# Patient Record
Sex: MALE | Race: BLACK OR AFRICAN AMERICAN | NOT HISPANIC OR LATINO | Employment: OTHER | URBAN - METROPOLITAN AREA
[De-identification: names, ages, dates, MRNs, and addresses within clinical notes are randomized per-mention and may not be internally consistent; named-entity substitution may affect disease eponyms.]

---

## 2018-01-22 ENCOUNTER — OFFICE VISIT CONVERTED (OUTPATIENT)
Dept: INTERNAL MEDICINE | Facility: CLINIC | Age: 73
End: 2018-01-22
Attending: PHYSICIAN ASSISTANT

## 2018-03-12 ENCOUNTER — CONVERSION ENCOUNTER (OUTPATIENT)
Dept: INTERNAL MEDICINE | Facility: CLINIC | Age: 73
End: 2018-03-12

## 2018-03-12 ENCOUNTER — OFFICE VISIT CONVERTED (OUTPATIENT)
Dept: INTERNAL MEDICINE | Facility: CLINIC | Age: 73
End: 2018-03-12
Attending: INTERNAL MEDICINE

## 2018-06-12 ENCOUNTER — OFFICE VISIT CONVERTED (OUTPATIENT)
Dept: INTERNAL MEDICINE | Facility: CLINIC | Age: 73
End: 2018-06-12
Attending: INTERNAL MEDICINE

## 2018-06-20 ENCOUNTER — OFFICE VISIT CONVERTED (OUTPATIENT)
Dept: INTERNAL MEDICINE | Facility: CLINIC | Age: 73
End: 2018-06-20
Attending: PHYSICIAN ASSISTANT

## 2018-08-01 ENCOUNTER — OFFICE VISIT CONVERTED (OUTPATIENT)
Dept: PULMONOLOGY | Facility: CLINIC | Age: 73
End: 2018-08-01
Attending: INTERNAL MEDICINE

## 2018-09-17 ENCOUNTER — OFFICE VISIT CONVERTED (OUTPATIENT)
Dept: INTERNAL MEDICINE | Facility: CLINIC | Age: 73
End: 2018-09-17
Attending: INTERNAL MEDICINE

## 2018-09-18 ENCOUNTER — OFFICE VISIT CONVERTED (OUTPATIENT)
Dept: PULMONOLOGY | Facility: CLINIC | Age: 73
End: 2018-09-18
Attending: INTERNAL MEDICINE

## 2019-01-08 ENCOUNTER — OFFICE VISIT CONVERTED (OUTPATIENT)
Dept: PULMONOLOGY | Facility: CLINIC | Age: 74
End: 2019-01-08
Attending: INTERNAL MEDICINE

## 2019-01-23 ENCOUNTER — HOSPITAL ENCOUNTER (OUTPATIENT)
Dept: CARDIOLOGY | Facility: HOSPITAL | Age: 74
Discharge: HOME | End: 2019-01-23
Attending: INTERNAL MEDICINE

## 2019-02-15 ENCOUNTER — HOSPITAL ENCOUNTER (OUTPATIENT)
Dept: OTHER | Facility: HOSPITAL | Age: 74
Discharge: HOME | End: 2019-02-15
Attending: SPECIALIST

## 2019-02-15 LAB
ALBUMIN SERPL-MCNC: 4.4 G/DL (ref 3.5–5)
ALBUMIN/GLOB SERPL: 1.3 {RATIO} (ref 1.4–2.6)
ALP SERPL-CCNC: 63 U/L (ref 56–155)
ALT SERPL-CCNC: 22 U/L (ref 10–40)
ANION GAP SERPL CALC-SCNC: 19 MMOL/L (ref 8–19)
AST SERPL-CCNC: 26 U/L (ref 15–50)
BILIRUB SERPL-MCNC: 0.37 MG/DL (ref 0.2–1.3)
BUN SERPL-MCNC: 16 MG/DL (ref 5–25)
BUN/CREAT SERPL: 16 {RATIO} (ref 6–20)
CALCIUM SERPL-MCNC: 9.5 MG/DL (ref 8.7–10.4)
CHLORIDE SERPL-SCNC: 103 MMOL/L (ref 99–111)
CHOLEST SERPL-MCNC: 154 MG/DL (ref 107–200)
CHOLEST/HDLC SERPL: 3.6 {RATIO} (ref 3–6)
CONV CO2: 27 MMOL/L (ref 22–32)
CONV TOTAL PROTEIN: 7.8 G/DL (ref 6.3–8.2)
CREAT UR-MCNC: 1.03 MG/DL (ref 0.7–1.2)
EST. AVERAGE GLUCOSE BLD GHB EST-MCNC: 237 MG/DL
GFR SERPLBLD BASED ON 1.73 SQ M-ARVRAT: >60 ML/MIN/{1.73_M2}
GLOBULIN UR ELPH-MCNC: 3.4 G/DL (ref 2–3.5)
GLUCOSE SERPL-MCNC: 114 MG/DL (ref 70–99)
HBA1C MFR BLD: 9.9 % (ref 3.5–5.7)
HDLC SERPL-MCNC: 43 MG/DL (ref 40–60)
LDLC SERPL CALC-MCNC: 89 MG/DL (ref 70–100)
OSMOLALITY SERPL CALC.SUM OF ELEC: 302 MOSM/KG (ref 273–304)
POTASSIUM SERPL-SCNC: 4.2 MMOL/L (ref 3.5–5.3)
PSA SERPL-MCNC: 0.92 NG/ML (ref 0–4)
SODIUM SERPL-SCNC: 145 MMOL/L (ref 135–147)
TRIGL SERPL-MCNC: 110 MG/DL (ref 40–150)
TSH SERPL-ACNC: 2.37 M[IU]/L (ref 0.27–4.2)
VLDLC SERPL-MCNC: 22 MG/DL (ref 5–37)

## 2019-07-02 ENCOUNTER — HOSPITAL ENCOUNTER (OUTPATIENT)
Dept: OTHER | Facility: HOSPITAL | Age: 74
Discharge: HOME | End: 2019-07-02
Attending: INTERNAL MEDICINE

## 2019-07-02 ENCOUNTER — CONVERSION ENCOUNTER (OUTPATIENT)
Dept: INTERNAL MEDICINE | Facility: CLINIC | Age: 74
End: 2019-07-02

## 2019-07-02 ENCOUNTER — OFFICE VISIT CONVERTED (OUTPATIENT)
Dept: INTERNAL MEDICINE | Facility: CLINIC | Age: 74
End: 2019-07-02
Attending: INTERNAL MEDICINE

## 2019-07-03 ENCOUNTER — HOSPITAL ENCOUNTER (OUTPATIENT)
Dept: CARDIOLOGY | Facility: HOSPITAL | Age: 74
Discharge: HOME | End: 2019-07-03
Attending: INTERNAL MEDICINE

## 2019-09-23 ENCOUNTER — OFFICE VISIT CONVERTED (OUTPATIENT)
Dept: SURGERY | Facility: CLINIC | Age: 74
End: 2019-09-23
Attending: SURGERY

## 2019-09-30 ENCOUNTER — OFFICE VISIT CONVERTED (OUTPATIENT)
Dept: INTERNAL MEDICINE | Facility: CLINIC | Age: 74
End: 2019-09-30
Attending: INTERNAL MEDICINE

## 2019-10-07 ENCOUNTER — HOSPITAL ENCOUNTER (OUTPATIENT)
Dept: GASTROENTEROLOGY | Facility: HOSPITAL | Age: 74
Setting detail: HOSPITAL OUTPATIENT SURGERY
Discharge: HOME | End: 2019-10-07
Attending: SURGERY

## 2019-10-07 LAB — GLUCOSE BLD-MCNC: 167 MG/DL (ref 70–99)

## 2019-10-28 ENCOUNTER — OFFICE VISIT CONVERTED (OUTPATIENT)
Dept: SURGERY | Facility: CLINIC | Age: 74
End: 2019-10-28
Attending: SURGERY

## 2020-02-04 ENCOUNTER — OFFICE VISIT CONVERTED (OUTPATIENT)
Dept: INTERNAL MEDICINE | Facility: CLINIC | Age: 75
End: 2020-02-04
Attending: INTERNAL MEDICINE

## 2020-02-04 ENCOUNTER — HOSPITAL ENCOUNTER (OUTPATIENT)
Dept: OTHER | Facility: HOSPITAL | Age: 75
Discharge: HOME | End: 2020-02-04
Attending: INTERNAL MEDICINE

## 2020-02-04 LAB
ALBUMIN SERPL-MCNC: 4.4 G/DL (ref 3.5–5)
ALBUMIN/GLOB SERPL: 1.3 {RATIO} (ref 1.4–2.6)
ALP SERPL-CCNC: 73 U/L (ref 56–155)
ALT SERPL-CCNC: 25 U/L (ref 10–40)
ANION GAP SERPL CALC-SCNC: 19 MMOL/L (ref 8–19)
AST SERPL-CCNC: 29 U/L (ref 15–50)
BASOPHILS # BLD AUTO: 0.04 10*3/UL (ref 0–0.2)
BASOPHILS NFR BLD AUTO: 0.5 % (ref 0–3)
BILIRUB SERPL-MCNC: 0.5 MG/DL (ref 0.2–1.3)
BUN SERPL-MCNC: 12 MG/DL (ref 5–25)
BUN/CREAT SERPL: 12 {RATIO} (ref 6–20)
CALCIUM SERPL-MCNC: 9.8 MG/DL (ref 8.7–10.4)
CHLORIDE SERPL-SCNC: 93 MMOL/L (ref 99–111)
CHOLEST SERPL-MCNC: 113 MG/DL (ref 107–200)
CHOLEST/HDLC SERPL: 2.6 {RATIO} (ref 3–6)
CONV ABS IMM GRAN: 0.02 10*3/UL (ref 0–0.2)
CONV CO2: 27 MMOL/L (ref 22–32)
CONV IMMATURE GRAN: 0.2 % (ref 0–1.8)
CONV TOTAL PROTEIN: 7.8 G/DL (ref 6.3–8.2)
CREAT UR-MCNC: 1.01 MG/DL (ref 0.7–1.2)
DEPRECATED RDW RBC AUTO: 45.6 FL (ref 35.1–43.9)
EOSINOPHIL # BLD AUTO: 0.11 10*3/UL (ref 0–0.7)
EOSINOPHIL # BLD AUTO: 1.3 % (ref 0–7)
ERYTHROCYTE [DISTWIDTH] IN BLOOD BY AUTOMATED COUNT: 13.3 % (ref 11.6–14.4)
EST. AVERAGE GLUCOSE BLD GHB EST-MCNC: 217 MG/DL
FOLATE SERPL-MCNC: >20 NG/ML (ref 4.8–20)
GFR SERPLBLD BASED ON 1.73 SQ M-ARVRAT: >60 ML/MIN/{1.73_M2}
GLOBULIN UR ELPH-MCNC: 3.4 G/DL (ref 2–3.5)
GLUCOSE SERPL-MCNC: 291 MG/DL (ref 70–99)
HBA1C MFR BLD: 9.2 % (ref 3.5–5.7)
HCT VFR BLD AUTO: 43.9 % (ref 42–52)
HDLC SERPL-MCNC: 43 MG/DL (ref 40–60)
HGB BLD-MCNC: 14.3 G/DL (ref 14–18)
IRON SATN MFR SERPL: 38 % (ref 20–55)
IRON SERPL-MCNC: 111 UG/DL (ref 70–180)
LDLC SERPL CALC-MCNC: 57 MG/DL (ref 70–100)
LYMPHOCYTES # BLD AUTO: 1.77 10*3/UL (ref 1–5)
LYMPHOCYTES NFR BLD AUTO: 20.8 % (ref 20–45)
MCH RBC QN AUTO: 30.4 PG (ref 27–31)
MCHC RBC AUTO-ENTMCNC: 32.6 G/DL (ref 33–37)
MCV RBC AUTO: 93.4 FL (ref 80–96)
MONOCYTES # BLD AUTO: 0.84 10*3/UL (ref 0.2–1.2)
MONOCYTES NFR BLD AUTO: 9.8 % (ref 3–10)
NEUTROPHILS # BLD AUTO: 5.75 10*3/UL (ref 2–8)
NEUTROPHILS NFR BLD AUTO: 67.4 % (ref 30–85)
NRBC CBCN: 0 % (ref 0–0.7)
OSMOLALITY SERPL CALC.SUM OF ELEC: 288 MOSM/KG (ref 273–304)
PLATELET # BLD AUTO: 231 10*3/UL (ref 130–400)
PMV BLD AUTO: 10.8 FL (ref 9.4–12.4)
POTASSIUM SERPL-SCNC: 5 MMOL/L (ref 3.5–5.3)
RBC # BLD AUTO: 4.7 10*6/UL (ref 4.7–6.1)
SODIUM SERPL-SCNC: 134 MMOL/L (ref 135–147)
T4 FREE SERPL-MCNC: 0.9 NG/DL (ref 0.9–1.8)
TIBC SERPL-MCNC: 293 UG/DL (ref 245–450)
TRANSFERRIN SERPL-MCNC: 205 MG/DL (ref 215–365)
TRIGL SERPL-MCNC: 66 MG/DL (ref 40–150)
TSH SERPL-ACNC: 6.37 M[IU]/L (ref 0.27–4.2)
VIT B12 SERPL-MCNC: 945 PG/ML (ref 211–911)
VLDLC SERPL-MCNC: 13 MG/DL (ref 5–37)
WBC # BLD AUTO: 8.53 10*3/UL (ref 4.8–10.8)

## 2020-02-14 ENCOUNTER — CONVERSION ENCOUNTER (OUTPATIENT)
Dept: ORTHOPEDIC SURGERY | Facility: CLINIC | Age: 75
End: 2020-02-14

## 2020-02-14 ENCOUNTER — OFFICE VISIT CONVERTED (OUTPATIENT)
Dept: ORTHOPEDIC SURGERY | Facility: CLINIC | Age: 75
End: 2020-02-14
Attending: ORTHOPAEDIC SURGERY

## 2020-03-10 ENCOUNTER — OFFICE VISIT CONVERTED (OUTPATIENT)
Dept: ORTHOPEDIC SURGERY | Facility: CLINIC | Age: 75
End: 2020-03-10
Attending: ORTHOPAEDIC SURGERY

## 2020-03-17 ENCOUNTER — HOSPITAL ENCOUNTER (OUTPATIENT)
Dept: PREADMISSION TESTING | Facility: HOSPITAL | Age: 75
Discharge: HOME | End: 2020-03-17
Attending: ORTHOPAEDIC SURGERY

## 2020-03-17 LAB
ALBUMIN SERPL-MCNC: 4.3 G/DL (ref 3.5–5)
ALBUMIN/GLOB SERPL: 1.3 {RATIO} (ref 1.4–2.6)
ALP SERPL-CCNC: 68 U/L (ref 56–155)
ALT SERPL-CCNC: 19 U/L (ref 10–40)
ANION GAP SERPL CALC-SCNC: 16 MMOL/L (ref 8–19)
APTT BLD: 22.7 S (ref 22.2–34.2)
AST SERPL-CCNC: 23 U/L (ref 15–50)
BASOPHILS # BLD AUTO: 0.04 10*3/UL (ref 0–0.2)
BASOPHILS NFR BLD AUTO: 0.6 % (ref 0–3)
BILIRUB SERPL-MCNC: 0.31 MG/DL (ref 0.2–1.3)
BUN SERPL-MCNC: 19 MG/DL (ref 5–25)
BUN/CREAT SERPL: 19 {RATIO} (ref 6–20)
CALCIUM SERPL-MCNC: 9.2 MG/DL (ref 8.7–10.4)
CHLORIDE SERPL-SCNC: 97 MMOL/L (ref 99–111)
CONV ABS IMM GRAN: 0.02 10*3/UL (ref 0–0.2)
CONV CO2: 27 MMOL/L (ref 22–32)
CONV IMMATURE GRAN: 0.3 % (ref 0–1.8)
CONV TOTAL PROTEIN: 7.6 G/DL (ref 6.3–8.2)
CREAT UR-MCNC: 0.99 MG/DL (ref 0.7–1.2)
DEPRECATED RDW RBC AUTO: 47.9 FL (ref 35.1–43.9)
EOSINOPHIL # BLD AUTO: 0.12 10*3/UL (ref 0–0.7)
EOSINOPHIL # BLD AUTO: 1.7 % (ref 0–7)
ERYTHROCYTE [DISTWIDTH] IN BLOOD BY AUTOMATED COUNT: 13.8 % (ref 11.6–14.4)
EST. AVERAGE GLUCOSE BLD GHB EST-MCNC: 203 MG/DL
GFR SERPLBLD BASED ON 1.73 SQ M-ARVRAT: >60 ML/MIN/{1.73_M2}
GLOBULIN UR ELPH-MCNC: 3.3 G/DL (ref 2–3.5)
GLUCOSE SERPL-MCNC: 190 MG/DL (ref 70–99)
HBA1C MFR BLD: 8.7 % (ref 3.5–5.7)
HCT VFR BLD AUTO: 40.7 % (ref 42–52)
HGB BLD-MCNC: 13.3 G/DL (ref 14–18)
INR PPP: 0.94 (ref 2–3)
LYMPHOCYTES # BLD AUTO: 1.58 10*3/UL (ref 1–5)
LYMPHOCYTES NFR BLD AUTO: 21.9 % (ref 20–45)
MCH RBC QN AUTO: 31.1 PG (ref 27–31)
MCHC RBC AUTO-ENTMCNC: 32.7 G/DL (ref 33–37)
MCV RBC AUTO: 95.1 FL (ref 80–96)
MONOCYTES # BLD AUTO: 0.92 10*3/UL (ref 0.2–1.2)
MONOCYTES NFR BLD AUTO: 12.8 % (ref 3–10)
NEUTROPHILS # BLD AUTO: 4.53 10*3/UL (ref 2–8)
NEUTROPHILS NFR BLD AUTO: 62.7 % (ref 30–85)
NRBC CBCN: 0 % (ref 0–0.7)
OSMOLALITY SERPL CALC.SUM OF ELEC: 289 MOSM/KG (ref 273–304)
PLATELET # BLD AUTO: 225 10*3/UL (ref 130–400)
PMV BLD AUTO: 9.7 FL (ref 9.4–12.4)
POTASSIUM SERPL-SCNC: 4 MMOL/L (ref 3.5–5.3)
PROTHROMBIN TIME: 10.3 S (ref 9.4–12)
RBC # BLD AUTO: 4.28 10*6/UL (ref 4.7–6.1)
SODIUM SERPL-SCNC: 136 MMOL/L (ref 135–147)
WBC # BLD AUTO: 7.21 10*3/UL (ref 4.8–10.8)

## 2020-05-22 ENCOUNTER — HOSPITAL ENCOUNTER (OUTPATIENT)
Dept: PREADMISSION TESTING | Facility: HOSPITAL | Age: 75
Discharge: HOME | End: 2020-05-22
Attending: ORTHOPAEDIC SURGERY

## 2020-05-22 LAB
ALBUMIN SERPL-MCNC: 4.3 G/DL (ref 3.5–5)
ALBUMIN/GLOB SERPL: 1.4 {RATIO} (ref 1.4–2.6)
ALP SERPL-CCNC: 66 U/L (ref 56–155)
ALT SERPL-CCNC: 22 U/L (ref 10–40)
ANION GAP SERPL CALC-SCNC: 15 MMOL/L (ref 8–19)
APTT BLD: 24.4 S (ref 22.2–34.2)
AST SERPL-CCNC: 26 U/L (ref 15–50)
BASOPHILS # BLD AUTO: 0.04 10*3/UL (ref 0–0.2)
BASOPHILS NFR BLD AUTO: 0.6 % (ref 0–3)
BILIRUB SERPL-MCNC: 0.36 MG/DL (ref 0.2–1.3)
BUN SERPL-MCNC: 16 MG/DL (ref 5–25)
BUN/CREAT SERPL: 17 {RATIO} (ref 6–20)
CALCIUM SERPL-MCNC: 9.2 MG/DL (ref 8.7–10.4)
CHLORIDE SERPL-SCNC: 99 MMOL/L (ref 99–111)
CONV ABS IMM GRAN: 0.01 10*3/UL (ref 0–0.2)
CONV CO2: 26 MMOL/L (ref 22–32)
CONV IMMATURE GRAN: 0.2 % (ref 0–1.8)
CONV TOTAL PROTEIN: 7.4 G/DL (ref 6.3–8.2)
CREAT UR-MCNC: 0.92 MG/DL (ref 0.7–1.2)
DEPRECATED RDW RBC AUTO: 42.4 FL (ref 35.1–43.9)
EOSINOPHIL # BLD AUTO: 0.17 10*3/UL (ref 0–0.7)
EOSINOPHIL # BLD AUTO: 2.7 % (ref 0–7)
ERYTHROCYTE [DISTWIDTH] IN BLOOD BY AUTOMATED COUNT: 12.4 % (ref 11.6–14.4)
EST. AVERAGE GLUCOSE BLD GHB EST-MCNC: 203 MG/DL
GFR SERPLBLD BASED ON 1.73 SQ M-ARVRAT: >60 ML/MIN/{1.73_M2}
GLOBULIN UR ELPH-MCNC: 3.1 G/DL (ref 2–3.5)
GLUCOSE SERPL-MCNC: 112 MG/DL (ref 70–99)
HBA1C MFR BLD: 8.7 % (ref 3.5–5.7)
HCT VFR BLD AUTO: 41.5 % (ref 42–52)
HGB BLD-MCNC: 13.6 G/DL (ref 14–18)
INR PPP: 1.02 (ref 2–3)
LYMPHOCYTES # BLD AUTO: 1.51 10*3/UL (ref 1–5)
LYMPHOCYTES NFR BLD AUTO: 23.9 % (ref 20–45)
MCH RBC QN AUTO: 30.4 PG (ref 27–31)
MCHC RBC AUTO-ENTMCNC: 32.8 G/DL (ref 33–37)
MCV RBC AUTO: 92.8 FL (ref 80–96)
MONOCYTES # BLD AUTO: 0.81 10*3/UL (ref 0.2–1.2)
MONOCYTES NFR BLD AUTO: 12.8 % (ref 3–10)
NEUTROPHILS # BLD AUTO: 3.79 10*3/UL (ref 2–8)
NEUTROPHILS NFR BLD AUTO: 59.8 % (ref 30–85)
NRBC CBCN: 0 % (ref 0–0.7)
OSMOLALITY SERPL CALC.SUM OF ELEC: 284 MOSM/KG (ref 273–304)
PLATELET # BLD AUTO: 194 10*3/UL (ref 130–400)
PMV BLD AUTO: 9.7 FL (ref 9.4–12.4)
POTASSIUM SERPL-SCNC: 4 MMOL/L (ref 3.5–5.3)
PROTHROMBIN TIME: 10.9 S (ref 9.4–12)
RBC # BLD AUTO: 4.47 10*6/UL (ref 4.7–6.1)
SODIUM SERPL-SCNC: 136 MMOL/L (ref 135–147)
WBC # BLD AUTO: 6.33 10*3/UL (ref 4.8–10.8)

## 2020-05-25 ENCOUNTER — HOSPITAL ENCOUNTER (OUTPATIENT)
Dept: PERIOP | Facility: HOSPITAL | Age: 75
Discharge: HOME | End: 2020-05-25
Attending: ORTHOPAEDIC SURGERY

## 2020-05-26 LAB — SARS-COV-2 RNA SPEC QL NAA+PROBE: NOT DETECTED

## 2020-06-01 ENCOUNTER — TELEPHONE CONVERTED (OUTPATIENT)
Dept: INTERNAL MEDICINE | Facility: CLINIC | Age: 75
End: 2020-06-01
Attending: INTERNAL MEDICINE

## 2020-06-01 ENCOUNTER — CONVERSION ENCOUNTER (OUTPATIENT)
Dept: INTERNAL MEDICINE | Facility: CLINIC | Age: 75
End: 2020-06-01

## 2020-06-04 ENCOUNTER — HOSPITAL ENCOUNTER (OUTPATIENT)
Dept: NUCLEAR MEDICINE | Facility: HOSPITAL | Age: 75
Discharge: HOME | End: 2020-06-04
Attending: SPECIALIST

## 2020-09-21 ENCOUNTER — HOSPITAL ENCOUNTER (OUTPATIENT)
Dept: PREADMISSION TESTING | Facility: HOSPITAL | Age: 75
Discharge: HOME | End: 2020-09-21
Attending: ORTHOPAEDIC SURGERY

## 2020-09-22 ENCOUNTER — HOSPITAL ENCOUNTER (OUTPATIENT)
Dept: PREADMISSION TESTING | Facility: HOSPITAL | Age: 75
Discharge: HOME | End: 2020-09-22
Attending: ORTHOPAEDIC SURGERY

## 2020-09-22 LAB
ALBUMIN SERPL-MCNC: 4.2 G/DL (ref 3.5–5)
ALBUMIN/GLOB SERPL: 1.4 {RATIO} (ref 1.4–2.6)
ALP SERPL-CCNC: 74 U/L (ref 56–155)
ALT SERPL-CCNC: 29 U/L (ref 10–40)
ANION GAP SERPL CALC-SCNC: 14 MMOL/L (ref 8–19)
APTT BLD: 23.8 S (ref 22.2–34.2)
AST SERPL-CCNC: 32 U/L (ref 15–50)
BASOPHILS # BLD AUTO: 0.03 10*3/UL (ref 0–0.2)
BASOPHILS NFR BLD AUTO: 0.5 % (ref 0–3)
BILIRUB SERPL-MCNC: 0.8 MG/DL (ref 0.2–1.3)
BUN SERPL-MCNC: 14 MG/DL (ref 5–25)
BUN/CREAT SERPL: 13 {RATIO} (ref 6–20)
CALCIUM SERPL-MCNC: 9 MG/DL (ref 8.7–10.4)
CHLORIDE SERPL-SCNC: 101 MMOL/L (ref 99–111)
CONV ABS IMM GRAN: 0.02 10*3/UL (ref 0–0.2)
CONV CO2: 28 MMOL/L (ref 22–32)
CONV IMMATURE GRAN: 0.3 % (ref 0–1.8)
CONV TOTAL PROTEIN: 7.3 G/DL (ref 6.3–8.2)
CREAT UR-MCNC: 1.05 MG/DL (ref 0.7–1.2)
DEPRECATED RDW RBC AUTO: 49.2 FL (ref 35.1–43.9)
EOSINOPHIL # BLD AUTO: 0.15 10*3/UL (ref 0–0.7)
EOSINOPHIL # BLD AUTO: 2.5 % (ref 0–7)
ERYTHROCYTE [DISTWIDTH] IN BLOOD BY AUTOMATED COUNT: 14 % (ref 11.6–14.4)
EST. AVERAGE GLUCOSE BLD GHB EST-MCNC: 200 MG/DL
GFR SERPLBLD BASED ON 1.73 SQ M-ARVRAT: >60 ML/MIN/{1.73_M2}
GLOBULIN UR ELPH-MCNC: 3.1 G/DL (ref 2–3.5)
GLUCOSE SERPL-MCNC: 188 MG/DL (ref 70–99)
HBA1C MFR BLD: 8.6 % (ref 3.5–5.7)
HCT VFR BLD AUTO: 42 % (ref 42–52)
HGB BLD-MCNC: 13.4 G/DL (ref 14–18)
INR PPP: 1.03 (ref 2–3)
LYMPHOCYTES # BLD AUTO: 1.72 10*3/UL (ref 1–5)
LYMPHOCYTES NFR BLD AUTO: 28.9 % (ref 20–45)
MCH RBC QN AUTO: 30.5 PG (ref 27–31)
MCHC RBC AUTO-ENTMCNC: 31.9 G/DL (ref 33–37)
MCV RBC AUTO: 95.5 FL (ref 80–96)
MONOCYTES # BLD AUTO: 0.75 10*3/UL (ref 0.2–1.2)
MONOCYTES NFR BLD AUTO: 12.6 % (ref 3–10)
NEUTROPHILS # BLD AUTO: 3.29 10*3/UL (ref 2–8)
NEUTROPHILS NFR BLD AUTO: 55.2 % (ref 30–85)
NRBC CBCN: 0 % (ref 0–0.7)
OSMOLALITY SERPL CALC.SUM OF ELEC: 293 MOSM/KG (ref 273–304)
PLATELET # BLD AUTO: 192 10*3/UL (ref 130–400)
PMV BLD AUTO: 9.9 FL (ref 9.4–12.4)
POTASSIUM SERPL-SCNC: 4.2 MMOL/L (ref 3.5–5.3)
PROTHROMBIN TIME: 11 S (ref 9.4–12)
RBC # BLD AUTO: 4.4 10*6/UL (ref 4.7–6.1)
SARS-COV-2 RNA SPEC QL NAA+PROBE: NOT DETECTED
SODIUM SERPL-SCNC: 139 MMOL/L (ref 135–147)
WBC # BLD AUTO: 5.96 10*3/UL (ref 4.8–10.8)

## 2020-10-09 ENCOUNTER — CONVERSION ENCOUNTER (OUTPATIENT)
Dept: ORTHOPEDIC SURGERY | Facility: CLINIC | Age: 75
End: 2020-10-09

## 2020-10-09 ENCOUNTER — OFFICE VISIT CONVERTED (OUTPATIENT)
Dept: ORTHOPEDIC SURGERY | Facility: CLINIC | Age: 75
End: 2020-10-09
Attending: PHYSICIAN ASSISTANT

## 2020-11-10 ENCOUNTER — OFFICE VISIT CONVERTED (OUTPATIENT)
Dept: ORTHOPEDIC SURGERY | Facility: CLINIC | Age: 75
End: 2020-11-10
Attending: PHYSICIAN ASSISTANT

## 2020-12-02 ENCOUNTER — OFFICE VISIT CONVERTED (OUTPATIENT)
Dept: INTERNAL MEDICINE | Facility: CLINIC | Age: 75
End: 2020-12-02
Attending: INTERNAL MEDICINE

## 2020-12-02 ENCOUNTER — HOSPITAL ENCOUNTER (OUTPATIENT)
Dept: OTHER | Facility: HOSPITAL | Age: 75
Discharge: HOME | End: 2020-12-02
Attending: INTERNAL MEDICINE

## 2020-12-02 LAB
ALBUMIN SERPL-MCNC: 4.6 G/DL (ref 3.5–5)
ALBUMIN/GLOB SERPL: 1.2 {RATIO} (ref 1.4–2.6)
ALP SERPL-CCNC: 97 U/L (ref 56–155)
ALT SERPL-CCNC: 22 U/L (ref 10–40)
ANION GAP SERPL CALC-SCNC: 22 MMOL/L (ref 8–19)
AST SERPL-CCNC: 26 U/L (ref 15–50)
BASOPHILS # BLD AUTO: 0.03 10*3/UL (ref 0–0.2)
BASOPHILS NFR BLD AUTO: 0.3 % (ref 0–3)
BILIRUB SERPL-MCNC: 1.02 MG/DL (ref 0.2–1.3)
BUN SERPL-MCNC: 20 MG/DL (ref 5–25)
BUN/CREAT SERPL: 18 {RATIO} (ref 6–20)
CALCIUM SERPL-MCNC: 9.4 MG/DL (ref 8.7–10.4)
CHLORIDE SERPL-SCNC: 95 MMOL/L (ref 99–111)
CHOLEST SERPL-MCNC: 181 MG/DL (ref 107–200)
CHOLEST/HDLC SERPL: 3.3 {RATIO} (ref 3–6)
CONV ABS IMM GRAN: 0.01 10*3/UL (ref 0–0.2)
CONV CO2: 23 MMOL/L (ref 22–32)
CONV IMMATURE GRAN: 0.1 % (ref 0–1.8)
CONV TOTAL PROTEIN: 8.3 G/DL (ref 6.3–8.2)
CREAT UR-MCNC: 1.14 MG/DL (ref 0.7–1.2)
DEPRECATED RDW RBC AUTO: 44.7 FL (ref 35.1–43.9)
EOSINOPHIL # BLD AUTO: 0.09 10*3/UL (ref 0–0.7)
EOSINOPHIL # BLD AUTO: 0.9 % (ref 0–7)
ERYTHROCYTE [DISTWIDTH] IN BLOOD BY AUTOMATED COUNT: 12.9 % (ref 11.6–14.4)
GFR SERPLBLD BASED ON 1.73 SQ M-ARVRAT: >60 ML/MIN/{1.73_M2}
GLOBULIN UR ELPH-MCNC: 3.7 G/DL (ref 2–3.5)
GLUCOSE SERPL-MCNC: 387 MG/DL (ref 70–99)
HCT VFR BLD AUTO: 45.4 % (ref 42–52)
HDLC SERPL-MCNC: 55 MG/DL (ref 40–60)
HGB BLD-MCNC: 14.4 G/DL (ref 14–18)
LDLC SERPL CALC-MCNC: 103 MG/DL (ref 70–100)
LYMPHOCYTES # BLD AUTO: 1.71 10*3/UL (ref 1–5)
LYMPHOCYTES NFR BLD AUTO: 17.9 % (ref 20–45)
MCH RBC QN AUTO: 29.9 PG (ref 27–31)
MCHC RBC AUTO-ENTMCNC: 31.7 G/DL (ref 33–37)
MCV RBC AUTO: 94.4 FL (ref 80–96)
MONOCYTES # BLD AUTO: 0.7 10*3/UL (ref 0.2–1.2)
MONOCYTES NFR BLD AUTO: 7.3 % (ref 3–10)
NEUTROPHILS # BLD AUTO: 7 10*3/UL (ref 2–8)
NEUTROPHILS NFR BLD AUTO: 73.5 % (ref 30–85)
NRBC CBCN: 0 % (ref 0–0.7)
OSMOLALITY SERPL CALC.SUM OF ELEC: 299 MOSM/KG (ref 273–304)
PLATELET # BLD AUTO: 200 10*3/UL (ref 130–400)
PMV BLD AUTO: 10.9 FL (ref 9.4–12.4)
POTASSIUM SERPL-SCNC: 4.7 MMOL/L (ref 3.5–5.3)
RBC # BLD AUTO: 4.81 10*6/UL (ref 4.7–6.1)
SODIUM SERPL-SCNC: 135 MMOL/L (ref 135–147)
T4 FREE SERPL-MCNC: 1.1 NG/DL (ref 0.9–1.8)
TRIGL SERPL-MCNC: 117 MG/DL (ref 40–150)
TSH SERPL-ACNC: 2.82 M[IU]/L (ref 0.27–4.2)
VLDLC SERPL-MCNC: 23 MG/DL (ref 5–37)
WBC # BLD AUTO: 9.54 10*3/UL (ref 4.8–10.8)

## 2020-12-03 LAB
EST. AVERAGE GLUCOSE BLD GHB EST-MCNC: 192 MG/DL
HBA1C MFR BLD: 8.3 % (ref 3.5–5.7)

## 2020-12-04 LAB
CONV HEPATITIS C AB WITH REFLEX TO CONFIRMATION: <0.1 S/CO RATIO (ref 0–0.9)
CONV HEPATITIS COMMENT: NORMAL

## 2020-12-15 ENCOUNTER — OFFICE VISIT CONVERTED (OUTPATIENT)
Dept: ORTHOPEDIC SURGERY | Facility: CLINIC | Age: 75
End: 2020-12-15
Attending: PHYSICIAN ASSISTANT

## 2020-12-16 ENCOUNTER — HOSPITAL ENCOUNTER (OUTPATIENT)
Dept: MRI IMAGING | Facility: HOSPITAL | Age: 75
Discharge: HOME | End: 2020-12-16
Attending: INTERNAL MEDICINE

## 2021-01-07 ENCOUNTER — OFFICE VISIT CONVERTED (OUTPATIENT)
Dept: ORTHOPEDIC SURGERY | Facility: CLINIC | Age: 76
End: 2021-01-07
Attending: ORTHOPAEDIC SURGERY

## 2021-02-09 ENCOUNTER — OFFICE VISIT CONVERTED (OUTPATIENT)
Dept: ORTHOPEDIC SURGERY | Facility: CLINIC | Age: 76
End: 2021-02-09
Attending: PHYSICIAN ASSISTANT

## 2021-03-25 ENCOUNTER — OFFICE VISIT CONVERTED (OUTPATIENT)
Dept: ORTHOPEDIC SURGERY | Facility: CLINIC | Age: 76
End: 2021-03-25

## 2021-03-26 ENCOUNTER — OFFICE VISIT CONVERTED (OUTPATIENT)
Dept: INTERNAL MEDICINE | Facility: CLINIC | Age: 76
End: 2021-03-26
Attending: INTERNAL MEDICINE

## 2021-03-26 ENCOUNTER — CONVERSION ENCOUNTER (OUTPATIENT)
Dept: INTERNAL MEDICINE | Facility: CLINIC | Age: 76
End: 2021-03-26

## 2021-04-12 ENCOUNTER — HOSPITAL ENCOUNTER (OUTPATIENT)
Dept: CARDIOLOGY | Facility: HOSPITAL | Age: 76
Discharge: HOME | End: 2021-04-12
Attending: PODIATRIST

## 2021-05-10 NOTE — H&P
History and Physical      Patient Name: Giles Donovan   Patient ID: [id number]   Sex: Male   YOB: 1945    Primary Care Provider: Morena Masters MD   Referring Provider: Kannan Bhatti MD    Visit Date: January 7, 2021    Provider: Ho Gilliam MD   Location: [facility]   Location Address: [address] [place] [address]  [id number]   Location Phone: [phone]          Chief Complaint  · Left Knee Pain      History Of Present Illness  Giles Donovan is a 75 year old /Black male who presents today to [facility].      Patient presents today for an evaluation of left knee pain. Patient had a fall a couple of months ago when he was voting resulting in left knee injury. Patient has been having a lot of posterior knee pain. Patient states he received a cortisone injection in his left knee by Dr. Masters that has given him relief of pain for a couple of days. Patient states he has generalized knee pain when weight bearing.       Past Medical History  Allergic rhinitis; Cataract; Cough; Depression; Diabetes Mellitus, Type II; GERD; Hyperlipidemia; Hypertension; Hypothyroidism; Psychiatric Care; Seasonal allergies; Shortness of Breath; Stenosis of right carotid artery; Syncope; Upper Respiratory Infection         Past Surgical History  Cataract surgery; Colonoscopy; Retinal tear repair NEC; Toe amputation, left, single toe         Medication List  aspirin 81 mg oral tablet,chewable; atorvastatin 20 mg oral tablet; carvedilol 12.5 mg oral tablet; felodipine 5 mg oral tablet extended release 24 hr; gabapentin 600 mg oral tablet; Lantus Solostar 100 unit/mL (3 mL) subcutaneous insulin pen; losartan 50 mg oral tablet; Obed Multivitamin For Men 200-175-250 mcg oral tablet; meloxicam 7.5 mg oral tablet; Novolog Flexpen 100 unit/mL subcutaneous insulin pen; omeprazole 40 mg oral capsule,delayed release(DR/EC); oxybutynin chloride 5 mg oral tablet; sildenafil oral; Synthroid  "88 mcg oral tablet; Tessalon Perles 100 mg oral capsule; Zyrtec 10 mg oral tablet         Allergy List  Latex       Allergies Reconciled  Family Medical History  Heart Disease; Lupus Erythematosus; Kidney Disease; Family history of Arthritis         Social History  Alcohol (Current some day); Alcohol Use (Current some day); Disabled; lives with other; Retired; Retired.; Single.; Tobacco (Never)         Immunizations  Name Date Admin   Influenza 12/02/2020   Influenza 10/01/2019   Influenza 09/17/2018         Review of Systems  · Constitutional  o Denies  o : fever, chills, weight loss  · Cardiovascular  o Denies  o : chest pain, shortness of breath  · Gastrointestinal  o Denies  o : liver disease, heartburn, nausea, blood in stools  · Genitourinary  o Denies  o : painful urination, blood in urine  · Integument  o Denies  o : rash, itching  · Neurologic  o Denies  o : headache, weakness, loss of consciousness  · Musculoskeletal  o Denies  o : painful, swollen joints  · Psychiatric  o Denies  o : drug/alcohol addiction, anxiety, depression      Vitals  Date Time BP Position Site L\R Cuff Size HR RR TEMP (F) WT  HT  BMI kg/m2 BSA m2 O2 Sat FR L/min FiO2 HC       01/07/2021 10:27 AM      82 - R   204lbs 16oz 5'  7\" 32.11 2.1 94 %            Physical Examination  · Constitutional  o Appearance  o : well developed, well-nourished, no obvious deformities present  · Head and Face  o Head  o :   § Inspection  § : normocephalic  o Face  o :   § Inspection  § : no facial lesions  · Eyes  o Conjunctivae  o : conjunctivae normal  o Sclerae  o : sclerae white  · Ears, Nose, Mouth and Throat  o Ears  o :   § External Ears  § : appearance within normal limits  § Hearing  § : intact  o Nose  o :   § External Nose  § : appearance normal  · Neck  o Inspection/Palpation  o : normal appearance  o Range of Motion  o : full range of motion  · Respiratory  o Respiratory Effort  o : breathing unlabored  o Inspection of Chest  o : normal " appearance  o Auscultation of Lungs  o : no audible wheezing or rales  · Cardiovascular  o Heart  o : regular rate  · Gastrointestinal  o Abdominal Examination  o : soft and non-tender  · Skin and Subcutaneous Tissue  o General Inspection  o : intact, no rashes  · Psychiatric  o General  o : Alert and oriented x3  o Judgement and Insight  o : judgment and insight intact  o Mood and Affect  o : mood normal, affect appropriate  · Left Knee  o Inspection  o : Sensation grossly intact. Tender to palpation on joint lines. No swelling, skin discoloration or atrophy. Full weight bearing. Cane for ambulation assistance. Full flexion and extension. Stable to valgus/varus stress. Good strength in quadriceps, hamstrings, dorsiflexors, and plantar flexors.  · Injection Note/Aspiration Note  o Site  o : left knee   o Procedure  o : Procedure: After educating the patient, patient gave consent for procedure. After using Chloraprep, the joint space was injected. The patient tolerated the procedure well.   o Medication  o : Synvisc One 48 mg   · Imaging  o Imaging  o : 12/16/20 MRI: 1. Mild to moderate tricompartmental osteoarthritis, most pronounced within the medial compartment. 2. Vertical tear of the body of the medial meniscus with small oblique tear of the apex of the posterior horn of the medial meniscus. The lateral meniscus appears intact.           Assessment  · Primary osteoarthritis of left knee     715.16/M17.12  · Hamstring Tendinitis     726.90/M77.9      Plan  · Orders  o Hyaluronan or derivative, Synvisc or Synvisc-One, for intra-venu () - 715.16/M17.12 - 01/07/2021   Lot [id number] Exp 02 2023 [employer] Administered by DANIEL Gilliam MD  o Knee Intra-articular Injection without US Guidance [facility] (54087) - 715.16/M17.12 - 01/07/2021  · Medications  o Medications have been Reconciled  o Transition of Care or Provider Policy  · Instructions  o Dr. Gilliam saw and examined the patient and agrees with plan.   o X-rays reviewed by  Dr. Gilliam.  o Reviewed the patient's Past Medical, Social, and Family history as well as the ROS at today's visit, no changes.  o Call or return if worsening symptoms.  o Follow up in 1 month.  o This note was transcribed by Beverley Chisholm. ernesto  o Discussed diagnosis and treatment plans with the patient. Patient states his hip replacement is doing well. Patient opted for physical therapy and a left knee injection. Patient tolerated the left knee injection well.             Electronically Signed by: Beverley Chisholm-, Other -Author on January 8, 2021 02:33:19 PM  Electronically Co-signed by: Ho Gilliam MD -Reviewer on January 9, 2021 10:14:46 AM

## 2021-05-13 NOTE — PROGRESS NOTES
Progress Note      Patient Name: Giles Donovan   Patient ID: [id number]   Sex: Male   YOB: 1945    Primary Care Provider: Morena Masters MD   Referring Provider: Kannan Bhatti MD    Visit Date: October 9, 2020    Provider: Tawny Glasgow PA-C   Location: [facility]   Location Address: [address] [place] [address]  [id number]   Location Phone: [phone]          History Of Present Illness  Giles Donovan is a 74 year old /Black male who presents today to [facility].      Patient is status post right total hip arthroplasty performed 9/25/20 by Dr. Gilliam. Patient is doing very well attending physical therapy at [facility]. Patient states minimal pain in right hip. Patient denies calf pain. Patient is using a cane.       Past Medical History  Allergic rhinitis; Cataract; Cough; Depression; Diabetes Mellitus, Type II; GERD; Hyperlipidemia; Hypertension; Hypothyroidism; Psychiatric Care; Seasonal allergies; Shortness of Breath; Stenosis of right carotid artery; Syncope; Upper respiratory infection         Past Surgical History  Cataract surgery; Colonoscopy; Retinal tear repair NEC; Toe amputation, left, single toe         Medication List  aspirin 81 mg oral tablet,chewable; atorvastatin 20 mg oral tablet; carvedilol 12.5 mg oral tablet; felodipine 5 mg oral tablet extended release 24 hr; gabapentin 600 mg oral tablet; Lantus Solostar 100 unit/mL (3 mL) subcutaneous insulin pen; losartan 50 mg oral tablet; Obed Multivitamin For Men 200-175-250 mcg oral tablet; Novolog Flexpen 100 unit/mL subcutaneous insulin pen; omeprazole 40 mg oral capsule,delayed release(DR/EC); sildenafil oral; Synthroid 88 mcg oral tablet; Tessalon Perles 100 mg oral capsule; Zyrtec 10 mg oral tablet         Allergy List  Latex         Family Medical History  Heart Disease; Lupus Erythematosus; Kidney Disease; Family history of Arthritis         Social History  Alcohol (Current some day);  "Alcohol Use (Current some day); Disabled; lives with other; Retired; Retired.; Single.; Tobacco (Never)         Review of Systems  · Constitutional  o Denies  o : fever, chills, weight loss  · Cardiovascular  o Denies  o : chest pain, shortness of breath  · Gastrointestinal  o Denies  o : liver disease, heartburn, nausea, blood in stools  · Genitourinary  o Denies  o : painful urination, blood in urine  · Integument  o Denies  o : rash, itching  · Neurologic  o Denies  o : headache, weakness, loss of consciousness  · Musculoskeletal  o Denies  o : painful, swollen joints  · Psychiatric  o Denies  o : drug/alcohol addiction, anxiety, depression      Vitals  Date Time BP Position Site L\R Cuff Size HR RR TEMP (F) WT  HT  BMI kg/m2 BSA m2 O2 Sat FR L/min FiO2 HC       10/09/2020 10:10 AM      86 - R   212lbs 6oz 5'  7\" 33.26 2.13 92 %            Physical Examination  · Constitutional  o Appearance  o : well developed, well-nourished, no obvious deformities present  · Head and Face  o Head  o :   § Inspection  § : normocephalic  o Face  o :   § Inspection  § : no facial lesions  · Eyes  o Conjunctivae  o : conjunctivae normal  o Sclerae  o : sclerae white  · Ears, Nose, Mouth and Throat  o Ears  o :   § External Ears  § : appearance within normal limits  § Hearing  § : intact  o Nose  o :   § External Nose  § : appearance normal  · Neck  o Inspection/Palpation  o : normal appearance  o Range of Motion  o : full range of motion  · Respiratory  o Respiratory Effort  o : breathing unlabored  o Inspection of Chest  o : normal appearance  o Auscultation of Lungs  o : no audible wheezing or rales  · Cardiovascular  o Heart  o : regular rate  · Gastrointestinal  o Abdominal Examination  o : soft and non-tender  · Skin and Subcutaneous Tissue  o General Inspection  o : intact, no rashes  · Psychiatric  o General  o : Alert and oriented x3  o Judgement and Insight  o : judgment and insight intact  o Mood and Affect  o : mood " normal, affect appropriate  · In Office Procedures  o View  o : AP/LATERAL  o Site  o : right, hip  o Indication  o : Right hip pain  o Study  o : X-rays ordered, taken in the office, and reviewed today.  o Xray  o : stable implant  o Comparative Data  o : Comparative Data found and reviewed today  · Right Hip-Street  o Inspection  o : well-healed scar   o Palpation  o : no tenderness at hip and pelvic muscles  o ROM  o : normal extension (30), normal abduction (45-50), normal adduction, normal internal rotation, normal external rotation  o Special Tests  o : no pain with flexion, no pain with extension, no pain with rotation  o Neurologic Testing  o : Neurovascular and sensation intact          Assessment  · Aftercare;following joint replacement     V54.81/Z47.1  · Right Pain: Hip     719.45/M25.559      Plan  · Orders  o Hip (Right) 2 or more views (includes AP Pelvis) [facility] Preferred View. (19885) - 719.45/M25.559 - 10/09/2020  · Medications  o Medications have been Reconciled  o Transition of Care or Provider Policy  · Instructions  o Staples removed in clinic today.  o Reviewed the patient's Past Medical, Social, and Family history as well as the ROS at today's visit, no changes.  o Call or return if worsening symptoms.  o X-ray ordered, taken and reviewed at this visit.  o Follow Up in 4 weeks.   o Electronically Identified Patient Education Materials Provided Electronically     continue physical therapy             Electronically Signed by: Tawny Glasgow PA-C -Author on October 9, 2020 10:38:03 AM  Electronically Co-signed by: Ho Gilliam MD -Reviewer on October 9, 2020 08:21:21 PM

## 2021-05-13 NOTE — PROGRESS NOTES
Progress Note      Patient Name: Giles Donovan   Patient ID: [id number]   Sex: Male   YOB: 1945    Primary Care Provider: Morena Masters MD   Referring Provider: Kannan Bhatti MD    Visit Date: November 10, 2020    Provider: Tawny Glasgow PA-C   Location: [facility]   Location Address: [address] [place] [address]  [id number]   Location Phone: [phone]          Chief Complaint  · Follow up right hip pain      History Of Present Illness  Giles Donovan is a 75 year old /Black male who presents today to [facility].      Patient is status post right total hip arthroplasty performed 9/25/20 by Dr. Gilliam. Patient is doing very well attending physical therapy at [facility]. Patient states minimal pain in right hip.             Past Medical History  Allergic rhinitis; Cataract; Cough; Depression; Diabetes Mellitus, Type II; GERD; Hyperlipidemia; Hypertension; Hypothyroidism; Psychiatric Care; Seasonal allergies; Shortness of Breath; Stenosis of right carotid artery; Syncope; Upper respiratory infection         Past Surgical History  Cataract surgery; Colonoscopy; Retinal tear repair NEC; Toe amputation, left, single toe         Medication List  aspirin 81 mg oral tablet,chewable; atorvastatin 20 mg oral tablet; carvedilol 12.5 mg oral tablet; felodipine 5 mg oral tablet extended release 24 hr; gabapentin 600 mg oral tablet; Lantus Solostar 100 unit/mL (3 mL) subcutaneous insulin pen; losartan 50 mg oral tablet; Obed Multivitamin For Men 200-175-250 mcg oral tablet; Novolog Flexpen 100 unit/mL subcutaneous insulin pen; omeprazole 40 mg oral capsule,delayed release(DR/EC); sildenafil oral; Synthroid 88 mcg oral tablet; Tessalon Perles 100 mg oral capsule; Zyrtec 10 mg oral tablet         Allergy List  Latex         Family Medical History  Heart Disease; Lupus Erythematosus; Kidney Disease; Family history of Arthritis         Social History  Alcohol (Current some  "day); Alcohol Use (Current some day); Disabled; lives with other; Retired; Retired.; Single.; Tobacco (Never)         Review of Systems  · Constitutional  o Denies  o : fever, chills, weight loss  · Cardiovascular  o Denies  o : chest pain, shortness of breath  · Gastrointestinal  o Denies  o : liver disease, heartburn, nausea, blood in stools  · Genitourinary  o Denies  o : painful urination, blood in urine  · Integument  o Denies  o : rash, itching  · Neurologic  o Denies  o : headache, weakness, loss of consciousness  · Musculoskeletal  o Denies  o : painful, swollen joints  · Psychiatric  o Denies  o : drug/alcohol addiction, anxiety, depression      Vitals  Date Time BP Position Site L\R Cuff Size HR RR TEMP (F) WT  HT  BMI kg/m2 BSA m2 O2 Sat FR L/min FiO2        11/10/2020 10:56 AM      83 - R   212lbs 16oz 5'  7\" 33.36 2.14 96 %            Physical Examination  · Constitutional  o Appearance  o : well developed, well-nourished, no obvious deformities present  · Head and Face  o Head  o :   § Inspection  § : normocephalic  o Face  o :   § Inspection  § : no facial lesions  · Eyes  o Conjunctivae  o : conjunctivae normal  o Sclerae  o : sclerae white  · Ears, Nose, Mouth and Throat  o Ears  o :   § External Ears  § : appearance within normal limits  § Hearing  § : intact  o Nose  o :   § External Nose  § : appearance normal  · Neck  o Inspection/Palpation  o : normal appearance  o Range of Motion  o : full range of motion  · Respiratory  o Respiratory Effort  o : breathing unlabored  o Inspection of Chest  o : normal appearance  o Auscultation of Lungs  o : no audible wheezing or rales  · Cardiovascular  o Heart  o : regular rate  · Gastrointestinal  o Abdominal Examination  o : soft and non-tender  · Skin and Subcutaneous Tissue  o General Inspection  o : intact, no rashes  · Psychiatric  o General  o : Alert and oriented x3  o Judgement and Insight  o : judgment and insight intact  o Mood and Affect  o : " mood normal, affect appropriate  · Right Hip-Street  o Inspection  o : well-healed scar   o Palpation  o : no tenderness at hip and pelvic muscles  o ROM  o : normal extension (30), normal abduction (45-50), normal adduction, normal internal rotation, normal external rotation  o Special Tests  o : normal heel/toe walk, no pain with flexion, no pain with extension, no pain with rotation  o Neurologic Testing  o : Neurovascular and sensation intact          Assessment  · Aftercare;following joint replacement     V54.81/Z47.1  · Right Pain: Hip     719.45/M25.559      Plan  · Medications  o Medications have been Reconciled  o Transition of Care or Provider Policy  · Instructions  o Reviewed the patient's Past Medical, Social, and Family history as well as the ROS at today's visit, no changes.  o Call or return if worsening symptoms.  o Follow up in 6 weeks.  o Electronically Identified Patient Education Materials Provided Electronically     Follow up 6 weeks, x ray             Electronically Signed by: NIRMAL RuizC -Author on November 10, 2020 11:37:03 AM  Electronically Co-signed by: Ho Gilliam MD -Reviewer on November 10, 2020 01:10:20 PM

## 2021-05-13 NOTE — PROGRESS NOTES
"   Quick Note      Patient Name: Giles Donovan   Patient ID: [id number]   Sex: Male   YOB: 1945    Primary Care Provider: Morena Masters MD   Referring Provider: Kannan Bhatti MD    Visit Date: June 1, 2020    Provider: Morena Masters MD   Location: [facility]   Location Address: [address] [place] [address], [address]  [place], [place]  [id number]   Location Phone: [phone]          History Of Present Illness  TELEHEALTH TELEPHONE VISIT  Chief Complaint: right hip pain, follow up.   Giles Donovan is a 74 year old /Black male who is presenting for evaluation via telehealth telephone visit. Verbal consent obtained before beginning visit.   Provider spent 15 minutes with the patient during telehealth visit.   The following staff were present during this visit:  and TENISHA Carrera   Past Medical History/Overview of Patient Symptoms     follow up via telehealth    patient reports pain 8/10 in the right hip that runs down into the thigh, when he walks it causes pain. Denies injury. Xrays have been performed by ortho, will have hip replacement with Dr. Gilliam.    no chest pain  no trouble breathing  no leg swelling    he is having a cough  he likes the Tessalon perles and feels like they help him with his chronic cough    still follows with  [facility] for his basic blood work, etc       Vitals  Date Time BP Position Site L\R Cuff Size HR RR TEMP (F) WT  HT  BMI kg/m2 BSA m2 O2 Sat HC       06/01/2020 12:13 PM         207lbs 0oz 5'  7\" 32.42 2.11           Physical Examination     alert and oriented, conversation held.           Assessment  · Cough     786.2/R05  refill Tessalon perles  · Diabetes Mellitus, Type II     250.00/E11.9  · Hypertension     401.9/I10  · Right hip pain     719.45/M25.551       chronic issues stable    cont current meds  will get labs from [facility] for review    will try to help him get back in with ortho for his hip     Problems " Reconciled  Plan  · Orders  o Physician Telephone Evaluation, 11-20 minutes (37908) - - 06/01/2020  · Medications  o Tessalon Perles 100 mg oral capsule   SIG: take 1 capsule by oral route every 4 hours as needed   DISP: (30) capsules with 1 refills  Prescribed on 06/01/2020     o Medications have been Reconciled  o Transition of Care or Provider Policy  · Instructions  o Plan Of Care:             Electronically Signed by: Morena Masters MD -Author on June 6, 2020 08:06:50 PM

## 2021-05-13 NOTE — PROGRESS NOTES
"   Progress Note      Patient Name: Giles Donovan   Patient ID: [id number]   Sex: Male   YOB: 1945    Primary Care Provider: Morena Masters MD   Referring Provider: Morena Masters MD    Visit Date: December 2, 2020    Provider: Morena Masters MD   Location: [facility]   Location Address: [address] [place] [address]  [id number]   Location Phone: [phone]          Chief Complaint  · left posterior knee pain   · \"I had my right hip replaced x 1 month ago and my left knee has hurt since then, plus I fell 10/26/2020 its been hurt since that too\"  · \"I'm here for a normal f/u\"      History Of Present Illness  Giles Donovan is a 75 year old /Black male who presents for evaluation and treatment of:      [facility]  [place] [facility]  pneumonia vaccine: unsure records at [facility]   hep c screening: never  urine leakage: has bought depends  states that if he doesn't run to the restroom he will have an accident    left leg under his knee  he has a sharp pain under it  it goes into his calf  it hurts more with sitting than standing  when he is walking it bothers him  it started hurting right after the surgery  he tripped over the concrete and fell on the grass    reviewed x-ray    hip is doing great post op           Past Medical History  Disease Name Date Onset Notes   Allergic rhinitis 12/27/2014 will try zyrtec he didn't want to use a nasal spray   Cataract --  Lt Eye   Cough 03/30/2016 PFT and CXR ordered.    Depression --  PTSD   Diabetes Mellitus, Type II --  --    GERD --  --    Hyperlipidemia 03/30/2016 Lipids ordered. Continue on current medication   Hypertension --  --    Hypothyroidism --  --    Psychiatric Care 1999 PTSD   Seasonal allergies --  --    Shortness of Breath --  --    Stenosis of right carotid artery 02/19/2017 will refer to vascular surgeon   Syncope 02/19/2017 --    Upper Respiratory Infection " 10/18/2015 Will treat cough with Hydromet otherwise over-the-counter meds for treatment         Past Surgical History  Procedure Name Date Notes   Cataract surgery --  Rt Eye x 2   Colonoscopy --  --    Retinal tear repair NEC --  --    Toe amputation, left, single toe 11/2017 Second phalaeng         Medication List  Name Date Started Instructions   aspirin 81 mg oral tablet,chewable  chew 1 tablet (81 mg) by oral route once daily   atorvastatin 20 mg oral tablet 12/31/2018 take 1 tablet (20 mg) by oral route once daily for 30 days   carvedilol 12.5 mg oral tablet 07/02/2019 take 1/2 tab po BID   felodipine 5 mg oral tablet extended release 24 hr  take 1 tablet (5 mg) by oral route once daily   gabapentin 600 mg oral tablet 07/02/2019 take 1 tablet (600 mg) by oral route 3 times per day for 30 days   Lantus Solostar 100 unit/mL (3 mL) subcutaneous insulin pen  inject 62 units by subcutaneous route QHS   losartan 50 mg oral tablet  take 1 tablet (50 mg) by oral route once daily   Obed Multivitamin For Men 200-175-250 mcg oral tablet  --    meloxicam 7.5 mg oral tablet 12/15/2020 take 1 tablet (7.5 mg) by oral route once daily   Novolog Flexpen 100 unit/mL subcutaneous insulin pen  inject 10 units by subcutaneous route QID   omeprazole 40 mg oral capsule,delayed release(DR/EC) 06/12/2018 take 1 capsule (40 mg) by oral route once daily before a meal   sildenafil oral  --    Synthroid 88 mcg oral tablet 12/02/2020 take 1 tablet (88 mcg) by oral route once daily for 90 days   Tessalon Perles 100 mg oral capsule 06/01/2020 take 1 capsule by oral route every 4 hours as needed   Zyrtec 10 mg oral tablet 12/02/2020 take 1 tablet (10 mg) by oral route once daily for 90 days         Allergy List  Allergen Name Date Reaction Notes   Latex --  Rash --        Allergies Reconciled  Family Medical History  Disease Name Relative/Age Notes   Heart Disease Mother/   Mother   Lupus Erythematosus Mother/   --    Kidney Disease Sister/    "--    Family history of Arthritis Daughter/  Mother/  Sister/  Son/   Mother; Sister; Daughter; Son         Social History  Finding Status Start/Stop Quantity Notes   Alcohol Current some day --/-- occasionally --    Alcohol Use Current some day --/-- --  drinks weekly, 1 drink per day   Disabled --  --/-- --  --    lives with other --  --/-- --  --    Retired --  --/-- --  --    Retired. --  --/-- --  --    Single. --  --/-- --  --    Tobacco Never --/-- --  --          Immunizations  NameDate Admin Mfg Trade Name Lot Number Route Inj VIS Given VIS Publication   Vykphcdtt70/02/2020 SEQ Fluad [id number] IM RD 12/02/2020 07/02/2012   Comments: pt tolerated well and left office in stable condition, JANET Chowdhury         Review of Systems  · Constitutional  o Denies  o : fever, fatigue, weight loss, weight gain  · Cardiovascular  o Denies  o : lower extremity edema, claudication, chest pressure, palpitations  · Respiratory  o Denies  o : shortness of breath, wheezing, frequent cough, hemoptysis, dyspnea on exertion  · Gastrointestinal  o Denies  o : nausea, vomiting, diarrhea, constipation, abdominal pain      Vitals  Date Time BP Position Site L\R Cuff Size HR RR TEMP (F) WT  HT  BMI kg/m2 BSA m2 O2 Sat FR L/min FiO2 HC       09/30/2019 10:18 /70 Sitting    83 - R  97.8 200lbs 0oz 5'  7\" 31.32 2.07 97 %  21%    02/04/2020 02:22 /76 Sitting    81 - R  97.9 201lbs 6oz 5'  7\" 31.54 2.08 95 %  21%    12/02/2020 01:57 /64 Sitting    86 - R 16 98.3 210lbs 0oz 5'  7\" 32.89 2.12 97 %  21%          Physical Examination  · Constitutional  o Appearance  o : no acute distress, well-nourished  · Head and Face  o Head  o :   § Inspection  § : atraumatic, normocephalic  · Eyes  o Eyes  o : extraocular movements intact, no scleral icterus, no conjunctival injection  · Ears, Nose, Mouth and Throat  o Ears  o :   § External Ears  § : normal  o Nose  o :   § Intranasal Exam  § : nares patent  o Oral Cavity  o :   § Oral " Mucosa  § : moist mucous membranes  · Respiratory  o Respiratory Effort  o : breathing comfortably, symmetric chest rise  o Auscultation of Lungs  o : clear to asculatation bilaterally, no wheezes, rales, or rhonchii  · Cardiovascular  o Heart  o :   § Auscultation of Heart  § : regular rate and rhythm, no murmurs, rubs, or gallops  o Peripheral Vascular System  o :   § Extremities  § : no edema  · Neurologic  o Mental Status Examination  o :   § Orientation  § : grossly oriented to person, place and time  o Gait and Station  o :   § Gait Screening  § : normal gait  · Psychiatric  o General  o : normal mood and affect  · IMP Knee Injection  o Knee  o : Left Knee   o Procedure info  o : Informed Consent obtained. Patient was informed of possible adverse effects including but not limited to bleeding, damage to nerve, tendon or artery, increased blood sugar, and increased blood pressure. They were instructed to call if they have any conerns or questions. Time out performed. Patient placed with knee in flexion at 90 degrees. Site marked inferior and lateral to patella. Betadine was swabbed at injection site per typical technique. 25 ga, 1.5 inch needle injected into bursa, aspiration was performed and then 80mg Kenalog and 1mL 1% Lidocaine without Epi was slowly injected into joint space, fluid was free flowing. Needle was removed, betadine wiped off and band-aid placed to injection site.           Assessment  · Hyperlipidemia     272.4/E78.5  Lipids ordered. Continue on current medication  · Diabetes Mellitus, Type II     250.00/E11.9  · Hypertension     401.9/I10  · Hypothyroidism     244.9/E03.9  · Need for influenza vaccination     V04.81/Z23  · Need for hepatitis C screening test     V73.89/Z11.59  · Left knee pain     719.46/M25.562  · Urinary incontinence     788.30/R32       Chronic issues stable continue current meds  Will start oxybutynin  Will check labs and adjust meds based on result  Steroid injection of  the knee today for presumed arthritis with effusion  Discussed possible side effect of worsening sugar     Problems Reconciled  Plan  · Orders  o HTN/Lipid Panel (CMP, Lipid) [facility] (58168, 63352) - 272.4/E78.5 - 12/02/2020  o Thyroid Profile (27619, 12623, THYII) - 244.9/E03.9 - 12/02/2020  o CBC with Auto Diff [facility] (00031) - 401.9/I10, 272.4/E78.5 - 12/02/2020  o Hgb A1c [facility] (83812) - 250.00/E11.9 - 12/02/2020  o Joint Injection; major joint or bursa (eg. shoulder, hip, knee, subacromial bursa) (20610) - 719.46/M25.562 - 12/02/2020   pt given intra-articular injection to left knee. pt tolerated well and left office in stable condition. 2 mls of kenalog mixed with 1 ml of lidocaine given by Dr. Masters and drawn up JANET Chowdhury  o ACO-39: Current medications updated and reviewed (, 1159F) - - 12/02/2020  o ACO-41: Dilated Diabetic eye exam completed this year and results in chart/reviewed (2022F) - - 12/02/2020  o Knee (Left) 3 views X-Ray [facility] (00157-YN) - 719.46/M25.562 - 12/02/2020   DONE IN CLINIC  o Hepatitis C antibody MEDICARE screening [facility] (51091, ) - V73.89/Z11.59 - 12/02/2020  o IM/SQ - Injection Fee [facility] (68016) - - 12/02/2020  o FLUAD, QUAD SYR 0.5 mL (83260) - V04.81/Z23 - 12/02/2020   Vaccine - Influenza; Dose: 0.5; Site: Right Deltoid; Route: Intramuscular; Date: 12/02/2020 15:59:00; Exp: 06/30/2021; Lot: [id number]; Mfg: Seqirus; TradeName: Fluad; Administered By: Cristal Armstrong MA; Comment: pt tolerated well and left office in stable condition, JNAET Chowdhury  o 4.00 - Kenalog Injection 40mg (-9) - 719.46/M25.562 - 12/02/2020   Injection - Kenalog 40 mg; Dose: 2 mls; Site: Left Knee; Route: intra-articular; Date: 12/02/2020 16:02:14; Exp: 10/01/2021; Lot: [id number]; Mfg: N/A; TradeName: N/A; Location[facility]; Administered By: Cristal Armstrong MA; Comment: pt tolerated well and left office in stable condition, JANET Chowdhury  · Medications  o oxybutynin chloride  5 mg oral tablet   SIG: take 1 tablet (5 mg) by oral route 2 times per day   DISP: (60) Tablet with 2 refills  Prescribed on 12/02/2020     o Synthroid 88 mcg oral tablet   SIG: take 1 tablet (88 mcg) by oral route once daily for 90 days   DISP: (90) Tablet with 0 refills  Refilled on 12/02/2020     o Zyrtec 10 mg oral tablet   SIG: take 1 tablet (10 mg) by oral route once daily for 90 days   DISP: (90) Tablet with 0 refills  Refilled on 12/02/2020     o Medications have been Reconciled  o Transition of Care or Provider Policy  · Instructions  o Advised that cheeses and other sources of dairy fats, animal fats, fast food, and the extras (candy, pastries, pies, doughnuts and cookies) all contain LDL raising nutrients. Advised to increase fruits, vegetables, whole grains, and to monitor portion sizes.   o Medicare suggests a once in a lifetime screening for Hepatitis C for all Medicare beneficiaries born between 1076-1873.  o Patient was educated/instructed on their diagnosis, treatment and medications prior to discharge from the clinic today.  · Disposition  o 3 Month Follow Up  o Labs drawn in house            Electronically Signed by: Morena Masters MD -Author on December 17, 2020 09:18:27 AM

## 2021-05-13 NOTE — PROGRESS NOTES
Progress Note      Patient Name: Giles Donovan   Patient ID: [id number]   Sex: Male   YOB: 1945    Primary Care Provider: Morena Masters MD   Referring Provider: Kannan Bhatti MD    Visit Date: December 15, 2020    Provider: Tawny Glasgow PA-C   Location: [facility]   Location Address: [address] [place] [address]  [id number]   Location Phone: [phone]          Chief Complaint  · Right hip pain      History Of Present Illness  Giles Donovan is a 75 year old /Black male who presents today to [facility].      Patient is status post right total hip arthroplasty performed 9/25/20 by Dr. Gilliam. Patient denies pain in right hip.            Past Medical History  Allergic rhinitis; Cataract; Cough; Depression; Diabetes Mellitus, Type II; GERD; Hyperlipidemia; Hypertension; Hypothyroidism; Psychiatric Care; Seasonal allergies; Shortness of Breath; Stenosis of right carotid artery; Syncope; Upper Respiratory Infection         Past Surgical History  Cataract surgery; Colonoscopy; Retinal tear repair NEC; Toe amputation, left, single toe         Medication List  aspirin 81 mg oral tablet,chewable; atorvastatin 20 mg oral tablet; carvedilol 12.5 mg oral tablet; felodipine 5 mg oral tablet extended release 24 hr; gabapentin 600 mg oral tablet; Lantus Solostar 100 unit/mL (3 mL) subcutaneous insulin pen; losartan 50 mg oral tablet; Obed Multivitamin For Men 200-175-250 mcg oral tablet; Novolog Flexpen 100 unit/mL subcutaneous insulin pen; omeprazole 40 mg oral capsule,delayed release(DR/EC); oxybutynin chloride 5 mg oral tablet; sildenafil oral; Synthroid 88 mcg oral tablet; Tessalon Perles 100 mg oral capsule; Zyrtec 10 mg oral tablet         Allergy List  Latex         Family Medical History  Heart Disease; Lupus Erythematosus; Kidney Disease; Family history of Arthritis         Social History  Alcohol (Current some day); Alcohol Use (Current some day);  Disabled; lives with other; Retired; Retired.; Single.; Tobacco (Never)         Review of Systems  · Constitutional  o Denies  o : fever, chills, weight loss  · Cardiovascular  o Denies  o : chest pain, shortness of breath  · Gastrointestinal  o Denies  o : liver disease, heartburn, nausea, blood in stools  · Genitourinary  o Denies  o : painful urination, blood in urine  · Integument  o Denies  o : rash, itching  · Neurologic  o Denies  o : headache, weakness, loss of consciousness  · Musculoskeletal  o Denies  o : painful, swollen joints  · Psychiatric  o Denies  o : drug/alcohol addiction, anxiety, depression      Physical Examination  · Constitutional  o Appearance  o : well developed, well-nourished, no obvious deformities present  · Head and Face  o Head  o :   § Inspection  § : normocephalic  o Face  o :   § Inspection  § : no facial lesions  · Eyes  o Conjunctivae  o : conjunctivae normal  o Sclerae  o : sclerae white  · Ears, Nose, Mouth and Throat  o Ears  o :   § External Ears  § : appearance within normal limits  § Hearing  § : intact  o Nose  o :   § External Nose  § : appearance normal  · Neck  o Inspection/Palpation  o : normal appearance  o Range of Motion  o : full range of motion  · Respiratory  o Respiratory Effort  o : breathing unlabored  o Inspection of Chest  o : normal appearance  o Auscultation of Lungs  o : no audible wheezing or rales  · Cardiovascular  o Heart  o : regular rate  · Gastrointestinal  o Abdominal Examination  o : soft and non-tender  · Skin and Subcutaneous Tissue  o General Inspection  o : intact, no rashes  · Psychiatric  o General  o : Alert and oriented x3  o Judgement and Insight  o : judgment and insight intact  o Mood and Affect  o : mood normal, affect appropriate  · In Office Procedures  o View  o : AP/LATERAL  o Site  o : right, hip   o Indication  o : Right hip pain   o Study  o : X-rays ordered, taken in the office, and reviewed today.  o Xray  o : stable  implant  o Comparative Data  o : Comparative Data found and reviewed today   · Right Hip-Street  o Inspection  o : well-healed scar   o Palpation  o : no tenderness at hip and pelvic muscles  o ROM  o : normal extension (30), normal abduction (45-50), normal adduction, normal internal rotation, normal external rotation  o Special Tests  o : normal heel/toe walk, no pain with flexion, no pain with extension, no pain with rotation  o Neurologic Testing  o : Neurovascular and sensation intact          Assessment  · Aftercare;following joint replacement     V54.81/Z47.1  · Right Pain: Hip     719.45/M25.559      Plan  · Orders  o Hip (Right) 2 or more views (includes AP Pelvis) [facility]. (82022) - 719.45/M25.559 - 12/15/2020  · Medications  o Medications have been Reconciled  o Transition of Care or Provider Policy  · Instructions  o Reviewed the patient's Past Medical, Social, and Family history as well as the ROS at today's visit, no changes.  o Call or return if worsening symptoms.  o X-ray ordered, taken and reviewed at this visit.  o Electronically Identified Patient Education Materials Provided Electronically     follow up 1 year, x ray             Electronically Signed by: ALEC Ruiz-MONIQUE -Author on December 15, 2020 10:53:24 AM  Electronically Co-signed by: Ho Gilliam MD -Reviewer on December 15, 2020 08:28:22 PM

## 2021-05-13 NOTE — PROGRESS NOTES
Progress Note      Patient Name: Giles Donovan   Patient ID: [id number]   Sex: Male   YOB: 1945    Primary Care Provider: Morena Masters MD   Referring Provider: Morena Masters MD    Visit Date: December 2, 2020    Provider: Morena Masters MD   Location[place] [facility]   Location Address: [address] [place] [address]  [id number]   Location Phone: [phone]          Chief Complaint  · Annual Wellness Exam      History Of Present Illness  The patient is a 75 year old /Black male who has come to this office for his Annual Wellness Visit.   His Primary Care Provider is Morena Masters MD. His comprehensive Care Team list, including suppliers, has been updated on the Facesheet. His medical/family history, height, weight, BMI, and blood pressure have been reviewed and are in the chart. The Health Risk Assessment has been completed and scanned in the chart.   Medications are listed in the medication list.   The active problem list includes: Allergic rhinitis, Cough, Depression, Diabetes Mellitus, Type II, GERD, Hyperlipidemia, Hypertension, Hypothyroidism, Stenosis of right carotid artery, Syncope, and Upper Respiratory Infection   The patient does not have a history of substance use.   Patient reports his diet is adequate.   The Mini-Cog has been administered and is scanned in chart. The results are negative. His cognitive function is without limitation.   A hearing loss screen was completed today and the result is negative.   Patient has the following risk factors for depression: currently has depression. Patient completed the PHQ-9 today and it has been scanned in the chart. The total score is 5-9.   The Timed Up and Go screen was administered today and the result is negative.   The Kelley Index of [place] in ADLs indicated moderate impairment (score of 3-5).   A Falls Risk Assessment has been completed, including a review of home fall hazards  and medication review.   Overall, the patient's functional ability is noted by this provider to be within normal limits. His level of safety is noted to be within normal limits. His balance/gait is within normal limits. There have been no falls in the past year. Patient-specific home safety recommendations have been reviewed and a copy has been given to patient.   He admits issues with leaking urine. This happens frequently and he would like to discuss this further today.   There are no additional risk factors identified.   Living Will/Advanced Directive previously executed and scanned in chart.   Personalized health advice was given to the patient and a written health screening schedule was established; see Plan for details.       Past Medical History  Disease Name Date Onset Notes   Allergic rhinitis 12/27/2014 will try zyrtec he didn't want to use a nasal spray   Cataract --  Lt Eye   Cough 03/30/2016 PFT and CXR ordered.    Depression --  PTSD   Diabetes Mellitus, Type II --  --    GERD --  --    Hyperlipidemia 03/30/2016 Lipids ordered. Continue on current medication   Hypertension --  --    Hypothyroidism --  --    Psychiatric Care 1999 PTSD   Seasonal allergies --  --    Shortness of Breath --  --    Stenosis of right carotid artery 02/19/2017 will refer to vascular surgeon   Syncope 02/19/2017 --    Upper Respiratory Infection 10/18/2015 Will treat cough with Hydromet otherwise over-the-counter meds for treatment         Past Surgical History  Procedure Name Date Notes   Cataract surgery --  Rt Eye x 2   Colonoscopy --  --    Retinal tear repair NEC --  --    Toe amputation, left, single toe 11/2017 Second phalaeng         Medication List  Name Date Started Instructions   aspirin 81 mg oral tablet,chewable  chew 1 tablet (81 mg) by oral route once daily   atorvastatin 20 mg oral tablet 12/31/2018 take 1 tablet (20 mg) by oral route once daily for 30 days   carvedilol 12.5 mg oral tablet 07/02/2019 take 1/2  tab po BID   felodipine 5 mg oral tablet extended release 24 hr  take 1 tablet (5 mg) by oral route once daily   gabapentin 600 mg oral tablet 07/02/2019 take 1 tablet (600 mg) by oral route 3 times per day for 30 days   Lantus Solostar 100 unit/mL (3 mL) subcutaneous insulin pen  inject 62 units by subcutaneous route QHS   losartan 50 mg oral tablet  take 1 tablet (50 mg) by oral route once daily   Obed Multivitamin For Men 200-175-250 mcg oral tablet  --    meloxicam 7.5 mg oral tablet 12/15/2020 take 1 tablet (7.5 mg) by oral route once daily   Novolog Flexpen 100 unit/mL subcutaneous insulin pen  inject 10 units by subcutaneous route QID   omeprazole 40 mg oral capsule,delayed release(DR/EC) 06/12/2018 take 1 capsule (40 mg) by oral route once daily before a meal   oxybutynin chloride 5 mg oral tablet 12/02/2020 take 1 tablet (5 mg) by oral route 2 times per day   sildenafil oral  --    Synthroid 88 mcg oral tablet 12/02/2020 take 1 tablet (88 mcg) by oral route once daily for 90 days   Tessalon Perles 100 mg oral capsule 06/01/2020 take 1 capsule by oral route every 4 hours as needed   Zyrtec 10 mg oral tablet 12/02/2020 take 1 tablet (10 mg) by oral route once daily for 90 days         Allergy List  Allergen Name Date Reaction Notes   Latex --  Rash --        Allergies Reconciled  Family Medical History  Disease Name Relative/Age Notes   Heart Disease Mother/   Mother   Lupus Erythematosus Mother/   --    Kidney Disease Sister/   --    Family history of Arthritis Daughter/  Mother/  Sister/  Son/   Mother; Sister; Daughter; Son         Social History  Finding Status Start/Stop Quantity Notes   Alcohol Current some day --/-- occasionally --    Alcohol Use Current some day --/-- --  drinks weekly, 1 drink per day   Disabled --  --/-- --  --    lives with other --  --/-- --  --    Retired --  --/-- --  --    Retired. --  --/-- --  --    Single. --  --/-- --  --    Tobacco Never --/-- --  --   "        Immunizations  NameDate Admin Mfg Trade Name Lot Number Route Inj VIS Given VIS Publication   [id number]/02/2020 SEQ Fluad [id number] IM RD 12/02/2020 07/02/2012   Comments: pt tolerated well and left office in stable condition, JANET Chowdhury         Vitals  Date Time BP Position Site L\R Cuff Size HR RR TEMP (F) WT  HT  BMI kg/m2 BSA m2 O2 Sat FR L/min FiO2 HC       12/02/2020 01:57 /64 Sitting    86 - R 16 98.3 210lbs 0oz 5'  7\" 32.89 2.12 97 %  21%              Assessment  · Encounter for Medicare annual wellness exam     V70.0/Z00.00  · Screening for depression     V79.0/Z13.89  · Screening for alcoholism     V79.1/Z13.39    Problems Reconciled  Plan  · Orders  o Falls Risk Assessment Completed (3288F) - V70.0/Z00.00 - 12/02/2020  o Brief hearing screening (written) [facility] () - V70.0/Z00.00 - 12/02/2020  o Annual wellness visit; includes a personalized prevention plan of service (pps), initial visit () - V70.0/Z00.00 - 12/02/2020  o Presence or absence of urinary incontinence assessed (JONNIE) (1090F) - V70.0/Z00.00 - 12/02/2020  o Positive Alcohol Screening () - V79.1/Z13.39 - 12/02/2020  o ACO-15: Pneumococcal Vaccine Administered or Previously Received (4040F) - V70.0/Z00.00 - 12/02/2020  o ACO-13: Fall Risk Screening with no falls in past year or only one fall without injury in the past year (1101F) - V70.0/Z00.00 - 12/02/2020  o ACO-14: Influenza immunization administered or previously received () - V70.0/Z00.00 - 12/02/2020  o ACO-16: BMI outside normal range, no follow-up plan is documented due to documented reason () - V70.0/Z00.00 - 12/02/2020   patient deferred  o ACO-17: Current tobacco non-user (1036F) - V70.0/Z00.00 - 12/02/2020  o ACO-18: Positive screen for clinical depression using a standardized tool and a follow-up plan documented () - V79.0/Z13.89 - 12/02/2020  o ACO-19: Colorectal cancer screening results documented and reviewed (3017F) - V70.0/Z00.00 - " 12/02/2020  o ACO-39: Current medications updated and reviewed (, 1159F) - V70.0/Z00.00 - 12/02/2020  · Medications  o Medications have been Reconciled  o Transition of Care or Provider Policy  · Instructions  o Health Risk Assessment has been reviewed with the patient.  o Written health screening schedule for next 5-10 years was established with patient; information scanned in chart and given/mailed to patient.  o Fall prevention methods discussed and a copy of recommendations given/mailed to patient.            Electronically Signed by: Morena Masters MD -Author on December 17, 2020 09:19:11 AM

## 2021-05-14 VITALS — WEIGHT: 205 LBS | BODY MASS INDEX: 32.18 KG/M2 | HEART RATE: 82 BPM | HEIGHT: 67 IN | OXYGEN SATURATION: 94 %

## 2021-05-14 VITALS
SYSTOLIC BLOOD PRESSURE: 112 MMHG | HEART RATE: 86 BPM | WEIGHT: 210 LBS | TEMPERATURE: 98.3 F | BODY MASS INDEX: 32.96 KG/M2 | OXYGEN SATURATION: 97 % | HEIGHT: 67 IN | RESPIRATION RATE: 16 BRPM | DIASTOLIC BLOOD PRESSURE: 64 MMHG

## 2021-05-14 VITALS — HEIGHT: 67 IN | OXYGEN SATURATION: 92 % | HEART RATE: 86 BPM | WEIGHT: 212.37 LBS | BODY MASS INDEX: 33.33 KG/M2

## 2021-05-14 VITALS — OXYGEN SATURATION: 97 % | HEART RATE: 77 BPM | HEIGHT: 67 IN

## 2021-05-14 VITALS — HEIGHT: 67 IN | OXYGEN SATURATION: 95 % | BODY MASS INDEX: 33.47 KG/M2 | WEIGHT: 213.25 LBS | HEART RATE: 93 BPM

## 2021-05-14 VITALS
SYSTOLIC BLOOD PRESSURE: 112 MMHG | WEIGHT: 211 LBS | BODY MASS INDEX: 33.12 KG/M2 | HEIGHT: 67 IN | HEART RATE: 84 BPM | DIASTOLIC BLOOD PRESSURE: 76 MMHG | OXYGEN SATURATION: 95 % | TEMPERATURE: 97.8 F

## 2021-05-14 VITALS — OXYGEN SATURATION: 96 % | BODY MASS INDEX: 33.43 KG/M2 | HEART RATE: 83 BPM | WEIGHT: 213 LBS | HEIGHT: 67 IN

## 2021-05-14 NOTE — PROGRESS NOTES
"   Progress Note      Patient Name: Giles Donovan   Patient ID: [id number]   Sex: Male   YOB: 1945    Primary Care Provider: Morena Masters MD   Referring Provider: Kannan Bhatti MD    Visit Date: March 26, 2021    Provider: Morena Masters MD   Location[place] [facility]   Location Address: [address] [place] [address], [address]  [place], [place]  [id number]   Location Phone: [phone]          Chief Complaint     \"Follow-up for my chronic issues\"       History Of Present Illness  Giles Donovan is a 75 year old /Black male who presents for evaluation and treatment of:      Chronic issues    -  doing pretty well with oxybutanin, has fewer issues with leakage states he will at times have accidents where he can not make it to the bathroom on time.     PSA checked, he states he had it taken recently but does not know exactly when.     Knee pain-  recieved knee injection at last visit with DR. Gilliam, states it helped for about 2 weeks but is now bothering him again with the same pain as before. He does go to PT for this with some improvement.      states he will get weak at times where his legs will shake. He attributes this to his sugars dropping too low.   states he does not feel like eating at times.   States his ED meds are not working very well for him.       Past Medical History  Disease Name Date Onset Notes   Allergic rhinitis 12/27/2014 will try zyrtec he didn't want to use a nasal spray   Cataract --  Lt Eye   Cough 03/30/2016 PFT and CXR ordered.    Depression --  PTSD   Diabetes --  --    Diabetes Mellitus, Type II --  --    GERD --  --    Hyperlipidemia 03/30/2016 Lipids ordered. Continue on current medication   Hypertension --  --    Hypothyroidism --  --    Psychiatric Care 1999 PTSD   Seasonal allergies --  --    Shortness of Breath --  --    Stenosis of right carotid artery 02/19/2017 will refer to vascular surgeon   Syncope 02/19/2017 --    Upper Respiratory " Infection 10/18/2015 Will treat cough with Hydromet otherwise over-the-counter meds for treatment         Past Surgical History  Procedure Name Date Notes   Cataract surgery --  Rt Eye x 2   Colonoscopy --  --    Retinal tear repair NEC --  --    Toe amputation, left, single toe 11/2017 Second phalaeng         Medication List  Name Date Started Instructions   aspirin 81 mg oral tablet,chewable  chew 1 tablet (81 mg) by oral route once daily   atorvastatin 20 mg oral tablet 12/31/2018 take 1 tablet (20 mg) by oral route once daily for 30 days   carvedilol 12.5 mg oral tablet 07/02/2019 take 1/2 tab po BID   felodipine 5 mg oral tablet extended release 24 hr  take 1 tablet (5 mg) by oral route once daily   gabapentin 600 mg oral tablet 07/02/2019 take 1 tablet (600 mg) by oral route 3 times per day for 30 days   Lantus Solostar 100 unit/mL (3 mL) subcutaneous insulin pen  inject 62 units by subcutaneous route QHS   losartan 50 mg oral tablet  take 1 tablet (50 mg) by oral route once daily   Obed Multivitamin For Men 200-175-250 mcg oral tablet  --    meloxicam 7.5 mg oral tablet 03/26/2021 take 1 tablet (7.5 mg) by oral route once daily   Novolog Flexpen 100 unit/mL subcutaneous insulin pen  inject 10 units by subcutaneous route QID   omeprazole 40 mg oral capsule,delayed release(DR/EC) 06/12/2018 take 1 capsule (40 mg) by oral route once daily before a meal   oxybutynin chloride 5 mg oral tablet 03/26/2021 take 1 tablet (5 mg) by oral route 2 times per day   sildenafil oral  --    Synthroid 88 mcg oral tablet 12/02/2020 take 1 tablet (88 mcg) by oral route once daily for 90 days   Zyrtec 10 mg oral tablet 04/05/2021 take 1 tablet (10 mg) by oral route once daily for 90 days         Allergy List  Allergen Name Date Reaction Notes   Latex --  Rash --    Latex Exam Gloves --  --  --        Allergies Reconciled  Family Medical History  Disease Name Relative/Age Notes   Heart Disease Mother/   Mother   Lupus Erythematosus  "Mother/   --    Kidney Disease Sister/   --    Family history of Arthritis Mother/   Mother  Mother; Sister; Daughter; Son         Social History  Finding Status Start/Stop Quantity Notes   Alcohol Current some day --/-- occasionally --    Alcohol Use Current some day --/-- --  occasionally drinks, 1 drink per day, has been drinking for 31 or more years   Disabled --  --/-- --  --    . --  --/-- --  --    lives with other --  --/-- --  --    Recreational Drug Use Never --/-- --  no   Retired --  --/-- --  --    Retired. --  --/-- --  --    Single. --  --/-- --  --    Tobacco Never --/-- --  never smoker         Immunizations  NameDate Admin Mfg Trade Name Lot Number Route Inj VIS Given VIS Publication   [id number]/02/2020 SEQ Fluad [id number] IM RD 12/02/2020 07/02/2012   Comments: pt tolerated well and left office in stable condition, [facility]         Vitals  Date Time BP Position Site L\R Cuff Size HR RR TEMP (F) WT  HT  BMI kg/m2 BSA m2 O2 Sat FR L/min FiO2 HC       01/07/2021 10:27 AM      82 - R   204lbs 16oz 5'  7\" 32.11 2.1 94 %      02/09/2021 10:59 AM      77 - R    5'  7\"   97 %      03/25/2021 01:43 PM      93 - R   213lbs 4oz 5'  7\" 33.4 2.14 95 %      03/26/2021 11:12 /76 Sitting    84 - R  97.8 211lbs 0oz 5'  7\" 33.05 2.13 95 %  21%          Physical Examination  · Constitutional  o Appearance  o : no acute distress, well-nourished  · Head and Face  o Head  o :   § Inspection  § : atraumatic, normocephalic  · Eyes  o Eyes  o : extraocular movements intact, no scleral icterus, no conjunctival injection  · Respiratory  o Respiratory Effort  o : breathing comfortably, symmetric chest rise  o Auscultation of Lungs  o : clear to asculatation bilaterally, no wheezes, rales, or rhonchii  · Cardiovascular  o Heart  o :   § Auscultation of Heart  § : regular rate and rhythm, no murmurs, rubs, or gallops  o Peripheral Vascular System  o :   § Extremities  § : no edema  · Neurologic  o Mental Status " Examination  o :   § Orientation  § : grossly oriented to person, place and time  o Gait and Station  o :   § Gait Screening  § : normal gait  · Psychiatric  o General  o : normal mood and affect          Assessment  · Diabetes mellitus, type 2     250.00/E11.9  [facility]  May need to consider stopping medication once reviewing these results  · Incontinence     788.30/R32  Will refer to urology for further discussion of possibilities to help him  · Erectile dysfunction     607.84/N52.9    Problems Reconciled  Plan  · Orders  o ACO-39: Current medications updated and reviewed (, 1159F) - - 03/26/2021  o UROLOGY CONSULTATION ([facility]O) - 788.30/R32, 607.84/N52.9 - 03/26/2021   with DR. Ortiz   · Medications  o oxybutynin chloride 5 mg oral tablet   SIG: take 1 tablet (5 mg) by oral route 2 times per day   DISP: (60) Tablet with 2 refills  Refilled on 03/26/2021     o Medications have been Reconciled  o Transition of Care or Provider Policy  · Instructions  o Patient was educated/instructed on their diagnosis, treatment and medications prior to discharge from the clinic today.            Electronically Signed by: Morena Masters MD -Author on April 10, 2021 07:27:28 PM

## 2021-05-14 NOTE — PROGRESS NOTES
Progress Note      Patient Name: Giles Donovan   Patient ID: [id number]   Sex: Male   YOB: 1945    Primary Care Provider: Morena Masters MD   Referring Provider: Kannan Bhatti MD    Visit Date: March 25, 2021    Provider: Coleman Castro PA-C   Location: [facility] [place]   Location Address: [address] [place] [address]  [id number]   Location Phone: [phone]          Chief Complaint  · Left knee pain      History Of Present Illness  Giles Donovan is a 75 year old /Black male who presents today to [facility].      Patient follows up today for left knee pain which has persisted for a number of months.  Patient has been improving with physical therapy.    MRI performed 12/16/2020 demonstrates mild tricompartmental osteoarthritis, a vertical tear in the body of the medial meniscus, and a small oblique tear of the apex of the posterior horn of the medial meniscus.       Past Medical History  Allergic rhinitis; Cataract; Cough; Depression; Diabetes; Diabetes Mellitus, Type II; GERD; Hyperlipidemia; Hypertension; Hypothyroidism; Psychiatric Care; Seasonal allergies; Shortness of Breath; Stenosis of right carotid artery; Syncope; Upper Respiratory Infection         Past Surgical History  Cataract surgery; Colonoscopy; Retinal tear repair NEC; Toe amputation, left, single toe         Medication List  aspirin 81 mg oral tablet,chewable; atorvastatin 20 mg oral tablet; carvedilol 12.5 mg oral tablet; felodipine 5 mg oral tablet extended release 24 hr; gabapentin 600 mg oral tablet; Lantus Solostar 100 unit/mL (3 mL) subcutaneous insulin pen; losartan 50 mg oral tablet; Obed Multivitamin For Men 200-175-250 mcg oral tablet; meloxicam 7.5 mg oral tablet; Novolog Flexpen 100 unit/mL subcutaneous insulin pen; omeprazole 40 mg oral capsule,delayed release(DR/EC); oxybutynin chloride 5 mg oral tablet; sildenafil oral; Synthroid 88 mcg oral tablet; Zyrtec 10 mg oral  "tablet         Allergy List  Latex; Latex Exam Gloves         Family Medical History  Heart Disease; Lupus Erythematosus; Kidney Disease; Family history of Arthritis         Social History  Alcohol (Current some day); Alcohol Use (Current some day); Disabled; .; lives with other; Recreational Drug Use (Never); Retired; Retired.; Single.; Tobacco (Never)         Immunizations  Name Date Admin   Influenza 12/02/2020   Influenza 10/01/2019   Influenza 09/17/2018         Review of Systems  · Constitutional  o Denies  o : fever, chills, weight loss  · Cardiovascular  o Denies  o : chest pain, shortness of breath  · Gastrointestinal  o Denies  o : liver disease, heartburn, nausea, blood in stools  · Genitourinary  o Denies  o : painful urination, blood in urine  · Integument  o Denies  o : rash, itching  · Neurologic  o Denies  o : headache, weakness, loss of consciousness  · Musculoskeletal  o Denies  o : painful, swollen joints  · Psychiatric  o Denies  o : drug/alcohol addiction, anxiety, depression      Vitals  Date Time BP Position Site L\R Cuff Size HR RR TEMP (F) WT  HT  BMI kg/m2 BSA m2 O2 Sat FR L/min FiO2        03/25/2021 01:43 PM      93 - R   213lbs 4oz 5'  7\" 33.4 2.14 95 %            Physical Examination  · Constitutional  o Appearance  o : well developed, well-nourished, no obvious deformities present  · Head and Face  o Head  o :   § Inspection  § : normocephalic  o Face  o :   § Inspection  § : no facial lesions  · Eyes  o Conjunctivae  o : conjunctivae normal  o Sclerae  o : sclerae white  · Ears, Nose, Mouth and Throat  o Ears  o :   § External Ears  § : appearance within normal limits  § Hearing  § : intact  o Nose  o :   § External Nose  § : appearance normal  · Neck  o Inspection/Palpation  o : normal appearance  o Range of Motion  o : full range of motion  · Respiratory  o Respiratory Effort  o : breathing unlabored  o Inspection of Chest  o : normal appearance  o Auscultation of " Lungs  o : no audible wheezing or rales  · Cardiovascular  o Heart  o : regular rate  · Gastrointestinal  o Abdominal Examination  o : soft and non-tender  · Skin and Subcutaneous Tissue  o General Inspection  o : intact, no rashes  · Psychiatric  o General  o : Alert and oriented x3  o Judgement and Insight  o : judgment and insight intact  o Mood and Affect  o : mood normal, affect appropriate  · Left Knee  o Inspection  o : Appearance is normal anatomic and atraumatic. Patient is tender over the medial joint line and there is moderate swelling in the popliteal region. Patient has full ankle range of motion full and equal strength compared to the contralateral side. Patient is able to perform full squat with single stage recovery.  · Right Ankle/Foot  o Inspection  o : Pes planus morphology.  · Left Ankle/Foot  o Inspection  o : Pes planus morphology.          Assessment  · Primary osteoarthritis of left knee     715.16/M17.12  · Left knee pain, unspecified chronicity     719.46/M25.562  · Pes planus of both feet       Flat foot [pes planus] (acquired), right foot     734/M21.41  Flat foot [pes planus] (acquired), left foot     734/M21.42      Plan  · Medications  o Medications have been Reconciled  o Transition of Care or Provider Policy  · Instructions  o Reviewed the patient's Past Medical, Social, and Family history as well as the ROS at today's visit, no changes.  o Call or return if worsening symptoms.  o Patient appears to be making good gains with physical therapy and is instructed to continue as long as he is improving. Patient is informed of the natural history of pes planus and informed of OTC shoe inserts which may help maintain good alignment of his joints may also help with his knee pain. Patient is invited to follow-up on an as-needed basis.  o . Portions of this note were generated with voice recognition software. While efforts have been made to proofread the text, some sound alike errors may still  persist.             Electronically Signed by: Coleman Castro PA-C -Author on March 27, 2021 04:10:49 PM  Electronically Co-signed by: Ho Gilliam MD -Reviewer on March 29, 2021 06:39:08 AM

## 2021-05-14 NOTE — PROGRESS NOTES
Progress Note      Patient Name: Giles Donovan   Patient ID: [id number]   Sex: Male   YOB: 1945    Primary Care Provider: Morena Masters MD   Referring Provider: Kannan Bhatti MD    Visit Date: February 9, 2021    Provider: Janis Gage PA-C   Location: [facility]   Location Address: [address] [place] [address]  [id number]   Location Phone: [phone]          Chief Complaint  · left knee pain      History Of Present Illness  Giles Donovan is a 75 year old /Black male who presents today to [facility].      He is here for follow up for left knee pain. He states pain is mostly in posterior knee. He has no pain at rest. He did see some relief with synvisc injection.       Past Medical History  Allergic rhinitis; Cataract; Cough; Depression; Diabetes; Diabetes Mellitus, Type II; GERD; Hyperlipidemia; Hypertension; Hypothyroidism; Psychiatric Care; Seasonal allergies; Shortness of Breath; Stenosis of right carotid artery; Syncope; Upper Respiratory Infection         Past Surgical History  Cataract surgery; Colonoscopy; Retinal tear repair NEC; Toe amputation, left, single toe         Medication List  aspirin 81 mg oral tablet,chewable; atorvastatin 20 mg oral tablet; carvedilol 12.5 mg oral tablet; felodipine 5 mg oral tablet extended release 24 hr; gabapentin 600 mg oral tablet; Lantus Solostar 100 unit/mL (3 mL) subcutaneous insulin pen; losartan 50 mg oral tablet; Obed Multivitamin For Men 200-175-250 mcg oral tablet; meloxicam 7.5 mg oral tablet; Novolog Flexpen 100 unit/mL subcutaneous insulin pen; omeprazole 40 mg oral capsule,delayed release(DR/EC); oxybutynin chloride 5 mg oral tablet; sildenafil oral; Synthroid 88 mcg oral tablet; Tessalon Perles 100 mg oral capsule; Zyrtec 10 mg oral tablet         Allergy List  Latex; Latex Exam Gloves       Allergies Reconciled  Family Medical History  Heart Disease; Lupus Erythematosus; Kidney Disease; Family  "history of Arthritis         Social History  Alcohol (Current some day); Alcohol Use (Current some day); Disabled; .; lives with other; Recreational Drug Use (Never); Retired; Retired.; Single.; Tobacco (Never)         Review of Systems  · Constitutional  o Denies  o : fever, chills, weight loss  · Cardiovascular  o Denies  o : chest pain, shortness of breath  · Gastrointestinal  o Denies  o : liver disease, heartburn, nausea, blood in stools  · Genitourinary  o Denies  o : painful urination, blood in urine  · Integument  o Denies  o : rash, itching  · Neurologic  o Denies  o : headache, weakness, loss of consciousness  · Musculoskeletal  o Admits  o : painful, swollen joints  · Psychiatric  o Denies  o : drug/alcohol addiction, anxiety, depression      Vitals  Date Time BP Position Site L\R Cuff Size HR RR TEMP (F) WT  HT  BMI kg/m2 BSA m2 O2 Sat FR L/min FiO2 HC       02/09/2021 10:59 AM      77 - R    5'  7\"   97 %            Physical Examination  · Constitutional  o Appearance  o : well developed, well-nourished, no obvious deformities present  · Head and Face  o Head  o :   § Inspection  § : normocephalic  o Face  o :   § Inspection  § : no facial lesions  · Eyes  o Conjunctivae  o : conjunctivae normal  o Sclerae  o : sclerae white  · Ears, Nose, Mouth and Throat  o Ears  o :   § External Ears  § : appearance within normal limits  § Hearing  § : intact  o Nose  o :   § External Nose  § : appearance normal  · Neck  o Inspection/Palpation  o : normal appearance  o Range of Motion  o : full range of motion  · Respiratory  o Respiratory Effort  o : breathing unlabored  o Inspection of Chest  o : normal appearance  o Auscultation of Lungs  o : no audible wheezing or rales  · Cardiovascular  o Heart  o : regular rate  · Gastrointestinal  o Abdominal Examination  o : soft and non-tender  · Skin and Subcutaneous Tissue  o General Inspection  o : intact, no rashes  · Psychiatric  o General  o : Alert and " oriented x3  o Judgement and Insight  o : judgment and insight intact  o Mood and Affect  o : mood normal, affect appropriate  · Left Knee  o Inspection  o : Sensation grossly intact. Tender to palpation on joint lines. No swelling, skin discoloration or atrophy. Full weight bearing. Cane for ambulation assistance. Full flexion and extension. Stable to valgus/varus stress. Good strength in quadriceps, hamstrings, dorsiflexors, and plantar flexors.  · Imaging  o Imaging  o : 12/16/20 MRI: 1. Mild to moderate tricompartmental osteoarthritis, most pronounced within the medial compartment. 2. Vertical tear of the body of the medial meniscus with small oblique tear of the apex of the posterior horn of the medial meniscus. The lateral meniscus appears intact.          Assessment  · Primary osteoarthritis of left knee     715.16/M17.12  · MMT (medial meniscus tear)     836.0/S83.249A  · Left knee pain, unspecified chronicity     719.46/M25.562      Plan  · Medications  o Medications have been Reconciled  o Transition of Care or Provider Policy  · Instructions  o Reviewed the patient's Past Medical, Social, and Family history as well as the ROS at today's visit, no changes.  o Call or return if worsening symptoms.  o Start course of PT. Follow up in 6 weeks.            Electronically Signed by: ALEC Denson-MONIQUE -Author on February 9, 2021 11:25:11 AM  Electronically Co-signed by: Ho Gilliam MD -Reviewer on February 9, 2021 05:59:05 PM

## 2021-05-15 VITALS — BODY MASS INDEX: 32.18 KG/M2 | OXYGEN SATURATION: 92 % | HEIGHT: 67 IN | HEART RATE: 78 BPM | WEIGHT: 205 LBS

## 2021-05-15 VITALS
BODY MASS INDEX: 31.61 KG/M2 | DIASTOLIC BLOOD PRESSURE: 76 MMHG | WEIGHT: 201.37 LBS | SYSTOLIC BLOOD PRESSURE: 130 MMHG | HEART RATE: 81 BPM | TEMPERATURE: 97.9 F | OXYGEN SATURATION: 95 % | HEIGHT: 67 IN

## 2021-05-15 VITALS
DIASTOLIC BLOOD PRESSURE: 74 MMHG | WEIGHT: 202.25 LBS | HEIGHT: 67 IN | SYSTOLIC BLOOD PRESSURE: 118 MMHG | HEART RATE: 82 BPM | TEMPERATURE: 97.9 F | OXYGEN SATURATION: 97 % | BODY MASS INDEX: 31.74 KG/M2

## 2021-05-15 VITALS — RESPIRATION RATE: 14 BRPM | BODY MASS INDEX: 31.92 KG/M2 | HEIGHT: 67 IN | WEIGHT: 203.37 LBS

## 2021-05-15 VITALS — HEART RATE: 78 BPM | OXYGEN SATURATION: 97 % | HEIGHT: 67 IN | WEIGHT: 204 LBS | BODY MASS INDEX: 32.02 KG/M2

## 2021-05-15 VITALS — HEIGHT: 67 IN | BODY MASS INDEX: 31.71 KG/M2 | WEIGHT: 202 LBS | RESPIRATION RATE: 16 BRPM

## 2021-05-15 VITALS
HEIGHT: 67 IN | HEART RATE: 83 BPM | OXYGEN SATURATION: 97 % | BODY MASS INDEX: 31.39 KG/M2 | TEMPERATURE: 97.8 F | SYSTOLIC BLOOD PRESSURE: 118 MMHG | DIASTOLIC BLOOD PRESSURE: 70 MMHG | WEIGHT: 200 LBS

## 2021-05-15 VITALS — WEIGHT: 207 LBS | HEIGHT: 67 IN | BODY MASS INDEX: 32.49 KG/M2

## 2021-05-16 VITALS
HEART RATE: 75 BPM | WEIGHT: 209.25 LBS | HEIGHT: 67 IN | BODY MASS INDEX: 32.84 KG/M2 | TEMPERATURE: 97.4 F | SYSTOLIC BLOOD PRESSURE: 121 MMHG | RESPIRATION RATE: 16 BRPM | DIASTOLIC BLOOD PRESSURE: 72 MMHG | OXYGEN SATURATION: 96 %

## 2021-05-16 VITALS
TEMPERATURE: 98.7 F | HEART RATE: 67 BPM | WEIGHT: 212.5 LBS | RESPIRATION RATE: 18 BRPM | OXYGEN SATURATION: 98 % | SYSTOLIC BLOOD PRESSURE: 152 MMHG | BODY MASS INDEX: 33.35 KG/M2 | HEIGHT: 67 IN | DIASTOLIC BLOOD PRESSURE: 82 MMHG

## 2021-05-16 VITALS
DIASTOLIC BLOOD PRESSURE: 82 MMHG | OXYGEN SATURATION: 98 % | WEIGHT: 207.25 LBS | HEART RATE: 74 BPM | TEMPERATURE: 98.3 F | BODY MASS INDEX: 32.53 KG/M2 | HEIGHT: 67 IN | SYSTOLIC BLOOD PRESSURE: 134 MMHG

## 2021-05-16 VITALS
OXYGEN SATURATION: 98 % | TEMPERATURE: 97.1 F | WEIGHT: 207.37 LBS | HEIGHT: 67 IN | BODY MASS INDEX: 32.55 KG/M2 | DIASTOLIC BLOOD PRESSURE: 78 MMHG | HEART RATE: 74 BPM | SYSTOLIC BLOOD PRESSURE: 130 MMHG | RESPIRATION RATE: 16 BRPM

## 2021-05-16 VITALS
BODY MASS INDEX: 32.88 KG/M2 | SYSTOLIC BLOOD PRESSURE: 150 MMHG | WEIGHT: 209.5 LBS | OXYGEN SATURATION: 98 % | HEART RATE: 77 BPM | HEIGHT: 67 IN | TEMPERATURE: 97.4 F | DIASTOLIC BLOOD PRESSURE: 82 MMHG

## 2021-05-28 VITALS
HEIGHT: 67 IN | DIASTOLIC BLOOD PRESSURE: 73 MMHG | HEART RATE: 74 BPM | OXYGEN SATURATION: 99 % | DIASTOLIC BLOOD PRESSURE: 66 MMHG | BODY MASS INDEX: 32.66 KG/M2 | TEMPERATURE: 98.1 F | RESPIRATION RATE: 12 BRPM | TEMPERATURE: 98.9 F | SYSTOLIC BLOOD PRESSURE: 132 MMHG | WEIGHT: 208.12 LBS | DIASTOLIC BLOOD PRESSURE: 74 MMHG | SYSTOLIC BLOOD PRESSURE: 140 MMHG | HEART RATE: 70 BPM | BODY MASS INDEX: 31.94 KG/M2 | OXYGEN SATURATION: 96 % | HEIGHT: 67 IN | RESPIRATION RATE: 12 BRPM | WEIGHT: 209.44 LBS | BODY MASS INDEX: 32.87 KG/M2 | HEART RATE: 78 BPM | WEIGHT: 203.5 LBS | HEIGHT: 67 IN | SYSTOLIC BLOOD PRESSURE: 151 MMHG | OXYGEN SATURATION: 96 % | TEMPERATURE: 98.3 F | RESPIRATION RATE: 16 BRPM

## 2021-05-28 NOTE — PROGRESS NOTES
Patient: MONICA MENEZES     Acct: [id number]     Report: #[id number]  UNIT #: [id number]     : 1945    Encounter Date:2019  PRIMARY CARE: TC MARTINEZ  ***Signed***  --------------------------------------------------------------------------------------------------------------------  Chief Complaint      Encounter Date      2019            Primary Care Provider      CT MARTINEZ            Referring Provider      TC MARTINEZ            Patient Complaint      Patient is complaining of      Pt here for 3m f/u, Dyspnea            VITALS      Height 5 ft 7 in / 170.18 cm      Weight 203 lbs 8 oz / 92.191769 kg      BSA 2.04 m2      BMI 31.9 kg/m2      Temperature 98.3 F / 36.83 C - Oral      Pulse 78      Respirations 16      Blood Pressure 151/66 Sitting, Left Arm      Pulse Oximetry 96%, Room air            HPI      The patient is a very pleasant 73 year old -American male here today for     follow up.  The patient feels like his breathing is getting better.  He has had     no worsening symptoms since his last office visit, no cough, chest pain, lower     extremity edema or shortness of breath.  Overall doing well at this time.            ROS      Constitutional:  Complains of: Fatigue; Denies: Fever, Weight gain, Weight loss,    Chills, Insomnia, Other      Respiratory/Breathing:  Denies: Shortness of air, Wheezing, Cough, Hemoptysis,     Pleuritic pain, Other      Endocrine:  Denies: Polydipsia, Polyuria, Heat/cold intolerance, Diabetes, Other      Eyes:  Denies: Blurred vision, Vision Changes, Other      Ears, nose, mouth, throat:  Denies: Mouth lesions, Thrush, Throat pain,     Hoarseness, Allergies/Hay Fever, Post Nasal Drip, Headaches, Recent Head Injury,    Nose Bleeding, Neck Stiffness, Thyroid Mass, Hearing Loss, Ear Fullness, Dry     Mouth, Nasal or Sinus Pain, Dry Lips, Nasal discharge, Nasal congestion, Other      Cardiovascular:  Denies: Palpitations, Syncope,  Claudication, Chest Pain, Wake     up Gasping for air, Leg Swelling, Irregular Heart Rate, Cyanosis, Dyspnea on     Exertion, Other      Gastrointestinal:  Denies: Nausea, Constipation, Diarrhea, Abdominal pain,     Vomiting, Difficulty Swallowing, Reflux/Heartburn, Dysphagia, Jaundice,     Bloating, Melena, Bloody stools, Other      Genitourinary:  Denies: Urinary frequency, Incontinence, Hematuria, Urgency,     Nocturia, Dysuria, Testicular problems, Other      Musculoskeletal:  Denies: Joint Pain, Joint Stiffness, Joint Swelling, Myalgias,    Other      Hematologic/lymphatic:  DENIES: Lymphadenopathy, Bruising, Bleeding tendencies,     Other      Neurological:  Denies: Headache, Numbness, Weakness, Seizures, Other      Psychiatric:  Denies: Anxiety, Appropriate Effect, Depression, Other      Sleep:  No: Excessive daytime sleep, Morning Headache?, Snoring, Insomnia?, Stop    breathing at sleep?, Other      Integumentary:  Denies: Rash, Dry skin, Skin Warm to Touch, Other      Immunologic/Allergic:  Denies: Latex allergy, Seasonal allergies, Asthma,     Urticaria, Eczema, Other      Immunization status:  No: Up to date            FAMILY/SOCIAL/MEDICAL HX      Surgical History:  Yes: Head Surgery (CATARACT R EYE;R EYE BLEED), Orthopedic     Surgery (Left toe cut off)      Is Father Still Living?:  No      Is Mother Still Living?:  No       Family History:  None      Smoking status:  Former smoker (Just a few at 15yo)      Anticoagulation Therapy:  No      Antibiotic Prophylaxis:  No      Medical History:  Yes: Deafness or Ringing Ears, Depression, Anxiety, Diabetes     (TYPE II), Hemorrhoids/Rectal Prob (GERD, ), High Blood Pressure, Reflux     Disease, Thyroid Problem, Miscellaneous Medical/oth (neuropathy); No: Blood     Disease, Chemotherapy/Cancer, Congestive Heart Failu, Heart Attack, Shortness Of    Breath, Sinus Trouble      Psychiatric History      Anxiety and Depression            PREVENTION      Hx  Influenza Vaccination:  Yes      Date Influenza Vaccine Given:  Sep 1, 2018      Influenza Vaccine Declined:  No      2 or More Falls Past Year?:  No      Fall Past Year with Injury?:  No      Hx Pneumococcal Vaccination:  Yes      Encouraged to follow-up with:  PCP regarding preventative exams.      Chart initiated by      Jelly Delgado MA            ALLERGIES/MEDICATIONS      Allergies:        Coded Allergies:             LATEX (Verified  Allergy, Unknown, RASH, 1/8/19)      Medications    Last Reconciled on 8/1/18 10:03 by HERB DAILY MD      Tiotropium Bromide (Spiriva Respimat 2.5 mcg/Puff) 4 Gm Mist.inhal      2 PUFFS INH QDAY, #3 MDI 4 Refills         Prov: Herb Daily         9/18/18       Fluticasone/Salmeterol 115/21 (Advair /21 MCG) 12 Gm Hfa.aer.ad      2 PUFF INH RTBID, #3 INH 3 Refills         Prov: Herb Daily         9/18/18       Insulin Glargine (Lantus VIAL) 100 Units/Ml Vial      46 UNITS SUBQ HS, #1 VIAL 0 Refills         Reported         9/2/18       Insulin Human Aspart (NovoLOG FLEXPEN*) 100 Unit/1 Ml Insuln.pen      5 UNITS SUBQ TID, #1 BOX 0 Refills         Reported         9/2/18       Prazosin HCl (Prazosin HCl) 1 Mg Capsule      2 MG PO HS, #60 CAP 0 Refills         Reported         9/2/18       Dextrose (Glucose) 4 Gm Tab.chew      4 TAB PO ASDIR PRN for BLOOD GLUCOSE LESS THAN 60         Reported         9/2/18       Benzonatate (Tessalon Perles) 100 Mg Cap      100 MG PO TID PRN for COUGH, CAP         Reported         9/2/18       Acetaminophen (Tylenol) 325 Mg Tablet      650 MG PO Q4H PRN for PAIN OR FEVER, #100 TAB 0 Refills         Reported         9/2/18       Ibuprofen Susp (PEDS) (Motrin) 100 Mg/5 Ml Oral.susp      200 MG PO Q6H PRN for PAIN OR FEVER, #240 ML 0 Refills         Reported         8/1/18       Aspirin Chew (Aspirin Chew) 81 Mg Tab.chew      81 MG PO QDAY, #30 TAB.CHEW 0 Refills         Reported         8/1/18       Metformin ER* (Metformin ER*)  500 Mg Tab.er.24      500 MG PO BID, #30 TAB.ER 0 Refills         Reported         8/1/18       Paroxetine Hcl (PARoxetine*) 20 Mg Tablet      40 MG PO HS, TAB         Reported         8/1/18       Carvedilol (Coreg) 12.5 Mg Tab      12.5 MG PO BID, #60 TAB 0 Refills         Reported         7/22/16       Omeprazole (PriLOSEC*) 20 Mg Capcr      20 MG PO QDAY, #30 CAP 0 Refills         Reported         7/22/16       Losartan/HCTZ (Hyzaar 100/25 MG) 1 Tab Tab      0.5 TAB PO QDAY, #30 TAB 0 Refills         Reported         7/22/16       Levothyroxine (Synthroid) 0.075 Mg Tablet      0.075 MG PO QDAY, #30 TAB 0 Refills         Reported         7/22/16       Gabapentin (Gabapentin) 400 Mg Capsule      600 MG PO TID, #90 CAP 0 Refills         Reported         7/22/16       Cetirizine Hcl (ZyrTEC) 10 Mg Tablet      10 MG PO QDAY, #30 TAB 0 Refills         Reported         7/22/16      Current Medications      Current Medications Reviewed 1/8/19            EXAM      GEN-patient appears stated age resting comfortable in no acute distress      Eyes-PERRL,  conjunctiva are normal in appearance extraocular muscles are     intact, no scleral icterus      Nasal-both nares are patent turbinates appear normal no polyps seen no nasal     discharge or ulcerations      Lymphatic-no swollen or enlarged cervical nodes, or axillary node, or femoral     nodes, or supraclavicular nodes      Mouth normal dentition, no erythema no ulcerations oropharynx appears normal no     exudate no evidence of postnasal drip, MP II      Neck-there are no palpable supraclavicular or cervical adenopathy, thyroid is     normal in appearance no apparent nodules, there is no inspiratory or expiratory     stridor      Respiratory-patient exhibits normal work of breathing, speaking in full     sentences without difficulty, the chest is normal in appearance, clear to     auscultation with no wheezes rales or rhonchi, chest is normal to percussion on     both  the right and left sides      Cardiovascular-the heart rate is normal and regular S1 and S2 present with no     murmur or extra heart sounds, there is no JVD or pedal edema present      GI-the abdomen is normal in appearance, bowel sounds present and normal in all     quadrants no hepatosplenomegaly or masses felt      Extremities-no clubbing is present, pulses present in all extremities, capillary    refill time is normal      Skin-skin is normal in appearance it is warm and dry, no rashes present, no     evidence of cyanosis, palpation reveals no masses      Neurological-the patient is alert and oriented to time place and person, moves     all 4 extremities, normal gait, normal affect and mood, CN2-12 intact      Psych-normal judgment and insight is good, normal mood and affect, alert and     oriented to person, place, and time, and date      Vtials      Vitals:             Height 5 ft 7 in / 170.18 cm           Weight 203 lbs 8 oz / 92.050738 kg           BSA 2.04 m2           BMI 31.9 kg/m2           Temperature 98.3 F / 36.83 C - Oral           Pulse 78           Respirations 16           Blood Pressure 151/66 Sitting, Left Arm           Pulse Oximetry 96%, Room air            REVIEW      Results Reviewed      P[facility] Results Reviewed?:  Yes Prev Lab Results, Yes Prev Radiology Results, Yes     Previous [facility] Records            Assessment      Restrictive lung disease - J98.4            Notes      New Diagnostics      * PFT-Comp, PrePost,DLCO,BodyBox, Week         Dx: Restrictive lung disease - J98.4      ASSESSMENT:       1.  Restrictive lung disease.        2.  Dyspnea.      3.  Basilar fibrosis.            PLAN:      1.  At this time the patient's dyspnea is improved.  He has lost approximately 5    pounds.  We will repeat pulmonary function studies to see if his restrictive     lung disease has progressed or worsened.  If so, the patient need a repeat high     resolution CT scan of the chest since he had mild  fibrosis versus scarring of     the lower lobe. We will need to see if this has actually progressed. We will     plan on ordering this scan if and only his pulmonary function studies have     worsened.  If not, we will just plan on seeing patient back and assessing for     symptoms.        2.  In the meantime, I have encouraged the patient to continue with exercise and    weight loss.      3.  I have personally reviewed laboratory data, imaging as well as previous     medical records.            Patient Education      Education resources provided:  Yes (restrictive )                 Disclaimer: Converted document may not contain table formatting or lab diagrams. Please see [facility] for the authenticated document.

## 2021-05-28 NOTE — PROGRESS NOTES
Patient: MONICA MENEZES     Acct: [id number]     Report: #[id number]  UNIT #: [id number]     : 1945    Encounter Date:2018  PRIMARY CARE: TC MARTINEZ  ***Signed***  --------------------------------------------------------------------------------------------------------------------  Chief Complaint      Encounter Date      Sep 18, 2018            Primary Care Provider      TC MARTINEZ            Referring Provider      TC MARTINEZ            Patient Complaint      Patient is complaining of      Pt here for 1m f/u, Restrictive lung disease            VITALS      Height 5 ft 7 in / 170.18 cm      Weight 208 lbs 2 oz / 94.159670 kg      BSA 2.15 m2      BMI 32.6 kg/m2      Temperature 98.9 F / 37.17 C - Oral      Pulse 74      Respirations 12      Blood Pressure 132/74 Sitting, Right Arm      Pulse Oximetry 96%, Room air            HPI      The patient is a very pleasant 72-year-old  male here today for     followup. The patient had MIPs and MEPs done since last office visit that showed    no evidence of respiratory muscle weakness, had a CT scan of the chest with high    resolution showing no evidence of any interstitial lung disease. The patient has    dyspnea present still only when he is walking up an incline, such as his     driveway. He denies having any chest pain, denies having any lower extremity     edema, no orthopnea at this time. He describes his symptoms and mild-to-moderate    in severity. He is not currently taking any medications for it at this time.            ROS      Constitutional:  Denies: Fatigue, Fever, Weight gain, Weight loss, Chills,     Insomnia, Other      Respiratory/Breathing:  Complains of: Shortness of air, Cough; Denies: Wheezing,    Hemoptysis, Pleuritic pain, Other      Endocrine:  Denies: Polydipsia, Polyuria, Heat/cold intolerance, Diabetes, Other      Eyes:  Denies: Blurred vision, Vision Changes, Other      Ears, nose, mouth, throat:   Denies: Mouth lesions, Thrush, Throat pain,     Hoarseness, Allergies/Hay Fever, Post Nasal Drip, Headaches, Recent Head Injury,    Nose Bleeding, Neck Stiffness, Thyroid Mass, Hearing Loss, Ear Fullness, Dry     Mouth, Nasal or Sinus Pain, Dry Lips, Nasal discharge, Nasal congestion, Other      Cardiovascular:  Denies: Palpitations, Syncope, Claudication, Chest Pain, Wake     up Gasping for air, Leg Swelling, Irregular Heart Rate, Cyanosis, Dyspnea on     Exertion, Other      Gastrointestinal:  Denies: Nausea, Constipation, Diarrhea, Abdominal pain,     Vomiting, Difficulty Swallowing, Reflux/Heartburn, Dysphagia, Jaundice,     Bloating, Melena, Bloody stools, Other      Genitourinary:  Denies: Urinary frequency, Incontinence, Hematuria, Urgency,     Nocturia, Dysuria, Testicular problems, Other      Musculoskeletal:  Denies: Joint Pain, Joint Stiffness, Joint Swelling, Myalgias,    Other      Hematologic/lymphatic:  DENIES: Lymphadenopathy, Bruising, Bleeding tendencies,     Other      Neurological:  Denies: Headache, Numbness, Weakness, Seizures, Other      Psychiatric:  Denies: Anxiety, Appropriate Effect, Depression, Other      Sleep:  No: Excessive daytime sleep, Morning Headache?, Snoring, Insomnia?, Stop    breathing at sleep?, Other      Integumentary:  Denies: Rash, Dry skin, Skin Warm to Touch, Other      Immunologic/Allergic:  Denies: Latex allergy, Seasonal allergies, Asthma,     Urticaria, Eczema, Other      Immunization status:  No: Up to date            FAMILY/SOCIAL/MEDICAL HX      Surgical History:  Yes: Head Surgery (CATARACT R EYE;R EYE BLEED), Orthopedic     Surgery (Left toe cut off)      Is Father Still Living?:  No      Is Mother Still Living?:  No       Family History:  None      Smoking status:  Former smoker (Just a few at 15yo)      Anticoagulation Therapy:  No      Antibiotic Prophylaxis:  No      Medical History:  Yes: Deafness or Ringing Ears, Depression, Anxiety, Diabetes     (TYPE  II), Hemorrhoids/Rectal Prob (GERD, ), High Blood Pressure, Reflux     Disease, Thyroid Problem, Miscellaneous Medical/oth (neuropathy); No: Blood     Disease, Chemotherapy/Cancer, Congestive Heart Failu, Heart Attack, Shortness Of    Breath, Sinus Trouble      Psychiatric History      Anxiety and depression            PREVENTION      Hx Influenza Vaccination:  Yes      Date Influenza Vaccine Given:  Sep 1, 2018      Influenza Vaccine Declined:  No      2 or More Falls Past Year?:  No      Fall Past Year with Injury?:  No      Hx Pneumococcal Vaccination:  Yes      Encouraged to follow-up with:  PCP regarding preventative exams.      Chart initiated by      Jelly Delgado MA            ALLERGIES/MEDICATIONS      Allergies:        Coded Allergies:             LATEX (Verified  Allergy, Unknown, RASH, 9/18/18)      Medications    Last Reconciled on 8/1/18 10:03 by JOHN DAILY MD      Insulin Glargine (Lantus VIAL) 100 Units/Ml Vial      46 UNITS SUBQ HS, #1 VIAL 0 Refills         Reported         9/2/18       Insulin Human Aspart (NovoLOG FLEXPEN*) 100 Unit/1 Ml Insuln.pen      5 UNITS SUBQ TID, #1 BOX 0 Refills         Reported         9/2/18       Prazosin HCl (Prazosin HCl) 1 Mg Capsule      2 MG PO HS, #60 CAP 0 Refills         Reported         9/2/18       Dextrose (Glucose) 4 Gm Tab.chew      4 TAB PO ASDIR PRN for BLOOD GLUCOSE LESS THAN 60         Reported         9/2/18       Benzonatate (Tessalon Perles) 100 Mg Cap      100 MG PO TID PRN for COUGH, CAP         Reported         9/2/18       Acetaminophen* (Tylenol*) 325 Mg Tablet      650 MG PO Q4H PRN for PAIN OR FEVER, #100 TAB 0 Refills         Reported         9/2/18       Ibuprofen Susp (PEDS) (Motrin) 100 Mg/5 Ml Oral.susp      200 MG PO Q6H PRN for PAIN OR FEVER, #240 ML 0 Refills         Reported         8/1/18       Aspirin (Aspirin*) 81 Mg Tab.chew      81 MG PO QDAY, #30 TAB.CHEW 0 Refills         Reported         8/1/18       Metformin ER*  (Metformin ER*) 500 Mg Tab.er.24      500 MG PO BID, #30 TAB.ER 0 Refills         Reported         8/1/18       Paroxetine Hcl (PARoxetine*) 20 Mg Tablet      40 MG PO HS, TAB         Reported         8/1/18       Carvedilol (Coreg) 12.5 Mg Tab      12.5 MG PO BID, #60 TAB 0 Refills         Reported         7/22/16       Omeprazole (PriLOSEC*) 20 Mg Capcr      20 MG PO QDAY, #30 CAP 0 Refills         Reported         7/22/16       Losartan/HCTZ (Hyzaar 100/25 MG) 1 Tab Tab      0.5 TAB PO QDAY, #30 TAB 0 Refills         Reported         7/22/16       Levothyroxine (Synthroid) 0.075 Mg Tablet      0.075 MG PO QDAY, #30 TAB 0 Refills         Reported         7/22/16       Gabapentin (Gabapentin) 400 Mg Capsule      600 MG PO TID, #90 CAP 0 Refills         Reported         7/22/16       Cetirizine Hcl (ZyrTEC*) 10 Mg Tablet      10 MG PO QDAY, #30 TAB 0 Refills         Reported         7/22/16      Current Medications      Current Medications Reviewed 9/18/18            EXAM      GEN-patient appears stated age resting comfortable in no acute distress      Eyes-PERRL,  conjunctiva are normal in appearance extraocular muscles are     intact, no scleral icterus      Nasal-both nares are patent turbinates appear normal no polyps seen no nasal     discharge or ulcerations      Lymphatic-no swollen or enlarged cervical nodes, or axillary node, or femoral     nodes, or supraclavicular nodes      Mouth normal dentition, no erythema no ulcerations oropharynx appears normal no     exudate no evidence of postnasal drip, MP II      Neck-there are no palpable supraclavicular or cervical adenopathy, thyroid is     normal in appearance no apparent nodules, there is no inspiratory or expiratory     stridor      Respiratory-patient exhibits normal work of breathing, speaking in full     sentences without difficulty, the chest is normal in appearance, clear to     auscultation with no wheezes rales or rhonchi, chest is normal to  percussion on     both the right and left sides      Cardiovascular-the heart rate is normal and regular S1 and S2 present with no     murmur or extra heart sounds, there is no JVD or pedal edema present      GI-the abdomen is normal in appearance, bowel sounds present and normal in all     quadrants no hepatosplenomegaly or masses felt      Extremities-no clubbing is present, pulses present in all extremities, capillary    refill time is normal      Skin-skin is normal in appearance it is warm and dry, no rashes present, no     evidence of cyanosis, palpation reveals no masses      Neurological-the patient is alert and oriented to time place and person, moves     all 4 extremities, normal gait, normal affect and mood, CN2-12 intact      Psych-normal judgment and insight is good, normal mood and affect, alert and     oriented to person, place, and time, and date      Vtials      Vitals:             Height 5 ft 7 in / 170.18 cm           Weight 208 lbs 2 oz / 94.413170 kg           BSA 2.15 m2           BMI 32.6 kg/m2           Temperature 98.9 F / 37.17 C - Oral           Pulse 74           Respirations 12           Blood Pressure 132/74 Sitting, Right Arm           Pulse Oximetry 96%, Room air            REVIEW      Results Reviewed      PCCS Results Reviewed?:  Yes Prev Lab Results, Yes Prev Radiology Results, Yes     Previous [facility] Records            Assessment      Notes      New Medications      * Fluticasone/Salmeterol 115/21 (Advair /21 MCG) 12 GM HFA.AER.AD: 2 PUFF      INH RTBID #3      * TIOTROPIUM BROMIDE (Spiriva Respimat 2.5 mcg/Puff) 4 GM MIST.INHAL: 2 PUFFS       INH QDAY #3      ASSESSMENT:      1. Dyspnea.       2. Restrictive lung disease.             PLAN:       1. CT scan of the chest showed no evidence of any interstitial lung disease. The    patient had muscular testing of the diaphragm and interstitial muscles that     showed no evidence of weakness there.       2. Question if some of  the patient's shortness of breath could be from weight     gain, he does have truncal obesity. We will give the patient a trial of inhaler     medications to see if this helps with his symptoms.       3. I also question some of his restrictive lung disease could be from a little     bit of volume overload and diastolic dysfunction. I think the patient's     shortness of breath could be related to diastolic dysfunction.       4. I have personally reviewed laboratory data, imaging as well as previous     medical records.            Patient Education      Education resources provided:  Yes      Patient Education Provided:  Acute Asthma                 Disclaimer: Converted document may not contain table formatting or lab diagrams. Please see [email or web address] [facility] for the authenticated document.

## 2021-05-28 NOTE — PROGRESS NOTES
Patient: MONICA MENEZES     Acct: [id number]     Report: #[id number]  UNIT #: [id number]     : 1945    Encounter Date:2018  PRIMARY CARE: TC MARTINEZ  ***Signed***  --------------------------------------------------------------------------------------------------------------------  Chief Complaint      Encounter Date      Aug 1, 2018            Primary Care Provider      TC MARTINEZ            Referring Provider      TC MARTINEZ            Patient Complaint      Patient is complaining of      New pt here for abnormal PFT            VITALS      Height 5 ft 7 in / 170.18 cm      Weight 209 lbs 7 oz / 94.025598 kg      BSA 2.15 m2      BMI 32.8 kg/m2      Temperature 98.1 F / 36.72 C - Oral      Pulse 70      Respirations 12      Blood Pressure 140/73 Sitting, Right Arm      Pulse Oximetry 99%, Room air            HPI      The patient is a very pleasant 72 year old  male who is here     today to be evaluated for shortness of breath and abnormal pulmonary function     studies.             The patient has been having shortness of breath for approximately 1 year.  He     goes on morning bike rides with his wife as well as afternoons walks and     noticed he has decreased tolerance to exercise. The patient is usually able to     do 10 miles on a bike without having any difficulty but has noticed he is not     able to go as far as he once was. He denies any chest pains, wheezing, lower     extremity edema or heart failure type symptoms. The patient had pulmonary     function studies that showed the presence of a moderate obstructive defect. The     patient had a chest x-ray back in April with no significant infiltrative     process seen. The patient is a lifelong never smoker and was in the      for 25+ years.  He was exposed to Agent Orange and petroleum. He is not     currently taking any medications for his shortness of breath. He has shortness     of breath on a daily  basis, worse with exertion. He is able to do all his     activities of daily living. He does have some muscle weakness and has had a     couple falls over the past 2-3 months. The patient describes his symptoms as     moderate in severity and is slowly getting worse over the past 1 year.            ROS      Constitutional:  Denies: Fatigue, Fever, Weight gain, Weight loss, Chills,     Insomnia, Other      Respiratory/Breathing:  Complains of: Shortness of air, Denies: Wheezing, Cough    , Hemoptysis, Pleuritic pain, Other      Endocrine:  Denies: Polydipsia, Polyuria, Heat/cold intolerance, Diabetes, Other      Eyes:  Denies: Blurred vision, Vision Changes, Other      Ears, nose, mouth, throat:  Denies: Mouth lesions, Thrush, Throat pain,     Hoarseness, Allergies/Hay Fever, Post Nasal Drip, Headaches, Recent Head Injury    , Nose Bleeding, Neck Stiffness, Thyroid Mass, Hearing Loss, Ear Fullness, Dry     Mouth, Nasal or Sinus Pain, Dry Lips, Nasal discharge, Nasal congestion, Other      Cardiovascular:  Denies: Palpitations, Syncope, Claudication, Chest Pain, Wake     up Gasping for air, Leg Swelling, Irregular Heart Rate, Cyanosis, Dyspnea on     Exertion, Other      Gastrointestinal:  Denies: Nausea, Constipation, Diarrhea, Abdominal pain,     Vomiting, Difficulty Swallowing, Reflux/Heartburn, Dysphagia, Jaundice, Bloating    , Melena, Bloody stools, Other      Genitourinary:  Denies: Urinary frequency, Incontinence, Hematuria, Urgency,     Nocturia, Dysuria, Testicular problems, Other      Musculoskeletal:  Denies: Joint Pain, Joint Stiffness, Joint Swelling, Myalgias    , Other      Hematologic/lymphatic:  DENIES: Lymphadenopathy, Bruising, Bleeding tendencies,     Other      Neurological:  Denies: Headache, Numbness, Weakness, Seizures, Other      Psychiatric:  Denies: Anxiety, Appropriate Effect, Depression, Other      Sleep:  No: Excessive daytime sleep, Morning Headache?, Snoring, Insomnia?,     Stop  breathing at sleep?, Other      Integumentary:  Denies: Rash, Dry skin, Skin Warm to Touch, Other      Immunologic/Allergic:  Denies: Latex allergy, Seasonal allergies, Asthma,     Urticaria, Eczema, Other      Immunization status:  No: Up to date            FAMILY/SOCIAL/MEDICAL HX      Surgical History:  Yes: Head Surgery (CATARACT R EYE), Orthopedic Surgery (Left     toe cut off)      Is Father Still Living?:  No      Is Mother Still Living?:  No       Family History:  None      Smoking status:  Former smoker (6 month when he was 15)      Anticoagulation Therapy:  No      Antibiotic Prophylaxis:  No      Medical History:  Yes: Depression, Anxiety, Diabetes (TYPE II), Hemorrhoids/    Rectal Prob (GERD, ), High Blood Pressure, Reflux Disease, Thyroid Problem,     Miscellaneous Medical/oth (neuropathy), No: Blood Disease, Chemotherapy/Cancer,     Deafness or Ringing Ears, Shortness Of Breath      Psychiatric History      Anxiety, depression and PTSD            PREVENTION      Hx Influenza Vaccination:  Yes      Date Influenza Vaccine Given:  Nov 1, 2017      Influenza Vaccine Declined:  No      2 or More Falls Past Year?:  Yes      Fall Past Year with Injury?:  No      Hx Pneumococcal Vaccination:  Yes      Encouraged to follow-up with:  PCP regarding preventative exams.      Chart initiated by      Jelly Delgado MA            ALLERGIES/MEDICATIONS      Allergies:        Coded Allergies:             LATEX (Verified  Allergy, Unknown, RASH, 8/1/18)      Medications    Last Reconciled on 8/1/18 10:03 by JOHN DAILY MD      (test 7)   No Conflict Check               Reported         8/1/18       Ibuprofen Susp (PEDS) (Motrin) 100 Mg/5 Ml Oral.susp      200 MG PO Q6H Y for PAIN OR FEVER, #240 ML 0 Refills         Reported         8/1/18       Aspirin (Aspirin*) 81 Mg Tab.chew      81 MG PO QDAY, #30 TAB.CHEW 0 Refills         Reported         8/1/18       Metformin ER* (Metformin ER*) 500 Mg Tab.er.24      500 MG PO  BID, #30 TAB.ER 0 Refills         Reported         8/1/18       Paroxetine Hcl (PARoxetine*) 20 Mg Tablet      40 MG PO HS, TAB         Reported         8/1/18       Carvedilol (Coreg) 12.5 Mg Tab      25 MG PO BID, #60 TAB 0 Refills         Reported         7/22/16       Omeprazole (PriLOSEC*) 20 Mg Capcr      20 MG PO QDAY, #30 CAP 0 Refills         Reported         7/22/16       Losartan/HCTZ (Hyzaar 100/25 MG) 1 Tab Tab      1 TAB PO QDAY, #30 TAB 0 Refills         Reported         7/22/16       Levothyroxine (Synthroid) 0.075 Mg Tablet      0.075 MG PO QDAY, #30 TAB 0 Refills         Reported         7/22/16       Gabapentin (Gabapentin) 400 Mg Capsule      400 MG PO TID, #90 CAP 0 Refills         Reported         7/22/16       Cetirizine Hcl (ZyrTEC*) 10 Mg Tablet      10 MG PO QDAY, #30 TAB 0 Refills         Reported         7/22/16      Current Medications      Current Medications Reviewed 8/1/18            EXAM      GEN-patient appears stated age resting comfortable in no acute distress      Eyes-PERRL,  conjunctiva are normal in appearance extraocular muscles are intact    , no scleral icterus      Nasal-both nares are patent turbinates appear normal no polyps seen no nasal     discharge or ulcerations      Ears-tympanic membranes are normal no erythema no bulging, normal to inspection      Lymphatic-no swollen or enlarged cervical nodes, or axillary node, or femoral     nodes, or supraclavicular nodes      Mouth normal dentition, no erythema no ulcerations oropharynx appears normal no     exudate no evidence of postnasal drip, MP II      Neck-there are no palpable supraclavicular or cervical adenopathy, thyroid is     normal in appearance no apparent nodules, there is no inspiratory or expiratory     stridor      Respiratory-patient exhibits normal work of breathing, speaking in full     sentences without difficulty, the chest is normal in appearance, clear to     auscultation with no wheezes rales or  rhonchi, chest is normal to percussion on     both the right and left sides      Cardiovascular-the heart rate is normal and regular S1 and S2 present with no     murmur or extra heart sounds, there is no JVD or pedal edema present      GI-the abdomen is normal in appearance, bowel sounds present and normal in all     quadrants no hepatosplenomegaly or masses felt      Extremities-no clubbing is present, pulses present in all extremities,     capillary refill time is normal      Musculoskeletal-Normal strength in upper and lower extremities, inspection     shows no evidence of muscle atrophy      Skin-skin is normal in appearance it is warm and dry, no rashes present, no     evidence of cyanosis, palpation reveals no masses      Neurological-the patient is alert and oriented to time place and person, moves     all 4 extremities, normal gait, normal affect and mood, CN2-12 intact      Psych-normal judgment and insight is good, normal mood and affect, alert and     oriented to person, place, and time, and date      Vtials      Vitals:             Height 5 ft 7 in / 170.18 cm           Weight 209 lbs 7 oz / 94.282862 kg           BSA 2.15 m2           BMI 32.8 kg/m2           Temperature 98.1 F / 36.72 C - Oral           Pulse 70           Respirations 12           Blood Pressure 140/73 Sitting, Right Arm           Pulse Oximetry 99%, Room air            REVIEW      Results Reviewed      PCCS Results Reviewed?:  Yes Prev Lab Results, Yes Prev Radiology Results, Yes     [facility]            Assessment      Restrictive lung disease - J98.4            Weakness - R53.1            Notes      New Medications      * Paroxetine Hcl (PARoxetine*) 20 MG TABLET: 40 MG PO HS      * Metformin ER* 500 MG TAB.ER.24: 500 MG PO BID #30      * Aspirin (Aspirin*) 81 MG TAB.CHEW: 81 MG PO QDAY #30      * Ibuprofen Susp (PEDS) (Motrin) 100 MG/5 ML ORAL.SUSP: 200 MG PO Q6H PRN PAIN     OR FEVER #240      * (test 7):        Changed Medications      * Levothyroxine (Synthroid) 0.075 MG TABLET: 0.075 MG PO QDAY #30      * Carvedilol (Coreg) 12.5 MG TAB: 25 MG PO BID #60      New Diagnostics      * Chest W/O Cont High Resolution, SCHEDULED PROCEDURE       Dx: Restrictive lung disease - J98.4      * Myositis Panel, Routine       Dx: Restrictive lung disease - J98.4      * Cpk Isoenzymes, Routine       Dx: Weakness - R53.1      * Aldolase, Routine       Dx: Weakness - R53.1      * MIP/MEP, Week       Dx: Weakness - R53.1      ASSESSMENT:      1.  Restrictive lung disease.       2.  Dyspnea on exertion .             PLAN:      1. At this time we will obtain high resolution CT scan of the chest to evaluate     the patient for any interstitial lung disease that could be causing this     restrictive lung defect.       2. We will also obtain myositis panel given the patient's weakness along with a     CPK and aldolase.       3. We will check MIPS/MEPS  to check for muscular weakness that could be     causing the patient's restrictive defect seen on pulmonary function tests.       4. We will see the patient back in the office in 1 month.       5. I have personally reviewed laboratory data, imaging as well as previous     medical records.            Patient Education      Education resources provided:  Yes (restrictive lung disease)                 Disclaimer: Converted document may not contain table formatting or lab diagrams. Please see [employer] [facility] for the authenticated document.

## 2021-06-06 PROBLEM — E03.9 HYPOTHYROIDISM: Status: ACTIVE | Noted: 2021-06-06

## 2021-06-06 PROBLEM — E11.9 TYPE 2 DIABETES MELLITUS: Status: ACTIVE | Noted: 2021-06-06

## 2021-06-06 PROBLEM — R55 SYNCOPE: Status: ACTIVE | Noted: 2017-02-19

## 2021-06-06 PROBLEM — K21.9 GERD (GASTROESOPHAGEAL REFLUX DISEASE): Status: ACTIVE | Noted: 2021-06-06

## 2021-06-06 PROBLEM — F32.A DEPRESSION: Status: ACTIVE | Noted: 2021-06-06

## 2021-06-06 PROBLEM — I65.21 STENOSIS OF RIGHT CAROTID ARTERY: Status: ACTIVE | Noted: 2017-02-19

## 2021-06-06 PROBLEM — I10 HYPERTENSION: Status: ACTIVE | Noted: 2021-06-06

## 2021-06-14 ENCOUNTER — OFFICE VISIT (OUTPATIENT)
Dept: UROLOGY | Facility: CLINIC | Age: 76
End: 2021-06-14

## 2021-06-14 VITALS — WEIGHT: 210 LBS | BODY MASS INDEX: 32.96 KG/M2 | HEIGHT: 67 IN | RESPIRATION RATE: 17 BRPM

## 2021-06-14 DIAGNOSIS — N52.9 ERECTILE DYSFUNCTION, UNSPECIFIED ERECTILE DYSFUNCTION TYPE: ICD-10-CM

## 2021-06-14 DIAGNOSIS — R35.0 BENIGN PROSTATIC HYPERPLASIA WITH URINARY FREQUENCY: ICD-10-CM

## 2021-06-14 DIAGNOSIS — N39.41 URGE INCONTINENCE OF URINE: Primary | ICD-10-CM

## 2021-06-14 DIAGNOSIS — N40.1 BENIGN PROSTATIC HYPERPLASIA WITH URINARY FREQUENCY: ICD-10-CM

## 2021-06-14 LAB
BILIRUB BLD-MCNC: NEGATIVE MG/DL
BILIRUB UR QL STRIP: NEGATIVE
CLARITY UR: CLEAR
CLARITY, POC: CLEAR
COLOR UR: YELLOW
COLOR UR: YELLOW
GLUCOSE UR STRIP-MCNC: ABNORMAL MG/DL
GLUCOSE UR STRIP-MCNC: ABNORMAL MG/DL
HGB UR QL STRIP.AUTO: ABNORMAL
KETONES UR QL STRIP: ABNORMAL
KETONES UR QL: ABNORMAL
LEUKOCYTE EST, POC: NEGATIVE
LEUKOCYTE ESTERASE UR QL STRIP.AUTO: NEGATIVE
NITRITE UR QL STRIP: NEGATIVE
NITRITE UR-MCNC: NEGATIVE MG/ML
PH UR STRIP.AUTO: 7.5 [PH] (ref 5–8)
PH UR: 7 [PH] (ref 5–8)
PROT UR QL STRIP: ABNORMAL
PROT UR STRIP-MCNC: ABNORMAL MG/DL
RBC # UR STRIP: ABNORMAL /UL
SP GR UR STRIP: 1.03 (ref 1–1.03)
SP GR UR: 1.02 (ref 1–1.03)
SPECIMEN VOL 24H UR: 78 L
UROBILINOGEN UR QL STRIP: ABNORMAL
UROBILINOGEN UR QL: ABNORMAL

## 2021-06-14 PROCEDURE — 81003 URINALYSIS AUTO W/O SCOPE: CPT | Performed by: UROLOGY

## 2021-06-14 PROCEDURE — 99204 OFFICE O/P NEW MOD 45 MIN: CPT | Performed by: UROLOGY

## 2021-06-14 RX ORDER — PAROXETINE HYDROCHLORIDE 40 MG/1
TABLET, FILM COATED ORAL
COMMUNITY
Start: 2021-04-05

## 2021-06-14 RX ORDER — OMEPRAZOLE 20 MG/1
CAPSULE, DELAYED RELEASE ORAL
COMMUNITY

## 2021-06-14 RX ORDER — TAMSULOSIN HYDROCHLORIDE 0.4 MG/1
1 CAPSULE ORAL DAILY
Qty: 90 CAPSULE | Refills: 3 | Status: SHIPPED | OUTPATIENT
Start: 2021-06-14 | End: 2022-03-28 | Stop reason: SDUPTHER

## 2021-06-14 RX ORDER — ATORVASTATIN CALCIUM 80 MG/1
TABLET, FILM COATED ORAL
COMMUNITY
Start: 2021-05-10 | End: 2022-08-09 | Stop reason: SDUPTHER

## 2021-06-14 RX ORDER — GABAPENTIN 600 MG/1
TABLET ORAL
COMMUNITY
Start: 2021-03-31

## 2021-06-14 RX ORDER — LEVOTHYROXINE SODIUM 0.07 MG/1
75 TABLET ORAL DAILY
COMMUNITY

## 2021-06-14 RX ORDER — TADALAFIL 20 MG/1
20 TABLET ORAL AS NEEDED
Qty: 5 TABLET | Refills: 10 | Status: SHIPPED | OUTPATIENT
Start: 2021-06-14 | End: 2021-07-19 | Stop reason: SDUPTHER

## 2021-06-14 RX ORDER — CETIRIZINE HYDROCHLORIDE 10 MG/1
TABLET ORAL
COMMUNITY
Start: 2021-04-05 | End: 2021-07-26 | Stop reason: SDUPTHER

## 2021-06-14 RX ORDER — ASPIRIN 81 MG/1
TABLET, CHEWABLE ORAL
COMMUNITY
End: 2021-09-28 | Stop reason: SDUPTHER

## 2021-06-14 RX ORDER — LOSARTAN POTASSIUM 100 MG/1
TABLET ORAL
COMMUNITY
Start: 2021-05-10 | End: 2021-09-28 | Stop reason: SDUPTHER

## 2021-06-14 RX ORDER — FELODIPINE 5 MG/1
TABLET, EXTENDED RELEASE ORAL
COMMUNITY

## 2021-06-14 RX ORDER — OXYBUTYNIN CHLORIDE 5 MG/1
TABLET ORAL
COMMUNITY
Start: 2021-05-10 | End: 2021-07-26 | Stop reason: SDUPTHER

## 2021-06-14 RX ORDER — CARVEDILOL 12.5 MG/1
12.5 TABLET ORAL
COMMUNITY

## 2021-06-14 RX ORDER — MELOXICAM 7.5 MG/1
TABLET ORAL
COMMUNITY
Start: 2021-03-31 | End: 2021-07-26 | Stop reason: SDUPTHER

## 2021-06-14 RX ORDER — INSULIN GLARGINE 100 [IU]/ML
44 INJECTION, SOLUTION SUBCUTANEOUS
COMMUNITY

## 2021-06-14 RX ORDER — EZETIMIBE 10 MG/1
10 TABLET ORAL DAILY
COMMUNITY

## 2021-06-14 NOTE — PROGRESS NOTES
Chief Complaint: Urologic complaint    Subjective         History of Present Illness  Giles Donovan is a 75 y.o. male presents to [facility] to be seen for BPH/urge incontinence/ ED    Patient's been dealing with worsening urination for the last year.    Patient does have frequency if he is sugars are running high.    ok stream.  No trouble with initiationof stream. Nocturia X 0 .  Patient is wearing a couple pads a day.  He is having urge incontinence.     Oxybutynin 5 mg p.o. twice daily does help some.  Patient does deal with dry eyes, has had this before    Minimal erections can get a small erection in the morning.  Sildenafil 100 mg -did not help    no gross hematuria/ UTI    No urologic family history,   Has never had any urologic surgery.    No cardiopulmonary history.  Patient does not smoke. ASA 81/meloxicam.  Diabetes mellitus on insulin    PVR today 78    Trace blood on UA today    9/20  GFR > 60     PSA    2/19 0.92    Objective     Past Medical History:   Diagnosis Date   • Allergic rhinitis 12/27/2014    Will try Zyrtec, he didnt want to use a nasal spray.   • Cataract     left eye   • Cough 03/30/2016    PFT and CXR ordered.   • Depression     PTSD   • Diabetes (CMS/[facility])    • GERD (gastroesophageal reflux disease)    • H/O psychiatric care 1999    PTSD   • Hyperlipidemia 03/30/2016    Lipids ordered. Continue on current medication.   • Hypertension    • Hypothyroidism    • Seasonal allergies    • Shortness of breath    • Stenosis of right carotid artery 02/19/2017    Will refer to vascular surgeon.   • Syncope 02/19/2017   • Type 2 diabetes mellitus (CMS/[facility])    • Upper respiratory infection 10/18/2015    Will treat cough with Hydromet, otherwise over the counter meds for treatment.       Past Surgical History:   Procedure Laterality Date   • CATARACT EXTRACTION Right     x2   • COLONOSCOPY     • REPLACEMENT TOTAL HIP ONCOLOGIC Right    • RETINAL DETACHMENT REPAIR      • TOE AMPUTATION Left 11/2017    Second phalaeng.   • TOE OSTEOPHYTE REMOVAL           Current Outpatient Medications:   •  aspirin 81 MG chewable tablet, aspirin 81 mg oral tablet,chewable chew 1 tablet (81 mg) by oral route once daily   Active, Disp: , Rfl:   •  atorvastatin (LIPITOR) 80 MG tablet, , Disp: , Rfl:   •  carvedilol (COREG) 12.5 MG tablet, Take 12.5 mg by mouth., Disp: , Rfl:   •  cetirizine (ZyrTEC Allergy) 10 MG tablet, Zyrtec 10 mg oral tablet take 1 tablet (10 mg) by oral route once daily for 90 days 4/5/2021  Active, Disp: , Rfl:   •  ezetimibe (ZETIA) 10 MG tablet, Take 10 mg by mouth Daily., Disp: , Rfl:   •  felodipine (PLENDIL) 5 MG 24 hr tablet, felodipine 5 mg oral tablet extended release 24 hr take 1 tablet (5 mg) by oral route once daily   Active, Disp: , Rfl:   •  gabapentin (NEURONTIN) 600 MG tablet, , Disp: , Rfl:   •  insulin aspart (novoLOG) 100 UNIT/ML injection, Inject 10 Units under the skin into the appropriate area as directed 4 (Four) Times a Day., Disp: , Rfl:   •  Insulin Glargine (Lantus SoloStar) 100 UNIT/ML injection pen, Lantus Solostar 100 unit/mL (3 mL) subcutaneous insulin pen inject 62 units by subcutaneous route QHS   Active, Disp: , Rfl:   •  levothyroxine (SYNTHROID, LEVOTHROID) 75 MCG tablet, Take 75 mcg by mouth Daily., Disp: , Rfl:   •  losartan (COZAAR) 100 MG tablet, , Disp: , Rfl:   •  meloxicam (MOBIC) 7.5 MG tablet, , Disp: , Rfl:   •  omeprazole (priLOSEC) 20 MG capsule, omeprazole 20 mg oral capsule,delayed release(DR/EC) take 1 capsule (20 mg) by oral route once daily before a meal   Suspended, Disp: , Rfl:   •  oxybutynin (DITROPAN) 5 MG tablet, oxybutynin chloride 5 mg oral tablet take 1 tablet (5 mg) by oral route 2 times per day 5/10/2021  Active, Disp: , Rfl:   •  PARoxetine (PAXIL) 40 MG tablet, , Disp: , Rfl:     Allergies   Allergen Reactions   • Latex Rash and Anaphylaxis        Family History   Problem Relation Age of Onset   • Heart disease  "Mother    • Lupus Mother    • Arthritis Mother    • Kidney disease Sister        Social History     Socioeconomic History   • Marital status:      Spouse name: Not on file   • Number of children: Not on file   • Years of education: Not on file   • Highest education level: Not on file   Tobacco Use   • Smoking status: Never Smoker   Substance and Sexual Activity   • Alcohol use: Yes     Comment: occasionally   • Drug use: Never       Vital Signs:   Resp 17   Ht 170.2 cm (67\")   Wt 95.3 kg (210 lb)   BMI 32.89 kg/m²      Physical exam    Alert and orient x3  Well appearing, well developed, in no acute distress   Unlabored respirations  Nontender/nondistended    Grossly oriented to person, place and time, judgment is intact, normal mood and affect    No results found for this or any previous visit.          Assessment and Plan    Diagnoses and all orders for this visit:    1. Urge incontinence of urine (Primary)    Continue Ditropan p.o. twice daily.  May have to increase in the future if the urgency does not get better.    BPH    Start Flomax 0.4 mg daily.  Risk-benefit side effect discussed today      Because of new onset symptoms in the last year we will get him in for office cystoscopy in about 1 month.  Risk and benefits discussed.  We did discuss we are ruling out underlying pathology and if not done could be detrimental to his health or cause death.  Patient voiced understanding    Trace blood today we will send UA with micro       PSA    ED    Start tadalafil 20 mg 1 tab as needed sexual intercourse.  Risk/benefits I discussed today.  Patient understands if he has an erection for > than 4 hours can go to emergency room or risk never  having reaction again  "

## 2021-06-15 ENCOUNTER — TELEPHONE (OUTPATIENT)
Dept: UROLOGY | Facility: CLINIC | Age: 76
End: 2021-06-15

## 2021-06-15 DIAGNOSIS — N40.1 BENIGN PROSTATIC HYPERPLASIA (BPH) WITH URINARY URGE INCONTINENCE: Primary | ICD-10-CM

## 2021-06-15 DIAGNOSIS — R31.9 HEMATURIA, UNSPECIFIED TYPE: Primary | ICD-10-CM

## 2021-06-15 DIAGNOSIS — N39.41 URGE INCONTINENCE OF URINE: ICD-10-CM

## 2021-06-15 DIAGNOSIS — N39.41 BENIGN PROSTATIC HYPERPLASIA (BPH) WITH URINARY URGE INCONTINENCE: Primary | ICD-10-CM

## 2021-06-22 DIAGNOSIS — R31.9 HEMATURIA, UNSPECIFIED TYPE: ICD-10-CM

## 2021-06-22 LAB
BACTERIA UR QL AUTO: ABNORMAL /HPF
BILIRUB UR QL STRIP: NEGATIVE
CLARITY UR: ABNORMAL
COLOR UR: YELLOW
GLUCOSE UR STRIP-MCNC: ABNORMAL MG/DL
HGB UR QL STRIP.AUTO: ABNORMAL
HYALINE CASTS UR QL AUTO: ABNORMAL /LPF
KETONES UR QL STRIP: ABNORMAL
LEUKOCYTE ESTERASE UR QL STRIP.AUTO: NEGATIVE
NITRITE UR QL STRIP: POSITIVE
PH UR STRIP.AUTO: 6.5 [PH] (ref 5–8)
PROT UR QL STRIP: ABNORMAL
RBC # UR: ABNORMAL /HPF
REF LAB TEST METHOD: ABNORMAL
SP GR UR STRIP: >=1.03 (ref 1–1.03)
SQUAMOUS #/AREA URNS HPF: ABNORMAL /HPF
UROBILINOGEN UR QL STRIP: ABNORMAL
WBC UR QL AUTO: ABNORMAL /HPF

## 2021-06-22 PROCEDURE — 81001 URINALYSIS AUTO W/SCOPE: CPT | Performed by: UROLOGY

## 2021-06-23 ENCOUNTER — LAB (OUTPATIENT)
Dept: LAB | Facility: HOSPITAL | Age: 76
End: 2021-06-23

## 2021-06-23 ENCOUNTER — TELEPHONE (OUTPATIENT)
Dept: UROLOGY | Facility: CLINIC | Age: 76
End: 2021-06-23

## 2021-06-23 DIAGNOSIS — R35.0 BENIGN PROSTATIC HYPERPLASIA WITH URINARY FREQUENCY: ICD-10-CM

## 2021-06-23 DIAGNOSIS — N30.01 ACUTE CYSTITIS WITH HEMATURIA: Primary | ICD-10-CM

## 2021-06-23 DIAGNOSIS — N30.01 ACUTE CYSTITIS WITH HEMATURIA: ICD-10-CM

## 2021-06-23 DIAGNOSIS — N40.1 BENIGN PROSTATIC HYPERPLASIA WITH URINARY FREQUENCY: ICD-10-CM

## 2021-06-23 LAB — PSA SERPL-MCNC: 1.09 NG/ML (ref 0–4)

## 2021-06-23 PROCEDURE — 36415 COLL VENOUS BLD VENIPUNCTURE: CPT

## 2021-06-23 PROCEDURE — 87147 CULTURE TYPE IMMUNOLOGIC: CPT

## 2021-06-23 PROCEDURE — 81001 URINALYSIS AUTO W/SCOPE: CPT

## 2021-06-23 PROCEDURE — 87086 URINE CULTURE/COLONY COUNT: CPT

## 2021-06-23 PROCEDURE — 84153 ASSAY OF PSA TOTAL: CPT

## 2021-06-23 NOTE — TELEPHONE ENCOUNTER
----- Message from Issac Ortiz MD sent at 6/23/2021  9:18 AM EDT -----  Patient's UA looks to have a UTI, can he do a urine culture, I will put an order

## 2021-06-23 NOTE — TELEPHONE ENCOUNTER
The patient called and I relayed the message, per Dr. Ortiz and Lazara.  He will go to [facility] lab to have culture done.

## 2021-06-24 ENCOUNTER — TELEPHONE (OUTPATIENT)
Dept: UROLOGY | Facility: CLINIC | Age: 76
End: 2021-06-24

## 2021-06-24 DIAGNOSIS — R30.0 DYSURIA: Primary | ICD-10-CM

## 2021-06-24 LAB
BACTERIA SPEC AEROBE CULT: ABNORMAL
BACTERIA SPEC AEROBE CULT: ABNORMAL
BACTERIA UR QL AUTO: ABNORMAL /HPF
BILIRUB UR QL STRIP: NEGATIVE
CLARITY UR: ABNORMAL
COLOR UR: YELLOW
GLUCOSE UR STRIP-MCNC: ABNORMAL MG/DL
HGB UR QL STRIP.AUTO: NEGATIVE
HYALINE CASTS UR QL AUTO: ABNORMAL /LPF
KETONES UR QL STRIP: ABNORMAL
LEUKOCYTE ESTERASE UR QL STRIP.AUTO: NEGATIVE
NITRITE UR QL STRIP: POSITIVE
PH UR STRIP.AUTO: 6.5 [PH] (ref 5–8)
PROT UR QL STRIP: ABNORMAL
RBC # UR: ABNORMAL /HPF
REF LAB TEST METHOD: ABNORMAL
SP GR UR STRIP: 1.02 (ref 1–1.03)
SQUAMOUS #/AREA URNS HPF: ABNORMAL /HPF
UROBILINOGEN UR QL STRIP: ABNORMAL
WBC UR QL AUTO: ABNORMAL /HPF

## 2021-06-24 NOTE — TELEPHONE ENCOUNTER
Pt made aware urine culture was contaminated. Pt verbalized understanding to drop off another urine sample.

## 2021-06-29 ENCOUNTER — LAB (OUTPATIENT)
Dept: LAB | Facility: HOSPITAL | Age: 76
End: 2021-06-29

## 2021-06-29 DIAGNOSIS — R30.0 DYSURIA: ICD-10-CM

## 2021-06-29 PROCEDURE — 87086 URINE CULTURE/COLONY COUNT: CPT

## 2021-06-29 PROCEDURE — 87147 CULTURE TYPE IMMUNOLOGIC: CPT

## 2021-06-30 LAB — BACTERIA SPEC AEROBE CULT: ABNORMAL

## 2021-07-19 ENCOUNTER — OFFICE VISIT (OUTPATIENT)
Dept: UROLOGY | Facility: CLINIC | Age: 76
End: 2021-07-19

## 2021-07-19 DIAGNOSIS — N52.9 ERECTILE DYSFUNCTION, UNSPECIFIED ERECTILE DYSFUNCTION TYPE: ICD-10-CM

## 2021-07-19 DIAGNOSIS — N40.1 BENIGN PROSTATIC HYPERPLASIA WITH URINARY FREQUENCY: Primary | ICD-10-CM

## 2021-07-19 DIAGNOSIS — R35.0 BENIGN PROSTATIC HYPERPLASIA WITH URINARY FREQUENCY: Primary | ICD-10-CM

## 2021-07-19 PROCEDURE — 87086 URINE CULTURE/COLONY COUNT: CPT | Performed by: UROLOGY

## 2021-07-19 PROCEDURE — 87147 CULTURE TYPE IMMUNOLOGIC: CPT | Performed by: UROLOGY

## 2021-07-19 PROCEDURE — 99214 OFFICE O/P EST MOD 30 MIN: CPT | Performed by: UROLOGY

## 2021-07-19 RX ORDER — TADALAFIL 20 MG/1
20 TABLET ORAL AS NEEDED
Qty: 5 TABLET | Refills: 10 | Status: SHIPPED | OUTPATIENT
Start: 2021-07-19 | End: 2021-09-28 | Stop reason: SDUPTHER

## 2021-07-19 NOTE — PROGRESS NOTES
Chief Complaint    Urologic complaint    Subjective          Giles Donovan presents to [facility]  History of Present Illness       75 y.o. male presents to [facility] to be seen for BPH/urge incontinence/ ED     Patient comes in today for office cystoscopy, urine is nitrite positive he is not having any symptoms    He did start on Flomax 0.4 mg at his last visit and he has noticed just a little bit of symptom improvement.  Patient does feel like he is doing okay as long as he goes and voids when he gets the urge.    No gross hematuria/UTI    Patient no longer having to wear pads he is back wearing regular underwear    Patient is also taking oxybutynin 5 mg p.o. twice daily.  Thinks this does help    Patient does drink about 3 beers daily.    Previous    Patient's been dealing with worsening urination for the last year.       Minimal erections can get a small erection in the morning.  Sildenafil 100 mg -did not help     No urologic family history,   Has never had any urologic surgery.     No cardiopulmonary history.  Patient does not smoke. ASA 81/meloxicam.  Diabetes mellitus on insulin     PVR    5/21  78     9/20  GFR > 60      PSA     6/29 1.0  2/19 0.92     Past History:  Medical History: has a past medical history of Allergic rhinitis (12/27/2014), Cataract, Cough (03/30/2016), Depression, Diabetes (CMS/[facility]), GERD (gastroesophageal reflux disease), H/O psychiatric care (1999), Hyperlipidemia (03/30/2016), Hypertension, Hypothyroidism, Seasonal allergies, Shortness of breath, Stenosis of right carotid artery (02/19/2017), Syncope (02/19/2017), Type 2 diabetes mellitus (CMS/[facility]), and Upper respiratory infection (10/18/2015).   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    Family History: family history includes Arthritis in his mother; Heart disease in his mother; Kidney disease in his sister; Lupus in his mother.   Social History: reports that he has never smoked. He does not have any smokeless tobacco history on file. He reports current alcohol use. He reports that he does not use drugs.  Allergies: Latex       Current Outpatient Medications:   •  aspirin 81 MG chewable tablet, aspirin 81 mg oral tablet,chewable chew 1 tablet (81 mg) by oral route once daily   Active, Disp: , Rfl:   •  atorvastatin (LIPITOR) 80 MG tablet, , Disp: , Rfl:   •  carvedilol (COREG) 12.5 MG tablet, Take 12.5 mg by mouth., Disp: , Rfl:   •  cetirizine (ZyrTEC Allergy) 10 MG tablet, Zyrtec 10 mg oral tablet take 1 tablet (10 mg) by oral route once daily for 90 days 4/5/2021  Active, Disp: , Rfl:   •  ezetimibe (ZETIA) 10 MG tablet, Take 10 mg by mouth Daily., Disp: , Rfl:   •  felodipine (PLENDIL) 5 MG 24 hr tablet, felodipine 5 mg oral tablet extended release 24 hr take 1 tablet (5 mg) by oral route once daily   Active, Disp: , Rfl:   •  gabapentin (NEURONTIN) 600 MG tablet, , Disp: , Rfl:   •  insulin aspart (novoLOG) 100 UNIT/ML injection, Inject 10 Units under the skin into the appropriate area as directed 4 (Four) Times a Day., Disp: , Rfl:   •  Insulin Glargine (Lantus SoloStar) 100 UNIT/ML injection pen, Lantus Solostar 100 unit/mL (3 mL) subcutaneous insulin pen inject 62 units by subcutaneous route QHS   Active, Disp: , Rfl:   •  levothyroxine (SYNTHROID, LEVOTHROID) 75 MCG tablet, Take 75 mcg by mouth Daily., Disp: , Rfl:   •  losartan (COZAAR) 100 MG tablet, , Disp: , Rfl:   •  meloxicam (MOBIC) 7.5 MG tablet, , Disp: , Rfl:   •  omeprazole (priLOSEC) 20 MG capsule, omeprazole 20 mg oral capsule,delayed release(DR/EC) take 1 capsule (20 mg) by oral route once daily before a meal   Suspended, Disp: , Rfl:   •  oxybutynin (DITROPAN) 5 MG tablet, oxybutynin chloride 5 mg oral tablet take 1 tablet  (5 mg) by oral route 2 times per day 5/10/2021  Active, Disp: , Rfl:   •  PARoxetine (PAXIL) 40 MG tablet, , Disp: , Rfl:   •  tadalafil (Cialis) 20 MG tablet, Take 1 tablet by mouth As Needed for Erectile Dysfunction., Disp: 5 tablet, Rfl: 10  •  tamsulosin (FLOMAX) 0.4 MG capsule 24 hr capsule, Take 1 capsule by mouth Daily., Disp: 90 capsule, Rfl: 3     Physical exam       Alert and orient x3  Well appearing, well developed, in no acute distress   Unlabored respirations  Nontender/nondistended      Grossly oriented to person, place and time, judgment is intact, normal mood and affect    Results for orders placed or performed in visit on 06/29/21   Urine Culture - Urine, Urine, Clean Catch    Specimen: Urine, Clean Catch   Result Value Ref Range    Urine Culture >100,000 CFU/mL Staphylococcus, coagulase negative (A)         Objective     Vital Signs:   There were no vitals taken for this visit.             Assessment and Plan    There are no diagnoses linked to this encounter.  BPH with frequency    Urge incontinence/BPH     continue Ditropan 5 mg p.o. daily   continue Flomax 0.4 mg daily.  Doing better on this medication.      New onset urgency    Patient with nitrite positive urine today, will send urine culture and have him follow-up in a few weeks for office cystoscopy to rule out underlying pathology.  Patient understands failure to do this could risk missing a malignancy which could be detriment to his health or cause death    ED    Patient was prescribed tadalafil 20 mg 1 tab sexual intercourse prnlast appointment.  He did not get this prescription he did not think we'll send it again.  Patient was counseled to take this at least 4 hours from the time he takes Flomax.  Or itcould be detriment to his health or cause hypotension/harm.

## 2021-07-20 LAB — BACTERIA SPEC AEROBE CULT: ABNORMAL

## 2021-07-22 ENCOUNTER — TELEPHONE (OUTPATIENT)
Dept: UROLOGY | Facility: CLINIC | Age: 76
End: 2021-07-22

## 2021-07-22 NOTE — TELEPHONE ENCOUNTER
M for patient to call office back to have him repeat ucx as it come back contaminated.     ----- Message from Issac Ortiz MD sent at 7/22/2021  6:27 AM EDT -----  Regarding: UTI  That  Staphylococcus is probably contaminant, I would like him to repeat before we do anything  ----- Message -----  From: Lazara Lund  Sent: 7/21/2021   3:33 PM EDT  To: Issac Ortiz MD    Looks like patient has infection, did the results change?  ----- Message -----  From: Issac Ortiz MD  Sent: 7/21/2021  12:16 PM EDT  To: Lazara Lund    Patient's urine culture contaminated , I need him to repeat

## 2021-07-26 NOTE — TELEPHONE ENCOUNTER
Caller: Giles Donovan    Relationship: Self    Best call back number: [phone]    Medication needed:   Requested Prescriptions     Pending Prescriptions Disp Refills   • cetirizine (ZyrTEC Allergy) 10 MG tablet     • meloxicam (MOBIC) 7.5 MG tablet     • oxybutynin (DITROPAN) 5 MG tablet     BENZONATATE 100MG    When do you need the refill by: 7/26/21    Does the patient have less than a 3 day supply:  [x] Yes  [] No    What is the patient's preferred pharmacy: [facility] [place] [facility] [address] [phone] [facility] [phone]

## 2021-07-27 RX ORDER — CETIRIZINE HYDROCHLORIDE 10 MG/1
10 TABLET ORAL DAILY
Qty: 90 TABLET | Refills: 3 | Status: SHIPPED | OUTPATIENT
Start: 2021-07-27 | End: 2022-08-09 | Stop reason: SDUPTHER

## 2021-07-27 RX ORDER — OXYBUTYNIN CHLORIDE 5 MG/1
5 TABLET ORAL 2 TIMES DAILY
Qty: 60 TABLET | Refills: 2 | Status: SHIPPED | OUTPATIENT
Start: 2021-07-27 | End: 2021-12-09 | Stop reason: SDUPTHER

## 2021-07-27 RX ORDER — MELOXICAM 7.5 MG/1
7.5 TABLET ORAL DAILY
Qty: 30 TABLET | Refills: 1 | Status: SHIPPED | OUTPATIENT
Start: 2021-07-27 | End: 2021-12-21 | Stop reason: SDUPTHER

## 2021-08-23 NOTE — PROGRESS NOTES
Procedures       Urinalysis was checked today and was negative for signs of infection      Cytoscopy Procedure:     Procedure: Flexible cytoscope was passed per urethra into the bladder without difficulty after proper consent. The bladder was inspected in a systematic meridian fashion.     3 cm prostate, no median lobe    Mild trabeculations throughout.    There were no tumors, lesions, stones, or other abnormalities noted within the bladder. Of note, there was no increased vascularity as well. Both ureteral orifices were identified and were normal in appearance. The flexible cytoscope was removed. The patient tolerated the procedure well.     PLAN      Urge incontinence/BPH     Still very bothersome to the patient     Hold Ditropan 5 mg p.o. twice daily and try Myrbetriq 50 mg daily, risk benefits discussed.   continue Flomax 0.4 mg daily.            ED     Patient was prescribed tadalafil 20 mg 1 tab sexual intercourse prn.  We will discuss at his next appointment    Follow-up in 1 month with [facility]    This document has been electronically signed by Issac Ortiz MD  August 23, 2021 07:24 EDT

## 2021-08-24 ENCOUNTER — OFFICE VISIT (OUTPATIENT)
Dept: UROLOGY | Facility: CLINIC | Age: 76
End: 2021-08-24

## 2021-08-24 DIAGNOSIS — N40.1 BENIGN PROSTATIC HYPERPLASIA WITH URINARY FREQUENCY: Primary | ICD-10-CM

## 2021-08-24 DIAGNOSIS — R35.0 BENIGN PROSTATIC HYPERPLASIA WITH URINARY FREQUENCY: Primary | ICD-10-CM

## 2021-08-24 PROCEDURE — 52000 CYSTOURETHROSCOPY: CPT | Performed by: UROLOGY

## 2021-09-27 NOTE — PROGRESS NOTES
Chief Complaint    Urologic complaint    Subjective          Giles Donovan presents to [facility]  History of Present Illness     75 y.o. male presents to [facility] to be seen for BPH/urge incontinence/ ED     Myrbetriq 50-helped some, but he actually thinks Ditropan works better.    8/21 cystoscopy-3 cm prostate, no median lobe, mild trabeculations.  Negative otherwise    On oxybutynin 5 mg p.o. twice daily.  He does have some dry eyes but used eyedrops which makes this okay.    Patient doing a little better, he is no longer having such bad urgency.  1 pad daily.     on Flomax 0.4 mg, good stream.  No straining    Patient does have trace blood and nitrite positive on UA today     Patient does drink about 3 beers daily.     Patient is asymptomatic as far as burning and dysuria.    Patient does have prescription for tadalafil 20 mg-he has not used this yet as he is having insurance issues    Previous      Minimal erections can get a small erection in the morning.  Sildenafil 100 mg -did not help     No urologic family history,   Has never had any urologic surgery.     No cardiopulmonary history.  Patient does not smoke. ASA 81/meloxicam.  Diabetes mellitus on insulin     PVR     9/21   167  5/21   78     9/20  GFR > 60      PSA     6/29 1.0  2/19 0.92    Results for orders placed or performed in visit on 09/28/21   Bladder Scan   Result Value Ref Range    Urine Volume 116    POC Urinalysis Dipstick, Automated    Specimen: Urine   Result Value Ref Range    Color Yellow Yellow, Straw, Dark Yellow, Ashley    Clarity, UA Clear Clear    Specific Gravity  1.020 1.005 - 1.030    pH, Urine 7.0 5.0 - 8.0    Leukocytes Trace (A) Negative    Nitrite, UA Positive (A) Negative    Protein, POC 30 mg/dL (A) Negative mg/dL    Glucose,  mg/dL (A) Negative, 1000 mg/dL (3+) mg/dL    Ketones, UA Negative Negative    Urobilinogen, UA Normal Normal    Bilirubin Negative  Negative    Blood, UA Trace (A) Negative         Past History:  Medical History: has a past medical history of Allergic rhinitis (12/27/2014), Cataract, Cough (03/30/2016), Depression, Diabetes (CMS/[facility]), GERD (gastroesophageal reflux disease), H/O psychiatric care (1999), Hyperlipidemia (03/30/2016), Hypertension, Hypothyroidism, Seasonal allergies, Shortness of breath, Stenosis of right carotid artery (02/19/2017), Syncope (02/19/2017), Type 2 diabetes mellitus (CMS/[facility]), and Upper respiratory infection (10/18/2015).   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   Family History: family history includes Arthritis in his mother; Heart disease in his mother; Kidney disease in his sister; Lupus in his mother.   Social History: reports that he has never smoked. He does not have any smokeless tobacco history on file. He reports current alcohol use. He reports that he does not use drugs.  Allergies: Latex       Current Outpatient Medications:   •  aspirin 81 MG chewable tablet, aspirin 81 mg oral tablet,chewable chew 1 tablet (81 mg) by oral route once daily   Active, Disp: , Rfl:   •  atorvastatin (LIPITOR) 80 MG tablet, , Disp: , Rfl:   •  carvedilol (COREG) 12.5 MG tablet, Take 12.5 mg by mouth., Disp: , Rfl:   •  cetirizine (ZyrTEC Allergy) 10 MG tablet, Take 1 tablet by mouth Daily., Disp: 90 tablet, Rfl: 3  •  ezetimibe (ZETIA) 10 MG tablet, Take 10 mg by mouth Daily., Disp: , Rfl:   •  felodipine (PLENDIL) 5 MG 24 hr tablet, felodipine 5 mg oral tablet extended release 24 hr take 1 tablet (5 mg) by oral route once daily   Active, Disp: , Rfl:   •  gabapentin (NEURONTIN) 600 MG tablet, , Disp: , Rfl:   •  insulin aspart (novoLOG) 100 UNIT/ML injection, Inject 10 Units under the skin into the appropriate area as directed 4 (Four)  Times a Day., Disp: , Rfl:   •  Insulin Glargine (Lantus SoloStar) 100 UNIT/ML injection pen, Lantus Solostar 100 unit/mL (3 mL) subcutaneous insulin pen inject 62 units by subcutaneous route QHS   Active, Disp: , Rfl:   •  levothyroxine (SYNTHROID, LEVOTHROID) 75 MCG tablet, Take 75 mcg by mouth Daily., Disp: , Rfl:   •  losartan (COZAAR) 100 MG tablet, , Disp: , Rfl:   •  meloxicam (MOBIC) 7.5 MG tablet, Take 1 tablet by mouth Daily., Disp: 30 tablet, Rfl: 1  •  omeprazole (priLOSEC) 20 MG capsule, omeprazole 20 mg oral capsule,delayed release(DR/EC) take 1 capsule (20 mg) by oral route once daily before a meal   Suspended, Disp: , Rfl:   •  oxybutynin (DITROPAN) 5 MG tablet, Take 1 tablet by mouth 2 (Two) Times a Day., Disp: 60 tablet, Rfl: 2  •  PARoxetine (PAXIL) 40 MG tablet, , Disp: , Rfl:   •  tadalafil (Cialis) 20 MG tablet, Take 1 tablet by mouth As Needed for Erectile Dysfunction (1 tab prn sexual intercourse)., Disp: 5 tablet, Rfl: 10  •  tamsulosin (FLOMAX) 0.4 MG capsule 24 hr capsule, Take 1 capsule by mouth Daily., Disp: 90 capsule, Rfl: 3     Physical exam       Alert and orient x3  Well appearing, well developed, in no acute distress   Unlabored respirations    Grossly oriented to person, place and time, judgment is intact, normal mood and affect    Results for orders placed or performed in visit on 07/19/21   Urine Culture - Urine, Urine, Random Void    Specimen: Urine, Random Void   Result Value Ref Range    Urine Culture >100,000 CFU/mL Staphylococcus, coagulase negative (A)         Objective     Vital Signs:   There were no vitals taken for this visit.             Assessment and Plan    Diagnoses and all orders for this visit:    1. Benign prostatic hyperplasia with urinary frequency (Primary)    2. Erectile dysfunction, unspecified erectile dysfunction type      Urge incontinence/BPH     Myrbetriq did not help, stop Myrbetriq     Cont Ditropan 5 mg p.o. twice daily    continue Flomax 0.4 mg  daily.       Nitrite positive urine, urine culture today    Patient actually feels like he is doing okay at this time.    Discussed Rezum and TURP today.    Patient more interested in TURP as he does not want a procedure in the office.     Patient will follow up in 6 months to discuss again, at this time not wanting to do any procedures.  We did discuss TURP in detail today.    ED     Has tadalafil 20 mg 1 tab sexual intercourse prn.  Has not used yet     PVR at follow-up

## 2021-09-28 ENCOUNTER — OFFICE VISIT (OUTPATIENT)
Dept: UROLOGY | Facility: CLINIC | Age: 76
End: 2021-09-28

## 2021-09-28 VITALS — BODY MASS INDEX: 33.34 KG/M2 | WEIGHT: 212.4 LBS | HEIGHT: 67 IN | RESPIRATION RATE: 18 BRPM

## 2021-09-28 DIAGNOSIS — N40.1 BENIGN PROSTATIC HYPERPLASIA WITH URINARY FREQUENCY: Primary | ICD-10-CM

## 2021-09-28 DIAGNOSIS — R35.0 BENIGN PROSTATIC HYPERPLASIA WITH URINARY FREQUENCY: Primary | ICD-10-CM

## 2021-09-28 DIAGNOSIS — N52.9 ERECTILE DYSFUNCTION, UNSPECIFIED ERECTILE DYSFUNCTION TYPE: ICD-10-CM

## 2021-09-28 LAB
BILIRUB BLD-MCNC: NEGATIVE MG/DL
CLARITY, POC: CLEAR
COLOR UR: YELLOW
GLUCOSE UR STRIP-MCNC: ABNORMAL MG/DL
KETONES UR QL: NEGATIVE
LEUKOCYTE EST, POC: ABNORMAL
NITRITE UR-MCNC: POSITIVE MG/ML
PH UR: 7 [PH] (ref 5–8)
PROT UR STRIP-MCNC: ABNORMAL MG/DL
RBC # UR STRIP: ABNORMAL /UL
SP GR UR: 1.02 (ref 1–1.03)
URINE VOLUME: 116
UROBILINOGEN UR QL: NORMAL

## 2021-09-28 PROCEDURE — 51798 US URINE CAPACITY MEASURE: CPT | Performed by: UROLOGY

## 2021-09-28 PROCEDURE — 87077 CULTURE AEROBIC IDENTIFY: CPT | Performed by: UROLOGY

## 2021-09-28 PROCEDURE — 99214 OFFICE O/P EST MOD 30 MIN: CPT | Performed by: UROLOGY

## 2021-09-28 PROCEDURE — 81003 URINALYSIS AUTO W/O SCOPE: CPT | Performed by: UROLOGY

## 2021-09-28 PROCEDURE — 87086 URINE CULTURE/COLONY COUNT: CPT | Performed by: UROLOGY

## 2021-09-28 RX ORDER — FLURBIPROFEN SODIUM 0.3 MG/ML
SOLUTION/ DROPS OPHTHALMIC
COMMUNITY
Start: 2021-07-06

## 2021-09-28 RX ORDER — PRAZOSIN HYDROCHLORIDE 1 MG/1
CAPSULE ORAL
COMMUNITY
Start: 2021-09-08

## 2021-09-28 RX ORDER — MULTIPLE VITAMINS W/ MINERALS TAB 9MG-400MCG
TAB ORAL
COMMUNITY

## 2021-09-28 RX ORDER — ASPIRIN 81 MG/1
TABLET ORAL
COMMUNITY
Start: 2021-07-23

## 2021-09-28 RX ORDER — TIOTROPIUM BROMIDE 18 UG/1
CAPSULE ORAL; RESPIRATORY (INHALATION)
COMMUNITY
Start: 2021-07-23

## 2021-09-28 RX ORDER — BENZONATATE 100 MG/1
CAPSULE ORAL
COMMUNITY
Start: 2021-07-23 | End: 2022-02-21

## 2021-09-28 RX ORDER — LOSARTAN POTASSIUM 50 MG/1
TABLET ORAL
COMMUNITY
Start: 2021-07-23

## 2021-09-28 RX ORDER — ACETAMINOPHEN 325 MG/1
TABLET ORAL
COMMUNITY
Start: 2021-07-23 | End: 2022-05-09

## 2021-09-28 RX ORDER — SILDENAFIL 100 MG/1
TABLET, FILM COATED ORAL
COMMUNITY
Start: 2021-07-23 | End: 2021-12-21

## 2021-09-28 RX ORDER — SIMETHICONE 80 MG
TABLET,CHEWABLE ORAL
COMMUNITY
Start: 2021-07-23

## 2021-09-28 RX ORDER — INSULIN ASPART 100 [IU]/ML
INJECTION, SOLUTION INTRAVENOUS; SUBCUTANEOUS
COMMUNITY
Start: 2021-06-30

## 2021-09-28 RX ORDER — FUROSEMIDE 20 MG/1
TABLET ORAL
COMMUNITY
Start: 2021-07-23

## 2021-09-29 LAB — BACTERIA SPEC AEROBE CULT: ABNORMAL

## 2021-12-09 RX ORDER — OXYBUTYNIN CHLORIDE 5 MG/1
5 TABLET ORAL 2 TIMES DAILY
Qty: 90 TABLET | Refills: 1 | Status: SHIPPED | OUTPATIENT
Start: 2021-12-09 | End: 2021-12-21

## 2021-12-09 NOTE — TELEPHONE ENCOUNTER
Caller: Zion Giles Zapata    Relationship: Self    Best call back number: [phone]    Requested Prescriptions:   Requested Prescriptions     Pending Prescriptions Disp Refills   • oxybutynin (DITROPAN) 5 MG tablet 60 tablet 2     Sig: Take 1 tablet by mouth 2 (Two) Times a Day.      Pharmacy where request should be sent:    [facility]  [address] [place], [place] [id number] [phone]    Additional details provided by patient: PATIENT IS FULLY OUT OF MEDICATION    Does the patient have less than a 3 day supply:  [x] Yes  [] No    Neto Tracy Rep   12/09/21 14:11 EST

## 2021-12-21 ENCOUNTER — OFFICE VISIT (OUTPATIENT)
Dept: INTERNAL MEDICINE | Facility: CLINIC | Age: 76
End: 2021-12-21

## 2021-12-21 VITALS
WEIGHT: 222.4 LBS | OXYGEN SATURATION: 97 % | HEART RATE: 82 BPM | HEIGHT: 67 IN | DIASTOLIC BLOOD PRESSURE: 63 MMHG | RESPIRATION RATE: 18 BRPM | TEMPERATURE: 97.4 F | SYSTOLIC BLOOD PRESSURE: 119 MMHG | BODY MASS INDEX: 34.91 KG/M2

## 2021-12-21 DIAGNOSIS — R53.1 WEAKNESS: Primary | ICD-10-CM

## 2021-12-21 DIAGNOSIS — E78.2 MIXED HYPERLIPIDEMIA: ICD-10-CM

## 2021-12-21 DIAGNOSIS — I10 PRIMARY HYPERTENSION: ICD-10-CM

## 2021-12-21 DIAGNOSIS — N52.9 ERECTILE DYSFUNCTION, UNSPECIFIED ERECTILE DYSFUNCTION TYPE: ICD-10-CM

## 2021-12-21 DIAGNOSIS — N39.41 URGE INCONTINENCE OF URINE: ICD-10-CM

## 2021-12-21 PROBLEM — E03.9 HYPOTHYROIDISM: Status: RESOLVED | Noted: 2021-12-21 | Resolved: 2021-12-21

## 2021-12-21 PROBLEM — F32.A DEPRESSION: Status: ACTIVE | Noted: 2021-12-21

## 2021-12-21 PROBLEM — I65.21 STENOSIS OF RIGHT CAROTID ARTERY: Status: RESOLVED | Noted: 2017-02-19 | Resolved: 2021-12-21

## 2021-12-21 PROBLEM — I65.21: Status: ACTIVE | Noted: 2017-01-23

## 2021-12-21 PROBLEM — E11.9 DIABETES MELLITUS, TYPE II: Status: RESOLVED | Noted: 2021-12-21 | Resolved: 2021-12-21

## 2021-12-21 PROBLEM — J30.2 SEASONAL ALLERGIC RHINITIS: Status: ACTIVE | Noted: 2021-12-21

## 2021-12-21 PROBLEM — I65.21 STENOSIS OF RIGHT CAROTID ARTERY: Status: ACTIVE | Noted: 2017-02-19

## 2021-12-21 PROBLEM — K21.9 ESOPHAGEAL REFLUX: Status: ACTIVE | Noted: 2021-12-21

## 2021-12-21 PROBLEM — E11.9 DIABETES MELLITUS, TYPE II: Status: ACTIVE | Noted: 2021-12-21

## 2021-12-21 PROBLEM — R55 VASO VAGAL EPISODE: Status: ACTIVE | Noted: 2017-01-23

## 2021-12-21 PROBLEM — IMO0002 TYPE II DIABETES MELLITUS, UNCONTROLLED: Status: RESOLVED | Noted: 2021-12-21 | Resolved: 2021-12-21

## 2021-12-21 PROBLEM — IMO0002 TYPE II DIABETES MELLITUS, UNCONTROLLED: Status: ACTIVE | Noted: 2021-12-21

## 2021-12-21 PROBLEM — E03.9 HYPOTHYROIDISM: Status: ACTIVE | Noted: 2021-12-21

## 2021-12-21 PROBLEM — H26.9 CATARACT: Status: ACTIVE | Noted: 2021-12-21

## 2021-12-21 PROBLEM — R06.02 SHORTNESS OF BREATH: Status: ACTIVE | Noted: 2021-12-21

## 2021-12-21 LAB — CK SERPL-CCNC: 297 U/L (ref 20–200)

## 2021-12-21 PROCEDURE — 83519 RIA NONANTIBODY: CPT | Performed by: INTERNAL MEDICINE

## 2021-12-21 PROCEDURE — 86255 FLUORESCENT ANTIBODY SCREEN: CPT | Performed by: INTERNAL MEDICINE

## 2021-12-21 PROCEDURE — 1170F FXNL STATUS ASSESSED: CPT | Performed by: INTERNAL MEDICINE

## 2021-12-21 PROCEDURE — 82550 ASSAY OF CK (CPK): CPT | Performed by: INTERNAL MEDICINE

## 2021-12-21 PROCEDURE — G0439 PPPS, SUBSEQ VISIT: HCPCS | Performed by: INTERNAL MEDICINE

## 2021-12-21 PROCEDURE — 1159F MED LIST DOCD IN RCRD: CPT | Performed by: INTERNAL MEDICINE

## 2021-12-21 PROCEDURE — 36415 COLL VENOUS BLD VENIPUNCTURE: CPT | Performed by: INTERNAL MEDICINE

## 2021-12-21 PROCEDURE — 99214 OFFICE O/P EST MOD 30 MIN: CPT | Performed by: INTERNAL MEDICINE

## 2021-12-21 RX ORDER — TADALAFIL 20 MG/1
20 TABLET ORAL DAILY PRN
Qty: 8 TABLET | Refills: 1 | Status: SHIPPED | OUTPATIENT
Start: 2021-12-21 | End: 2022-02-21

## 2021-12-21 RX ORDER — MELOXICAM 7.5 MG/1
7.5 TABLET ORAL DAILY
Qty: 90 TABLET | Refills: 1 | Status: SHIPPED | OUTPATIENT
Start: 2021-12-21

## 2021-12-21 RX ORDER — OXYBUTYNIN CHLORIDE 10 MG/1
10 TABLET, EXTENDED RELEASE ORAL DAILY
Qty: 90 TABLET | Refills: 1 | Status: SHIPPED | OUTPATIENT
Start: 2021-12-21 | End: 2022-03-28 | Stop reason: SDUPTHER

## 2021-12-21 RX ORDER — OXYBUTYNIN CHLORIDE 5 MG/1
5 TABLET ORAL 2 TIMES DAILY
Qty: 90 TABLET | Refills: 1 | Status: CANCELLED | OUTPATIENT
Start: 2021-12-21

## 2021-12-21 NOTE — PROGRESS NOTES
"Chief Complaint  Medicare Wellness-subsequent    Subjective          Giles Donovan presents to [facility]  History of Present Illness    Having trouble with his urinary issues  Is leaking more    Sugar was a little out of control for awhile, but doing better now    Feeling a bit more weak and noticing that he is having some trouble with balance as well  States that he just doesn't have energy to get up    He feels like he has no energy    bp running well  No chest pain    [facility]    Interested in sex, but can't get erections    Objective   Vital Signs:   /63 (BP Location: Right arm, Patient Position: Sitting)   Pulse 82   Temp 97.4 °F (36.3 °C) (Oral)   Resp 18   Ht 170.2 cm (67.01\")   Wt 101 kg (222 lb 6.4 oz)   SpO2 97%   BMI 34.82 kg/m²     Physical Exam  Vitals reviewed.   Constitutional:       Appearance: Normal appearance. He is well-developed.   HENT:      Head: Normocephalic and atraumatic.      Right Ear: External ear normal.      Left Ear: External ear normal.   Eyes:      Conjunctiva/sclera: Conjunctivae normal.      Pupils: Pupils are equal, round, and reactive to light.   Cardiovascular:      Rate and Rhythm: Normal rate and regular rhythm.      Heart sounds: No murmur heard.  No friction rub. No gallop.    Pulmonary:      Effort: Pulmonary effort is normal.      Breath sounds: Normal breath sounds. No wheezing or rhonchi.   Skin:     General: Skin is warm and dry.   Neurological:      Mental Status: He is alert and oriented to person, place, and time.      Cranial Nerves: No cranial nerve deficit.   Psychiatric:         Mood and Affect: Affect normal.         Behavior: Behavior normal.         Thought Content: Thought content normal.        Result Review :     Common labs    Common Labsle 6/23/21   PSA 1.090              Procedures      Assessment and Plan    Diagnoses and all orders for this visit:    1. Weakness " (Primary)  Comments:  will work up for myasthenia gravis  Orders:  -     Myasthenia Gravis Full Panel w/MuSK Reflex; Future  -     CK; Future  -     Myasthenia Gravis Full Panel w/MuSK Reflex  -     CK    2. Primary hypertension  Comments:  stable, cont current meds    3. Mixed hyperlipidemia    4. Urge incontinence of urine  Comments:  will try ditropan    5. Erectile dysfunction, unspecified erectile dysfunction type  Comments:  will try new medicine to help    Other orders  -     meloxicam (MOBIC) 7.5 MG tablet; Take 1 tablet by mouth Daily.  Dispense: 90 tablet; Refill: 1  -     oxybutynin XL (DITROPAN-XL) 10 MG 24 hr tablet; Take 1 tablet by mouth Daily.  Dispense: 90 tablet; Refill: 1  -     tadalafil (CIALIS) 20 MG tablet; Take 1 tablet by mouth Daily As Needed for Erectile Dysfunction.  Dispense: 8 tablet; Refill: 1              Follow Up   Return in about 2 months (around 2/21/2022).  Patient was given instructions and counseling regarding his condition or for health maintenance advice. Please see specific information pulled into the AVS if appropriate.

## 2021-12-21 NOTE — PROGRESS NOTES
The ABCs of the Annual Wellness Visit  Subsequent Medicare Wellness Visit    Chief Complaint   Patient presents with   • Medicare Wellness-subsequent      Subjective    History of Present Illness:  Giles Donovan is a 76 y.o. male who presents for a Subsequent Medicare Wellness Visit.    The following portions of the patient's history were reviewed and   updated as appropriate: allergies, current medications, past family history, past medical history, past social history, past surgical history and problem list.    Compared to one year ago, the patient feels his physical   health is worse.    Compared to one year ago, the patient feels his mental   health is worse.    Recent Hospitalizations:  He was not admitted to the hospital during the last year.       Current Medical Providers:  Patient Care Team:  Morena Masters MD as PCP - General (Internal Medicine)  Issac Ortiz MD as Consulting Physician (Urology)    Outpatient Medications Prior to Visit   Medication Sig Dispense Refill   • acetaminophen (TYLENOL) 325 MG tablet      • aspirin EC 81 MG EC tablet      • atorvastatin (LIPITOR) 80 MG tablet      • B-D UF III MINI PEN NEEDLES 31G X 5 MM misc      • benzonatate (TESSALON) 100 MG capsule      • carvedilol (COREG) 12.5 MG tablet Take 12.5 mg by mouth.     • cetirizine (ZyrTEC Allergy) 10 MG tablet Take 1 tablet by mouth Daily. 90 tablet 3   • ezetimibe (ZETIA) 10 MG tablet Take 10 mg by mouth Daily.     • felodipine (PLENDIL) 5 MG 24 hr tablet felodipine 5 mg oral tablet extended release 24 hr take 1 tablet (5 mg) by oral route once daily   Active     • furosemide (LASIX) 20 MG tablet      • gabapentin (NEURONTIN) 600 MG tablet      • insulin aspart (novoLOG) 100 UNIT/ML injection Inject 10 Units under the skin into the appropriate area as directed 4 (Four) Times a Day.     • Insulin Glargine (Lantus SoloStar) 100 UNIT/ML injection pen Lantus Solostar 100 unit/mL (3 mL) subcutaneous insulin pen  inject 62 units by subcutaneous route QHS   Active     • levothyroxine (SYNTHROID, LEVOTHROID) 75 MCG tablet Take 75 mcg by mouth Daily.     • losartan (COZAAR) 50 MG tablet      • multivitamin with minerals (ODELL MULTIVITAMIN FOR MEN PO)      • NovoLOG FlexPen 100 UNIT/ML solution pen-injector sc pen      • omeprazole (priLOSEC) 20 MG capsule omeprazole 20 mg oral capsule,delayed release(DR/EC) take 1 capsule (20 mg) by oral route once daily before a meal   Suspended     • PARoxetine (PAXIL) 40 MG tablet      • prazosin (MINIPRESS) 1 MG capsule      • simethicone (MYLICON) 80 MG chewable tablet      • Spiriva HandiHaler 18 MCG per inhalation capsule      • tamsulosin (FLOMAX) 0.4 MG capsule 24 hr capsule Take 1 capsule by mouth Daily. 90 capsule 3   • meloxicam (MOBIC) 7.5 MG tablet Take 1 tablet by mouth Daily. 30 tablet 1   • sildenafil (VIAGRA) 100 MG tablet      • oxybutynin (DITROPAN) 5 MG tablet Take 1 tablet by mouth 2 (Two) Times a Day. 90 tablet 1     No facility-administered medications prior to visit.       No opioid medication identified on active medication list. I have reviewed chart for other potential  high risk medication/s and harmful drug interactions in the elderly.          Aspirin is on active medication list. Aspirin use is indicated based on review of current medical condition/s. Pros and cons of this therapy have been discussed today. Benefits of this medication outweigh potential harm.  Patient has been encouraged to continue taking this medication.  .      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  ACP discussion was held with the patient during this visit. Vicky Allen would make decisions for him if he can't make them for himself.  This is his fiance.          Objective    Vitals:    12/21/21 1424   BP: 119/63   BP Location: Right arm   Patient Position: Sitting   Pulse: 82   Resp: 18   Temp: 97.4 °F (36.3 °C)   TempSrc: Oral   SpO2: 97%   Weight: 101 kg (222 lb 6.4 oz)   Height: 170.2 cm (67.01\")     BMI Readings from Last 1 Encounters:   12/21/21 34.82 kg/m²   BMI is above normal parameters. Recommendations include: exercise counseling and nutrition counseling    Does the patient have evidence of cognitive impairment? slight    Physical Exam            HEALTH RISK ASSESSMENT    Smoking Status:  Social History     Tobacco Use   Smoking Status Never Smoker   Smokeless Tobacco Never Used     Alcohol Consumption:  Social History     Substance and Sexual Activity   Alcohol Use Yes    Comment: occasionally     Fall Risk Screen:    STEADI Fall Risk Assessment was completed, and patient is at HIGH risk for falls. Assessment completed on:12/21/2021    Depression Screening:  PHQ-2/PHQ-9 Depression Screening 12/21/2021   Little interest or pleasure in doing things 0   Feeling down, depressed, or hopeless 0   Total Score 0       Health Habits and Functional and Cognitive Screening:  Functional & Cognitive Status 12/21/2021   Do you have difficulty preparing food and eating? Yes   Do you have difficulty bathing yourself, getting dressed or grooming yourself? Yes   Do you have difficulty using the toilet? Yes   Do you have difficulty moving around from place to place? Yes   Do you have trouble with steps or getting out of a bed or a chair? Yes   Current Diet Unhealthy Diet   Dental Exam Up to date   Eye Exam " Up to date   Exercise (times per week) 0 times per week   Current Exercises Include No Regular Exercise        Exercise Comment he stopped and said eh needs to start back   Do you need help using the phone?  No   Are you deaf or do you have serious difficulty hearing?  No   Do you need help with transportation? Yes   Do you need help shopping? Yes   Do you need help preparing meals?  Yes   Do you need help with housework?  Yes   Do you need help with laundry? Yes   Do you need help taking your medications? No   Do you need help managing money? No   Do you ever drive or ride in a car without wearing a seat belt? No   Have you felt unusual stress, anger or loneliness in the last month? No   Who do you live with? Other   If you need help, do you have trouble finding someone available to you? No   Have you been bothered in the last four weeks by sexual problems? No   Do you have difficulty concentrating, remembering or making decisions? No       Age-appropriate Screening Schedule:  Refer to the list below for future screening recommendations based on patient's age, sex and/or medical conditions. Orders for these recommended tests are listed in the plan section. The patient has been provided with a written plan.    Health Maintenance   Topic Date Due   • URINE MICROALBUMIN  Never done   • TDAP/TD VACCINES (1 - Tdap) Never done   • ZOSTER VACCINE (1 of 2) Never done   • DIABETIC FOOT EXAM  06/06/2021   • HEMOGLOBIN A1C  06/06/2021   • DIABETIC EYE EXAM  06/06/2021   • INFLUENZA VACCINE  08/01/2021   • LIPID PANEL  12/02/2021              Assessment/Plan   CMS Preventative Services Quick Reference  Risk Factors Identified During Encounter  Dementia/Memory   Immunizations Discussed/Encouraged (specific Immunizations; COVID19  The above risks/problems have been discussed with the patient.  Follow up actions/plans if indicated are seen below in the Assessment/Plan Section.  Pertinent information has been shared with the  patient in the After Visit Summary.    Diagnoses and all orders for this visit:    1. Weakness (Primary)  Comments:  will work up for myasthenia gravis  Orders:  -     Myasthenia Gravis Full Panel w/MuSK Reflex; Future  -     CK; Future  -     Myasthenia Gravis Full Panel w/MuSK Reflex  -     CK    2. Primary hypertension  Comments:  stable, cont current meds    3. Mixed hyperlipidemia    4. Urge incontinence of urine  Comments:  will try ditropan    5. Erectile dysfunction, unspecified erectile dysfunction type  Comments:  will try new medicine to help    Other orders  -     meloxicam (MOBIC) 7.5 MG tablet; Take 1 tablet by mouth Daily.  Dispense: 90 tablet; Refill: 1  -     oxybutynin XL (DITROPAN-XL) 10 MG 24 hr tablet; Take 1 tablet by mouth Daily.  Dispense: 90 tablet; Refill: 1  -     tadalafil (CIALIS) 20 MG tablet; Take 1 tablet by mouth Daily As Needed for Erectile Dysfunction.  Dispense: 8 tablet; Refill: 1        Follow Up:   Return in about 2 months (around 2/21/2022).     An After Visit Summary and PPPS were made available to the patient.

## 2021-12-26 PROBLEM — J30.2 SEASONAL ALLERGIC RHINITIS: Status: RESOLVED | Noted: 2021-12-21 | Resolved: 2021-12-26

## 2021-12-26 PROBLEM — F32.A DEPRESSION: Status: RESOLVED | Noted: 2021-06-06 | Resolved: 2021-12-26

## 2021-12-26 PROBLEM — R06.02 SHORTNESS OF BREATH: Status: RESOLVED | Noted: 2021-12-21 | Resolved: 2021-12-26

## 2021-12-27 ENCOUNTER — HOSPITAL ENCOUNTER (EMERGENCY)
Facility: HOSPITAL | Age: 76
Discharge: HOME | End: 2021-12-27
Attending: EMERGENCY MEDICINE | Admitting: EMERGENCY MEDICINE

## 2021-12-27 ENCOUNTER — TELEPHONE (OUTPATIENT)
Dept: INTERNAL MEDICINE | Facility: CLINIC | Age: 76
End: 2021-12-27

## 2021-12-27 ENCOUNTER — OFFICE VISIT (OUTPATIENT)
Dept: INTERNAL MEDICINE | Facility: CLINIC | Age: 76
End: 2021-12-27

## 2021-12-27 ENCOUNTER — APPOINTMENT (OUTPATIENT)
Dept: GENERAL RADIOLOGY | Facility: HOSPITAL | Age: 76
End: 2021-12-27

## 2021-12-27 VITALS
WEIGHT: 218.03 LBS | BODY MASS INDEX: 34.22 KG/M2 | SYSTOLIC BLOOD PRESSURE: 137 MMHG | DIASTOLIC BLOOD PRESSURE: 84 MMHG | OXYGEN SATURATION: 94 % | RESPIRATION RATE: 16 BRPM | HEART RATE: 76 BPM | HEIGHT: 67 IN | TEMPERATURE: 98.7 F

## 2021-12-27 VITALS
HEIGHT: 67 IN | HEART RATE: 82 BPM | RESPIRATION RATE: 16 BRPM | TEMPERATURE: 99.1 F | OXYGEN SATURATION: 89 % | BODY MASS INDEX: 34.82 KG/M2

## 2021-12-27 DIAGNOSIS — U07.1 COVID-19: Primary | ICD-10-CM

## 2021-12-27 DIAGNOSIS — U07.1 COVID-19 VIRUS INFECTION: Primary | ICD-10-CM

## 2021-12-27 DIAGNOSIS — R09.02 HYPOXIA: ICD-10-CM

## 2021-12-27 LAB
ALBUMIN SERPL-MCNC: 3.8 G/DL (ref 3.5–5.2)
ALBUMIN/GLOB SERPL: 1 G/DL
ALP SERPL-CCNC: 83 U/L (ref 39–117)
ALT SERPL W P-5'-P-CCNC: 26 U/L (ref 1–41)
ANION GAP SERPL CALCULATED.3IONS-SCNC: 10.3 MMOL/L (ref 5–15)
ARTERIAL PATENCY WRIST A: POSITIVE
AST SERPL-CCNC: 32 U/L (ref 1–40)
BASE EXCESS BLDA CALC-SCNC: 4.2 MMOL/L (ref -2–2)
BASOPHILS # BLD AUTO: 0.02 10*3/MM3 (ref 0–0.2)
BASOPHILS NFR BLD AUTO: 0.3 % (ref 0–1.5)
BDY SITE: ABNORMAL
BILIRUB SERPL-MCNC: 0.5 MG/DL (ref 0–1.2)
BUN SERPL-MCNC: 18 MG/DL (ref 8–23)
BUN/CREAT SERPL: 18.8 (ref 7–25)
CALCIUM SPEC-SCNC: 9 MG/DL (ref 8.6–10.5)
CHLORIDE SERPL-SCNC: 98 MMOL/L (ref 98–107)
CO2 SERPL-SCNC: 26.7 MMOL/L (ref 22–29)
COHGB MFR BLD: 1.1 % (ref 0–1.5)
CREAT SERPL-MCNC: 0.96 MG/DL (ref 0.76–1.27)
CRP SERPL-MCNC: 9.89 MG/DL (ref 0–0.5)
D-LACTATE SERPL-SCNC: 1.1 MMOL/L (ref 0.5–2)
DEPRECATED RDW RBC AUTO: 46.4 FL (ref 37–54)
EOSINOPHIL # BLD AUTO: 0.16 10*3/MM3 (ref 0–0.4)
EOSINOPHIL NFR BLD AUTO: 2.5 % (ref 0.3–6.2)
ERYTHROCYTE [DISTWIDTH] IN BLOOD BY AUTOMATED COUNT: 13.7 % (ref 12.3–15.4)
FHHB: 5.4 % (ref 0–5)
GAS FLOW AIRWAY: 2 LPM
GFR SERPL CREATININE-BSD FRML MDRD: 93 ML/MIN/1.73
GLOBULIN UR ELPH-MCNC: 3.9 GM/DL
GLUCOSE SERPL-MCNC: 206 MG/DL (ref 65–99)
HCO3 BLDA-SCNC: 28.1 MMOL/L (ref 22–26)
HCT VFR BLD AUTO: 33.3 % (ref 37.5–51)
HGB BLD-MCNC: 11.1 G/DL (ref 13–17.7)
HGB BLDA-MCNC: 11.9 G/DL (ref 13.8–16.4)
HOLD SPECIMEN: NORMAL
HOLD SPECIMEN: NORMAL
IMM GRANULOCYTES # BLD AUTO: 0.03 10*3/MM3 (ref 0–0.05)
IMM GRANULOCYTES NFR BLD AUTO: 0.5 % (ref 0–0.5)
INHALED O2 CONCENTRATION: 28 %
LYMPHOCYTES # BLD AUTO: 1.37 10*3/MM3 (ref 0.7–3.1)
LYMPHOCYTES NFR BLD AUTO: 21.1 % (ref 19.6–45.3)
MCH RBC QN AUTO: 30.5 PG (ref 26.6–33)
MCHC RBC AUTO-ENTMCNC: 33.3 G/DL (ref 31.5–35.7)
MCV RBC AUTO: 91.5 FL (ref 79–97)
METHGB BLD QL: 0.3 % (ref 0–1.5)
MODALITY: ABNORMAL
MONOCYTES # BLD AUTO: 1.02 10*3/MM3 (ref 0.1–0.9)
MONOCYTES NFR BLD AUTO: 15.7 % (ref 5–12)
NEUTROPHILS NFR BLD AUTO: 3.89 10*3/MM3 (ref 1.7–7)
NEUTROPHILS NFR BLD AUTO: 59.9 % (ref 42.7–76)
NRBC BLD AUTO-RTO: 0 /100 WBC (ref 0–0.2)
NT-PROBNP SERPL-MCNC: 2939 PG/ML (ref 0–1800)
OXYHGB MFR BLDV: 93.2 % (ref 94–99)
PCO2 BLDA: 39.6 MM HG (ref 35–45)
PH BLDA: 7.47 PH UNITS (ref 7.35–7.45)
PLATELET # BLD AUTO: 205 10*3/MM3 (ref 140–450)
PMV BLD AUTO: 10 FL (ref 6–12)
PO2 BLD: 273 MM[HG] (ref 0–500)
PO2 BLDA: 76.3 MM HG (ref 80–100)
POTASSIUM SERPL-SCNC: 4.2 MMOL/L (ref 3.5–5.2)
PROCALCITONIN SERPL-MCNC: 0.37 NG/ML (ref 0–0.25)
PROT SERPL-MCNC: 7.7 G/DL (ref 6–8.5)
RBC # BLD AUTO: 3.64 10*6/MM3 (ref 4.14–5.8)
SAO2 % BLDCOA: 94.5 % (ref 95–99)
SODIUM SERPL-SCNC: 135 MMOL/L (ref 136–145)
TROPONIN T SERPL-MCNC: 0.01 NG/ML (ref 0–0.03)
WBC NRBC COR # BLD: 6.49 10*3/MM3 (ref 3.4–10.8)
WHOLE BLOOD HOLD SPECIMEN: NORMAL
WHOLE BLOOD HOLD SPECIMEN: NORMAL

## 2021-12-27 PROCEDURE — 83605 ASSAY OF LACTIC ACID: CPT | Performed by: EMERGENCY MEDICINE

## 2021-12-27 PROCEDURE — 82375 ASSAY CARBOXYHB QUANT: CPT | Performed by: EMERGENCY MEDICINE

## 2021-12-27 PROCEDURE — U0004 COV-19 TEST NON-CDC HGH THRU: HCPCS | Performed by: EMERGENCY MEDICINE

## 2021-12-27 PROCEDURE — U0005 INFEC AGEN DETEC AMPLI PROBE: HCPCS | Performed by: EMERGENCY MEDICINE

## 2021-12-27 PROCEDURE — 80053 COMPREHEN METABOLIC PANEL: CPT | Performed by: EMERGENCY MEDICINE

## 2021-12-27 PROCEDURE — 99284 EMERGENCY DEPT VISIT MOD MDM: CPT

## 2021-12-27 PROCEDURE — 99214 OFFICE O/P EST MOD 30 MIN: CPT | Performed by: PHYSICIAN ASSISTANT

## 2021-12-27 PROCEDURE — 83880 ASSAY OF NATRIURETIC PEPTIDE: CPT | Performed by: EMERGENCY MEDICINE

## 2021-12-27 PROCEDURE — 84145 PROCALCITONIN (PCT): CPT | Performed by: EMERGENCY MEDICINE

## 2021-12-27 PROCEDURE — 82805 BLOOD GASES W/O2 SATURATION: CPT | Performed by: EMERGENCY MEDICINE

## 2021-12-27 PROCEDURE — 36600 WITHDRAWAL OF ARTERIAL BLOOD: CPT | Performed by: EMERGENCY MEDICINE

## 2021-12-27 PROCEDURE — 86140 C-REACTIVE PROTEIN: CPT | Performed by: EMERGENCY MEDICINE

## 2021-12-27 PROCEDURE — 93005 ELECTROCARDIOGRAM TRACING: CPT | Performed by: EMERGENCY MEDICINE

## 2021-12-27 PROCEDURE — 99283 EMERGENCY DEPT VISIT LOW MDM: CPT

## 2021-12-27 PROCEDURE — 84484 ASSAY OF TROPONIN QUANT: CPT | Performed by: EMERGENCY MEDICINE

## 2021-12-27 PROCEDURE — 87040 BLOOD CULTURE FOR BACTERIA: CPT | Performed by: EMERGENCY MEDICINE

## 2021-12-27 PROCEDURE — 83050 HGB METHEMOGLOBIN QUAN: CPT | Performed by: EMERGENCY MEDICINE

## 2021-12-27 PROCEDURE — 85025 COMPLETE CBC W/AUTO DIFF WBC: CPT | Performed by: EMERGENCY MEDICINE

## 2021-12-27 PROCEDURE — 36415 COLL VENOUS BLD VENIPUNCTURE: CPT

## 2021-12-27 PROCEDURE — 71045 X-RAY EXAM CHEST 1 VIEW: CPT

## 2021-12-27 RX ORDER — SODIUM CHLORIDE 0.9 % (FLUSH) 0.9 %
10 SYRINGE (ML) INJECTION AS NEEDED
Status: DISCONTINUED | OUTPATIENT
Start: 2021-12-27 | End: 2021-12-27 | Stop reason: HOSPADM

## 2021-12-27 NOTE — TELEPHONE ENCOUNTER
Red rule verified and correct.    Pt asking for an appt today.    He is c/o a productive cough.    No fever.    Does not sound SOB as he is able to speak in full sentences.    Appt this afternoon

## 2021-12-27 NOTE — PROGRESS NOTES
"Chief Complaint  Shortness of Breath, Cough, and Exposure To Known Illness (Tested positvie for covid 19 at home.)    Subjective          Giles Donovan presents to [facility]  COVID- coughing started day before [place].  He took an at home COVID test at that time which came back positive.  Shortness of breath started getting worse 2 days ago.  No fevers or chest pain.  Other family members have tested positive as well.        Objective   Vital Signs:   Pulse 82   Temp 99.1 °F (37.3 °C) (Temporal)   Resp 16   Ht 170.2 cm (67.01\")   SpO2 (!) 89%   BMI 34.82 kg/m²     Physical Exam  Vitals reviewed.   Constitutional:       Appearance: Normal appearance. He is well-developed.   HENT:      Head: Normocephalic and atraumatic.      Right Ear: Tympanic membrane, ear canal and external ear normal.      Left Ear: Tympanic membrane, ear canal and external ear normal.      Mouth/Throat:      Pharynx: No oropharyngeal exudate.   Eyes:      Conjunctiva/sclera: Conjunctivae normal.      Pupils: Pupils are equal, round, and reactive to light.   Cardiovascular:      Rate and Rhythm: Normal rate and regular rhythm.      Heart sounds: No murmur heard.  No friction rub. No gallop.    Pulmonary:      Breath sounds: Normal breath sounds. No wheezing or rhonchi.   Skin:     General: Skin is warm and dry.   Neurological:      Mental Status: He is alert and oriented to person, place, and time.      Cranial Nerves: No cranial nerve deficit.   Psychiatric:         Mood and Affect: Mood and affect normal.         Behavior: Behavior normal.         Thought Content: Thought content normal.         Judgment: Judgment normal.        Result Review :          Procedures      Assessment and Plan    Diagnoses and all orders for this visit:    1. COVID-19 virus infection (Primary)    2. Hypoxia  Assessment & Plan:  Patient oxygen saturation when walking into exam room was initially 85%, " while resting her O2 increased to 94% but decreased to 90% when talking.  O2 increased to 99% on 2L of oxygen in the office. He admits to worsening SOB and cough since testing positive to COVID.  Recommended patient go directly to the ER for further evaluation, especially since oxygen was not stable.  Patient likely will need stat labs and Chest CT.  Discussed this with patient and his wife.  They declined EMS transport and will go by personal vehicle to the ER.  They are to pull over and call EMS if patient starts having difficulty breathing or other emergency symptoms. They voiced understanding.              Follow Up   Return if symptoms worsen or fail to improve.  Patient was given instructions and counseling regarding his condition or for health maintenance advice. Please see specific information pulled into the AVS if appropriate.

## 2021-12-27 NOTE — ASSESSMENT & PLAN NOTE
Patient oxygen saturation when walking into exam room was initially 85%, while resting her O2 increased to 94% but decreased to 90% when talking.  O2 increased to 99% on 2L of oxygen in the office. He admits to worsening SOB and cough since testing positive to COVID.  Recommended patient go directly to the ER for further evaluation, especially since oxygen was not stable.  Patient likely will need stat labs and Chest CT.  Discussed this with patient and his wife.  They declined EMS transport and will go by personal vehicle to the ER.  They are to pull over and call EMS if patient starts having difficulty breathing or other emergency symptoms. They voiced understanding.

## 2021-12-28 ENCOUNTER — HOSPITAL ENCOUNTER (OUTPATIENT)
Dept: CT IMAGING | Facility: HOSPITAL | Age: 76
Discharge: HOME | End: 2021-12-28
Admitting: PHYSICIAN ASSISTANT

## 2021-12-28 DIAGNOSIS — R53.1 WEAKNESS: ICD-10-CM

## 2021-12-28 DIAGNOSIS — U07.1 COVID-19 VIRUS INFECTION: ICD-10-CM

## 2021-12-28 DIAGNOSIS — U07.1 COVID-19 VIRUS INFECTION: Primary | ICD-10-CM

## 2021-12-28 DIAGNOSIS — R09.02 HYPOXIA: ICD-10-CM

## 2021-12-28 LAB — SARS-COV-2 RNA PNL SPEC NAA+PROBE: DETECTED

## 2021-12-28 PROCEDURE — 71260 CT THORAX DX C+: CPT

## 2021-12-28 PROCEDURE — 0 IOPAMIDOL PER 1 ML: Performed by: PHYSICIAN ASSISTANT

## 2021-12-28 RX ORDER — AZITHROMYCIN 250 MG/1
TABLET, FILM COATED ORAL
Qty: 6 TABLET | Refills: 0 | Status: SHIPPED | OUTPATIENT
Start: 2021-12-28 | End: 2022-02-21

## 2021-12-28 RX ORDER — AMOXICILLIN AND CLAVULANATE POTASSIUM 875; 125 MG/1; MG/1
1 TABLET, FILM COATED ORAL 2 TIMES DAILY
Qty: 20 TABLET | Refills: 0 | Status: SHIPPED | OUTPATIENT
Start: 2021-12-28 | End: 2022-01-07

## 2021-12-28 RX ORDER — AMOXICILLIN AND CLAVULANATE POTASSIUM 875; 125 MG/1; MG/1
1 TABLET, FILM COATED ORAL 2 TIMES DAILY
Qty: 20 TABLET | Refills: 0 | Status: SHIPPED | OUTPATIENT
Start: 2021-12-28 | End: 2021-12-28 | Stop reason: SDUPTHER

## 2021-12-28 RX ORDER — AZITHROMYCIN 250 MG/1
TABLET, FILM COATED ORAL
Qty: 6 TABLET | Refills: 0 | Status: SHIPPED | OUTPATIENT
Start: 2021-12-28 | End: 2021-12-28 | Stop reason: SDUPTHER

## 2021-12-28 RX ADMIN — IOPAMIDOL 100 ML: 755 INJECTION, SOLUTION INTRAVENOUS at 16:21

## 2021-12-29 ENCOUNTER — OFFICE VISIT (OUTPATIENT)
Dept: INTERNAL MEDICINE | Facility: CLINIC | Age: 76
End: 2021-12-29

## 2021-12-29 VITALS
HEART RATE: 92 BPM | BODY MASS INDEX: 34.21 KG/M2 | OXYGEN SATURATION: 95 % | TEMPERATURE: 96.5 F | WEIGHT: 218 LBS | SYSTOLIC BLOOD PRESSURE: 120 MMHG | RESPIRATION RATE: 18 BRPM | HEIGHT: 67 IN | DIASTOLIC BLOOD PRESSURE: 76 MMHG

## 2021-12-29 DIAGNOSIS — J12.82 PNEUMONIA DUE TO COVID-19 VIRUS: ICD-10-CM

## 2021-12-29 DIAGNOSIS — J98.4 CHRONIC LUNG DISEASE: Primary | ICD-10-CM

## 2021-12-29 DIAGNOSIS — U07.1 PNEUMONIA DUE TO COVID-19 VIRUS: ICD-10-CM

## 2021-12-29 PROCEDURE — 99213 OFFICE O/P EST LOW 20 MIN: CPT | Performed by: NURSE PRACTITIONER

## 2021-12-29 RX ORDER — ALBUTEROL SULFATE 90 UG/1
2 AEROSOL, METERED RESPIRATORY (INHALATION) EVERY 4 HOURS PRN
Qty: 18 G | Refills: 0 | Status: SHIPPED | OUTPATIENT
Start: 2021-12-29 | End: 2022-02-21 | Stop reason: SDUPTHER

## 2021-12-29 NOTE — ASSESSMENT & PLAN NOTE
Discussed the importance of using inhalers as prescribed. He will seek medical attention immediately with worsening cough, shortness of breath, wheezing.

## 2021-12-29 NOTE — PROGRESS NOTES
"Chief Complaint  Hospital Follow Up Visit and Fatigue    Subjective          Giles Donovan presents to [facility]  Patient in clinic for ED follow up after testing positive for COVID19 two days ago. Patient was sent directly to the ED from the clinic due to hypoxia at rest.  Patient was discharged from the ED per request, was not interested in admission for observation. Patient with significant medication history, including diabetes, hypertension and cardiomyopathy. CT scan with evidence of inflammation and pneumonia bilateral lungs, patient was started on Augmentin and azithromycin, took first dosages last night. Patient states since discharge he is feeling better. Patient has been treating with OTC Mucinex and Theraflu and drinking hot tea. His wife agrees that since his home test positive 12/21 (8 days ago) symptoms have improved. Patient reports symptoms started a few days before home test, approximately on day 9 of symptoms. Patient has a steroid inhaler prescribed for chronic shortness of breath but has not been taking this as prescribed. His wife states he will have severe coughing episodes, was having this prior to current illness. He does not have an albuterol inhaler but would like this. Patient was discharged with home O2 to be used as needed, patient states he is using this mostly at night. Denies fever, worsening shortness of breath. Eating/drinking okay. Plenty of urine output.      Objective   Vital Signs:   /76 (BP Location: Right arm, Patient Position: Sitting, Cuff Size: Adult)   Pulse 92   Temp 96.5 °F (35.8 °C) (Temporal)   Resp 18   Ht 170.2 cm (67.01\")   Wt 98.9 kg (218 lb)   SpO2 95%   BMI 34.13 kg/m²     Physical Exam  Constitutional:       Appearance: Normal appearance. He is normal weight.   HENT:      Head: Normocephalic and atraumatic.      Nose: Nose normal.      Mouth/Throat:      Mouth: Mucous membranes are moist.     "  Pharynx: Oropharynx is clear.   Eyes:      Extraocular Movements: Extraocular movements intact.      Conjunctiva/sclera: Conjunctivae normal.      Pupils: Pupils are equal, round, and reactive to light.   Cardiovascular:      Rate and Rhythm: Normal rate and regular rhythm.      Heart sounds: Normal heart sounds.   Pulmonary:      Effort: Pulmonary effort is normal.      Breath sounds: Normal breath sounds.   Skin:     General: Skin is warm and dry.   Neurological:      General: No focal deficit present.      Mental Status: He is alert and oriented to person, place, and time.   Psychiatric:         Mood and Affect: Mood normal.         Behavior: Behavior normal.         Thought Content: Thought content normal.        Result Review :                 Assessment and Plan    Diagnoses and all orders for this visit:    1. Chronic lung disease (Primary)  Assessment & Plan:  Discussed the importance of using inhalers as prescribed. He will seek medical attention immediately with worsening cough, shortness of breath, wheezing.      2. Pneumonia due to COVID-19 virus  Assessment & Plan:  Vitals stable, patient to continue outpatient treatment with Augmentin and azithromycin as previously prescribed. He will start daily ICS, albuterol inhaler to pharmacy. Discussed with patient that due to his age and chronic conditions he is at greater risk for complications, they will proceed directly to the ED with worsening cough, shortness of breath, fever. He is to continue to use supplemental O2 as needed, encouraged use with shortness of breath. Encouraged patient to obtain home pulse oxiemeter to monitor O2 sats closely. Will schedule follow up in one week with PCP, he will seek medical attention immediately if concerns arise sooner.      Other orders  -     albuterol sulfate  (90 Base) MCG/ACT inhaler; Inhale 2 puffs Every 4 (Four) Hours As Needed for Wheezing or Shortness of Air.  Dispense: 18 g; Refill: 0      Follow Up    Return in about 1 week (around 1/5/2022).  Patient was given instructions and counseling regarding his condition or for health maintenance advice. Please see specific information pulled into the AVS if appropriate.

## 2021-12-29 NOTE — ASSESSMENT & PLAN NOTE
Vitals stable, patient to continue outpatient treatment with Augmentin and azithromycin as previously prescribed. He will start daily ICS, albuterol inhaler to pharmacy. Discussed with patient that due to his age and chronic conditions he is at greater risk for complications, they will proceed directly to the ED with worsening cough, shortness of breath, fever. He is to continue to use supplemental O2 as needed, encouraged use with shortness of breath. Encouraged patient to obtain home pulse oxiemeter to monitor O2 sats closely. Will schedule follow up in one week with PCP, he will seek medical attention immediately if concerns arise sooner.

## 2022-01-01 LAB
BACTERIA SPEC AEROBE CULT: NORMAL
BACTERIA SPEC AEROBE CULT: NORMAL

## 2022-01-04 LAB
ACHR BIND AB SER-SCNC: 0.18 NMOL/L (ref 0–0.24)
ACHR BLOCK AB SER-ACNC: 21 % (ref 0–25)
ACHR MOD AB/ACHR TOTAL SFR SER: NORMAL %
MUSK AB SER-SCNC: <1 U/ML
REFLEX INFORMATION: NORMAL
STRIA MUS AB TITR SER IF: NEGATIVE {TITER}

## 2022-01-05 LAB — QT INTERVAL: 439 MS

## 2022-01-17 ENCOUNTER — OFFICE VISIT (OUTPATIENT)
Dept: INTERNAL MEDICINE | Facility: CLINIC | Age: 77
End: 2022-01-17

## 2022-01-17 VITALS
DIASTOLIC BLOOD PRESSURE: 58 MMHG | HEART RATE: 87 BPM | RESPIRATION RATE: 16 BRPM | WEIGHT: 217.8 LBS | TEMPERATURE: 98.3 F | SYSTOLIC BLOOD PRESSURE: 120 MMHG | BODY MASS INDEX: 34.18 KG/M2 | OXYGEN SATURATION: 95 % | HEIGHT: 67 IN

## 2022-01-17 DIAGNOSIS — U07.1 PNEUMONIA DUE TO COVID-19 VIRUS: Primary | ICD-10-CM

## 2022-01-17 DIAGNOSIS — R05.9 COUGH: ICD-10-CM

## 2022-01-17 DIAGNOSIS — J98.4 CHRONIC LUNG DISEASE: ICD-10-CM

## 2022-01-17 DIAGNOSIS — J12.82 PNEUMONIA DUE TO COVID-19 VIRUS: Primary | ICD-10-CM

## 2022-01-17 PROCEDURE — 99214 OFFICE O/P EST MOD 30 MIN: CPT | Performed by: NURSE PRACTITIONER

## 2022-01-17 NOTE — PROGRESS NOTES
Chief Complaint  Cough (Started last night, had covid in late dec.) and Shortness of Breath    Subjective         Giles Donovan presents to [facility] for Follow-up for cough and wheezing.  Patient was seen in the emergency room on 12/27/2021 for COVID-19 pneumonia.  He was encouraged to be admitted, however he declined at that time.  He went home and has been managing symptoms at home.  He did have significant COVID-19 pneumonia, he was eventually prescribed antibiotics, CT scan was done as well.  He does have chronic lung problems as it stands.  He states overall he has been doing quite well, his only significant complaint is cough and some wheezing, primarily at night.  He states that this has been ongoing for over a year.  He does use albuterol as needed and Spiriva daily.  He has been using some Mucinex at night, he states that this does help some.  Patient does have history of sleep apnea, he does use CPAP at night however he admits that his machine has been broken for several weeks now.  He has not been using that consistently.  He has home O2 concentrator since his COVID-19 diagnosis, he has been using that about 2 L at night when he sleeps.  He felt like he was using that as a substitution to his CPAP, I explained that that was not the intent of the oxygen.  Otherwise, patient does not have any significant concerns or complaints.  He is doing much better since his diagnosis.         Review of Systems   Constitutional: Negative for chills, fatigue and fever.   HENT: Negative for congestion, sneezing and sore throat.    Respiratory: Positive for cough and wheezing. Negative for shortness of breath.    Cardiovascular: Negative for chest pain and palpitations.   Gastrointestinal: Negative for diarrhea, nausea and vomiting.   Musculoskeletal: Negative for myalgias.   Skin: Negative for rash.   Neurological: Negative for headaches.       Objective   Vital Signs:   /58    "Pulse 87   Temp 98.3 °F (36.8 °C) (Temporal)   Resp 16   Ht 170.2 cm (67.01\")   Wt 98.8 kg (217 lb 12.8 oz)   SpO2 95%   BMI 34.10 kg/m²     Physical Exam  Vitals and nursing note reviewed.   Constitutional:       Appearance: Normal appearance.   HENT:      Head: Normocephalic and atraumatic.      Right Ear: Tympanic membrane normal.      Left Ear: Tympanic membrane normal.      Nose: Nose normal.      Mouth/Throat:      Mouth: Mucous membranes are moist.      Pharynx: Oropharynx is clear.   Eyes:      Conjunctiva/sclera: Conjunctivae normal.      Pupils: Pupils are equal, round, and reactive to light.   Cardiovascular:      Rate and Rhythm: Normal rate and regular rhythm.   Pulmonary:      Effort: Pulmonary effort is normal.      Breath sounds: Normal breath sounds.   Neurological:      Mental Status: He is alert.   Psychiatric:         Mood and Affect: Mood normal.         Thought Content: Thought content normal.        Result Review :                   Diagnoses and all orders for this visit:    1. Pneumonia due to COVID-19 virus (Primary)  Assessment & Plan:  Patient is overall doing much better since his COVID-19 diagnosis.  He completed all medications as prescribed.  He states only lingering problem is his cough and wheezing, primarily at night.  We did discuss the need for him to get his CPAP machine fixed and start using that regularly again.  He states he will make the phone call today to order replacement parts.  Also instructed his use on the oxygen, he now has an SPO2 monitor, he will be able to check his oxygen levels as needed and adjust oxygen as needed.  I stated that this was not an alternative to his CPAP.  This was only for hypoxia due to his pneumonia which should be resolved now.  He is in need of follow-up with pulmonary, with his chronic cough and wheezing, he would like to follow-up with them to see if they have any further recommendations at this time.      2. Chronic lung " disease    3. Cough        Follow Up   Return if symptoms worsen or fail to improve.  Patient was given instructions and counseling regarding his condition or for health maintenance advice. Please see specific information pulled into the AVS if appropriate.     Vinod Emmanuel, APRBRANDON  1/17/2022  This note was electronically signed.

## 2022-01-17 NOTE — ASSESSMENT & PLAN NOTE
Patient is overall doing much better since his COVID-19 diagnosis.  He completed all medications as prescribed.  He states only lingering problem is his cough and wheezing, primarily at night.  We did discuss the need for him to get his CPAP machine fixed and start using that regularly again.  He states he will make the phone call today to order replacement parts.  Also instructed his use on the oxygen, he now has an SPO2 monitor, he will be able to check his oxygen levels as needed and adjust oxygen as needed.  I stated that this was not an alternative to his CPAP.  This was only for hypoxia due to his pneumonia which should be resolved now.  He is in need of follow-up with pulmonary, with his chronic cough and wheezing, he would like to follow-up with them to see if they have any further recommendations at this time.

## 2022-02-21 ENCOUNTER — OFFICE VISIT (OUTPATIENT)
Dept: INTERNAL MEDICINE | Facility: CLINIC | Age: 77
End: 2022-02-21

## 2022-02-21 VITALS
RESPIRATION RATE: 20 BRPM | OXYGEN SATURATION: 94 % | HEART RATE: 73 BPM | SYSTOLIC BLOOD PRESSURE: 122 MMHG | DIASTOLIC BLOOD PRESSURE: 74 MMHG | TEMPERATURE: 97.5 F | WEIGHT: 215.4 LBS | BODY MASS INDEX: 33.81 KG/M2 | HEIGHT: 67 IN

## 2022-02-21 DIAGNOSIS — E03.8 HYPOTHYROIDISM DUE TO HASHIMOTO'S THYROIDITIS: ICD-10-CM

## 2022-02-21 DIAGNOSIS — E06.3 HYPOTHYROIDISM DUE TO HASHIMOTO'S THYROIDITIS: ICD-10-CM

## 2022-02-21 DIAGNOSIS — N52.9 ERECTILE DYSFUNCTION, UNSPECIFIED ERECTILE DYSFUNCTION TYPE: ICD-10-CM

## 2022-02-21 DIAGNOSIS — J12.82 PNEUMONIA DUE TO COVID-19 VIRUS: ICD-10-CM

## 2022-02-21 DIAGNOSIS — R05.9 COUGH: Primary | ICD-10-CM

## 2022-02-21 DIAGNOSIS — E78.2 MIXED HYPERLIPIDEMIA: ICD-10-CM

## 2022-02-21 DIAGNOSIS — E11.59 TYPE 2 DIABETES MELLITUS WITH OTHER CIRCULATORY COMPLICATION, WITH LONG-TERM CURRENT USE OF INSULIN: ICD-10-CM

## 2022-02-21 DIAGNOSIS — U07.1 PNEUMONIA DUE TO COVID-19 VIRUS: ICD-10-CM

## 2022-02-21 DIAGNOSIS — I10 PRIMARY HYPERTENSION: ICD-10-CM

## 2022-02-21 DIAGNOSIS — Z79.4 TYPE 2 DIABETES MELLITUS WITH OTHER CIRCULATORY COMPLICATION, WITH LONG-TERM CURRENT USE OF INSULIN: ICD-10-CM

## 2022-02-21 PROCEDURE — 99214 OFFICE O/P EST MOD 30 MIN: CPT | Performed by: INTERNAL MEDICINE

## 2022-02-21 RX ORDER — FLUTICASONE PROPIONATE 220 UG/1
1 AEROSOL, METERED RESPIRATORY (INHALATION)
Qty: 12 G | Refills: 2 | Status: SHIPPED | OUTPATIENT
Start: 2022-02-21

## 2022-02-21 RX ORDER — ALBUTEROL SULFATE 90 UG/1
2 AEROSOL, METERED RESPIRATORY (INHALATION) EVERY 4 HOURS PRN
Qty: 18 G | Refills: 0 | Status: SHIPPED | OUTPATIENT
Start: 2022-02-21

## 2022-02-21 RX ORDER — SILDENAFIL 100 MG/1
100 TABLET, FILM COATED ORAL DAILY PRN
Qty: 8 TABLET | Refills: 0 | Status: SHIPPED | OUTPATIENT
Start: 2022-02-21

## 2022-02-21 NOTE — PROGRESS NOTES
"Chief Complaint  Follow-up, Hypertension, Hypothyroidism, Heartburn, and Cough (Notices when laying down starts wheezing)    Subjective          Giles Donovan presents to [facility]  History of Present Illness     Breathing well  States that he is still noticing coughing and wheezing  He only notices this when he is lying down, even if he is just slightly laid back  When he sits up straight he doesn't notice this  He feels like the inhaler did help while he was using it, but it is empty  The cough is dry  He has been using equate for his cough, hasn't really tried anything else    Doesn't notice a heartburn type feeling with this  No chest pain    He is due for blood work from [place] on March 1st  He follows up with his person on post after this    Did have an ECHO when he saw Dr. Walker last month and was told that his heart was good    He is still having trouble with erections  States that the viagra     Objective   Vital Signs:   /74 (BP Location: Right arm, Patient Position: Sitting, Cuff Size: Adult)   Pulse 73   Temp 97.5 °F (36.4 °C) (Temporal)   Resp 20   Ht 170.2 cm (67.01\")   Wt 97.7 kg (215 lb 6.4 oz)   SpO2 94%   BMI 33.73 kg/m²     Physical Exam  Vitals reviewed.   Constitutional:       Appearance: Normal appearance. He is well-developed.   HENT:      Head: Normocephalic and atraumatic.      Right Ear: External ear normal.      Left Ear: External ear normal.   Eyes:      Conjunctiva/sclera: Conjunctivae normal.      Pupils: Pupils are equal, round, and reactive to light.   Cardiovascular:      Rate and Rhythm: Normal rate and regular rhythm.      Heart sounds: No murmur heard.  No friction rub. No gallop.    Pulmonary:      Effort: Pulmonary effort is normal.      Breath sounds: Normal breath sounds. No wheezing or rhonchi.   Skin:     General: Skin is warm and dry.   Neurological:      Mental Status: He is alert and oriented to person, " place, and time.      Cranial Nerves: No cranial nerve deficit.   Psychiatric:         Mood and Affect: Affect normal.         Behavior: Behavior normal.         Thought Content: Thought content normal.        Result Review :       Common labs    Common Labsle 6/23/21 12/27/21 12/27/21     1801 1801   Glucose   206 (A)   BUN   18   Creatinine   0.96   eGFR African Am   93   Sodium   135 (A)   Potassium   4.2   Chloride   98   Calcium   9.0   Albumin   3.80   Total Bilirubin   0.5   Alkaline Phosphatase   83   AST (SGOT)   32   ALT (SGPT)   26   WBC  6.49    Hemoglobin  11.1 (A)    Hematocrit  33.3 (A)    Platelets  205    PSA 1.090     (A) Abnormal value       Comments are available for some flowsheets but are not being displayed.                    Procedures        Assessment and Plan    Diagnoses and all orders for this visit:    1. Cough (Primary)  Comments:  likely residual covid, but will get CXR today and determine treatment from there  Orders:  -     XR Chest PA & Lateral (In Office)    2. Pneumonia due to COVID-19 virus    3. Hypothyroidism due to Hashimoto's thyroiditis    4. Type 2 diabetes mellitus with other circulatory complication, with long-term current use of insulin (HCC)  Comments:  stable, cont to monitor    5. Primary hypertension  Comments:  stable, cont to monitor    6. Mixed hyperlipidemia    7. Erectile dysfunction, unspecified erectile dysfunction type  Comments:  discussed poor candidate for testosterone, will switch cialis to generic viagra as viagra worked in the past; warned of interaction with flomax    Other orders  -     albuterol sulfate  (90 Base) MCG/ACT inhaler; Inhale 2 puffs Every 4 (Four) Hours As Needed for Wheezing or Shortness of Air.  Dispense: 18 g; Refill: 0  -     fluticasone (Flovent HFA) 220 MCG/ACT inhaler; Inhale 1 puff 2 (Two) Times a Day.  Dispense: 12 g; Refill: 2  -     sildenafil (Viagra) 100 MG tablet; Take 1 tablet by mouth Daily As Needed for Erectile  Dysfunction.  Dispense: 8 tablet; Refill: 0      For chronic problems will get records from [facility]              Follow Up   Return in about 1 month (around 3/21/2022).  Patient was given instructions and counseling regarding his condition or for health maintenance advice. Please see specific information pulled into the AVS if appropriate.

## 2022-03-25 NOTE — PROGRESS NOTES
Chief Complaint    Urologic complaint    Subjective          Giles Donovan presents to [facility]  History of Present Illness     76 y.o. male       BPH  urge incontinence  ED           Myrbetriq did not help, stop Myrbetriq.  Ditropan works better.    Patient at his last visit we increased Ditropan to 10 mg XL daily.  He has noticed a little bit less frequency.  Some minimal incontinence.  He does wear pads if he wears dress pants.  Does have some urgency.     not sure if he is on Flomax 0.4 at this time    8/21 cystoscopy-3 cm prostate, no median lobe, mild trabeculations.  Negative otherwise         3 beers daily.     No  Burning/ dysuria/ GH    No cardiopulmonary history.  Patient does not smoke. ASA 81/meloxicam.  Diabetes mellitus on insulin      Patient has never got a prescription for tadalafil yet      Previous      Sildenafil 100 mg -did not help     No urologic family history,   Has never had any urologic surgery.        PVR     3/22   109  9/21   167  5/21   78     9/20  GFR > 60      PSA      2/19 0.92          Past History:  Medical History: has a past medical history of Allergic rhinitis (12/27/2014), Cataract, Cough (03/30/2016), Depression, Diabetes ([facility]), GERD (gastroesophageal reflux disease), H/O psychiatric care (1999), Hyperlipidemia (03/30/2016), Hypertension, Hypothyroidism, Seasonal allergies, Shortness of breath, Stenosis of right carotid artery (02/19/2017), Syncope (02/19/2017), Type 2 diabetes mellitus ([facility]), and Upper respiratory infection (10/18/2015).   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   Family History: family history includes Arthritis in his mother; Heart disease in his mother; Kidney disease in his sister; Lupus in his mother.   Social History: reports  that he has never smoked. He has never used smokeless tobacco. He reports current alcohol use. He reports that he does not use drugs.  Allergies: Latex and Latex, natural rubber       Current Outpatient Medications:   •  acetaminophen (TYLENOL) 325 MG tablet, , Disp: , Rfl:   •  albuterol sulfate  (90 Base) MCG/ACT inhaler, Inhale 2 puffs Every 4 (Four) Hours As Needed for Wheezing or Shortness of Air., Disp: 18 g, Rfl: 0  •  aspirin EC 81 MG EC tablet, , Disp: , Rfl:   •  atorvastatin (LIPITOR) 80 MG tablet, , Disp: , Rfl:   •  B-D UF III MINI PEN NEEDLES 31G X 5 MM misc, , Disp: , Rfl:   •  carvedilol (COREG) 12.5 MG tablet, Take 12.5 mg by mouth., Disp: , Rfl:   •  cetirizine (ZyrTEC Allergy) 10 MG tablet, Take 1 tablet by mouth Daily., Disp: 90 tablet, Rfl: 3  •  ezetimibe (ZETIA) 10 MG tablet, Take 10 mg by mouth Daily., Disp: , Rfl:   •  felodipine (PLENDIL) 5 MG 24 hr tablet, felodipine 5 mg oral tablet extended release 24 hr take 1 tablet (5 mg) by oral route once daily   Active, Disp: , Rfl:   •  fluticasone (Flovent HFA) 220 MCG/ACT inhaler, Inhale 1 puff 2 (Two) Times a Day., Disp: 12 g, Rfl: 2  •  furosemide (LASIX) 20 MG tablet, , Disp: , Rfl:   •  gabapentin (NEURONTIN) 600 MG tablet, , Disp: , Rfl:   •  insulin aspart (novoLOG) 100 UNIT/ML injection, Inject 10 Units under the skin into the appropriate area as directed 4 (Four) Times a Day., Disp: , Rfl:   •  Insulin Glargine (Lantus SoloStar) 100 UNIT/ML injection pen, Lantus Solostar 100 unit/mL (3 mL) subcutaneous insulin pen inject 62 units by subcutaneous route QHS   Active, Disp: , Rfl:   •  levothyroxine (SYNTHROID, LEVOTHROID) 75 MCG tablet, Take 75 mcg by mouth Daily., Disp: , Rfl:   •  losartan (COZAAR) 50 MG tablet, , Disp: , Rfl:   •  meloxicam (MOBIC) 7.5 MG tablet, Take 1 tablet by mouth Daily., Disp: 90 tablet, Rfl: 1  •  multivitamin with minerals (ODELL MULTIVITAMIN FOR MEN PO), , Disp: , Rfl:   •  NovoLOG FlexPen 100 UNIT/ML  solution pen-injector sc pen, , Disp: , Rfl:   •  omeprazole (priLOSEC) 20 MG capsule, omeprazole 20 mg oral capsule,delayed release(DR/EC) take 1 capsule (20 mg) by oral route once daily before a meal   Suspended, Disp: , Rfl:   •  oxybutynin XL (DITROPAN-XL) 10 MG 24 hr tablet, Take 1 tablet by mouth Daily., Disp: 90 tablet, Rfl: 1  •  PARoxetine (PAXIL) 40 MG tablet, , Disp: , Rfl:   •  prazosin (MINIPRESS) 1 MG capsule, , Disp: , Rfl:   •  sildenafil (Viagra) 100 MG tablet, Take 1 tablet by mouth Daily As Needed for Erectile Dysfunction., Disp: 8 tablet, Rfl: 0  •  simethicone (MYLICON) 80 MG chewable tablet, , Disp: , Rfl:   •  Spiriva HandiHaler 18 MCG per inhalation capsule, , Disp: , Rfl:   •  tamsulosin (FLOMAX) 0.4 MG capsule 24 hr capsule, Take 1 capsule by mouth Daily., Disp: 90 capsule, Rfl: 3     Physical exam       Alert and orient x3  Well appearing, well developed, in no acute distress   Unlabored respirations    Grossly oriented to person, place and time, judgment is intact, normal mood and affect         Objective     Vital Signs:   There were no vitals taken for this visit.             Assessment and Plan    Diagnoses and all orders for this visit:    1. Benign prostatic hyperplasia with urinary frequency (Primary)    2. Erectile dysfunction, unspecified erectile dysfunction type      Urge incontinence/BPH        Cont Ditropan 10 mg p.o. XL daily.  Restart/continue Flomax 0.4 mg daily.       Doing okay currently not interested in procedures we have discussed Rezum/TURP in the past        ED     Patient has not yet received tadalafil 20 mg as needed.  We will prescribe again risk and benefits discussed.    Patient understands if he has an erection for greater than 4 hours to go to emergency room or risk never having erection again.     PVR at follow-up

## 2022-03-28 ENCOUNTER — OFFICE VISIT (OUTPATIENT)
Dept: UROLOGY | Facility: CLINIC | Age: 77
End: 2022-03-28

## 2022-03-28 VITALS — HEIGHT: 67 IN | RESPIRATION RATE: 18 BRPM | BODY MASS INDEX: 33.74 KG/M2 | WEIGHT: 215 LBS

## 2022-03-28 DIAGNOSIS — N52.9 ERECTILE DYSFUNCTION, UNSPECIFIED ERECTILE DYSFUNCTION TYPE: ICD-10-CM

## 2022-03-28 DIAGNOSIS — R35.0 BENIGN PROSTATIC HYPERPLASIA WITH URINARY FREQUENCY: Primary | ICD-10-CM

## 2022-03-28 DIAGNOSIS — N40.1 BENIGN PROSTATIC HYPERPLASIA WITH URINARY FREQUENCY: Primary | ICD-10-CM

## 2022-03-28 LAB — SPECIMEN VOL 24H UR: NORMAL L

## 2022-03-28 PROCEDURE — 99214 OFFICE O/P EST MOD 30 MIN: CPT | Performed by: UROLOGY

## 2022-03-28 PROCEDURE — 51798 US URINE CAPACITY MEASURE: CPT | Performed by: UROLOGY

## 2022-03-28 RX ORDER — OXYBUTYNIN CHLORIDE 10 MG/1
10 TABLET, EXTENDED RELEASE ORAL DAILY
Qty: 90 TABLET | Refills: 4 | Status: SHIPPED | OUTPATIENT
Start: 2022-03-28

## 2022-03-28 RX ORDER — LOSARTAN POTASSIUM 100 MG/1
TABLET ORAL
COMMUNITY
Start: 2022-03-01 | End: 2022-05-26 | Stop reason: SDDI

## 2022-03-28 RX ORDER — TAMSULOSIN HYDROCHLORIDE 0.4 MG/1
1 CAPSULE ORAL DAILY
Qty: 90 CAPSULE | Refills: 3 | Status: SHIPPED | OUTPATIENT
Start: 2022-03-28

## 2022-03-28 RX ORDER — TADALAFIL 20 MG/1
20 TABLET ORAL AS NEEDED
Qty: 5 TABLET | Refills: 5 | Status: SHIPPED | OUTPATIENT
Start: 2022-03-28 | End: 2022-05-26

## 2022-04-14 ENCOUNTER — OFFICE VISIT (OUTPATIENT)
Dept: INTERNAL MEDICINE | Facility: CLINIC | Age: 77
End: 2022-04-14

## 2022-04-14 VITALS
BODY MASS INDEX: 35.31 KG/M2 | HEIGHT: 67 IN | OXYGEN SATURATION: 97 % | WEIGHT: 225 LBS | SYSTOLIC BLOOD PRESSURE: 116 MMHG | DIASTOLIC BLOOD PRESSURE: 64 MMHG | HEART RATE: 74 BPM | TEMPERATURE: 98 F

## 2022-04-14 DIAGNOSIS — R05.9 COUGH: Primary | ICD-10-CM

## 2022-04-14 PROCEDURE — 99213 OFFICE O/P EST LOW 20 MIN: CPT

## 2022-04-14 NOTE — PROGRESS NOTES
"Chief Complaint  Follow-up (1 month)    Subjective          Giles Donovan presents to [facility]  History of Present Illness    Giles is here for a 1 month follow up. He reports slowly getting better on his cough. He said he feels better. He said the cough sometimes is bad but is slowly getting better. He said his breathing is about the same since he had COVID. He is taking inhalers daily, and they are helping him a lot he says.     Most recently saw Dr Ortiz - he increased his dose of the Oxybutynin. Wants him to take the Flomax too.     He gets blood work on [facility] - he is scheduled to get some on the 19th of April. He will bring a copy with his next visit.     Objective   Vital Signs:   /64   Pulse 74   Temp 98 °F (36.7 °C)   Ht 170.2 cm (67.01\")   Wt 102 kg (225 lb)   SpO2 97%   BMI 35.23 kg/m²     Physical Exam  Vitals reviewed.   Constitutional:       Appearance: Normal appearance. He is well-developed.   HENT:      Head: Normocephalic and atraumatic.      Mouth/Throat:      Pharynx: No oropharyngeal exudate.   Eyes:      Conjunctiva/sclera: Conjunctivae normal.      Pupils: Pupils are equal, round, and reactive to light.   Cardiovascular:      Rate and Rhythm: Normal rate and regular rhythm.      Heart sounds: No murmur heard.    No friction rub. No gallop.   Pulmonary:      Effort: Pulmonary effort is normal. No prolonged expiration or respiratory distress.      Breath sounds: Normal breath sounds and air entry. No decreased air movement. No wheezing or rhonchi.      Comments: Cough not observed in office   Skin:     General: Skin is warm and dry.   Neurological:      Mental Status: He is alert and oriented to person, place, and time.   Psychiatric:         Mood and Affect: Affect normal.        Result Review :          Procedures      Assessment and Plan    Diagnoses and all orders for this visit:    1. Cough (Primary)  Assessment & " Plan:  Improving. Lung sounds good today in office. No cough observed in office. Patient to notify office if cough worsens in severity. Patient to follow up with PCP in about 6 weeks after he gets labs from [facility]       Patient was instructed and educated on their diagnoses and treatment plan prior to leaving the office. If symptoms worsen or persist, seek emergency medical attention. Take all medications as prescribed. Call the office if any questions or concerns arise.       Follow Up   Return in about 6 weeks (around 5/26/2022) for with Jen Carballo.  Patient was given instructions and counseling regarding his condition or for health maintenance advice. Please see specific information pulled into the AVS if appropriate.

## 2022-04-14 NOTE — ASSESSMENT & PLAN NOTE
Improving. Lung sounds good today in office. No cough observed in office. Patient to notify office if cough worsens in severity. Patient to follow up with PCP in about 6 weeks after he gets labs [facility]

## 2022-05-09 ENCOUNTER — TELEPHONE (OUTPATIENT)
Dept: INTERNAL MEDICINE | Facility: CLINIC | Age: 77
End: 2022-05-09

## 2022-05-09 DIAGNOSIS — Z20.822 EXPOSURE TO CONFIRMED CASE OF COVID-19: Primary | ICD-10-CM

## 2022-05-09 PROCEDURE — U0004 COV-19 TEST NON-CDC HGH THRU: HCPCS | Performed by: PHYSICIAN ASSISTANT

## 2022-05-09 PROCEDURE — U0005 INFEC AGEN DETEC AMPLI PROBE: HCPCS | Performed by: PHYSICIAN ASSISTANT

## 2022-05-09 NOTE — TELEPHONE ENCOUNTER
Message taken off voicemail.    Pt calling asking for a confirmatory covid test.  He did home test this morning and it was faint positive.    Wife positive with covid

## 2022-05-26 ENCOUNTER — OFFICE VISIT (OUTPATIENT)
Dept: INTERNAL MEDICINE | Facility: CLINIC | Age: 77
End: 2022-05-26

## 2022-05-26 VITALS
RESPIRATION RATE: 17 BRPM | HEIGHT: 67 IN | OXYGEN SATURATION: 98 % | SYSTOLIC BLOOD PRESSURE: 113 MMHG | BODY MASS INDEX: 34.53 KG/M2 | DIASTOLIC BLOOD PRESSURE: 78 MMHG | HEART RATE: 80 BPM | WEIGHT: 220 LBS | TEMPERATURE: 98.6 F

## 2022-05-26 DIAGNOSIS — R93.89 ABNORMAL CT SCAN: ICD-10-CM

## 2022-05-26 DIAGNOSIS — R05.9 COUGH: Primary | ICD-10-CM

## 2022-05-26 DIAGNOSIS — R55 SYNCOPE, UNSPECIFIED SYNCOPE TYPE: ICD-10-CM

## 2022-05-26 PROCEDURE — 99213 OFFICE O/P EST LOW 20 MIN: CPT | Performed by: INTERNAL MEDICINE

## 2022-05-26 RX ORDER — TRAZODONE HYDROCHLORIDE 100 MG/1
100 TABLET ORAL DAILY
COMMUNITY
Start: 2022-04-22

## 2022-05-31 ENCOUNTER — TELEPHONE (OUTPATIENT)
Dept: INTERNAL MEDICINE | Facility: CLINIC | Age: 77
End: 2022-05-31

## 2022-05-31 NOTE — TELEPHONE ENCOUNTER
Called patient to discuss results, no answer.  Left voicemail for patient to return call to [phone].          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  How is he doing now?  I would recommend discussing this further with neurosurgery to see what they can offer him.  [facility] or would he like us to schedule with someone else?  In [facility] we can do Dr. Ware if he wishes.

## 2022-06-23 ENCOUNTER — APPOINTMENT (OUTPATIENT)
Dept: CT IMAGING | Facility: HOSPITAL | Age: 77
End: 2022-06-23

## 2022-08-09 ENCOUNTER — OFFICE VISIT (OUTPATIENT)
Dept: INTERNAL MEDICINE | Facility: CLINIC | Age: 77
End: 2022-08-09

## 2022-08-09 VITALS
HEIGHT: 67 IN | TEMPERATURE: 99 F | DIASTOLIC BLOOD PRESSURE: 72 MMHG | SYSTOLIC BLOOD PRESSURE: 122 MMHG | BODY MASS INDEX: 34.37 KG/M2 | HEART RATE: 88 BPM | OXYGEN SATURATION: 90 % | WEIGHT: 219 LBS

## 2022-08-09 DIAGNOSIS — E78.2 MIXED HYPERLIPIDEMIA: ICD-10-CM

## 2022-08-09 DIAGNOSIS — N52.9 ERECTILE DYSFUNCTION, UNSPECIFIED ERECTILE DYSFUNCTION TYPE: ICD-10-CM

## 2022-08-09 DIAGNOSIS — Z79.4 TYPE 2 DIABETES MELLITUS WITH OTHER CIRCULATORY COMPLICATION, WITH LONG-TERM CURRENT USE OF INSULIN: Primary | ICD-10-CM

## 2022-08-09 DIAGNOSIS — E11.59 TYPE 2 DIABETES MELLITUS WITH OTHER CIRCULATORY COMPLICATION, WITH LONG-TERM CURRENT USE OF INSULIN: Primary | ICD-10-CM

## 2022-08-09 DIAGNOSIS — I10 PRIMARY HYPERTENSION: ICD-10-CM

## 2022-08-09 DIAGNOSIS — Z76.0 MEDICATION REFILL: ICD-10-CM

## 2022-08-09 DIAGNOSIS — F32.A DEPRESSION, UNSPECIFIED DEPRESSION TYPE: ICD-10-CM

## 2022-08-09 PROCEDURE — 99213 OFFICE O/P EST LOW 20 MIN: CPT | Performed by: STUDENT IN AN ORGANIZED HEALTH CARE EDUCATION/TRAINING PROGRAM

## 2022-08-09 RX ORDER — CETIRIZINE HYDROCHLORIDE 10 MG/1
10 TABLET ORAL DAILY
Qty: 90 TABLET | Refills: 3 | Status: SHIPPED | OUTPATIENT
Start: 2022-08-09

## 2022-08-09 RX ORDER — ATORVASTATIN CALCIUM 80 MG/1
80 TABLET, FILM COATED ORAL DAILY
Qty: 90 TABLET | Refills: 1 | Status: SHIPPED | OUTPATIENT
Start: 2022-08-09

## 2022-08-09 NOTE — PROGRESS NOTES
"Chief Complaint  Hypertension (2 month follow up) and Diabetes    Subjective            Giles Donovan presents to [facility]  History of Present Illness  Hypertension :  Chronic and stable.   Has bp machine at home, however states machine bp machine at home and wonders if needs to be calibrated.   Has not checked his bp at home for the past 1year.   States he does get symptom of HA or dizziness when his bp is high but has not experienced this recently.       Type II Diabetes:  Chronic and stable.   [facility]   Diabetes  Is managed by endocrinologist in [place] [facility].  Last A1c was 9.1 done through [facility].   Does check glucose at home, glucose of 89 in the afternoon; AM glucose ranges from 120-150; has gotten up to 200 \"but not higher\" before.   He is able to tell when his sugar is runny low (left leg shaking, sweating, dizziness)  Most recent low was 3 days ago, low of 68, first thing in the morning, had some orange juice which helps.   States lows occur about 1-2x month, but infrequently.    Eye exam: upcoming. Blue ring in his visual field at times, smoky appearance when he looks far off.   Podiatry: today in [facility].     Current regimen:   Novolog 24-26 units 4x per day.   Lantus: 44 units nighlty.    Chronic back pain:  Following with pain specialist, managing gabapentin.   Currently considering injections.     Depression:   States he has days when he lacks motivation.   Denies SI or HI.   States his S.O is his support system.     Erectile dysfunction:  Chronic, sildenafil cost is prohibitive.     Past Medical History:   Diagnosis Date   • Allergic rhinitis 12/27/2014    Will try Zyrtec, he didnt want to use a nasal spray.   • Cataract     left eye   • Cough 03/30/2016    PFT and CXR ordered.   • Depression     PTSD   • Diabetes (HCC)    • GERD (gastroesophageal reflux disease)    • H/O psychiatric care 1999    PTSD "   • Hyperlipidemia 03/30/2016    Lipids ordered. Continue on current medication.   • Hypertension    • Hypothyroidism    • Seasonal allergies    • Shortness of breath    • Stenosis of right carotid artery 02/19/2017    Will refer to vascular surgeon.   • Syncope 02/19/2017   • Type 2 diabetes mellitus (HCC)    • Upper respiratory infection 10/18/2015    Will treat cough with Hydromet, otherwise over the counter meds for treatment.       Allergies:   Allergies   Allergen Reactions   • Latex Rash and Anaphylaxis   • Latex, Natural Rubber Itching          Past Surgical History:   Procedure Laterality Date   • CATARACT EXTRACTION Right     x2   • COLONOSCOPY     • REPLACEMENT TOTAL HIP ONCOLOGIC Right    • RETINAL DETACHMENT REPAIR     • TOE AMPUTATION Left 11/2017    Second phalaeng.   • TOE OSTEOPHYTE REMOVAL            Social History     Socioeconomic History   • Marital status:    Tobacco Use   • Smoking status: Never Smoker   • Smokeless tobacco: Never Used   Vaping Use   • Vaping Use: Never used   Substance and Sexual Activity   • Alcohol use: Yes     Comment: occasionally   • Drug use: Never   • Sexual activity: Defer         Family History   Problem Relation Age of Onset   • Heart disease Mother    • Lupus Mother    • Arthritis Mother    • Kidney disease Sister           Health Maintenance Due   Topic Date Due   • URINE MICROALBUMIN  Never done   • Pneumococcal Vaccine 65+ (1 - PCV) Never done   • TDAP/TD VACCINES (1 - Tdap) Never done   • ZOSTER VACCINE (1 of 2) Never done   • DIABETIC FOOT EXAM  06/06/2021   • HEMOGLOBIN A1C  06/06/2021   • DIABETIC EYE EXAM  06/06/2021   • LIPID PANEL  12/02/2021   • COVID-19 Vaccine (4 - Booster) 05/18/2022            Current Outpatient Medications:   •  albuterol sulfate  (90 Base) MCG/ACT inhaler, Inhale 2 puffs Every 4 (Four) Hours As Needed for Wheezing or Shortness of Air., Disp: 18 g, Rfl: 0  •  aspirin EC 81 MG EC tablet, , Disp: , Rfl:   •  atorvastatin  (LIPITOR) 80 MG tablet, Take 1 tablet by mouth Daily., Disp: 90 tablet, Rfl: 1  •  B-D UF III MINI PEN NEEDLES 31G X 5 MM misc, , Disp: , Rfl:   •  benzonatate (TESSALON) 100 MG capsule, Take 1 capsule by mouth 3 (Three) Times a Day As Needed for Cough., Disp: 20 capsule, Rfl: 0  •  carvedilol (COREG) 12.5 MG tablet, Take 12.5 mg by mouth., Disp: , Rfl:   •  cetirizine (ZyrTEC Allergy) 10 MG tablet, Take 1 tablet by mouth Daily., Disp: 90 tablet, Rfl: 3  •  ezetimibe (ZETIA) 10 MG tablet, Take 10 mg by mouth Daily., Disp: , Rfl:   •  felodipine (PLENDIL) 5 MG 24 hr tablet, felodipine 5 mg oral tablet extended release 24 hr take 1 tablet (5 mg) by oral route once daily   Active, Disp: , Rfl:   •  fluticasone (Flovent HFA) 220 MCG/ACT inhaler, Inhale 1 puff 2 (Two) Times a Day., Disp: 12 g, Rfl: 2  •  gabapentin (NEURONTIN) 600 MG tablet, , Disp: , Rfl:   •  insulin aspart (novoLOG) 100 UNIT/ML injection, Inject 10 Units under the skin into the appropriate area as directed 4 (Four) Times a Day., Disp: , Rfl:   •  Insulin Glargine (Lantus SoloStar) 100 UNIT/ML injection pen, Lantus Solostar 100 unit/mL (3 mL) subcutaneous insulin pen inject 62 units by subcutaneous route QHS   Active, Disp: , Rfl:   •  levothyroxine (SYNTHROID, LEVOTHROID) 75 MCG tablet, Take 75 mcg by mouth Daily., Disp: , Rfl:   •  losartan (COZAAR) 50 MG tablet, , Disp: , Rfl:   •  multivitamin with minerals tablet tablet, , Disp: , Rfl:   •  NovoLOG FlexPen 100 UNIT/ML solution pen-injector sc pen, , Disp: , Rfl:   •  omeprazole (priLOSEC) 20 MG capsule, omeprazole 20 mg oral capsule,delayed release(DR/EC) take 1 capsule (20 mg) by oral route once daily before a meal   Suspended, Disp: , Rfl:   •  oxybutynin XL (DITROPAN-XL) 10 MG 24 hr tablet, Take 1 tablet by mouth Daily., Disp: 90 tablet, Rfl: 4  •  PARoxetine (PAXIL) 40 MG tablet, , Disp: , Rfl:   •  sildenafil (Viagra) 100 MG tablet, Take 1 tablet by mouth Daily As Needed for Erectile  "Dysfunction., Disp: 8 tablet, Rfl: 0  •  simethicone (MYLICON) 80 MG chewable tablet, , Disp: , Rfl:   •  Spiriva HandiHaler 18 MCG per inhalation capsule, , Disp: , Rfl:   •  traZODone (DESYREL) 100 MG tablet, Take 100 mg by mouth Daily., Disp: , Rfl:   •  furosemide (LASIX) 20 MG tablet, , Disp: , Rfl:   •  meloxicam (MOBIC) 7.5 MG tablet, Take 1 tablet by mouth Daily., Disp: 90 tablet, Rfl: 1  •  prazosin (MINIPRESS) 1 MG capsule, , Disp: , Rfl:   •  tamsulosin (FLOMAX) 0.4 MG capsule 24 hr capsule, Take 1 capsule by mouth Daily., Disp: 90 capsule, Rfl: 3      Immunization History   Administered Date(s) Administered   • COVID-19 (MODERNA) 1st, 2nd, 3rd Dose Only 01/01/2021, 01/27/2021   • COVID-19 (MODERNA) BOOSTER 01/18/2022   • Flu Vaccine Intradermal Quad 18-64YR 12/02/2020   • Flu Vaccine Quad PF >36MO 10/01/2019, 12/02/2020   • Fluzone Split Quad (Multi-dose) 09/17/2018         Review of Systems   Per hpi       Objective       Vitals:    08/09/22 1345   BP: 122/72   Pulse: 88   Temp: 99 °F (37.2 °C)   SpO2: 90%   Weight: 99.3 kg (219 lb)   Height: 170.2 cm (67\")     Body mass index is 34.3 kg/m².      Physical Exam  Vitals reviewed.   Constitutional:       Appearance: Normal appearance.   HENT:      Head: Normocephalic and atraumatic.      Nose: Nose normal.   Eyes:      Extraocular Movements: Extraocular movements intact.      Conjunctiva/sclera: Conjunctivae normal.   Cardiovascular:      Rate and Rhythm: Normal rate and regular rhythm.      Pulses: Normal pulses.      Heart sounds: Normal heart sounds.   Pulmonary:      Effort: Pulmonary effort is normal. No respiratory distress.      Breath sounds: Normal breath sounds.   Musculoskeletal:         General: Normal range of motion.   Skin:     General: Skin is warm and dry.   Neurological:      General: No focal deficit present.      Mental Status: He is alert and oriented to person, place, and time.      Cranial Nerves: No cranial nerve deficit. "   Psychiatric:         Mood and Affect: Mood normal.         Behavior: Behavior normal.         Thought Content: Thought content normal.             Result Review :{Labs  Result Review  Imaging  Med Tab  Media :23}                           Assessment and Plan {CC Problem List  Visit Diagnosis  ROS  Review (Popup)  Health Maintenance  Quality  BestPractice  Medications  SmartSets  SnapShot Encounters  Media :23}     Diagnoses and all orders for this visit:    1. Type 2 diabetes mellitus with other circulatory complication, with long-term current use of insulin (HCC) (Primary)  Comments:  [facility] with [facility] A1c of 9.1 per pt. Has labs  in the next radha weeks and pt to bring copy of results.     2. Primary hypertension  Comments:  Chronic, stable and in good control. Continue current regimen. Pt encouraged to check pb at home.     3. Mixed hyperlipidemia  Comments:  Chronic. No recent labs available for review. Pt will bring in records of his labs.     4. Medication refill  Comments:  Refilled meds.   Orders:  -     cetirizine (ZyrTEC Allergy) 10 MG tablet; Take 1 tablet by mouth Daily.  Dispense: 90 tablet; Refill: 3  -     atorvastatin (LIPITOR) 80 MG tablet; Take 1 tablet by mouth Daily.  Dispense: 90 tablet; Refill: 1    5. Erectile dysfunction, unspecified erectile dysfunction type  Comments:  Chronic and stable. Discussed goodrx as well as a local [facility] that tends to have sildenafil at a discounted price with pt.     6. Depression, unspecified depression type  Comments:  Chronic and stable.                   Follow Up     Return in about 4 months (around 12/22/2022) for Medicare Wellness.    Patient was given instructions and counseling regarding his condition or for health maintenance advice. Please see specific information pulled into the AVS if appropriate.     Cathy Ochoa MD   Internal Medicine-Pediatrics

## 2022-12-05 ENCOUNTER — OFFICE VISIT (OUTPATIENT)
Dept: INTERNAL MEDICINE | Facility: CLINIC | Age: 77
End: 2022-12-05

## 2022-12-05 VITALS
BODY MASS INDEX: 34.53 KG/M2 | WEIGHT: 220 LBS | DIASTOLIC BLOOD PRESSURE: 58 MMHG | HEIGHT: 67 IN | HEART RATE: 73 BPM | TEMPERATURE: 98.1 F | SYSTOLIC BLOOD PRESSURE: 118 MMHG | OXYGEN SATURATION: 98 %

## 2022-12-05 DIAGNOSIS — G62.9 NEUROPATHY: ICD-10-CM

## 2022-12-05 DIAGNOSIS — N40.1 BENIGN PROSTATIC HYPERPLASIA WITH URINARY FREQUENCY: ICD-10-CM

## 2022-12-05 DIAGNOSIS — E11.9 CONTROLLED TYPE 2 DIABETES MELLITUS WITHOUT COMPLICATION, WITHOUT LONG-TERM CURRENT USE OF INSULIN: ICD-10-CM

## 2022-12-05 DIAGNOSIS — E78.2 MIXED HYPERLIPIDEMIA: ICD-10-CM

## 2022-12-05 DIAGNOSIS — R29.6 FALLING: ICD-10-CM

## 2022-12-05 DIAGNOSIS — I10 PRIMARY HYPERTENSION: Primary | ICD-10-CM

## 2022-12-05 DIAGNOSIS — R35.0 BENIGN PROSTATIC HYPERPLASIA WITH URINARY FREQUENCY: ICD-10-CM

## 2022-12-05 DIAGNOSIS — Z23 NEED FOR VACCINATION: ICD-10-CM

## 2022-12-05 LAB
BASOPHILS # BLD AUTO: 0.05 10*3/MM3 (ref 0–0.2)
BASOPHILS NFR BLD AUTO: 0.7 % (ref 0–1.5)
DEPRECATED RDW RBC AUTO: 39.5 FL (ref 37–54)
EOSINOPHIL # BLD AUTO: 0.21 10*3/MM3 (ref 0–0.4)
EOSINOPHIL NFR BLD AUTO: 3 % (ref 0.3–6.2)
ERYTHROCYTE [DISTWIDTH] IN BLOOD BY AUTOMATED COUNT: 12.3 % (ref 12.3–15.4)
HCT VFR BLD AUTO: 39.4 % (ref 37.5–51)
HGB BLD-MCNC: 13.7 G/DL (ref 13–17.7)
IMM GRANULOCYTES # BLD AUTO: 0.03 10*3/MM3 (ref 0–0.05)
IMM GRANULOCYTES NFR BLD AUTO: 0.4 % (ref 0–0.5)
LYMPHOCYTES # BLD AUTO: 2.07 10*3/MM3 (ref 0.7–3.1)
LYMPHOCYTES NFR BLD AUTO: 29.7 % (ref 19.6–45.3)
MCH RBC QN AUTO: 30.7 PG (ref 26.6–33)
MCHC RBC AUTO-ENTMCNC: 34.8 G/DL (ref 31.5–35.7)
MCV RBC AUTO: 88.3 FL (ref 79–97)
MONOCYTES # BLD AUTO: 0.86 10*3/MM3 (ref 0.1–0.9)
MONOCYTES NFR BLD AUTO: 12.3 % (ref 5–12)
NEUTROPHILS NFR BLD AUTO: 3.75 10*3/MM3 (ref 1.7–7)
NEUTROPHILS NFR BLD AUTO: 53.9 % (ref 42.7–76)
NRBC BLD AUTO-RTO: 0 /100 WBC (ref 0–0.2)
PLATELET # BLD AUTO: 209 10*3/MM3 (ref 140–450)
PMV BLD AUTO: 10.7 FL (ref 6–12)
RBC # BLD AUTO: 4.46 10*6/MM3 (ref 4.14–5.8)
WBC NRBC COR # BLD: 6.97 10*3/MM3 (ref 3.4–10.8)

## 2022-12-05 PROCEDURE — G0009 ADMIN PNEUMOCOCCAL VACCINE: HCPCS | Performed by: INTERNAL MEDICINE

## 2022-12-05 PROCEDURE — 80061 LIPID PANEL: CPT | Performed by: INTERNAL MEDICINE

## 2022-12-05 PROCEDURE — 80053 COMPREHEN METABOLIC PANEL: CPT | Performed by: INTERNAL MEDICINE

## 2022-12-05 PROCEDURE — 85025 COMPLETE CBC W/AUTO DIFF WBC: CPT | Performed by: INTERNAL MEDICINE

## 2022-12-05 PROCEDURE — 82043 UR ALBUMIN QUANTITATIVE: CPT | Performed by: INTERNAL MEDICINE

## 2022-12-05 PROCEDURE — 90677 PCV20 VACCINE IM: CPT | Performed by: INTERNAL MEDICINE

## 2022-12-05 PROCEDURE — 99214 OFFICE O/P EST MOD 30 MIN: CPT | Performed by: INTERNAL MEDICINE

## 2022-12-05 PROCEDURE — 84443 ASSAY THYROID STIM HORMONE: CPT | Performed by: INTERNAL MEDICINE

## 2022-12-05 PROCEDURE — 36415 COLL VENOUS BLD VENIPUNCTURE: CPT | Performed by: INTERNAL MEDICINE

## 2022-12-05 PROCEDURE — 83036 HEMOGLOBIN GLYCOSYLATED A1C: CPT | Performed by: INTERNAL MEDICINE

## 2022-12-05 NOTE — PROGRESS NOTES
Chief Complaint  Hypertension (Follow up for covid)    Subjective          Giles Donovan presents to [facility]  History of Present Illness   The patient presents today for a follow-up to discuss how he is doing after having COVID-19 as well as his blood pressure.    COVID-19  The patient reports that since having COVID-19, he has not needed to use his oxygen tank anymore.  He states that his lungs have gotten stronger, and he is still using his inhalers.  He denies any chest pain.    Hypertension  The patient reports that his blood pressure has been running about the same at home as it is in the office today.    Neuropathy  The patient denies having any leg cramping but reports a constant burning sensation in his toes.  He is taking gabapentin 600 mg, 2 times a day though he could take it up to 3 times a day.    Diabetes  The patient reports that his blood sugar readings have been within normal limits though it can occasionally be elevated but never too low.  He also states that on 1 occasion, he forgot to take his insulin before he ate and his blood sugar increased to 344 mg/dL.  He states that he [facility].    Stress  The patient reports that Paxil is working well for him.    Frequent urination   The patient states that he has bladder control the majority of time but can occasionally have difficulty holding his urine.    Falls  The patient reports having had a fall about 2 to 3 times within the past year.  He uses a cane/walker to ambulate and recently [facility] a [facility] walk[facility].   He declines a referral to physical therapy to help with his balance.    Health maintenance  The patient is due for a pneumonia vaccine.   He has received the influenza vaccine and the 3rd COVID-19 vaccine.  He will be getting [facility] work [facility] in [facility], including his A1c, kidney, and liver function, but he agrees to  "getting blood work done today.  The patient is due for shingles vaccine and tetanus vaccine.      Objective   Vital Signs:   /58 (BP Location: Right arm, Patient Position: Sitting, Cuff Size: Adult)   Pulse 73   Temp 98.1 °F (36.7 °C) (Infrared)   Ht 170.2 cm (67\")   Wt 99.8 kg (220 lb)   SpO2 98%   BMI 34.46 kg/m²     Physical Exam  Vitals reviewed.   Constitutional:       Appearance: Normal appearance. He is well-developed.   HENT:      Head: Normocephalic and atraumatic.      Right Ear: External ear normal.      Left Ear: External ear normal.   Eyes:      Conjunctiva/sclera: Conjunctivae normal.      Pupils: Pupils are equal, round, and reactive to light.   Cardiovascular:      Rate and Rhythm: Normal rate and regular rhythm.      Heart sounds: No murmur heard.    No friction rub. No gallop.   Pulmonary:      Effort: Pulmonary effort is normal.      Breath sounds: Normal breath sounds. No wheezing or rhonchi.   Skin:     General: Skin is warm and dry.   Neurological:      Mental Status: He is alert and oriented to person, place, and time.   Psychiatric:         Mood and Affect: Affect normal.         Behavior: Behavior normal.         Thought Content: Thought content normal.        Result Review :       Common labs    Common Labs 12/27/21 12/27/21 12/5/22 12/5/22 12/5/22 12/5/22 12/5/22    1801 1801 1435 1510 1510 1510 1510   Glucose  206 (A)    258 (A)    BUN  18    16    Creatinine  0.96    0.97    eGFR African Am  93        Sodium  135 (A)    133 (A)    Potassium  4.2    4.2    Chloride  98    98    Calcium  9.0    9.2    Albumin  3.80    4.40    Total Bilirubin  0.5    0.5    Alkaline Phosphatase  83    79    AST (SGOT)  32    25    ALT (SGPT)  26    19    WBC 6.49   6.97      Hemoglobin 11.1 (A)   13.7      Hematocrit 33.3 (A)   39.4      Platelets 205   209      Total Cholesterol     220 (A)     Triglycerides     162 (A)     HDL Cholesterol     51     LDL Cholesterol      140 (A)     Hemoglobin " A1C       9.50 (A)   Microalbumin, Urine   12.9       (A) Abnormal value       Comments are available for some flowsheets but are not being displayed.             Results for orders placed or performed in visit on 12/05/22   Comprehensive Metabolic Panel    Specimen: Arm, Left; Blood   Result Value Ref Range    Glucose 258 (H) 65 - 99 mg/dL    BUN 16 8 - 23 mg/dL    Creatinine 0.97 0.76 - 1.27 mg/dL    Sodium 133 (L) 136 - 145 mmol/L    Potassium 4.2 3.5 - 5.2 mmol/L    Chloride 98 98 - 107 mmol/L    CO2 27.0 22.0 - 29.0 mmol/L    Calcium 9.2 8.6 - 10.5 mg/dL    Total Protein 7.1 6.0 - 8.5 g/dL    Albumin 4.40 3.50 - 5.20 g/dL    ALT (SGPT) 19 1 - 41 U/L    AST (SGOT) 25 1 - 40 U/L    Alkaline Phosphatase 79 39 - 117 U/L    Total Bilirubin 0.5 0.0 - 1.2 mg/dL    Globulin 2.7 gm/dL    A/G Ratio 1.6 g/dL    BUN/Creatinine Ratio 16.5 7.0 - 25.0    Anion Gap 8.0 5.0 - 15.0 mmol/L    eGFR 80.9 >60.0 mL/min/1.73   Lipid Panel    Specimen: Arm, Left; Blood   Result Value Ref Range    Total Cholesterol 220 (H) 0 - 200 mg/dL    Triglycerides 162 (H) 0 - 150 mg/dL    HDL Cholesterol 51 40 - 60 mg/dL    LDL Cholesterol  140 (H) 0 - 100 mg/dL    VLDL Cholesterol 29 5 - 40 mg/dL    LDL/HDL Ratio 2.68    TSH    Specimen: Arm, Left; Blood   Result Value Ref Range    TSH 2.390 0.270 - 4.200 uIU/mL   MicroAlbumin, Urine, Random - Urine, Clean Catch    Specimen: Urine, Clean Catch   Result Value Ref Range    Microalbumin, Urine 12.9 mg/dL   Hemoglobin A1c    Specimen: Arm, Left; Blood   Result Value Ref Range    Hemoglobin A1C 9.50 (H) 4.80 - 5.60 %   CBC Auto Differential    Specimen: Arm, Left; Blood   Result Value Ref Range    WBC 6.97 3.40 - 10.80 10*3/mm3    RBC 4.46 4.14 - 5.80 10*6/mm3    Hemoglobin 13.7 13.0 - 17.7 g/dL    Hematocrit 39.4 37.5 - 51.0 %    MCV 88.3 79.0 - 97.0 fL    MCH 30.7 26.6 - 33.0 pg    MCHC 34.8 31.5 - 35.7 g/dL    RDW 12.3 12.3 - 15.4 %    RDW-SD 39.5 37.0 - 54.0 fl    MPV 10.7 6.0 - 12.0 fL     Platelets 209 140 - 450 10*3/mm3    Neutrophil % 53.9 42.7 - 76.0 %    Lymphocyte % 29.7 19.6 - 45.3 %    Monocyte % 12.3 (H) 5.0 - 12.0 %    Eosinophil % 3.0 0.3 - 6.2 %    Basophil % 0.7 0.0 - 1.5 %    Immature Grans % 0.4 0.0 - 0.5 %    Neutrophils, Absolute 3.75 1.70 - 7.00 10*3/mm3    Lymphocytes, Absolute 2.07 0.70 - 3.10 10*3/mm3    Monocytes, Absolute 0.86 0.10 - 0.90 10*3/mm3    Eosinophils, Absolute 0.21 0.00 - 0.40 10*3/mm3    Basophils, Absolute 0.05 0.00 - 0.20 10*3/mm3    Immature Grans, Absolute 0.03 0.00 - 0.05 10*3/mm3    nRBC 0.0 0.0 - 0.2 /100 WBC            Procedures        Assessment and Plan {CC Problem List  Visit Diagnosis   ROS  Review (Popup)  Health Maintenance  Quality  BestPractice  Medications  SmartSets  SnapShot Encounters  Media :23}   Diagnoses and all orders for this visit:    1. Primary hypertension (Primary)  Assessment & Plan:  Stable. Continue current medication regimen with no changes.    Orders:  -     CBC & Differential  -     Comprehensive Metabolic Panel    2. Mixed hyperlipidemia  -     Lipid Panel    3. Controlled type 2 diabetes mellitus without complication, without long-term current use of insulin (HCC)  Assessment & Plan:  - Controlled. Continue current medication regimen. Continue to monitor blood glucose levels at home. Up to date on diabetic eye exam. Was done in 06/2022. Blood tests ordered to check A1C, kidneys, liver, and cholesterol. Will [facility] result [facility].    Orders:  -     CBC & Differential  -     Comprehensive Metabolic Panel  -     Lipid Panel  -     TSH  -     MicroAlbumin, Urine, Random - Urine, Clean Catch  -     Hemoglobin A1c    4. Benign prostatic hyperplasia with urinary frequency  Assessment & Plan:  Stable. No changes needed at this time.      5. Need for vaccination  Comments:  Will receive his pneumonia vaccine today. Due shingles, tetanus vaccine; can get through [facility], or local [facility].  Orders:  -      Pneumococcal Conjugate Vaccine 20-Valent All    6. Neuropathy    7. Falling    Neuropathy  - Continue gabapentin 600 mg 2 to 3 times a day.  - Discussed that it can cause drowsiness.     Falls  - Had 2 -3 falls within the past year.  - Declined a physical therapy referral at this time.  - Encouraged him to remain active to help with his balance issues.              Follow Up   Return in about 6 months (around 6/5/2023).  Patient was given instructions and counseling regarding his condition or for health maintenance advice. Please see specific information pulled into the AVS if appropriate.             Transcribed from ambient dictation for Morena Masters MD by Nazia Washington.  12/14/22   09:36 EST    Patient or patient representative verbalized consent to the visit recording.  I have personally performed the services described in this document as transcribed by the above individual, and it is both accurate and complete.

## 2022-12-06 LAB
ALBUMIN SERPL-MCNC: 4.4 G/DL (ref 3.5–5.2)
ALBUMIN UR-MCNC: 12.9 MG/DL
ALBUMIN/GLOB SERPL: 1.6 G/DL
ALP SERPL-CCNC: 79 U/L (ref 39–117)
ALT SERPL W P-5'-P-CCNC: 19 U/L (ref 1–41)
ANION GAP SERPL CALCULATED.3IONS-SCNC: 8 MMOL/L (ref 5–15)
AST SERPL-CCNC: 25 U/L (ref 1–40)
BILIRUB SERPL-MCNC: 0.5 MG/DL (ref 0–1.2)
BUN SERPL-MCNC: 16 MG/DL (ref 8–23)
BUN/CREAT SERPL: 16.5 (ref 7–25)
CALCIUM SPEC-SCNC: 9.2 MG/DL (ref 8.6–10.5)
CHLORIDE SERPL-SCNC: 98 MMOL/L (ref 98–107)
CHOLEST SERPL-MCNC: 220 MG/DL (ref 0–200)
CO2 SERPL-SCNC: 27 MMOL/L (ref 22–29)
CREAT SERPL-MCNC: 0.97 MG/DL (ref 0.76–1.27)
EGFRCR SERPLBLD CKD-EPI 2021: 80.9 ML/MIN/1.73
GLOBULIN UR ELPH-MCNC: 2.7 GM/DL
GLUCOSE SERPL-MCNC: 258 MG/DL (ref 65–99)
HDLC SERPL-MCNC: 51 MG/DL (ref 40–60)
LDLC SERPL CALC-MCNC: 140 MG/DL (ref 0–100)
LDLC/HDLC SERPL: 2.68 {RATIO}
POTASSIUM SERPL-SCNC: 4.2 MMOL/L (ref 3.5–5.2)
PROT SERPL-MCNC: 7.1 G/DL (ref 6–8.5)
SODIUM SERPL-SCNC: 133 MMOL/L (ref 136–145)
TRIGL SERPL-MCNC: 162 MG/DL (ref 0–150)
TSH SERPL DL<=0.05 MIU/L-ACNC: 2.39 UIU/ML (ref 0.27–4.2)
VLDLC SERPL-MCNC: 29 MG/DL (ref 5–40)

## 2022-12-07 LAB — HBA1C MFR BLD: 9.5 % (ref 4.8–5.6)

## 2022-12-14 NOTE — ASSESSMENT & PLAN NOTE
- Controlled. Continue current medication regimen. Continue to monitor blood glucose levels at home. Up to date on diabetic eye exam. Was done in 06/2022. Blood tests ordered to check A1C, kidneys, liver, and cholesterol. Will forward result to [facility].  
Resolved. Denies having any lingering symptoms.    
Stable with Paxil. No changes needed at this time.  
Stable. Continue current medication regimen with no changes.  
Stable. No changes needed at this time.  
Infant of diabetic mother

## 2022-12-21 ENCOUNTER — OFFICE VISIT (OUTPATIENT)
Dept: SURGERY | Facility: CLINIC | Age: 77
End: 2022-12-21

## 2022-12-21 VITALS — HEART RATE: 87 BPM | BODY MASS INDEX: 34.37 KG/M2 | WEIGHT: 219 LBS | HEIGHT: 67 IN

## 2022-12-21 DIAGNOSIS — Z86.010 HISTORY OF COLONIC POLYPS: ICD-10-CM

## 2022-12-21 DIAGNOSIS — Z12.11 SCREENING FOR MALIGNANT NEOPLASM OF COLON: Primary | ICD-10-CM

## 2022-12-21 PROCEDURE — S0260 H&P FOR SURGERY: HCPCS | Performed by: NURSE PRACTITIONER

## 2022-12-21 NOTE — PROGRESS NOTES
Chief Complaint: Colonoscopy (consult)    Subjective      Colonoscopy consultation       History of Present Illness  Giles Donovan is a 77 y.o. male presents to [facility] for a colonoscopy consultation.     Patient presents today on a referral from Dr. Morena Masters for a colonoscopy consultation.    Patient denies any abdominal pain, change in bowel habits or rectal bleeding. Denies any family history of colorectal cancer.     10/19: Colonoscopy (Davy): Diverticulosis of the sigmoid.     7/17: Colonoscopy (Chon): Normal colon.    7/16: Colonoscopy (NUHA Schwartz): Sigmoid - tubular adenoma.     Objective     Past Medical History:   Diagnosis Date   • Allergic rhinitis 12/27/2014    Will try Zyrtec, he didnt want to use a nasal spray.   • Cataract     left eye   • Cough 03/30/2016    PFT and CXR ordered.   • Depression     PTSD   • Diabetes (HCC)    • GERD (gastroesophageal reflux disease)    • H/O psychiatric care 1999    PTSD   • Hyperlipidemia 03/30/2016    Lipids ordered. Continue on current medication.   • Hypertension    • Hypothyroidism    • Seasonal allergies    • Shortness of breath    • Stenosis of right carotid artery 02/19/2017    Will refer to vascular surgeon.   • Syncope 02/19/2017   • Type 2 diabetes mellitus (HCC)    • Upper respiratory infection 10/18/2015    Will treat cough with Hydromet, otherwise over the counter meds for treatment.       Past Surgical History:   Procedure Laterality Date   • CATARACT EXTRACTION Right     x2   • COLONOSCOPY     • REPLACEMENT TOTAL HIP ONCOLOGIC Right    • RETINAL DETACHMENT REPAIR     • TOE AMPUTATION Left 11/2017    Second phalaeng.   • TOE OSTEOPHYTE REMOVAL         Outpatient Medications Marked as Taking for the 12/21/22 encounter (Office Visit) with Eliz Whitehead APRN   Medication Sig Dispense Refill   • albuterol sulfate  (90 Base) MCG/ACT inhaler Inhale 2 puffs Every 4 (Four) Hours As Needed for Wheezing  or Shortness of Air. 18 g 0   • aspirin EC 81 MG EC tablet      • atorvastatin (LIPITOR) 80 MG tablet Take 1 tablet by mouth Daily. 90 tablet 1   • B-D UF III MINI PEN NEEDLES 31G X 5 MM misc      • benzonatate (TESSALON) 100 MG capsule Take 1 capsule by mouth 3 (Three) Times a Day As Needed for Cough. 20 capsule 0   • carvedilol (COREG) 12.5 MG tablet Take 12.5 mg by mouth.     • cetirizine (ZyrTEC Allergy) 10 MG tablet Take 1 tablet by mouth Daily. 90 tablet 3   • ezetimibe (ZETIA) 10 MG tablet Take 10 mg by mouth Daily.     • felodipine (PLENDIL) 5 MG 24 hr tablet felodipine 5 mg oral tablet extended release 24 hr take 1 tablet (5 mg) by oral route once daily   Active     • fluticasone (Flovent HFA) 220 MCG/ACT inhaler Inhale 1 puff 2 (Two) Times a Day. 12 g 2   • furosemide (LASIX) 20 MG tablet      • gabapentin (NEURONTIN) 600 MG tablet      • insulin aspart (novoLOG) 100 UNIT/ML injection Inject 26 Units under the skin into the appropriate area as directed 4 (Four) Times a Day.     • Insulin Glargine (Lantus SoloStar) 100 UNIT/ML injection pen 44 Units.     • levothyroxine (SYNTHROID, LEVOTHROID) 75 MCG tablet Take 75 mcg by mouth Daily.     • losartan (COZAAR) 50 MG tablet      • meloxicam (MOBIC) 7.5 MG tablet Take 1 tablet by mouth Daily. 90 tablet 1   • multivitamin with minerals tablet tablet      • NovoLOG FlexPen 100 UNIT/ML solution pen-injector sc pen      • omeprazole (priLOSEC) 20 MG capsule omeprazole 20 mg oral capsule,delayed release(DR/EC) take 1 capsule (20 mg) by oral route once daily before a meal   Suspended     • oxybutynin XL (DITROPAN-XL) 10 MG 24 hr tablet Take 1 tablet by mouth Daily. 90 tablet 4   • PARoxetine (PAXIL) 40 MG tablet      • prazosin (MINIPRESS) 1 MG capsule      • sildenafil (Viagra) 100 MG tablet Take 1 tablet by mouth Daily As Needed for Erectile Dysfunction. 8 tablet 0   • simethicone (MYLICON) 80 MG chewable tablet      • Spiriva HandiHaler 18 MCG per inhalation  "capsule      • tamsulosin (FLOMAX) 0.4 MG capsule 24 hr capsule Take 1 capsule by mouth Daily. 90 capsule 3   • traZODone (DESYREL) 100 MG tablet Take 100 mg by mouth Daily.         Allergies   Allergen Reactions   • Latex Rash and Anaphylaxis   • Latex, Natural Rubber Itching        Family History   Problem Relation Age of Onset   • Heart disease Mother    • Lupus Mother    • Arthritis Mother    • Kidney disease Sister        Social History     Socioeconomic History   • Marital status:    Tobacco Use   • Smoking status: Never   • Smokeless tobacco: Never   Vaping Use   • Vaping Use: Never used   Substance and Sexual Activity   • Alcohol use: Yes     Comment: occasionally   • Drug use: Never   • Sexual activity: Defer       Review of Systems   Constitutional: Negative for chills and fever.   Gastrointestinal: Negative for abdominal distention, abdominal pain, anal bleeding, blood in stool, constipation, diarrhea and rectal pain.        Vital Signs:   Pulse 87   Ht 170.2 cm (67\")   Wt 99.3 kg (219 lb)   BMI 34.30 kg/m²      Physical Exam  Vitals and nursing note reviewed.   Constitutional:       General: He is not in acute distress.     Appearance: Normal appearance.   HENT:      Head: Normocephalic.   Cardiovascular:      Rate and Rhythm: Normal rate.   Pulmonary:      Effort: Pulmonary effort is normal.      Breath sounds: No stridor.   Abdominal:      Palpations: Abdomen is soft.      Tenderness: There is no guarding.   Musculoskeletal:         General: No deformity. Normal range of motion.   Skin:     General: Skin is warm and dry.      Coloration: Skin is not jaundiced.   Neurological:      General: No focal deficit present.      Mental Status: He is alert and oriented to person, place, and time.   Psychiatric:         Mood and Affect: Mood normal.         Thought Content: Thought content normal.          Result Review :         []  Laboratory  []  Radiology  [x]  Pathology  []  Microbiology  []  " EKG/Telemetry   []  Cardiology/Vascular   [x]  Old records  Today I have reviewed Dr. Bhatti's, Canelos and Efren previous colonoscopies and pathology.      Assessment and Plan    Diagnoses and all orders for this visit:    1. Screening for malignant neoplasm of colon (Primary)    2. History of colonic polyps        Follow Up   Return for Re-screen colon in October 2024..     Recommendation from last colonoscopy was 5 years. Patient is without complaints and prefers to wait until October 2024. I will recall the patient at that time, or he will notify the office with any concerns.     Patient was given instructions and counseling regarding his condition or for health maintenance advice. Please see specific information pulled into the AVS if appropriate.

## 2023-03-09 ENCOUNTER — TRANSCRIBE ORDERS (OUTPATIENT)
Dept: ADMINISTRATIVE | Facility: HOSPITAL | Age: 78
End: 2023-03-09
Payer: MEDICARE

## 2023-03-09 DIAGNOSIS — R07.9 CHEST PAIN, UNSPECIFIED TYPE: Primary | ICD-10-CM

## 2023-04-07 ENCOUNTER — HOSPITAL ENCOUNTER (INPATIENT)
Facility: HOSPITAL | Age: 78
LOS: 4 days | Discharge: HOME HEALTH | DRG: 871 | End: 2023-04-11
Attending: EMERGENCY MEDICINE | Admitting: INTERNAL MEDICINE
Payer: MEDICARE

## 2023-04-07 ENCOUNTER — APPOINTMENT (OUTPATIENT)
Dept: CT IMAGING | Facility: HOSPITAL | Age: 78
DRG: 871 | End: 2023-04-07
Payer: MEDICARE

## 2023-04-07 ENCOUNTER — APPOINTMENT (OUTPATIENT)
Dept: GENERAL RADIOLOGY | Facility: HOSPITAL | Age: 78
DRG: 871 | End: 2023-04-07
Payer: MEDICARE

## 2023-04-07 DIAGNOSIS — J18.9 MULTIFOCAL PNEUMONIA: ICD-10-CM

## 2023-04-07 DIAGNOSIS — J96.01 ACUTE RESPIRATORY FAILURE WITH HYPOXIA: ICD-10-CM

## 2023-04-07 DIAGNOSIS — Z78.9 DECREASED ACTIVITIES OF DAILY LIVING (ADL): ICD-10-CM

## 2023-04-07 DIAGNOSIS — R09.02 HYPOXIA: ICD-10-CM

## 2023-04-07 DIAGNOSIS — R26.2 DIFFICULTY IN WALKING: ICD-10-CM

## 2023-04-07 DIAGNOSIS — A41.9 SEPSIS, DUE TO UNSPECIFIED ORGANISM, UNSPECIFIED WHETHER ACUTE ORGAN DYSFUNCTION PRESENT: Primary | ICD-10-CM

## 2023-04-07 DIAGNOSIS — J18.9 PNEUMONIA DUE TO INFECTIOUS ORGANISM, UNSPECIFIED LATERALITY, UNSPECIFIED PART OF LUNG: ICD-10-CM

## 2023-04-07 DIAGNOSIS — I50.32 CHF (CONGESTIVE HEART FAILURE), NYHA CLASS I, CHRONIC, DIASTOLIC: ICD-10-CM

## 2023-04-07 PROBLEM — E78.2 MIXED HYPERLIPIDEMIA: Status: ACTIVE | Noted: 2023-04-07

## 2023-04-07 PROBLEM — Z79.4 INSULIN DEPENDENT TYPE 2 DIABETES MELLITUS: Status: ACTIVE | Noted: 2023-04-07

## 2023-04-07 PROBLEM — E11.9 INSULIN DEPENDENT TYPE 2 DIABETES MELLITUS: Status: ACTIVE | Noted: 2023-04-07

## 2023-04-07 LAB
ALBUMIN SERPL-MCNC: 4 G/DL (ref 3.5–5.2)
ALBUMIN/GLOB SERPL: 1.5 G/DL
ALP SERPL-CCNC: 101 U/L (ref 39–117)
ALT SERPL W P-5'-P-CCNC: 104 U/L (ref 1–41)
ANION GAP SERPL CALCULATED.3IONS-SCNC: 13.3 MMOL/L (ref 5–15)
AST SERPL-CCNC: 63 U/L (ref 1–40)
BASOPHILS # BLD AUTO: 0.04 10*3/MM3 (ref 0–0.2)
BASOPHILS NFR BLD AUTO: 0.3 % (ref 0–1.5)
BILIRUB SERPL-MCNC: 1.2 MG/DL (ref 0–1.2)
BUN SERPL-MCNC: 15 MG/DL (ref 8–23)
BUN/CREAT SERPL: 15.8 (ref 7–25)
CALCIUM SPEC-SCNC: 8.5 MG/DL (ref 8.6–10.5)
CHLORIDE SERPL-SCNC: 98 MMOL/L (ref 98–107)
CO2 SERPL-SCNC: 24.7 MMOL/L (ref 22–29)
CREAT SERPL-MCNC: 0.95 MG/DL (ref 0.76–1.27)
D-LACTATE SERPL-SCNC: 1.3 MMOL/L (ref 0.5–2)
DEPRECATED RDW RBC AUTO: 47.6 FL (ref 37–54)
EGFRCR SERPLBLD CKD-EPI 2021: 83 ML/MIN/1.73
EOSINOPHIL # BLD AUTO: 0.04 10*3/MM3 (ref 0–0.4)
EOSINOPHIL NFR BLD AUTO: 0.3 % (ref 0.3–6.2)
ERYTHROCYTE [DISTWIDTH] IN BLOOD BY AUTOMATED COUNT: 13.6 % (ref 12.3–15.4)
FLUAV AG NPH QL: NEGATIVE
FLUBV AG NPH QL IA: NEGATIVE
GLOBULIN UR ELPH-MCNC: 2.6 GM/DL
GLUCOSE BLDC GLUCOMTR-MCNC: 183 MG/DL (ref 70–99)
GLUCOSE BLDC GLUCOMTR-MCNC: 92 MG/DL (ref 70–99)
GLUCOSE SERPL-MCNC: 148 MG/DL (ref 65–99)
HCT VFR BLD AUTO: 37.2 % (ref 37.5–51)
HGB BLD-MCNC: 12.3 G/DL (ref 13–17.7)
HOLD SPECIMEN: NORMAL
HOLD SPECIMEN: NORMAL
IMM GRANULOCYTES # BLD AUTO: 0.03 10*3/MM3 (ref 0–0.05)
IMM GRANULOCYTES NFR BLD AUTO: 0.2 % (ref 0–0.5)
L PNEUMO1 AG UR QL IA: NEGATIVE
LYMPHOCYTES # BLD AUTO: 0.46 10*3/MM3 (ref 0.7–3.1)
LYMPHOCYTES NFR BLD AUTO: 3.5 % (ref 19.6–45.3)
MCH RBC QN AUTO: 31.9 PG (ref 26.6–33)
MCHC RBC AUTO-ENTMCNC: 33.1 G/DL (ref 31.5–35.7)
MCV RBC AUTO: 96.4 FL (ref 79–97)
MONOCYTES # BLD AUTO: 0.95 10*3/MM3 (ref 0.1–0.9)
MONOCYTES NFR BLD AUTO: 7.3 % (ref 5–12)
NEUTROPHILS NFR BLD AUTO: 11.55 10*3/MM3 (ref 1.7–7)
NEUTROPHILS NFR BLD AUTO: 88.4 % (ref 42.7–76)
NRBC BLD AUTO-RTO: 0 /100 WBC (ref 0–0.2)
NT-PROBNP SERPL-MCNC: 1793 PG/ML (ref 0–1800)
PLATELET # BLD AUTO: 202 10*3/MM3 (ref 140–450)
PMV BLD AUTO: 9.5 FL (ref 6–12)
POTASSIUM SERPL-SCNC: 4.1 MMOL/L (ref 3.5–5.2)
PROCALCITONIN SERPL-MCNC: 0.49 NG/ML (ref 0–0.25)
PROT SERPL-MCNC: 6.6 G/DL (ref 6–8.5)
RBC # BLD AUTO: 3.86 10*6/MM3 (ref 4.14–5.8)
S PNEUM AG SPEC QL LA: NEGATIVE
SODIUM SERPL-SCNC: 136 MMOL/L (ref 136–145)
TROPONIN T SERPL HS-MCNC: 26 NG/L
WBC NRBC COR # BLD: 13.07 10*3/MM3 (ref 3.4–10.8)
WHOLE BLOOD HOLD COAG: NORMAL
WHOLE BLOOD HOLD SPECIMEN: NORMAL

## 2023-04-07 PROCEDURE — 71045 X-RAY EXAM CHEST 1 VIEW: CPT

## 2023-04-07 PROCEDURE — 99223 1ST HOSP IP/OBS HIGH 75: CPT | Performed by: INTERNAL MEDICINE

## 2023-04-07 PROCEDURE — 93005 ELECTROCARDIOGRAM TRACING: CPT | Performed by: EMERGENCY MEDICINE

## 2023-04-07 PROCEDURE — 84484 ASSAY OF TROPONIN QUANT: CPT | Performed by: EMERGENCY MEDICINE

## 2023-04-07 PROCEDURE — 85025 COMPLETE CBC W/AUTO DIFF WBC: CPT | Performed by: EMERGENCY MEDICINE

## 2023-04-07 PROCEDURE — U0005 INFEC AGEN DETEC AMPLI PROBE: HCPCS | Performed by: EMERGENCY MEDICINE

## 2023-04-07 PROCEDURE — 94640 AIRWAY INHALATION TREATMENT: CPT

## 2023-04-07 PROCEDURE — 93010 ELECTROCARDIOGRAM REPORT: CPT | Performed by: SPECIALIST

## 2023-04-07 PROCEDURE — 63710000001 INSULIN DETEMIR PER 5 UNITS: Performed by: INTERNAL MEDICINE

## 2023-04-07 PROCEDURE — 87040 BLOOD CULTURE FOR BACTERIA: CPT | Performed by: EMERGENCY MEDICINE

## 2023-04-07 PROCEDURE — 94799 UNLISTED PULMONARY SVC/PX: CPT

## 2023-04-07 PROCEDURE — 99285 EMERGENCY DEPT VISIT HI MDM: CPT

## 2023-04-07 PROCEDURE — U0004 COV-19 TEST NON-CDC HGH THRU: HCPCS | Performed by: EMERGENCY MEDICINE

## 2023-04-07 PROCEDURE — 84145 PROCALCITONIN (PCT): CPT | Performed by: INTERNAL MEDICINE

## 2023-04-07 PROCEDURE — 87804 INFLUENZA ASSAY W/OPTIC: CPT | Performed by: EMERGENCY MEDICINE

## 2023-04-07 PROCEDURE — 80053 COMPREHEN METABOLIC PANEL: CPT | Performed by: EMERGENCY MEDICINE

## 2023-04-07 PROCEDURE — 87899 AGENT NOS ASSAY W/OPTIC: CPT | Performed by: INTERNAL MEDICINE

## 2023-04-07 PROCEDURE — 93005 ELECTROCARDIOGRAM TRACING: CPT

## 2023-04-07 PROCEDURE — 83880 ASSAY OF NATRIURETIC PEPTIDE: CPT | Performed by: EMERGENCY MEDICINE

## 2023-04-07 PROCEDURE — 25510000001 IOPAMIDOL PER 1 ML: Performed by: EMERGENCY MEDICINE

## 2023-04-07 PROCEDURE — 82962 GLUCOSE BLOOD TEST: CPT

## 2023-04-07 PROCEDURE — 71260 CT THORAX DX C+: CPT

## 2023-04-07 PROCEDURE — 25010000002 PIPERACILLIN SOD-TAZOBACTAM PER 1 G: Performed by: EMERGENCY MEDICINE

## 2023-04-07 PROCEDURE — 87449 NOS EACH ORGANISM AG IA: CPT | Performed by: INTERNAL MEDICINE

## 2023-04-07 PROCEDURE — 25010000002 FUROSEMIDE PER 20 MG: Performed by: NURSE PRACTITIONER

## 2023-04-07 PROCEDURE — 83605 ASSAY OF LACTIC ACID: CPT | Performed by: EMERGENCY MEDICINE

## 2023-04-07 PROCEDURE — 25010000002 VANCOMYCIN 5 G RECONSTITUTED SOLUTION: Performed by: EMERGENCY MEDICINE

## 2023-04-07 PROCEDURE — 94660 CPAP INITIATION&MGMT: CPT

## 2023-04-07 RX ORDER — AMOXICILLIN 250 MG
2 CAPSULE ORAL 2 TIMES DAILY
Status: DISCONTINUED | OUTPATIENT
Start: 2023-04-07 | End: 2023-04-11 | Stop reason: HOSPADM

## 2023-04-07 RX ORDER — SODIUM CHLORIDE 0.9 % (FLUSH) 0.9 %
10 SYRINGE (ML) INJECTION EVERY 12 HOURS SCHEDULED
Status: DISCONTINUED | OUTPATIENT
Start: 2023-04-07 | End: 2023-04-11 | Stop reason: HOSPADM

## 2023-04-07 RX ORDER — DEXTROSE MONOHYDRATE 25 G/50ML
25 INJECTION, SOLUTION INTRAVENOUS
Status: DISCONTINUED | OUTPATIENT
Start: 2023-04-07 | End: 2023-04-11 | Stop reason: HOSPADM

## 2023-04-07 RX ORDER — ICOSAPENT ETHYL 1000 MG/1
1 CAPSULE ORAL 2 TIMES DAILY WITH MEALS
COMMUNITY
End: 2023-04-07

## 2023-04-07 RX ORDER — VANCOMYCIN 2 GRAM/500 ML IN 0.9 % SODIUM CHLORIDE INTRAVENOUS
20 ONCE
Status: COMPLETED | OUTPATIENT
Start: 2023-04-07 | End: 2023-04-07

## 2023-04-07 RX ORDER — ONDANSETRON 4 MG/1
4 TABLET, FILM COATED ORAL EVERY 6 HOURS PRN
Status: DISCONTINUED | OUTPATIENT
Start: 2023-04-07 | End: 2023-04-11 | Stop reason: HOSPADM

## 2023-04-07 RX ORDER — BISACODYL 10 MG
10 SUPPOSITORY, RECTAL RECTAL DAILY PRN
Status: DISCONTINUED | OUTPATIENT
Start: 2023-04-07 | End: 2023-04-11 | Stop reason: HOSPADM

## 2023-04-07 RX ORDER — GUAIFENESIN 600 MG/1
600 TABLET, EXTENDED RELEASE ORAL EVERY 12 HOURS SCHEDULED
Status: DISCONTINUED | OUTPATIENT
Start: 2023-04-07 | End: 2023-04-11 | Stop reason: HOSPADM

## 2023-04-07 RX ORDER — SODIUM CHLORIDE 9 MG/ML
40 INJECTION, SOLUTION INTRAVENOUS AS NEEDED
Status: DISCONTINUED | OUTPATIENT
Start: 2023-04-07 | End: 2023-04-11 | Stop reason: HOSPADM

## 2023-04-07 RX ORDER — ALBUTEROL SULFATE 2.5 MG/3ML
2.5 SOLUTION RESPIRATORY (INHALATION) EVERY 6 HOURS PRN
Status: DISCONTINUED | OUTPATIENT
Start: 2023-04-07 | End: 2023-04-11 | Stop reason: HOSPADM

## 2023-04-07 RX ORDER — POLYETHYLENE GLYCOL 3350 17 G/17G
17 POWDER, FOR SOLUTION ORAL DAILY PRN
Status: DISCONTINUED | OUTPATIENT
Start: 2023-04-07 | End: 2023-04-11 | Stop reason: HOSPADM

## 2023-04-07 RX ORDER — BISACODYL 5 MG/1
5 TABLET, DELAYED RELEASE ORAL DAILY PRN
Status: DISCONTINUED | OUTPATIENT
Start: 2023-04-07 | End: 2023-04-11 | Stop reason: HOSPADM

## 2023-04-07 RX ORDER — ACETAMINOPHEN 325 MG/1
650 TABLET ORAL EVERY 4 HOURS PRN
Status: DISCONTINUED | OUTPATIENT
Start: 2023-04-07 | End: 2023-04-11 | Stop reason: HOSPADM

## 2023-04-07 RX ORDER — BUDESONIDE 0.5 MG/2ML
0.5 INHALANT ORAL
Status: DISCONTINUED | OUTPATIENT
Start: 2023-04-07 | End: 2023-04-11 | Stop reason: HOSPADM

## 2023-04-07 RX ORDER — INSULIN LISPRO 100 [IU]/ML
2-9 INJECTION, SOLUTION INTRAVENOUS; SUBCUTANEOUS
Status: DISCONTINUED | OUTPATIENT
Start: 2023-04-07 | End: 2023-04-11 | Stop reason: HOSPADM

## 2023-04-07 RX ORDER — ACETAMINOPHEN 325 MG/1
650 TABLET ORAL EVERY 4 HOURS PRN
Status: DISCONTINUED | OUTPATIENT
Start: 2023-04-07 | End: 2023-04-07 | Stop reason: SDUPTHER

## 2023-04-07 RX ORDER — ARFORMOTEROL TARTRATE 15 UG/2ML
15 SOLUTION RESPIRATORY (INHALATION)
Status: DISCONTINUED | OUTPATIENT
Start: 2023-04-07 | End: 2023-04-11 | Stop reason: HOSPADM

## 2023-04-07 RX ORDER — FUROSEMIDE 10 MG/ML
40 INJECTION INTRAMUSCULAR; INTRAVENOUS EVERY 12 HOURS
Status: DISCONTINUED | OUTPATIENT
Start: 2023-04-07 | End: 2023-04-11 | Stop reason: HOSPADM

## 2023-04-07 RX ORDER — SODIUM CHLORIDE 0.9 % (FLUSH) 0.9 %
10 SYRINGE (ML) INJECTION AS NEEDED
Status: DISCONTINUED | OUTPATIENT
Start: 2023-04-07 | End: 2023-04-11 | Stop reason: HOSPADM

## 2023-04-07 RX ORDER — NICOTINE POLACRILEX 4 MG
15 LOZENGE BUCCAL
Status: DISCONTINUED | OUTPATIENT
Start: 2023-04-07 | End: 2023-04-11 | Stop reason: HOSPADM

## 2023-04-07 RX ADMIN — GUAIFENESIN 600 MG: 600 TABLET, EXTENDED RELEASE ORAL at 20:32

## 2023-04-07 RX ADMIN — PIPERACILLIN SODIUM AND TAZOBACTAM SODIUM 3.38 G: 3; .375 INJECTION, POWDER, LYOPHILIZED, FOR SOLUTION INTRAVENOUS at 15:12

## 2023-04-07 RX ADMIN — FUROSEMIDE 40 MG: 10 INJECTION, SOLUTION INTRAMUSCULAR; INTRAVENOUS at 18:15

## 2023-04-07 RX ADMIN — BUDESONIDE 0.5 MG: 0.5 SUSPENSION RESPIRATORY (INHALATION) at 21:31

## 2023-04-07 RX ADMIN — Medication 10 ML: at 20:32

## 2023-04-07 RX ADMIN — INSULIN DETEMIR 20 UNITS: 100 INJECTION, SOLUTION SUBCUTANEOUS at 20:32

## 2023-04-07 RX ADMIN — IOPAMIDOL 100 ML: 755 INJECTION, SOLUTION INTRAVENOUS at 13:45

## 2023-04-07 RX ADMIN — SODIUM CHLORIDE 1000 ML: 9 INJECTION, SOLUTION INTRAVENOUS at 15:12

## 2023-04-07 RX ADMIN — ARFORMOTEROL TARTRATE 15 MCG: 15 SOLUTION RESPIRATORY (INHALATION) at 21:31

## 2023-04-07 RX ADMIN — VANCOMYCIN HYDROCHLORIDE 2000 MG: 5 INJECTION, POWDER, LYOPHILIZED, FOR SOLUTION INTRAVENOUS at 15:54

## 2023-04-07 NOTE — CONSULTS
Pulmonary / Critical Care Consult Note      Patient Name: Giles Donovan  : 1945  MRN: [id number]  Primary Care Physician:  Morena Masters MD  Referring Physician: No ref. provider found  Date of admission: 2023    Subjective   Subjective     Reason for Consult/ Chief Complaint: Shortness of breath x1 week, hemoptysis, acute hypoxic respiratory failure    HPI:  Giles Donovan is a 77 y.o. male with a past medical history significant for lung disease (patient unsure which type), uses albuterol and Flovent inhalers at home, diabetes, PTSD and depression, hyperlipidemia, hypertension and [facility] shortness of breath and coughing.  He presents emergency department with worsening coughing and shortness of breath for the past week and endorses dark blood tinged sputum that is now light pink.  His oxygen saturations were in the 60%'s on room air, required up to 6 L nasal cannula and is now at 5 L satting 98%.  He denies chest pain, denies smoking, denies sick contacts.  He endorses a 30 to 40 pound weight gain over the past few months.  He did have a home CPAP machine that he has used for approximately 4 years, unfortunately a part broke on the machine and [facility] not [facility].  He has gone approximately 12 months without a home CPAP machine.  No fever but states that he felt chills last night.  His emergency room work-up included a chest CT that ruled out PE, did show multifocal pneumonia and some pulmonary edema.  proBNP 1793, white blood count 13, Pro-Tereso 0.49, flu swab negative.  Patient received empiric antibiotics of vancomycin and Zosyn, was given a 2 L bolus of normal saline, and started on nebulizer treatments.    Review of Systems  General:  No Fatigue, No Fever.  Weight gain  HEENT: No dysphagia, No Visual Changes, no rhinorrhea  Respiratory:  + Productive cough,+Dyspnea, dark brown to light pink  phlegm, No Pleuritic Pain, no wheezing, +hemoptysis  Cardiovascular: Denies chest pain, denies palpitations,+COYLE, No Chest Pressure  Gastrointestinal:  No Abdominal Pain, No Nausea, No Vomiting, No Diarrhea  Genitourinary:  No Dysuria, No Frequency, No Hesitancy  Musculoskeletal: No muscle pain or swelling  Endocrine:  No Heat Intolerance, No Cold Intolerance  Hematologic:  No Bleeding, No Bruising  Psychiatric:  No Anxiety, No Depression  Neurologic:  No Confusion, no Dysarthria, No Headaches  Skin:  No Rash, No Open Wounds        Personal History     Past Medical History:   Diagnosis Date   • Allergic rhinitis 12/27/2014    Will try Zyrtec, he didnt want to use a nasal spray.   • Cataract     left eye   • Cough 03/30/2016    PFT and CXR ordered.   • Depression     PTSD   • Diabetes    • GERD (gastroesophageal reflux disease)    • H/O psychiatric care 1999    PTSD   • Hyperlipidemia 03/30/2016    Lipids ordered. Continue on current medication.   • Hypertension    • Hypothyroidism    • Seasonal allergies    • Shortness of breath    • Stenosis of right carotid artery 02/19/2017    Will refer to vascular surgeon.   • Syncope 02/19/2017   • Type 2 diabetes mellitus    • Upper respiratory infection 10/18/2015    Will treat cough with Hydromet, otherwise over the counter meds for treatment.       Past Surgical History:   Procedure Laterality Date   • CATARACT EXTRACTION Right     x2   • COLONOSCOPY     • REPLACEMENT TOTAL HIP ONCOLOGIC Right    • RETINAL DETACHMENT REPAIR     • TOE AMPUTATION Left 11/2017    Second phalaeng.   • TOE OSTEOPHYTE REMOVAL         Family History: family history includes Arthritis in his mother; Heart disease in his mother; Kidney disease in his sister; Lupus in his mother.     Social History:  reports that he has never smoked. He has never used smokeless tobacco. He reports current alcohol use. He reports that he does not use drugs.    Home Medications:  Insulin Glargine, Insulin Pen Needle,  PARoxetine, albuterol sulfate HFA, aspirin, atorvastatin, benzonatate, carvedilol, cetirizine, ezetimibe, felodipine, fluticasone, furosemide, gabapentin, icosapent ethyl, insulin aspart, levothyroxine, losartan, meloxicam, multivitamin with minerals, omeprazole, oxybutynin XL, prazosin, sildenafil, simethicone, tamsulosin, tiotropium, and traZODone    Allergies:  Allergies   Allergen Reactions   • Latex Rash and Anaphylaxis   • Latex, Natural Rubber Itching       Objective    Objective     Vitals:   Temp:  [97.8 °F (36.6 °C)-99.5 °F (37.5 °C)] 99.5 °F (37.5 °C)  Heart Rate:  [81-93] 89  Resp:  [22-25] 25  BP: (128-149)/(65-78) 132/69  Flow (L/min):  [6] 6    Physical Exam:    Vital Signs Reviewed   General: Obese older man, WDWN, Alert, in mild distress, has conversational dyspnea, on supplemental oxygen  HEENT:  PERRL, EOMI.  OP, nares clear, no sinus tenderness  Neck:  Supple, no JVD, no thyromegaly  Lymph: no axillary, cervical, supraclavicular lymphadenopathy noted bilaterally  Chest:  good aeration, b/l diminished breath sounds,  tympanic to percussion bilaterally except for bases, ,mild work of breathing noted on 5 L nasal cannula  CV: RRR, no MGR, pulses 2+, equal.  Abd:   Obese, soft, NT, ND, + BS, no HSM  EXT:  no clubbing, no cyanosis, trace bilateral lower ext edema, no joint tenderness  Neuro:  A&Ox3, CN grossly intact, no focal deficits.  Skin: No rashes or lesions noted      Result Review    Result Review:  I have personally reviewed the results from the time of this admission to 4/7/2023 15:47 EDT and agree with these findings:  [x]  Laboratory  [x]  Microbiology  [x]  Radiology  []  EKG/Telemetry   []  Cardiology/Vascular   []  Pathology  [x]  Old records  []  Other:  Most notable findings include: proBNP 1793, AST 63, , Pro-Tereso 0.49, WBC 13, normal lactic acid, flu swab negative        Lab 04/07/23  1248   WBC 13.07*   HEMOGLOBIN 12.3*   HEMATOCRIT 37.2*   PLATELETS 202   SODIUM 136    POTASSIUM 4.1   CHLORIDE 98   CO2 24.7   BUN 15   CREATININE 0.95   GLUCOSE 148*   CALCIUM 8.5*   TOTAL PROTEIN 6.6   ALBUMIN 4.0   GLOBULIN 2.6     CT Chest With Contrast Diagnostic    Result Date: 4/7/2023  PROCEDURE: CT CHEST W CONTRAST DIAGNOSTIC  COMPARISON:  [facility], CT, CT CHEST W CONTRAST DIAGNOSTIC, 12/28/2021, 16:07.  INDICATIONS: Shortness of breath, hypoxia, hemoptysis  TECHNIQUE: After obtaining the patient's consent, CT images were obtained with non-ionic intravenous contrast material.   PROTOCOL:   Standard imaging protocol performed    RADIATION:   DLP: 521.2 mGy*cm   Automated exposure control was utilized to minimize radiation dose. CONTRAST: 75 cc Isovue 370 I.V.  FINDINGS:  The visualized soft tissue structures at the base of the neck including the thyroid appear within normal limits.  There is no lower cervical or axillary adenopathy.  The heart size is normal.  There is no pericardial effusion.  The aorta is normal in caliber without evidence of aneurysm formation.  The main pulmonary artery is normal in caliber.  There is no evidence of pulmonary embolism.  There are reactive mediastinal or hilar lymphadenopathy by size criteria.  The tracheobronchial tree is patent.  There is no abnormal bronchial wall thickening or bronchiectasis.  There is patchy bilateral consolidation throughout both lungs, left worse than right.  There is mild smooth interlobular septal thickening.  There are small bilateral pleural effusions.  There is no evidence of pneumothorax.  The esophagus is normal in course and caliber.  There is partial visualization of a 2.7 cm low-density lesion within the inferior aspect of the left kidney.  This is incompletely evaluated on CT.  Follow-up with initial renal ultrasound would be recommended. There are degenerative changes of the thoracic spine.      Impression:   1. Bilateral multifocal consolidation, left worse than right favored to represent multifocal  pneumonia.  There are background findings of smooth interlobular septal thickening and small bilateral pleural effusions which can be seen with volume overload.  Alveolar edema is felt to be a less likely differential consideration for the consolidation.  2. No evidence of pulmonary embolism. 3. Partial visualization of a 2.7 cm low-density lesion within the inferior aspect of the left kidney.  Nonemergent initial imaging assessment with renal ultrasound is recommended.      PRESTON RILEY MD       Electronically Signed and Approved By: PRESTON RILEY MD on 4/07/2023 at 14:26                 Assessment & Plan   Assessment / Plan     Active Hospital Problems:  Active Hospital Problems    Diagnosis    • **Sepsis due to pneumonia      Impression:  Acute hypoxic respiratory failure  Heart failure  Reported lung disease  Remote history of COVID  Fully vaccinated against COVID, flu and pneumonia  MIAH with NIPPV  Pneumonia    Plan:    -Continue supplemental oxygen to maintain oxygen saturations greater than 92%  -Patient utilizes CPAP at home, 10 mmHg pressure, please use at night and during naps  -Begin Lasix 40 mg every 12 hours, strict intake and output monitoring  -Continue empiric antibiotics of vancomycin and Zosyn, tailor antibiotics as microbiology results  -Blood cultures x2, sputum culture, respiratory virus panel pending and follow-up results  -Send urine for Legionella and strep pneumonia antigens  -Continue nebulizer treatments of Brovana and Pulmicort  -DuoNebs as needed  -Echocardiogram ordered, follow-up results      Reviewed labs, imaging and provider notes.   Discussed with bedside nurse and primary service.   Thank you for allowing me to participate in care of Mr Donovan.     Electronically signed by Corey Mckeon MD, 4/7/2023, 15:47 EDT.

## 2023-04-07 NOTE — PROGRESS NOTES
"[facility] Clinical Pharmacy Services: Vancomycin Pharmacokinetic Initial Consult Note    Giles Donovan is a 77 y.o. male who is on day  of pharmacy to dose vancomycin for pneumonia.    Consult Information  Consulting Provider: Kandi  Planned Duration of Therapy: 4 days  Was Patient Receiving Prior to Admission/Consult?: NO  Loading Dose Given: 2000 mg on 23 at 1554  PK/PD Target: -600 mg/L.hr  Relevant ID History: h/o coag negative staph in urine  Other Antimicrobials: Zosyn 3.375 g IV q8h    Imaging Reviewed?: YES    Microbiology Data  MRSA PCR: ordered  Result: N/A  Culture/Source:   BLOOD CX PENDING  COVID19, NASAL SWAB: NEGATIVE  INFLUENZA A/B, NASAL SWAB: NEGATIVE  LEGIONELLA AG, URINE: NEGATIVE  STREP PNEUMO AG, URINE: NEGATIVE    Vitals/Labs  Ht: 172.7 cm (68\"); Wt:  102 kg  Temp (24hrs), Av.7 °F (37.1 °C), Min:97.8 °F (36.6 °C), Max:99.5 °F (37.5 °C)   Estimated Creatinine Clearance: 76.5 mL/min (by C-G formula based on SCr of 0.95 mg/dL).       Results from last 7 days   Lab Units 23  1248   CREATININE mg/dL 0.95   WBC 10*3/mm3 13.07*     Assessment/Plan:    Vancomycin Dose: 1000 mg IV q12h which provides the following predicted parameters:  AUC24,ss: 523 mg/L.hr  PAUC*: 78 %  Ctrough,ss: 17.6 mg/L  Pconc*: 38 %  Tox.: 13 %  Vanc random planned for 23 at 1800  Patient has order for Basic Metabolic Panel    Pharmacy will follow patient's kidney function and will adjust doses and obtain levels as necessary. Thank you for involving pharmacy in this patient's care. Please contact pharmacy with any questions or concerns.                           Pearl Bryan, Pharmacy Intern  Clinical Pharmacist   "

## 2023-04-07 NOTE — H&P
[facility]   HOSPITALIST HISTORY AND PHYSICAL  Date: 2023   Patient Name: Giles Donovan  : 1945  MRN: [id number]  Primary Care Physician:  Morena Masters MD  Date of admission: 2023    Subjective   Subjective     Chief Complaint: Shortness of breath    HPI:    Giles Donovan is a 77 y.o. male with HTN, HLD, type II DM, BPH, previous COVID infection 2021 who presents with chief complaint of shortness of breath.  He reports onset about a week ago and progressively worsening until last night when he came very short of breath going to the bathroom and developed productive cough with a little bit of blood.  He was unable to catch his breath with exertion.  He went to the urgent care earlier but was then told to go to the hospital.  On presentation in the ER oxygen saturation 57% on room air with RR >20.  He was requiring 6 L to maintain SpO2 greater than 90%.  Does not wear oxygen normally. Temp 99.5 F.  Pulse 92.  Blood pressure 143/73.  WBC 13.07.  Lactate 1.3.  /AST 63 otherwise CMP unremarkable.  proBNP 1793.  High-sensitivity troponin T 26.  CT scan of the chest with contrast was negative for PE but did show bilateral infiltrates, more pronounced on the left side and small pleural effusions .  Received 1 L of fluid and a dose of vancomycin and Zosyn.  He was admitted under the hospitalist service for further evaluation and management.     Personal History     Past Medical History:  Primary hypertension  Mixed hyperlipidemia  BPH  Type II DM  Neuropathy  Hypothyroidism  PTSD  Depression  COVID-19 pneumonia - 2021    Past Surgical History:  Past Surgical History:   Procedure Laterality Date   • CATARACT EXTRACTION Right     x2   • COLONOSCOPY     • REPLACEMENT TOTAL HIP ONCOLOGIC Right    • RETINAL DETACHMENT REPAIR     • TOE AMPUTATION Left 2017    Second phalaeng.   • TOE OSTEOPHYTE REMOVAL         Family History:   Family History   Problem Relation  Age of Onset   • Heart disease Mother    • Lupus Mother    • Arthritis Mother    • Kidney disease Sister        Social History:   Denied tobacco or illicit drug use  Drinks about 4 beers a day, last drink was 3 to 4 days, denies history of alcohol withdrawal    Home Medications:  Insulin Glargine, Insulin Pen Needle, PARoxetine, albuterol sulfate HFA, aspirin, atorvastatin, benzonatate, carvedilol, cetirizine, ezetimibe, felodipine, fluticasone, furosemide, gabapentin, icosapent ethyl, insulin aspart, levothyroxine, losartan, meloxicam, multivitamin with minerals, omeprazole, oxybutynin XL, prazosin, sildenafil, simethicone, tamsulosin, tiotropium, and traZODone    Allergies:  Allergies   Allergen Reactions   • Latex Rash and Anaphylaxis   • Latex, Natural Rubber Itching       Review of Systems  Constitutional:  No Fever, +Chills, +Fatigue  HEENT:  Denied complaints  Cardiovascular:  No Chest Pain, +Edema, No Palpitations, +COYLE  Respiratory:  +Cough, +Dyspnea, No Wheezing  Gastrointestinal:  Denied complaints  Genitourinary:  Denied complaints  Musculoskeletal:  Denied complaints  Neuro:  Denied complaints  Heme/Lymph: Denied complaints    Objective   Objective     Vitals:   Temp:  [97.8 °F (36.6 °C)-99.5 °F (37.5 °C)] 99.5 °F (37.5 °C)  Heart Rate:  [81-93] 89  Resp:  [22-25] 25  BP: (128-149)/(65-78) 132/69  Flow (L/min):  [6] 6    Physical Exam    Constitutional: Elderly male, awake, conversant, no acute distress   Eyes: Pupils equal and reactive, no conjunctival injection   HENT: NCAT, nares patent, MMM   Neck: Supple, trachea midline,   Respiratory: 6L NC, diminished breath sounds bilaterally with rhonchi, nonlabored respiration   Cardiovascular: RRR, no murmurs, traceedema   Gastrointestinal: Positive bowel sounds, soft, nontender, nondistended   Musculoskeletal: No gross joint deformities, no clubbing or cyanosis to extremities   Psychiatric: Appropriate affect, cooperative    Neurologic: Oriented x 3,  Cranial Nerves grossly intact, speech clear   Skin: Warm and dry, no rashes    Result Review    Result Review:  I have personally reviewed the results from the time of this admission to 4/7/2023 15:47 EDT and agree with these findings:  [x]  Laboratory   CBC    CBC 12/5/22 4/7/23   WBC 6.97 13.07 (A)   RBC 4.46 3.86 (A)   Hemoglobin 13.7 12.3 (A)   Hematocrit 39.4 37.2 (A)   MCV 88.3 96.4   MCH 30.7 31.9   MCHC 34.8 33.1   RDW 12.3 13.6   Platelets 209 202   (A) Abnormal value            CMP    CMP 12/5/22 4/7/23   Glucose 258 (A) 148 (A)   BUN 16 15   Creatinine 0.97 0.95   eGFR 80.9 83.0   Sodium 133 (A) 136   Potassium 4.2 4.1   Chloride 98 98   Calcium 9.2 8.5 (A)   Total Protein 7.1 6.6   Albumin 4.40 4.0   Globulin 2.7 2.6   Total Bilirubin 0.5 1.2   Alkaline Phosphatase 79 101   AST (SGOT) 25 63 (A)   ALT (SGPT) 19 104 (A)   Albumin/Globulin Ratio 1.6 1.5   BUN/Creatinine Ratio 16.5 15.8   Anion Gap 8.0 13.3   (A) Abnormal value       Comments are available for some flowsheets but are not being displayed.           [x]  Microbiology   [x]  Radiology  [x]  EKG/Telemetry EKG: Normal sinus rhythm, no ST or T wave changes.  Occasional PVCs, right bundle dorothea block.  IN and QTc interval no prolonged  []  Cardiology/Vascular   []  Pathology  []  Old records  []  Other:      Assessment & Plan   Assessment / Plan     Assessment/Plan:   Active Hospital Problems    Diagnosis  POA   • **Sepsis due to pneumonia [J18.9, A41.9]  Yes   • Acute respiratory failure with hypoxia [J96.01]  Yes   • Multifocal pneumonia [J18.9]  Yes   • Insulin dependent type 2 diabetes mellitus [E11.9, Z79.4]  Not Applicable   • Mixed hyperlipidemia [E78.2]  Unknown   • Depression [F32.A]  Yes   • Hypertension [I10]  Yes   • Hypothyroidism [E03.9]  Yes       · Admit to hospitalist service.  Telemetry bed  · Rule out for influenza/COVID  · Check procalcitonin, strep and Legionella urinary antigen, respiratory culture  · Continue IV vancomycin  and Zosyn.  Check MRSA PCR  · Start Brovana/Pulmicort nebs twice daily.  Albuterol nebs q.6 hours as needed  · Wean supplemental oxygen, SpO2 goal >90%  · Pulmonology consult, appreciate assistance  · s/p 1 L NS.  Lactate WNL. HDS. proBNP 1793.  Small pleural effusions on CT.  Hold further IV fluids.  Hold blood pressure temporarily in the setting of sepsis +/- HF exacerbation.  · No previous Echo on file, check TTE  · Mild elevation in AST/ALT noted.  Hold statin temporarily  · Recent hemoglobin A1c 9.50.  Taking Lantus (44 units q nightly) plus mealtime NovoLog.  Start Levemir 20 units q. nightly plus moderate SSI/Accu-Cheks TID with meals  · Home med reconciliation when completed   · Nutrition: Cardiac/diabetic diet  · Activity ad artemio.  Up to chair daily    DVT prophylaxis:SCDs    CODE STATUS:    Level Of Support Discussed With: Patient  Code Status (Patient has no pulse and is not breathing): CPR (Attempt to Resuscitate)  Medical Interventions (Patient has pulse or is breathing): Full Support    Admission Status:  I believe this patient meets INPATIENT status.    Electronically signed by Lincoln Chau DO, 04/07/23, 3:47 PM EDT.

## 2023-04-07 NOTE — ED PROVIDER NOTES
Time: 12:48 PM EDT  Date of encounter:  4/7/2023  Independent Historian/Clinical History and Information was obtained by: Patient, Family, Chart and EMS  Chief Complaint: cough  History is limited by: N/A  Room number: 412/1     History of Present Illness:  HPI  Patient is a 77 y.o. year old male who presents to the Emergency Department via EMS for evaluation of cough. Patient has family at bedside on initial evaluation. Patient has a medical history of stenosis of carotid artery, gastroesophageal reflux disease (GERD), hyperlipidemia (HLD), hypertension (HTN), hypothyroidism and type 2 diabetes mellitus (T2DM). Patient has a surgical history of no clinical significance. Patient has a social history of current alcohol user.    Patient is experiencing symptoms of cough with blood, wheezing that started approximately a couple hours ago. Patient states they are in no pain and rates their pain level 0 out of 10 at rest and with movement or activity. Patient states no modifying factors. Patient denies any other symptoms. All other systems reviews are negative.    Patient has not tried anything for symptom relief. Patient has oxygen (O2) at home but has not used it within the past 6 months and has an SPO2 level of 80% on room air.    Patient Care Team  Primary Care Provider: Morena Masters MD    Past Medical History:  Allergies   Allergen Reactions   • Latex Rash and Anaphylaxis   • Latex, Natural Rubber Itching     Past Medical History:   Diagnosis Date   • Allergic rhinitis 12/27/2014    Will try Zyrtec, he didnt want to use a nasal spray.   • Cataract     left eye   • Cough 03/30/2016    PFT and CXR ordered.   • Depression     PTSD   • Diabetes    • GERD (gastroesophageal reflux disease)    • H/O psychiatric care 1999    PTSD   • Hyperlipidemia 03/30/2016    Lipids ordered. Continue on current medication.   • Hypertension    • Hypothyroidism    • Seasonal allergies    • Shortness of breath    • Stenosis of right  carotid artery 02/19/2017    Will refer to vascular surgeon.   • Syncope 02/19/2017   • Type 2 diabetes mellitus    • Upper respiratory infection 10/18/2015    Will treat cough with Hydromet, otherwise over the counter meds for treatment.     Past Surgical History:   Procedure Laterality Date   • CATARACT EXTRACTION Right     x2   • COLONOSCOPY     • REPLACEMENT TOTAL HIP ONCOLOGIC Right    • RETINAL DETACHMENT REPAIR     • TOE AMPUTATION Left 11/2017    Second phalaeng.   • TOE OSTEOPHYTE REMOVAL       Family History   Problem Relation Age of Onset   • Heart disease Mother    • Lupus Mother    • Arthritis Mother    • Kidney disease Sister        Home Medications:  Prior to Admission medications    Medication Sig Start Date End Date Taking? Authorizing Provider   albuterol sulfate  (90 Base) MCG/ACT inhaler Inhale 2 puffs Every 4 (Four) Hours As Needed for Wheezing or Shortness of Air. 2/21/22   Morena Masters MD   aspirin EC 81 MG EC tablet  7/23/21   Kierra Pitts MD   atorvastatin (LIPITOR) 80 MG tablet Take 1 tablet by mouth Daily. 8/9/22   Cathy Ochoa MD   B-D UF III MINI PEN NEEDLES 31G X 5 MM misc  7/6/21   ProviderKierra MD   benzonatate (TESSALON) 100 MG capsule Take 1 capsule by mouth 3 (Three) Times a Day As Needed for Cough. 5/9/22   Beto Farah PA   carvedilol (COREG) 12.5 MG tablet Take 12.5 mg by mouth.    ProviderKierra MD   cetirizine (ZyrTEC Allergy) 10 MG tablet Take 1 tablet by mouth Daily. 8/9/22   Cathy Ochoa MD   ezetimibe (ZETIA) 10 MG tablet Take 10 mg by mouth Daily.    Kierra Pitts MD   felodipine (PLENDIL) 5 MG 24 hr tablet felodipine 5 mg oral tablet extended release 24 hr take 1 tablet (5 mg) by oral route once daily   Active    Kierra Pitts MD   fluticasone (Flovent HFA) 220 MCG/ACT inhaler Inhale 1 puff 2 (Two) Times a Day. 2/21/22   Morena Masters MD   furosemide (LASIX) 20 MG tablet  7/23/21   Provider  MD Kierra   gabapentin (NEURONTIN) 600 MG tablet  3/31/21   Kierra Pitts MD   insulin aspart (novoLOG) 100 UNIT/ML injection Inject 26 Units under the skin into the appropriate area as directed 4 (Four) Times a Day.    Kierra Pitts MD   Insulin Glargine (Lantus SoloStar) 100 UNIT/ML injection pen 44 Units.    Kierra Pitts MD   levothyroxine (SYNTHROID, LEVOTHROID) 75 MCG tablet Take 75 mcg by mouth Daily.    Kierra Pitts MD   losartan (COZAAR) 50 MG tablet  7/23/21   Kierra Pitts MD   meloxicam (MOBIC) 7.5 MG tablet Take 1 tablet by mouth Daily. 12/21/21   Morena Masters MD   multivitamin with minerals tablet tablet     Kierra Pitts MD   NovoLOG FlexPen 100 UNIT/ML solution pen-injector sc pen  6/30/21   Kierra Pitts MD   omeprazole (priLOSEC) 20 MG capsule omeprazole 20 mg oral capsule,delayed release(DR/EC) take 1 capsule (20 mg) by oral route once daily before a meal   Suspended    Kierra Pitts MD   oxybutynin XL (DITROPAN-XL) 10 MG 24 hr tablet Take 1 tablet by mouth Daily. 3/28/22   sIsac Ortiz MD   PARoxetine (PAXIL) 40 MG tablet  4/5/21   Kierra Pitts MD   prazosin (MINIPRESS) 1 MG capsule  9/8/21   Kierra Pitts MD   sildenafil (Viagra) 100 MG tablet Take 1 tablet by mouth Daily As Needed for Erectile Dysfunction. 2/21/22   Morena Masters MD   simethicone (MYLICON) 80 MG chewable tablet  7/23/21   Kierra Pitts MD   Spiriva HandiHaler 18 MCG per inhalation capsule  7/23/21   Kierra Pitts MD   tamsulosin (FLOMAX) 0.4 MG capsule 24 hr capsule Take 1 capsule by mouth Daily. 3/28/22   Issac Ortiz MD   traZODone (DESYREL) 100 MG tablet Take 100 mg by mouth Daily. 4/22/22   Kierra Pitts MD        Social History:  Social History     Tobacco Use   • Smoking status: Never   • Smokeless tobacco: Never   Vaping Use   • Vaping Use: Never used   Substance Use Topics   • Alcohol  "use: Yes     Comment: occasionally   • Drug use: Never       Review of Systems:   Review of Systems   Constitutional: Negative.    HENT: Negative.    Eyes: Negative.    Respiratory: Positive for cough (with blood) and wheezing.    Cardiovascular: Negative.    Gastrointestinal: Negative.    Endocrine: Negative.    Genitourinary: Negative.    Musculoskeletal: Negative.    Skin: Negative.    Allergic/Immunologic: Negative.    Neurological: Negative.    Hematological: Negative.    Psychiatric/Behavioral: Negative.    All other systems reviewed and are negative.         Physical Exam:   /73   Pulse 82   Temp 99.5 °F (37.5 °C) (Oral)   Resp 25   Ht 172.7 cm (68\")   SpO2 98%   BMI 34.21 kg/m²     Physical Exam  Vitals and nursing note reviewed.   Constitutional:       General: He is not in acute distress.     Appearance: Normal appearance. He is not toxic-appearing.   HENT:      Head: Normocephalic and atraumatic.      Jaw: There is normal jaw occlusion.   Eyes:      General: Lids are normal.      Extraocular Movements: Extraocular movements intact.      Conjunctiva/sclera: Conjunctivae normal.      Pupils: Pupils are equal, round, and reactive to light.   Cardiovascular:      Rate and Rhythm: Normal rate and regular rhythm.      Pulses: Normal pulses.      Heart sounds: Normal heart sounds.   Pulmonary:      Effort: Pulmonary effort is normal. Tachypnea present. No respiratory distress.      Breath sounds: Wheezing (left lung) and rhonchi (left lung) present.   Abdominal:      General: Abdomen is flat.      Palpations: Abdomen is soft.      Tenderness: There is no abdominal tenderness. There is no guarding or rebound.   Musculoskeletal:         General: Normal range of motion.      Cervical back: Normal range of motion and neck supple.      Right lower leg: No edema.      Left lower leg: No edema.   Skin:     General: Skin is warm and dry.   Neurological:      Mental Status: He is alert and oriented to person, " place, and time. Mental status is at baseline.   Psychiatric:         Mood and Affect: Mood normal.                Procedures:  Procedures    Medical Decision Making:    Comorbidities that affect care:     stenosis of carotid artery, gastroesophageal reflux disease (GERD), hyperlipidemia (HLD), hypertension (HTN), hypothyroidism and type 2 diabetes mellitus (T2DM).    External Notes reviewed:    Previous Clinic Note: Urgent care visit for URI    The following orders were placed and all results were independently analyzed by me:  Orders Placed This Encounter   Procedures   • Blood Culture - Blood,   • Blood Culture - Blood,   • COVID-19,APTIMA PANTHER(JOSEPH),[facility], NP/OP SWAB IN UTM/VTM/SALINE TRANSPORT MEDIA,24 HR TAT - Swab, Nasal Cavity   • Influenza Antigen, Rapid - Swab, Nasopharynx   • S. Pneumo Ag Urine or CSF - Urine, Urine, Clean Catch   • Legionella Antigen, Urine - Urine, Urine, Clean Catch   • MRSA Screen, PCR (Inpatient) - Swab, Nares   • Respiratory Panel, PCR (WITHOUT COVID) - Swab, Nasopharynx   • Respiratory Culture - Sputum, Cough   • XR Chest 1 View   • CT Chest With Contrast Diagnostic   • Halfway Draw   • Comprehensive Metabolic Panel   • BNP   • Single High Sensitivity Troponin T   • CBC Auto Differential   • Lactic Acid, Plasma   • Procalcitonin   • Basic Metabolic Panel   • CBC Auto Differential   • CBC (No Diff)   • Basic Metabolic Panel   • Diet: Cardiac Diets, Diabetic Diets; Healthy Heart (2-3 Na+); Consistent Carbohydrate; Texture: Regular Texture (IDDSI 7); Fluid Consistency: Thin (IDDSI 0)   • Undress & Gown   • Cardiac Monitoring   • Continuous Pulse Oximetry   • Vital Signs   • Vital Signs   • Intake & Output   • Weigh Patient   • Oral Care   • Saline Lock & Maintain IV Access   • Tobacco Cessation Education   • Place Sequential Compression Device   • Maintain Sequential Compression Device   • Up With Assistance   • Up in Chair   • Strict Intake & Output   • Code Status and  Medical Interventions:   • Inpatient Hospitalist Consult   • Inpatient Pulmonology Consult   • OT Consult: Eval & Treat   • PT Consult: Eval & Treat Discharge Placement Assessment   • Oxygen Therapy- Nasal Cannula; Titrate for SPO2: 90% - 95%   • NIPPV (CPAP or BIPAP)   • POC Glucose TID AC   • ECG 12 Lead ED Triage Standing Order; SOA   • Adult Transthoracic Echo Limited W/ Cont if Necessary Per Protocol   • Insert Peripheral IV   • Insert Peripheral IV   • Inpatient Admission   • CBC & Differential   • Green Top (Gel)   • Lavender Top   • Gold Top - SST   • Light Blue Top       Medications Given in the Emergency Department:  Medications   sodium chloride 0.9 % flush 10 mL (has no administration in time range)   sodium chloride 0.9 % flush 10 mL (has no administration in time range)   sodium chloride 0.9 % flush 10 mL (has no administration in time range)   sodium chloride 0.9 % infusion 40 mL (has no administration in time range)   acetaminophen (TYLENOL) tablet 650 mg (has no administration in time range)   sennosides-docusate (PERICOLACE) 8.6-50 MG per tablet 2 tablet (has no administration in time range)     And   polyethylene glycol (MIRALAX) packet 17 g (has no administration in time range)     And   bisacodyl (DULCOLAX) EC tablet 5 mg (has no administration in time range)     And   bisacodyl (DULCOLAX) suppository 10 mg (has no administration in time range)   ondansetron (ZOFRAN) tablet 4 mg (has no administration in time range)   Pharmacy to dose vancomycin (has no administration in time range)   Pharmacy to Dose Zosyn (has no administration in time range)   arformoterol (BROVANA) nebulizer solution 15 mcg (has no administration in time range)   budesonide (PULMICORT) nebulizer solution 0.5 mg (has no administration in time range)   ondansetron (ZOFRAN) tablet 4 mg (has no administration in time range)   albuterol (PROVENTIL) nebulizer solution 0.083% 2.5 mg/3mL (has no administration in time range)    guaiFENesin (MUCINEX) 12 hr tablet 600 mg (has no administration in time range)   furosemide (LASIX) injection 40 mg (has no administration in time range)   insulin detemir (LEVEMIR) injection 20 Units (has no administration in time range)   dextrose (GLUTOSE) oral gel 15 g (has no administration in time range)   dextrose (D50W) (25 g/50 mL) IV injection 25 g (has no administration in time range)   glucagon (GLUCAGEN) injection 1 mg (has no administration in time range)   Insulin Lispro (humaLOG) injection 2-9 Units (has no administration in time range)   iopamidol (ISOVUE-370) 76 % injection 100 mL (100 mL Intravenous Given 4/7/23 1345)   piperacillin-tazobactam (ZOSYN) 3.375 g/100 mL 0.9% NS IVPB (mbp) (0 g Intravenous Stopped 4/7/23 1556)   vancomycin IVPB 2000 mg in 0.9% Sodium Chloride (premix) 500 mL (2,000 mg Intravenous New Bag 4/7/23 1554)   sodium chloride 0.9 % bolus 1,000 mL (1,000 mL Intravenous New Bag 4/7/23 1512)       ED Course:       Labs:  Lab Results (last 24 hours)     Procedure Component Value Units Date/Time    CBC & Differential [[id number]]  (Abnormal) Collected: 04/07/23 1248    Specimen: Blood from Arm, Right Updated: 04/07/23 1258    Narrative:      The following orders were created for panel order CBC & Differential.  Procedure                               Abnormality         Status                     ---------                               -----------         ------                     CBC Auto Differential[[id number]]        Abnormal            Final result                 Please view results for these tests on the individual orders.    Comprehensive Metabolic Panel [[id number]]  (Abnormal) Collected: 04/07/23 1248    Specimen: Blood from Arm, Right Updated: 04/07/23 1320     Glucose 148 mg/dL      BUN 15 mg/dL      Creatinine 0.95 mg/dL      Sodium 136 mmol/L      Potassium 4.1 mmol/L      Chloride 98 mmol/L      CO2 24.7 mmol/L      Calcium 8.5 mg/dL      Total Protein 6.6 g/dL       Albumin 4.0 g/dL      ALT (SGPT) 104 U/L      AST (SGOT) 63 U/L      Alkaline Phosphatase 101 U/L      Total Bilirubin 1.2 mg/dL      Globulin 2.6 gm/dL      A/G Ratio 1.5 g/dL      BUN/Creatinine Ratio 15.8     Anion Gap 13.3 mmol/L      eGFR 83.0 mL/min/1.73     Narrative:      GFR Normal >60  Chronic Kidney Disease <60  Kidney Failure <15    The GFR formula is only valid for adults with stable renal function between ages 18 and 70.    BNP [[id number]]  (Normal) Collected: 04/07/23 1248    Specimen: Blood from Arm, Right Updated: 04/07/23 1317     proBNP 1,793.0 pg/mL     Narrative:      Among patients with dyspnea, NT-proBNP is highly sensitive for the detection of acute congestive heart failure. In addition NT-proBNP of <300 pg/ml effectively rules out acute congestive heart failure with 99% negative predictive value.      Single High Sensitivity Troponin T [[id number]]  (Abnormal) Collected: 04/07/23 1248    Specimen: Blood from Arm, Right Updated: 04/07/23 1320     HS Troponin T 26 ng/L     Narrative:      High Sensitive Troponin T Reference Range:  <10.0 ng/L- Negative Female for AMI  <15.0 ng/L- Negative Male for AMI  >=10 - Abnormal Female indicating possible myocardial injury.  >=15 - Abnormal Male indicating possible myocardial injury.   Clinicians would have to utilize clinical acumen, EKG, Troponin, and serial changes to determine if it is an Acute Myocardial Infarction or myocardial injury due to an underlying chronic condition.         CBC Auto Differential [[id number]]  (Abnormal) Collected: 04/07/23 1248    Specimen: Blood from Arm, Right Updated: 04/07/23 1258     WBC 13.07 10*3/mm3      RBC 3.86 10*6/mm3      Hemoglobin 12.3 g/dL      Hematocrit 37.2 %      MCV 96.4 fL      MCH 31.9 pg      MCHC 33.1 g/dL      RDW 13.6 %      RDW-SD 47.6 fl      MPV 9.5 fL      Platelets 202 10*3/mm3      Neutrophil % 88.4 %      Lymphocyte % 3.5 %      Monocyte % 7.3 %      Eosinophil % 0.3 %      Basophil % 0.3 %   "    Immature Grans % 0.2 %      Neutrophils, Absolute 11.55 10*3/mm3      Lymphocytes, Absolute 0.46 10*3/mm3      Monocytes, Absolute 0.95 10*3/mm3      Eosinophils, Absolute 0.04 10*3/mm3      Basophils, Absolute 0.04 10*3/mm3      Immature Grans, Absolute 0.03 10*3/mm3      nRBC 0.0 /100 WBC     Blood Culture - Blood, Arm, Right [[id number]] Collected: 04/07/23 1248    Specimen: Blood from Arm, Right Updated: 04/07/23 1254    Blood Culture - Blood, Arm, Right [[id number]] Collected: 04/07/23 1248    Specimen: Blood from Arm, Right Updated: 04/07/23 1254    Lactic Acid, Plasma [[id number]]  (Normal) Collected: 04/07/23 1248    Specimen: Blood from Arm, Right Updated: 04/07/23 1316     Lactate 1.3 mmol/L     Procalcitonin [[id number]]  (Abnormal) Collected: 04/07/23 1248    Specimen: Blood from Arm, Right Updated: 04/07/23 1548     Procalcitonin 0.49 ng/mL     Narrative:      As a Marker for Sepsis (Non-Neonates):    1. <0.5 ng/mL represents a low risk of severe sepsis and/or septic shock.  2. >2 ng/mL represents a high risk of severe sepsis and/or septic shock.    As a Marker for Lower Respiratory Tract Infections that require antibiotic therapy:    PCT on Admission    Antibiotic Therapy       6-12 Hrs later    >0.5                Strongly Recommended  >0.25 - <0.5        Recommended  0.1 - 0.25          Discouraged              Remeasure/reassess PCT  <0.1                Strongly Discouraged     Remeasure/reassess PCT    As 28 day mortality risk marker: \"Change in Procalcitonin Result\" (>80% or <=80%) if Day 0 (or Day 1) and Day 4 values are available. Refer to http://www.[email or web address]-pct-calculator.com    Change in PCT <=80%  A decrease of PCT levels below or equal to 80% defines a positive change in PCT test result representing a higher risk for 28-day all-cause mortality of patients diagnosed with severe sepsis for septic shock.    Change in PCT >80%  A decrease of PCT levels of more than 80% defines a negative change " in PCT result representing a lower risk for 28-day all-cause mortality of patients diagnosed with severe sepsis or septic shock.    This test is Prognostic not Diagnostic, if elevated correlate with clinical findings before administering antibiotic treatment.        COVID-19,APTIMA PANTHER(JOSEPH),[facility], NP/OP SWAB IN UTM/VTM/SALINE TRANSPORT MEDIA,24 HR TAT - Swab, Nasal Cavity [[id number]] Collected: 04/07/23 1513    Specimen: Swab from Nasal Cavity Updated: 04/07/23 1517    Influenza Antigen, Rapid - Swab, Nasopharynx [[id number]]  (Normal) Collected: 04/07/23 1513    Specimen: Swab from Nasopharynx Updated: 04/07/23 1546     Influenza A Ag, EIA Negative     Influenza B Ag, EIA Negative    S. Pneumo Ag Urine or CSF - Urine, Urine, Clean Catch [[id number]]  (Normal) Collected: 04/07/23 1540    Specimen: Urine, Clean Catch Updated: 04/07/23 1605     Strep Pneumo Ag Negative    Legionella Antigen, Urine - Urine, Urine, Clean Catch [[id number]]  (Normal) Collected: 04/07/23 1540    Specimen: Urine, Clean Catch Updated: 04/07/23 1605     LEGIONELLA ANTIGEN, URINE Negative           Imaging:  CT Chest With Contrast Diagnostic    Result Date: 4/7/2023  PROCEDURE: CT CHEST W CONTRAST DIAGNOSTIC  COMPARISON:  Psychiatric, CT, CT CHEST W CONTRAST DIAGNOSTIC, 12/28/2021, 16:07.  INDICATIONS: Shortness of breath, hypoxia, hemoptysis  TECHNIQUE: After obtaining the patient's consent, CT images were obtained with non-ionic intravenous contrast material.   PROTOCOL:   Standard imaging protocol performed    RADIATION:   DLP: 521.2 mGy*cm   Automated exposure control was utilized to minimize radiation dose. CONTRAST: 75 cc Isovue 370 I.V.  FINDINGS:  The visualized soft tissue structures at the base of the neck including the thyroid appear within normal limits.  There is no lower cervical or axillary adenopathy.  The heart size is normal.  There is no pericardial effusion.  The aorta is normal in caliber without  evidence of aneurysm formation.  The main pulmonary artery is normal in caliber.  There is no evidence of pulmonary embolism.  There are reactive mediastinal or hilar lymphadenopathy by size criteria.  The tracheobronchial tree is patent.  There is no abnormal bronchial wall thickening or bronchiectasis.  There is patchy bilateral consolidation throughout both lungs, left worse than right.  There is mild smooth interlobular septal thickening.  There are small bilateral pleural effusions.  There is no evidence of pneumothorax.  The esophagus is normal in course and caliber.  There is partial visualization of a 2.7 cm low-density lesion within the inferior aspect of the left kidney.  This is incompletely evaluated on CT.  Follow-up with initial renal ultrasound would be recommended. There are degenerative changes of the thoracic spine.        1. Bilateral multifocal consolidation, left worse than right favored to represent multifocal pneumonia.  There are background findings of smooth interlobular septal thickening and small bilateral pleural effusions which can be seen with volume overload.  Alveolar edema is felt to be a less likely differential consideration for the consolidation.  2. No evidence of pulmonary embolism. 3. Partial visualization of a 2.7 cm low-density lesion within the inferior aspect of the left kidney.  Nonemergent initial imaging assessment with renal ultrasound is recommended.      PRESTON RILEY MD       Electronically Signed and Approved By: PRESTON RILEY MD on 4/07/2023 at 14:26             XR Chest 1 View    Result Date: 4/7/2023  PROCEDURE: XR CHEST 1 VW  COMPARISON: [facility], CR, XR CHEST PA AND LATERAL, 2/21/2022, 15:00.  INDICATIONS: SOA Triage Protocol  FINDINGS:  The cardiac silhouette is within normal limits.  Pulmonary vascularity appears normal.  There are bilateral perihilar opacities, left worse than right, likely representing multifocal pneumonia.   Asymmetric alveolar edema is felt to be less likely.  There may be blunting of the costophrenic angles, not well evaluated.  There is no evidence of pneumothorax.  There are degenerative changes of the thoracic spine.        1. Bilateral perihilar opacities, left worse than right, likely representing multifocal pneumonia or asymmetric alveolar edema. 2. Questionable blunting of the costophrenic angles which may indicate small effusions.       PRESTON RILEY MD       Electronically Signed and Approved By: PRESTON RILEY MD on 4/07/2023 at 13:46               Differential Diagnosis and Discussion:    Dyspnea: Differential diagnosis includes but is not limited to metabolic acidosis, neurological disorders, psychogenic, asthma, pneumothorax, upper airway obstruction, COPD, pneumonia, noncardiogenic pulmonary edema, interstitial lung disease, anemia, congestive heart failure, and pulmonary embolism    All labs were reviewed and interpreted by me.  All X-rays were independently reviewed by me.  CT scan radiology interpretation was reviewed by me.    MDM  Number of Diagnoses or Management Options  Hypoxia  Pneumonia due to infectious organism, unspecified laterality, unspecified part of lung  Sepsis, due to unspecified organism, unspecified whether acute organ dysfunction present  Diagnosis management comments: In summary this is a 77-year-old male who presents emerged department hypoxic and tachypneic.  Chest x-ray and CT scan of the chest revealed multifocal pneumonia.  Patient is requiring nasal cannula oxygen to maintain oxygen saturations in the 90s at this time.  CBC independently reviewed by me and shows no critical abnormalities except for leukocytosis.  Initial lactate normal.  CMP independently reviewed by me and shows no critical abnormalities.  Patient's been given broad-spectrum antibiotics, dual therapy along with IV fluids and will be admitted to the hospital for further treatment.  Patient case has  been discussed with the hospitalist team who will admit to the hospital for further evaluation and continuation of treatment.     Sepsis criteria was met in the emergency department and the Sepsis protocol (including antibiotic administration) was initiated.      SIRS criteria considered:   1.  Temperature > 100.4 or <98.6    2.  Heart Rate > 90    3.  Respiratory Rate > 22    4.  WBC > 12K or <4K.             Severe Sepsis:     Respiratory: Mechanical Ventilation or Bipap  Hypotension: SBP > 90 or MAP < 65  Renal: Creatinine > 2  Metabolic: Lactic Acid > 2  Hematologic: Platelets < 100K or INR > 1.5  Hepatic: BILI  >  2  CNS: Sudden AMS     Septic Shock:     Severe Sepsis + Persistent hypotension or Lactic Acid > 4     Normal saline bolus, Antibiotics, and final disposition was based on these definitions.        Sepsis was recognized at 1451    Antibiotics were ordered.     30 cc/kg bolus was not indicated.       Patient did not receive the recommended 30ml/kg fluid bolus for sepsis because it would be harmful or detrimental to the patient.    The patient has patient's blood pressure responded to fluid given.   The patient was ordered 1 L of fluids.    Total Critical Care time of 40 minutes. Total critical care time documented does not include time spent on separately billed procedures for services of nurses or physician assistants. I personally saw and examined the patient. I have reviewed all diagnostic interpretations and treatment plans as written. I was present for the key portions of any procedures performed and the inclusive time noted in any critical care statement. Critical care time includes patient management by me, time spent at the patients bedside,  time to review lab and imaging results, discussing patient care, documentation in the medical record, and time spent with family or caregiver.        Patient Care Considerations:    CONSULT: I considered consulting Pulmonology, however This can be done  inpatient if necessary    Consultants/Shared Management Plan:    Hospitalist: I have discussed the case with Dr. Chau who agrees to accept the patient for admission.    Social Determinants of Health:    Patient is independent, reliable, and has access to care.     Disposition and Care Coordination:    Admit:   Through independent evaluation of the patient's history, physical, and imperical data, the patient meets criteria for observation/admission to the hospital.        Final diagnoses:   Sepsis, due to unspecified organism, unspecified whether acute organ dysfunction present   Pneumonia due to infectious organism, unspecified laterality, unspecified part of lung   Hypoxia        ED Disposition     ED Disposition   Decision to Admit    Condition   --    Comment   Level of Care: Telemetry [5]   Diagnosis: Sepsis due to pneumonia [[id number]]   Admitting Physician: BARBARA CHAU [[id number]]   Isolate for COVID?: Yes [1]   Certification: I Certify That Inpatient Hospital Services Are Medically Necessary For Greater Than 2 Midnights               This medical record created using voice recognition software.    Documentation assistance provided by Caity Perez acting as scribes for Vipin Blair MD.Information recorded by the scribe was verified and validated at my direction.     Caity Perez  04/07/23 1307       Vipin Blair MD  04/07/23 [id number]

## 2023-04-08 ENCOUNTER — APPOINTMENT (OUTPATIENT)
Dept: CARDIOLOGY | Facility: HOSPITAL | Age: 78
DRG: 871 | End: 2023-04-08
Payer: MEDICARE

## 2023-04-08 LAB
ALBUMIN SERPL-MCNC: 3.7 G/DL (ref 3.5–5.2)
ALP SERPL-CCNC: 83 U/L (ref 39–117)
ALT SERPL W P-5'-P-CCNC: 77 U/L (ref 1–41)
ANION GAP SERPL CALCULATED.3IONS-SCNC: 9.1 MMOL/L (ref 5–15)
AST SERPL-CCNC: 38 U/L (ref 1–40)
BASOPHILS # BLD AUTO: 0.05 10*3/MM3 (ref 0–0.2)
BASOPHILS NFR BLD AUTO: 0.4 % (ref 0–1.5)
BH CV ECHO MEAS - AO ROOT DIAM: 3.2 CM
BH CV ECHO MEAS - EDV(MOD-SP2): 70 ML
BH CV ECHO MEAS - EDV(MOD-SP4): 70 ML
BH CV ECHO MEAS - EF(MOD-BP): 51 %
BH CV ECHO MEAS - ESV(MOD-SP2): 34 ML
BH CV ECHO MEAS - ESV(MOD-SP4): 34 ML
BH CV ECHO MEAS - IVSD: 0.9 CM
BH CV ECHO MEAS - LA DIMENSION: 4.2 CM
BH CV ECHO MEAS - LAT PEAK E' VEL: 2.8 CM/SEC
BH CV ECHO MEAS - LVIDD: 5.3 CM
BH CV ECHO MEAS - LVIDS: 4 CM
BH CV ECHO MEAS - LVOT DIAM: 2 CM
BH CV ECHO MEAS - LVPWD: 0.9 CM
BH CV ECHO MEAS - MED PEAK E' VEL: 3.75 CM/SEC
BH CV ECHO MEAS - MV A MAX VEL: 73 CM/SEC
BH CV ECHO MEAS - MV DEC TIME: 184 MSEC
BH CV ECHO MEAS - MV E MAX VEL: 108 CM/SEC
BH CV ECHO MEAS - MV E/A: 1.5
BH CV ECHO MEAS - RVDD: 2.8 CM
BH CV ECHO MEAS - TAPSE (>1.6): 1.89 CM
BH CV ECHO MEASUREMENTS AVERAGE E/E' RATIO: 32.98
BILIRUB CONJ SERPL-MCNC: 0.3 MG/DL (ref 0–0.3)
BILIRUB INDIRECT SERPL-MCNC: 0.7 MG/DL
BILIRUB SERPL-MCNC: 1 MG/DL (ref 0–1.2)
BUN SERPL-MCNC: 15 MG/DL (ref 8–23)
BUN/CREAT SERPL: 15.3 (ref 7–25)
CALCIUM SPEC-SCNC: 8.1 MG/DL (ref 8.6–10.5)
CHLORIDE SERPL-SCNC: 101 MMOL/L (ref 98–107)
CO2 SERPL-SCNC: 27.9 MMOL/L (ref 22–29)
CREAT SERPL-MCNC: 0.98 MG/DL (ref 0.76–1.27)
DEPRECATED RDW RBC AUTO: 47.8 FL (ref 37–54)
EGFRCR SERPLBLD CKD-EPI 2021: 79.9 ML/MIN/1.73
EOSINOPHIL # BLD AUTO: 0.25 10*3/MM3 (ref 0–0.4)
EOSINOPHIL NFR BLD AUTO: 1.8 % (ref 0.3–6.2)
ERYTHROCYTE [DISTWIDTH] IN BLOOD BY AUTOMATED COUNT: 13.7 % (ref 12.3–15.4)
GLUCOSE BLDC GLUCOMTR-MCNC: 117 MG/DL (ref 70–99)
GLUCOSE BLDC GLUCOMTR-MCNC: 264 MG/DL (ref 70–99)
GLUCOSE BLDC GLUCOMTR-MCNC: 275 MG/DL (ref 70–99)
GLUCOSE BLDC GLUCOMTR-MCNC: 363 MG/DL (ref 70–99)
GLUCOSE SERPL-MCNC: 145 MG/DL (ref 65–99)
HCT VFR BLD AUTO: 32.7 % (ref 37.5–51)
HGB BLD-MCNC: 10.8 G/DL (ref 13–17.7)
IMM GRANULOCYTES # BLD AUTO: 0.05 10*3/MM3 (ref 0–0.05)
IMM GRANULOCYTES NFR BLD AUTO: 0.4 % (ref 0–0.5)
IVRT: 61 MSEC
LEFT ATRIUM VOLUME INDEX: 16.4 ML/M2
LYMPHOCYTES # BLD AUTO: 1.39 10*3/MM3 (ref 0.7–3.1)
LYMPHOCYTES NFR BLD AUTO: 9.9 % (ref 19.6–45.3)
MAXIMAL PREDICTED HEART RATE: 144 BPM
MCH RBC QN AUTO: 31.8 PG (ref 26.6–33)
MCHC RBC AUTO-ENTMCNC: 33 G/DL (ref 31.5–35.7)
MCV RBC AUTO: 96.2 FL (ref 79–97)
MONOCYTES # BLD AUTO: 1.31 10*3/MM3 (ref 0.1–0.9)
MONOCYTES NFR BLD AUTO: 9.3 % (ref 5–12)
MRSA DNA SPEC QL NAA+PROBE: NORMAL
NEUTROPHILS NFR BLD AUTO: 11.01 10*3/MM3 (ref 1.7–7)
NEUTROPHILS NFR BLD AUTO: 78.2 % (ref 42.7–76)
NRBC BLD AUTO-RTO: 0 /100 WBC (ref 0–0.2)
PLATELET # BLD AUTO: 179 10*3/MM3 (ref 140–450)
PMV BLD AUTO: 10 FL (ref 6–12)
POTASSIUM SERPL-SCNC: 3.8 MMOL/L (ref 3.5–5.2)
PROT SERPL-MCNC: 6.3 G/DL (ref 6–8.5)
QT INTERVAL: 429 MS
RBC # BLD AUTO: 3.4 10*6/MM3 (ref 4.14–5.8)
SARS-COV-2 RNA RESP QL NAA+PROBE: NOT DETECTED
SODIUM SERPL-SCNC: 138 MMOL/L (ref 136–145)
STRESS TARGET HR: 122 BPM
WBC NRBC COR # BLD: 14.06 10*3/MM3 (ref 3.4–10.8)

## 2023-04-08 PROCEDURE — 87641 MR-STAPH DNA AMP PROBE: CPT | Performed by: INTERNAL MEDICINE

## 2023-04-08 PROCEDURE — 87633 RESP VIRUS 12-25 TARGETS: CPT

## 2023-04-08 PROCEDURE — 80076 HEPATIC FUNCTION PANEL: CPT | Performed by: INTERNAL MEDICINE

## 2023-04-08 PROCEDURE — 99233 SBSQ HOSP IP/OBS HIGH 50: CPT | Performed by: INTERNAL MEDICINE

## 2023-04-08 PROCEDURE — 94799 UNLISTED PULMONARY SVC/PX: CPT

## 2023-04-08 PROCEDURE — 87798 DETECT AGENT NOS DNA AMP: CPT

## 2023-04-08 PROCEDURE — 94664 DEMO&/EVAL PT USE INHALER: CPT

## 2023-04-08 PROCEDURE — 97161 PT EVAL LOW COMPLEX 20 MIN: CPT

## 2023-04-08 PROCEDURE — 93306 TTE W/DOPPLER COMPLETE: CPT

## 2023-04-08 PROCEDURE — 94660 CPAP INITIATION&MGMT: CPT

## 2023-04-08 PROCEDURE — 87486 CHLMYD PNEUM DNA AMP PROBE: CPT | Performed by: NURSE PRACTITIONER

## 2023-04-08 PROCEDURE — 25010000002 FUROSEMIDE PER 20 MG: Performed by: NURSE PRACTITIONER

## 2023-04-08 PROCEDURE — 97165 OT EVAL LOW COMPLEX 30 MIN: CPT

## 2023-04-08 PROCEDURE — 85025 COMPLETE CBC W/AUTO DIFF WBC: CPT | Performed by: INTERNAL MEDICINE

## 2023-04-08 PROCEDURE — 99233 SBSQ HOSP IP/OBS HIGH 50: CPT | Performed by: STUDENT IN AN ORGANIZED HEALTH CARE EDUCATION/TRAINING PROGRAM

## 2023-04-08 PROCEDURE — 63710000001 INSULIN DETEMIR PER 5 UNITS: Performed by: INTERNAL MEDICINE

## 2023-04-08 PROCEDURE — 87581 M.PNEUMON DNA AMP PROBE: CPT

## 2023-04-08 PROCEDURE — 25010000002 VANCOMYCIN 5 G RECONSTITUTED SOLUTION: Performed by: INTERNAL MEDICINE

## 2023-04-08 PROCEDURE — 80048 BASIC METABOLIC PNL TOTAL CA: CPT | Performed by: INTERNAL MEDICINE

## 2023-04-08 PROCEDURE — 25010000002 PIPERACILLIN SOD-TAZOBACTAM PER 1 G: Performed by: INTERNAL MEDICINE

## 2023-04-08 PROCEDURE — 63710000001 INSULIN LISPRO (HUMAN) PER 5 UNITS: Performed by: INTERNAL MEDICINE

## 2023-04-08 PROCEDURE — 36415 COLL VENOUS BLD VENIPUNCTURE: CPT | Performed by: INTERNAL MEDICINE

## 2023-04-08 PROCEDURE — 94761 N-INVAS EAR/PLS OXIMETRY MLT: CPT

## 2023-04-08 PROCEDURE — 82962 GLUCOSE BLOOD TEST: CPT

## 2023-04-08 RX ORDER — GABAPENTIN 300 MG/1
600 CAPSULE ORAL EVERY 12 HOURS SCHEDULED
Status: DISCONTINUED | OUTPATIENT
Start: 2023-04-08 | End: 2023-04-11 | Stop reason: HOSPADM

## 2023-04-08 RX ORDER — TRAZODONE HYDROCHLORIDE 100 MG/1
100 TABLET ORAL NIGHTLY
Status: DISCONTINUED | OUTPATIENT
Start: 2023-04-08 | End: 2023-04-11 | Stop reason: HOSPADM

## 2023-04-08 RX ORDER — PAROXETINE HYDROCHLORIDE 20 MG/1
40 TABLET, FILM COATED ORAL EVERY EVENING
Status: DISCONTINUED | OUTPATIENT
Start: 2023-04-08 | End: 2023-04-11 | Stop reason: HOSPADM

## 2023-04-08 RX ORDER — ATORVASTATIN CALCIUM 40 MG/1
80 TABLET, FILM COATED ORAL DAILY
Status: DISCONTINUED | OUTPATIENT
Start: 2023-04-08 | End: 2023-04-11 | Stop reason: HOSPADM

## 2023-04-08 RX ORDER — PANTOPRAZOLE SODIUM 40 MG/1
40 TABLET, DELAYED RELEASE ORAL
Status: DISCONTINUED | OUTPATIENT
Start: 2023-04-08 | End: 2023-04-11 | Stop reason: HOSPADM

## 2023-04-08 RX ORDER — OXYBUTYNIN CHLORIDE 5 MG/1
10 TABLET, EXTENDED RELEASE ORAL DAILY
Status: DISCONTINUED | OUTPATIENT
Start: 2023-04-08 | End: 2023-04-11 | Stop reason: HOSPADM

## 2023-04-08 RX ORDER — LEVOTHYROXINE SODIUM 0.1 MG/1
100 TABLET ORAL
Status: DISCONTINUED | OUTPATIENT
Start: 2023-04-08 | End: 2023-04-11 | Stop reason: HOSPADM

## 2023-04-08 RX ORDER — ASPIRIN 81 MG/1
81 TABLET ORAL DAILY
Status: DISCONTINUED | OUTPATIENT
Start: 2023-04-08 | End: 2023-04-11 | Stop reason: HOSPADM

## 2023-04-08 RX ADMIN — Medication 10 ML: at 20:11

## 2023-04-08 RX ADMIN — ACETAMINOPHEN 650 MG: 325 TABLET ORAL at 16:35

## 2023-04-08 RX ADMIN — SENNOSIDES AND DOCUSATE SODIUM 2 TABLET: 8.6; 5 TABLET ORAL at 08:59

## 2023-04-08 RX ADMIN — GABAPENTIN 600 MG: 300 CAPSULE ORAL at 20:11

## 2023-04-08 RX ADMIN — INSULIN DETEMIR 20 UNITS: 100 INJECTION, SOLUTION SUBCUTANEOUS at 20:11

## 2023-04-08 RX ADMIN — INSULIN LISPRO 6 UNITS: 100 INJECTION, SOLUTION INTRAVENOUS; SUBCUTANEOUS at 17:10

## 2023-04-08 RX ADMIN — PIPERACILLIN SODIUM AND TAZOBACTAM SODIUM 3.38 G: 3; .375 INJECTION, POWDER, LYOPHILIZED, FOR SOLUTION INTRAVENOUS at 20:25

## 2023-04-08 RX ADMIN — GABAPENTIN 600 MG: 300 CAPSULE ORAL at 08:59

## 2023-04-08 RX ADMIN — GUAIFENESIN 600 MG: 600 TABLET, EXTENDED RELEASE ORAL at 20:11

## 2023-04-08 RX ADMIN — BUDESONIDE 0.5 MG: 0.5 SUSPENSION RESPIRATORY (INHALATION) at 18:51

## 2023-04-08 RX ADMIN — PAROXETINE HYDROCHLORIDE 40 MG: 20 TABLET, FILM COATED ORAL at 17:10

## 2023-04-08 RX ADMIN — LEVOTHYROXINE SODIUM 100 MCG: 100 TABLET ORAL at 09:56

## 2023-04-08 RX ADMIN — VANCOMYCIN HYDROCHLORIDE 1000 MG: 5 INJECTION, POWDER, LYOPHILIZED, FOR SOLUTION INTRAVENOUS at 08:59

## 2023-04-08 RX ADMIN — INSULIN LISPRO 6 UNITS: 100 INJECTION, SOLUTION INTRAVENOUS; SUBCUTANEOUS at 12:30

## 2023-04-08 RX ADMIN — ARFORMOTEROL TARTRATE 15 MCG: 15 SOLUTION RESPIRATORY (INHALATION) at 18:50

## 2023-04-08 RX ADMIN — FUROSEMIDE 40 MG: 10 INJECTION, SOLUTION INTRAMUSCULAR; INTRAVENOUS at 16:35

## 2023-04-08 RX ADMIN — ARFORMOTEROL TARTRATE 15 MCG: 15 SOLUTION RESPIRATORY (INHALATION) at 06:39

## 2023-04-08 RX ADMIN — Medication 10 ML: at 09:00

## 2023-04-08 RX ADMIN — GUAIFENESIN 600 MG: 600 TABLET, EXTENDED RELEASE ORAL at 08:59

## 2023-04-08 RX ADMIN — BUDESONIDE 0.5 MG: 0.5 SUSPENSION RESPIRATORY (INHALATION) at 06:39

## 2023-04-08 RX ADMIN — SENNOSIDES AND DOCUSATE SODIUM 2 TABLET: 8.6; 5 TABLET ORAL at 20:11

## 2023-04-08 RX ADMIN — TRAZODONE HYDROCHLORIDE 100 MG: 100 TABLET ORAL at 20:11

## 2023-04-08 RX ADMIN — OXYBUTYNIN CHLORIDE 10 MG: 5 TABLET, EXTENDED RELEASE ORAL at 08:59

## 2023-04-08 RX ADMIN — ASPIRIN 81 MG: 81 TABLET, COATED ORAL at 09:00

## 2023-04-08 RX ADMIN — ATORVASTATIN CALCIUM 80 MG: 40 TABLET, FILM COATED ORAL at 08:59

## 2023-04-08 RX ADMIN — FUROSEMIDE 40 MG: 10 INJECTION, SOLUTION INTRAMUSCULAR; INTRAVENOUS at 04:52

## 2023-04-08 RX ADMIN — PANTOPRAZOLE SODIUM 40 MG: 40 TABLET, DELAYED RELEASE ORAL at 08:59

## 2023-04-08 NOTE — PLAN OF CARE
Goal Outcome Evaluation:  Plan of Care Reviewed With: patient        Progress: no change  Outcome Evaluation: Patient presents with limitations that impede his/her ability to perform ADLS. The skills of a therapist are necessary to maximize independence with ADLs.  Recommend home with assist and home health or outpatient therapy following hospital discharge.   Scripts sent back for approval

## 2023-04-08 NOTE — PLAN OF CARE
Goal Outcome Evaluation:   Pt is alert and oriented. 2L via n/c, tolerating well. PT was up with pt this shift. Will continue with care plan and monitoring.

## 2023-04-08 NOTE — PLAN OF CARE
Goal Outcome Evaluation:              Outcome Evaluation: VSS.  Pt resting throughout shift.  No compalints of pain or discomfort.

## 2023-04-08 NOTE — PLAN OF CARE
Goal Outcome Evaluation:            Pt stated he had not wore his CPAP in a while due to it being broken. Pt wore facility CPAP for duration of night for sleep. Settings: CPAP 10, 30%. No complications. Pt on 2l nc when not wearing CPAP.

## 2023-04-08 NOTE — PROGRESS NOTES
[facility]   Hospitalist Progress Note  Date: 2023  Patient Name: Giles Donovan  : 1945  MRN: [id number]  Date of admission: 2023      Subjective   Subjective     Chief Complaint: Follow up for shortness of breath    Summary:  77 y.o. male with HTN, HLD, type II DM, BPH, previous COVID infection 2021 who presented with SOB.  On presentation SPO2 50% on room air with tachypnea.  Requiring 6 L nasal cannula.  Met SIRS criteria.  WBC 13.  Lactate WNL.  CT chest negative for PE but with bilateral infiltrates and small pleural effusions.  On IV antibiotics.  Pulmonology on board    Interval Followup: Feeling better.  Less shortness of breath, COYLE, cough.  No fevers/chills, chest pain, palpitations, lower extremity swelling.  Down to 3 L nasal cannula    Review of Systems  Cardiovascular:  No Chest Pain, No Edema  Respiratory:  +Cough, +Dyspnea  Gastrointestinal:  No Nausea, No Vomiting      Objective   Objective     Vitals:   Temp:  [97.8 °F (36.6 °C)-99.5 °F (37.5 °C)] 98.6 °F (37 °C)  Heart Rate:  [] 74  Resp:  [18-25] 20  BP: (119-149)/(64-78) 119/64  Flow (L/min):  [3-6] 3  Physical Exam               Constitutional: Elderly male, awake, conversant, no acute distress              Eyes: Pupils equal and reactive, no conjunctival injection              HENT: NCAT, nares patent, MMM              Respiratory: 3L NC, improved aeration, rhonchi bilateral, nonlabored respiration              Cardiovascular: RRR, no murmurs, traceedema              Gastrointestinal: Positive bowel sounds, soft, nontender, nondistended              Musculoskeletal: No gross joint deformities, no clubbing or cyanosis to extremities              Neurologic: Alert, Cranial Nerves grossly intact, speech clear              Skin: Warm and dry, no rashes    Result Review    Result Review:  I have personally reviewed the following over the last 24 hours (07:00 to 07:00) and agree with the following  findings  [x]  Laboratory   CBC    CBC 12/5/22 4/7/23 4/8/23   WBC 6.97 13.07 (A) 14.06 (A)   RBC 4.46 3.86 (A) 3.40 (A)   Hemoglobin 13.7 12.3 (A) 10.8 (A)   Hematocrit 39.4 37.2 (A) 32.7 (A)   MCV 88.3 96.4 96.2   MCH 30.7 31.9 31.8   MCHC 34.8 33.1 33.0   RDW 12.3 13.6 13.7   Platelets 209 202 179   (A) Abnormal value            BMP    BMP 12/5/22 4/7/23 4/8/23   BUN 16 15 15   Creatinine 0.97 0.95 0.98   Sodium 133 (A) 136 138   Potassium 4.2 4.1 3.8   Chloride 98 98 101   CO2 27.0 24.7 27.9   Calcium 9.2 8.5 (A) 8.1 (A)   (A) Abnormal value            Procalcitonin 0.49    [x]  Microbiology  MRSA PCR negative  Strep/Legionella urinary antigen negative  COVID/influenza negative  Blood culture pending  [x]  Radiology   [x]  EKG/Telemetry monitor personally reviewed: NSR, no events  []  Cardiology/Vascular   []  Pathology  []  Old records  [x]  Other:     Intake/Output Summary (Last 24 hours) at 4/8/2023 1032  Last data filed at 4/8/2023 0746  Gross per 24 hour   Intake --   Output 1350 ml   Net -1350 ml         Assessment & Plan   Assessment / Plan     Assessment:  Active Hospital Problems    Diagnosis  POA   • **Sepsis due to pneumonia [J18.9, A41.9]  Yes   • Acute respiratory failure with hypoxia [J96.01]  Yes   • Multifocal pneumonia [J18.9]  Yes   • Insulin dependent type 2 diabetes mellitus [E11.9, Z79.4]  Not Applicable   • Mixed hyperlipidemia [E78.2]  Unknown   • Depression [F32.A]  Yes   • Hypertension [I10]  Yes   • Hypothyroidism [E03.9]  Yes          Plan:    · Respiratory panel PCR pending. Obtain respiratory culture if able  · MRSA PCR negative, stop vancomycin.  Continue IV Zosyn.  Day 2  · Continue Brovana/Pulmicort nebs twice daily  · Wean supplemental oxygen, SPO2 goal >90%  · Pulmonology following, appreciate recommendation  · Good response to diuresis.  Creatinine stable.  Potassium WNL.  Continue IV Lasix 40 mg twice daily.  Strict I/Os.  Trend renal function and electrolytes.  · TTE report  pending  · Blood sugars at goal.  Continue Levemir 20 units q. Nightly + moderate SSI  · Restart baby aspirin and statin  · Restart Paxil 40 mg q.daily  · Restart Protonix  · Restart Synthroid  · Restart gabapentin, adjust 600 mg q12 hours  · Activity ad artemio.  Continue PT/OT  · CBC, CMP in A.M    Discussed plan with RN and pulmonology    DVT prophylaxis:  Mechanical DVT prophylaxis orders are present.    CODE STATUS:   Level Of Support Discussed With: Patient  Code Status (Patient has no pulse and is not breathing): CPR (Attempt to Resuscitate)  Medical Interventions (Patient has pulse or is breathing): Full Support      Electronically signed by Lincoln Chau DO, 04/08/23, 10:27 AM EDT.

## 2023-04-08 NOTE — PROGRESS NOTES
"Pulmonary / Critical Care Progress Note      Patient Name: Giles Donovan  : 1945  MRN: [id number]  Primary Care Physician:  Morena Masters MD  Referring Physician: No ref. provider found  Date of admission: 2023    Subjective   Subjective     Reason for Consult/ Chief Complaint: Shortness of breath x1 week, hemoptysis, acute hypoxic respiratory failure    Past 24-hour events,  Patient admitted, utilized CPAP at 10 mmHg overnight, weaned supplemental oxygen down to 2 L, states he has less secretions and are not pink or brown-tinged anymore    This morning,  Patient feels his breathing has improved  Diuresing well  Comfortable on 2 L nasal cannula with oxygen saturations 9390%  Patient states that he does not use home oxygen anymore-used to have it prescribed but was unable to \"change the tanks\"  Had some difficulty readjusting to using CPAP and did not sleep well  Encourage patient to be out of bed to chair today and to utilize incentive spirometry and flutter valve    HPI:  Giles Donovan is a 77 y.o. male with a past medical history significant for lung disease (patient unsure which type), uses albuterol and Flovent inhalers at home, diabetes, PTSD and depression, hyperlipidemia, hypertension and [facility] of breath and coughing.  He presents emergency department with worsening coughing and shortness of breath for the past week and endorses dark blood tinged sputum that is now light pink.  His oxygen saturations were in the 60%'s on room air, required up to 6 L nasal cannula and is now at 5 L satting 98%.  He denies chest pain, denies smoking, denies sick contacts.  He endorses a 30 to 40 pound weight gain over the past few months.  He did have a home CPAP machine that he has used for approximately 4 years, unfortunately a part broke on the machine and [facility] not [facility].  He has gone " approximately 12 months without a home CPAP machine.  No fever but states that he felt chills last night.  His emergency room work-up included a chest CT that ruled out PE, did show multifocal pneumonia and some pulmonary edema.  proBNP 1793, white blood count 13, Pro-Tereso 0.49, flu swab negative.  Patient received empiric antibiotics of vancomycin and Zosyn, was given a 2 L bolus of normal saline, and started on nebulizer treatments.    Review of Systems  General:  No Fatigue, No Fever.  Weight gain  HEENT: No dysphagia, No Visual Changes, no rhinorrhea  Respiratory:  + Productive cough,+Dyspnea, dark brown to light pink phlegm, No Pleuritic Pain, no wheezing, +hemoptysis  Cardiovascular: Denies chest pain, denies palpitations,+COYLE, No Chest Pressure  Gastrointestinal:  No Abdominal Pain, No Nausea, No Vomiting, No Diarrhea  Genitourinary:  No Dysuria, No Frequency, No Hesitancy  Musculoskeletal: No muscle pain or swelling  Endocrine:  No Heat Intolerance, No Cold Intolerance  Hematologic:  No Bleeding, No Bruising  Psychiatric:  No Anxiety, No Depression  Neurologic:  No Confusion, no Dysarthria, No Headaches  Skin:  No Rash, No Open Wounds        Personal History     Past Medical History:   Diagnosis Date   • Allergic rhinitis 12/27/2014    Will try Zyrtec, he didnt want to use a nasal spray.   • Arthritis    • Asthma    • Cataract     left eye   • Cough 03/30/2016    PFT and CXR ordered.   • Depression     PTSD   • Diabetes    • Elevated cholesterol    • H/O psychiatric care 1999    PTSD   • Hyperlipidemia 03/30/2016    Lipids ordered. Continue on current medication.   • Hypertension    • Hypothyroidism    • Seasonal allergies    • Shortness of breath    • Sleep apnea    • Stenosis of right carotid artery 02/19/2017    Will refer to vascular surgeon.   • Syncope 02/19/2017   • Type 2 diabetes mellitus    • Upper respiratory infection 10/18/2015    Will treat cough with Hydromet, otherwise over the counter meds  for treatment.       Past Surgical History:   Procedure Laterality Date   • CATARACT EXTRACTION Right     x2   • COLONOSCOPY     • JOINT REPLACEMENT     • REPLACEMENT TOTAL HIP ONCOLOGIC Right    • RETINAL DETACHMENT REPAIR     • TOE AMPUTATION Left 11/2017    Second phalaeng.   • TOE OSTEOPHYTE REMOVAL         Family History: family history includes Arthritis in his mother; Heart disease in his mother; Kidney disease in his sister; Lupus in his mother.     Social History:  reports that he has never smoked. He has never used smokeless tobacco. He reports current alcohol use. He reports that he does not use drugs.    Home Medications:  Insulin Glargine, Insulin Pen Needle, PARoxetine, albuterol sulfate HFA, aspirin, atorvastatin, cetirizine, ezetimibe, felodipine, gabapentin, insulin aspart, levothyroxine, losartan, metFORMIN, omeprazole, oxybutynin XL, and traZODone    Allergies:  Allergies   Allergen Reactions   • Latex Rash and Anaphylaxis   • Latex, Natural Rubber Itching       Objective    Objective     Vitals:   Temp:  [97.9 °F (36.6 °C)-99.5 °F (37.5 °C)] 98.6 °F (37 °C)  Heart Rate:  [] 72  Resp:  [18-25] 20  BP: (119-149)/(64-78) 119/64  Flow (L/min):  [3-6] 3    Physical Exam:  Vital Signs Reviewed   General:  Alert, NAD. Elderly male in chair   HEENT:  PERRL, EOMI.    Neck:  No JVD, no thyromegaly  Lymph: no axillary, cervical, supraclavicular lymphadenopathy noted bilaterally  Chest:  Clear to auscultation bilaterally, no work of breathing noted on 3L NC  CV: RRR, no M/G/R, pulses 2+  Abd:  Soft, NT, ND, +BS, obese  EXT:  no clubbing, no cyanosis, no edema  Neuro:  A&Ox3, CN grossly intact, no focal deficits.  Skin: No rashes or lesions noted    Result Review    Result Review:  I have personally reviewed the results from the time of this admission to 4/8/2023 12:02 EDT and agree with these findings:  [x]  Laboratory  [x]  Microbiology  [x]  Radiology  []  EKG/Telemetry   []  Cardiology/Vascular   []   Pathology  [x]  Old records  []  Other:  Most notable findings include: WBC 14, respiratory viral panel pending        Lab 04/08/23  0441 04/07/23  1248   WBC 14.06* 13.07*   HEMOGLOBIN 10.8* 12.3*   HEMATOCRIT 32.7* 37.2*   PLATELETS 179 202   SODIUM 138 136   POTASSIUM 3.8 4.1   CHLORIDE 101 98   CO2 27.9 24.7   BUN 15 15   CREATININE 0.98 0.95   GLUCOSE 145* 148*   CALCIUM 8.1* 8.5*   TOTAL PROTEIN 6.3 6.6   ALBUMIN 3.7 4.0   GLOBULIN  --  2.6     CT Chest With Contrast Diagnostic    Result Date: 4/7/2023  PROCEDURE: CT CHEST W CONTRAST DIAGNOSTIC  COMPARISON:  [facility], CT, CT CHEST W CONTRAST DIAGNOSTIC, 12/28/2021, 16:07.  INDICATIONS: Shortness of breath, hypoxia, hemoptysis  TECHNIQUE: After obtaining the patient's consent, CT images were obtained with non-ionic intravenous contrast material.   PROTOCOL:   Standard imaging protocol performed    RADIATION:   DLP: 521.2 mGy*cm   Automated exposure control was utilized to minimize radiation dose. CONTRAST: 75 cc Isovue 370 I.V.  FINDINGS:  The visualized soft tissue structures at the base of the neck including the thyroid appear within normal limits.  There is no lower cervical or axillary adenopathy.  The heart size is normal.  There is no pericardial effusion.  The aorta is normal in caliber without evidence of aneurysm formation.  The main pulmonary artery is normal in caliber.  There is no evidence of pulmonary embolism.  There are reactive mediastinal or hilar lymphadenopathy by size criteria.  The tracheobronchial tree is patent.  There is no abnormal bronchial wall thickening or bronchiectasis.  There is patchy bilateral consolidation throughout both lungs, left worse than right.  There is mild smooth interlobular septal thickening.  There are small bilateral pleural effusions.  There is no evidence of pneumothorax.  The esophagus is normal in course and caliber.  There is partial visualization of a 2.7 cm low-density lesion within the  inferior aspect of the left kidney.  This is incompletely evaluated on CT.  Follow-up with initial renal ultrasound would be recommended. There are degenerative changes of the thoracic spine.      Impression:   1. Bilateral multifocal consolidation, left worse than right favored to represent multifocal pneumonia.  There are background findings of smooth interlobular septal thickening and small bilateral pleural effusions which can be seen with volume overload.  Alveolar edema is felt to be a less likely differential consideration for the consolidation.  2. No evidence of pulmonary embolism. 3. Partial visualization of a 2.7 cm low-density lesion within the inferior aspect of the left kidney.  Nonemergent initial imaging assessment with renal ultrasound is recommended.      PRESTON RILEY MD       Electronically Signed and Approved By: PRESTON RILEY MD on 4/07/2023 at 14:26                 Assessment & Plan   Assessment / Plan     Active Hospital Problems:  Active Hospital Problems    Diagnosis    • **Sepsis due to pneumonia    • Acute respiratory failure with hypoxia    • Multifocal pneumonia    • Insulin dependent type 2 diabetes mellitus    • Mixed hyperlipidemia    • Depression    • Hypertension    • Hypothyroidism      Impression:  Acute hypoxic respiratory failure  Heart failure  Reported lung disease  Remote history of COVID  Fully vaccinated against COVID, flu and pneumonia  MIAH with NIPPV  Pneumonia    Plan:  -Currently on 3 L nasal cannula; continue to wean as tolerated keep SPO2 greater than 90%  -CT chest with extensive, dense bilateral pneumonia left > right  -Continue medical management for now; consider coloscopy Monday if symptoms do not improve.  Risk and benefits of bronchoscopy explained to patient.  Risk include bleeding, infection, pneumothorax, even death can occur.  Patient expressed understanding.    -Patient utilizes CPAP at home, 10 mmHg pressure, please use at night and during  naps  -Continue Lasix 40 mg every 12 hours, strict intake and output monitoring; monitor renal function and electrolytes  -Continue empiric antibiotics of vancomycin and Zosyn, tailor antibiotics as microbiology results  -Blood cultures x2, and respiratory virus panel pending and follow-up results  -Sputum culture ordered, unable to produce sample, once sample obtained please send to lab  -Legionella and strep pneumonia antigens are negative  -Continue nebulizer treatments of Brovana and Pulmicort  -DuoNebs as needed  -Echocardiogram ordered, follow-up results    I, Dr. Dash Duff, spent >50% of time in accordance with split shared billing. This included personally reviewing all pertinent labs, imaging, microbiology and documentation. Also discussing the case with the patient and any available family, the admitting physician and any available ancillary staff.     Electronically signed by Dash Duff MD, 04/08/23, 2:21 PM EDT.

## 2023-04-08 NOTE — THERAPY EVALUATION
Acute Care - Physical Therapy Initial Evaluation   [place]     Patient Name: Giles Donovan  : 1945  MRN: [id number]  Today's Date: 2023      Visit Dx:     ICD-10-CM ICD-9-CM   1. Sepsis, due to unspecified organism, unspecified whether acute organ dysfunction present  A41.9 038.9     995.91   2. Pneumonia due to infectious organism, unspecified laterality, unspecified part of lung  J18.9 486   3. Hypoxia  R09.02 799.02   4. Multifocal pneumonia  J18.9 486   5. Acute respiratory failure with hypoxia  J96.01 518.81   6. Decreased activities of daily living (ADL)  Z78.9 V49.89   7. Difficulty in walking  R26.2 719.7     Patient Active Problem List   Diagnosis   • Allergic rhinitis   • Cough   • Controlled type 2 diabetes mellitus without complication, without long-term current use of insulin   • GERD (gastroesophageal reflux disease)   • Hyperlipidemia   • Hypertension   • Hypothyroidism   • Stenosis of right carotid artery   • Syncope   • Urge incontinence of urine   • Erectile dysfunction   • Benign prostatic hyperplasia with urinary frequency   • Cardiomyopathy   • Cataract   • Chest pain with low risk for cardiac etiology   • Right-sided carotid artery occlusion without cerebral infarction   • Depression   • Esophageal reflux   • Vaso vagal episode   • Pneumonia due to COVID-19 virus   • Hypoxia   • Chronic lung disease   • Sepsis due to pneumonia   • Acute respiratory failure with hypoxia   • Multifocal pneumonia   • Insulin dependent type 2 diabetes mellitus   • Mixed hyperlipidemia     Past Medical History:   Diagnosis Date   • Allergic rhinitis 2014    Will try Zyrtec, he didnt want to use a nasal spray.   • Arthritis    • Asthma    • Cataract     left eye   • Cough 2016    PFT and CXR ordered.   • Depression     PTSD   • Diabetes    • Elevated cholesterol    • H/O psychiatric care     PTSD   • Hyperlipidemia 2016    Lipids ordered. Continue on current medication.   •  Hypertension    • Hypothyroidism    • Seasonal allergies    • Shortness of breath    • Sleep apnea    • Stenosis of right carotid artery 02/19/2017    Will refer to vascular surgeon.   • Syncope 02/19/2017   • Type 2 diabetes mellitus    • Upper respiratory infection 10/18/2015    Will treat cough with Hydromet, otherwise over the counter meds for treatment.     Past Surgical History:   Procedure Laterality Date   • CATARACT EXTRACTION Right     x2   • COLONOSCOPY     • JOINT REPLACEMENT     • REPLACEMENT TOTAL HIP ONCOLOGIC Right    • RETINAL DETACHMENT REPAIR     • TOE AMPUTATION Left 11/2017    Second phalaeng.   • TOE OSTEOPHYTE REMOVAL       PT Assessment (last 12 hours)     PT Evaluation and Treatment     Row Name 04/08/23 1112          Physical Therapy Time and Intention    Subjective Information no complaints (P)   -     Document Type evaluation (P)   -     Mode of Treatment individual therapy;physical therapy (P)   -     Patient Effort good (P)   -     Symptoms Noted During/After Treatment shortness of breath (P)   -     Row Name 04/08/23 1112          General Information    Patient Profile Reviewed yes (P)   -     Patient Observations alert;cooperative;agree to therapy (P)   -     Prior Level of Function independent:;community mobility (P)   -     Equipment Currently Used at Home cane, straight;rollator (P)   -     Row Name 04/08/23 1112          Living Environment    Current Living Arrangements home (P)   with basement, 14 stairs to basement  -SW     Home Accessibility stairs to enter home;stairs within home (P)   1 [address], 14 to basement  -SW     People in Home significant other;other (see comments) (P)   girlfriend's sister  -     Primary Care Provided by self;spouse/significant other (P)   -     Row Name 04/08/23 1112          Home Use of Assistive/Adaptive Equipment    Equipment Currently Used at Home glucometer;cpap;cane, straight;oxygen (P)   -     Row Name 04/08/23 1112           Pain    Pretreatment Pain Rating 0/10 - no pain (P)   -     Posttreatment Pain Rating 0/10 - no pain (P)   -SW     Row Name 04/08/23 1112          Strength (Manual Muscle Testing)    Strength (Manual Muscle Testing) -- (P)   Bilateral LE: grossly 4+/5  -SW     Row Name 04/08/23 1112          Bed Mobility    Bed Mobility bed mobility (all) activities (P)   -     All Activities, [place] (Bed Mobility) standby assist (P)   -SW     Row Name 04/08/23 1112          Sit-Stand Transfer    Sit-Stand [place] (Transfers) minimum assist (75% patient effort) (P)   -SW     Row Name 04/08/23 1112          Stand-Sit Transfer    Stand-Sit [place] (Transfers) contact guard (P)   -SW     Row Name 04/08/23 1112          Gait/Stairs (Locomotion)    Gait/Stairs Locomotion gait/ambulation independence;gait/ambulation assistive device;distance ambulated (P)   -     [place] Level (Gait) contact guard (P)   -     Assistive Device (Gait) walker, front-wheeled (P)   -     Distance in Feet (Gait) 40 (P)   -SW     Pattern (Gait) 4-point;step-through (P)   -SW     Deviations/Abnormal Patterns (Gait) festinating/shuffling;gait speed decreased;base of support, wide (P)   -SW     Row Name 04/08/23 1112          Safety Issues, Functional Mobility    Impairments Affecting Function (Mobility) balance;endurance/activity tolerance;shortness of breath;strength (P)   -SW     Row Name 04/08/23 1112          Balance    Balance Assessment standing dynamic balance (P)   -SW     Dynamic Standing Balance contact guard (P)   -     Position/Device Used, Standing Balance walker, front-wheeled (P)   -SW     Row Name 04/08/23 1112          Plan of Care Review    Plan of Care Reviewed With patient (P)   -SW     Outcome Evaluation Pt currently has difficulty walking, has impaired balance, decreased strength, and shortness of breath. Pt would benefit from skilled physical therapy intervention while in hospital. Recommend discharge to  inpatient rehab. (P)   -     Row Name 04/08/23 1112          Positioning and Restraints    Pre-Treatment Position in bed (P)   -SW     Post Treatment Position bed (P)   -SW     In Bed supine;call light within reach (P)   -[facility] Name 04/08/23 1112          Therapy Assessment/Plan (PT)    Rehab Potential (PT) good, to achieve stated therapy goals (P)   -SW     Criteria for Skilled Interventions Met (PT) skilled treatment is necessary (P)   -SW     Therapy Frequency (PT) daily (P)   -SW     Predicted Duration of Therapy Intervention (PT) 10 days (P)   -SW     Problem List (PT) problems related to;balance;mobility;strength (P)   -SW     Activity Limitations Related to Problem List (PT) unable to ambulate safely;unable to transfer safely (P)   -[facility] Name 04/08/23 1112          Therapy Plan Review/Discharge Plan (PT)    Therapy Plan Review (PT) evaluation/treatment results reviewed;participants included;patient (P)   -SW     Row Name 04/08/23 1112          Physical Therapy Goals    Transfer Goal Selection (PT) transfer, PT goal 1 (P)   -SW     Gait Training Goal Selection (PT) gait training, PT goal 1 (P)   -[facility] Name 04/08/23 1112          Transfer Goal 1 (PT)    Activity/Assistive Device (Transfer Goal 1, PT) transfers, all (P)   -SW     [place] Level/Cues Needed (Transfer Goal 1, PT) standby assist (P)   -SW     Time Frame (Transfer Goal 1, PT) long term goal (LTG);10 days (P)   -SW     Row Name 04/08/23 1112          Gait Training Goal 1 (PT)    Activity/Assistive Device (Gait Training Goal 1, PT) gait (walking locomotion);assistive device use;walker, rolling (P)   -SW     [place] Level (Gait Training Goal 1, PT) standby assist (P)   -SW     Distance (Gait Training Goal 1, PT) 150 feet (P)   -SW     Time Frame (Gait Training Goal 1, PT) long term goal (LTG);10 days (P)   -SW           User Key  (r) = Recorded By, (t) = Taken By, (c) = Cosigned By    Initials Name Provider Type    SW  Nikki Pretty, PT Student PT Student                  PT Recommendation and Plan  Anticipated Discharge Disposition (PT): (P) inpatient rehabilitation facility  Planned Therapy Interventions (PT): (P) balance training, bed mobility training, gait training, ROM (range of motion), stair training, strengthening, stretching, transfer training  Therapy Frequency (PT): (P) daily  Plan of Care Reviewed With: (P) patient  Outcome Evaluation: (P) Pt currently has difficulty walking, has impaired balance, decreased strength, and shortness of breath. Pt would benefit from skilled physical therapy intervention while in hospital. Recommend discharge to inpatient rehab.   Outcome Measures     Row Name 04/08/23 1100             How much help from another person do you currently need...    Turning from your back to your side while in flat bed without using bedrails? 4 (P)   -SW      Moving from lying on back to sitting on the side of a flat bed without bedrails? 4 (P)   -SW      Moving to and from a bed to a chair (including a wheelchair)? 3 (P)   -SW      Standing up from a chair using your arms (e.g., wheelchair, bedside chair)? 3 (P)   -SW      Climbing 3-5 steps with a railing? 2 (P)   -SW      To walk in hospital room? 3 (P)   -SW      AM-PAC 6 Clicks Score (PT) 19 (P)   -SW         Functional Assessment    Outcome Measure Options AM-PAC 6 Clicks Basic Mobility (PT) (P)   -SW            User Key  (r) = Recorded By, (t) = Taken By, (c) = Cosigned By    Initials Name Provider Type    SW Nikki Pretty, PT Student PT Student                 Time Calculation:    PT Charges     Row Name 04/08/23 1112             Time Calculation    PT Received On 04/08/23 (P)   -SW      PT Goal Re-Cert Due Date 04/17/23 (P)   -SW         Untimed Charges    PT Eval/Re-eval Minutes 25 (P)   -SW         Total Minutes    Untimed Charges Total Minutes 25 (P)   -SW       Total Minutes 25 (P)   -SW            User Key  (r) = Recorded By, (t) =  Taken By, (c) = Cosigned By    Initials Name Provider Type    SW Nikki Pretty, PT Student PT Student              Therapy Charges for Today     Code Description Service Date Service Provider Modifiers Qty    [id number] HC PT EVAL LOW COMPLEXITY 2 4/8/2023 Nikki Pretty, PT Student GP 1          PT G-Codes  Outcome Measure Options: (P) AM-PAC 6 Clicks Basic Mobility (PT)  AM-PAC 6 Clicks Score (PT): (P) 19  AM-PAC 6 Clicks Score (OT): 21    Nikki Pretty PT Student  4/8/2023

## 2023-04-08 NOTE — THERAPY EVALUATION
Patient Name: Giles Donovan  : 1945    MRN: [id number]                              Today's Date: 2023       Admit Date: 2023    Visit Dx:     ICD-10-CM ICD-9-CM   1. Sepsis, due to unspecified organism, unspecified whether acute organ dysfunction present  A41.9 038.9     995.91   2. Pneumonia due to infectious organism, unspecified laterality, unspecified part of lung  J18.9 486   3. Hypoxia  R09.02 799.02   4. Multifocal pneumonia  J18.9 486   5. Acute respiratory failure with hypoxia  J96.01 518.81   6. Decreased activities of daily living (ADL)  Z78.9 V49.89     Patient Active Problem List   Diagnosis   • Allergic rhinitis   • Cough   • Controlled type 2 diabetes mellitus without complication, without long-term current use of insulin   • GERD (gastroesophageal reflux disease)   • Hyperlipidemia   • Hypertension   • Hypothyroidism   • Stenosis of right carotid artery   • Syncope   • Urge incontinence of urine   • Erectile dysfunction   • Benign prostatic hyperplasia with urinary frequency   • Cardiomyopathy   • Cataract   • Chest pain with low risk for cardiac etiology   • Right-sided carotid artery occlusion without cerebral infarction   • Depression   • Esophageal reflux   • Vaso vagal episode   • Pneumonia due to COVID-19 virus   • Hypoxia   • Chronic lung disease   • Sepsis due to pneumonia   • Acute respiratory failure with hypoxia   • Multifocal pneumonia   • Insulin dependent type 2 diabetes mellitus   • Mixed hyperlipidemia     Past Medical History:   Diagnosis Date   • Allergic rhinitis 2014    Will try Zyrtec, he didnt want to use a nasal spray.   • Arthritis    • Asthma    • Cataract     left eye   • Cough 2016    PFT and CXR ordered.   • Depression     PTSD   • Diabetes    • Elevated cholesterol    • H/O psychiatric care     PTSD   • Hyperlipidemia 2016    Lipids ordered. Continue on current medication.   • Hypertension    • Hypothyroidism    • Seasonal  "allergies    • Shortness of breath    • Sleep apnea    • Stenosis of right carotid artery 02/19/2017    Will refer to vascular surgeon.   • Syncope 02/19/2017   • Type 2 diabetes mellitus    • Upper respiratory infection 10/18/2015    Will treat cough with Hydromet, otherwise over the counter meds for treatment.     Past Surgical History:   Procedure Laterality Date   • CATARACT EXTRACTION Right     x2   • COLONOSCOPY     • JOINT REPLACEMENT     • REPLACEMENT TOTAL HIP ONCOLOGIC Right    • RETINAL DETACHMENT REPAIR     • TOE AMPUTATION Left 11/2017    Second phalaeng.   • TOE OSTEOPHYTE REMOVAL        General Information     Row Name 04/08/23 1027          OT Time and Intention    Document Type evaluation  -     Mode of Treatment individual therapy;occupational therapy  -     Row Name 04/08/23 1027          General Information    Patient Profile Reviewed yes  -AC     Prior Level of Function --  patient reports mostly (I) wtih adls with occ assist with adls from gf. Uses rolling walker for long dist and cane for short. . He uses a shower chair in walk in shower, electric bed, regular toilet and has O2 and states, \"I dont know how to use it\"  -     Existing Precautions/Restrictions fall;oxygen therapy device and L/min  -     Barriers to Rehab none identified  -     Row Name 04/08/23 1027          Occupational Profile    Reason for Services/Referral (Occupational Profile) Pt. is a 77year old male admitted for the above diagnosis. Pt. referred to OT services to assess independence with ADLs and adl transfers/fx'l mobility. No previous OT services for current condition.  -     Row Name 04/08/23 1027          Living Environment    People in Home significant other  -     Row Name 04/08/23 1027          Stairs Within Home, Primary    Stairs, Within Home, Primary patient states he uses basement stairs for laundry  -     Row Name 04/08/23 1027          Cognition    Orientation Status (Cognition) oriented x 4  " -     Row Name 04/08/23 1027          Safety Issues, Functional Mobility    Impairments Affecting Function (Mobility) balance;endurance/activity tolerance;shortness of breath  -           User Key  (r) = Recorded By, (t) = Taken By, (c) = Cosigned By    Initials Name Provider Type     Nazia Pena OT Occupational Therapist                 Mobility/ADL's     Row Name 04/08/23 1032          Bed Mobility    Bed Mobility bed mobility (all) activities  -     All Activities, [place] (Bed Mobility) standby assist  -     Assistive Device (Bed Mobility) head of bed elevated  -[facility] Name 04/08/23 1032          Transfers    Transfers sit-stand transfer;stand-sit transfer  -[facility] Name 04/08/23 1032          Sit-Stand Transfer    Sit-Stand [place] (Transfers) standby assist;contact guard;1 person assist  -AC     Comment, (Sit-Stand Transfer) no device  -[facility] Name 04/08/23 1032          Stand-Sit Transfer    Stand-Sit [place] (Transfers) contact guard;standby assist;1 person assist  -AC     Comment, (Stand-Sit Transfer) no device  -AC     Row Name 04/08/23 1032          Functional Mobility    Functional Mobility- Ind. Level standby assist;contact guard assist  -     Functional Mobility- Comment patient was CGA/SBA without AD for approx 4 steps at EOB with increased breathing. patient instructed in pursed lip breathing technique.  -[facility] Name 04/08/23 1032          Activities of Daily Living    BADL Assessment/Intervention --  patient is setup for upper body bathing/dressing, setup for grooming, setup for self-feeding, min A for lower body bathing/dressing, CGA/SBA for toileting.  -           User Key  (r) = Recorded By, (t) = Taken By, (c) = Cosigned By    Initials Name Provider Type    Nazia Burnette OT Occupational Therapist               Obj/Interventions     Row Name 04/08/23 1034          Sensory Assessment (Somatosensory)    Sensory Assessment (Somatosensory) sensation  intact  -[facility] Name 04/08/23 1034          Vision Assessment/Intervention    Visual Impairment/Limitations WFL  -     Vision Assessment Comment wf for adls, patient states he no longer drives due to vision  -[facility] Name 04/08/23 1034          Range of Motion Comprehensive    General Range of Motion bilateral upper extremity ROM WNL  -[facility] Name 04/08/23 1034          Strength Comprehensive (MMT)    General Manual Muscle Testing (MMT) Assessment no strength deficits identified  -AC     Row Name 04/08/23 1034          Motor Skills    Motor Skills coordination;functional endurance  -     Coordination WFL  -     Functional Endurance fair  -AC     Row Name 04/08/23 1034          Balance    Balance Assessment standing dynamic balance  -AC     Dynamic Standing Balance contact guard;standby assist;1-person assist  -     Position/Device Used, Standing Balance unsupported  -     Balance Interventions standing;sit to stand;dynamic;minimal challenge;occupation based/functional task  -           User Key  (r) = Recorded By, (t) = Taken By, (c) = Cosigned By    Initials Name Provider Type    AC Nazia Pena OT Occupational Therapist               Goals/Plan     Row Name 04/08/23 1037          Transfer Goal 1 (OT)    Activity/Assistive Device (Transfer Goal 1, OT) transfers, all  -AC     [place] Level/Cues Needed (Transfer Goal 1, OT) modified independence  -AC     Time Frame (Transfer Goal 1, OT) long term goal (LTG);10 days  -AC     Row Name 04/08/23 1037          Bathing Goal 1 (OT)    Activity/Device (Bathing Goal 1, OT) bathing skills, all  -AC     [place] Level/Cues Needed (Bathing Goal 1, OT) modified independence  -AC     Time Frame (Bathing Goal 1, OT) long term goal (LTG);10 days  -AC     Row Name 04/08/23 1037          Dressing Goal 1 (OT)    Activity/Device (Dressing Goal 1, OT) dressing skills, all  -AC     [place]/Cues Needed (Dressing Goal 1, OT) modified independence  -AC      Time Frame (Dressing Goal 1, OT) long term goal (LTG);10 days  -AC     Row Name 04/08/23 1037          Toileting Goal 1 (OT)    Activity/Device (Toileting Goal 1, OT) toileting skills, all  -AC     [place] Level/Cues Needed (Toileting Goal 1, OT) modified independence  -AC     Time Frame (Toileting Goal 1, OT) long term goal (LTG);10 days  -AC     Row Name 04/08/23 1037          Grooming Goal 1 (OT)    Activity/Device (Grooming Goal 1, OT) grooming skills, all  -AC     [place] (Grooming Goal 1, OT) modified independence  -AC     Time Frame (Grooming Goal 1, OT) long term goal (LTG);10 days  -AC     Row Name 04/08/23 1037          Problem Specific Goal 1 (OT)    Problem Specific Goal 1 (OT) patient will improve endurance to good for adls.  -AC     Time Frame (Problem Specific Goal 1, OT) long term goal (LTG);10 days  -     Row Name 04/08/23 1037          Therapy Assessment/Plan (OT)    Planned Therapy Interventions (OT) activity tolerance training;BADL retraining;functional balance retraining;occupation/activity based interventions;patient/caregiver education/training;transfer/mobility retraining;ROM/therapeutic exercise  -AC           User Key  (r) = Recorded By, (t) = Taken By, (c) = Cosigned By    Initials Name Provider Type    Nazia Burnette OT Occupational Therapist               Clinical Impression     Row Name 04/08/23 1035          Pain Assessment    Pretreatment Pain Rating 0/10 - no pain  -AC     Posttreatment Pain Rating 0/10 - no pain  -AC     Row Name 04/08/23 1035          Plan of Care Review    Plan of Care Reviewed With patient  -AC     Progress no change  -AC     Outcome Evaluation Patient presents with limitations that impede his/her ability to perform ADLS. The skills of a therapist are necessary to maximize independence with ADLs.  Recommend home with assist and home health or outpatient therapy following hospital discharge.  -     Row Name 04/08/23 1035          Therapy  Assessment/Plan (OT)    Patient/Family Therapy Goal Statement (OT) Patient would like to maximize independence with adls.  -AC     Rehab Potential (OT) good, to achieve stated therapy goals  -     Criteria for Skilled Therapeutic Interventions Met (OT) yes;meets criteria;skilled treatment is necessary  -     Therapy Frequency (OT) 5 times/wk  -     Row Name 04/08/23 1035          Therapy Plan Review/Discharge Plan (OT)    Equipment Needs Upon Discharge (OT) commode chair;walker, rolling  -AC     Anticipated Discharge Disposition (OT) home with assist;home with outpatient therapy services  -     Row Name 04/08/23 1035          Positioning and Restraints    Pre-Treatment Position in bed  -AC     Post Treatment Position bed  -AC     In Bed fowlers;call light within reach;encouraged to call for assist  -AC           User Key  (r) = Recorded By, (t) = Taken By, (c) = Cosigned By    Initials Name Provider Type    AC Nazia Pena, OT Occupational Therapist               Outcome Measures     Row Name 04/08/23 1042          How much help from another is currently needed...    Putting on and taking off regular lower body clothing? 3  -AC     Bathing (including washing, rinsing, and drying) 3  -AC     Toileting (which includes using toilet bed pan or urinal) 3  -AC     Putting on and taking off regular upper body clothing 4  -AC     Taking care of personal grooming (such as brushing teeth) 4  -AC     Eating meals 4  -AC     AM-PAC 6 Clicks Score (OT) 21  -AC     Row Name 04/08/23 1042          Functional Assessment    Outcome Measure Options AM-PAC 6 Clicks Daily Activity (OT);Optimal Instrument  -     Row Name 04/08/23 1042          Optimal Instrument    Optimal Instrument Optimal - 3  -AC     Bending/Stooping 2  -AC     Standing 2  -AC     Reaching 1  -AC     From the list, choose the 3 activities you would most like to be able to do without any difficulty Bending/stooping;Standing;Reaching  -AC     Total Score  Optimal - 3 5  -           User Key  (r) = Recorded By, (t) = Taken By, (c) = Cosigned By    Initials Name Provider Type     Nazia Pena OT Occupational Therapist                Occupational Therapy Education     Title: PT OT SLP Therapies (Done)     Topic: Occupational Therapy (Done)     Point: ADL training (Done)     Description:   Instruct learner(s) on proper safety adaptation and remediation techniques during self care or transfers.   Instruct in proper use of assistive devices.              Learning Progress Summary           Patient Acceptance, E, VU by  at 4/8/2023 1043                   Point: Home exercise program (Done)     Description:   Instruct learner(s) on appropriate technique for monitoring, assisting and/or progressing therapeutic exercises/activities.              Learning Progress Summary           Patient Acceptance, E, VU by  at 4/8/2023 1043                   Point: Precautions (Done)     Description:   Instruct learner(s) on prescribed precautions during self-care and functional transfers.              Learning Progress Summary           Patient Acceptance, E, VU by  at 4/8/2023 1043                   Point: Body mechanics (Done)     Description:   Instruct learner(s) on proper positioning and spine alignment during self-care, functional mobility activities and/or exercises.              Learning Progress Summary           Patient Acceptance, E, VU by  at 4/8/2023 1043                               User Key     Initials Effective Dates Name Provider Type Discipline     06/16/21 -  Nazia Pena OT Occupational Therapist OT              OT Recommendation and Plan  Planned Therapy Interventions (OT): activity tolerance training, BADL retraining, functional balance retraining, occupation/activity based interventions, patient/caregiver education/training, transfer/mobility retraining, ROM/therapeutic exercise  Therapy Frequency (OT): 5 times/wk  Plan of Care Review  Plan of  Care Reviewed With: patient  Progress: no change  Outcome Evaluation: Patient presents with limitations that impede his/her ability to perform ADLS. The skills of a therapist are necessary to maximize independence with ADLs.  Recommend home with assist and home health or outpatient therapy following hospital discharge.     Time Calculation:    Time Calculation- OT     Row Name 04/08/23 1044             Time Calculation- OT    OT Received On 04/08/23  -AC      OT Goal Re-Cert Due Date 04/17/23  -AC         Untimed Charges    OT Eval/Re-eval Minutes 32  -AC         Total Minutes    Untimed Charges Total Minutes 32  -AC       Total Minutes 32  -AC            User Key  (r) = Recorded By, (t) = Taken By, (c) = Cosigned By    Initials Name Provider Type    AC Nazia Pena OT Occupational Therapist              Therapy Charges for Today     Code Description Service Date Service Provider Modifiers Qty    47255327430 HC OT EVAL LOW COMPLEXITY 3 4/8/2023 Nazia Pena OT GO 1               Nazia Pena OT  4/8/2023

## 2023-04-08 NOTE — PLAN OF CARE
Goal Outcome Evaluation:  Plan of Care Reviewed With: (P) patient           Outcome Evaluation: (P) Pt currently has difficulty walking, has impaired balance, decreased strength, and shortness of breath. Pt would benefit from skilled physical therapy intervention while in hospital. Recommend discharge to inpatient rehab.

## 2023-04-08 NOTE — PLAN OF CARE
Goal Outcome Evaluation:  Plan of Care Reviewed With: patient           Outcome Evaluation: Patient was wearing Cpap +10 30% since last night with no issues. Patient was taken off Bipap to 3 lpm NC and given aerosolized treatments. Patient did not want to go back on Bipap. Patient remained on 3 lpm NC tolerating well. Respiratory Therapy will continue to monitor and make changes as needed.

## 2023-04-09 PROBLEM — I50.32 DIASTOLIC CHF, CHRONIC: Status: ACTIVE | Noted: 2023-04-09

## 2023-04-09 LAB
ANION GAP SERPL CALCULATED.3IONS-SCNC: 11.3 MMOL/L (ref 5–15)
BUN SERPL-MCNC: 14 MG/DL (ref 8–23)
BUN/CREAT SERPL: 15.1 (ref 7–25)
CALCIUM SPEC-SCNC: 9.2 MG/DL (ref 8.6–10.5)
CHLORIDE SERPL-SCNC: 97 MMOL/L (ref 98–107)
CO2 SERPL-SCNC: 29.7 MMOL/L (ref 22–29)
CREAT SERPL-MCNC: 0.93 MG/DL (ref 0.76–1.27)
DEPRECATED RDW RBC AUTO: 48.7 FL (ref 37–54)
EGFRCR SERPLBLD CKD-EPI 2021: 85.1 ML/MIN/1.73
ERYTHROCYTE [DISTWIDTH] IN BLOOD BY AUTOMATED COUNT: 13.7 % (ref 12.3–15.4)
GLUCOSE BLDC GLUCOMTR-MCNC: 199 MG/DL (ref 70–99)
GLUCOSE BLDC GLUCOMTR-MCNC: 215 MG/DL (ref 70–99)
GLUCOSE BLDC GLUCOMTR-MCNC: 334 MG/DL (ref 70–99)
GLUCOSE SERPL-MCNC: 217 MG/DL (ref 65–99)
HCT VFR BLD AUTO: 36.9 % (ref 37.5–51)
HGB BLD-MCNC: 11.9 G/DL (ref 13–17.7)
MCH RBC QN AUTO: 31.4 PG (ref 26.6–33)
MCHC RBC AUTO-ENTMCNC: 32.2 G/DL (ref 31.5–35.7)
MCV RBC AUTO: 97.4 FL (ref 79–97)
PLATELET # BLD AUTO: 200 10*3/MM3 (ref 140–450)
PMV BLD AUTO: 9.8 FL (ref 6–12)
POTASSIUM SERPL-SCNC: 3.6 MMOL/L (ref 3.5–5.2)
RBC # BLD AUTO: 3.79 10*6/MM3 (ref 4.14–5.8)
SODIUM SERPL-SCNC: 138 MMOL/L (ref 136–145)
WBC NRBC COR # BLD: 11.86 10*3/MM3 (ref 3.4–10.8)

## 2023-04-09 PROCEDURE — 99233 SBSQ HOSP IP/OBS HIGH 50: CPT | Performed by: INTERNAL MEDICINE

## 2023-04-09 PROCEDURE — 94664 DEMO&/EVAL PT USE INHALER: CPT

## 2023-04-09 PROCEDURE — 63710000001 INSULIN DETEMIR PER 5 UNITS: Performed by: INTERNAL MEDICINE

## 2023-04-09 PROCEDURE — 25010000002 PIPERACILLIN SOD-TAZOBACTAM PER 1 G: Performed by: INTERNAL MEDICINE

## 2023-04-09 PROCEDURE — 63710000001 INSULIN LISPRO (HUMAN) PER 5 UNITS: Performed by: INTERNAL MEDICINE

## 2023-04-09 PROCEDURE — 94799 UNLISTED PULMONARY SVC/PX: CPT

## 2023-04-09 PROCEDURE — 97530 THERAPEUTIC ACTIVITIES: CPT

## 2023-04-09 PROCEDURE — 94660 CPAP INITIATION&MGMT: CPT

## 2023-04-09 PROCEDURE — 80048 BASIC METABOLIC PNL TOTAL CA: CPT | Performed by: INTERNAL MEDICINE

## 2023-04-09 PROCEDURE — 97116 GAIT TRAINING THERAPY: CPT

## 2023-04-09 PROCEDURE — 25010000002 FUROSEMIDE PER 20 MG: Performed by: NURSE PRACTITIONER

## 2023-04-09 PROCEDURE — 99233 SBSQ HOSP IP/OBS HIGH 50: CPT | Performed by: STUDENT IN AN ORGANIZED HEALTH CARE EDUCATION/TRAINING PROGRAM

## 2023-04-09 PROCEDURE — 82962 GLUCOSE BLOOD TEST: CPT

## 2023-04-09 PROCEDURE — 85027 COMPLETE CBC AUTOMATED: CPT | Performed by: INTERNAL MEDICINE

## 2023-04-09 RX ADMIN — ATORVASTATIN CALCIUM 80 MG: 40 TABLET, FILM COATED ORAL at 08:23

## 2023-04-09 RX ADMIN — ARFORMOTEROL TARTRATE 15 MCG: 15 SOLUTION RESPIRATORY (INHALATION) at 18:55

## 2023-04-09 RX ADMIN — PAROXETINE HYDROCHLORIDE 40 MG: 20 TABLET, FILM COATED ORAL at 17:10

## 2023-04-09 RX ADMIN — LEVOTHYROXINE SODIUM 100 MCG: 100 TABLET ORAL at 05:48

## 2023-04-09 RX ADMIN — PIPERACILLIN SODIUM AND TAZOBACTAM SODIUM 3.38 G: 3; .375 INJECTION, POWDER, LYOPHILIZED, FOR SOLUTION INTRAVENOUS at 22:30

## 2023-04-09 RX ADMIN — Medication 10 ML: at 08:23

## 2023-04-09 RX ADMIN — GUAIFENESIN 600 MG: 600 TABLET, EXTENDED RELEASE ORAL at 08:23

## 2023-04-09 RX ADMIN — PANTOPRAZOLE SODIUM 40 MG: 40 TABLET, DELAYED RELEASE ORAL at 05:48

## 2023-04-09 RX ADMIN — INSULIN DETEMIR 25 UNITS: 100 INJECTION, SOLUTION SUBCUTANEOUS at 22:28

## 2023-04-09 RX ADMIN — INSULIN LISPRO 4 UNITS: 100 INJECTION, SOLUTION INTRAVENOUS; SUBCUTANEOUS at 17:10

## 2023-04-09 RX ADMIN — FUROSEMIDE 40 MG: 10 INJECTION, SOLUTION INTRAMUSCULAR; INTRAVENOUS at 17:10

## 2023-04-09 RX ADMIN — ARFORMOTEROL TARTRATE 15 MCG: 15 SOLUTION RESPIRATORY (INHALATION) at 06:49

## 2023-04-09 RX ADMIN — Medication 10 ML: at 22:48

## 2023-04-09 RX ADMIN — BUDESONIDE 0.5 MG: 0.5 SUSPENSION RESPIRATORY (INHALATION) at 18:55

## 2023-04-09 RX ADMIN — GABAPENTIN 600 MG: 300 CAPSULE ORAL at 08:23

## 2023-04-09 RX ADMIN — INSULIN LISPRO 7 UNITS: 100 INJECTION, SOLUTION INTRAVENOUS; SUBCUTANEOUS at 12:25

## 2023-04-09 RX ADMIN — ASPIRIN 81 MG: 81 TABLET, COATED ORAL at 08:23

## 2023-04-09 RX ADMIN — OXYBUTYNIN CHLORIDE 10 MG: 5 TABLET, EXTENDED RELEASE ORAL at 08:22

## 2023-04-09 RX ADMIN — TRAZODONE HYDROCHLORIDE 100 MG: 100 TABLET ORAL at 22:28

## 2023-04-09 RX ADMIN — SENNOSIDES AND DOCUSATE SODIUM 2 TABLET: 8.6; 5 TABLET ORAL at 22:28

## 2023-04-09 RX ADMIN — Medication 10 ML: at 22:49

## 2023-04-09 RX ADMIN — FUROSEMIDE 40 MG: 10 INJECTION, SOLUTION INTRAMUSCULAR; INTRAVENOUS at 05:43

## 2023-04-09 RX ADMIN — INSULIN LISPRO 2 UNITS: 100 INJECTION, SOLUTION INTRAVENOUS; SUBCUTANEOUS at 08:22

## 2023-04-09 RX ADMIN — PIPERACILLIN SODIUM AND TAZOBACTAM SODIUM 3.38 G: 3; .375 INJECTION, POWDER, LYOPHILIZED, FOR SOLUTION INTRAVENOUS at 14:53

## 2023-04-09 RX ADMIN — GABAPENTIN 600 MG: 300 CAPSULE ORAL at 22:28

## 2023-04-09 RX ADMIN — GUAIFENESIN 600 MG: 600 TABLET, EXTENDED RELEASE ORAL at 22:48

## 2023-04-09 RX ADMIN — BUDESONIDE 0.5 MG: 0.5 SUSPENSION RESPIRATORY (INHALATION) at 06:49

## 2023-04-09 RX ADMIN — PIPERACILLIN SODIUM AND TAZOBACTAM SODIUM 3.38 G: 3; .375 INJECTION, POWDER, LYOPHILIZED, FOR SOLUTION INTRAVENOUS at 05:46

## 2023-04-09 NOTE — THERAPY TREATMENT NOTE
Acute Care - Physical Therapy Progress Note  EDUARDA Wang     Patient Name: Giles Donovan  : 1945  MRN: [id number]  Today's Date: 2023      Visit Dx:     ICD-10-CM ICD-9-CM   1. Sepsis, due to unspecified organism, unspecified whether acute organ dysfunction present  A41.9 038.9     995.91   2. Pneumonia due to infectious organism, unspecified laterality, unspecified part of lung  J18.9 486   3. Hypoxia  R09.02 799.02   4. Multifocal pneumonia  J18.9 486   5. Acute respiratory failure with hypoxia  J96.01 518.81   6. Decreased activities of daily living (ADL)  Z78.9 V49.89   7. Difficulty in walking  R26.2 719.7     Patient Active Problem List   Diagnosis   • Allergic rhinitis   • Cough   • Controlled type 2 diabetes mellitus without complication, without long-term current use of insulin   • GERD (gastroesophageal reflux disease)   • Hyperlipidemia   • Hypertension   • Hypothyroidism   • Stenosis of right carotid artery   • Syncope   • Urge incontinence of urine   • Erectile dysfunction   • Benign prostatic hyperplasia with urinary frequency   • Cardiomyopathy   • Cataract   • Chest pain with low risk for cardiac etiology   • Right-sided carotid artery occlusion without cerebral infarction   • Depression   • Esophageal reflux   • Vaso vagal episode   • Pneumonia due to COVID-19 virus   • Hypoxia   • Chronic lung disease   • Sepsis due to pneumonia   • Acute respiratory failure with hypoxia   • Multifocal pneumonia   • Insulin dependent type 2 diabetes mellitus   • Mixed hyperlipidemia     Past Medical History:   Diagnosis Date   • Allergic rhinitis 2014    Will try Zyrtec, he didnt want to use a nasal spray.   • Arthritis    • Asthma    • Cataract     left eye   • Cough 2016    PFT and CXR ordered.   • Depression     PTSD   • Diabetes    • Elevated cholesterol    • H/O psychiatric care     PTSD   • Hyperlipidemia 2016    Lipids ordered. Continue on current medication.   •  Hypertension    • Hypothyroidism    • Seasonal allergies    • Shortness of breath    • Sleep apnea    • Stenosis of right carotid artery 02/19/2017    Will refer to vascular surgeon.   • Syncope 02/19/2017   • Type 2 diabetes mellitus    • Upper respiratory infection 10/18/2015    Will treat cough with Hydromet, otherwise over the counter meds for treatment.     Past Surgical History:   Procedure Laterality Date   • CATARACT EXTRACTION Right     x2   • COLONOSCOPY     • JOINT REPLACEMENT     • REPLACEMENT TOTAL HIP ONCOLOGIC Right    • RETINAL DETACHMENT REPAIR     • TOE AMPUTATION Left 11/2017    Second phalaeng.   • TOE OSTEOPHYTE REMOVAL       PT Assessment (last 12 hours)     PT Evaluation and Treatment     Row Name 04/09/23 1300          Physical Therapy Time and Intention    Subjective Information no complaints  -CS     Document Type therapy note (daily note)  -CS     Mode of Treatment individual therapy;physical therapy  -CS     Patient Effort good  -CS     Symptoms Noted During/After Treatment none  -CS     Row Name 04/09/23 1300          Bed Mobility    Bed Mobility scooting/bridging;supine-sit  -CS     Scooting/Bridging [place] (Bed Mobility) contact guard  -CS     Supine-Sit [place] (Bed Mobility) contact guard  -CS     Assistive Device (Bed Mobility) bed rails  -CS     Row Name 04/09/23 1300          Transfers    Transfers sit-stand transfer;stand-sit transfer  -CS     Row Name 04/09/23 1300          Sit-Stand Transfer    Sit-Stand [place] (Transfers) verbal cues;minimum assist (75% patient effort);1 person assist  -CS     Assistive Device (Sit-Stand Transfers) walker, front-wheeled  -     Row Name 04/09/23 1300          Stand-Sit Transfer    Stand-Sit [place] (Transfers) verbal cues;contact guard;1 person assist  -CS     Assistive Device (Stand-Sit Transfers) walker, front-wheeled  -     Row Name 04/09/23 1300          Gait/Stairs (Locomotion)    [place] Level (Gait) verbal  cues;contact guard;1 person to manage equipment  -CS     Assistive Device (Gait) walker, front-wheeled  -CS     Distance in Feet (Gait) 225  -CS     Pattern (Gait) 4-point;step-through  -CS     Deviations/Abnormal Patterns (Gait) festinating/shuffling;gait speed decreased;base of support, wide  -CS     Bilateral Gait Deviations forward flexed posture  -CS     Row Name 04/09/23 1300          Vital Signs    Pretreatment Heart Rate (beats/min) 72  -CS     Intratreatment Heart Rate (beats/min) 77  -CS     Posttreatment Heart Rate (beats/min) 70  -CS     Pre SpO2 (%) 95  -CS     O2 Delivery Pre Treatment --  3 LPM NC  -CS     Intra SpO2 (%) 92  -CS     O2 Delivery Intra Treatment --  3 LPM NC  -CS     Post SpO2 (%) 93  -CS     O2 Delivery Post Treatment --  3 LPM NC  -CS     Pre Patient Position Supine  -CS     Intra Patient Position Standing  -CS     Post Patient Position Sitting  -CS     Row Name 04/09/23 1300          Positioning and Restraints    Pre-Treatment Position in bed  -CS     Post Treatment Position chair  -CS     In Chair sitting;call light within reach;encouraged to call for assist  -CS     Row Name 04/09/23 1300          Progress Summary (PT)    Progress Toward Functional Goals (PT) progress toward functional goals is good  -CS           User Key  (r) = Recorded By, (t) = Taken By, (c) = Cosigned By    Initials Name Provider Type    CS Hugo Duque PTA Physical Therapist Assistant                  PT Recommendation and Plan     Progress Summary (PT)  Progress Toward Functional Goals (PT): progress toward functional goals is good   Outcome Measures     Row Name 04/09/23 1300 04/08/23 1100          How much help from another person do you currently need...    Turning from your back to your side while in flat bed without using bedrails? 4  -CS 4  -COLLEEN (r) SW (t) COLLEEN (c)     Moving from lying on back to sitting on the side of a flat bed without bedrails? 4  -CS 4  -COLLEEN (r) SW (t) COLLEEN (c)     Moving to and  from a bed to a chair (including a wheelchair)? 3  -CS 3  -COLLEEN (r) SW (t) COLLEEN (c)     Standing up from a chair using your arms (e.g., wheelchair, bedside chair)? 3  -CS 3  -COLLEEN (r) SW (t) COLLEEN (c)     Climbing 3-5 steps with a railing? 3  -CS 2  -COLLEEN (r) SW (t) COLLEEN (c)     To walk in hospital room? 3  -CS 3  -COLLEEN (r) SW (t) COLLEEN (c)     AM-PAC 6 Clicks Score (PT) 20  -CS 19  -COLLEEN (r) SW (t)        Functional Assessment    Outcome Measure Options AM-PAC 6 Clicks Basic Mobility (PT)  -CS AM-PAC 6 Clicks Basic Mobility (PT)  -COLLEEN (r) SW (t) COLLEEN (c)           User Key  (r) = Recorded By, (t) = Taken By, (c) = Cosigned By    Initials Name Provider Type    Julio Bravo, PT Physical Therapist    CS Hugo Duque PTA Physical Therapist Assistant    Nikki Keen, PT Student PT Student                 Time Calculation:    PT Charges     Row Name 04/09/23 1303             Time Calculation    Start Time 1154  -CS      PT Received On 04/09/23  -CS         Timed Charges    81787 - Gait Training Minutes  15  -CS      34090 - PT Therapeutic Activity Minutes 8  -CS         Total Minutes    Timed Charges Total Minutes 23  -CS       Total Minutes 23  -CS            User Key  (r) = Recorded By, (t) = Taken By, (c) = Cosigned By    Initials Name Provider Type    CS Hugo Duque PTA Physical Therapist Assistant              Therapy Charges for Today     Code Description Service Date Service Provider Modifiers Qty    58125936300 HC GAIT TRAINING EA 15 MIN 4/9/2023 Hugo Duque PTA GP 1    22458961589 HC PT THERAPEUTIC ACT EA 15 MIN 4/9/2023 Hugo Duque PTA GP 1          PT G-Codes  Outcome Measure Options: AM-PAC 6 Clicks Basic Mobility (PT)  AM-PAC 6 Clicks Score (PT): 20  AM-PAC 6 Clicks Score (OT): 21    Hugo Duque PTA  4/9/2023

## 2023-04-09 NOTE — PROGRESS NOTES
"Pulmonary / Critical Care Progress Note      Patient Name: Giles Donvoan  : 1945  MRN: [id number]  Primary Care Physician:  Morena Masters MD  Referring Physician: No ref. provider found  Date of admission: 2023    Subjective   Subjective     Reason for Consult/ Chief Complaint: Shortness of breath x1 week, hemoptysis, acute hypoxic respiratory failure    Past 24-hour events,  Working with PT and OT, weaned off of supplemental oxygen at times, utilized CPAP at 10 mmHg overnight.  Continues on Lasix and antibiotics of Zosyn    This morning,  Sitting up in recliner and eating his meal  Patient feels his breathing has improved, states he coughs but not productive  Diuresing well  Comfortable on  2L nasal cannula with oxygen saturations 100%  Patient states that he does not use home oxygen anymore-used to have it prescribed but was unable to \"change the tanks\"  Was able to tolerate CPAP better and wore it all throughout the night  Encouraging patient to be out of bed to chair today and to utilize incentive spirometry and flutter valve  Patient has been accepted for home health  Discussed bronchoscopy for tomorrow, advised him to take nothing by mouth starting at midnight    HPI:  Giles Donovan is a 77 y.o. male with a past medical history significant for lung disease (patient unsure which type), uses albuterol and Flovent inhalers at home, diabetes, PTSD and depression, hyperlipidemia, hypertension and [facility]ness of breath and coughing.  He presents emergency department with worsening coughing and shortness of breath for the past week and endorses dark blood tinged sputum that is now light pink.  His oxygen saturations were in the 60%'s on room air, required up to 6 L nasal cannula and is now at 5 L satting 98%.  He denies chest pain, denies smoking, denies sick contacts.  He endorses a 30 to 40 pound weight gain over " the past few months.  He did have a home CPAP machine that he has used for approximately 4 years, unfortunately a part broke on the machine and [facility] not [facility].  He has gone approximately 12 months without a home CPAP machine.  No fever but states that he felt chills last night.  His emergency room work-up included a chest CT that ruled out PE, did show multifocal pneumonia and some pulmonary edema.  proBNP 1793, white blood count 13, Pro-Tereso 0.49, flu swab negative.  Patient received empiric antibiotics of vancomycin and Zosyn, was given a 2 L bolus of normal saline, and started on nebulizer treatments.    Review of Systems  General:  No Fatigue, No Fever.  Weight gain  HEENT: No dysphagia, No Visual Changes, no rhinorrhea  Respiratory:  + Productive cough,+Dyspnea, dark brown to light pink phlegm, No Pleuritic Pain, no wheezing, +hemoptysis  Cardiovascular: Denies chest pain, denies palpitations,+COYLE, No Chest Pressure  Gastrointestinal:  No Abdominal Pain, No Nausea, No Vomiting, No Diarrhea  Genitourinary:  No Dysuria, No Frequency, No Hesitancy  Musculoskeletal: No muscle pain or swelling  Endocrine:  No Heat Intolerance, No Cold Intolerance  Hematologic:  No Bleeding, No Bruising  Psychiatric:  No Anxiety, No Depression  Neurologic:  No Confusion, no Dysarthria, No Headaches  Skin:  No Rash, No Open Wounds        Personal History     Past Medical History:   Diagnosis Date   • Allergic rhinitis 12/27/2014    Will try Zyrtec, he didnt want to use a nasal spray.   • Arthritis    • Asthma    • Cataract     left eye   • Cough 03/30/2016    PFT and CXR ordered.   • Depression     PTSD   • Diabetes    • Elevated cholesterol    • H/O psychiatric care 1999    PTSD   • Hyperlipidemia 03/30/2016    Lipids ordered. Continue on current medication.   • Hypertension    • Hypothyroidism    • Seasonal allergies    • Shortness of breath    • Sleep apnea    • Stenosis of right carotid artery 02/19/2017     Will refer to vascular surgeon.   • Syncope 02/19/2017   • Type 2 diabetes mellitus    • Upper respiratory infection 10/18/2015    Will treat cough with Hydromet, otherwise over the counter meds for treatment.       Past Surgical History:   Procedure Laterality Date   • CATARACT EXTRACTION Right     x2   • COLONOSCOPY     • JOINT REPLACEMENT     • REPLACEMENT TOTAL HIP ONCOLOGIC Right    • RETINAL DETACHMENT REPAIR     • TOE AMPUTATION Left 11/2017    Second phalaeng.   • TOE OSTEOPHYTE REMOVAL         Family History: family history includes Arthritis in his mother; Heart disease in his mother; Kidney disease in his sister; Lupus in his mother.     Social History:  reports that he has never smoked. He has never used smokeless tobacco. He reports current alcohol use. He reports that he does not use drugs.    Home Medications:  Insulin Glargine, Insulin Pen Needle, PARoxetine, albuterol sulfate HFA, aspirin, atorvastatin, cetirizine, ezetimibe, felodipine, gabapentin, insulin aspart, levothyroxine, losartan, metFORMIN, omeprazole, oxybutynin XL, and traZODone    Allergies:  Allergies   Allergen Reactions   • Latex Rash and Anaphylaxis   • Latex, Natural Rubber Itching       Objective    Objective     Vitals:   Temp:  [97.7 °F (36.5 °C)-98.8 °F (37.1 °C)] 98.8 °F (37.1 °C)  Heart Rate:  [69-78] 70  Resp:  [17-21] 18  BP: (121-142)/(59-77) 121/59  Flow (L/min):  [2] 2    Physical Exam:  Vital Signs Reviewed   General:  Alert, NAD. Sitting up in chair   HEENT:  PERRL, EOMI.    Neck:  No JVD, no thyromegaly  Lymph: no axillary, cervical, supraclavicular lymphadenopathy noted bilaterally  Chest:  rhonchi noted bilaterally, no work of breathing noted  CV: RRR, no M/G/R, pulses 2+  Abd:  Soft, NT, ND, +BS  EXT:  no clubbing, no cyanosis, no edema  Neuro:  A&Ox3, CN grossly intact, no focal deficits.  Skin: No rashes or lesions noted     Result Review    Result Review:  I have personally reviewed the results from the time of  this admission to 4/9/2023 10:42 EDT and agree with these findings:  [x]  Laboratory  [x]  Microbiology  [x]  Radiology  []  EKG/Telemetry   []  Cardiology/Vascular   []  Pathology  [x]  Old records  []  Other:  Most notable findings include: WBC 11, respiratory viral panel in progress, blood cultures ngtd, urine Antigens neg, MRSA and flu swabs all negative        Lab 04/09/23  0750 04/08/23  0441 04/07/23  1248   WBC 11.86* 14.06* 13.07*   HEMOGLOBIN 11.9* 10.8* 12.3*   HEMATOCRIT 36.9* 32.7* 37.2*   PLATELETS 200 179 202   SODIUM 138 138 136   POTASSIUM 3.6 3.8 4.1   CHLORIDE 97* 101 98   CO2 29.7* 27.9 24.7   BUN 14 15 15   CREATININE 0.93 0.98 0.95   GLUCOSE 217* 145* 148*   CALCIUM 9.2 8.1* 8.5*   TOTAL PROTEIN  --  6.3 6.6   ALBUMIN  --  3.7 4.0   GLOBULIN  --   --  2.6     CT Chest With Contrast Diagnostic    Result Date: 4/7/2023  PROCEDURE: CT CHEST W CONTRAST DIAGNOSTIC  COMPARISON:  [facility], CT, CT CHEST W CONTRAST DIAGNOSTIC, 12/28/2021, 16:07.  INDICATIONS: Shortness of breath, hypoxia, hemoptysis  TECHNIQUE: After obtaining the patient's consent, CT images were obtained with non-ionic intravenous contrast material.   PROTOCOL:   Standard imaging protocol performed    RADIATION:   DLP: 521.2 mGy*cm   Automated exposure control was utilized to minimize radiation dose. CONTRAST: 75 cc Isovue 370 I.V.  FINDINGS:  The visualized soft tissue structures at the base of the neck including the thyroid appear within normal limits.  There is no lower cervical or axillary adenopathy.  The heart size is normal.  There is no pericardial effusion.  The aorta is normal in caliber without evidence of aneurysm formation.  The main pulmonary artery is normal in caliber.  There is no evidence of pulmonary embolism.  There are reactive mediastinal or hilar lymphadenopathy by size criteria.  The tracheobronchial tree is patent.  There is no abnormal bronchial wall thickening or bronchiectasis.  There is  patchy bilateral consolidation throughout both lungs, left worse than right.  There is mild smooth interlobular septal thickening.  There are small bilateral pleural effusions.  There is no evidence of pneumothorax.  The esophagus is normal in course and caliber.  There is partial visualization of a 2.7 cm low-density lesion within the inferior aspect of the left kidney.  This is incompletely evaluated on CT.  Follow-up with initial renal ultrasound would be recommended. There are degenerative changes of the thoracic spine.      Impression:   1. Bilateral multifocal consolidation, left worse than right favored to represent multifocal pneumonia.  There are background findings of smooth interlobular septal thickening and small bilateral pleural effusions which can be seen with volume overload.  Alveolar edema is felt to be a less likely differential consideration for the consolidation.  2. No evidence of pulmonary embolism. 3. Partial visualization of a 2.7 cm low-density lesion within the inferior aspect of the left kidney.  Nonemergent initial imaging assessment with renal ultrasound is recommended.      PRESTON RILEY MD       Electronically Signed and Approved By: PRESTON RILEY MD on 4/07/2023 at 14:26                 Assessment & Plan   Assessment / Plan     Active Hospital Problems:  Active Hospital Problems    Diagnosis    • **Sepsis due to pneumonia    • Acute respiratory failure with hypoxia    • Multifocal pneumonia    • Insulin dependent type 2 diabetes mellitus    • Mixed hyperlipidemia    • Depression    • Hypertension    • Hypothyroidism      Impression:  Acute hypoxic respiratory failure  Heart failure  Reported lung disease  Remote history of COVID  Fully vaccinated against COVID, flu and pneumonia  MIAH with NIPPV  Pneumonia    Plan:  -Fluctuating between room air and 2 L nasal cannula; continue to wean as tolerated keep SPO2 greater than 90%  -CT chest with extensive, dense bilateral pneumonia  left > right  -Continue medical management for now; consider bronchoscopy Monday with Dr. Ochoa if symptoms do not improve.  Risk and benefits of bronchoscopy explained to patient.  Risk include bleeding, infection, pneumothorax, even death can occur.  Patient expressed understanding.    -NPO at midnight  -Patient utilizes CPAP at home, 10 mmHg pressure, please use at night and during naps  -Continue Lasix 40 mg every 12 hours, strict intake and output monitoring; monitor renal function and electrolytes  -Continue empiric antibiotics Zosyn; vancomycin discontinued now that MRSA swab is negative .Tailor antibiotics as microbiology results  -Blood cultures x2 no growth to date, and respiratory virus panel pending and follow-up results  -Sputum culture ordered, unable to produce sample, once sample obtained please send to lab  -Legionella and strep pneumonia antigens are negative  -Continue nebulizer treatments of Brovana and Pulmicort  -DuoNebs as needed  -Echocardiogram completed, EF 51%, left ventricular diastolic function consistent with grade 1 impaired relaxation    I, Dr. Dash Duff, spent >50% of time in accordance with split shared billing. This included personally reviewing all pertinent labs, imaging, microbiology and documentation. Also discussing the case with the patient and any available family, the admitting physician and any available ancillary staff.     Electronically signed by Dash Duff MD, 04/09/23, 5:13 PM EDT.

## 2023-04-09 NOTE — PLAN OF CARE
Goal Outcome Evaluation:         Pt is compliant with using the bipap. Pt wears 2lnc while off the bipap.

## 2023-04-09 NOTE — PLAN OF CARE
Goal Outcome Evaluation:  POC Reviewed with: Patient  Status: Progressing    Patient pleasant, cooperative with all care. Using flutter valve and IS for respiratory toilet. Noted with infrequent cough, minimally productive. Good appetite, ambulated in hallway with PT, up ad artemio in room. No complaints of pain or nausea this shift. VSS.

## 2023-04-09 NOTE — PLAN OF CARE
Goal Outcome Evaluation:               Pt wore CPAP for duration of the night. Settings:CPAP 10, 30%

## 2023-04-09 NOTE — PLAN OF CARE
Goal Outcome Evaluation:              Outcome Evaluation: Pt A/O. Wore bipap this shift. No complaints of pain. Voids per urinal. Belongings within reach. Will continue with POC.

## 2023-04-09 NOTE — PROGRESS NOTES
[facility]   Hospitalist Progress Note  Date: 2023  Patient Name: Giles Donovan  : 1945  MRN: [id number]  Date of admission: 2023      Subjective   Subjective     Chief Complaint: Follow up for shortness of breath    Summary:  77 y.o. male with HTN, HLD, type II DM, BPH, previous COVID infection 2021 who presented with SOB.  On presentation SPO2 50% on room air with tachypnea.  Requiring 6 L nasal cannula.  Met SIRS criteria.  WBC 13.  Lactate WNL.  CT chest negative for PE but with bilateral infiltrates and small pleural effusions.  On IV antibiotics.  Pulmonology on board.  Improving    Interval Followup: VSS.  Down to 2 L.  Feeling better.  Less COYLE.  No fever or chills.  No myalgias.  No edema or orthopnea.  Ambulating to chair/ bathroom without much issue    Review of Systems  Cardiovascular:  No Chest Pain, No Edema  Respiratory:  No Cough, +Dyspnea (improved)  Gastrointestinal:  No Nausea, No Vomiting    Objective   Objective     Vitals:   Temp:  [97.7 °F (36.5 °C)-99 °F (37.2 °C)] 99 °F (37.2 °C)  Heart Rate:  [63-78] 63  Resp:  [17-21] 17  BP: (114-142)/(52-77) 114/52  Flow (L/min):  [2] 2  Physical Exam               Constitutional: Elderly male, up in chair, conversant, no acute distress              Eyes: Pupils equal and reactive, no conjunctival injection              HENT: NCAT, nares patent, MMM              Respiratory: 2L NC, equal aeration bilaterally, no wheezing, nonlabored respiration              Cardiovascular: RRR, no murmurs, no edema              Gastrointestinal: Positive bowel sounds, soft, nontender, nondistended              Musculoskeletal: No gross joint deformities, no clubbing or cyanosis to extremities              Neurologic: Alert, Cranial Nerves grossly intact, speech clear              Skin: Warm and dry, no rashes    Result Review    Result Review:  I have personally reviewed the following over the last 24 hours (07:00 to 07:00) and agree  with the following findings  [x]  Laboratory   CBC    CBC 4/7/23 4/8/23 4/9/23   WBC 13.07 (A) 14.06 (A) 11.86 (A)   RBC 3.86 (A) 3.40 (A) 3.79 (A)   Hemoglobin 12.3 (A) 10.8 (A) 11.9 (A)   Hematocrit 37.2 (A) 32.7 (A) 36.9 (A)   MCV 96.4 96.2 97.4 (A)   MCH 31.9 31.8 31.4   MCHC 33.1 33.0 32.2   RDW 13.6 13.7 13.7   Platelets 202 179 200   (A) Abnormal value            BMP    BMP 4/7/23 4/8/23 4/9/23   BUN 15 15 14   Creatinine 0.95 0.98 0.93   Sodium 136 138 138   Potassium 4.1 3.8 3.6   Chloride 98 101 97 (A)   CO2 24.7 27.9 29.7 (A)   Calcium 8.5 (A) 8.1 (A) 9.2   (A) Abnormal value            Procalcitonin 0.49    [x]  Microbiology  MRSA PCR negative   Strep/Legionella urinary antigen negative  COVID/influenza negative  Blood culture NGTD  [x]  Radiology   [x]  EKG/Telemetry monitor personally reviewed: NSR, no events  [x]  Cardiology/Vascular    TTE  Interpretation Summary       •  Left ventricular systolic function is normal. Calculated left ventricular EF = 51%  •  Left ventricular diastolic function is consistent with (grade I) impaired relaxation.  •  There is calcification of the aortic valve.    []  Pathology  []  Old records  [x]  Other:     Intake/Output Summary (Last 24 hours) at 4/9/2023 1329  Last data filed at 4/9/2023 1035  Gross per 24 hour   Intake 420 ml   Output 2900 ml   Net -2480 ml         Assessment & Plan   Assessment / Plan     Assessment:  Active Hospital Problems    Diagnosis  POA   • **Sepsis due to pneumonia [J18.9, A41.9]  Yes   • Diastolic CHF, chronic [I50.32]  Unknown   • Acute respiratory failure with hypoxia [J96.01]  Yes   • Multifocal pneumonia [J18.9]  Yes   • Insulin dependent type 2 diabetes mellitus [E11.9, Z79.4]  Not Applicable   • Mixed hyperlipidemia [E78.2]  Unknown   • Depression [F32.A]  Yes   • Hypertension [I10]  Yes   • Hypothyroidism [E03.9]  Yes          Plan:    · Respiratory panel PCR pending.  Unable to get respiratory culture.  On day 3 of Zosyn, continue.    · Pulmonology following; may need bronchoscopy  · Continue Brovana/Pulmicort nebs twice daily  · Wean supplemental oxygen, SPO2 goal >90%. Walk test prior to dc  · TTE with diastolic dysfunction.  Net -2.5 L. Creatinine stable.  Potassium WNL.  Continue IV Lasix 40 mg twice daily.  Strict I/Os.  Trend renal function and electrolytes.  · Blood sugars not at goal.  Increase Levemir 25 units q. Nightly + moderate SSI  · Continue baby aspirin and statin  · Continue Paxil 40 mg q.daily  · Continue Protonix  · Continue Synthroid  · Continue gabapentin 600 mg q12 hours  · Activity ad artemio.  Continue PT/OT   · CBC, CMP in A.M    Discussed plan with RN and pulmonology    DVT prophylaxis:  Mechanical DVT prophylaxis orders are present.    CODE STATUS:   Level Of Support Discussed With: Patient  Code Status (Patient has no pulse and is not breathing): CPR (Attempt to Resuscitate)  Medical Interventions (Patient has pulse or is breathing): Full Support      Electronically signed by Lincoln Chau DO, 04/09/23, 1:29 PM EDT.

## 2023-04-10 LAB
ANION GAP SERPL CALCULATED.3IONS-SCNC: 10.5 MMOL/L (ref 5–15)
BUN SERPL-MCNC: 15 MG/DL (ref 8–23)
BUN/CREAT SERPL: 12.9 (ref 7–25)
CALCIUM SPEC-SCNC: 9.3 MG/DL (ref 8.6–10.5)
CHLORIDE SERPL-SCNC: 92 MMOL/L (ref 98–107)
CO2 SERPL-SCNC: 32.5 MMOL/L (ref 22–29)
CREAT SERPL-MCNC: 1.16 MG/DL (ref 0.76–1.27)
DEPRECATED RDW RBC AUTO: 47 FL (ref 37–54)
EGFRCR SERPLBLD CKD-EPI 2021: 65.3 ML/MIN/1.73
ERYTHROCYTE [DISTWIDTH] IN BLOOD BY AUTOMATED COUNT: 13.2 % (ref 12.3–15.4)
GLUCOSE BLDC GLUCOMTR-MCNC: 251 MG/DL (ref 70–99)
GLUCOSE BLDC GLUCOMTR-MCNC: 303 MG/DL (ref 70–99)
GLUCOSE BLDC GLUCOMTR-MCNC: 304 MG/DL (ref 70–99)
GLUCOSE BLDC GLUCOMTR-MCNC: 304 MG/DL (ref 70–99)
GLUCOSE SERPL-MCNC: 260 MG/DL (ref 65–99)
HCT VFR BLD AUTO: 37.7 % (ref 37.5–51)
HGB BLD-MCNC: 12.2 G/DL (ref 13–17.7)
MCH RBC QN AUTO: 31.4 PG (ref 26.6–33)
MCHC RBC AUTO-ENTMCNC: 32.4 G/DL (ref 31.5–35.7)
MCV RBC AUTO: 96.9 FL (ref 79–97)
PLATELET # BLD AUTO: 211 10*3/MM3 (ref 140–450)
PMV BLD AUTO: 9.5 FL (ref 6–12)
POTASSIUM SERPL-SCNC: 3.8 MMOL/L (ref 3.5–5.2)
RBC # BLD AUTO: 3.89 10*6/MM3 (ref 4.14–5.8)
SODIUM SERPL-SCNC: 135 MMOL/L (ref 136–145)
WBC NRBC COR # BLD: 9.62 10*3/MM3 (ref 3.4–10.8)

## 2023-04-10 PROCEDURE — 25010000002 PIPERACILLIN SOD-TAZOBACTAM PER 1 G: Performed by: INTERNAL MEDICINE

## 2023-04-10 PROCEDURE — 99233 SBSQ HOSP IP/OBS HIGH 50: CPT | Performed by: INTERNAL MEDICINE

## 2023-04-10 PROCEDURE — 94799 UNLISTED PULMONARY SVC/PX: CPT

## 2023-04-10 PROCEDURE — 85027 COMPLETE CBC AUTOMATED: CPT | Performed by: INTERNAL MEDICINE

## 2023-04-10 PROCEDURE — 80048 BASIC METABOLIC PNL TOTAL CA: CPT | Performed by: INTERNAL MEDICINE

## 2023-04-10 PROCEDURE — 99232 SBSQ HOSP IP/OBS MODERATE 35: CPT | Performed by: INTERNAL MEDICINE

## 2023-04-10 PROCEDURE — 63710000001 INSULIN DETEMIR PER 5 UNITS: Performed by: INTERNAL MEDICINE

## 2023-04-10 PROCEDURE — 97116 GAIT TRAINING THERAPY: CPT

## 2023-04-10 PROCEDURE — 94660 CPAP INITIATION&MGMT: CPT

## 2023-04-10 PROCEDURE — 25010000002 FUROSEMIDE PER 20 MG: Performed by: NURSE PRACTITIONER

## 2023-04-10 PROCEDURE — 63710000001 INSULIN LISPRO (HUMAN) PER 5 UNITS: Performed by: INTERNAL MEDICINE

## 2023-04-10 PROCEDURE — 82962 GLUCOSE BLOOD TEST: CPT

## 2023-04-10 RX ORDER — AMOXICILLIN AND CLAVULANATE POTASSIUM 875; 125 MG/1; MG/1
1 TABLET, FILM COATED ORAL EVERY 12 HOURS SCHEDULED
Status: DISCONTINUED | OUTPATIENT
Start: 2023-04-10 | End: 2023-04-11 | Stop reason: HOSPADM

## 2023-04-10 RX ADMIN — GUAIFENESIN 600 MG: 600 TABLET, EXTENDED RELEASE ORAL at 20:31

## 2023-04-10 RX ADMIN — ATORVASTATIN CALCIUM 80 MG: 40 TABLET, FILM COATED ORAL at 08:03

## 2023-04-10 RX ADMIN — ARFORMOTEROL TARTRATE 15 MCG: 15 SOLUTION RESPIRATORY (INHALATION) at 20:40

## 2023-04-10 RX ADMIN — FUROSEMIDE 40 MG: 10 INJECTION, SOLUTION INTRAMUSCULAR; INTRAVENOUS at 04:21

## 2023-04-10 RX ADMIN — PIPERACILLIN SODIUM AND TAZOBACTAM SODIUM 3.38 G: 3; .375 INJECTION, POWDER, LYOPHILIZED, FOR SOLUTION INTRAVENOUS at 05:27

## 2023-04-10 RX ADMIN — FUROSEMIDE 40 MG: 10 INJECTION, SOLUTION INTRAMUSCULAR; INTRAVENOUS at 17:15

## 2023-04-10 RX ADMIN — AMOXICILLIN AND CLAVULANATE POTASSIUM 1 TABLET: 875; 125 TABLET, FILM COATED ORAL at 20:31

## 2023-04-10 RX ADMIN — GABAPENTIN 600 MG: 300 CAPSULE ORAL at 20:31

## 2023-04-10 RX ADMIN — GUAIFENESIN 600 MG: 600 TABLET, EXTENDED RELEASE ORAL at 08:23

## 2023-04-10 RX ADMIN — ARFORMOTEROL TARTRATE 15 MCG: 15 SOLUTION RESPIRATORY (INHALATION) at 06:48

## 2023-04-10 RX ADMIN — BUDESONIDE 0.5 MG: 0.5 SUSPENSION RESPIRATORY (INHALATION) at 06:48

## 2023-04-10 RX ADMIN — INSULIN LISPRO 7 UNITS: 100 INJECTION, SOLUTION INTRAVENOUS; SUBCUTANEOUS at 12:44

## 2023-04-10 RX ADMIN — OXYBUTYNIN CHLORIDE 10 MG: 5 TABLET, EXTENDED RELEASE ORAL at 08:03

## 2023-04-10 RX ADMIN — SENNOSIDES AND DOCUSATE SODIUM 2 TABLET: 8.6; 5 TABLET ORAL at 08:03

## 2023-04-10 RX ADMIN — INSULIN DETEMIR 30 UNITS: 100 INJECTION, SOLUTION SUBCUTANEOUS at 20:32

## 2023-04-10 RX ADMIN — PIPERACILLIN SODIUM AND TAZOBACTAM SODIUM 3.38 G: 3; .375 INJECTION, POWDER, LYOPHILIZED, FOR SOLUTION INTRAVENOUS at 12:44

## 2023-04-10 RX ADMIN — Medication 10 ML: at 20:32

## 2023-04-10 RX ADMIN — GABAPENTIN 600 MG: 300 CAPSULE ORAL at 08:03

## 2023-04-10 RX ADMIN — Medication 10 ML: at 10:42

## 2023-04-10 RX ADMIN — BUDESONIDE 0.5 MG: 0.5 SUSPENSION RESPIRATORY (INHALATION) at 20:40

## 2023-04-10 RX ADMIN — INSULIN LISPRO 7 UNITS: 100 INJECTION, SOLUTION INTRAVENOUS; SUBCUTANEOUS at 17:15

## 2023-04-10 RX ADMIN — TRAZODONE HYDROCHLORIDE 100 MG: 100 TABLET ORAL at 20:31

## 2023-04-10 RX ADMIN — INSULIN LISPRO 6 UNITS: 100 INJECTION, SOLUTION INTRAVENOUS; SUBCUTANEOUS at 08:02

## 2023-04-10 RX ADMIN — PAROXETINE HYDROCHLORIDE 40 MG: 20 TABLET, FILM COATED ORAL at 17:16

## 2023-04-10 NOTE — PLAN OF CARE
Problem: Adult Inpatient Plan of Care  Goal: Plan of Care Review  Outcome: Ongoing, Progressing  Flowsheets (Taken 4/10/2023 1842)  Plan of Care Reviewed With: patient  Outcome Evaluation: Patient on 2L NC. Bronchscopy not completed today. Walk test completed. No complaints of pain. Blood sugars monitored and insulin administered per MAR.   Goal Outcome Evaluation:  Plan of Care Reviewed With: patient           Outcome Evaluation: Patient on 2L NC. Bronchscopy not completed today. Walk test completed. No complaints of pain. Blood sugars monitored and insulin administered per MAR.

## 2023-04-10 NOTE — PLAN OF CARE
Goal Outcome Evaluation:         Pt wears cpap nightly and as needed. Pt on 2lnc otherwise, which is his home use.

## 2023-04-10 NOTE — THERAPY TREATMENT NOTE
Acute Care - Physical Therapy Treatment Note   [facility]     Patient Name: Giles Donovan  : 1945  MRN: [id number]  Today's Date: 4/10/2023      Visit Dx:     ICD-10-CM ICD-9-CM   1. Sepsis, due to unspecified organism, unspecified whether acute organ dysfunction present  A41.9 038.9     995.91   2. Pneumonia due to infectious organism, unspecified laterality, unspecified part of lung  J18.9 486   3. Hypoxia  R09.02 799.02   4. Multifocal pneumonia  J18.9 486   5. Acute respiratory failure with hypoxia  J96.01 518.81   6. Decreased activities of daily living (ADL)  Z78.9 V49.89   7. Difficulty in walking  R26.2 719.7     Patient Active Problem List   Diagnosis   • Allergic rhinitis   • Cough   • Controlled type 2 diabetes mellitus without complication, without long-term current use of insulin   • GERD (gastroesophageal reflux disease)   • Hyperlipidemia   • Hypertension   • Hypothyroidism   • Stenosis of right carotid artery   • Syncope   • Urge incontinence of urine   • Erectile dysfunction   • Benign prostatic hyperplasia with urinary frequency   • Cardiomyopathy   • Cataract   • Chest pain with low risk for cardiac etiology   • Right-sided carotid artery occlusion without cerebral infarction   • Depression   • Esophageal reflux   • Vaso vagal episode   • Pneumonia due to COVID-19 virus   • Hypoxia   • Chronic lung disease   • Sepsis due to pneumonia   • Acute respiratory failure with hypoxia   • Multifocal pneumonia   • Insulin dependent type 2 diabetes mellitus   • Mixed hyperlipidemia   • Diastolic CHF, chronic     Past Medical History:   Diagnosis Date   • Allergic rhinitis 2014    Will try Zyrtec, he didnt want to use a nasal spray.   • Arthritis    • Asthma    • Cataract     left eye   • Cough 2016    PFT and CXR ordered.   • Depression     PTSD   • Diabetes    • Elevated cholesterol    • H/O psychiatric care     PTSD   • Hyperlipidemia 2016    Lipids ordered. Continue on  current medication.   • Hypertension    • Hypothyroidism    • Seasonal allergies    • Shortness of breath    • Sleep apnea    • Stenosis of right carotid artery 02/19/2017    Will refer to vascular surgeon.   • Syncope 02/19/2017   • Type 2 diabetes mellitus    • Upper respiratory infection 10/18/2015    Will treat cough with Hydromet, otherwise over the counter meds for treatment.     Past Surgical History:   Procedure Laterality Date   • CATARACT EXTRACTION Right     x2   • COLONOSCOPY     • JOINT REPLACEMENT     • REPLACEMENT TOTAL HIP ONCOLOGIC Right    • RETINAL DETACHMENT REPAIR     • TOE AMPUTATION Left 11/2017    Second phalaeng.   • TOE OSTEOPHYTE REMOVAL       PT Assessment (last 12 hours)     PT Evaluation and Treatment     Row Name 04/10/23 1132          Physical Therapy Time and Intention    Subjective Information no complaints (P)   -AT     Document Type therapy note (daily note) (P)   -AT     Mode of Treatment individual therapy;physical therapy (P)   -AT     Patient Effort good (P)   -AT     Symptoms Noted During/After Treatment fatigue (P)   -AT     Row Name 04/10/23 1132          General Information    Patient Profile Reviewed yes (P)   -AT     Patient Observations alert;cooperative;agree to therapy (P)   -AT     Equipment Currently Used at Home cane, straight;rollator (P)   -AT     Existing Precautions/Restrictions fall;oxygen therapy device and L/min (P)   -AT     Barriers to Rehab none identified (P)   -AT     Row Name 04/10/23 1132          Cognition    Affect/Mental Status (Cognition) WFL (P)   -AT     Orientation Status (Cognition) oriented x 4 (P)   -AT     Follows Commands (Cognition) WFL (P)   -AT     Cognitive Function WFL (P)   -AT     Row Name 04/10/23 1132          Bed Mobility    Bed Mobility supine-sit (P)   -AT     All Activities, [place]kelman (Bed Mobility) standby assist (P)   -AT     Supine-Sit [place] (Bed Mobility) standby assist (P)   -AT     Assistive Device (Bed  Mobility) bed rails (P)   -AT     Row Name 04/10/23 1132          Transfers    Transfers sit-stand transfer;stand-sit transfer (P)   -AT     Row Name 04/10/23 1132          Sit-Stand Transfer    Sit-Stand Holt (Transfers) standby assist (P)   -AT     Assistive Device (Sit-Stand Transfers) walker, front-wheeled (P)   -AT     Row Name 04/10/23 1132          Stand-Sit Transfer    Stand-Sit Holt (Transfers) standby assist (P)   -AT     Assistive Device (Stand-Sit Transfers) walker, front-wheeled (P)   -AT     Row Name 04/10/23 1132          Gait/Stairs (Locomotion)    Gait/Stairs Locomotion gait/ambulation independence;gait/ambulation assistive device (P)   -AT     Holt Level (Gait) contact guard (P)   -AT     Assistive Device (Gait) walker, front-wheeled (P)   -AT     Distance in Feet (Gait) 120 (P)   -AT     Pattern (Gait) 4-point;step-through (P)   -AT     Deviations/Abnormal Patterns (Gait) gait speed decreased;stride length decreased (P)   -AT     Bilateral Gait Deviations forward flexed posture (P)   -AT     Gait Assessment/Intervention patient ambulated 120 feet on 2L o2. o2 sats decreased to 88% after ambulating, increased to 90% and above after 1-2 min of rest. (P)   -AT     Row Name 04/10/23 1132          Balance    Balance Assessment standing dynamic balance (P)   -AT     Dynamic Standing Balance contact guard (P)   -AT     Position/Device Used, Standing Balance supported;walker, front-wheeled (P)   -AT     Row Name 04/10/23 1132          Progress Summary (PT)    Daily Progress Summary (PT) Patient tolerated ambulating in room today with moderate difficulty due to slight fatigue. He will continue to benefit from physical therapy services while in the hospital. (P)   -AT           User Key  (r) = Recorded By, (t) = Taken By, (c) = Cosigned By    Initials Name Provider Type    AT Tania Almanza PT Student PT Student                  PT Recommendation and Plan     Progress Summary  (PT)  Daily Progress Summary (PT): (P) Patient tolerated ambulating in room today with moderate difficulty due to slight fatigue. He will continue to benefit from physical therapy services while in the hospital.   Outcome Measures     Row Name 04/10/23 1136 04/09/23 1300 04/08/23 1100       How much help from another person do you currently need...    Turning from your back to your side while in flat bed without using bedrails? 4 (P)   -AT 4  -CS 4  -COLLEEN (r) SW (t) COLLEEN (c)    Moving from lying on back to sitting on the side of a flat bed without bedrails? 4 (P)   -AT 4  -CS 4  -COLLEEN (r) SW (t) COLLEEN (c)    Moving to and from a bed to a chair (including a wheelchair)? 3 (P)   -AT 3  -CS 3  -COLLEEN (r) SW (t) COLLEEN (c)    Standing up from a chair using your arms (e.g., wheelchair, bedside chair)? 3 (P)   -AT 3  -CS 3  -COLLEEN (r) SW (t) COLLEEN (c)    Climbing 3-5 steps with a railing? 3 (P)   -AT 3  -CS 2  -COLLEEN (r) SW (t) COLLEEN (c)    To walk in hospital room? 4 (P)   -AT 3  -CS 3  -COLLEEN (r) SW (t) COLLEEN (c)    AM-PAC 6 Clicks Score (PT) 21 (P)   -AT 20  -CS 19  -COLLEEN (r) SW (t)       Functional Assessment    Outcome Measure Options AM-PAC 6 Clicks Basic Mobility (PT) (P)   -AT AM-PAC 6 Clicks Basic Mobility (PT)  -CS AM-PAC 6 Clicks Basic Mobility (PT)  -COLLEEN (r) SW (t) COLLEEN (c)          User Key  (r) = Recorded By, (t) = Taken By, (c) = Cosigned By    Initials Name Provider Type    Julio Bravo, PT Physical Therapist    Hugo Ricci, PTA Physical Therapist Assistant    AT Tania Almanza, PT Student PT Student    SW Nikki Pretty, PT Student PT Student                 Time Calculation:    PT Charges     Row Name 04/10/23 1137             Time Calculation    PT Received On 04/10/23 (P)   -AT         Timed Charges    82212 - Gait Training Minutes  15 (P)   -AT         Total Minutes    Timed Charges Total Minutes 15 (P)   -AT       Total Minutes 15 (P)   -AT            User Key  (r) = Recorded By, (t) = Taken By, (c) = Cosigned By     Initials Name Provider Type    AT Tania Almanza, PT Student PT Student              Therapy Charges for Today     Code Description Service Date Service Provider Modifiers Qty    05128732148 HC GAIT TRAINING EA 15 MIN 4/10/2023 Tania Almanza, PT Student GP 1    95095120308 HC GAIT TRAINING EA 15 MIN 4/10/2023 Tania Almanza, PT Student GP 1          PT G-Codes  Outcome Measure Options: (P) AM-PAC 6 Clicks Basic Mobility (PT)  AM-PAC 6 Clicks Score (PT): (P) 21  AM-PAC 6 Clicks Score (OT): 21    Tania Almanza PT Student  4/10/2023

## 2023-04-10 NOTE — PROGRESS NOTES
Pulmonary / Critical Care Progress Note      Patient Name: Giles Donovan  : 1945  MRN: [id number]  Primary Care Physician:  Morena Masters MD  Referring Physician: No ref. provider found  Date of admission: 2023    Subjective   Subjective       Reason for Consult/ Chief Complaint: Shortness of breath x1 week, hemoptysis, acute hypoxic respiratory failure    No acute events overnight.  Wore CPAP.    This morning,  Lying back in bed on 2 L nasal cannula with SPO2 of 96%  Reports feeling better  Productive cough with small amount of sputum  Denies shortness of air  Diuresing well  -2.2 L fluid balance  No chest pain  No fever/chills    Review of Systems  General:  No Fatigue, No Fever.  Weight gain  HEENT: No dysphagia, No Visual Changes, no rhinorrhea  Respiratory:  + Productive cough, No dyspnea, No Pleuritic Pain, no wheezing  Cardiovascular: Denies chest pain, denies palpitations,+COYLE, No Chest Pressure  Gastrointestinal:  No Abdominal Pain, No Nausea, No Vomiting, No Diarrhea  Genitourinary:  No Dysuria, No Frequency, No Hesitancy  Musculoskeletal: No muscle pain or swelling    Allergies:  Allergies   Allergen Reactions   • Latex Rash and Anaphylaxis   • Latex, Natural Rubber Itching       Objective    Objective     Vitals:   Temp:  [97.5 °F (36.4 °C)-99 °F (37.2 °C)] 98.2 °F (36.8 °C)  Heart Rate:  [63-84] 71  Resp:  [16-22] 22  BP: (116-154)/(47-85) 121/54  Flow (L/min):  [2-8] 2    Physical Exam:  Vital Signs Reviewed   General:  Alert, NAD. Sitting up in chair   HEENT:  PERRL, EOMI.    Neck:  No JVD, no thyromegaly  Lymph: no axillary, cervical, supraclavicular lymphadenopathy noted bilaterally  Chest:  rhonchi noted bilaterally, no work of breathing noted  CV: RRR, no M/G/R, pulses 2+  Abd:  Soft, NT, ND, +BS  EXT:  no clubbing, no cyanosis, no edema  Neuro:  A&Ox3, CN grossly intact, no focal deficits.  Skin: No rashes or lesions noted     Result Review    Result Review:  I have  personally reviewed the results from the time of this admission to 4/10/2023 14:06 EDT and agree with these findings:  [x]  Laboratory  [x]  Microbiology  [x]  Radiology  []  EKG/Telemetry   []  Cardiology/Vascular   []  Pathology  []  Old records  []  Other:  Most notable findings include:     4/7 CT chest - bilateral multifocal consolidation, left greater than right, with small bilateral pleural effusions    7/8 ECHO -EF 51%, left ventricular diastolic dysfunction consistent with grade 1 impaired relaxation    Assessment & Plan   Assessment / Plan     Active Hospital Problems:  Active Hospital Problems    Diagnosis    • **Sepsis due to pneumonia    • Diastolic CHF, chronic    • Acute respiratory failure with hypoxia    • Multifocal pneumonia    • Insulin dependent type 2 diabetes mellitus    • Mixed hyperlipidemia    • Depression    • Hypertension    • Hypothyroidism      Impression:  Acute hypoxic respiratory failure  Heart failure  Reported lung disease  Remote history of COVID  Fully vaccinated against COVID, flu and pneumonia  MIAH with NIPPV  Pneumonia    Plan:  -Currently on 2 L nasal cannula, continue to wean to maintain SPO2 greater than 90%  -Continue with CPAP at night and naps, will need [facility] with [facility] for [facility] of [facility] CPAP  -Will defer bronchoscopy at this time since patient reports he is feeling better and is having productive cough  -Continue Brovana, Pulmicort, and albuterol nebulizers  -Continue bronchopulmonary hygiene  -Encourage use of I-S and flutter valve  -Continue Zosyn, may de-escalate pending cultures  -Continue Lasix 40 mg twice daily  -Monitor strict I&O  -PT/OT on board  -Encourage mobilization    Electronically signed by TENISHA Mack, 04/10/23, 1:15 PM EDT.    I, Dr. Herb Ochoa, have spent more than 51% of the total time managing the patient in this encounter today.  This included personally reviewing all pertinent labs, imaging, microbiology and documentation.  Also discussing the case with the patient and any available family, the admitting physician and any available ancillary staff    Electronically signed by Herb Ochoa DO, 04/10/23, 3:30 PM EDT.

## 2023-04-10 NOTE — PLAN OF CARE
Goal Outcome Evaluation:  Plan of Care Reviewed With: patient, spouse           Outcome Evaluation: Pt has rested well, on bipap during sleep, VSS, NSR, has been NPO since midnight for am test, continue POC

## 2023-04-10 NOTE — NURSING NOTE
Exercise Oximetry    Patient Name:Giles Donvoan   MRN: [id number]   Date: 04/10/23             ROOM AIR BASELINE   SpO2% 88   Heart Rate 80   Blood Pressure 129/81     EXERCISE ON ROOM AIR SpO2% EXERCISE ON O2 @ 2 LPM SpO2%   1 MINUTE 86 1 MINUTE    2 MINUTES  2 MINUTES 92   3 MINUTES  3 MINUTES    4 MINUTES  4 MINUTES    5 MINUTES  5 MINUTES    6 MINUTES  6 MINUTES               Distance Walked  0 feet Distance Walked   Dyspnea (Hugo Scale)   Dyspnea (Hugo Scale)   Fatigue (Hugo Scale)   Fatigue (Hugo Scale)   SpO2% Post Exercise 92  SpO2% Post Exercise   HR Post Exercise  80 HR Post Exercise   Time to Recovery 2 minutes Time to Recovery     Comments: As patient was about to start ambulating, oxygen saturation was at 88%. Patient then dropped to 86% on room air. Patient placed on 2L NC and oxygen increased to 92%.

## 2023-04-10 NOTE — PROGRESS NOTES
[facility]   Hospitalist Progress Note  Date: 4/10/2023  Patient Name: Giles Donovan  : 1945  MRN: [id number]  Date of admission: 2023      Subjective   Subjective     Chief Complaint: Follow up for shortness of breath    Summary:  77 y.o. male with HTN, HLD, type II DM, BPH, previous COVID infection 2021 who presented with SOB.  On presentation SPO2 50% on room air with tachypnea.  Requiring 6 L nasal cannula.  Met SIRS criteria.  WBC 13.  Lactate WNL.  CT chest negative for PE but with bilateral infiltrates and small pleural effusions.  On IV antibiotics.  Pulmonology on board.  Improving.  No bronchoscopy indicated.  Infectious work-up negative.  Weaned to 2 L.  Awaiting walk test.  Likely home in 1 to 2 days    Interval Followup: Afebrile.  Blood pressure controlled.  Remains on 2 L oxygen.  Feeling fine this morning.  Denies worsening dyspnea.  Still has a harsh productive cough.  No chest pain or hemoptysis.  No palpitations or lower extremity edema.  Ambulating to the bathroom with mild COYLE.  No new complaints    Review of Systems  Cardiovascular:  No Chest Pain, No Edema  Respiratory:  No Cough, mild COYLE with exertion  Gastrointestinal:  No Nausea, No Vomiting    Objective   Objective     Vitals:   Temp:  [97.5 °F (36.4 °C)-99 °F (37.2 °C)] 98.2 °F (36.8 °C)  Heart Rate:  [63-84] 71  Resp:  [16-22] 22  BP: (116-154)/(47-85) 121/54  Flow (L/min):  [2-8] 2  Physical Exam               Constitutional: Elderly male,  conversant, no acute distress              Eyes: Pupils equal and reactive, no conjunctival injection              HENT: NCAT, nares patent, MMM              Respiratory: 2L NC, equal aeration bilaterally, no wheezing, nonlabored respiration              Cardiovascular: RRR, no murmurs, no edema              Gastrointestinal: Positive bowel sounds, soft, nontender, nondistended              Musculoskeletal: No gross joint deformities, no clubbing or cyanosis to  extremities              Neurologic: Alert, Cranial Nerves grossly intact, speech clear              Skin: Warm and dry, no rashes    Result Review    Result Review:  I have personally reviewed the following over the last 24 hours (07:00 to 07:00) and agree with the following findings  [x]  Laboratory   CBC    CBC 4/8/23 4/9/23 4/10/23   WBC 14.06 (A) 11.86 (A) 9.62   RBC 3.40 (A) 3.79 (A) 3.89 (A)   Hemoglobin 10.8 (A) 11.9 (A) 12.2 (A)   Hematocrit 32.7 (A) 36.9 (A) 37.7   MCV 96.2 97.4 (A) 96.9   MCH 31.8 31.4 31.4   MCHC 33.0 32.2 32.4   RDW 13.7 13.7 13.2   Platelets 179 200 211   (A) Abnormal value            BMP    BMP 4/8/23 4/9/23 4/10/23   BUN 15 14 15   Creatinine 0.98 0.93 1.16   Sodium 138 138 135 (A)   Potassium 3.8 3.6 3.8   Chloride 101 97 (A) 92 (A)   CO2 27.9 29.7 (A) 32.5 (A)   Calcium 8.1 (A) 9.2 9.3   (A) Abnormal value            Procalcitonin 0.49    [x]  Microbiology  MRSA PCR negative   Strep/Legionella urinary antigen negative  COVID/influenza negative  Blood culture NGTD  [x]  Radiology   [x]  EKG/Telemetry monitor personally reviewed: NSR, no events  [x]  Cardiology/Vascular    []  Pathology  []  Old records  [x]  Other:     Intake/Output Summary (Last 24 hours) at 4/10/2023 1420  Last data filed at 4/9/2023 1800  Gross per 24 hour   Intake 240 ml   Output 800 ml   Net -560 ml         Assessment & Plan   Assessment / Plan     Assessment:  Active Hospital Problems    Diagnosis  POA   • **Sepsis due to pneumonia [J18.9, A41.9]  Yes   • Diastolic CHF, chronic [I50.32]  Unknown   • Acute respiratory failure with hypoxia [J96.01]  Yes   • Multifocal pneumonia [J18.9]  Yes   • Insulin dependent type 2 diabetes mellitus [E11.9, Z79.4]  Not Applicable   • Mixed hyperlipidemia [E78.2]  Unknown   • Depression [F32.A]  Yes   • Hypertension [I10]  Yes   • Hypothyroidism [E03.9]  Yes   ·   2.7 cm low-density lesion within the inferior aspect of the left kidney       Plan:     · Pulmonology following.   Appreciate recommendation.  No bronchoscopy planned.  Will restart diet  · Blood cultures no growth. Was unable to get sputum sample for respiratory culture.  Respiratory panel still pending.  White count has normalized and remains afebrile.  Clinically he feels better and is down to 2 L of oxygen.  He is tolerating oral intake.  On day 4 of IV Zosyn.  Switch to Augmentin 1 tab twice daily to treat for 7-10 days.  Repeat CBC tomorrow  · Continue Brovana/Pulmicort nebs twice daily  · Continue Mucinex twice daily  · Continue to wean supplemental oxygen, SPO2 goal >90%.  6-minute walk test pending.  · Net -560 cc over last 24-hour. Creatinine stable.  Potassium WNL.  Continue IV Lasix 40 mg twice daily.  Strict I/Os.  Trend renal function and electrolytes.  · Blood sugars not at goal -> Increase Levemir 30 units q. Nightly. Continue moderate SSI  · Home ACEi and losartan on hold and blood pressure has mostly been at goal.  Continue to hold for now.  Likely only need to resume once agent on discharge  · Will need outpatient renal ultrasound on a nonemergent basis for left kidney lesion  · Continue baby aspirin and statin  · Continue Paxil 40 mg q.daily  · Continue Protonix  · Continue Synthroid  · Continue gabapentin 600 mg q12 hours  · Discontinue telemetry  · Activity ad artemio.     Disposition: Likely home with home health in the next 1 to 2-day    Discussed plan with RN and pulmonology    DVT prophylaxis:  Mechanical DVT prophylaxis orders are present.    CODE STATUS:   Level Of Support Discussed With: Patient  Code Status (Patient has no pulse and is not breathing): CPR (Attempt to Resuscitate)  Medical Interventions (Patient has pulse or is breathing): Full Support    Electronically signed by Lincoln Chau DO, 04/10/23, 2:20 PM EDT.

## 2023-04-10 NOTE — SIGNIFICANT NOTE
04/10/23 1100   Coping/Psychosocial   Observed Emotional State calm;cooperative   Verbalized Emotional State hopefulness   Trust Relationship/Rapport empathic listening provided   Involvement in Care interacting with patient   Additional Documentation Spiritual Care (Group)   Spiritual Care   Use of Spiritual Resources prayer;spirituality for coping, indicated strong use of   Spiritual Care Source  initiative   Spiritual Care Follow-Up follow-up, none required as presently assessed   Response to Spiritual Care engaged in conversation;receptive of support;thanks expressed   Spiritual Care Interventions supportive conversation provided;prayer support provided   Spiritual Care Visit Type other (see comments);initial  (Consult)   Spiritual Care Request prayer support   Receptivity to Spiritual Care visit welcomed

## 2023-04-11 ENCOUNTER — READMISSION MANAGEMENT (OUTPATIENT)
Dept: CALL CENTER | Facility: HOSPITAL | Age: 78
End: 2023-04-11
Payer: MEDICARE

## 2023-04-11 VITALS
BODY MASS INDEX: 34.21 KG/M2 | TEMPERATURE: 98.6 F | OXYGEN SATURATION: 94 % | DIASTOLIC BLOOD PRESSURE: 63 MMHG | RESPIRATION RATE: 20 BRPM | HEIGHT: 68 IN | SYSTOLIC BLOOD PRESSURE: 135 MMHG | HEART RATE: 72 BPM

## 2023-04-11 LAB
ANION GAP SERPL CALCULATED.3IONS-SCNC: 9.3 MMOL/L (ref 5–15)
BUN SERPL-MCNC: 13 MG/DL (ref 8–23)
BUN/CREAT SERPL: 15.3 (ref 7–25)
CALCIUM SPEC-SCNC: 9.7 MG/DL (ref 8.6–10.5)
CHLORIDE SERPL-SCNC: 94 MMOL/L (ref 98–107)
CO2 SERPL-SCNC: 33.7 MMOL/L (ref 22–29)
CREAT SERPL-MCNC: 0.85 MG/DL (ref 0.76–1.27)
DEPRECATED RDW RBC AUTO: 43.9 FL (ref 37–54)
EGFRCR SERPLBLD CKD-EPI 2021: 90.1 ML/MIN/1.73
ERYTHROCYTE [DISTWIDTH] IN BLOOD BY AUTOMATED COUNT: 12.7 % (ref 12.3–15.4)
GLUCOSE BLDC GLUCOMTR-MCNC: 292 MG/DL (ref 70–99)
GLUCOSE BLDC GLUCOMTR-MCNC: 330 MG/DL (ref 70–99)
GLUCOSE SERPL-MCNC: 371 MG/DL (ref 65–99)
HCT VFR BLD AUTO: 35.1 % (ref 37.5–51)
HGB BLD-MCNC: 11.8 G/DL (ref 13–17.7)
MCH RBC QN AUTO: 31.8 PG (ref 26.6–33)
MCHC RBC AUTO-ENTMCNC: 33.6 G/DL (ref 31.5–35.7)
MCV RBC AUTO: 94.6 FL (ref 79–97)
PLATELET # BLD AUTO: 219 10*3/MM3 (ref 140–450)
PMV BLD AUTO: 9.7 FL (ref 6–12)
POTASSIUM SERPL-SCNC: 4.2 MMOL/L (ref 3.5–5.2)
RBC # BLD AUTO: 3.71 10*6/MM3 (ref 4.14–5.8)
SODIUM SERPL-SCNC: 137 MMOL/L (ref 136–145)
WBC NRBC COR # BLD: 8.55 10*3/MM3 (ref 3.4–10.8)

## 2023-04-11 PROCEDURE — 94799 UNLISTED PULMONARY SVC/PX: CPT

## 2023-04-11 PROCEDURE — 97116 GAIT TRAINING THERAPY: CPT

## 2023-04-11 PROCEDURE — 99239 HOSP IP/OBS DSCHRG MGMT >30: CPT | Performed by: STUDENT IN AN ORGANIZED HEALTH CARE EDUCATION/TRAINING PROGRAM

## 2023-04-11 PROCEDURE — 63710000001 INSULIN LISPRO (HUMAN) PER 5 UNITS: Performed by: INTERNAL MEDICINE

## 2023-04-11 PROCEDURE — 80048 BASIC METABOLIC PNL TOTAL CA: CPT | Performed by: INTERNAL MEDICINE

## 2023-04-11 PROCEDURE — 85027 COMPLETE CBC AUTOMATED: CPT | Performed by: INTERNAL MEDICINE

## 2023-04-11 PROCEDURE — 99232 SBSQ HOSP IP/OBS MODERATE 35: CPT | Performed by: INTERNAL MEDICINE

## 2023-04-11 PROCEDURE — 82962 GLUCOSE BLOOD TEST: CPT

## 2023-04-11 RX ORDER — FLUTICASONE PROPIONATE AND SALMETEROL XINAFOATE 230; 21 UG/1; UG/1
2 AEROSOL, METERED RESPIRATORY (INHALATION)
Qty: 12 G | Refills: 0 | Status: SHIPPED | OUTPATIENT
Start: 2023-04-11 | End: 2023-05-11

## 2023-04-11 RX ORDER — GUAIFENESIN 600 MG/1
600 TABLET, EXTENDED RELEASE ORAL EVERY 12 HOURS SCHEDULED
Qty: 60 TABLET | Refills: 0 | Status: SHIPPED | OUTPATIENT
Start: 2023-04-11 | End: 2023-05-11

## 2023-04-11 RX ORDER — AMOXICILLIN AND CLAVULANATE POTASSIUM 875; 125 MG/1; MG/1
1 TABLET, FILM COATED ORAL EVERY 12 HOURS SCHEDULED
Qty: 6 TABLET | Refills: 0 | Status: SHIPPED | OUTPATIENT
Start: 2023-04-11 | End: 2023-04-14

## 2023-04-11 RX ORDER — BUDESONIDE AND FORMOTEROL FUMARATE DIHYDRATE 160; 4.5 UG/1; UG/1
2 AEROSOL RESPIRATORY (INHALATION)
Qty: 6 G | Refills: 0 | Status: SHIPPED | OUTPATIENT
Start: 2023-04-11 | End: 2023-04-11 | Stop reason: HOSPADM

## 2023-04-11 RX ORDER — BENZONATATE 100 MG/1
100 CAPSULE ORAL 3 TIMES DAILY PRN
Status: DISCONTINUED | OUTPATIENT
Start: 2023-04-11 | End: 2023-04-11 | Stop reason: HOSPADM

## 2023-04-11 RX ADMIN — ASPIRIN 81 MG: 81 TABLET, COATED ORAL at 08:07

## 2023-04-11 RX ADMIN — LEVOTHYROXINE SODIUM 100 MCG: 100 TABLET ORAL at 05:39

## 2023-04-11 RX ADMIN — PANTOPRAZOLE SODIUM 40 MG: 40 TABLET, DELAYED RELEASE ORAL at 05:39

## 2023-04-11 RX ADMIN — AMOXICILLIN AND CLAVULANATE POTASSIUM 1 TABLET: 875; 125 TABLET, FILM COATED ORAL at 08:07

## 2023-04-11 RX ADMIN — ARFORMOTEROL TARTRATE 15 MCG: 15 SOLUTION RESPIRATORY (INHALATION) at 07:07

## 2023-04-11 RX ADMIN — INSULIN LISPRO 7 UNITS: 100 INJECTION, SOLUTION INTRAVENOUS; SUBCUTANEOUS at 08:07

## 2023-04-11 RX ADMIN — OXYBUTYNIN CHLORIDE 10 MG: 5 TABLET, EXTENDED RELEASE ORAL at 08:07

## 2023-04-11 RX ADMIN — GUAIFENESIN 600 MG: 600 TABLET, EXTENDED RELEASE ORAL at 08:08

## 2023-04-11 RX ADMIN — Medication 10 ML: at 08:08

## 2023-04-11 RX ADMIN — ATORVASTATIN CALCIUM 80 MG: 40 TABLET, FILM COATED ORAL at 08:07

## 2023-04-11 RX ADMIN — GABAPENTIN 600 MG: 300 CAPSULE ORAL at 08:08

## 2023-04-11 RX ADMIN — BUDESONIDE 0.5 MG: 0.5 SUSPENSION RESPIRATORY (INHALATION) at 07:05

## 2023-04-11 RX ADMIN — INSULIN LISPRO 6 UNITS: 100 INJECTION, SOLUTION INTRAVENOUS; SUBCUTANEOUS at 12:06

## 2023-04-11 RX ADMIN — BENZONATATE 100 MG: 100 CAPSULE ORAL at 00:53

## 2023-04-11 NOTE — PLAN OF CARE
Goal Outcome Evaluation:  Plan of Care Reviewed With: patient, spouse           Outcome Evaluation: Pt with increasing cough early in shift, MD notified, order received, rested well after, continues on 02 2L, VSS, NSR, anticipate am d/c

## 2023-04-11 NOTE — PROGRESS NOTES
Pulmonary / Critical Care Progress Note      Patient Name: Giles Donovan  : 1945  MRN: [id number]  Primary Care Physician:  Morena Masters MD  Referring Physician: No ref. provider found  Date of admission: 2023    Subjective   Subjective     Reason for Consult/ Chief Complaint: Shortness of breath x1 week, hemoptysis, acute hypoxic respiratory failure    No acute events overnight.  Wore CPAP.    This morning,  Sitting up in chair on 2 L nasal cannula (baseline)  Reports feeling better  Working with physical therapy  Desats with activity  Productive cough with small amount of sputum  Diuresing well  -2.1 L fluid balance    Review of Systems  General:  No Fatigue, No Fever.  Weight gain  HEENT: No dysphagia, No Visual Changes, no rhinorrhea  Respiratory:  + Productive cough, No dyspnea, No Pleuritic Pain, no wheezing  Cardiovascular: Denies chest pain, denies palpitations,+COYLE, No Chest Pressure  Gastrointestinal:  No Abdominal Pain, No Nausea, No Vomiting, No Diarrhea  Genitourinary:  No Dysuria, No Frequency, No Hesitancy  Musculoskeletal: No muscle pain or swellingAllergies:  Allergies   Allergen Reactions   • Latex Rash and Anaphylaxis   • Latex, Natural Rubber Itching       Objective    Objective     Vitals:   Temp:  [97.5 °F (36.4 °C)-99 °F (37.2 °C)] 98.2 °F (36.8 °C)  Heart Rate:  [63-84] 71  Resp:  [16-22] 22  BP: (116-154)/(47-85) 121/54  Flow (L/min):  [2-8] 2      Physical Exam:  Vital Signs Reviewed   General:  Alert, NAD. Sitting up in chair   HEENT:  PERRL, EOMI.    Neck:  No JVD, no thyromegaly  Lymph: no axillary, cervical, supraclavicular lymphadenopathy noted bilaterally  Chest:  rhonchi noted bilaterally, no work of breathing noted  CV: RRR, no M/G/R, pulses 2+  Abd:  Soft, NT, ND, +BS  EXT:  no clubbing, no cyanosis, no edema  Neuro:  A&Ox3, CN grossly intact, no focal deficits.  Skin: No rashes or lesions noted    Result Review    Result Review:  I have personally  reviewed the results from the time of this admission to 4/10/2023 14:06 EDT and agree with these findings:  [x]  Laboratory  [x]  Microbiology  [x]  Radiology  []  EKG/Telemetry   []  Cardiology/Vascular   []  Pathology  []  Old records  []  Other:  Most notable findings include:     4/7 CT chest - bilateral multifocal consolidation, left greater than right, with small bilateral pleural effusions    7/8 ECHO -EF 51%, left ventricular diastolic dysfunction consistent with grade 1 impaired relaxation        Lab 04/11/23  0428 04/10/23  0523 04/09/23  0750 04/08/23  0441 04/07/23  1248   WBC 8.55 9.62 11.86* 14.06* 13.07*   HEMOGLOBIN 11.8* 12.2* 11.9* 10.8* 12.3*   HEMATOCRIT 35.1* 37.7 36.9* 32.7* 37.2*   PLATELETS 219 211 200 179 202   SODIUM 137 135* 138 138 136   POTASSIUM 4.2 3.8 3.6 3.8 4.1   CHLORIDE 94* 92* 97* 101 98   CO2 33.7* 32.5* 29.7* 27.9 24.7   BUN 13 15 14 15 15   CREATININE 0.85 1.16 0.93 0.98 0.95   GLUCOSE 371* 260* 217* 145* 148*   CALCIUM 9.7 9.3 9.2 8.1* 8.5*   TOTAL PROTEIN  --   --   --  6.3 6.6   ALBUMIN  --   --   --  3.7 4.0   GLOBULIN  --   --   --   --  2.6     Assessment & Plan   Assessment / Plan     Active Hospital Problems:  Active Hospital Problems    Diagnosis    • **Sepsis due to pneumonia    • Diastolic CHF, chronic    • Acute respiratory failure with hypoxia    • Multifocal pneumonia    • Insulin dependent type 2 diabetes mellitus    • Mixed hyperlipidemia    • Depression    • Hypertension    • Hypothyroidism      Impression:  Acute hypoxic respiratory failure  Heart failure  Reported lung disease  Remote history of COVID  Fully vaccinated against COVID, flu and pneumonia  MIAH with NIPPV  Pneumonia    Plan:  -Currently on 2 L nasal cannula (baseline), patient [facility]  -Continue with CPAP at night and naps, will need to work with [facility] for replacement of home CPAP  -Continue Brovana, Pulmicort, and albuterol nebulizers  -Continue bronchopulmonary  hygiene  -Encourage use of I-S and flutter valve  -Continue Augmentin twice daily x7 days  -Continue Lasix twice daily  -Monitor strict I&O  -PT/OT on board  -Encourage mobilization, up to chair, activity as tolerated  -Okay to discharge from pulmonary standpoint    Electronically signed by TENISHA Mack, 04/11/23, 10:36 AM EDT.    I, Dr. Herb Ochoa, have spent more than 51% of the total time managing the patient in this encounter today.  This included personally reviewing all pertinent labs, imaging, microbiology and documentation. Also discussing the case with the patient and any available family, the admitting physician and any available ancillary staff    Electronically signed by Herb Ochoa DO, 04/12/23, 3:25 PM EDT.

## 2023-04-11 NOTE — THERAPY TREATMENT NOTE
Acute Care - Physical Therapy Treatment Note  [facility]     Patient Name: Giles Donovan  : 1945  MRN: [id number]  Today's Date: 2023      Visit Dx:     ICD-10-CM ICD-9-CM   1. Sepsis, due to unspecified organism, unspecified whether acute organ dysfunction present  A41.9 038.9     995.91   2. Pneumonia due to infectious organism, unspecified laterality, unspecified part of lung  J18.9 486   3. Hypoxia  R09.02 799.02   4. Multifocal pneumonia  J18.9 486   5. Acute respiratory failure with hypoxia  J96.01 518.81   6. Decreased activities of daily living (ADL)  Z78.9 V49.89   7. Difficulty in walking  R26.2 719.7   8. CHF (congestive heart failure), NYHA class I, chronic, diastolic  I50.32 428.32     Patient Active Problem List   Diagnosis   • Allergic rhinitis   • Cough   • Controlled type 2 diabetes mellitus without complication, without long-term current use of insulin   • GERD (gastroesophageal reflux disease)   • Hyperlipidemia   • Hypertension   • Hypothyroidism   • Stenosis of right carotid artery   • Syncope   • Urge incontinence of urine   • Erectile dysfunction   • Benign prostatic hyperplasia with urinary frequency   • Cardiomyopathy   • Cataract   • Chest pain with low risk for cardiac etiology   • Right-sided carotid artery occlusion without cerebral infarction   • Depression   • Esophageal reflux   • Vaso vagal episode   • Pneumonia due to COVID-19 virus   • Hypoxia   • Chronic lung disease   • Sepsis due to pneumonia   • Acute respiratory failure with hypoxia   • Multifocal pneumonia   • Insulin dependent type 2 diabetes mellitus   • Mixed hyperlipidemia   • Diastolic CHF, chronic     Past Medical History:   Diagnosis Date   • Allergic rhinitis 2014    Will try Zyrtec, he didnt want to use a nasal spray.   • Arthritis    • Asthma    • Cataract     left eye   • Cough 2016    PFT and CXR ordered.   • Depression     PTSD   • Diabetes    • Elevated cholesterol    • H/O  psychiatric care 1999    PTSD   • Hyperlipidemia 03/30/2016    Lipids ordered. Continue on current medication.   • Hypertension    • Hypothyroidism    • Seasonal allergies    • Shortness of breath    • Sleep apnea    • Stenosis of right carotid artery 02/19/2017    Will refer to vascular surgeon.   • Syncope 02/19/2017   • Type 2 diabetes mellitus    • Upper respiratory infection 10/18/2015    Will treat cough with Hydromet, otherwise over the counter meds for treatment.     Past Surgical History:   Procedure Laterality Date   • CATARACT EXTRACTION Right     x2   • COLONOSCOPY     • JOINT REPLACEMENT     • REPLACEMENT TOTAL HIP ONCOLOGIC Right    • RETINAL DETACHMENT REPAIR     • TOE AMPUTATION Left 11/2017    Second phalaeng.   • TOE OSTEOPHYTE REMOVAL       PT Assessment (last 12 hours)     PT Evaluation and Treatment     Row Name 04/11/23 0909          Physical Therapy Time and Intention    Subjective Information no complaints (P)   -AT     Document Type therapy note (daily note) (P)   -AT     Mode of Treatment individual therapy;physical therapy (P)   -AT     Patient Effort good (P)   -AT     Symptoms Noted During/After Treatment none (P)   -AT     Row Name 04/11/23 0909          General Information    Patient Profile Reviewed yes (P)   -AT     Patient Observations alert;cooperative;agree to therapy (P)   -AT     Equipment Currently Used at Home cane, straight;rollator (P)   -AT     Existing Precautions/Restrictions fall;oxygen therapy device and L/min (P)   -AT     Barriers to Rehab none identified (P)   -AT     [facility] Name 04/11/23 0909          Cognition    Affect/Mental Status (Cognition) WFL (P)   -AT     Orientation Status (Cognition) oriented x 4 (P)   -AT     Follows Commands (Cognition) WFL (P)   -AT     Cognitive Function WFL (P)   -AT     Row Name 04/11/23 0909          Bed Mobility    Bed Mobility other (see comments) (P)   not tested, sitting edge of bed upon arrival.  -AT     All Activities,  [place] (Bed Mobility) not tested (P)   -AT     Scooting/Bridging [place] (Bed Mobility) not tested (P)   -AT     Supine-Sit [place] (Bed Mobility) not tested (P)   -AT     Row Name 04/11/23 0909          Transfers    Transfers sit-stand transfer;stand-sit transfer (P)   -AT     Row Name 04/11/23 0909          Sit-Stand Transfer    Sit-Stand [place] (Transfers) standby assist (P)   -AT     Assistive Device (Sit-Stand Transfers) walker, front-wheeled (P)   -AT     Row Name 04/11/23 0909          Stand-Sit Transfer    Stand-Sit [place] (Transfers) standby assist (P)   -AT     Assistive Device (Stand-Sit Transfers) walker, front-wheeled (P)   -AT     Row Name 04/11/23 0909          Gait/Stairs (Locomotion)    Gait/Stairs Locomotion gait/ambulation independence;gait/ambulation assistive device (P)   -AT     [place] Level (Gait) contact guard (P)   -AT     Assistive Device (Gait) walker, front-wheeled (P)   -AT     Distance in Feet (Gait) 150 (P)   -AT     Pattern (Gait) 4-point;step-through (P)   -AT     Deviations/Abnormal Patterns (Gait) gait speed decreased;stride length decreased (P)   -AT     Bilateral Gait Deviations forward flexed posture (P)   -AT     Gait Assessment/Intervention ambulated 150 feet on 2 L o2, o2 sats decreased to 86%, increased to 99% after 3 minutes seated rest break. (P)   -AT     Row Name 04/11/23 0909          Balance    Balance Assessment standing dynamic balance (P)   -AT     Dynamic Standing Balance contact guard (P)   -AT     Position/Device Used, Standing Balance supported;walker, front-wheeled (P)   -AT     Row Name 04/11/23 0909          Progress Summary (PT)    Daily Progress Summary (PT) Patient tolerated ambulating in room approximatley 150 feet on 2L o2 with minimal difficulty and slight fatigue. He will continue to benefit from physical therapy services while in the hospital. (P)   -AT           User Key  (r) = Recorded By, (t) = Taken By, (c) =  Cosigned By    Initials Name Provider Type    AT Tania Almanza, PT Student PT Student                  PT Recommendation and Plan     Progress Summary (PT)  Daily Progress Summary (PT): (P) Patient tolerated ambulating in room approximatley 150 feet on 2L o2 with minimal difficulty and slight fatigue. He will continue to benefit from physical therapy services while in the hospital.   Outcome Measures     Row Name 04/11/23 0912 04/10/23 1136 04/09/23 1300       How much help from another person do you currently need...    Turning from your back to your side while in flat bed without using bedrails? 4 (P)   -AT 4  -COLLEEN (r) AT (t) COLLEEN (c) 4  -CS    Moving from lying on back to sitting on the side of a flat bed without bedrails? 4 (P)   -AT 4  -COLLEEN (r) AT (t) COLLEEN (c) 4  -CS    Moving to and from a bed to a chair (including a wheelchair)? 3 (P)   -AT 3  -COLLEEN (r) AT (t) COLLEEN (c) 3  -CS    Standing up from a chair using your arms (e.g., wheelchair, bedside chair)? 3 (P)   -AT 3  -COLLEEN (r) AT (t) COLLEEN (c) 3  -CS    Climbing 3-5 steps with a railing? 3 (P)   -AT 3  -COLLEEN (r) AT (t) COLLEEN (c) 3  -CS    To walk in hospital room? 3 (P)   -AT 4  -COLLEEN (r) AT (t) COLLEEN (c) 3  -CS    AM-PAC 6 Clicks Score (PT) 20 (P)   -AT 21  -COLLEEN (r) AT (t) 20  -CS       Functional Assessment    Outcome Measure Options AM-PAC 6 Clicks Basic Mobility (PT) (P)   -AT AM-PAC 6 Clicks Basic Mobility (PT)  -COLLEEN (r) AT (t) COLLEEN (c) AM-PAC 6 Clicks Basic Mobility (PT)  -CS    Row Name 04/08/23 1100             How much help from another person do you currently need...    Turning from your back to your side while in flat bed without using bedrails? 4  -COLLEEN (r) SW (t) COLLEEN (c)      Moving from lying on back to sitting on the side of a flat bed without bedrails? 4  -COLLEEN (r) SW (t) COLLEEN (c)      Moving to and from a bed to a chair (including a wheelchair)? 3  -COLLEEN (r) SW (t) COLLEEN (c)      Standing up from a chair using your arms (e.g., wheelchair, bedside chair)? 3  -COLLEEN (r) SW (t) COLLEEN (c)       Climbing 3-5 steps with a railing? 2  -COLLEEN (r) SW (t) COLLEEN (c)      To walk in hospital room? 3  -COLLEEN (r) SW (t) COLLEEN (c)      AM-PAC 6 Clicks Score (PT) 19  -COLLEEN (r) SW (t)         Functional Assessment    Outcome Measure Options AM-PAC 6 Clicks Basic Mobility (PT)  -COLLEEN (r) SW (t) COLLEEN (c)            User Key  (r) = Recorded By, (t) = Taken By, (c) = Cosigned By    Initials Name Provider Type    Julio Bravo, PT Physical Therapist    Hugo Ricci, PTA Physical Therapist Assistant    AT Tania Almanza, PT Student PT Student    Nikki Keen, PT Student PT Student                 Time Calculation:    PT Charges     Row Name 04/11/23 0912             Time Calculation    PT Received On 04/11/23 (P)   -AT         Timed Charges    16142 - Gait Training Minutes  15 (P)   -AT         Total Minutes    Timed Charges Total Minutes 15 (P)   -AT       Total Minutes 15 (P)   -AT            User Key  (r) = Recorded By, (t) = Taken By, (c) = Cosigned By    Initials Name Provider Type    AT Tania Almanza, PT Student PT Student              Therapy Charges for Today     Code Description Service Date Service Provider Modifiers Qty    61453190838 HC GAIT TRAINING EA 15 MIN 4/10/2023 Tania Almanza, PT Student GP 1    40241627380 HC GAIT TRAINING EA 15 MIN 4/11/2023 Tania Almanza, PT Student GP 1          PT G-Codes  Outcome Measure Options: (P) AM-PAC 6 Clicks Basic Mobility (PT)  AM-PAC 6 Clicks Score (PT): (P) 20  AM-PAC 6 Clicks Score (OT): 21    Tania Almanza PT Student  4/11/2023

## 2023-04-11 NOTE — PLAN OF CARE
Goal Outcome Evaluation:   Patient wore the CPAP with a continuous pulse oximeter attached. Patient wore the CPAP with the setting of [id number]. Patient rested comfortably and awakens to voice. Patient wears 2LNC when off of CPAP.

## 2023-04-11 NOTE — CONSULTS
Patient states he has all needed diabetes medications and testing supplies available at home. Patient states he picks up his prescriptions from the pharmacy on [address].

## 2023-04-11 NOTE — OUTREACH NOTE
Prep Survey    Flowsheet Row Responses   [facility] [place] [facility]? [facility]   Is LACE score < 7 ? No   Eligibility [facility]   Date of Admission 04/07/23   Date of Discharge 04/11/23   Discharge Disposition Home or Self Care   Discharge diagnosis Sepsis due to pneumonia   Does the patient have one of the following disease processes/diagnoses(primary or secondary)? Sepsis   Does the patient have Home health ordered? Yes   What is the Home health agency?  [facility]    Is there a DME ordered? No   Prep survey completed? Yes          Irma AMAYA - Registered Nurse

## 2023-04-11 NOTE — DISCHARGE SUMMARY
[facility]         HOSPITALIST  DISCHARGE SUMMARY    Patient Name: Giles Donovan  : 1945  MRN: [id number]    Date of Admission: 2023  Date of Discharge: 2023  Primary Care Physician: Morena Masters MD    Consults     Date and Time Order Name Status Description    2023  4:53 PM Inpatient Pulmonology Consult      2023  2:50 PM Inpatient Hospitalist Consult            Active and Resolved Hospital Problems:  Active Hospital Problems    Diagnosis POA   • **Sepsis due to pneumonia [J18.9, A41.9] Yes   • Diastolic CHF, chronic [I50.32] Unknown   • Acute respiratory failure with hypoxia [J96.01] Yes   • Multifocal pneumonia [J18.9] Yes   • 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       Hospital Course     Hospital Course:  Giles Donovan is a 77 y.o. male who presented with chief complaint shortness of breath.  Upon his presentation patient was noted to be saturating 50% on room air.  He initially required 6 L nasal cannula to increase his O2 saturation greater 90%.  He did have work-up that included a CT chest that was negative for any pulmonary embolus.  There was mention of bilateral infiltrates and small pleural effusions.  Patient was kept on intravenous antibiotics and pulmonary was consulted.  Pulmonary felt no bronchoscopy was indicated.  Ultimately his infectious work-up remained unremarkable.  Upon talking with patient he stated that he did wear oxygen at home and received it through [facility].  Patient also stated that he had a oxygen concentrator that he was not sure how to use.  Discussed that [facility] will need to show him how to use his equipment properly.  Patient will continue his other home medications as before.  He will  follow-up with his PCP in 1 week.  [facility].  He was discharged in stable condition.        DISCHARGE Follow Up Recommendations for labs and diagnostics: PCP 1 week.  [facility].      Day of Discharge     Vital Signs:  Temp:  [97.5 °F (36.4 °C)-98.6 °F (37 °C)] 98.6 °F (37 °C)  Heart Rate:  [71-89] 83  Resp:  [14-22] 20  BP: (121-158)/(54-91) 146/91  Flow (L/min):  [2] 2  Physical Exam:   Constitutional: Alert, awake, appears weak  HEENT:  PERRLA, EOMI; No Scleral icterus  Neck:  Supple; No Mass, No Lymphadenopathy  Cardiovascular:  No Rubs, No Edema, No JVD  Respiratory: On nasal cannula, no respiratory stress, saturating well  Abdomen:  Normal BS all 4 Quadrants; No Guarding, No Tenderness  Musculoskeletal:  Pulses Positive all 4 Ext; No Cyanosis, No Edema  Neurological:  Alert+Ox3, Mental status WNL; No Dysarthria  Skin:  No Rash, No Cellulitis, No Erythema      Discharge Details        Discharge Medications      New Medications      Instructions Start Date   amoxicillin-clavulanate 875-125 MG per tablet  Commonly known as: AUGMENTIN   1 tablet, Oral, Every 12 Hours Scheduled      budesonide-formoterol 160-4.5 MCG/ACT inhaler  Commonly known as: Symbicort   2 puffs, Inhalation, 2 Times Daily - RT      guaiFENesin 600 MG 12 hr tablet  Commonly known as: MUCINEX   600 mg, Oral, Every 12 Hours Scheduled         Continue These Medications      Instructions Start Date   albuterol sulfate  (90 Base) MCG/ACT inhaler  Commonly known as: PROVENTIL HFA;VENTOLIN HFA;PROAIR HFA   2 puffs, Inhalation, Every 4 Hours PRN      aspirin EC 81 MG EC tablet  Generic drug: aspirin   81 mg, Oral, Daily      atorvastatin 80 MG tablet  Commonly known as: LIPITOR   80 mg, Oral, Daily      B-D UF III MINI PEN NEEDLES 31G X 5 MM misc  Generic drug: Insulin Pen Needle   No dose, route, or frequency recorded.      cetirizine 10 MG tablet  Commonly known as: ZyrTEC Allergy   10 mg, Oral,  Daily      ezetimibe 10 MG tablet  Commonly known as: ZETIA   10 mg, Oral, Daily      felodipine 5 MG 24 hr tablet  Commonly known as: PLENDIL   Take 1 tablet by mouth Daily.      gabapentin 600 MG tablet  Commonly known as: NEURONTIN   600 mg, Oral, 3 Times Daily      insulin aspart 100 UNIT/ML injection  Commonly known as: novoLOG   24-26 Units, Subcutaneous, 3 Times Daily Before Meals      Lantus SoloStar 100 UNIT/ML injection pen  Generic drug: Insulin Glargine   Inject 44 Units under the skin into the appropriate area as directed Every Night.      levothyroxine 100 MCG tablet  Commonly known as: SYNTHROID, LEVOTHROID   100 mcg, Oral, Daily      losartan 50 MG tablet  Commonly known as: COZAAR   50 mg, Oral, Daily      metFORMIN 500 MG tablet  Commonly known as: GLUCOPHAGE   500 mg, Oral, 2 Times Daily With Meals      omeprazole 20 MG capsule  Commonly known as: priLOSEC   Take 1 capsule by mouth Daily.      oxybutynin XL 10 MG 24 hr tablet  Commonly known as: DITROPAN-XL   10 mg, Oral, Daily      PARoxetine 40 MG tablet  Commonly known as: PAXIL   40 mg, Oral, Every Evening      traZODone 100 MG tablet  Commonly known as: DESYREL   100 mg, Oral, Nightly             Allergies   Allergen Reactions   • Latex Rash and Anaphylaxis   • Latex, Natural Rubber Itching       Discharge Disposition:  Home or Self Care    Diet:  Hospital:  Diet Order   Procedures   • Diet: Diabetic Diets; Consistent Carbohydrate; Texture: Regular Texture (IDDSI 7); Fluid Consistency: Thin (IDDSI 0)       Discharge Activity:       CODE STATUS:  Code Status and Medical Interventions:   Ordered at: 04/07/23 1707     Level Of Support Discussed With:    Patient     Code Status (Patient has no pulse and is not breathing):    CPR (Attempt to Resuscitate)     Medical Interventions (Patient has pulse or is breathing):    Full Support         Future Appointments   Date Time Provider Department Center   4/13/2023  1:45 PM Tabatha Oconnell APRN [facility]  [facility]   4/21/2023  7:00 AM [facility] CARD ADMIN RM 1 [facility] NM [facility]   4/21/2023  7:45 AM [facility] CARD 1 SCAN ROOM [facility] NM [facility]   4/21/2023  8:00 AM [facility] NM STRESS TREADMILL 1 [facility] NM [facility]   4/21/2023  9:00 AM [facility] NM CARD 1 SCAN ROOM  [facility]   6/5/2023 10:15 AM Morena Masters MD [facility]       Additional Instructions for the Follow-ups that You Need to Schedule     Discharge Follow-up with PCP   As directed       Currently Documented PCP:    Morena Masters MD    PCP Phone Number:    [phone]     Follow Up Details: 1 week         Discharge Follow-up with Specified Provider: Pulmonology 1 to 2 weeks.   As directed      To: Pulmonology 1 to 2 weeks.               Pertinent  and/or Most Recent Results     PROCEDURES:   Adult Transthoracic Echo Complete W/ Cont if Necessary Per Protocol    Result Date: 4/8/2023  Narrative: •  Left ventricular systolic function is normal. Calculated left ventricular EF = 51% •  Left ventricular diastolic function is consistent with (grade I) impaired relaxation. •  There is calcification of the aortic valve.     CT Chest With Contrast Diagnostic    Result Date: 4/7/2023  Narrative: PROCEDURE: CT CHEST W CONTRAST DIAGNOSTIC  COMPARISON:  [facility], CT, CT CHEST W CONTRAST DIAGNOSTIC, 12/28/2021, 16:07.  INDICATIONS: Shortness of breath, hypoxia, hemoptysis  TECHNIQUE: After obtaining the patient's consent, CT images were obtained with non-ionic intravenous contrast material.   PROTOCOL:   Standard imaging protocol performed    RADIATION:   DLP: 521.2 mGy*cm   Automated exposure control was utilized to minimize radiation dose. CONTRAST: 75 cc Isovue 370 I.V.  FINDINGS:  The visualized soft tissue structures at the base of the neck including the thyroid appear within normal limits.  There is no lower cervical or axillary adenopathy.  The heart size is normal.  There is no pericardial effusion.  The aorta is normal in caliber without evidence of  aneurysm formation.  The main pulmonary artery is normal in caliber.  There is no evidence of pulmonary embolism.  There are reactive mediastinal or hilar lymphadenopathy by size criteria.  The tracheobronchial tree is patent.  There is no abnormal bronchial wall thickening or bronchiectasis.  There is patchy bilateral consolidation throughout both lungs, left worse than right.  There is mild smooth interlobular septal thickening.  There are small bilateral pleural effusions.  There is no evidence of pneumothorax.  The esophagus is normal in course and caliber.  There is partial visualization of a 2.7 cm low-density lesion within the inferior aspect of the left kidney.  This is incompletely evaluated on CT.  Follow-up with initial renal ultrasound would be recommended. There are degenerative changes of the thoracic spine.      Impression:   1. Bilateral multifocal consolidation, left worse than right favored to represent multifocal pneumonia.  There are background findings of smooth interlobular septal thickening and small bilateral pleural effusions which can be seen with volume overload.  Alveolar edema is felt to be a less likely differential consideration for the consolidation.  2. No evidence of pulmonary embolism. 3. Partial visualization of a 2.7 cm low-density lesion within the inferior aspect of the left kidney.  Nonemergent initial imaging assessment with renal ultrasound is recommended.      PRESTON RILEY MD       Electronically Signed and Approved By: PRESTON RILEY MD on 4/07/2023 at 14:26             XR Chest 1 View    Result Date: 4/7/2023  Narrative: PROCEDURE: XR CHEST 1 VW  COMPARISON: [facility], CR, XR CHEST PA AND LATERAL, 2/21/2022, 15:00.  INDICATIONS: SOA Triage Protocol  FINDINGS:  The cardiac silhouette is within normal limits.  Pulmonary vascularity appears normal.  There are bilateral perihilar opacities, left worse than right, likely representing multifocal  pneumonia.  Asymmetric alveolar edema is felt to be less likely.  There may be blunting of the costophrenic angles, not well evaluated.  There is no evidence of pneumothorax.  There are degenerative changes of the thoracic spine.      Impression:   1. Bilateral perihilar opacities, left worse than right, likely representing multifocal pneumonia or asymmetric alveolar edema. 2. Questionable blunting of the costophrenic angles which may indicate small effusions.       PRESTON RILEY MD       Electronically Signed and Approved By: PRESTON RILEY MD on 4/07/2023 at 13:46                 LAB RESULTS:      Lab 04/11/23  0428 04/10/23  0523 04/09/23  0750 04/08/23  0441 04/07/23  1248   WBC 8.55 9.62 11.86* 14.06* 13.07*   HEMOGLOBIN 11.8* 12.2* 11.9* 10.8* 12.3*   HEMATOCRIT 35.1* 37.7 36.9* 32.7* 37.2*   PLATELETS 219 211 200 179 202   NEUTROS ABS  --   --   --  11.01* 11.55*   IMMATURE GRANS (ABS)  --   --   --  0.05 0.03   LYMPHS ABS  --   --   --  1.39 0.46*   MONOS ABS  --   --   --  1.31* 0.95*   EOS ABS  --   --   --  0.25 0.04   MCV 94.6 96.9 97.4* 96.2 96.4   PROCALCITONIN  --   --   --   --  0.49*   LACTATE  --   --   --   --  1.3         Lab 04/11/23  0428 04/10/23  0523 04/09/23  0750 04/08/23  0441 04/07/23  1248   SODIUM 137 135* 138 138 136   POTASSIUM 4.2 3.8 3.6 3.8 4.1   CHLORIDE 94* 92* 97* 101 98   CO2 33.7* 32.5* 29.7* 27.9 24.7   ANION GAP 9.3 10.5 11.3 9.1 13.3   BUN 13 15 14 15 15   CREATININE 0.85 1.16 0.93 0.98 0.95   EGFR 90.1 65.3 85.1 79.9 83.0   GLUCOSE 371* 260* 217* 145* 148*   CALCIUM 9.7 9.3 9.2 8.1* 8.5*         Lab 04/08/23  0441 04/07/23  1248   TOTAL PROTEIN 6.3 6.6   ALBUMIN 3.7 4.0   GLOBULIN  --  2.6   ALT (SGPT) 77* 104*   AST (SGOT) 38 63*   BILIRUBIN 1.0 1.2   INDIRECT BILIRUBIN 0.7  --    BILIRUBIN DIRECT 0.3  --    ALK PHOS 83 101         Lab 04/07/23  1248   PROBNP 1,793.0   HSTROP T 26*                 Brief Urine Lab Results     None        Microbiology Results (last 10  days)     Procedure Component Value - Date/Time    MRSA Screen, PCR (Inpatient) - Swab, Nares [[id number]]  (Normal) Collected: 04/08/23 0459    Lab Status: Final result Specimen: Swab from Nares Updated: 04/08/23 0618     MRSA PCR No MRSA Detected    Narrative:      The negative predictive value of this diagnostic test is high and should only be used to consider de-escalating anti-MRSA therapy. A positive result may indicate colonization with MRSA and must be correlated clinically.    S. Pneumo Ag Urine or CSF - Urine, Urine, Clean Catch [[id number]]  (Normal) Collected: 04/07/23 1540    Lab Status: Final result Specimen: Urine, Clean Catch Updated: 04/07/23 1605     Strep Pneumo Ag Negative    Legionella Antigen, Urine - Urine, Urine, Clean Catch [[id number]]  (Normal) Collected: 04/07/23 1540    Lab Status: Final result Specimen: Urine, Clean Catch Updated: 04/07/23 1605     LEGIONELLA ANTIGEN, URINE Negative    COVID-19,APTIMA PANTHER(JOSEPH),[facility], NP/OP SWAB IN UTM/VTM/[facility]ALINE TRANSPORT MEDIA,24 HR TAT - Swab, Nasal Cavity [[id number]]  (Normal) Collected: 04/07/23 1513    Lab Status: Final result Specimen: Swab from Nasal Cavity Updated: 04/08/23 0112     COVID19 Not Detected    Narrative:      Fact sheet for providers: https://www.fda.gov/media/599146/download     Fact sheet for patients: https://www.fda.gov/media/512507/download    Test performed by RT PCR.    Influenza Antigen, Rapid - Swab, Nasopharynx [[id number]]  (Normal) Collected: 04/07/23 1513    Lab Status: Final result Specimen: Swab from Nasopharynx Updated: 04/07/23 1546     Influenza A Ag, EIA Negative     Influenza B Ag, EIA Negative    Blood Culture - Blood, Arm, Right [[id number]]  (Normal) Collected: 04/07/23 1248    Lab Status: Preliminary result Specimen: Blood from Arm, Right Updated: 04/10/23 1300     Blood Culture No growth at 3 days    Blood Culture - Blood, Arm, Right [[id number]]  (Normal) Collected: 04/07/23 1248    Lab Status:  Preliminary result Specimen: Blood from Arm, Right Updated: 04/10/23 1300     Blood Culture No growth at 3 days          CT Chest With Contrast Diagnostic    Result Date: 4/7/2023  Impression:   1. Bilateral multifocal consolidation, left worse than right favored to represent multifocal pneumonia.  There are background findings of smooth interlobular septal thickening and small bilateral pleural effusions which can be seen with volume overload.  Alveolar edema is felt to be a less likely differential consideration for the consolidation.  2. No evidence of pulmonary embolism. 3. Partial visualization of a 2.7 cm low-density lesion within the inferior aspect of the left kidney.  Nonemergent initial imaging assessment with renal ultrasound is recommended.      PRESTON RILEY MD       Electronically Signed and Approved By: PRESTON RILEY MD on 4/07/2023 at 14:26             XR Chest 1 View    Result Date: 4/7/2023  Impression:   1. Bilateral perihilar opacities, left worse than right, likely representing multifocal pneumonia or asymmetric alveolar edema. 2. Questionable blunting of the costophrenic angles which may indicate small effusions.       PRESTON RILEY MD       Electronically Signed and Approved By: PRESTON RILEY MD on 4/07/2023 at 13:46                       Results for orders placed during the hospital encounter of 04/07/23    Adult Transthoracic Echo Complete W/ Cont if Necessary Per Protocol    Interpretation Summary  •  Left ventricular systolic function is normal. Calculated left ventricular EF = 51%  •  Left ventricular diastolic function is consistent with (grade I) impaired relaxation.  •  There is calcification of the aortic valve.      Labs Pending at Discharge:  Pending Labs     Order Current Status    Respiratory Panel, PCR (WITHOUT COVID) - Swab, Nasopharynx In process    Blood Culture - Blood, Arm, Right Preliminary result    Blood Culture - Blood, Arm, Right Preliminary result             Time spent on Discharge including face to face service:  32 minutes    Electronically signed by Parker Wilson DO, 04/11/23, 8:48 AM EDT.

## 2023-04-11 NOTE — PLAN OF CARE
Problem: Adult Inpatient Plan of Care  Goal: Plan of Care Review  Outcome: Met  Flowsheets  Taken 4/11/2023 1307 by Miley Miller, RN  Outcome Evaluation: Patient being discharged home with oxygen. On 2L NC. No complaints of pain.  Taken 4/11/2023 8347 by Jaclyn Tipton RN  Plan of Care Reviewed With:   patient   spouse   Goal Outcome Evaluation:  Plan of Care Reviewed With: patient           Outcome Evaluation: Patient being discharged home with oxygen. On 2L NC. No complaints of pain.

## 2023-04-12 ENCOUNTER — TRANSITIONAL CARE MANAGEMENT TELEPHONE ENCOUNTER (OUTPATIENT)
Dept: CALL CENTER | Facility: HOSPITAL | Age: 78
End: 2023-04-12
Payer: MEDICARE

## 2023-04-12 LAB
B PERT.PT PRMT NPH QL NAA+NON-PROBE: NOT DETECTED
BACTERIA SPEC AEROBE CULT: NORMAL
BACTERIA SPEC AEROBE CULT: NORMAL
C PNEUM DNA NPH QL NAA+NON-PROBE: NOT DETECTED
FLUAV H1 2009 PAN RNA NPH NAA+NON-PROBE: NOT DETECTED
FLUAV H1 RNA NPH QL NAA+NON-PROBE: NOT DETECTED
FLUAV H3 RNA NPH QL NAA+NON-PROBE: NOT DETECTED
FLUAV RNA NPH QL NAA+NON-PROBE: NOT DETECTED
FLUBV RNA NPH QL NAA+NON-PROBE: NOT DETECTED
HADV DNA NPH QL NAA+NON-PROBE: NOT DETECTED
HCOV 229E RNA NPH QL NAA+NON-PROBE: NOT DETECTED
HCOV HKU1 RNA NPH QL NAA+NON-PROBE: NOT DETECTED
HCOV NL63 RNA NPH QL NAA+NON-PROBE: NOT DETECTED
HCOV OC43 RNA NPH QL NAA+NON-PROBE: NOT DETECTED
HMPV RNA NPH QL NAA+NON-PROBE: NOT DETECTED
HPIV1 RNA NPH QL NAA+NON-PROBE: NOT DETECTED
HPIV2 RNA NPH QL NAA+NON-PROBE: NOT DETECTED
HPIV3 RNA NPH QL NAA+NON-PROBE: NOT DETECTED
HPIV4 RNA NPH QL NAA+NON-PROBE: NOT DETECTED
M PNEUMO DNA NPH QL NAA+NON-PROBE: NOT DETECTED
RSV RNA NPH QL NAA+NON-PROBE: NOT DETECTED
RV+EV RNA NPH QL NAA+NON-PROBE: NOT DETECTED

## 2023-04-12 NOTE — OUTREACH NOTE
Call Center TCM Note    Flowsheet Row Responses   [facility] patient discharged from? [facility]   Does the patient have one of the following disease processes/diagnoses(primary or secondary)? Sepsis   TCM attempt successful? Yes   Call start time 1629   Call end time 1635   Discharge diagnosis Sepsis due to pneumonia   List who call center can speak with pt   Meds reviewed with patient/caregiver? Yes   Is the patient having any side effects they believe may be caused by any medication additions or changes? No   Does the patient have all medications related to this admission filled (includes all antibiotics, inhalers, nebulizers,steroids,etc.) No   What is keeping the patient from filling the prescriptions? Pre-authorization in progress   Is the patient taking all medications as directed (includes completed medication regime)? Yes   Comments HOPS D/C F/U 4/18/2023 9 am.   Does the patient have an appointment with their PCP within 7 days of discharge? Yes   What is the Home health agency?  [facility]    Has home health visited the patient within 72 hours of discharge? Yes   Home health comments PT will also be visiting.   Psychosocial issues? No   Did the patient receive a copy of their discharge instructions? Yes   Nursing interventions Reviewed instructions with patient   What is the patient's perception of their health status since discharge? Improving   Nursing interventions Nurse provided patient education   Is the patient/caregiver able to teach back TIME? T emperature - higher or lower than normal, I nfection - may have signs and symptoms of an infection, M ental Decline - confused, sleepy, difficult to arouse, E xtremely Ill - severe pain, discomfort, shortness of breath   Nursing interventions Nurse provided reassurance to patient   Is patient/caregiver able to teach back steps to recovery at home? Set small, achievable goals for return to baseline health, Rest and regain strength, Exercise as tolerated    Is the patient/caregiver able to teach back signs and symptoms of worsening condition: Fever   Is the patient/caregiver able to teach back the hierarchy of who to call/visit for symptoms/problems? PCP, Specialist, Home health nurse, Urgent Care, ED, 911 Yes   TCM call completed? Yes   Wrap up additional comments Pt and spouse report pt feeling better. Pt is up walking. HH did visit on this day. PT will be visiting.   Call end time 1635   Would this patient benefit from a Referral to [facility] Social Work? No   Is the patient interested in additional calls from an ambulatory ?  NOTE:  applies to high risk patients requiring additional follow-up. No          Mabel Alvares, JACUQI    4/12/2023, 16:37 EDT

## 2023-04-12 NOTE — OUTREACH NOTE
Call Center TCM Note    Flowsheet Row Responses   [facility] patient discharged from? [facility]   Does the patient have one of the following disease processes/diagnoses(primary or secondary)? Sepsis   TCM attempt successful? No   Unsuccessful attempts Attempt 1   Call Status Voice mail issues          Mabel Alvares RN    4/12/2023, 13:23 EDT

## 2023-04-18 ENCOUNTER — OFFICE VISIT (OUTPATIENT)
Dept: INTERNAL MEDICINE | Facility: CLINIC | Age: 78
End: 2023-04-18
Payer: MEDICARE

## 2023-04-18 VITALS
TEMPERATURE: 97.4 F | SYSTOLIC BLOOD PRESSURE: 130 MMHG | HEART RATE: 73 BPM | DIASTOLIC BLOOD PRESSURE: 63 MMHG | BODY MASS INDEX: 31.98 KG/M2 | WEIGHT: 211 LBS | HEIGHT: 68 IN | OXYGEN SATURATION: 92 %

## 2023-04-18 DIAGNOSIS — I50.32 DIASTOLIC CHF, CHRONIC: ICD-10-CM

## 2023-04-18 DIAGNOSIS — Z09 HOSPITAL DISCHARGE FOLLOW-UP: Primary | ICD-10-CM

## 2023-04-18 DIAGNOSIS — J18.9 MULTIFOCAL PNEUMONIA: ICD-10-CM

## 2023-04-18 DIAGNOSIS — R09.02 HYPOXIA: ICD-10-CM

## 2023-04-18 DIAGNOSIS — A41.9 SEPSIS DUE TO PNEUMONIA: ICD-10-CM

## 2023-04-18 DIAGNOSIS — J18.9 SEPSIS DUE TO PNEUMONIA: ICD-10-CM

## 2023-04-18 RX ORDER — ICOSAPENT ETHYL 1000 MG/1
CAPSULE ORAL EVERY 12 HOURS SCHEDULED
COMMUNITY

## 2023-04-18 RX ORDER — LOSARTAN POTASSIUM 100 MG/1
TABLET ORAL
COMMUNITY
Start: 2023-02-27

## 2023-04-18 NOTE — PROGRESS NOTES
"Transitional Care Follow Up Visit  Subjective     Giles is a 77 y.o. male who presents for a transitional care management visit.    Within 48 business hours after discharge our office contacted him via telephone to coordinate his care and needs.      I reviewed and discussed the details of that call along with the discharge summary, hospital problems, inpatient lab results, inpatient diagnostic studies, and consultation reports with Giles.     Current outpatient and discharge medications have been reconciled for the patient.  Reviewed by: TENISHA Paul          4/11/2023     6:40 PM   Date of TCM Phone Call   [facility]   Date of Admission 4/7/2023   Date of Discharge 4/11/2023   Discharge Disposition Home or Self Care       Risk for Readmission (LACE) Score: 11 (4/11/2023  6:01 AM)      History of Present Illness     Course During Hospital Stay The following information was reviewed by: TENISHA Paul on 04/18/2023:      The following portions of the patient's history were reviewed and updated as appropriate: allergies, current medications, past family history, past medical history, past social history, past surgical history and problem list.     Vitals:    04/18/23 0923   BP: 130/63   BP Location: Right arm   Patient Position: Sitting   Cuff Size: Large Adult   Pulse: 73   Temp: 97.4 °F (36.3 °C)   TempSrc: Temporal   SpO2: 92%   Weight: 95.7 kg (211 lb)   Height: 172.7 cm (68\")       Date of Admission: 4/7/2023  Date of Discharge: 4/11/2023  Primary Care Physician: Morena Masters MD    Hospital Course:  Giles Donovan is a 77 y.o. male who presented with chief complaint shortness of breath.  Upon his presentation patient was noted to be saturating 50% on room air.  He initially required 6 L nasal cannula to increase his O2 saturation greater 90%.  He did have work-up that included a CT chest that was negative for any pulmonary embolus.  There was mention of " bilateral infiltrates and small pleural effusions.  Patient was kept on intravenous antibiotics and pulmonary was consulted.  Pulmonary felt no bronchoscopy was indicated.  Ultimately his infectious work-up remained unremarkable.  Upon talking with patient he stated that he did wear oxygen at home and received it through [facility].  Patient also stated that he had a oxygen concentrator that he was not sure how to use.  Discussed that [facility] will need to show him how to use his equipment properly.  Patient will continue his other home medications as before.  He will follow-up with his PCP in 1 week.  [facility].  He was discharged in stable condition.      DISCHARGE Follow Up Recommendations for labs and diagnostics: PCP 1 week.  [facility]     He has an appointment next week with Pulmonology  He has [facility] visiting weekly with PT as well.   He has had oxygen (2 tanks and concentrator) when he had COVID before   [facility] for oxygen   Wife has been testing him while walking and his oxygen dropped 64%   Did the walk test in the hospital  Pulmonology wants him on oxygen all the time   Was discharged on Augmentin, mucinex and symbicort         Review of Systems   Constitutional: Positive for fatigue. Negative for chills and fever.   HENT: Positive for postnasal drip and rhinorrhea.    Eyes: Negative.    Respiratory: Positive for cough and shortness of breath. Negative for chest tightness and wheezing.    Cardiovascular: Negative for chest pain, palpitations and leg swelling.   Gastrointestinal: Negative.    Endocrine: Negative.    Genitourinary: Negative.    Musculoskeletal: Negative.    Skin: Negative.    Allergic/Immunologic: Negative.    Neurological: Positive for weakness. Negative for dizziness, facial asymmetry, light-headedness, numbness and headaches.   Psychiatric/Behavioral: Negative.        Objective   Physical Exam  Vitals reviewed.   Constitutional:        Appearance: Normal appearance. He is well-developed.   HENT:      Head: Normocephalic and atraumatic.      Mouth/Throat:      Pharynx: No oropharyngeal exudate.   Eyes:      Conjunctiva/sclera: Conjunctivae normal.      Pupils: Pupils are equal, round, and reactive to light.   Cardiovascular:      Rate and Rhythm: Normal rate and regular rhythm.      Heart sounds: No murmur heard.    No friction rub. No gallop.   Pulmonary:      Effort: Pulmonary effort is normal. Prolonged expiration present. No accessory muscle usage or respiratory distress.      Breath sounds: Decreased air movement present. Examination of the right-lower field reveals decreased breath sounds. Examination of the left-lower field reveals decreased breath sounds. Decreased breath sounds present. No wheezing or rhonchi.   Musculoskeletal:      Right lower leg: No edema.      Left lower leg: No edema.   Skin:     General: Skin is warm and dry.   Neurological:      Mental Status: He is alert and oriented to person, place, and time.   Psychiatric:         Mood and Affect: Affect normal.           Assessment & Plan     Diagnoses and all orders for this visit:    1. Hospital discharge follow-up (Primary)  Comments:  Patient has home health, physical therapy visiting him.  Patient needs travel oxygen set up.  Continue current treatment plan.    2. Multifocal pneumonia  Comments:  Continue recommended medications.  Lung sounds stable today.  No wheezing heard.  Oxygen saturation 92% on room air.  Follow-up with pulm next week.    3. Hypoxia  Comments:  Oxygen at 92% on room air.  Patient reports running low on travel oxygen.  Patient reports having home concentrator.  We will reach out to [facility] for further    4. Sepsis due to pneumonia  Comments:  Labs improving.  Continue current medications.  Patient has follow-up with specialist next week.    5. Diastolic CHF, chronic  Comments:  Patient to see Dr. Walker for follow-up.  No pedal edema observed today  in office.  Discussed worrisome symptoms and when to proceed back to ER.    Will contact [facility] to get portable oxygen set up for patient.  Patient reports pulmonology wants him on oxygen therapy all the time now since he failed his walk test in the hospital.  Patient has a follow-up with pulmonology next week.  Patient does not have trouble oxygen with him at this time and is currently satting 92% on room air.  Patient denies significant shortness of breath, he says his shortness of breath increases with activity.  Patient does have the larger oxygen set up at home and wears his oxygen 24/7 at home.  Patient denies any further concerns or needs at this time.  Patient has home health visiting him once weekly as well as physical therapy multiple days a week.  Patient reports he is trying to eat and get his energy back.  Patient denies any increasing weakness, fever or chest pain.  Discussed worrisome symptoms that if significant shortness of breath, fever/chest pain were to develop he needs to go the emergency department for immediate evaluation.  Patient/significant other verbalized understanding of treatment plan.  Patient has previous scheduled stress test for this week by cardiologist, Dr. Walker.  Advised patient to contact Dr. Walker office and discussed recent hospitalization with multifocal pneumonia and CHF exacerbation.  Concerned about him keeping appointment for stress test this week due to recent illness/sepsis and still trying to recover from hospitalization.  Patient/significant other stated they would contact his office to try to get stress test rescheduled.  Also advised patient to make a follow-up appointment with cardiology for further discussion of CHF symptoms/exacerbation.  Patient to contact office if symptoms were to worsen or any other concerns arise.    Follow Up     Return in about 1 month (around 5/18/2023) for with PCP.

## 2023-04-19 ENCOUNTER — READMISSION MANAGEMENT (OUTPATIENT)
Dept: CALL CENTER | Facility: HOSPITAL | Age: 78
End: 2023-04-19
Payer: MEDICARE

## 2023-04-19 NOTE — OUTREACH NOTE
Sepsis Week 2 Survey    Flowsheet Row Responses   [facility] patient discharged from? [facility]   Does the patient have one of the following disease processes/diagnoses(primary or secondary)? Sepsis   Week 2 attempt successful? Yes   Call start time 1419   Call end time 1422   Discharge diagnosis Sepsis due to pneumonia   Meds reviewed with patient/caregiver? Yes   Is the patient taking all medications as directed (includes completed medication regime)? Yes   Comments regarding appointments Pt has f/u with pcp.   Does the patient have a primary care provider?  Yes   Has the patient kept scheduled appointments due by today? Yes   Has home health visited the patient within 72 hours of discharge? Yes   What is the patient's perception of their health status since discharge? Improving   Week 2 call completed? Yes   Wrap up additional comments Pt continues to improve. Pt has f/u with pcp. Antibiotics complete. Pt out running errands on this day. Pt has no questions.          Mabel AMAYA - Registered Nurse

## 2023-04-21 ENCOUNTER — OUTSIDE FACILITY SERVICE (OUTPATIENT)
Dept: INTERNAL MEDICINE | Facility: CLINIC | Age: 78
End: 2023-04-21
Payer: MEDICARE

## 2023-04-24 ENCOUNTER — OUTSIDE FACILITY SERVICE (OUTPATIENT)
Dept: INTERNAL MEDICINE | Facility: CLINIC | Age: 78
End: 2023-04-24
Payer: MEDICARE

## 2023-04-25 NOTE — PROGRESS NOTES
Primary Care Provider  Morena Masters MD   Referring Provider  No ref. provider found    Patient Complaint  Follow-up, Cough, Pneumonia (Hospital follow up), and oxygen      SUBJECTIVE    History of Presenting Illness  Giles Donovan is a pleasant 77 y.o. male who presents to [facility] for hospital follow-up.  Patient was admitted to [facility] 4/7/2023 through 4/11/2023.  Admitting diagnosis was sepsis due to pneumonia, diastolic CHF, acute respiratory failure with hypoxia, multifocal pneumonia.He did have work-up that included a CT chest that was negative for any pulmonary embolus.  There was mention of bilateral infiltrates and small pleural effusions.  Patient was kept on intravenous antibiotics and pulmonary was consulted.  Pulmonary felt no bronchoscopy was indicated.  Ultimately his infectious work-up remained unremarkable.     Patient presents today on O2 at 3 L via nasal cannula.  His DME company is [employer].  Patient continues on Advair and albuterol inhaler.  Patient does have obstructive sleep apnea he does have a CPAP machine.  Patient has gotten this from the [facility] however he has a broken piece on it and [facility].  Patient was sent home with an incentive spirometer and flutter valve.  He is not using it as frequently as he should.  He currently is on Advair and albuterol inhaler.  He did not get a nebulizer medication and will need a nebulizer machine today.  Patient's last PFT was several years ago.  Patient states he was given his Inogene machine back at COVID time by his PCP Dr. Masters.  Patient states he gets very short of air with exertional activities such as getting up and getting ready in the morning.  He states this is prior to him using his Advair and his albuterol inhaler.  Patient states he does check his O2 saturation and has been in the 84% at one time  since his hospital discharge.  Patient was sent home with antibiotic which he has completed.    Denies coughing, wheezing, headaches, chest pain, weight loss or hemoptysis. Denies fevers, chills and night sweats. Giles Donovan is able to perform ADLs without difficulties and denies any swollen glands/lymph nodes in the head or neck.    I have personally reviewed the review of systems, past family, social, medical and surgical histories; and agree with their findings.    Review of Systems  Constitutional symptoms:  Denied complaints   Ear, nose, throat: Denied complaints  Cardiovascular:  Denied complaints  Respiratory: Shortness of air with exertion  Gastrointestinal: Denied complaints  Musculoskeletal: Denied complaints    Family History   Problem Relation Age of Onset   • Heart disease Mother    • Lupus Mother    • Arthritis Mother    • Kidney disease Sister         Social History     Socioeconomic History   • Marital status:    Tobacco Use   • Smoking status: Never   • Smokeless tobacco: Never   Vaping Use   • Vaping Use: Never used   Substance and Sexual Activity   • Alcohol use: Yes     Comment: occasionally   • Drug use: Never   • Sexual activity: Defer        Past Medical History:   Diagnosis Date   • Allergic rhinitis 12/27/2014    Will try Zyrtec, he didnt want to use a nasal spray.   • Arthritis    • Asthma    • Cataract     left eye   • Cough 03/30/2016    PFT and CXR ordered.   • Depression     PTSD   • Diabetes    • Elevated cholesterol    • H/O psychiatric care 1999    PTSD   • Hyperlipidemia 03/30/2016    Lipids ordered. Continue on current medication.   • Hypertension    • Hypothyroidism    • Seasonal allergies    • Shortness of breath    • Sleep apnea    • Stenosis of right carotid artery 02/19/2017    Will refer to vascular surgeon.   • Syncope 02/19/2017   • Type 2 diabetes mellitus    • Upper respiratory infection 10/18/2015    Will treat cough with Hydromet, otherwise over the  counter meds for treatment.        Immunization History   Administered Date(s) Administered   • COVID-19 (MODERNA) 1st,2nd,3rd Dose Monovalent 01/01/2021, 01/27/2021   • COVID-19 (MODERNA) Monovalent Original Booster 01/18/2022   • COVID-19 (PFIZER) BIVALENT 12+YRS 10/05/2022   • Flu Vaccine Intradermal Quad 18-64YR 12/02/2020   • Flu Vaccine Quad PF >36MO 10/01/2019, 12/02/2020   • Flublok 18+yrs 10/05/2022   • Fluzone Quad >6mos (Multi-dose) 09/17/2018   • Pneumococcal Conjugate 20-Valent (PCV20) 12/05/2022       Allergies   Allergen Reactions   • Latex Rash and Anaphylaxis   • Latex, Natural Rubber Itching          Current Outpatient Medications:   •  albuterol sulfate  (90 Base) MCG/ACT inhaler, Inhale 2 puffs Every 4 (Four) Hours As Needed for Wheezing or Shortness of Air., Disp: 18 g, Rfl: 0  •  aspirin EC 81 MG EC tablet, Take 1 tablet by mouth Daily., Disp: , Rfl:   •  atorvastatin (LIPITOR) 80 MG tablet, Take 1 tablet by mouth Daily., Disp: 90 tablet, Rfl: 1  •  B-D UF III MINI PEN NEEDLES 31G X 5 MM misc, , Disp: , Rfl:   •  benzonatate (TESSALON) 100 MG capsule, Take 1 capsule by mouth., Disp: , Rfl:   •  carvedilol (COREG) 12.5 MG tablet, Take 1 tablet by mouth., Disp: , Rfl:   •  cetirizine (ZyrTEC Allergy) 10 MG tablet, Take 1 tablet by mouth Daily., Disp: 90 tablet, Rfl: 3  •  ezetimibe (ZETIA) 10 MG tablet, Take 1 tablet by mouth Daily., Disp: , Rfl:   •  felodipine (PLENDIL) 5 MG 24 hr tablet, Take 1 tablet by mouth Daily., Disp: , Rfl:   •  fluticasone-salmeterol (Advair HFA) 230-21 MCG/ACT inhaler, Inhale 2 puffs 2 (Two) Times a Day for 30 days. Indications: COPD, J44.9, Disp: 12 g, Rfl: 0  •  gabapentin (NEURONTIN) 600 MG tablet, Take 1 tablet by mouth 3 (Three) Times a Day., Disp: , Rfl:   •  guaiFENesin (MUCINEX) 600 MG 12 hr tablet, Take 1 tablet by mouth Every 12 (Twelve) Hours for 30 days., Disp: 60 tablet, Rfl: 0  •  insulin aspart (novoLOG) 100 UNIT/ML injection, Inject 24-26 Units  "under the skin into the appropriate area as directed 3 (Three) Times a Day Before Meals., Disp: , Rfl:   •  Insulin Glargine (Lantus SoloStar) 100 UNIT/ML injection pen, Inject 44 Units under the skin into the appropriate area as directed Every Night., Disp: , Rfl:   •  levothyroxine (SYNTHROID, LEVOTHROID) 100 MCG tablet, Take 1 tablet by mouth Daily., Disp: , Rfl:   •  losartan (COZAAR) 100 MG tablet, , Disp: , Rfl:   •  metFORMIN (GLUCOPHAGE) 500 MG tablet, Take 1 tablet by mouth 2 (Two) Times a Day With Meals., Disp: , Rfl:   •  omeprazole (priLOSEC) 20 MG capsule, Take 1 capsule by mouth Daily., Disp: , Rfl:   •  oxybutynin XL (DITROPAN-XL) 10 MG 24 hr tablet, Take 1 tablet by mouth Daily., Disp: 90 tablet, Rfl: 4  •  oxybutynin XL (DITROPAN-XL) 10 MG 24 hr tablet, Take 1 tablet by mouth Daily., Disp: , Rfl:   •  PARoxetine (PAXIL) 40 MG tablet, Take 1 tablet by mouth Every Evening., Disp: , Rfl:   •  prazosin (MINIPRESS) 1 MG capsule, Take 1 capsule by mouth Daily., Disp: , Rfl:   •  traZODone (DESYREL) 100 MG tablet, Take 1 tablet by mouth Every Night., Disp: , Rfl:   •  albuterol (PROVENTIL) (2.5 MG/3ML) 0.083% nebulizer solution, Take 2.5 mg by nebulization Every 4 (Four) Hours As Needed for Wheezing or Shortness of Air., Disp: 120 mL, Rfl: 5  •  icosapent ethyl (VASCEPA) 1 g capsule capsule, Every 12 (Twelve) Hours. (Patient not taking: Reported on 4/27/2023), Disp: , Rfl:            Vital Signs   /64 (BP Location: Right arm, Patient Position: Sitting, Cuff Size: Adult)   Pulse 89   Temp 99.8 °F (37.7 °C) (Tympanic)   Resp 22   Ht 172.7 cm (68\")   Wt 98.1 kg (216 lb 3.2 oz)   SpO2 100% Comment: 3 liters pd  BMI 32.87 kg/m²       OBJECTIVE    Physical Exam  Vital Signs Reviewed   WDWN, Alert, NAD.    HEENT:  PERRL, EOMI.  OP, nares clear  Neck:  Supple, no JVD, no thyromegaly  Chest:  good aeration, clear to auscultation bilaterally, tympanic to percussion bilaterally, no work of breathing " noted  CV: RRR, no MGR, pulses 2+, equal.  Abd:  Soft, NT, ND, + BS, no HSM  EXT:  no clubbing, no cyanosis, no edema  Neuro:  A&Ox3, CN grossly intact, no focal deficits.  Skin: No rashes or lesions noted    Results Review  I have personally reviewed the prior office notes, hospital records, labs, and diagnostics.  CT Chest With Contrast Diagnostic [[id number]] (Order [id number])  Order  Status: Final result     Appointment Information    PACS Images     Radiology Images  Study Result    Narrative & Impression   PROCEDURE:  CT CHEST W CONTRAST DIAGNOSTIC     COMPARISON:  [facility], CT, CT CHEST W CONTRAST DIAGNOSTIC, 12/28/2021, 16:07.     INDICATIONS:  Shortness of breath, hypoxia, hemoptysis     TECHNIQUE:    After obtaining the patient's consent, CT images were obtained with non-ionic   intravenous contrast material.       PROTOCOL:     Standard imaging protocol performed                 RADIATION:      DLP: 521.2 mGy*cm               Automated exposure control was utilized to minimize radiation dose.   CONTRAST:      75 cc Isovue 370 I.V.     FINDINGS:          The visualized soft tissue structures at the base of the neck including the thyroid appear within   normal limits.  There is no lower cervical or axillary adenopathy.     The heart size is normal.  There is no pericardial effusion.  The aorta is normal in caliber   without evidence of aneurysm formation.  The main pulmonary artery is normal in caliber.  There is   no evidence of pulmonary embolism.  There are reactive mediastinal or hilar lymphadenopathy by size   criteria.     The tracheobronchial tree is patent.  There is no abnormal bronchial wall thickening or   bronchiectasis.  There is patchy bilateral consolidation throughout both lungs, left worse than   right.  There is mild smooth interlobular septal thickening.  There are small bilateral pleural   effusions.  There is no evidence of pneumothorax.     The esophagus is normal in course  and caliber.  There is partial visualization of a 2.7 cm   low-density lesion within the inferior aspect of the left kidney.  This is incompletely evaluated   on CT.  Follow-up with initial renal ultrasound would be recommended. There are degenerative   changes of the thoracic spine.     IMPRESSION:                 1. Bilateral multifocal consolidation, left worse than right favored to represent multifocal   pneumonia.  There are background findings of smooth interlobular septal thickening and small   bilateral pleural effusions which can be seen with volume overload.  Alveolar edema is felt to be a   less likely differential consideration for the consolidation.     2. No evidence of pulmonary embolism.  3. Partial visualization of a 2.7 cm low-density lesion within the inferior aspect of the left   kidney.  Nonemergent initial imaging assessment with renal ultrasound is recommended.              PRESTON RILEY MD         Electronically Signed and Approved By: PRESTON RILEY MD on 2023 at 14:26        Giles Donovan  Complete Transthoracic Echocardiogram with Complete Doppler and Color Flow  Order# [id number]  Reading physician: Mayank Walker MD Ordering physician: Mabel Orr APRN Study date: 23     Patient Information    Patient Name   Giles Donovan MRN   [id number] Legal Sex   Male  (Age)   1945 (77 y.o.)     Patient Hx Of Height, Weight, and Vitals    Height Weight BSA (Calculated - sq m) BMI (Calculated) Retired BMI (kg/m2) Pulse BP        72 127/64     [facility] PACS Images     Show images for Adult Transthoracic Echo Complete W/ Cont if Necessary Per Protocol [facility] PACS Images     Show images for Adult Transthoracic Echo Complete W/ Cont if Necessary Per Protocol   Clinical Indication    Heart Failure, Cardiomyopathy or Systemic or Pulmonary Hypertension   Comments: Patient of Dr Walker     Interpretation Summary       •  Left ventricular systolic function is normal.  Calculated left ventricular EF = 51%  •  Left ventricular diastolic function is consistent with (grade I) impaired relaxation.  •  There is calcification of the aortic valve.    ASSESSMENT         Patient Active Problem List   Diagnosis   • Allergic rhinitis   • Cough   • Controlled type 2 diabetes mellitus without complication, without long-term current use of insulin   • GERD (gastroesophageal reflux disease)   • Hyperlipidemia   • Hypertension   • Hypothyroidism   • Stenosis of right carotid artery   • Syncope   • Urge incontinence of urine   • Erectile dysfunction   • Benign prostatic hyperplasia with urinary frequency   • Cardiomyopathy   • Cataract   • Chest pain with low risk for cardiac etiology   • Right-sided carotid artery occlusion without cerebral infarction   • Depression   • Esophageal reflux   • Vaso vagal episode   • Pneumonia due to COVID-19 virus   • Hypoxia   • Chronic lung disease   • Sepsis due to pneumonia   • Acute respiratory failure with hypoxia   • Multifocal pneumonia   • Insulin dependent type 2 diabetes mellitus   • Mixed hyperlipidemia   • Diastolic CHF, chronic       Encounter Diagnoses   Name Primary?   • Acute respiratory failure with hypoxia Yes   • MIAH (obstructive sleep apnea)    • Multifocal pneumonia    • Hypoxia    • Dyspnea on exertion       PLAN  At this time we will provide patient with a nebulizer machine tubing and instructions in use.  Prescription for albuterol nebulizer sent to his pharmacy.  Patient instructed to use.  Patient to continue with O2 at 2 L via nasal cannula titrating to keep his saturation above 90%.  We will schedule patient for pulmonary function test at this time.  [facility]  Patient to continue with Advair albuterol inhaler.  Patient will follow back up in 6 weeks or sooner if needed.    Diagnoses and all orders for this visit:    1. Acute respiratory failure with hypoxia (Primary)  -     Full  Pulmonary Function Test With Bronchodilator; Future  -     albuterol (PROVENTIL) (2.5 MG/3ML) 0.083% nebulizer solution; Take 2.5 mg by nebulization Every 4 (Four) Hours As Needed for Wheezing or Shortness of Air.  Dispense: 120 mL; Refill: 5  -     Home Nebulizer    2. MIAH (obstructive sleep apnea)  -     Home Nebulizer    3. Multifocal pneumonia  -     Full Pulmonary Function Test With Bronchodilator; Future  -     albuterol (PROVENTIL) (2.5 MG/3ML) 0.083% nebulizer solution; Take 2.5 mg by nebulization Every 4 (Four) Hours As Needed for Wheezing or Shortness of Air.  Dispense: 120 mL; Refill: 5  -     Home Nebulizer    4. Hypoxia  -     Full Pulmonary Function Test With Bronchodilator; Future  -     albuterol (PROVENTIL) (2.5 MG/3ML) 0.083% nebulizer solution; Take 2.5 mg by nebulization Every 4 (Four) Hours As Needed for Wheezing or Shortness of Air.  Dispense: 120 mL; Refill: 5  -     Home Nebulizer    5. Dyspnea on exertion  -     Full Pulmonary Function Test With Bronchodilator; Future  -     albuterol (PROVENTIL) (2.5 MG/3ML) 0.083% nebulizer solution; Take 2.5 mg by nebulization Every 4 (Four) Hours As Needed for Wheezing or Shortness of Air.  Dispense: 120 mL; Refill: 5  -     Home Nebulizer           Smoking status:never  Vaccination status:up to date  Medications personally reviewed    Follow Up  Return in about 6 weeks (around 6/8/2023) for after PFT with Aisha houser NP or Dr. Ochoa.    Patient was given instructions and counseling regarding his condition or for health maintenance advice. Please see specific information pulled into the AVS if appropriate.

## 2023-04-27 ENCOUNTER — OFFICE VISIT (OUTPATIENT)
Dept: PULMONOLOGY | Facility: CLINIC | Age: 78
End: 2023-04-27
Payer: MEDICARE

## 2023-04-27 VITALS
OXYGEN SATURATION: 100 % | HEIGHT: 68 IN | WEIGHT: 216.2 LBS | RESPIRATION RATE: 22 BRPM | DIASTOLIC BLOOD PRESSURE: 64 MMHG | BODY MASS INDEX: 32.77 KG/M2 | SYSTOLIC BLOOD PRESSURE: 135 MMHG | HEART RATE: 89 BPM | TEMPERATURE: 99.8 F

## 2023-04-27 DIAGNOSIS — G47.33 OSA (OBSTRUCTIVE SLEEP APNEA): ICD-10-CM

## 2023-04-27 DIAGNOSIS — J96.01 ACUTE RESPIRATORY FAILURE WITH HYPOXIA: Primary | ICD-10-CM

## 2023-04-27 DIAGNOSIS — R09.02 HYPOXIA: ICD-10-CM

## 2023-04-27 DIAGNOSIS — J18.9 MULTIFOCAL PNEUMONIA: ICD-10-CM

## 2023-04-27 DIAGNOSIS — R06.09 DYSPNEA ON EXERTION: ICD-10-CM

## 2023-04-27 RX ORDER — CARVEDILOL 12.5 MG/1
12.5 TABLET ORAL
COMMUNITY
Start: 2023-03-14

## 2023-04-27 RX ORDER — PRAZOSIN HYDROCHLORIDE 1 MG/1
1 CAPSULE ORAL DAILY
COMMUNITY
Start: 2023-04-11

## 2023-04-27 RX ORDER — OXYBUTYNIN CHLORIDE 10 MG/1
10 TABLET, EXTENDED RELEASE ORAL DAILY
COMMUNITY
Start: 2023-04-11

## 2023-04-27 RX ORDER — BENZONATATE 100 MG/1
100 CAPSULE ORAL
COMMUNITY
Start: 2023-04-10

## 2023-04-27 RX ORDER — ALBUTEROL SULFATE 2.5 MG/3ML
2.5 SOLUTION RESPIRATORY (INHALATION) EVERY 4 HOURS PRN
Qty: 120 ML | Refills: 5 | Status: SHIPPED | OUTPATIENT
Start: 2023-04-27

## 2023-04-27 RX ORDER — FLUTICASONE PROPIONATE AND SALMETEROL XINAFOATE 230; 21 UG/1; UG/1
AEROSOL, METERED RESPIRATORY (INHALATION)
COMMUNITY
Start: 2023-04-13 | End: 2023-04-27 | Stop reason: SDUPTHER

## 2023-05-11 ENCOUNTER — HOSPITAL ENCOUNTER (OUTPATIENT)
Dept: RESPIRATORY THERAPY | Facility: HOSPITAL | Age: 78
Discharge: HOME | End: 2023-05-11
Payer: MEDICARE

## 2023-05-11 DIAGNOSIS — J96.01 ACUTE RESPIRATORY FAILURE WITH HYPOXIA: ICD-10-CM

## 2023-05-11 DIAGNOSIS — R09.02 HYPOXIA: ICD-10-CM

## 2023-05-11 DIAGNOSIS — R06.09 DYSPNEA ON EXERTION: ICD-10-CM

## 2023-05-11 DIAGNOSIS — J18.9 MULTIFOCAL PNEUMONIA: ICD-10-CM

## 2023-05-11 PROCEDURE — 94729 DIFFUSING CAPACITY: CPT

## 2023-05-11 PROCEDURE — 94010 BREATHING CAPACITY TEST: CPT

## 2023-05-11 PROCEDURE — 94726 PLETHYSMOGRAPHY LUNG VOLUMES: CPT

## 2023-05-11 RX ORDER — LEVALBUTEROL INHALATION SOLUTION 1.25 MG/3ML
1.25 SOLUTION RESPIRATORY (INHALATION) ONCE
Status: COMPLETED | OUTPATIENT
Start: 2023-05-11 | End: 2023-05-11

## 2023-05-11 RX ADMIN — LEVALBUTEROL HYDROCHLORIDE 1.25 MG: 1.25 SOLUTION RESPIRATORY (INHALATION) at 12:07

## 2023-05-18 ENCOUNTER — OFFICE VISIT (OUTPATIENT)
Dept: UROLOGY | Facility: CLINIC | Age: 78
End: 2023-05-18
Payer: MEDICARE

## 2023-05-18 VITALS — BODY MASS INDEX: 32.74 KG/M2 | RESPIRATION RATE: 14 BRPM | HEIGHT: 68 IN | WEIGHT: 216 LBS

## 2023-05-18 DIAGNOSIS — R35.0 BENIGN PROSTATIC HYPERPLASIA WITH URINARY FREQUENCY: Primary | ICD-10-CM

## 2023-05-18 DIAGNOSIS — N40.1 BENIGN PROSTATIC HYPERPLASIA WITH URINARY FREQUENCY: Primary | ICD-10-CM

## 2023-05-18 LAB
BILIRUB BLD-MCNC: NEGATIVE MG/DL
CLARITY, POC: CLEAR
COLOR UR: YELLOW
EXPIRATION DATE: ABNORMAL
GLUCOSE UR STRIP-MCNC: NEGATIVE MG/DL
KETONES UR QL: ABNORMAL
LEUKOCYTE EST, POC: NEGATIVE
Lab: ABNORMAL
NITRITE UR-MCNC: NEGATIVE MG/ML
PH UR: 6 [PH] (ref 5–8)
PROT UR STRIP-MCNC: ABNORMAL MG/DL
RBC # UR STRIP: NEGATIVE /UL
SP GR UR: 1.01 (ref 1–1.03)
URINE VOLUME: 0
UROBILINOGEN UR QL: ABNORMAL

## 2023-05-18 RX ORDER — TAMSULOSIN HYDROCHLORIDE 0.4 MG/1
1 CAPSULE ORAL DAILY
Qty: 90 CAPSULE | Refills: 3 | Status: SHIPPED | OUTPATIENT
Start: 2023-05-18 | End: 2024-05-12

## 2023-05-18 RX ORDER — OXYBUTYNIN CHLORIDE 10 MG/1
10 TABLET, EXTENDED RELEASE ORAL DAILY
Qty: 90 TABLET | Refills: 4 | Status: SHIPPED | OUTPATIENT
Start: 2023-05-18

## 2023-05-18 NOTE — PROGRESS NOTES
Chief Complaint: Benign Prostatic Hypertrophy    Subjective         History of Present Illness  Giles Donovan is a 77 y.o. male presents to [facility] to be seen for annual f/u BPH.    Patient on tamsulosin and oxybutynin for th lat year. He ran out of the tamsulosin a few months ago.    States some urgency.    He reports a good stream.     Denies bothersome straining or hesitancy.     Nocturia x 2-3     He wears depends.     No GH/UTI     Previous:  8/21 cystoscopy-3 cm prostate, no median lobe, mild trabeculations.  Negative otherwise     3 beers daily.     No  Burning/ dysuria/ GH     No cardiopulmonary history.  Patient does not smoke. ASA 81/meloxicam.  Diabetes mellitus on insulin     Patient has never got a prescription for tadalafil yet     Sildenafil 100 mg -did not help     No urologic family history,   Has never had any urologic surgery.        PVR     3/22   109  9/21   167  5/21   78     9/20  GFR > 60      PSA        2/19 0.92    Objective     Past Medical History:   Diagnosis Date   • Allergic rhinitis 12/27/2014    Will try Zyrtec, he didnt want to use a nasal spray.   • Arthritis    • Asthma    • Cataract     left eye   • Cough 03/30/2016    PFT and CXR ordered.   • Depression     PTSD   • Diabetes    • Elevated cholesterol    • H/O psychiatric care 1999    PTSD   • Hyperlipidemia 03/30/2016    Lipids ordered. Continue on current medication.   • Hypertension    • Hypothyroidism    • Seasonal allergies    • Shortness of breath    • Sleep apnea    • Stenosis of right carotid artery 02/19/2017    Will refer to vascular surgeon.   • Syncope 02/19/2017   • Type 2 diabetes mellitus    • Upper respiratory infection 10/18/2015    Will treat cough with Hydromet, otherwise over the counter meds for treatment.       Past Surgical History:   Procedure Laterality Date   • CATARACT EXTRACTION Right     x2   • COLONOSCOPY     • JOINT REPLACEMENT     • REPLACEMENT TOTAL HIP  ONCOLOGIC Right    • RETINAL DETACHMENT REPAIR     • TOE AMPUTATION Left 11/2017    Second phalaeng.   • TOE OSTEOPHYTE REMOVAL           Current Outpatient Medications:   •  albuterol (PROVENTIL) (2.5 MG/3ML) 0.083% nebulizer solution, Take 2.5 mg by nebulization Every 4 (Four) Hours As Needed for Wheezing or Shortness of Air., Disp: 120 mL, Rfl: 5  •  albuterol sulfate  (90 Base) MCG/ACT inhaler, Inhale 2 puffs Every 4 (Four) Hours As Needed for Wheezing or Shortness of Air., Disp: 18 g, Rfl: 0  •  aspirin EC 81 MG EC tablet, Take 1 tablet by mouth Daily., Disp: , Rfl:   •  atorvastatin (LIPITOR) 80 MG tablet, Take 1 tablet by mouth Daily., Disp: 90 tablet, Rfl: 1  •  B-D UF III MINI PEN NEEDLES 31G X 5 MM misc, , Disp: , Rfl:   •  benzonatate (TESSALON) 100 MG capsule, Take 1 capsule by mouth., Disp: , Rfl:   •  carvedilol (COREG) 12.5 MG tablet, Take 1 tablet by mouth., Disp: , Rfl:   •  cetirizine (ZyrTEC Allergy) 10 MG tablet, Take 1 tablet by mouth Daily., Disp: 90 tablet, Rfl: 3  •  ezetimibe (ZETIA) 10 MG tablet, Take 1 tablet by mouth Daily., Disp: , Rfl:   •  felodipine (PLENDIL) 5 MG 24 hr tablet, Take 1 tablet by mouth Daily., Disp: , Rfl:   •  gabapentin (NEURONTIN) 600 MG tablet, Take 1 tablet by mouth 3 (Three) Times a Day., Disp: , Rfl:   •  icosapent ethyl (VASCEPA) 1 g capsule capsule, Every 12 (Twelve) Hours., Disp: , Rfl:   •  insulin aspart (novoLOG) 100 UNIT/ML injection, Inject 24-26 Units under the skin into the appropriate area as directed 3 (Three) Times a Day Before Meals., Disp: , Rfl:   •  Insulin Glargine (Lantus SoloStar) 100 UNIT/ML injection pen, Inject 44 Units under the skin into the appropriate area as directed Every Night., Disp: , Rfl:   •  levothyroxine (SYNTHROID, LEVOTHROID) 100 MCG tablet, Take 1 tablet by mouth Daily., Disp: , Rfl:   •  losartan (COZAAR) 100 MG tablet, , Disp: , Rfl:   •  metFORMIN (GLUCOPHAGE) 500 MG tablet, Take 1 tablet by mouth 2 (Two) Times a  "Day With Meals., Disp: , Rfl:   •  omeprazole (priLOSEC) 20 MG capsule, Take 1 capsule by mouth Daily., Disp: , Rfl:   •  oxybutynin XL (DITROPAN-XL) 10 MG 24 hr tablet, Take 1 tablet by mouth Daily., Disp: 90 tablet, Rfl: 4  •  PARoxetine (PAXIL) 40 MG tablet, Take 1 tablet by mouth Every Evening., Disp: , Rfl:   •  prazosin (MINIPRESS) 1 MG capsule, Take 1 capsule by mouth Daily., Disp: , Rfl:   •  traZODone (DESYREL) 100 MG tablet, Take 1 tablet by mouth Every Night., Disp: , Rfl:   •  fluticasone-salmeterol (Advair HFA) 230-21 MCG/ACT inhaler, Inhale 2 puffs 2 (Two) Times a Day for 30 days. Indications: COPD, J44.9, Disp: 12 g, Rfl: 0  •  tamsulosin (FLOMAX) 0.4 MG capsule 24 hr capsule, Take 1 capsule by mouth Daily for 360 days., Disp: 90 capsule, Rfl: 3    Allergies   Allergen Reactions   • Latex Rash and Anaphylaxis   • Latex, Natural Rubber Itching        Family History   Problem Relation Age of Onset   • Heart disease Mother    • Lupus Mother    • Arthritis Mother    • Kidney disease Sister        Social History     Socioeconomic History   • Marital status:    Tobacco Use   • Smoking status: Never   • Smokeless tobacco: Never   Vaping Use   • Vaping Use: Never used   Substance and Sexual Activity   • Alcohol use: Yes     Comment: occasionally   • Drug use: Never   • Sexual activity: Defer       Vital Signs:   Resp 14   Ht 172.7 cm (68\")   Wt 98 kg (216 lb)   BMI 32.84 kg/m²      Physical Exam     Result Review :   The following data was reviewed by: TENISHA Stack on 05/18/2023:  Results for orders placed or performed in visit on 05/18/23   Bladder Scan   Result Value Ref Range    Urine Volume 0    POC Urinalysis Dipstick, Automated    Specimen: Urine   Result Value Ref Range    Color Yellow Yellow, Straw, Dark Yellow, Ashley    Clarity, UA Clear Clear    Specific Gravity  1.015 1.005 - 1.030    pH, Urine 6.0 5.0 - 8.0    Leukocytes Negative Negative    Nitrite, UA Negative Negative    " Protein, POC 30 mg/dL (A) Negative mg/dL    Glucose, UA Negative Negative mg/dL    Ketones, UA Trace (A) Negative    Urobilinogen, UA 4 E.U./dL (A) Normal, 0.2 E.U./dL    Bilirubin Negative Negative    Blood, UA Negative Negative    Lot Number [id number]     Expiration Date 6/2,024        Bladder Scan interpretation 05/18/2023    Estimation of residual urine via BVI 3000 Verathon Bladder Scan  MA/nurse performing: Emmett GRACIA RN   Residual Urine: 0 ml  Indication: Benign prostatic hyperplasia with urinary frequency   Position: Supine  Examination: Incremental scanning of the suprapubic area using 2.0 MHz transducer using copious amounts of acoustic gel.   Findings: An anechoic area was demonstrated which represented the bladder, with measurement of residual urine as noted. I inspected this myself. In that the residual urine was stable or insignificant, refer to plan for treatment and plan necessary at this time.           Procedures        Assessment and Plan    Diagnoses and all orders for this visit:    1. Benign prostatic hyperplasia with urinary frequency (Primary)  -     POC Urinalysis Dipstick, Automated  -     Bladder Scan  -     tamsulosin (FLOMAX) 0.4 MG capsule 24 hr capsule; Take 1 capsule by mouth Daily for 360 days.  Dispense: 90 capsule; Refill: 3  -     oxybutynin XL (DITROPAN-XL) 10 MG 24 hr tablet; Take 1 tablet by mouth Daily.  Dispense: 90 tablet; Refill: 4      Patient doing about the same as last year.    Not bothered by his urinary symptoms.    We will refill his meds for a year and follow-up with him in 1 year with a repeat PVR        I spent 10 minutes caring for Giles on this date of service. This time includes time spent by me in the following activities:reviewing tests, obtaining and/or reviewing a separately obtained history, performing a medically appropriate examination and/or evaluation , counseling and educating the patient/family/caregiver, ordering medications, tests, or procedures, and  documenting information in the medical record  Follow Up   Return in about 1 year (around 5/18/2024) for Annual follow-up BPH with PVR.  Patient was given instructions and counseling regarding his condition or for health maintenance advice. Please see specific information pulled into the AVS if appropriate.         This document has been electronically signed by TENISHA Stack  May 18, 2023 16:04 EDT

## 2023-05-24 ENCOUNTER — TELEPHONE (OUTPATIENT)
Dept: INTERNAL MEDICINE | Facility: CLINIC | Age: 78
End: 2023-05-24
Payer: MEDICARE

## 2023-05-24 NOTE — TELEPHONE ENCOUNTER
DELETE AFTER REVIEWING: Telephone encounter to be sent to the clinical pool   Hub staff attempted to follow warm transfer process and was unsuccessful     Caller: JONAS MILES    Relationship to patient: HOME HEALTH    Best call back number: [phone]    Patient is needing: CALLED TO REPORT THAT PATIENT HAD A RECENT FALL. PLEASE CALL WITH ANY QUESTIONS.

## 2023-05-26 NOTE — TELEPHONE ENCOUNTER
Patient states he is okay. He said his leg gave out and he fell but denies any pain or bruising. He has a follow-up appt on June 5. Advised pt that if he needs anything before appt to let us know.

## 2023-05-31 ENCOUNTER — OFFICE VISIT (OUTPATIENT)
Dept: INTERNAL MEDICINE | Facility: CLINIC | Age: 78
End: 2023-05-31

## 2023-05-31 VITALS
HEART RATE: 74 BPM | BODY MASS INDEX: 33.1 KG/M2 | HEIGHT: 68 IN | OXYGEN SATURATION: 95 % | SYSTOLIC BLOOD PRESSURE: 116 MMHG | WEIGHT: 218.4 LBS | TEMPERATURE: 97.7 F | DIASTOLIC BLOOD PRESSURE: 64 MMHG

## 2023-05-31 DIAGNOSIS — J98.4 CHRONIC LUNG DISEASE: ICD-10-CM

## 2023-05-31 DIAGNOSIS — R05.1 ACUTE COUGH: Primary | ICD-10-CM

## 2023-05-31 DIAGNOSIS — R09.02 HYPOXIA: ICD-10-CM

## 2023-05-31 PROBLEM — J12.82 PNEUMONIA DUE TO COVID-19 VIRUS: Status: RESOLVED | Noted: 2021-12-27 | Resolved: 2023-05-31

## 2023-05-31 PROBLEM — U07.1 PNEUMONIA DUE TO COVID-19 VIRUS: Status: RESOLVED | Noted: 2021-12-27 | Resolved: 2023-05-31

## 2023-05-31 PROCEDURE — 1159F MED LIST DOCD IN RCRD: CPT | Performed by: PHYSICIAN ASSISTANT

## 2023-05-31 PROCEDURE — 1160F RVW MEDS BY RX/DR IN RCRD: CPT | Performed by: PHYSICIAN ASSISTANT

## 2023-05-31 PROCEDURE — 3074F SYST BP LT 130 MM HG: CPT | Performed by: PHYSICIAN ASSISTANT

## 2023-05-31 PROCEDURE — 99213 OFFICE O/P EST LOW 20 MIN: CPT | Performed by: PHYSICIAN ASSISTANT

## 2023-05-31 PROCEDURE — 3078F DIAST BP <80 MM HG: CPT | Performed by: PHYSICIAN ASSISTANT

## 2023-05-31 RX ORDER — FLUTICASONE PROPIONATE AND SALMETEROL XINAFOATE 230; 21 UG/1; UG/1
2 AEROSOL, METERED RESPIRATORY (INHALATION)
Qty: 12 G | Refills: 1 | Status: SHIPPED | OUTPATIENT
Start: 2023-05-31 | End: 2023-06-30

## 2023-05-31 NOTE — PROGRESS NOTES
Chief Complaint  Cough (Brown sputum) and Nasal Congestion    Subjective          Giles Donovan presents to [facility]  History of Present Illness  Pt here for cough x months   States he usually has clear sputum but changed to dark color last wk   Denies blood in sputum   Denies wheezing  Denies resp distress, sob as long as he is wearing his o2. Uses 2 L all the time.  Denies fever, body aches, chills  Pt ran out of Advair 2 wks ago   He has not had to use albuterol recently      Past Medical History:   Diagnosis Date   • Allergic rhinitis 12/27/2014    Will try Zyrtec, he didnt want to use a nasal spray.   • Arthritis    • Asthma    • Cataract     left eye   • Cough 03/30/2016    PFT and CXR ordered.   • Depression     PTSD   • Diabetes    • Elevated cholesterol    • H/O psychiatric care 1999    PTSD   • Hyperlipidemia 03/30/2016    Lipids ordered. Continue on current medication.   • Hypertension    • Hypothyroidism    • Seasonal allergies    • Shortness of breath    • Sleep apnea    • Stenosis of right carotid artery 02/19/2017    Will refer to vascular surgeon.   • Syncope 02/19/2017   • Type 2 diabetes mellitus    • Upper respiratory infection 10/18/2015    Will treat cough with Hydromet, otherwise over the counter meds for treatment.        Past Surgical History:   Procedure Laterality Date   • CATARACT EXTRACTION Right     x2   • COLONOSCOPY     • JOINT REPLACEMENT     • REPLACEMENT TOTAL HIP ONCOLOGIC Right    • RETINAL DETACHMENT REPAIR     • TOE AMPUTATION Left 11/2017    Second phalaeng.   • TOE OSTEOPHYTE REMOVAL          Current Outpatient Medications on File Prior to Visit   Medication Sig Dispense Refill   • albuterol (PROVENTIL) (2.5 MG/3ML) 0.083% nebulizer solution Take 2.5 mg by nebulization Every 4 (Four) Hours As Needed for Wheezing or Shortness of Air. 120 mL 5   • albuterol sulfate  (90 Base) MCG/ACT inhaler Inhale 2 puffs Every 4  (Four) Hours As Needed for Wheezing or Shortness of Air. 18 g 0   • aspirin EC 81 MG EC tablet Take 1 tablet by mouth Daily.     • atorvastatin (LIPITOR) 80 MG tablet Take 1 tablet by mouth Daily. 90 tablet 1   • B-D UF III MINI PEN NEEDLES 31G X 5 MM misc      • carvedilol (COREG) 12.5 MG tablet Take 1 tablet by mouth.     • cetirizine (ZyrTEC Allergy) 10 MG tablet Take 1 tablet by mouth Daily. 90 tablet 3   • ezetimibe (ZETIA) 10 MG tablet Take 1 tablet by mouth Daily.     • felodipine (PLENDIL) 5 MG 24 hr tablet Take 1 tablet by mouth Daily.     • gabapentin (NEURONTIN) 600 MG tablet Take 1 tablet by mouth 3 (Three) Times a Day.     • icosapent ethyl (VASCEPA) 1 g capsule capsule Every 12 (Twelve) Hours.     • insulin aspart (novoLOG) 100 UNIT/ML injection Inject 24-26 Units under the skin into the appropriate area as directed 3 (Three) Times a Day Before Meals.     • Insulin Glargine (Lantus SoloStar) 100 UNIT/ML injection pen Inject 44 Units under the skin into the appropriate area as directed Every Night.     • levothyroxine (SYNTHROID, LEVOTHROID) 100 MCG tablet Take 1 tablet by mouth Daily.     • losartan (COZAAR) 100 MG tablet      • metFORMIN (GLUCOPHAGE) 500 MG tablet Take 1 tablet by mouth 2 (Two) Times a Day With Meals.     • omeprazole (priLOSEC) 20 MG capsule Take 1 capsule by mouth Daily.     • oxybutynin XL (DITROPAN-XL) 10 MG 24 hr tablet Take 1 tablet by mouth Daily. 90 tablet 4   • PARoxetine (PAXIL) 40 MG tablet Take 1 tablet by mouth Every Evening.     • prazosin (MINIPRESS) 1 MG capsule Take 1 capsule by mouth Daily.     • tamsulosin (FLOMAX) 0.4 MG capsule 24 hr capsule Take 1 capsule by mouth Daily for 360 days. 90 capsule 3   • traZODone (DESYREL) 100 MG tablet Take 1 tablet by mouth Every Night.     • [DISCONTINUED] benzonatate (TESSALON) 100 MG capsule Take 1 capsule by mouth.     • [DISCONTINUED] fluticasone-salmeterol (Advair HFA) 230-21 MCG/ACT inhaler Inhale 2 puffs 2 (Two) Times a  "Day for 30 days. Indications: COPD, J44.9 12 g 0     No current facility-administered medications on file prior to visit.        Allergies   Allergen Reactions   • Latex Rash and Anaphylaxis   • Latex, Natural Rubber Itching       Social History     Tobacco Use   Smoking Status Never   Smokeless Tobacco Never          Objective   Vital Signs:   /64   Pulse 74   Temp 97.7 °F (36.5 °C)   Ht 172.7 cm (68\")   Wt 99.1 kg (218 lb 6.4 oz)   SpO2 95%   BMI 33.21 kg/m²     Physical Exam  Vitals reviewed.   Constitutional:       Appearance: Normal appearance.   HENT:      Head: Normocephalic and atraumatic.      Nose: Nose normal.      Mouth/Throat:      Mouth: Mucous membranes are moist.   Eyes:      Extraocular Movements: Extraocular movements intact.      Conjunctiva/sclera: Conjunctivae normal.      Pupils: Pupils are equal, round, and reactive to light.   Cardiovascular:      Rate and Rhythm: Normal rate and regular rhythm.   Pulmonary:      Effort: Pulmonary effort is normal.      Breath sounds: Normal breath sounds.   Abdominal:      General: Abdomen is flat. Bowel sounds are normal.      Palpations: Abdomen is soft.   Musculoskeletal:         General: Normal range of motion.   Neurological:      General: No focal deficit present.      Mental Status: He is alert and oriented to person, place, and time.   Psychiatric:         Mood and Affect: Mood normal.        Result Review :   The following data was reviewed by: Nelda Spencer PA-C on 05/31/2023:    Data reviewed: Radiologic studies cxr          Assessment and Plan    Diagnoses and all orders for this visit:    1. Acute cough (Primary)  Assessment & Plan:  Discussed pt with Dr. Masters who is in agreement with plan. CXR WNL. Showed previous pneumonia healed. Will restart Advair, can use albuterol prn. Pt will try and return to clinic with sputum sample but will hold off on abx at this time. He will let us know if sx worsen, difficulty breathing, fever " occurs. Pt understands and agrees with plan.    Orders:  -     XR Chest PA & Lateral (In Office)  -     Respiratory Culture - Sputum, Cough; Future    2. Hypoxia  Assessment & Plan:  Cont o2 as rx by pulm      3. Chronic lung disease    Other orders  -     fluticasone-salmeterol (Advair HFA) 230-21 MCG/ACT inhaler; Inhale 2 puffs 2 (Two) Times a Day for 30 days. Indications: COPD, J44.9  Dispense: 12 g; Refill: 1      Follow Up   Return if symptoms worsen or fail to improve, for Next scheduled follow up.  Patient was given instructions and counseling regarding his condition or for health maintenance advice. Please see specific information pulled into the AVS if appropriate.

## 2023-05-31 NOTE — ASSESSMENT & PLAN NOTE
Discussed pt with Dr. Masters who is in agreement with plan. CXR WNL. Showed previous pneumonia healed. Will restart Advair, can use albuterol prn. Pt will try and return to clinic with sputum sample but will hold off on abx at this time. He will let us know if sx worsen, difficulty breathing, fever occurs. Pt understands and agrees with plan.

## 2023-06-05 ENCOUNTER — HOSPITAL ENCOUNTER (OUTPATIENT)
Dept: NUCLEAR MEDICINE | Facility: HOSPITAL | Age: 78
Discharge: HOME | End: 2023-06-05
Payer: MEDICARE

## 2023-06-05 DIAGNOSIS — R07.9 CHEST PAIN, UNSPECIFIED TYPE: ICD-10-CM

## 2023-06-05 LAB
BH CV IMMEDIATE POST TECH DATA BLOOD PRESSURE: NORMAL MMHG
BH CV IMMEDIATE POST TECH DATA HEART RATE: 94 BPM
BH CV IMMEDIATE POST TECH DATA OXYGEN SATS: 98 %
BH CV REST NUCLEAR ISOTOPE DOSE: 9.5 MCI
BH CV SIX MINUTE RECOVERY TECH DATA BLOOD PRESSURE: NORMAL
BH CV SIX MINUTE RECOVERY TECH DATA HEART RATE: 92 BPM
BH CV SIX MINUTE RECOVERY TECH DATA OXYGEN SATURATION: 96 %
BH CV STRESS BP STAGE 1: NORMAL
BH CV STRESS COMMENTS STAGE 1: NORMAL
BH CV STRESS DOSE REGADENOSON STAGE 1: 0.4
BH CV STRESS DURATION MIN STAGE 1: 0
BH CV STRESS DURATION SEC STAGE 1: 10
BH CV STRESS HR STAGE 1: 90
BH CV STRESS NUCLEAR ISOTOPE DOSE: 34.5 MCI
BH CV STRESS O2 STAGE 1: 98
BH CV STRESS PROTOCOL 1: NORMAL
BH CV STRESS RECOVERY BP: NORMAL MMHG
BH CV STRESS RECOVERY HR: 92 BPM
BH CV STRESS RECOVERY O2: 96 %
BH CV STRESS STAGE 1: 1
BH CV THREE MINUTE POST TECH DATA BLOOD PRESSURE: NORMAL MMHG
BH CV THREE MINUTE POST TECH DATA HEART RATE: 90 BPM
BH CV THREE MINUTE POST TECH DATA OXYGEN SATURATION: 96 %
LV EF NUC BP: 26 %
MAXIMAL PREDICTED HEART RATE: 144 BPM
PERCENT MAX PREDICTED HR: 62.5 %
STRESS BASELINE BP: NORMAL MMHG
STRESS BASELINE HR: 90 BPM
STRESS O2 SAT REST: 97 %
STRESS PERCENT HR: 74 %
STRESS POST O2 SAT PEAK: 98 %
STRESS POST PEAK BP: NORMAL MMHG
STRESS POST PEAK HR: 90 BPM
STRESS TARGET HR: 122 BPM

## 2023-06-05 PROCEDURE — A9502 TC99M TETROFOSMIN: HCPCS | Performed by: SPECIALIST

## 2023-06-05 PROCEDURE — 78452 HT MUSCLE IMAGE SPECT MULT: CPT

## 2023-06-05 PROCEDURE — 93017 CV STRESS TEST TRACING ONLY: CPT

## 2023-06-05 PROCEDURE — 25010000002 REGADENOSON 0.4 MG/5ML SOLUTION: Performed by: SPECIALIST

## 2023-06-05 PROCEDURE — 0 TECHNETIUM TETROFOSMIN KIT: Performed by: SPECIALIST

## 2023-06-05 RX ORDER — REGADENOSON 0.08 MG/ML
0.4 INJECTION, SOLUTION INTRAVENOUS
Status: COMPLETED | OUTPATIENT
Start: 2023-06-05 | End: 2023-06-05

## 2023-06-05 RX ADMIN — TETROFOSMIN 1 DOSE: 1.38 INJECTION, POWDER, LYOPHILIZED, FOR SOLUTION INTRAVENOUS at 07:29

## 2023-06-05 RX ADMIN — TETROFOSMIN 1 DOSE: 1.38 INJECTION, POWDER, LYOPHILIZED, FOR SOLUTION INTRAVENOUS at 09:37

## 2023-06-05 RX ADMIN — REGADENOSON 0.4 MG: 0.08 INJECTION, SOLUTION INTRAVENOUS at 09:37

## 2023-06-06 ENCOUNTER — TELEPHONE (OUTPATIENT)
Dept: INTERNAL MEDICINE | Facility: CLINIC | Age: 78
End: 2023-06-06

## 2023-06-06 ENCOUNTER — OFFICE VISIT (OUTPATIENT)
Dept: INTERNAL MEDICINE | Facility: CLINIC | Age: 78
End: 2023-06-06
Payer: MEDICARE

## 2023-06-06 VITALS
RESPIRATION RATE: 16 BRPM | HEART RATE: 76 BPM | OXYGEN SATURATION: 95 % | TEMPERATURE: 98.4 F | BODY MASS INDEX: 32.67 KG/M2 | WEIGHT: 215.6 LBS | HEIGHT: 68 IN | SYSTOLIC BLOOD PRESSURE: 110 MMHG | DIASTOLIC BLOOD PRESSURE: 54 MMHG

## 2023-06-06 DIAGNOSIS — R09.02 HYPOXIA: Primary | ICD-10-CM

## 2023-06-06 DIAGNOSIS — Z13.21 ENCOUNTER FOR VITAMIN DEFICIENCY SCREENING: ICD-10-CM

## 2023-06-06 DIAGNOSIS — R35.0 BENIGN PROSTATIC HYPERPLASIA WITH URINARY FREQUENCY: ICD-10-CM

## 2023-06-06 DIAGNOSIS — I10 PRIMARY HYPERTENSION: ICD-10-CM

## 2023-06-06 DIAGNOSIS — N40.1 BENIGN PROSTATIC HYPERPLASIA WITH URINARY FREQUENCY: ICD-10-CM

## 2023-06-06 DIAGNOSIS — J98.4 CHRONIC LUNG DISEASE: ICD-10-CM

## 2023-06-06 DIAGNOSIS — R05.3 CHRONIC COUGH: ICD-10-CM

## 2023-06-06 DIAGNOSIS — E11.9 CONTROLLED TYPE 2 DIABETES MELLITUS WITHOUT COMPLICATION, WITHOUT LONG-TERM CURRENT USE OF INSULIN: ICD-10-CM

## 2023-06-06 DIAGNOSIS — F51.01 PRIMARY INSOMNIA: ICD-10-CM

## 2023-06-06 DIAGNOSIS — R79.9 ABNORMAL FINDING OF BLOOD CHEMISTRY, UNSPECIFIED: ICD-10-CM

## 2023-06-06 DIAGNOSIS — E06.3 HYPOTHYROIDISM DUE TO HASHIMOTO'S THYROIDITIS: ICD-10-CM

## 2023-06-06 DIAGNOSIS — F33.42 RECURRENT MAJOR DEPRESSIVE DISORDER, IN FULL REMISSION: ICD-10-CM

## 2023-06-06 DIAGNOSIS — Z00.00 ENCOUNTER FOR ANNUAL WELLNESS EXAM IN MEDICARE PATIENT: ICD-10-CM

## 2023-06-06 DIAGNOSIS — R26.81 UNSTEADY GAIT WHEN WALKING: ICD-10-CM

## 2023-06-06 DIAGNOSIS — E03.8 HYPOTHYROIDISM DUE TO HASHIMOTO'S THYROIDITIS: ICD-10-CM

## 2023-06-06 DIAGNOSIS — K21.9 GASTROESOPHAGEAL REFLUX DISEASE WITHOUT ESOPHAGITIS: ICD-10-CM

## 2023-06-06 DIAGNOSIS — H61.23 IMPACTED CERUMEN, BILATERAL: ICD-10-CM

## 2023-06-06 DIAGNOSIS — E78.2 MIXED HYPERLIPIDEMIA: ICD-10-CM

## 2023-06-06 PROBLEM — I65.29 CAROTID ARTERY OCCLUSION: Status: ACTIVE | Noted: 2023-06-06

## 2023-06-06 LAB
25(OH)D3 SERPL-MCNC: 23.4 NG/ML (ref 30–100)
ALBUMIN SERPL-MCNC: 4.3 G/DL (ref 3.5–5.2)
ALBUMIN/GLOB SERPL: 1.3 G/DL
ALP SERPL-CCNC: 66 U/L (ref 39–117)
ALT SERPL W P-5'-P-CCNC: 22 U/L (ref 1–41)
ANION GAP SERPL CALCULATED.3IONS-SCNC: 9 MMOL/L (ref 5–15)
AST SERPL-CCNC: 29 U/L (ref 1–40)
BASOPHILS # BLD AUTO: 0.05 10*3/MM3 (ref 0–0.2)
BASOPHILS NFR BLD AUTO: 0.5 % (ref 0–1.5)
BILIRUB SERPL-MCNC: 0.4 MG/DL (ref 0–1.2)
BUN SERPL-MCNC: 18 MG/DL (ref 8–23)
BUN/CREAT SERPL: 15.1 (ref 7–25)
CALCIUM SPEC-SCNC: 9.6 MG/DL (ref 8.6–10.5)
CHLORIDE SERPL-SCNC: 102 MMOL/L (ref 98–107)
CHOLEST SERPL-MCNC: 128 MG/DL (ref 0–200)
CO2 SERPL-SCNC: 29 MMOL/L (ref 22–29)
CREAT SERPL-MCNC: 1.19 MG/DL (ref 0.76–1.27)
DEPRECATED RDW RBC AUTO: 39.5 FL (ref 37–54)
EGFRCR SERPLBLD CKD-EPI 2021: 63.3 ML/MIN/1.73
EOSINOPHIL # BLD AUTO: 0.29 10*3/MM3 (ref 0–0.4)
EOSINOPHIL NFR BLD AUTO: 2.9 % (ref 0.3–6.2)
ERYTHROCYTE [DISTWIDTH] IN BLOOD BY AUTOMATED COUNT: 12.1 % (ref 12.3–15.4)
FOLATE SERPL-MCNC: >20 NG/ML (ref 4.78–24.2)
GLOBULIN UR ELPH-MCNC: 3.2 GM/DL
GLUCOSE SERPL-MCNC: 66 MG/DL (ref 65–99)
HBA1C MFR BLD: 8.8 % (ref 4.8–5.6)
HCT VFR BLD AUTO: 36.4 % (ref 37.5–51)
HDLC SERPL-MCNC: 46 MG/DL (ref 40–60)
HGB BLD-MCNC: 12.2 G/DL (ref 13–17.7)
IMM GRANULOCYTES # BLD AUTO: 0.05 10*3/MM3 (ref 0–0.05)
IMM GRANULOCYTES NFR BLD AUTO: 0.5 % (ref 0–0.5)
IRON 24H UR-MRATE: 100 MCG/DL (ref 59–158)
IRON SATN MFR SERPL: 38 % (ref 20–50)
LDLC SERPL CALC-MCNC: 65 MG/DL (ref 0–100)
LDLC/HDLC SERPL: 1.41 {RATIO}
LYMPHOCYTES # BLD AUTO: 2.25 10*3/MM3 (ref 0.7–3.1)
LYMPHOCYTES NFR BLD AUTO: 22.6 % (ref 19.6–45.3)
MCH RBC QN AUTO: 29.7 PG (ref 26.6–33)
MCHC RBC AUTO-ENTMCNC: 33.5 G/DL (ref 31.5–35.7)
MCV RBC AUTO: 88.6 FL (ref 79–97)
MONOCYTES # BLD AUTO: 1.04 10*3/MM3 (ref 0.1–0.9)
MONOCYTES NFR BLD AUTO: 10.4 % (ref 5–12)
NEUTROPHILS NFR BLD AUTO: 6.29 10*3/MM3 (ref 1.7–7)
NEUTROPHILS NFR BLD AUTO: 63.1 % (ref 42.7–76)
NRBC BLD AUTO-RTO: 0 /100 WBC (ref 0–0.2)
PLATELET # BLD AUTO: 234 10*3/MM3 (ref 140–450)
PMV BLD AUTO: 10.8 FL (ref 6–12)
POTASSIUM SERPL-SCNC: 4 MMOL/L (ref 3.5–5.2)
PROT SERPL-MCNC: 7.5 G/DL (ref 6–8.5)
RBC # BLD AUTO: 4.11 10*6/MM3 (ref 4.14–5.8)
SODIUM SERPL-SCNC: 140 MMOL/L (ref 136–145)
T4 FREE SERPL-MCNC: 1.09 NG/DL (ref 0.93–1.7)
TIBC SERPL-MCNC: 264 MCG/DL (ref 298–536)
TRANSFERRIN SERPL-MCNC: 177 MG/DL (ref 200–360)
TRIGL SERPL-MCNC: 86 MG/DL (ref 0–150)
TSH SERPL DL<=0.05 MIU/L-ACNC: 1.55 UIU/ML (ref 0.27–4.2)
VIT B12 BLD-MCNC: 658 PG/ML (ref 211–946)
VLDLC SERPL-MCNC: 17 MG/DL (ref 5–40)
WBC NRBC COR # BLD: 9.97 10*3/MM3 (ref 3.4–10.8)

## 2023-06-06 PROCEDURE — 83540 ASSAY OF IRON: CPT

## 2023-06-06 PROCEDURE — 80053 COMPREHEN METABOLIC PANEL: CPT

## 2023-06-06 PROCEDURE — 82306 VITAMIN D 25 HYDROXY: CPT

## 2023-06-06 PROCEDURE — 82607 VITAMIN B-12: CPT

## 2023-06-06 PROCEDURE — 82746 ASSAY OF FOLIC ACID SERUM: CPT

## 2023-06-06 PROCEDURE — 80061 LIPID PANEL: CPT

## 2023-06-06 PROCEDURE — 83036 HEMOGLOBIN GLYCOSYLATED A1C: CPT

## 2023-06-06 PROCEDURE — 84439 ASSAY OF FREE THYROXINE: CPT

## 2023-06-06 PROCEDURE — 84466 ASSAY OF TRANSFERRIN: CPT

## 2023-06-06 PROCEDURE — 85025 COMPLETE CBC W/AUTO DIFF WBC: CPT

## 2023-06-06 PROCEDURE — 84443 ASSAY THYROID STIM HORMONE: CPT

## 2023-06-06 RX ORDER — OXYBUTYNIN CHLORIDE 10 MG/1
10 TABLET, EXTENDED RELEASE ORAL DAILY
Qty: 90 TABLET | Refills: 4 | Status: SHIPPED | OUTPATIENT
Start: 2023-06-06

## 2023-06-06 NOTE — PROGRESS NOTES
The ABCs of the Annual Wellness Visit  Subsequent Medicare Wellness Visit    Subjective    Giles Donovan is a 77 y.o. male who presents for a Subsequent Medicare Wellness Visit.    The following portions of the patient's history were reviewed and   updated as appropriate: current medications, past family history, past medical history, past social history, past surgical history, and problem list.    Compared to one year ago, the patient feels his physical   health is worse.    Compared to one year ago, the patient feels his mental   health is better.    Recent Hospitalizations:  This patient has had a [facility] admission record on file within the last 365 days.    Current Medical Providers:  Patient Care Team:  Morena Masters MD as PCP - General (Internal Medicine)  Issac Ortiz MD as Consulting Physician (Urology)    Outpatient Medications Prior to Visit   Medication Sig Dispense Refill    albuterol (PROVENTIL) (2.5 MG/3ML) 0.083% nebulizer solution Take 2.5 mg by nebulization Every 4 (Four) Hours As Needed for Wheezing or Shortness of Air. 120 mL 5    albuterol sulfate  (90 Base) MCG/ACT inhaler Inhale 2 puffs Every 4 (Four) Hours As Needed for Wheezing or Shortness of Air. 18 g 0    aspirin EC 81 MG EC tablet Take 1 tablet by mouth Daily.      atorvastatin (LIPITOR) 80 MG tablet Take 1 tablet by mouth Daily. 90 tablet 1    B-D UF III MINI PEN NEEDLES 31G X 5 MM misc       carvedilol (COREG) 12.5 MG tablet Take 1 tablet by mouth.      cetirizine (ZyrTEC Allergy) 10 MG tablet Take 1 tablet by mouth Daily. 90 tablet 3    ezetimibe (ZETIA) 10 MG tablet Take 1 tablet by mouth Daily.      felodipine (PLENDIL) 5 MG 24 hr tablet Take 1 tablet by mouth Daily.      fluticasone-salmeterol (Advair HFA) 230-21 MCG/ACT inhaler Inhale 2 puffs 2 (Two) Times a Day for 30 days. Indications: COPD, J44.9 12 g 1    gabapentin (NEURONTIN) 600 MG tablet Take 1 tablet by mouth 3 (Three) Times a Day.       insulin aspart (novoLOG) 100 UNIT/ML injection Inject 24-26 Units under the skin into the appropriate area as directed 3 (Three) Times a Day Before Meals.      Insulin Glargine (Lantus SoloStar) 100 UNIT/ML injection pen Inject 44 Units under the skin into the appropriate area as directed Every Night.      levothyroxine (SYNTHROID, LEVOTHROID) 100 MCG tablet Take 1 tablet by mouth Daily.      losartan (COZAAR) 100 MG tablet Take 1 tablet by mouth Daily.      metFORMIN (GLUCOPHAGE) 500 MG tablet Take 1 tablet by mouth 2 (Two) Times a Day With Meals.      omeprazole (priLOSEC) 20 MG capsule Take 1 capsule by mouth Daily.      PARoxetine (PAXIL) 40 MG tablet Take 1 tablet by mouth Every Evening.      prazosin (MINIPRESS) 1 MG capsule Take 1 capsule by mouth Daily.      tamsulosin (FLOMAX) 0.4 MG capsule 24 hr capsule Take 1 capsule by mouth Daily for 360 days. 90 capsule 3    traZODone (DESYREL) 100 MG tablet Take 1 tablet by mouth Every Night.      oxybutynin XL (DITROPAN-XL) 10 MG 24 hr tablet Take 1 tablet by mouth Daily. 90 tablet 4    icosapent ethyl (VASCEPA) 1 g capsule capsule Every 12 (Twelve) Hours.       No facility-administered medications prior to visit.       No opioid medication identified on active medication list. I have reviewed chart for other potential  high risk medication/s and harmful drug interactions in the elderly.        Aspirin is on active medication list. Aspirin use is indicated based on review of current medical condition/s. Pros and cons of this therapy have been discussed today. Benefits of this medication outweigh potential harm.  Patient has been encouraged to continue taking this medication.  .      Patient Active Problem List   Diagnosis    Allergic rhinitis    Cough    Controlled type 2 diabetes mellitus without complication, without long-term current use of insulin    GERD (gastroesophageal reflux disease)    Hyperlipidemia    Hypertension    Hypothyroidism    Stenosis of right  "carotid artery    Syncope    Urge incontinence of urine    Erectile dysfunction    Benign prostatic hyperplasia with urinary frequency    Cardiomyopathy    Cataract    Chest pain with low risk for cardiac etiology    Right-sided carotid artery occlusion without cerebral infarction    Depression    Esophageal reflux    Vaso vagal episode    Hypoxia    Chronic lung disease    Sepsis due to pneumonia    Acute respiratory failure with hypoxia    Multifocal pneumonia    Insulin dependent type 2 diabetes mellitus    Mixed hyperlipidemia    Diastolic CHF, chronic    Carotid artery occlusion    DM (diabetes mellitus)    Impacted cerumen, bilateral    Primary insomnia    Unsteady gait when walking    Encounter for annual wellness exam in Medicare patient     Advance Care Planning   Advance Care Planning     Advance Directive is not on file.  ACP discussion was held with the patient during this visit. Patient does not have an advance directive, information provided. Would like for [place] love and daughter, To, to make medical decision if needed.      Objective    Vitals:    06/06/23 0943 06/06/23 1105   BP: 118/50 110/54   BP Location: Right arm Right arm   Patient Position: Sitting Sitting   Pulse: 76    Resp: 16    Temp: 98.4 °F (36.9 °C)    TempSrc: Temporal    SpO2: 95%  Comment: 2LPM cont per NC    Weight: 97.8 kg (215 lb 9.6 oz)    Height: 172.7 cm (68\")      Estimated body mass index is 32.78 kg/m² as calculated from the following:    Height as of this encounter: 172.7 cm (68\").    Weight as of this encounter: 97.8 kg (215 lb 9.6 oz).    BMI is >= 30 and <35. (Class 1 Obesity). The following options were offered after discussion;: exercise counseling/recommendations and nutrition counseling/recommendations      Does the patient have evidence of cognitive impairment? Yes    Lab Results   Component Value Date    TRIG 86 06/06/2023    HDL 46 06/06/2023    LDL 65 06/06/2023    VLDL 17 06/06/2023    HGBA1C 8.80 (H) " 2023        HEALTH RISK ASSESSMENT    Smoking Status:  Social History     Tobacco Use   Smoking Status Never   Smokeless Tobacco Never     Alcohol Consumption:  Social History     Substance and Sexual Activity   Alcohol Use Yes    Comment: occasionally     Fall Risk Screen:    LIZBETH Fall Risk Assessment was completed, and patient is at MODERATE risk for falls. Assessment completed on:2023    Depression Screenin/5/2022     1:55 PM   PHQ-2/PHQ-9 Depression Screening   Little Interest or Pleasure in Doing Things 0-->not at all   Feeling Down, Depressed or Hopeless 0-->not at all   PHQ-9: Brief Depression Severity Measure Score 0       Health Habits and Functional and Cognitive Screenin/6/2023    11:00 AM   Functional & Cognitive Status   Do you have difficulty preparing food and eating? Yes   Do you have difficulty bathing yourself, getting dressed or grooming yourself? No   Do you have difficulty using the toilet? No   Do you have difficulty moving around from place to place? Yes   Do you have trouble with steps or getting out of a bed or a chair? No   Current Diet Limited Junk Food   Dental Exam Up to date   Eye Exam Up to date   Exercise (times per week) 0 times per week   Do you need help using the phone?  No   Are you deaf or do you have serious difficulty hearing?  No   Do you need help with transportation? Yes   Do you need help shopping? Yes   Do you need help preparing meals?  Yes   Do you need help with housework?  Yes   Do you need help with laundry? No   Do you need help taking your medications? No   Do you need help managing money? No   Do you ever drive or ride in a car without wearing a seat belt? No   Have you felt unusual stress, anger or loneliness in the last month? No   Who do you live with? Spouse   If you need help, do you have trouble finding someone available to you? No   Do you have difficulty concentrating, remembering or making decisions? No   Patient's fiancé  helps patient with activities of daily living and transportation.  Patient also has physical therapy, Occupational Therapy and a nurse that comes to patient's home once a week.  Working closely with physical therapy to help strengthen lower extremity strength.  Age-appropriate Screening Schedule:  Refer to the list below for future screening recommendations based on patient's age, sex and/or medical conditions. Orders for these recommended tests are listed in the plan section. The patient has been provided with a written plan.    Health Maintenance   Topic Date Due    TDAP/TD VACCINES (1 - Tdap) Never done    ANNUAL WELLNESS VISIT  12/21/2022    COVID-19 Vaccine (5 - Moderna series) 02/05/2023    DIABETIC EYE EXAM  07/25/2023 (Originally 6/1/2023)    ZOSTER VACCINE (1 of 2) 12/05/2023 (Originally 10/21/1996)    INFLUENZA VACCINE  08/01/2023    URINE MICROALBUMIN  12/05/2023    HEMOGLOBIN A1C  12/06/2023    LIPID PANEL  06/06/2024    COLORECTAL CANCER SCREENING  10/07/2024    HEPATITIS C SCREENING  Completed    Pneumococcal Vaccine 65+  Completed                  CMS Preventative Services Quick Reference  Risk Factors Identified During Encounter  Immunizations Discussed/Encouraged: Tdap  The above risks/problems have been discussed with the patient.  Pertinent information has been shared with the patient in the After Visit Summary.  An After Visit Summary and PPPS were made available to the patient.    Follow Up:   Next Medicare Wellness visit to be scheduled in 1 year.       Additional E&M Note during same encounter follows:  Patient has multiple medical problems which are significant and separately identifiable that require additional work above and beyond the Medicare Wellness Visit.      Chief Complaint  Follow-up (6 week follow-up; frequent cough; infrequent production of sputum.  White in color this morning; small amount.  Recent admission to [facility] first part of May 2023; transferred via [facility]  "from home.)    Subjective        HPI  Giles Donovan is also being seen today for management of chronic illnesses.         Objective   Vital Signs:  /54 (BP Location: Right arm, Patient Position: Sitting)   Pulse 76   Temp 98.4 °F (36.9 °C) (Temporal)   Resp 16   Ht 172.7 cm (68\")   Wt 97.8 kg (215 lb 9.6 oz)   SpO2 95% Comment: 2LPM cont per NC  BMI 32.78 kg/m²     Physical Exam  Constitutional:       Appearance: Normal appearance.   HENT:      Head: Normocephalic and atraumatic.      Right Ear: There is impacted cerumen.      Left Ear: There is impacted cerumen.      Nose: Nose normal.      Mouth/Throat:      Mouth: Mucous membranes are moist.      Pharynx: Oropharynx is clear. No posterior oropharyngeal erythema.   Eyes:      Conjunctiva/sclera: Conjunctivae normal.   Cardiovascular:      Rate and Rhythm: Normal rate and regular rhythm.      Pulses: Normal pulses.      Heart sounds: Normal heart sounds.   Pulmonary:      Effort: Pulmonary effort is normal.      Breath sounds: Normal breath sounds.   Abdominal:      General: Abdomen is flat. Bowel sounds are normal. There is no distension.      Tenderness: There is no abdominal tenderness. There is no guarding.   Skin:     General: Skin is warm and dry.   Neurological:      Mental Status: He is alert and oriented to person, place, and time.   Psychiatric:         Mood and Affect: Mood normal.         Behavior: Behavior normal.         Thought Content: Thought content normal.         Judgment: Judgment normal.          Common labs          4/10/2023    05:23 4/11/2023    04:28 6/6/2023    11:58   Common Labs   Glucose 260  371  66    BUN 15  13  18    Creatinine 1.16  0.85  1.19    Sodium 135  137  140    Potassium 3.8  4.2  4.0    Chloride 92  94  102    Calcium 9.3  9.7  9.6    Albumin   4.3    Total Bilirubin   0.4    Alkaline Phosphatase   66    AST (SGOT)   29    ALT (SGPT)   22    WBC 9.62  8.55  9.97    Hemoglobin 12.2  11.8  12.2  "   Hematocrit 37.7  35.1  36.4    Platelets 211  219  234    Total Cholesterol   128    Triglycerides   86    HDL Cholesterol   46    LDL Cholesterol    65    Hemoglobin A1C   8.80                 Assessment and Plan   Diagnoses and all orders for this visit:    1. Hypoxia (Primary)  Assessment & Plan:  -Patient is using Advair inhaler 2 puffs twice a day.  Has not had to use rescue inhaler.  On oxygen at all times, 2 L nasal cannula.  Sees pulmonologist, Dr. Ochoa.  -Patient notes breathing is improving.  Chest x-ray done 2 weeks ago, unremarkable.  -Patient sputum production mostly clear, occasionally darker color.  -Improving.    Orders:  -     CBC & Differential  -     Comprehensive Metabolic Panel  -     Hemoglobin A1c  -     Iron Profile  -     Lipid Panel  -     TSH  -     T4, Free  -     Vitamin B12 & Folate    2. Benign prostatic hyperplasia with urinary frequency  -     oxybutynin XL (DITROPAN-XL) 10 MG 24 hr tablet; Take 1 tablet by mouth Daily.  Dispense: 90 tablet; Refill: 4    3. Mixed hyperlipidemia  Assessment & Plan:  Patient is on atorvastatin 80 mg and ezetimibe 10 mg.  We will check blood work and readjust as needed.  Informed patient medication list also shows Vascepa 1 g, however patient is not sure if he is taking that, patient will also check in with cardiologist to see if that the medication he would like him to continue on.    Orders:  -     CBC & Differential  -     Comprehensive Metabolic Panel  -     Hemoglobin A1c  -     Iron Profile  -     Lipid Panel  -     TSH  -     T4, Free  -     Vitamin B12 & Folate    4. Primary hypertension  Assessment & Plan:  -Diastolic blood pressure in office today slightly low 50-love both times.  Patient reports feeling slightly lightheaded this morning.  Patient notes he has not ate or drink anything today as he was anticipating lab work in office today.  Usually he has ate and drink plenty of fluids by now.  Informed patient likely secondary to possible  dehydration.  Would like for patient to monitor blood pressure at home closely for the next 2 weeks.  If patient continues to feel lightheaded or dizzy  follow-up in office sooner.    Orders:  -     CBC & Differential  -     Comprehensive Metabolic Panel  -     Hemoglobin A1c  -     Iron Profile  -     Lipid Panel  -     TSH  -     T4, Free  -     Vitamin B12 & Folate    5. Hypothyroidism due to Hashimoto's thyroiditis  Assessment & Plan:  We will check blood work, on Synthroid 100 mcg.    Orders:  -     CBC & Differential  -     Comprehensive Metabolic Panel  -     Hemoglobin A1c  -     Iron Profile  -     Lipid Panel  -     TSH  -     T4, Free  -     Vitamin B12 & Folate    6. Controlled type 2 diabetes mellitus without complication, without long-term current use of insulin  Assessment & Plan:  -Patient sees endocrinologist, ,  -Currently on glargine 44 units at nighttime, metformin 500 mg twice daily, and self administers NovoLog if blood glucose over 200.  -Waiting on sensors and new strips to start checking at home sugar levels more regularly.  -We will do blood work today.  -Up-to-date with diabetic eye exam.    Orders:  -     TSH  -     T4, Free    7. Abnormal finding of blood chemistry, unspecified  -     Hemoglobin A1c  -     Iron Profile    8. Encounter for vitamin deficiency screening  -     Vitamin D,25-Hydroxy    9. Body mass index (BMI) 32.0-32.9, adult  -     Vitamin D,25-Hydroxy    10. Impacted cerumen, bilateral  Assessment & Plan:  Cerumen impaction, ears irrigated bilaterally.  Tympanic membrane and external ear canal unremarkable after irrigation completed.      11. Chronic cough    12. Chronic lung disease    13. Gastroesophageal reflux disease without esophagitis  Assessment & Plan:  Patient reports burning sensation of epigastric area after meals and or during meals, has not been taking omeprazole.  Encourage patient to restart PPI.      14. Recurrent major depressive disorder, in  full remission  Assessment & Plan:  -Doing well on Paxil 40 mg.      15. Primary insomnia  Assessment & Plan:  Trazodone 100 mg as needed.  Prazosin for nightmares.  Doing well.      16. Unsteady gait when walking  Assessment & Plan:  Patient has PT and OT come to house to work on strengthening lower extremity.  Patient notes he has not started doing advised exercises daily, highly encourage patient to do exercises daily as directed.  -If it progresses I would like for patient to follow-up in office sooner.  -Patient also sees neurologist.  -We will check blood work as well.      17. Encounter for annual wellness exam in Medicare patient  Assessment & Plan:  Discussed chronic illnesses, immunizations, nutrition, exercise, medication and concerns.               Follow Up   Return in about 4 weeks (around 7/4/2023) for Hypertension, cough, breathing, gait.  Patient was given instructions and counseling regarding his condition or for health maintenance advice. Please see specific information pulled into the AVS if appropriate.

## 2023-06-06 NOTE — ASSESSMENT & PLAN NOTE
Patient has PT and OT come to house to work on strengthening lower extremity.  Patient notes he has not started doing advised exercises daily, highly encourage patient to do exercises daily as directed.  -If it progresses I would like for patient to follow-up in office sooner.  -Patient also sees neurologist.  -We will check blood work as well.

## 2023-06-06 NOTE — ASSESSMENT & PLAN NOTE
Patient is on atorvastatin 80 mg and ezetimibe 10 mg.  We will check blood work and readjust as needed.  Informed patient medication list also shows Vascepa 1 g, however patient is not sure if he is taking that, patient will also check in with cardiologist to see if that the medication he would like him to continue on.

## 2023-06-06 NOTE — TELEPHONE ENCOUNTER
[phone] and spoke with Brianna Irwin.  Inquired about the patient's medication list, specifically icosapent ehthyl (Vascepa) 1 gram PO Q12h.  Told Brianna Irwin that the patient is not familiar with medication ordered per Dr. Walker.  He doesn't think he is taking this at this time.  He did confirm that a medication was mailed to his home yesterday from [place], however the he doesn't recognize the name.  Also, inquired about the trip patient made via STAT flight to [facility] in May 2023 and if they had any records of that.  Brianna Irwin confirmed they would be seeing the patient this afternoon and would complete a full med rec on patient's meds in the home at that time.  She also confirmed she has no record of that medication nor any information on patient being admitted to [facility] via [facility] flight in May 2023.  She confirmed she would fax over any information to us for review.  Thanked her for her time.  Provider informed.

## 2023-06-06 NOTE — ASSESSMENT & PLAN NOTE
Cerumen impaction, ears irrigated bilaterally.  Tympanic membrane and external ear canal unremarkable after irrigation completed.

## 2023-06-06 NOTE — ASSESSMENT & PLAN NOTE
-Diastolic blood pressure in office today slightly low 50-love both times.  Patient reports feeling slightly lightheaded this morning.  Patient notes he has not ate or drink anything today as he was anticipating lab work in office today.  Usually he has ate and drink plenty of fluids by now.  Informed patient likely secondary to possible dehydration.  Would like for patient to monitor blood pressure at home closely for the next 2 weeks.  If patient continues to feel lightheaded or dizzy  follow-up in office sooner.

## 2023-06-06 NOTE — ASSESSMENT & PLAN NOTE
-Patient is using Advair inhaler 2 puffs twice a day.  Has not had to use rescue inhaler.  On oxygen at all times, 2 L nasal cannula.  Sees pulmonologist, Dr. Ochoa.  -Patient notes breathing is improving.  Chest x-ray done 2 weeks ago, unremarkable.  -Patient sputum production mostly clear, occasionally darker color.  -Improving.

## 2023-06-06 NOTE — PROGRESS NOTES
Chief Complaint  Follow-up (6 week follow-up; frequent cough; infrequent production of sputum.  White in color this morning; small amount.  Recent admission to [facility] first part of May 2023; transferred via [facility] Flight from home.)    Subjective       Giles Donovan presents to [facility]    HPI     Breathing-improving.  Patient is using Advair inhaler 2 puffs twice a day.  Has not had to use rescue albuterol inhaler.  Patient is on oxygen at all, 2 L nasal cannula.  Reason chest x-ray demonstrated improving and resolving pneumonia.  Patient notes he used to wear his CPAP, however has not used it in a while.  [facility] and is going to discuss Initiating this again.    HTN- no recent at home Blood pressures readings. Felt a little light headed this morning, but resolved. Patient is on Losartan 100 mg and Coreg 12.5, felodipine 5mg . Takes baby aspirin daily. Denies chest pain, vision changes or headache. Pt had nuclear stress test yesterday, seeing cardiologist,        DM- at home sugar ranging 240ish.  Checks sugar levels off and on, waiting on new strip and sensors, therefore has not checked it here recently. Patient notes there was a time where it got to 70, ate something sweet and went up.  Novolog, only if over 200. Glargine 44 units a night. Metformin 500 mg BID  Patient see endocrinologist in [place].DR. [place]    Jerking of legs, causes unsteady gait,  usually happenes prior to taking first step.  Symptoms are intermittent, has not noticed a pattern.  Frequency varies.    Anxiety/depression- paxil 40 mg, patient notes he is doing well     Insomnia-  trazadone 100 mg as needed    Nightmares-prazosin 1 mg nightly.    GERD-patient notes that when he eats he does notice burning sensation down esophagus.  Is not taking omeprazole at the moment.    Constipation-patient notes he does notice straining and difficulty with bowel  "movements from time to time.    Hyperlipidemia- atorvastatin 80 mg, ezetemibe 10 mg. Patient is sure if he is taking vascepa 1 gram    Neuropathy- gabapentin, working well, denies any pain     Hypothyroidism- synthroid 100 mcg, takes it in the morning on an empty stomach.      Patient was hospitalized at [facility] for one day for altered mental status and unconsciousness, per wife and patient CT of head and blood work was unremarkable, unsure of what caused symptoms.  This happened a few weeks ago, and had follow-up appointment here in office for that  .  Objective     Vitals:    06/06/23 0943 06/06/23 1105   BP: 118/50 110/54   BP Location: Right arm Right arm   Patient Position: Sitting Sitting   Pulse: 76    Resp: 16    Temp: 98.4 °F (36.9 °C)    TempSrc: Temporal    SpO2: 95%    Weight: 97.8 kg (215 lb 9.6 oz)    Height: 172.7 cm (68\")       Wt Readings from Last 3 Encounters:   06/06/23 97.8 kg (215 lb 9.6 oz)   05/31/23 99.1 kg (218 lb 6.4 oz)   05/18/23 98 kg (216 lb)      BP Readings from Last 3 Encounters:   06/06/23 110/54   05/31/23 116/64   04/27/23 135/64        Body mass index is 32.78 kg/m².           Physical Exam  Constitutional:       Appearance: Normal appearance.   HENT:      Head: Normocephalic and atraumatic.      Right Ear: There is impacted cerumen.      Left Ear: There is impacted cerumen.      Mouth/Throat:      Mouth: Mucous membranes are moist.      Pharynx: Oropharynx is clear. No posterior oropharyngeal erythema.   Eyes:      Conjunctiva/sclera: Conjunctivae normal.   Cardiovascular:      Rate and Rhythm: Normal rate and regular rhythm.      Pulses: Normal pulses.      Heart sounds: Normal heart sounds.   Pulmonary:      Effort: Pulmonary effort is normal.      Breath sounds: Normal breath sounds.   Abdominal:      General: Abdomen is flat. Bowel sounds are normal. There is no distension.      Tenderness: There is no abdominal tenderness. There is no guarding or rebound.      " Comments: Slightly firm    Lymphadenopathy:      Cervical: No cervical adenopathy.   Skin:     General: Skin is warm and dry.   Neurological:      Mental Status: He is alert and oriented to person, place, and time.      Comments: Gait slow   Psychiatric:         Mood and Affect: Mood normal.         Behavior: Behavior normal.         Thought Content: Thought content normal.         Judgment: Judgment normal.          Procedures    Assessment and Plan   Diagnoses and all orders for this visit:    1. Hypoxia (Primary)  Assessment & Plan:  -Patient is using Advair inhaler 2 puffs twice a day.  Has not had to use rescue inhaler.  On oxygen at all times, 2 L nasal cannula.  Sees pulmonologist, Dr. Ochoa.  -Patient notes breathing is improving.  Chest x-ray done 2 weeks ago, unremarkable.  -Patient sputum production mostly clear, occasionally darker color.  -Improving.    Orders:  -     CBC & Differential  -     Comprehensive Metabolic Panel  -     Hemoglobin A1c  -     Iron Profile  -     Lipid Panel  -     TSH  -     T4, Free  -     Vitamin B12 & Folate    2. Benign prostatic hyperplasia with urinary frequency  -     oxybutynin XL (DITROPAN-XL) 10 MG 24 hr tablet; Take 1 tablet by mouth Daily.  Dispense: 90 tablet; Refill: 4    3. Mixed hyperlipidemia  Assessment & Plan:  Patient is on atorvastatin 80 mg and ezetimibe 10 mg.  We will check blood work and readjust as needed.  Informed patient medication list also shows Vascepa 1 g, however patient is not sure if he is taking that, patient will also check in with cardiologist to see if that the medication he would like him to continue on.    Orders:  -     CBC & Differential  -     Comprehensive Metabolic Panel  -     Hemoglobin A1c  -     Iron Profile  -     Lipid Panel  -     TSH  -     T4, Free  -     Vitamin B12 & Folate    4. Primary hypertension  Assessment & Plan:  -Diastolic blood pressure in office today slightly low 50-love both times.  Patient reports feeling  slightly lightheaded this morning.  Patient notes he has not ate or drink anything today as he was anticipating lab work in office today.  Usually he has ate and drink plenty of fluids by now.  Informed patient likely secondary to possible dehydration.  Would like for patient to monitor blood pressure at home closely for the next 2 weeks.  If patient continues to feel lightheaded or dizzy  follow-up in office sooner.    Orders:  -     CBC & Differential  -     Comprehensive Metabolic Panel  -     Hemoglobin A1c  -     Iron Profile  -     Lipid Panel  -     TSH  -     T4, Free  -     Vitamin B12 & Folate    5. Hypothyroidism due to Hashimoto's thyroiditis  Assessment & Plan:  We will check blood work, on Synthroid 100 mcg.    Orders:  -     CBC & Differential  -     Comprehensive Metabolic Panel  -     Hemoglobin A1c  -     Iron Profile  -     Lipid Panel  -     TSH  -     T4, Free  -     Vitamin B12 & Folate    6. Controlled type 2 diabetes mellitus without complication, without long-term current use of insulin  Assessment & Plan:  -Patient sees endocrinologist, ,  -Currently on glargine 44 units at nighttime, metformin 500 mg twice daily, and self administers NovoLog if blood glucose over 200.  -Waiting on sensors and new strips to start checking at home sugar levels more regularly.  -We will do blood work today.  -Up-to-date with diabetic eye exam.    Orders:  -     TSH  -     T4, Free    7. Abnormal finding of blood chemistry, unspecified  -     Hemoglobin A1c  -     Iron Profile    8. Encounter for vitamin deficiency screening  -     Vitamin D,25-Hydroxy    9. Body mass index (BMI) 32.0-32.9, adult  -     Vitamin D,25-Hydroxy    10. Impacted cerumen, bilateral  Assessment & Plan:  Cerumen impaction, ears irrigated bilaterally.  Tympanic membrane and external ear canal unremarkable after irrigation completed.      11. Chronic cough    12. Chronic lung disease    13. Gastroesophageal reflux disease  without esophagitis  Assessment & Plan:  Patient reports burning sensation of epigastric area after meals and or during meals, has not been taking omeprazole.  Encourage patient to restart PPI.      14. Recurrent major depressive disorder, in full remission  Assessment & Plan:  -Doing well on Paxil 40 mg.      15. Primary insomnia  Assessment & Plan:  Trazodone 100 mg as needed.  Prazosin for nightmares.  Doing well.      16. Unsteady gait when walking  Assessment & Plan:  Patient has PT and OT come to house to work on strengthening lower extremity.  Patient notes he has not started doing advised exercises daily, highly encourage patient to do exercises daily as directed.  -If it progresses I would like for patient to follow-up in office sooner.  -Patient also sees neurologist.  -We will check blood work as well.      17. Encounter for annual wellness exam in Medicare patient  Assessment & Plan:  Discussed chronic illnesses, immunizations, nutrition, exercise, medication and concerns.            Follow Up   Return in about 4 weeks (around 7/4/2023) for Hypertension, cough, breathing, gait.  Patient was given instructions and counseling regarding his condition or for health maintenance advice. Please see specific information pulled into the AVS if appropriate.

## 2023-06-06 NOTE — ASSESSMENT & PLAN NOTE
-Patient sees endocrinologist, ,  -Currently on glargine 44 units at nighttime, metformin 500 mg twice daily, and self administers NovoLog if blood glucose over 200.  -Waiting on sensors and new strips to start checking at home sugar levels more regularly.  -We will do blood work today.  -Up-to-date with diabetic eye exam.

## 2023-06-06 NOTE — ASSESSMENT & PLAN NOTE
Patient reports burning sensation of epigastric area after meals and or during meals, has not been taking omeprazole.  Encourage patient to restart PPI.

## 2023-06-12 ENCOUNTER — OUTSIDE FACILITY SERVICE (OUTPATIENT)
Dept: INTERNAL MEDICINE | Facility: CLINIC | Age: 78
End: 2023-06-12
Payer: MEDICARE

## 2023-07-30 ENCOUNTER — APPOINTMENT (OUTPATIENT)
Dept: CT IMAGING | Facility: HOSPITAL | Age: 78
DRG: 917 | End: 2023-07-30
Payer: MEDICARE

## 2023-07-30 ENCOUNTER — APPOINTMENT (OUTPATIENT)
Dept: GENERAL RADIOLOGY | Facility: HOSPITAL | Age: 78
DRG: 917 | End: 2023-07-30
Payer: MEDICARE

## 2023-07-30 ENCOUNTER — HOSPITAL ENCOUNTER (INPATIENT)
Facility: HOSPITAL | Age: 78
LOS: 2 days | Discharge: HOME | DRG: 917 | End: 2023-08-01
Attending: EMERGENCY MEDICINE | Admitting: INTERNAL MEDICINE
Payer: MEDICARE

## 2023-07-30 DIAGNOSIS — G93.41 METABOLIC ENCEPHALOPATHY: ICD-10-CM

## 2023-07-30 DIAGNOSIS — R26.2 DIFFICULTY WALKING: ICD-10-CM

## 2023-07-30 DIAGNOSIS — N39.0 URINARY TRACT INFECTION WITHOUT HEMATURIA, SITE UNSPECIFIED: Primary | ICD-10-CM

## 2023-07-30 DIAGNOSIS — E16.2 HYPOGLYCEMIA: ICD-10-CM

## 2023-07-30 LAB
ALBUMIN SERPL-MCNC: 4.1 G/DL (ref 3.5–5.2)
ALBUMIN/GLOB SERPL: 1.3 G/DL
ALP SERPL-CCNC: 74 U/L (ref 39–117)
ALT SERPL W P-5'-P-CCNC: 19 U/L (ref 1–41)
AMPHET+METHAMPHET UR QL: NEGATIVE
ANION GAP SERPL CALCULATED.3IONS-SCNC: 9.8 MMOL/L (ref 5–15)
ARTERIAL PATENCY WRIST A: POSITIVE
AST SERPL-CCNC: 23 U/L (ref 1–40)
B PARAPERT DNA SPEC QL NAA+PROBE: NOT DETECTED
B PERT DNA SPEC QL NAA+PROBE: NOT DETECTED
BACTERIA UR QL AUTO: ABNORMAL /HPF
BARBITURATES UR QL SCN: NEGATIVE
BASE EXCESS BLDA CALC-SCNC: 3.2 MMOL/L (ref -2–2)
BASOPHILS # BLD AUTO: 0.05 10*3/MM3 (ref 0–0.2)
BASOPHILS NFR BLD AUTO: 0.3 % (ref 0–1.5)
BDY SITE: ABNORMAL
BENZODIAZ UR QL SCN: NEGATIVE
BILIRUB SERPL-MCNC: 0.5 MG/DL (ref 0–1.2)
BILIRUB UR QL STRIP: NEGATIVE
BUN SERPL-MCNC: 13 MG/DL (ref 8–23)
BUN/CREAT SERPL: 10.2 (ref 7–25)
C PNEUM DNA NPH QL NAA+NON-PROBE: NOT DETECTED
CA-I BLDA-SCNC: 1.12 MMOL/L (ref 1.13–1.32)
CALCIUM SPEC-SCNC: 9.1 MG/DL (ref 8.6–10.5)
CANNABINOIDS SERPL QL: NEGATIVE
CHLORIDE BLDA-SCNC: 100 MMOL/L (ref 98–106)
CHLORIDE SERPL-SCNC: 101 MMOL/L (ref 98–107)
CLARITY UR: ABNORMAL
CO2 SERPL-SCNC: 29.2 MMOL/L (ref 22–29)
COCAINE UR QL: NEGATIVE
COHGB MFR BLD: 0.5 % (ref 0–1.5)
COLOR UR: YELLOW
CREAT SERPL-MCNC: 1.27 MG/DL (ref 0.76–1.27)
D-LACTATE SERPL-SCNC: 1.4 MMOL/L (ref 0.5–2)
DEPRECATED RDW RBC AUTO: 49.2 FL (ref 37–54)
EGFRCR SERPLBLD CKD-EPI 2021: 58.6 ML/MIN/1.73
EOSINOPHIL # BLD AUTO: 0.14 10*3/MM3 (ref 0–0.4)
EOSINOPHIL NFR BLD AUTO: 0.9 % (ref 0.3–6.2)
ERYTHROCYTE [DISTWIDTH] IN BLOOD BY AUTOMATED COUNT: 14.7 % (ref 12.3–15.4)
ETHANOL BLD-MCNC: <10 MG/DL (ref 0–10)
ETHANOL UR QL: <0.01 %
FENTANYL UR-MCNC: NEGATIVE NG/ML
FHHB: 2 % (ref 0–5)
FLUAV SUBTYP SPEC NAA+PROBE: NOT DETECTED
FLUBV RNA ISLT QL NAA+PROBE: NOT DETECTED
GAS FLOW AIRWAY: 2.5 LPM
GEN 5 2HR TROPONIN T REFLEX: 26 NG/L
GLOBULIN UR ELPH-MCNC: 3.2 GM/DL
GLUCOSE BLDA-MCNC: 92 MG/DL (ref 70–99)
GLUCOSE BLDC GLUCOMTR-MCNC: 106 MG/DL (ref 70–99)
GLUCOSE BLDC GLUCOMTR-MCNC: 165 MG/DL (ref 70–99)
GLUCOSE BLDC GLUCOMTR-MCNC: 322 MG/DL (ref 70–99)
GLUCOSE BLDC GLUCOMTR-MCNC: 366 MG/DL (ref 70–99)
GLUCOSE BLDC GLUCOMTR-MCNC: 62 MG/DL (ref 70–99)
GLUCOSE BLDC GLUCOMTR-MCNC: 85 MG/DL (ref 70–99)
GLUCOSE SERPL-MCNC: 55 MG/DL (ref 65–99)
GLUCOSE UR STRIP-MCNC: NEGATIVE MG/DL
HADV DNA SPEC NAA+PROBE: NOT DETECTED
HCO3 BLDA-SCNC: 28.8 MMOL/L (ref 22–26)
HCOV 229E RNA SPEC QL NAA+PROBE: NOT DETECTED
HCOV HKU1 RNA SPEC QL NAA+PROBE: NOT DETECTED
HCOV NL63 RNA SPEC QL NAA+PROBE: NOT DETECTED
HCOV OC43 RNA SPEC QL NAA+PROBE: NOT DETECTED
HCT VFR BLD AUTO: 33.1 % (ref 37.5–51)
HGB BLD-MCNC: 11.1 G/DL (ref 13–17.7)
HGB BLDA-MCNC: 11.4 G/DL (ref 13.8–16.4)
HGB UR QL STRIP.AUTO: ABNORMAL
HMPV RNA NPH QL NAA+NON-PROBE: NOT DETECTED
HOLD SPECIMEN: NORMAL
HOLD SPECIMEN: NORMAL
HPIV1 RNA ISLT QL NAA+PROBE: NOT DETECTED
HPIV2 RNA SPEC QL NAA+PROBE: NOT DETECTED
HPIV3 RNA NPH QL NAA+PROBE: NOT DETECTED
HPIV4 P GENE NPH QL NAA+PROBE: NOT DETECTED
HYALINE CASTS UR QL AUTO: ABNORMAL /LPF
IMM GRANULOCYTES # BLD AUTO: 0.06 10*3/MM3 (ref 0–0.05)
IMM GRANULOCYTES NFR BLD AUTO: 0.4 % (ref 0–0.5)
INHALED O2 CONCENTRATION: ABNORMAL %
INR PPP: 1.14 (ref 0.86–1.15)
KETONES UR QL STRIP: NEGATIVE
LACTATE BLDA-SCNC: 1.28 MMOL/L (ref 0.5–2)
LEUKOCYTE ESTERASE UR QL STRIP.AUTO: ABNORMAL
LYMPHOCYTES # BLD AUTO: 1.92 10*3/MM3 (ref 0.7–3.1)
LYMPHOCYTES NFR BLD AUTO: 12.3 % (ref 19.6–45.3)
M PNEUMO IGG SER IA-ACNC: NOT DETECTED
MAGNESIUM SERPL-MCNC: 1.7 MG/DL (ref 1.6–2.4)
MCH RBC QN AUTO: 30.7 PG (ref 26.6–33)
MCHC RBC AUTO-ENTMCNC: 33.5 G/DL (ref 31.5–35.7)
MCV RBC AUTO: 91.7 FL (ref 79–97)
METHADONE UR QL SCN: NEGATIVE
METHGB BLD QL: 0.1 % (ref 0–1.5)
MODALITY: ABNORMAL
MONOCYTES # BLD AUTO: 1.72 10*3/MM3 (ref 0.1–0.9)
MONOCYTES NFR BLD AUTO: 11 % (ref 5–12)
NEUTROPHILS NFR BLD AUTO: 11.75 10*3/MM3 (ref 1.7–7)
NEUTROPHILS NFR BLD AUTO: 75.1 % (ref 42.7–76)
NITRITE UR QL STRIP: NEGATIVE
NOTE: ABNORMAL
NRBC BLD AUTO-RTO: 0 /100 WBC (ref 0–0.2)
NT-PROBNP SERPL-MCNC: 1657 PG/ML (ref 0–1800)
OPIATES UR QL: NEGATIVE
OXYCODONE UR QL SCN: NEGATIVE
OXYHGB MFR BLDV: 97.4 % (ref 94–99)
PCO2 BLDA: 48.4 MM HG (ref 35–45)
PH BLDA: 7.39 PH UNITS (ref 7.35–7.45)
PH UR STRIP.AUTO: 6.5 [PH] (ref 5–8)
PLATELET # BLD AUTO: 150 10*3/MM3 (ref 140–450)
PMV BLD AUTO: 9.8 FL (ref 6–12)
PO2 BLDA: 133.7 MM HG (ref 80–100)
POTASSIUM BLDA-SCNC: 3.55 MMOL/L (ref 3.5–5)
POTASSIUM SERPL-SCNC: 3.4 MMOL/L (ref 3.5–5.2)
PROCALCITONIN SERPL-MCNC: 0.15 NG/ML (ref 0–0.25)
PROT SERPL-MCNC: 7.3 G/DL (ref 6–8.5)
PROT UR QL STRIP: ABNORMAL
PROTHROMBIN TIME: 14.7 SECONDS (ref 11.8–14.9)
QT INTERVAL: 425 MS
RBC # BLD AUTO: 3.61 10*6/MM3 (ref 4.14–5.8)
RBC # UR STRIP: ABNORMAL /HPF
REF LAB TEST METHOD: ABNORMAL
RENAL EPI CELLS #/AREA URNS HPF: ABNORMAL /HPF
RHINOVIRUS RNA SPEC NAA+PROBE: NOT DETECTED
RSV RNA NPH QL NAA+NON-PROBE: NOT DETECTED
SAO2 % BLDCOA: 98 % (ref 95–99)
SARS-COV-2 RNA RESP QL NAA+PROBE: NOT DETECTED
SODIUM BLDA-SCNC: 138.3 MMOL/L (ref 136–146)
SODIUM SERPL-SCNC: 140 MMOL/L (ref 136–145)
SP GR UR STRIP: 1.01 (ref 1–1.03)
SQUAMOUS #/AREA URNS HPF: ABNORMAL /HPF
TROPONIN T DELTA: -2 NG/L
TROPONIN T SERPL HS-MCNC: 25 NG/L
TROPONIN T SERPL HS-MCNC: 28 NG/L
TROPONIN T SERPL HS-MCNC: 30 NG/L
TSH SERPL DL<=0.05 MIU/L-ACNC: 1.05 UIU/ML (ref 0.27–4.2)
UROBILINOGEN UR QL STRIP: ABNORMAL
VIT B12 BLD-MCNC: 988 PG/ML (ref 211–946)
WBC # UR STRIP: ABNORMAL /HPF
WBC NRBC COR # BLD: 15.64 10*3/MM3 (ref 3.4–10.8)
WHOLE BLOOD HOLD COAG: NORMAL
WHOLE BLOOD HOLD SPECIMEN: NORMAL

## 2023-07-30 PROCEDURE — 51798 US URINE CAPACITY MEASURE: CPT

## 2023-07-30 PROCEDURE — 93005 ELECTROCARDIOGRAM TRACING: CPT

## 2023-07-30 PROCEDURE — 85025 COMPLETE CBC W/AUTO DIFF WBC: CPT

## 2023-07-30 PROCEDURE — 81001 URINALYSIS AUTO W/SCOPE: CPT | Performed by: EMERGENCY MEDICINE

## 2023-07-30 PROCEDURE — 63710000001 INSULIN LISPRO (HUMAN) PER 5 UNITS: Performed by: STUDENT IN AN ORGANIZED HEALTH CARE EDUCATION/TRAINING PROGRAM

## 2023-07-30 PROCEDURE — 93005 ELECTROCARDIOGRAM TRACING: CPT | Performed by: EMERGENCY MEDICINE

## 2023-07-30 PROCEDURE — 82948 REAGENT STRIP/BLOOD GLUCOSE: CPT

## 2023-07-30 PROCEDURE — 70450 CT HEAD/BRAIN W/O DYE: CPT

## 2023-07-30 PROCEDURE — 87088 URINE BACTERIA CULTURE: CPT | Performed by: EMERGENCY MEDICINE

## 2023-07-30 PROCEDURE — 85610 PROTHROMBIN TIME: CPT | Performed by: STUDENT IN AN ORGANIZED HEALTH CARE EDUCATION/TRAINING PROGRAM

## 2023-07-30 PROCEDURE — 82077 ASSAY SPEC XCP UR&BREATH IA: CPT | Performed by: STUDENT IN AN ORGANIZED HEALTH CARE EDUCATION/TRAINING PROGRAM

## 2023-07-30 PROCEDURE — 87186 SC STD MICRODIL/AGAR DIL: CPT | Performed by: EMERGENCY MEDICINE

## 2023-07-30 PROCEDURE — 84484 ASSAY OF TROPONIN QUANT: CPT | Performed by: STUDENT IN AN ORGANIZED HEALTH CARE EDUCATION/TRAINING PROGRAM

## 2023-07-30 PROCEDURE — 83880 ASSAY OF NATRIURETIC PEPTIDE: CPT | Performed by: EMERGENCY MEDICINE

## 2023-07-30 PROCEDURE — 87040 BLOOD CULTURE FOR BACTERIA: CPT | Performed by: EMERGENCY MEDICINE

## 2023-07-30 PROCEDURE — 87086 URINE CULTURE/COLONY COUNT: CPT | Performed by: EMERGENCY MEDICINE

## 2023-07-30 PROCEDURE — 83050 HGB METHEMOGLOBIN QUAN: CPT | Performed by: EMERGENCY MEDICINE

## 2023-07-30 PROCEDURE — 83735 ASSAY OF MAGNESIUM: CPT

## 2023-07-30 PROCEDURE — 99285 EMERGENCY DEPT VISIT HI MDM: CPT

## 2023-07-30 PROCEDURE — 71045 X-RAY EXAM CHEST 1 VIEW: CPT

## 2023-07-30 PROCEDURE — 80307 DRUG TEST PRSMV CHEM ANLYZR: CPT | Performed by: INTERNAL MEDICINE

## 2023-07-30 PROCEDURE — 25010000002 CEFTRIAXONE PER 250 MG: Performed by: STUDENT IN AN ORGANIZED HEALTH CARE EDUCATION/TRAINING PROGRAM

## 2023-07-30 PROCEDURE — 84484 ASSAY OF TROPONIN QUANT: CPT | Performed by: EMERGENCY MEDICINE

## 2023-07-30 PROCEDURE — 0202U NFCT DS 22 TRGT SARS-COV-2: CPT | Performed by: STUDENT IN AN ORGANIZED HEALTH CARE EDUCATION/TRAINING PROGRAM

## 2023-07-30 PROCEDURE — 82805 BLOOD GASES W/O2 SATURATION: CPT | Performed by: EMERGENCY MEDICINE

## 2023-07-30 PROCEDURE — 83605 ASSAY OF LACTIC ACID: CPT | Performed by: EMERGENCY MEDICINE

## 2023-07-30 PROCEDURE — 84145 PROCALCITONIN (PCT): CPT | Performed by: STUDENT IN AN ORGANIZED HEALTH CARE EDUCATION/TRAINING PROGRAM

## 2023-07-30 PROCEDURE — 80053 COMPREHEN METABOLIC PANEL: CPT

## 2023-07-30 PROCEDURE — 36600 WITHDRAWAL OF ARTERIAL BLOOD: CPT | Performed by: EMERGENCY MEDICINE

## 2023-07-30 PROCEDURE — 82607 VITAMIN B-12: CPT | Performed by: STUDENT IN AN ORGANIZED HEALTH CARE EDUCATION/TRAINING PROGRAM

## 2023-07-30 PROCEDURE — 84443 ASSAY THYROID STIM HORMONE: CPT | Performed by: STUDENT IN AN ORGANIZED HEALTH CARE EDUCATION/TRAINING PROGRAM

## 2023-07-30 PROCEDURE — 84484 ASSAY OF TROPONIN QUANT: CPT

## 2023-07-30 PROCEDURE — 25010000002 ENOXAPARIN PER 10 MG: Performed by: STUDENT IN AN ORGANIZED HEALTH CARE EDUCATION/TRAINING PROGRAM

## 2023-07-30 PROCEDURE — 93010 ELECTROCARDIOGRAM REPORT: CPT | Performed by: INTERNAL MEDICINE

## 2023-07-30 PROCEDURE — 36415 COLL VENOUS BLD VENIPUNCTURE: CPT | Performed by: STUDENT IN AN ORGANIZED HEALTH CARE EDUCATION/TRAINING PROGRAM

## 2023-07-30 PROCEDURE — 99222 1ST HOSP IP/OBS MODERATE 55: CPT | Performed by: STUDENT IN AN ORGANIZED HEALTH CARE EDUCATION/TRAINING PROGRAM

## 2023-07-30 PROCEDURE — 82375 ASSAY CARBOXYHB QUANT: CPT | Performed by: EMERGENCY MEDICINE

## 2023-07-30 RX ORDER — AMLODIPINE BESYLATE 5 MG/1
5 TABLET ORAL
Status: DISCONTINUED | OUTPATIENT
Start: 2023-07-30 | End: 2023-08-01 | Stop reason: HOSPADM

## 2023-07-30 RX ORDER — SODIUM CHLORIDE 0.9 % (FLUSH) 0.9 %
10 SYRINGE (ML) INJECTION EVERY 12 HOURS SCHEDULED
Status: DISCONTINUED | OUTPATIENT
Start: 2023-07-30 | End: 2023-08-01 | Stop reason: HOSPADM

## 2023-07-30 RX ORDER — OXYBUTYNIN CHLORIDE 5 MG/1
10 TABLET, EXTENDED RELEASE ORAL DAILY
Status: DISCONTINUED | OUTPATIENT
Start: 2023-07-30 | End: 2023-08-01 | Stop reason: HOSPADM

## 2023-07-30 RX ORDER — CARVEDILOL 12.5 MG/1
12.5 TABLET ORAL 2 TIMES DAILY WITH MEALS
Status: DISCONTINUED | OUTPATIENT
Start: 2023-07-30 | End: 2023-08-01 | Stop reason: HOSPADM

## 2023-07-30 RX ORDER — ENOXAPARIN SODIUM 100 MG/ML
40 INJECTION SUBCUTANEOUS DAILY
Status: DISCONTINUED | OUTPATIENT
Start: 2023-07-30 | End: 2023-08-01 | Stop reason: HOSPADM

## 2023-07-30 RX ORDER — PAROXETINE HYDROCHLORIDE 20 MG/1
40 TABLET, FILM COATED ORAL EVERY EVENING
Status: DISCONTINUED | OUTPATIENT
Start: 2023-07-30 | End: 2023-08-01 | Stop reason: HOSPADM

## 2023-07-30 RX ORDER — ATORVASTATIN CALCIUM 40 MG/1
80 TABLET, FILM COATED ORAL NIGHTLY
Status: DISCONTINUED | OUTPATIENT
Start: 2023-07-30 | End: 2023-08-01 | Stop reason: HOSPADM

## 2023-07-30 RX ORDER — DEXTROSE MONOHYDRATE 25 G/50ML
25 INJECTION, SOLUTION INTRAVENOUS
Status: DISCONTINUED | OUTPATIENT
Start: 2023-07-30 | End: 2023-08-01 | Stop reason: HOSPADM

## 2023-07-30 RX ORDER — ACETAMINOPHEN 325 MG/1
650 TABLET ORAL EVERY 6 HOURS PRN
Status: DISCONTINUED | OUTPATIENT
Start: 2023-07-30 | End: 2023-08-01 | Stop reason: HOSPADM

## 2023-07-30 RX ORDER — ASPIRIN 81 MG/1
81 TABLET ORAL DAILY
Status: DISCONTINUED | OUTPATIENT
Start: 2023-07-30 | End: 2023-08-01 | Stop reason: HOSPADM

## 2023-07-30 RX ORDER — TERAZOSIN 1 MG/1
1 CAPSULE ORAL NIGHTLY
Status: DISCONTINUED | OUTPATIENT
Start: 2023-07-30 | End: 2023-08-01 | Stop reason: HOSPADM

## 2023-07-30 RX ORDER — OLOPATADINE HYDROCHLORIDE 1 MG/ML
1 SOLUTION/ DROPS OPHTHALMIC DAILY PRN
COMMUNITY

## 2023-07-30 RX ORDER — CEFTRIAXONE SODIUM 1 G/50ML
1000 INJECTION, SOLUTION INTRAVENOUS ONCE
Status: DISCONTINUED | OUTPATIENT
Start: 2023-07-30 | End: 2023-07-30

## 2023-07-30 RX ORDER — PANTOPRAZOLE SODIUM 40 MG/1
40 TABLET, DELAYED RELEASE ORAL
Status: DISCONTINUED | OUTPATIENT
Start: 2023-07-31 | End: 2023-08-01 | Stop reason: HOSPADM

## 2023-07-30 RX ORDER — LEVOTHYROXINE SODIUM 0.1 MG/1
100 TABLET ORAL DAILY
Status: DISCONTINUED | OUTPATIENT
Start: 2023-07-30 | End: 2023-08-01 | Stop reason: HOSPADM

## 2023-07-30 RX ORDER — SODIUM CHLORIDE 0.9 % (FLUSH) 0.9 %
10 SYRINGE (ML) INJECTION AS NEEDED
Status: DISCONTINUED | OUTPATIENT
Start: 2023-07-30 | End: 2023-08-01 | Stop reason: HOSPADM

## 2023-07-30 RX ORDER — CEFTRIAXONE SODIUM 1 G/50ML
1000 INJECTION, SOLUTION INTRAVENOUS EVERY 24 HOURS
Status: DISCONTINUED | OUTPATIENT
Start: 2023-07-31 | End: 2023-08-01 | Stop reason: HOSPADM

## 2023-07-30 RX ORDER — LOSARTAN POTASSIUM 50 MG/1
100 TABLET ORAL DAILY
Status: DISCONTINUED | OUTPATIENT
Start: 2023-07-30 | End: 2023-08-01 | Stop reason: HOSPADM

## 2023-07-30 RX ORDER — SODIUM CHLORIDE 9 MG/ML
100 INJECTION, SOLUTION INTRAVENOUS CONTINUOUS
Status: ACTIVE | OUTPATIENT
Start: 2023-07-30 | End: 2023-07-31

## 2023-07-30 RX ORDER — SODIUM CHLORIDE 9 MG/ML
40 INJECTION, SOLUTION INTRAVENOUS AS NEEDED
Status: DISCONTINUED | OUTPATIENT
Start: 2023-07-30 | End: 2023-08-01 | Stop reason: HOSPADM

## 2023-07-30 RX ORDER — ALUMINA, MAGNESIA, AND SIMETHICONE 2400; 2400; 240 MG/30ML; MG/30ML; MG/30ML
15 SUSPENSION ORAL EVERY 6 HOURS PRN
Status: DISCONTINUED | OUTPATIENT
Start: 2023-07-30 | End: 2023-08-01 | Stop reason: HOSPADM

## 2023-07-30 RX ORDER — NITROGLYCERIN 0.4 MG/1
0.4 TABLET SUBLINGUAL
Status: DISCONTINUED | OUTPATIENT
Start: 2023-07-30 | End: 2023-08-01 | Stop reason: HOSPADM

## 2023-07-30 RX ORDER — INSULIN LISPRO 100 [IU]/ML
2-7 INJECTION, SOLUTION INTRAVENOUS; SUBCUTANEOUS
Status: DISCONTINUED | OUTPATIENT
Start: 2023-07-30 | End: 2023-08-01 | Stop reason: HOSPADM

## 2023-07-30 RX ORDER — BISACODYL 10 MG
10 SUPPOSITORY, RECTAL RECTAL DAILY PRN
Status: DISCONTINUED | OUTPATIENT
Start: 2023-07-30 | End: 2023-08-01 | Stop reason: HOSPADM

## 2023-07-30 RX ORDER — FLUTICASONE PROPIONATE AND SALMETEROL XINAFOATE 230; 21 UG/1; UG/1
2 AEROSOL, METERED RESPIRATORY (INHALATION) 2 TIMES DAILY
COMMUNITY

## 2023-07-30 RX ORDER — AMOXICILLIN 250 MG
2 CAPSULE ORAL 2 TIMES DAILY
Status: DISCONTINUED | OUTPATIENT
Start: 2023-07-30 | End: 2023-08-01 | Stop reason: HOSPADM

## 2023-07-30 RX ORDER — BISACODYL 5 MG/1
5 TABLET, DELAYED RELEASE ORAL DAILY PRN
Status: DISCONTINUED | OUTPATIENT
Start: 2023-07-30 | End: 2023-08-01 | Stop reason: HOSPADM

## 2023-07-30 RX ORDER — NICOTINE POLACRILEX 4 MG
15 LOZENGE BUCCAL
Status: DISCONTINUED | OUTPATIENT
Start: 2023-07-30 | End: 2023-08-01 | Stop reason: HOSPADM

## 2023-07-30 RX ORDER — POLYETHYLENE GLYCOL 3350 17 G/17G
17 POWDER, FOR SOLUTION ORAL DAILY PRN
Status: DISCONTINUED | OUTPATIENT
Start: 2023-07-30 | End: 2023-08-01 | Stop reason: HOSPADM

## 2023-07-30 RX ORDER — CHOLECALCIFEROL (VITAMIN D3) 125 MCG
5 CAPSULE ORAL NIGHTLY PRN
Status: DISCONTINUED | OUTPATIENT
Start: 2023-07-30 | End: 2023-08-01 | Stop reason: HOSPADM

## 2023-07-30 RX ADMIN — Medication 10 ML: at 10:40

## 2023-07-30 RX ADMIN — CEFTRIAXONE SODIUM 2000 MG: 2 INJECTION, POWDER, FOR SOLUTION INTRAMUSCULAR; INTRAVENOUS at 06:55

## 2023-07-30 RX ADMIN — AMLODIPINE BESYLATE 5 MG: 5 TABLET ORAL at 18:07

## 2023-07-30 RX ADMIN — SODIUM CHLORIDE 100 ML/HR: 9 INJECTION, SOLUTION INTRAVENOUS at 10:40

## 2023-07-30 RX ADMIN — PAROXETINE HYDROCHLORIDE 40 MG: 20 TABLET, FILM COATED ORAL at 18:07

## 2023-07-30 RX ADMIN — LEVOTHYROXINE SODIUM 100 MCG: 0.1 TABLET ORAL at 18:07

## 2023-07-30 RX ADMIN — INSULIN LISPRO 5 UNITS: 100 INJECTION, SOLUTION INTRAVENOUS; SUBCUTANEOUS at 18:07

## 2023-07-30 RX ADMIN — CARVEDILOL 12.5 MG: 12.5 TABLET, FILM COATED ORAL at 18:07

## 2023-07-30 RX ADMIN — ATORVASTATIN CALCIUM 80 MG: 40 TABLET, FILM COATED ORAL at 20:39

## 2023-07-30 RX ADMIN — INSULIN LISPRO 2 UNITS: 100 INJECTION, SOLUTION INTRAVENOUS; SUBCUTANEOUS at 10:50

## 2023-07-30 RX ADMIN — TERAZOSIN HYDROCHLORIDE 1 MG: 1 CAPSULE ORAL at 20:38

## 2023-07-30 RX ADMIN — INSULIN LISPRO 6 UNITS: 100 INJECTION, SOLUTION INTRAVENOUS; SUBCUTANEOUS at 20:39

## 2023-07-30 RX ADMIN — LOSARTAN POTASSIUM 100 MG: 50 TABLET, FILM COATED ORAL at 18:07

## 2023-07-30 RX ADMIN — ASPIRIN 81 MG: 81 TABLET, COATED ORAL at 10:40

## 2023-07-30 RX ADMIN — Medication 10 ML: at 20:39

## 2023-07-30 RX ADMIN — SODIUM CHLORIDE 100 ML/HR: 9 INJECTION, SOLUTION INTRAVENOUS at 20:38

## 2023-07-30 RX ADMIN — ENOXAPARIN SODIUM 40 MG: 100 INJECTION SUBCUTANEOUS at 10:40

## 2023-07-30 RX ADMIN — OXYBUTYNIN CHLORIDE 10 MG: 5 TABLET, EXTENDED RELEASE ORAL at 18:07

## 2023-07-30 NOTE — PLAN OF CARE
Goal Outcome Evaluation:   Patient alert and oriented, on 2L NC, wears 2L NC at home, no complaints of pain, was a ED admission today

## 2023-07-30 NOTE — ED PROVIDER NOTES
Time: 6:49 AM EDT  Date of encounter:  7/30/2023  Independent Historian/Clinical History and Information was obtained by:   Patient    History is limited by: N/A    Chief Complaint: hypoglycemia      History of Present Illness:  Patient is a 77 y.o. year old male who presents to the emergency department for evaluation of hypoglycemia.  According to patient's significant other he is blood sugar has been elevated and he has been peeing more than usual.  They gave him extra insulin.  She states while watching TV she could get him to wake up.  He states she believes at that time he was not breathing.  She called 911.  She started chest compressions while he was sitting on his recliner.  She also states she gave mouth-to-mouth and he started breathing.  She states she checked his blood glucose and it was in the 50s.  When EMS arrived his glucose was in the 60s they gave him D5.  He also had was hypotension with a systolic in the 50s.  Patient wife report he has been very weak over last few days    HPI    Patient Care Team  Primary Care Provider: Morena Masters MD    Past Medical History:     Allergies   Allergen Reactions    Latex Rash and Anaphylaxis    Latex, Natural Rubber Itching     Past Medical History:   Diagnosis Date    Allergic rhinitis 12/27/2014    Will try Zyrtec, he didnt want to use a nasal spray.    Arthritis     Asthma     Cataract     left eye    Cough 03/30/2016    PFT and CXR ordered.    Depression     PTSD    Diabetes     Elevated cholesterol     H/O psychiatric care 1999    PTSD    Hyperlipidemia 03/30/2016    Lipids ordered. Continue on current medication.    Hypertension     Hypothyroidism     Seasonal allergies     Shortness of breath     Sleep apnea     Stenosis of right carotid artery 02/19/2017    Will refer to vascular surgeon.    Syncope 02/19/2017    Type 2 diabetes mellitus     Upper respiratory infection 10/18/2015    Will treat cough with Hydromet, otherwise over the counter meds  for treatment.     Past Surgical History:   Procedure Laterality Date    CATARACT EXTRACTION Right     x2    COLONOSCOPY      JOINT REPLACEMENT      REPLACEMENT TOTAL HIP ONCOLOGIC Right     RETINAL DETACHMENT REPAIR      TOE AMPUTATION Left 11/2017    Second phalaeng.    TOE OSTEOPHYTE REMOVAL       Family History   Problem Relation Age of Onset    Heart disease Mother     Lupus Mother     Arthritis Mother     Kidney disease Sister        Home Medications:  Prior to Admission medications    Medication Sig Start Date End Date Taking? Authorizing Provider   albuterol (PROVENTIL) (2.5 MG/3ML) 0.083% nebulizer solution Take 2.5 mg by nebulization Every 4 (Four) Hours As Needed for Wheezing or Shortness of Air. 4/27/23   Laureen Harris APRN   albuterol sulfate  (90 Base) MCG/ACT inhaler Inhale 2 puffs Every 4 (Four) Hours As Needed for Wheezing or Shortness of Air. 2/21/22   Morena Masters MD   aspirin EC 81 MG EC tablet Take 1 tablet by mouth Daily. 7/23/21   Kierra Pitts MD   atorvastatin (LIPITOR) 80 MG tablet Take 1 tablet by mouth Daily. 8/9/22   Cathy Ochoa MD   B-D UF III MINI PEN NEEDLES 31G X 5 MM misc  7/6/21   ProviderKierra MD   carvedilol (COREG) 12.5 MG tablet Take 1 tablet by mouth 2 (Two) Times a Day With Meals. 3/14/23   Kierra Pitts MD   cetirizine (ZyrTEC Allergy) 10 MG tablet Take 1 tablet by mouth Daily. 8/9/22   Cathy Ochoa MD   ezetimibe (ZETIA) 10 MG tablet Take 1 tablet by mouth Daily.    Kierra Pitts MD   felodipine (PLENDIL) 5 MG 24 hr tablet Take 1 tablet by mouth Daily.    ProviderKierra MD   fluticasone-salmeterol (Advair HFA) 230-21 MCG/ACT inhaler Inhale 2 puffs 2 (Two) Times a Day for 30 days. Indications: COPD, J44.9 5/31/23 7/11/23  Nelda Spencer PA-C   gabapentin (NEURONTIN) 600 MG tablet Take 1 tablet by mouth 3 (Three) Times a Day. 3/31/21   Kierra Pitts MD   icosapent ethyl (VASCEPA) 1 g capsule capsule  Every 12 (Twelve) Hours.    Kierra Pitts MD   insulin aspart (novoLOG) 100 UNIT/ML injection Inject 24-26 Units under the skin into the appropriate area as directed 3 (Three) Times a Day Before Meals.    Kierra Pitts MD   Insulin Glargine (Lantus SoloStar) 100 UNIT/ML injection pen Inject 44 Units under the skin into the appropriate area as directed Every Night.    Kierra Pitts MD   levothyroxine (SYNTHROID, LEVOTHROID) 100 MCG tablet Take 1 tablet by mouth Daily.    Kierra Pitts MD   losartan (COZAAR) 100 MG tablet Take 1 tablet by mouth Daily. 2/27/23   Kierra Pitts MD   metFORMIN (GLUCOPHAGE) 500 MG tablet Take 1 tablet by mouth 2 (Two) Times a Day With Meals.    Kierra Pitts MD   omeprazole (priLOSEC) 20 MG capsule Take 1 capsule by mouth Daily.    Kierra Pitts MD   oxybutynin XL (DITROPAN-XL) 10 MG 24 hr tablet Take 1 tablet by mouth Daily. 6/6/23   Kati Warren PA-C   PARoxetine (PAXIL) 40 MG tablet Take 1 tablet by mouth Every Evening. 4/5/21   Kierra Pitts MD   prazosin (MINIPRESS) 1 MG capsule Take 1 capsule by mouth Daily. 4/11/23   Kierra Pitts MD   pseudoephedrine-guaifenesin (MUCINEX D)  MG per 12 hr tablet Take 1 tablet by mouth Every 12 (Twelve) Hours.    Kierra Pitts MD   tamsulosin (FLOMAX) 0.4 MG capsule 24 hr capsule Take 1 capsule by mouth Daily for 360 days. 5/18/23 5/12/24  Tabatha Oconnell APRN   traZODone (DESYREL) 100 MG tablet Take 1 tablet by mouth Every Night. 4/22/22   Kierra Pitts MD        Social History:   Social History     Tobacco Use    Smoking status: Never    Smokeless tobacco: Never   Vaping Use    Vaping Use: Never used   Substance Use Topics    Alcohol use: Yes     Alcohol/week: 1.0 standard drink     Types: 1 Cans of beer per week    Drug use: Never         Review of Systems:  Review of Systems   Constitutional:  Negative for chills and fever.   HENT:  Negative for  "congestion, ear pain and sore throat.    Eyes:  Negative for pain.   Respiratory:  Negative for cough, chest tightness and shortness of breath.    Cardiovascular:  Negative for chest pain.   Gastrointestinal:  Negative for abdominal pain, diarrhea, nausea and vomiting.   Genitourinary:  Positive for frequency. Negative for flank pain and hematuria.   Musculoskeletal:  Negative for joint swelling.   Skin:  Negative for pallor.   Neurological:  Positive for weakness. Negative for seizures and headaches.   All other systems reviewed and are negative.     Physical Exam:  /60   Pulse 82   Temp 98.9 øF (37.2 øC) (Oral)   Resp 15   Ht 170.2 cm (67\")   Wt 99.6 kg (219 lb 9.3 oz)   SpO2 100%   BMI 34.39 kg/mý     Physical Exam  Constitutional:       Appearance: Normal appearance.      Comments: somnolent   HENT:      Head: Normocephalic and atraumatic.      Nose: Nose normal.      Mouth/Throat:      Mouth: Mucous membranes are moist.   Eyes:      Extraocular Movements: Extraocular movements intact.      Conjunctiva/sclera: Conjunctivae normal.      Pupils: Pupils are equal, round, and reactive to light.   Cardiovascular:      Rate and Rhythm: Normal rate and regular rhythm.      Pulses: Normal pulses.      Heart sounds: Normal heart sounds.   Pulmonary:      Effort: Pulmonary effort is normal.      Breath sounds: Normal breath sounds.   Abdominal:      General: There is no distension.      Palpations: Abdomen is soft.      Tenderness: There is no abdominal tenderness.   Musculoskeletal:         General: Normal range of motion.      Cervical back: Normal range of motion.   Skin:     General: Skin is warm and dry.      Capillary Refill: Capillary refill takes less than 2 seconds.   Neurological:      General: No focal deficit present.      Mental Status: He is oriented to person, place, and time. Mental status is at baseline.   Psychiatric:         Mood and Affect: Mood normal.         Behavior: Behavior normal. "                Procedures:  Procedures      Medical Decision Making:      Comorbidities that affect care:    HLD, Asthma, Diabetes, Thyroid Disease    External Notes reviewed:    Previous Clinic Note: Patient seen by his primary physician on 711/23 after a fall.  Patient complained of low back pain.      The following orders were placed and all results were independently analyzed by me:  Orders Placed This Encounter   Procedures    Urine Culture - Urine,    Blood Culture - Blood,    Blood Culture - Blood,    Respiratory Panel PCR w/COVID-19(SARS-CoV-2) FRANK/ISABEL/JOHANNA/PAD/COR/MAD/GREGORIA In-House, NP Swab in UTM/VTM, 3-4 HR TAT - Swab, Nasopharynx    CT Head Without Contrast    XR Chest 1 View    [place] Draw    Comprehensive Metabolic Panel    Magnesium    Single High Sensitivity Troponin T    CBC Auto Differential    High Sensitivity Troponin T    Urinalysis With Culture If Indicated - Urine, Clean Catch    High Sensitivity Troponin T 2Hr    Urinalysis, Microscopic Only - Urine, Clean Catch    BNP    Lactic Acid, Plasma    ABG with Co-Ox and Electrolytes    NPO Diet NPO Type: Strict NPO    Undress & Gown    Continuous Pulse Oximetry    Vital Signs    Orthostatic Blood Pressure    Bladder scan    Code Status and Medical Interventions:    Inpatient Hospitalist Consult    Oxygen Therapy- Nasal Cannula; Titrate 1-6 LPM Per SpO2; 90 - 95%    POC Glucose Once    POC Glucose Once    POC Glucose Once    POC Glucose STAT    POC Glucose Once    ECG 12 Lead ED Triage Standing Order; Syncope    Insert Peripheral IV    Inpatient Admission    CBC & Differential    Green Top (Gel)    Lavender Top    Gold Top - SST    Light Blue Top       Medications Given in the Emergency Department:  Medications   sodium chloride 0.9 % flush 10 mL (has no administration in time range)   cefTRIAXone (ROCEPHIN) 2000 mg/100 mL 0.9% NS IVPB (MBP) (2,000 mg Intravenous New Bag 7/30/23 0655)        ED Course:    ED Course as of 07/30/23 0700   Sun Jul 30,  2023 0700 ECG 12 Lead ED Triage Standing Order; Syncope  Sinus rhythm with rate of 83.  No acute ST elevation.  Normal AR and QTc interval.  Right bundle branch block present.  EKG interpreted by me. [LD]      ED Course User Index  [LD] Lucy Monge MD       Labs:    Lab Results (last 24 hours)       Procedure Component Value Units Date/Time    POC Glucose Once [[id number]]  (Abnormal) Collected: 07/30/23 0202    Specimen: Blood Updated: 07/30/23 0212     Glucose 62 mg/dL      Comment: Serial Number: [id number]:  [id number]       CBC & Differential [[id number]]  (Abnormal) Collected: 07/30/23 0227    Specimen: Blood Updated: 07/30/23 0243    Narrative:      The following orders were created for panel order CBC & Differential.  Procedure                               Abnormality         Status                     ---------                               -----------         ------                     CBC Auto Differential[[id number]]        Abnormal            Final result                 Please view results for these tests on the individual orders.    Comprehensive Metabolic Panel [[id number]]  (Abnormal) Collected: 07/30/23 0227    Specimen: Blood Updated: 07/30/23 0259     Glucose 55 mg/dL      BUN 13 mg/dL      Creatinine 1.27 mg/dL      Sodium 140 mmol/L      Potassium 3.4 mmol/L      Chloride 101 mmol/L      CO2 29.2 mmol/L      Calcium 9.1 mg/dL      Total Protein 7.3 g/dL      Albumin 4.1 g/dL      ALT (SGPT) 19 U/L      AST (SGOT) 23 U/L      Alkaline Phosphatase 74 U/L      Total Bilirubin 0.5 mg/dL      Globulin 3.2 gm/dL      A/G Ratio 1.3 g/dL      BUN/Creatinine Ratio 10.2     Anion Gap 9.8 mmol/L      eGFR 58.6 mL/min/1.73     Narrative:      GFR Normal >60  Chronic Kidney Disease <60  Kidney Failure <15    The GFR formula is only valid for adults with stable renal function between ages 18 and 70.    Magnesium [[id number]]  (Normal) Collected: 07/30/23 0227    Specimen: Blood Updated: 07/30/23  0259     Magnesium 1.7 mg/dL     Single High Sensitivity Troponin T [[id number]]  (Abnormal) Collected: 07/30/23 0227    Specimen: Blood Updated: 07/30/23 0257     HS Troponin T 30 ng/L     Narrative:      High Sensitive Troponin T Reference Range:  <10.0 ng/L- Negative Female for AMI  <15.0 ng/L- Negative Male for AMI  >=10 - Abnormal Female indicating possible myocardial injury.  >=15 - Abnormal Male indicating possible myocardial injury.   Clinicians would have to utilize clinical acumen, EKG, Troponin, and serial changes to determine if it is an Acute Myocardial Infarction or myocardial injury due to an underlying chronic condition.         CBC Auto Differential [[id number]]  (Abnormal) Collected: 07/30/23 0227    Specimen: Blood Updated: 07/30/23 0243     WBC 15.64 10*3/mm3      RBC 3.61 10*6/mm3      Hemoglobin 11.1 g/dL      Hematocrit 33.1 %      MCV 91.7 fL      MCH 30.7 pg      MCHC 33.5 g/dL      RDW 14.7 %      RDW-SD 49.2 fl      MPV 9.8 fL      Platelets 150 10*3/mm3      Neutrophil % 75.1 %      Lymphocyte % 12.3 %      Monocyte % 11.0 %      Eosinophil % 0.9 %      Basophil % 0.3 %      Immature Grans % 0.4 %      Neutrophils, Absolute 11.75 10*3/mm3      Lymphocytes, Absolute 1.92 10*3/mm3      Monocytes, Absolute 1.72 10*3/mm3      Eosinophils, Absolute 0.14 10*3/mm3      Basophils, Absolute 0.05 10*3/mm3      Immature Grans, Absolute 0.06 10*3/mm3      nRBC 0.0 /100 WBC     POC Glucose Once [[id number]]  (Normal) Collected: 07/30/23 0258    Specimen: Blood Updated: 07/30/23 0300     Glucose 85 mg/dL      Comment: Serial Number: [id number]:  [id number]       High Sensitivity Troponin T [[id number]]  (Abnormal) Collected: 07/30/23 0441    Specimen: Blood Updated: 07/30/23 0504     HS Troponin T 28 ng/L     Narrative:      High Sensitive Troponin T Reference Range:  <10.0 ng/L- Negative Female for AMI  <15.0 ng/L- Negative Male for AMI  >=10 - Abnormal Female indicating possible myocardial  injury.  >=15 - Abnormal Male indicating possible myocardial injury.   Clinicians would have to utilize clinical acumen, EKG, Troponin, and serial changes to determine if it is an Acute Myocardial Infarction or myocardial injury due to an underlying chronic condition.         BNP [[id number]]  (Normal) Collected: 07/30/23 0441    Specimen: Blood Updated: 07/30/23 0637     proBNP 1,657.0 pg/mL     Narrative:      Among patients with dyspnea, NT-proBNP is highly sensitive for the detection of acute congestive heart failure. In addition NT-proBNP of <300 pg/ml effectively rules out acute congestive heart failure with 99% negative predictive value.    Results may be falsely decreased if patient taking Biotin.      POC Glucose Once [[id number]]  (Abnormal) Collected: 07/30/23 0459    Specimen: Blood Updated: 07/30/23 0512     Glucose 106 mg/dL      Comment: Serial Number: [id number]:  [id number]       Urinalysis With Culture If Indicated - Urine, Clean Catch [[id number]]  (Abnormal) Collected: 07/30/23 0511    Specimen: Urine, Clean Catch Updated: 07/30/23 0526     Color, UA Yellow     Appearance, UA Cloudy     pH, UA 6.5     Specific Gravity, UA 1.012     Glucose, UA Negative     Ketones, UA Negative     Bilirubin, UA Negative     Blood, UA Small (1+)     Protein, UA 30 mg/dL (1+)     Leuk Esterase, UA Large (3+)     Nitrite, UA Negative     Urobilinogen, UA 2.0 E.U./dL    Narrative:      In absence of clinical symptoms, the presence of pyuria, bacteria, and/or nitrites on the urinalysis result does not correlate with infection.    Urinalysis, Microscopic Only - Urine, Clean Catch [[id number]]  (Abnormal) Collected: 07/30/23 0511    Specimen: Urine, Clean Catch Updated: 07/30/23 0555     RBC, UA 3-5 /HPF      WBC, UA 13-20 /HPF      Bacteria, UA 2+ /HPF      Squamous Epithelial Cells, UA 0-2 /HPF      Renal Epithelial Cells, UA 0-2 /HPF      Hyaline Casts, UA None Seen /LPF      Methodology Automated Microscopy     Urine Culture - Urine, Urine, Clean Catch [[id number]] Collected: 07/30/23 0511    Specimen: Urine, Clean Catch Updated: 07/30/23 0554    ABG with Co-Ox and Electrolytes [[id number]]  (Abnormal) Collected: 07/30/23 0640    Specimen: Arterial Blood Updated: 07/30/23 0646     pH, Arterial 7.392 pH units      pCO2, Arterial 48.4 mm Hg      pO2, Arterial 133.7 mm Hg      HCO3, Arterial 28.8 mmol/L      Base Excess, Arterial 3.2 mmol/L      O2 Saturation, Arterial 98.0 %      Hemoglobin, Blood Gas 11.4 g/dL      Carboxyhemoglobin 0.5 %      Methemoglobin 0.10 %      Oxyhemoglobin 97.4 %      FHHB 2.0 %      Shant's Test Positive     Note --     Site Arterial: right radial     Modality Cannula     FIO2 --     Flow Rate 2.5 lpm      Sodium, Arterial 138.3 mmol/L      Potassium, Arterial 3.55 mmol/L      Ionized Calcium, Arterial 1.12 mmol/L      Chloride, Arterial 100 mmol/L      Glucose, Arterial 92 mg/dL      Lactate, Arterial 1.28 mmol/L              Imaging:    CT Head Without Contrast    Result Date: 7/30/2023  PROCEDURE: CT HEAD WO CONTRAST  COMPARISON: None.  INDICATIONS: confusion; altered mental status (AMS)  PROTOCOL:   Standard CT imaging protocol performed.    RADIATION:   Total DLP: 954.3mGy*cm   MA and/or KV were/was adjusted to minimize radiation dose.    TECHNIQUE: After obtaining the patient's consent, 122 CT images were obtained without non-ionic intravenous contrast material.  The study is motion-limited.  DISCUSSION: A routine nonenhanced head CT was performed. No acute brain abnormality is identified. No acute intracranial hemorrhage. No acute infarction. No acute skull fracture. No midline shift or acute intracranial mass effect is seen.  Mild chronic small vessel ischemia/infarction is suspected. There are arterial and basal ganglia calcifications. The extra-axial spaces and the ventricular system are prominent, suggesting central atrophy.  The patient has undergone bilateral cataract extractions with  intra-ocular lens implants.  Mild-to-moderate age-indeterminate mucosal thickening involves the imaged paranasal sinuses.  No air-fluid interfaces are seen within the imaged paranasal sinuses.  Benign external auditory canal debris is suspected.       No acute brain abnormality is seen.    Please note that portions of this note were completed with a voice recognition program.  LEXX GONZALEZ JR, MD       Electronically Signed and Approved By: LEXX GONZALEZ JR, MD on 7/30/2023 at 5:34              XR Chest 1 View    Result Date: 7/30/2023  PROCEDURE: XR CHEST 1 VW  COMPARISONS: 5/31/2023; 4/7/2023; 2/21/2022.  INDICATIONS: SHORTNESS OF BREATH; COUGH; SYNCOPE/COLLAPSE.  FINDINGS: A single AP upright portable view of the chest is provided for review.  Bilateral infiltrates are seen.  The findings may represent infectious multifocal pneumonia.  Pulmonary edema would be in the differential diagnosis.  There is borderline cardiac enlargement.  No pneumothorax.  No pneumomediastinum.  There may be small bilateral pleural effusions.  There is slight pulmonary hypoinflation.  External artifacts obscure detail.  Degenerative changes involve the imaged spine and the bilateral shoulders.  There may be mild levoscoliosis of the upper thoracic spine.       New bilateral infiltrates are seen.  The findings may represent infectious multifocal pneumonia.  Pulmonary edema is possible.       Please note that portions of this note were completed with a voice recognition program.  LEXX GONZALEZ JR, MD       Electronically Signed and Approved By: LEXX GONZALEZ JR, MD on 7/30/2023 at 5:07                 Differential Diagnosis and Discussion:    Dysuria: Differential diagnosis includes but is not limited to urethritis, cystitis, pyelonephritis, ureteral calculi, neoplasm, chemical irritant, urethral stricture, and trauma  Weakness: Based on the patient's history, signs, and symptoms, the diffential diagnosis includes but is not limited  to meningitis, stroke, sepsis, subarachnoid hemorrhage, intracranial bleeding, encephalitis, acute uti, dehydration, MS, myasthenia gravis, Gu[place]an [place], migraine variant, neuromuscular disorders vertigo, electrolyte imbalance, and metabolic disorders.    All labs were reviewed and interpreted by me.  All X-rays impressions were independently interpreted by me.  EKG was interpreted by me.  CT scan radiology impression was interpreted by me.    MDM  Number of Diagnoses or Management Options  Hypoglycemia  Metabolic encephalopathy  Urinary tract infection without hematuria, site unspecified  Diagnosis management comments: On arrival patient is alert and oriented.  He is answering questions appropriately.  He is somnolent.  Labs initially showed a glucose in the 50s he was given orange juice and something to eat.  This improved.  Labs did show elevated white count.  UA concerning for urinary tract infection.  CT of his head did not show acute finding.  Chest x-ray showed bilateral infiltrates.  Patient was given dose of antibiotics.  Discussed patient with hospitalist and he will be admitted for further care.       Amount and/or Complexity of Data Reviewed  Clinical lab tests: reviewed  Tests in the radiology section of [facility]: reviewed  Review and summarize past medical records: yes  Independent visualization of images, tracings, or specimens: yes    Risk of Complications, Morbidity, and/or Mortality  Presenting problems: moderate  Management options: moderate             Patient Care Considerations:    CT CHEST: I considered ordering a CT scan of the chest, however not emergently indicated can be obtained as in patient if indicated      Consultants/Shared Management Plan:    Hospitalist: I have discussed the case with Dr Mays who agrees to accept the patient for admission.    Social Determinants of Health:    Patient is independent, reliable, and has access to care.       Disposition and Care Coordination:    Admit:    Through independent evaluation of the patient's history, physical, and imperical data, the patient meets criteria for observation/admission to the hospital.        Final diagnoses:   Urinary tract infection without hematuria, site unspecified   Hypoglycemia   Metabolic encephalopathy        ED Disposition       ED Disposition   Decision to Admit    Condition   --    Comment   Level of Care: Telemetry [5]   Diagnosis: Acute metabolic encephalopathy [1097706]   Admitting Physician: MEG ROCHE [[id number]]   Attending Physician: MEG ROCHE [[id number]]   Certification: I Certify That Inpatient Hospital Services Are Medically Necessary For Greater Than 2 Midnights                 This medical record created using voice recognition software.             Lucy Monge MD  07/30/23 0700

## 2023-07-30 NOTE — ED NOTES
Gave 8 ounces of orange juice and peanut butter crackers. Patient alert and talking blood glucose 62.

## 2023-07-30 NOTE — H&P
[facility] HISTORY AND PHYSICAL  Date: 2023   Patient Name: Giles Donovan  : 1945  MRN: [id number]  Primary Care Physician:  Morena Masters MD  Date of admission: 2023    Subjective   Subjective     Chief Complaint: Altered mental status, unresponsiveness    HPI:    Giles Donovan is a 77 y.o. male past medical history of chronic hypoxic respiratory failure on 2 L nasal cannula, hypertension, hyperlipidemia, carotid artery stenosis, poorly controlled type 2 diabetes, unhealthy alcohol use, obesity, MIAH with unclear adherence to BiPAP, depression/anxiety, prior diastolic heart failure who presents to the ER due to period of unresponsiveness.  Due to patient's altered mental status he is a poor historian and history supplemented by discussion with torres at the bedside.  Appears patient yesterday was urinating a lot and had glucoses in the 300s and may or may not have received more insulin than usual yesterday.  He was able to ambulate and had no issues throughout the day however last night after falling asleep his kevin‚jocelyn noted him to be minimally responsive to verbal and she was concerned that he was not responding even to sternal rub at which point she felt his chest and did not feel like he was breathing and at which point she started mouth-to-mouth and potentially CPR although he was on a recliner at the time and it is unclear if she did full chest compressions.  She checked his sugar and it was in the 50s.  She was concern for a insulin, and thus notified EMS.  Upon EMSs arrival patient was already more awake and responding however did have a low blood pressure for which she was given a fluid bolus.  His glucose was in the 60s on their check and he was given oral glucose replacement with improvement in his mental status.  He was then brought to the ER for evaluation.  Upon arrival here he is afebrile and hemodynamically stable.  Lab work-up is revealing of  a leukocytosis of 15,000, mildly elevated troponin at 28, hypokalemia of 3.4.  Normal lactate.  Anemia with hemoglobin 11.1.  Glucose checks while in the ER have gone from 62-1 06 with oral glucose replacement.  BNP is negative.  ABG showed chronic hypercapnia with compensation.  Chest x-ray showed bilateral infiltrates concerning for multifocal pneumonia and a CT of the head was negative.  Urinalysis showed extensive pyuria and there is concern for urinary tract infection.  Patient was given Rocephin and the hospitalist contacted for admission.  At the time my exam patient is lethargic but arousable and following commands.  He still falls asleep rather quickly after following commands and talking to me.  He denies having any chest pain or palpitations.  No abdominal pain.      Personal History     Past Medical History:  Past Medical History:   Diagnosis Date    Allergic rhinitis 12/27/2014    Will try Zyrtec, he didnt want to use a nasal spray.    Arthritis     Asthma     Cataract     left eye    Cough 03/30/2016    PFT and CXR ordered.    Depression     PTSD    Diabetes     Elevated cholesterol     H/O psychiatric care 1999    PTSD    Hyperlipidemia 03/30/2016    Lipids ordered. Continue on current medication.    Hypertension     Hypothyroidism     Seasonal allergies     Shortness of breath     Sleep apnea     Stenosis of right carotid artery 02/19/2017    Will refer to vascular surgeon.    Syncope 02/19/2017    Type 2 diabetes mellitus     Upper respiratory infection 10/18/2015    Will treat cough with Hydromet, otherwise over the counter meds for treatment.         Past Surgical History:  Past Surgical History:   Procedure Laterality Date    CATARACT EXTRACTION Right     x2    COLONOSCOPY      JOINT REPLACEMENT      REPLACEMENT TOTAL HIP ONCOLOGIC Right     RETINAL DETACHMENT REPAIR      TOE AMPUTATION Left 11/2017    Second phalaeng.    TOE OSTEOPHYTE REMOVAL           Family History:   Reviewed and  noncontributory except as mentioned in HPI    Social History:   Social Determinants of Health     Tobacco Use: Low Risk     Smoking Tobacco Use: Never    Smokeless Tobacco Use: Never    Passive Exposure: Not on file   Alcohol Use: Not At Risk    Frequency of Alcohol Consumption: Never    Average Number of Drinks: Patient does not drink    Frequency of Binge Drinking: Never   Financial Resource Strain: Not on file   Food Insecurity: Not on file   Transportation Needs: Not on file   Physical Activity: Not on file   Stress: Not on file   Social Connections: Not on file   Intimate Partner Violence: Not on file   Depression: Not at risk    PHQ-2 Score: 0   Housing Stability: Not on file         Home Medications:  Insulin Glargine, Insulin Pen Needle, PARoxetine, albuterol, albuterol sulfate HFA, aspirin, atorvastatin, carvedilol, cetirizine, ezetimibe, felodipine, fluticasone-salmeterol, gabapentin, icosapent ethyl, insulin aspart, levothyroxine, losartan, metFORMIN, omeprazole, oxybutynin XL, prazosin, pseudoephedrine-guaifenesin, tamsulosin, and traZODone    Allergies:  Allergies   Allergen Reactions    Latex Rash and Anaphylaxis    Latex, Natural Rubber Itching       Review of Systems   Unable to obtain due to altered mental status    Objective   Objective     Vitals:   Temp:  [98.9 øF (37.2 øC)] 98.9 øF (37.2 øC)  Heart Rate:  [73-83] 82  Resp:  [15] 15  BP: ()/(49-72) 126/60    Physical Exam    Constitutional: Lethargic but arousable and following simple commands with all extremities   Eyes: Pupils equal, sclerae anicteric, no conjunctival injection   HENT: NCAT, mucous membranes moist   Neck: Supple, no thyromegaly, no lymphadenopathy, trachea midline   Respiratory: Decreased breath sounds at the bases bilaterally nonlabored respirations    Cardiovascular: RRR, no murmurs, rubs, or gallops, palpable pedal pulses bilaterally   Gastrointestinal: Positive bowel sounds, soft, nontender,  nondistended   Musculoskeletal: 2+ pitting edema to the knee bilaterally, no clubbing or cyanosis to extremities   Psychiatric: Appropriate affect, cooperative   Neurologic: Oriented x 2 (person and year), strength symmetric in all extremities, Cranial Nerves grossly intact to confrontation, speech clear   Skin: No rashes     Result Review    Result Review:  I have personally reviewed the results from the time of this admission to 7/30/2023 06:48 EDT and agree with these findings:  [x]  Laboratory  [x]  Microbiology  [x]  Radiology  [x]  EKG/Telemetry   []  Cardiology/Vascular   []  Pathology  [x]  Old records  []  Other:      Assessment & Plan   Assessment / Plan     Assessment/Plan:   Acute metabolic encephalopathy likely secondary to UTI and possibly hypoglycemia  Elevated Troponin, suspect demand ischemia due to acute infection  Chronic hypoxic hypercapnic respiratory failure   Acute UTI  ?hypoglycemia  Poorly controlled type 2 diabetes  Hypertension  Hyperlipidemia  Carotid artery stenosis  MIAH with unclear CPAP adherence  Unhealthy alcohol use    Plan  - Admit to hospitalist service  - Etiology of encephalopathy seems likely related to hypoglycemia.  Is unclear if he was actually unresponsive as he never required CPR from EMS and appears to just have received mouth-to-mouth by his fianc‚e.  We will treat empirically for UTI.  Follow-up blood and urine cultures.  Patient appears stable however we will continue telemetry monitoring to rule out any arrhythmia.  - EKG was personally reviewed and showed sinus rhythm with a right bundle branch block pattern.  Repeat troponin ordered. Chest x-ray personally reviewed showed possible pulmonary vascular congestion although BNP is negative.  No mention of a cough by his fianc‚e over the last few days however we will check a respiratory panel  - We will avoid any long-acting insulin for now.  Monitor off insulin for now and assess for uptrend in glucose prior to  restarting sliding scale  - Hold home antihypertensives for now given he was hypotensive upon EMS arrival  - Continue aspirin and statin given his known history of carotid artery stenosis  - Per fianc‚ patient does drink an unhealthy amount although she cannot exactly quantify how much.  We will check an INR to assess for any underlying liver damage.  Monitor for any withdrawal symptoms.  Check alcohol level      Discussed with ER Physician and Nurse    All labs/imaging studies were personally reviewed and findings are as noted above      DVT Prophylaxis: Lovenox    CODE STATUS:    Code Status (Patient has no pulse and is not breathing): CPR (Attempt to Resuscitate)  Medical Interventions (Patient has pulse or is breathing): Full Support  Release to patient: Routine Release      Admission Status:  I believe this patient meets inpatient status.    Electronically signed by Erich Mays MD, 07/30/23, 6:48 AM EDT.

## 2023-07-31 LAB
ANION GAP SERPL CALCULATED.3IONS-SCNC: 13.1 MMOL/L (ref 5–15)
BASOPHILS # BLD AUTO: 0.05 10*3/MM3 (ref 0–0.2)
BASOPHILS NFR BLD AUTO: 0.3 % (ref 0–1.5)
BUN SERPL-MCNC: 17 MG/DL (ref 8–23)
BUN/CREAT SERPL: 15.5 (ref 7–25)
CALCIUM SPEC-SCNC: 9.1 MG/DL (ref 8.6–10.5)
CHLORIDE SERPL-SCNC: 97 MMOL/L (ref 98–107)
CO2 SERPL-SCNC: 22.9 MMOL/L (ref 22–29)
CREAT SERPL-MCNC: 1.1 MG/DL (ref 0.76–1.27)
DEPRECATED RDW RBC AUTO: 50.5 FL (ref 37–54)
EGFRCR SERPLBLD CKD-EPI 2021: 69.6 ML/MIN/1.73
EOSINOPHIL # BLD AUTO: 0.25 10*3/MM3 (ref 0–0.4)
EOSINOPHIL NFR BLD AUTO: 1.7 % (ref 0.3–6.2)
ERYTHROCYTE [DISTWIDTH] IN BLOOD BY AUTOMATED COUNT: 15 % (ref 12.3–15.4)
GLUCOSE BLDC GLUCOMTR-MCNC: 218 MG/DL (ref 70–99)
GLUCOSE BLDC GLUCOMTR-MCNC: 248 MG/DL (ref 70–99)
GLUCOSE BLDC GLUCOMTR-MCNC: 253 MG/DL (ref 70–99)
GLUCOSE BLDC GLUCOMTR-MCNC: 333 MG/DL (ref 70–99)
GLUCOSE SERPL-MCNC: 372 MG/DL (ref 65–99)
HCT VFR BLD AUTO: 35.8 % (ref 37.5–51)
HGB BLD-MCNC: 12 G/DL (ref 13–17.7)
IMM GRANULOCYTES # BLD AUTO: 0.1 10*3/MM3 (ref 0–0.05)
IMM GRANULOCYTES NFR BLD AUTO: 0.7 % (ref 0–0.5)
LYMPHOCYTES # BLD AUTO: 1.41 10*3/MM3 (ref 0.7–3.1)
LYMPHOCYTES NFR BLD AUTO: 9.8 % (ref 19.6–45.3)
MAGNESIUM SERPL-MCNC: 1.6 MG/DL (ref 1.6–2.4)
MCH RBC QN AUTO: 30.9 PG (ref 26.6–33)
MCHC RBC AUTO-ENTMCNC: 33.5 G/DL (ref 31.5–35.7)
MCV RBC AUTO: 92.3 FL (ref 79–97)
MONOCYTES # BLD AUTO: 1.09 10*3/MM3 (ref 0.1–0.9)
MONOCYTES NFR BLD AUTO: 7.6 % (ref 5–12)
NEUTROPHILS NFR BLD AUTO: 11.53 10*3/MM3 (ref 1.7–7)
NEUTROPHILS NFR BLD AUTO: 79.9 % (ref 42.7–76)
NRBC BLD AUTO-RTO: 0 /100 WBC (ref 0–0.2)
PLATELET # BLD AUTO: 167 10*3/MM3 (ref 140–450)
PMV BLD AUTO: 10.9 FL (ref 6–12)
POTASSIUM SERPL-SCNC: 4 MMOL/L (ref 3.5–5.2)
RBC # BLD AUTO: 3.88 10*6/MM3 (ref 4.14–5.8)
SODIUM SERPL-SCNC: 133 MMOL/L (ref 136–145)
WBC NRBC COR # BLD: 14.43 10*3/MM3 (ref 3.4–10.8)

## 2023-07-31 PROCEDURE — 25010000002 ENOXAPARIN PER 10 MG: Performed by: STUDENT IN AN ORGANIZED HEALTH CARE EDUCATION/TRAINING PROGRAM

## 2023-07-31 PROCEDURE — 99232 SBSQ HOSP IP/OBS MODERATE 35: CPT | Performed by: INTERNAL MEDICINE

## 2023-07-31 PROCEDURE — 36415 COLL VENOUS BLD VENIPUNCTURE: CPT | Performed by: STUDENT IN AN ORGANIZED HEALTH CARE EDUCATION/TRAINING PROGRAM

## 2023-07-31 PROCEDURE — 80048 BASIC METABOLIC PNL TOTAL CA: CPT | Performed by: STUDENT IN AN ORGANIZED HEALTH CARE EDUCATION/TRAINING PROGRAM

## 2023-07-31 PROCEDURE — 85025 COMPLETE CBC W/AUTO DIFF WBC: CPT | Performed by: STUDENT IN AN ORGANIZED HEALTH CARE EDUCATION/TRAINING PROGRAM

## 2023-07-31 PROCEDURE — 63710000001 INSULIN DETEMIR PER 5 UNITS: Performed by: INTERNAL MEDICINE

## 2023-07-31 PROCEDURE — 83735 ASSAY OF MAGNESIUM: CPT | Performed by: STUDENT IN AN ORGANIZED HEALTH CARE EDUCATION/TRAINING PROGRAM

## 2023-07-31 PROCEDURE — 25010000002 CEFTRIAXONE PER 250 MG: Performed by: STUDENT IN AN ORGANIZED HEALTH CARE EDUCATION/TRAINING PROGRAM

## 2023-07-31 PROCEDURE — 97161 PT EVAL LOW COMPLEX 20 MIN: CPT

## 2023-07-31 PROCEDURE — 82948 REAGENT STRIP/BLOOD GLUCOSE: CPT

## 2023-07-31 PROCEDURE — 97165 OT EVAL LOW COMPLEX 30 MIN: CPT

## 2023-07-31 PROCEDURE — 63710000001 INSULIN LISPRO (HUMAN) PER 5 UNITS: Performed by: STUDENT IN AN ORGANIZED HEALTH CARE EDUCATION/TRAINING PROGRAM

## 2023-07-31 RX ORDER — TRAZODONE HYDROCHLORIDE 100 MG/1
100 TABLET ORAL NIGHTLY
Status: DISCONTINUED | OUTPATIENT
Start: 2023-07-31 | End: 2023-08-01 | Stop reason: HOSPADM

## 2023-07-31 RX ADMIN — INSULIN LISPRO 3 UNITS: 100 INJECTION, SOLUTION INTRAVENOUS; SUBCUTANEOUS at 17:25

## 2023-07-31 RX ADMIN — CARVEDILOL 12.5 MG: 12.5 TABLET, FILM COATED ORAL at 08:02

## 2023-07-31 RX ADMIN — INSULIN DETEMIR 20 UNITS: 100 INJECTION, SOLUTION SUBCUTANEOUS at 10:00

## 2023-07-31 RX ADMIN — CARVEDILOL 12.5 MG: 12.5 TABLET, FILM COATED ORAL at 17:25

## 2023-07-31 RX ADMIN — LEVOTHYROXINE SODIUM 100 MCG: 0.1 TABLET ORAL at 08:02

## 2023-07-31 RX ADMIN — INSULIN LISPRO 4 UNITS: 100 INJECTION, SOLUTION INTRAVENOUS; SUBCUTANEOUS at 21:11

## 2023-07-31 RX ADMIN — PANTOPRAZOLE SODIUM 40 MG: 40 TABLET, DELAYED RELEASE ORAL at 05:08

## 2023-07-31 RX ADMIN — Medication 10 ML: at 08:43

## 2023-07-31 RX ADMIN — ENOXAPARIN SODIUM 40 MG: 100 INJECTION SUBCUTANEOUS at 08:08

## 2023-07-31 RX ADMIN — INSULIN DETEMIR 20 UNITS: 100 INJECTION, SOLUTION SUBCUTANEOUS at 21:12

## 2023-07-31 RX ADMIN — ASPIRIN 81 MG: 81 TABLET, COATED ORAL at 08:02

## 2023-07-31 RX ADMIN — TRAZODONE HYDROCHLORIDE 100 MG: 100 TABLET ORAL at 21:11

## 2023-07-31 RX ADMIN — INSULIN LISPRO 5 UNITS: 100 INJECTION, SOLUTION INTRAVENOUS; SUBCUTANEOUS at 08:01

## 2023-07-31 RX ADMIN — AMLODIPINE BESYLATE 5 MG: 5 TABLET ORAL at 08:02

## 2023-07-31 RX ADMIN — INSULIN LISPRO 5 UNITS: 100 INJECTION, SOLUTION INTRAVENOUS; SUBCUTANEOUS at 12:12

## 2023-07-31 RX ADMIN — SODIUM CHLORIDE 100 ML/HR: 9 INJECTION, SOLUTION INTRAVENOUS at 05:08

## 2023-07-31 RX ADMIN — ATORVASTATIN CALCIUM 80 MG: 40 TABLET, FILM COATED ORAL at 21:11

## 2023-07-31 RX ADMIN — OXYBUTYNIN CHLORIDE 10 MG: 5 TABLET, EXTENDED RELEASE ORAL at 08:08

## 2023-07-31 RX ADMIN — LOSARTAN POTASSIUM 100 MG: 50 TABLET, FILM COATED ORAL at 08:02

## 2023-07-31 RX ADMIN — TERAZOSIN HYDROCHLORIDE 1 MG: 1 CAPSULE ORAL at 21:11

## 2023-07-31 RX ADMIN — PAROXETINE HYDROCHLORIDE 40 MG: 20 TABLET, FILM COATED ORAL at 17:25

## 2023-07-31 RX ADMIN — CEFTRIAXONE SODIUM 1000 MG: 1 INJECTION, SOLUTION INTRAVENOUS at 05:08

## 2023-07-31 RX ADMIN — Medication 10 ML: at 21:12

## 2023-07-31 NOTE — THERAPY EVALUATION
Patient Name: Giles Donovan  : 1945    MRN: [id number]                              Today's Date: 2023       Admit Date: 2023    Visit Dx:     ICD-10-CM ICD-9-CM   1. Urinary tract infection without hematuria, site unspecified  N39.0 599.0   2. Hypoglycemia  E16.2 251.2   3. Metabolic encephalopathy  G93.41 348.31   4. Difficulty walking  R26.2 719.7     Patient Active Problem List   Diagnosis    Allergic rhinitis    Cough    Controlled type 2 diabetes mellitus without complication, without long-term current use of insulin    GERD (gastroesophageal reflux disease)    Hyperlipidemia    Hypertension    Hypothyroidism    Stenosis of right carotid artery    Syncope    Urge incontinence of urine    Erectile dysfunction    Benign prostatic hyperplasia with urinary frequency    Cardiomyopathy    Cataract    Chest pain with low risk for cardiac etiology    Right-sided carotid artery occlusion without cerebral infarction    Depression    Esophageal reflux    Vaso vagal episode    Hypoxia    Chronic lung disease    Sepsis due to pneumonia    Acute respiratory failure with hypoxia    Multifocal pneumonia    Insulin dependent type 2 diabetes mellitus    Mixed hyperlipidemia    Diastolic CHF, chronic    Carotid artery occlusion    DM (diabetes mellitus)    Impacted cerumen, bilateral    Primary insomnia    Unsteady gait when walking    Encounter for annual wellness exam in Medicare patient    Acute metabolic encephalopathy     Past Medical History:   Diagnosis Date    Allergic rhinitis 2014    Will try Zyrtec, he didnt want to use a nasal spray.    Arthritis     Asthma     Cataract     left eye    Cough 2016    PFT and CXR ordered.    Depression     PTSD    Diabetes     Elevated cholesterol     H/O psychiatric care     PTSD    Hyperlipidemia 2016    Lipids ordered. Continue on current medication.    Hypertension     Hypothyroidism     Seasonal allergies     Shortness of breath      Sleep apnea     Stenosis of right carotid artery 02/19/2017    Will refer to vascular surgeon.    Syncope 02/19/2017    Type 2 diabetes mellitus     Upper respiratory infection 10/18/2015    Will treat cough with Hydromet, otherwise over the counter meds for treatment.     Past Surgical History:   Procedure Laterality Date    CATARACT EXTRACTION Right     x2    COLONOSCOPY      JOINT REPLACEMENT      REPLACEMENT TOTAL HIP ONCOLOGIC Right     RETINAL DETACHMENT REPAIR      TOE AMPUTATION Left 11/2017    Second phalaeng.    TOE OSTEOPHYTE REMOVAL        General Information       Row Name 07/31/23 1256          OT Time and Intention    Document Type evaluation  -PG     Mode of Treatment individual therapy;occupational therapy  -PG       Row Name 07/31/23 1256          General Information    Prior Level of Function transfer;ADL's;independent:  -PG     Existing Precautions/Restrictions fall;oxygen therapy device and L/min  -PG     Barriers to Rehab none identified  -PG       Row Name 07/31/23 1256          Occupational Profile    Reason for Services/Referral (Occupational Profile) Patient is a 77-year-old male admitted for acute UTI and metabolic encephalopathy.  No previous OT services identified.  Patient is being evaluated by Occupational Therapy due to recent decline in ADL function  -PG       Row Name 07/31/23 1256          Living Environment    People in Home significant other  Patient resides with his roby.  -PG       Row Name 07/31/23 1256          Cognition    Orientation Status (Cognition) oriented x 3  -PG       Row Name 07/31/23 1256          Safety Issues, Functional Mobility    Impairments Affecting Function (Mobility) balance;endurance/activity tolerance;strength  -PG               User Key  (r) = Recorded By, (t) = Taken By, (c) = Cosigned By      Initials Name Provider Type    PG Inder Villeda OT Occupational Therapist                     Mobility/ADL's       Row Name 07/31/23 125          Transfers     Transfers sit-stand transfer;stand-sit transfer  -PG       Row Name 07/31/23 1258          Sit-Stand Transfer    Sit-Stand [place] (Transfers) contact guard  -PG       Row Name 07/31/23 1258          Stand-Sit Transfer    Stand-Sit [place] (Transfers) contact guard  -PG       [facility] Name 07/31/23 1258          Activities of Daily Living    BADL Assessment/Intervention bathing;upper body dressing;lower body dressing;grooming;toileting  -PG       [facility] Name 07/31/23 1258          Bathing Assessment/Intervention    [place] Level (Bathing) bathing skills;moderate assist (50% patient effort)  -PG       [facility] Name 07/31/23 1258          Upper Body Dressing Assessment/Training    [place] Level (Upper Body Dressing) upper body dressing skills;set up  -PG       [facility] Name 07/31/23 1258          Lower Body Dressing Assessment/Training    [place] Level (Lower Body Dressing) lower body dressing skills;maximum assist (25% patient effort)  -PG       Row Name 07/31/23 1258          Grooming Assessment/Training    [place] Level (Grooming) grooming skills;set up  -PG       Row Name 07/31/23 1258          Toileting Assessment/Training    [place] Level (Toileting) toileting skills;minimum assist (75% patient effort)  -PG               User Key  (r) = Recorded By, (t) = Taken By, (c) = Cosigned By      Initials Name Provider Type    PG Inder Villeda OT Occupational Therapist                   Obj/Interventions       Row Name 07/31/23 1300          Sensory Assessment (Somatosensory)    Sensory Assessment (Somatosensory) sensation intact  -PG       Row Name 07/31/23 1300          Vision Assessment/Intervention    Visual Impairment/Limitations WFL  -PG       Row Name 07/31/23 1300          Range of Motion Comprehensive    General Range of Motion no range of motion deficits identified  -PG       Row Name 07/31/23 1300          Strength Comprehensive (MMT)    General Manual Muscle Testing (MMT) Assessment no  strength deficits identified  -PG       Row Name 07/31/23 1300          Motor Skills    Motor Skills coordination;functional endurance  -PG     Coordination WFL  -PG     Functional Endurance Fair minus  -PG               User Key  (r) = Recorded By, (t) = Taken By, (c) = Cosigned By      Initials Name Provider Type    PG Inder Villeda, OT Occupational Therapist                   Goals/Plan       Row Name 07/31/23 1302          Transfer Goal 1 (OT)    Activity/Assistive Device (Transfer Goal 1, OT) transfers, all  -PG     [place] Level/Cues Needed (Transfer Goal 1, OT) modified independence  -PG     Time Frame (Transfer Goal 1, OT) long term goal (LTG);10 days  -PG       Row Name 07/31/23 1302          Bathing Goal 1 (OT)    Activity/Device (Bathing Goal 1, OT) bathing skills, all  -PG     [place] Level/Cues Needed (Bathing Goal 1, OT) modified independence  -PG     Time Frame (Bathing Goal 1, OT) long term goal (LTG);10 days  -PG       Row Name 07/31/23 1302          Dressing Goal 1 (OT)    Activity/Device (Dressing Goal 1, OT) dressing skills, all  -PG     [place]/Cues Needed (Dressing Goal 1, OT) modified independence  -PG     Time Frame (Dressing Goal 1, OT) long term goal (LTG);10 days  -PG       Row Name 07/31/23 1302          Toileting Goal 1 (OT)    Activity/Device (Toileting Goal 1, OT) toileting skills, all  -PG     [place] Level/Cues Needed (Toileting Goal 1, OT) modified independence  -PG     Time Frame (Toileting Goal 1, OT) long term goal (LTG);10 days  -PG       Row Name 07/31/23 1302          Grooming Goal 1 (OT)    Activity/Device (Grooming Goal 1, OT) grooming skills, all  -PG     [place] (Grooming Goal 1, OT) modified independence  -PG     Time Frame (Grooming Goal 1, OT) long term goal (LTG);10 days  -PG       Row Name 07/31/23 1302          Problem Specific Goal 1 (OT)    Problem Specific Goal 1 (OT) Patient will improve activity tolerance to fair plus to support  independence and engagement with ADL activities  -PG     Time Frame (Problem Specific Goal 1, OT) long term goal (LTG);10 days  -PG       Row Name 07/31/23 1302          Therapy Assessment/Plan (OT)    Planned Therapy Interventions (OT) BADL retraining;activity tolerance training;strengthening exercise;transfer/mobility retraining;patient/caregiver education/training;occupation/activity based interventions  -PG               User Key  (r) = Recorded By, (t) = Taken By, (c) = Cosigned By      Initials Name Provider Type    PG Inder Villeda OT Occupational Therapist                   Clinical Impression       [facility] Name 07/31/23 1300          Pain Assessment    Pretreatment Pain Rating 8/10  -PG     Posttreatment Pain Rating 8/10  -PG     Pain Location - abdomen  -PG     Pain Intervention(s) Nursing Notified  -PG       [facility] Name 07/31/23 1300          Plan of Care Review    Plan of Care Reviewed With patient  -PG     Progress no change  -PG     Outcome Evaluation Patient presents with limitations affecting strength, activity tolerance, and balance impacting patient's ability to return home safely and independently.  The skills of a therapist will be required to safely and effectively implement the following treatment plan to restore maximal level of function  -PG       Row Name 07/31/23 1300          Therapy Assessment/Plan (OT)    Patient/Family Therapy Goal Statement (OT) Get stronger and return home independently  -PG     Rehab Potential (OT) good, to achieve stated therapy goals  -PG     Criteria for Skilled Therapeutic Interventions Met (OT) yes;meets criteria;skilled treatment is necessary  -PG     Therapy Frequency (OT) 5 times/wk  -PG       Row Name 07/31/23 1300          Therapy Plan Review/Discharge Plan (OT)    Anticipated Discharge Disposition (OT) home with home health  -PG               User Key  (r) = Recorded By, (t) = Taken By, (c) = Cosigned By      Initials Name Provider Type    PG Inder Villeda OT  Occupational Therapist                   Outcome Measures       Row Name 07/31/23 1303          How much help from another is currently needed...    Putting on and taking off regular lower body clothing? 2  -PG     Bathing (including washing, rinsing, and drying) 2  -PG     Toileting (which includes using toilet bed pan or urinal) 2  -PG     Putting on and taking off regular upper body clothing 3  -PG     Taking care of personal grooming (such as brushing teeth) 3  -PG     Eating meals 4  -PG     AM-PAC 6 Clicks Score (OT) 16  -PG       Row Name 07/31/23 1200 07/31/23 0800       How much help from another person do you currently need...    Turning from your back to your side while in flat bed without using bedrails? 4  -DP 3  -AW    Moving from lying on back to sitting on the side of a flat bed without bedrails? 3  -DP 3  -AW    Moving to and from a bed to a chair (including a wheelchair)? 3  -DP 2  -AW    Standing up from a chair using your arms (e.g., wheelchair, bedside chair)? 3  -DP 2  -AW    Climbing 3-5 steps with a railing? 3  -DP 2  -AW    To walk in hospital room? 3  -DP 2  -AW    AM-PAC 6 Clicks Score (PT) 19  -DP 14  -AW    Highest level of mobility 6 --> Walked 10 steps or more  -DP 4 --> Transferred to chair/commode  -AW      Row Name 07/31/23 1303 07/31/23 1200       Functional Assessment    Outcome Measure Options AM-PAC 6 Clicks Daily Activity (OT);Optimal Instrument  -PG AM-PAC 6 Clicks Basic Mobility (PT)  -DP      Row Name 07/31/23 1303          Optimal Instrument    Optimal Instrument Optimal - 3  -PG     Bending/Stooping 3  -PG     Standing 2  -PG     Reaching 1  -PG     From the list, choose the 3 activities you would most like to be able to do without any difficulty Bending/stooping;Standing;Reaching  -PG     Total Score Optimal - 3 6  -PG               User Key  (r) = Recorded By, (t) = Taken By, (c) = Cosigned By      Initials Name Provider Type    PG Inder Villeda, OT Occupational  Therapist    Brenda Navarro, PT Physical Therapist    Miley Feng, RN Registered Nurse                    Occupational Therapy Education       Title: PT OT SLP Therapies (Done)       Topic: Occupational Therapy (Done)       Point: ADL training (Done)       Description:   Instruct learner(s) on proper safety adaptation and remediation techniques during self care or transfers.   Instruct in proper use of assistive devices.                  Learning Progress Summary             Patient Acceptance, E,D, DU by PG at 7/31/2023 1303                         Point: Home exercise program (Done)       Description:   Instruct learner(s) on appropriate technique for monitoring, assisting and/or progressing therapeutic exercises/activities.                  Learning Progress Summary             Patient Acceptance, E,D, DU by PG at 7/31/2023 1303                         Point: Precautions (Done)       Description:   Instruct learner(s) on prescribed precautions during self-care and functional transfers.                  Learning Progress Summary             Patient Acceptance, E,D, DU by PG at 7/31/2023 1303                         Point: Body mechanics (Done)       Description:   Instruct learner(s) on proper positioning and spine alignment during self-care, functional mobility activities and/or exercises.                  Learning Progress Summary             Patient Acceptance, E,D, DU by PG at 7/31/2023 1303                                         User Key       Initials Effective Dates Name Provider Type Discipline    PG 06/16/21 -  Inder Villeda, OT Occupational Therapist OT                  OT Recommendation and Plan  Planned Therapy Interventions (OT): BADL retraining, activity tolerance training, strengthening exercise, transfer/mobility retraining, patient/caregiver education/training, occupation/activity based interventions  Therapy Frequency (OT): 5 times/wk  Plan of Care Review  Plan of Care Reviewed  With: patient  Progress: no change  Outcome Evaluation: Patient presents with limitations affecting strength, activity tolerance, and balance impacting patient's ability to return home safely and independently.  The skills of a therapist will be required to safely and effectively implement the following treatment plan to restore maximal level of function     Time Calculation:   Evaluation Complexity (OT)  Review Occupational Profile/Medical/Therapy History Complexity: brief/low complexity  Assessment, Occupational Performance/Identification of Deficit Complexity: 1-3 performance deficits  Clinical Decision Making Complexity (OT): problem focused assessment/low complexity  Overall Complexity of Evaluation (OT): low complexity     Time Calculation- OT       Row Name 07/31/23 1304             Time Calculation- OT    OT Received On 07/31/23  -PG      OT Goal Re-Cert Due Date 08/09/23  -PG         Untimed Charges    OT Eval/Re-eval Minutes 35  -PG         Total Minutes    Untimed Charges Total Minutes 35  -PG       Total Minutes 35  -PG                User Key  (r) = Recorded By, (t) = Taken By, (c) = Cosigned By      Initials Name Provider Type    PG Inder Villeda OT Occupational Therapist                  Therapy Charges for Today       Code Description Service Date Service Provider Modifiers Qty    [id number] HC OT EVAL LOW COMPLEXITY 3 7/31/2023 Inder Villeda OT GO 1                 Inder Villeda OT  7/31/2023

## 2023-07-31 NOTE — PLAN OF CARE
Problem: Adult Inpatient Plan of Care  Goal: Plan of Care Review  Outcome: Ongoing, Progressing  Flowsheets (Taken 7/31/2023 1908)  Outcome Evaluation: No significant changes. Patient on 2L NC. No complaints of pain. Urinal within reach. Possible discharge tomorrow.   Goal Outcome Evaluation:              Outcome Evaluation: No significant changes. Patient on 2L NC. No complaints of pain. Urinal within reach. Possible discharge tomorrow.

## 2023-07-31 NOTE — PROGRESS NOTES
[facility]   Hospitalist Progress Note  Date: 2023  Patient Name: Giles Donovan  : 1945  MRN: [id number]  Date of admission: 2023      Subjective   Subjective     Chief Complaint: low blood sugar     Summary:   77 y.o. male past medical history of chronic hypoxic respiratory failure on 2 L nasal cannula, hypertension, hyperlipidemia, carotid artery stenosis, poorly controlled type 2 diabetes, unhealthy alcohol use, obesity, MIAH with unclear adherence to BiPAP, depression/anxiety, prior diastolic heart failure who presents to the ER due to period of unresponsiveness.  Due to patient's altered mental status he is a poor historian and history supplemented by discussion with torres at the bedside.  Appears patient yesterday was urinating a lot and had glucoses in the 300s and may or may not have received more insulin than usual yesterday.  He was able to ambulate and had no issues throughout the day however last night after falling asleep his roby noted him to be minimally responsive to verbal and she was concerned that he was not responding even to sternal rub at which point she felt his chest and did not feel like he was breathing and at which point she started mouth-to-mouth and potentially CPR although he was on a recliner at the time and it is unclear if she did full chest compressions.  She checked his sugar and it was in the 50s.  She was concern for a insulin, and thus notified EMS.  Upon EMSs arrival patient was already more awake and responding however did have a low blood pressure for which she was given a fluid bolus.  His glucose was in the 60s on their check and he was given oral glucose replacement with improvement in his mental status.  He was then brought to the ER for evaluation.  Upon arrival here he is afebrile and hemodynamically stable.  Lab work-up is revealing of a leukocytosis of 15,000, mildly elevated troponin at 28, hypokalemia of 3.4.  Normal lactate.   Anemia with hemoglobin 11.1.  Glucose checks while in the ER have gone from 62-1 06 with oral glucose replacement.  BNP is negative.  ABG showed chronic hypercapnia with compensation.  Chest x-ray showed bilateral infiltrates concerning for multifocal pneumonia and a CT of the head was negative.  Urinalysis showed extensive pyuria and there is concern for urinary tract infection.  Patient was given Rocephin and the hospitalist contacted for admission. .       Interval Followup:   Patient's mental status is back to normal.  He is up in the room and playing cards with his wife.  Patient suspects that he took insulin too close together and did not give his insulin enough time to work before taking another dose on his sliding scale.  Patient is urine culture is growing E. coli however sensitivities still pending.    Review of Systems   All systems were reviewed and negative    Objective   Objective     Vitals:   Temp:  [97.4 øF (36.3 øC)-99 øF (37.2 øC)] 99 øF (37.2 øC)  Heart Rate:  [76-99] 95  Resp:  [17-20] 20  BP: (121-143)/(57-97) 137/97  Flow (L/min):  [2] 2  Physical Exam    Constitutional: Awake, alert, no acute distress sitting on side of bed playing cards    Eyes: Pupils equal, sclerae anicteric, no conjunctival injection   HENT: NCAT, mucous membranes moist   Neck: Supple, no thyromegaly, no lymphadenopathy, trachea midline   Respiratory: Clear to auscultation bilaterally, nonlabored respirations    Cardiovascular: RRR, no murmurs, rubs, or gallops, palpable pedal pulses bilaterally   Gastrointestinal: Positive bowel sounds, soft, nontender, nondistended   Musculoskeletal: No bilateral ankle edema, no clubbing or cyanosis to extremities   Psychiatric: Appropriate affect, cooperative   Neurologic: Oriented x 3, strength symmetric in all extremities, Cranial Nerves grossly intact to confrontation, speech clear   Skin: No rashes     Result Review    Result Review:  I have personally reviewed the results from the  time of this admission to 7/31/2023 08:31 EDT and agree with these findings:  [x]  Laboratory  CBC          6/6/2023    11:58 7/30/2023    02:27 7/31/2023    05:24   CBC   WBC 9.97  15.64  14.43    RBC 4.11  3.61  3.88    Hemoglobin 12.2  11.1  12.0    Hematocrit 36.4  33.1  35.8    MCV 88.6  91.7  92.3    MCH 29.7  30.7  30.9    MCHC 33.5  33.5  33.5    RDW 12.1  14.7  15.0    Platelets 234  150  167      BMP          6/6/2023    11:58 7/30/2023    02:27 7/30/2023    06:40 7/31/2023    05:24   BMP   BUN 18  13   17    Creatinine 1.19  1.27   1.10    Sodium 140  140  138.3  133    Potassium 4.0  3.4   4.0    Chloride 102  101   97    CO2 29.0  29.2   22.9    Calcium 9.6  9.1   9.1        [x]  Microbiology  Blood Culture   Date Value Ref Range Status   07/30/2023 No growth at 24 hours  Preliminary   07/30/2023 No growth at 24 hours  Preliminary     No results found for: BCIDPCR, CXREFLEX, CSFCX, CULTURETIS  No results found for: CULTURES, HSVCX, URCX  No results found for: EYECULTURE, GCCX, HSVCULTURE, LABHSV  No results found for: LEGIONELLA, MRSACX, MUMPSCX, MYCOPLASCX  No results found for: NOCARDIACX, STOOLCX  Urine Culture   Date Value Ref Range Status   07/30/2023 >100,000 CFU/mL Escherichia coli (A)  Preliminary     No results found for: VIRALCULTU, WOUNDCX    [x]  Radiology  XR Chest 1 View    Result Date: 7/30/2023  PROCEDURE: XR CHEST 1 VW  COMPARISONS: 5/31/2023; 4/7/2023; 2/21/2022.  INDICATIONS: SHORTNESS OF BREATH; COUGH; SYNCOPE/COLLAPSE.  FINDINGS: A single AP upright portable view of the chest is provided for review.  Bilateral infiltrates are seen.  The findings may represent infectious multifocal pneumonia.  Pulmonary edema would be in the differential diagnosis.  There is borderline cardiac enlargement.  No pneumothorax.  No pneumomediastinum.  There may be small bilateral pleural effusions.  There is slight pulmonary hypoinflation.  External artifacts obscure detail.  Degenerative changes involve  the imaged spine and the bilateral shoulders.  There may be mild levoscoliosis of the upper thoracic spine.      Impression:  New bilateral infiltrates are seen.  The findings may represent infectious multifocal pneumonia.  Pulmonary edema is possible.       Please note that portions of this note were completed with a voice recognition program.  LEXX GONZALEZ JR, MD       Electronically Signed and Approved By: LEXX GONZALEZ JR, MD on 7/30/2023 at 5:07               []  EKG/Telemetry   []  Cardiology/Vascular   []  Pathology  []  Old records  []  Other:    Assessment & Plan   Assessment / Plan     Assessment/Plan:  Acute metabolic encephalopathy likely secondary to UTI and hypoglycemia  E. coli urinary tract infection  Diabetes mellitus type 2, poorly controlled  Hypertension  Hyperlipidemia  Obstructive sleep apnea  Alcohol use  Elevated troponin without chest pain    PLAN  -- Continue patient on IV Rocephin until sensitivities come back  --We will start patient on Levemir today as his blood sugar is running in the 300 range  --Restarted patient on his home medication      Suspect patient may go home tomorrow once urine culture has resulted.     Discussed plan with RN.    DVT prophylaxis:  Medical DVT prophylaxis orders are present.    CODE STATUS:   Code Status (Patient has no pulse and is not breathing): CPR (Attempt to Resuscitate)  Medical Interventions (Patient has pulse or is breathing): Full Support  Release to patient: Routine Release        Electronically signed by Uche Lopez DO, 07/31/23, 8:31 AM EDT.

## 2023-07-31 NOTE — PLAN OF CARE
Goal Outcome Evaluation: Pt AAOx4, VS stable. No events overnight. Continue plan of care.

## 2023-07-31 NOTE — THERAPY EVALUATION
Acute Care - Physical Therapy Initial Evaluation   [place]     Patient Name: Giles Donovan  : 1945  MRN: [id number]  Today's Date: 2023      Visit Dx:     ICD-10-CM ICD-9-CM   1. Urinary tract infection without hematuria, site unspecified  N39.0 599.0   2. Hypoglycemia  E16.2 251.2   3. Metabolic encephalopathy  G93.41 348.31   4. Difficulty walking  R26.2 719.7     Patient Active Problem List   Diagnosis    Allergic rhinitis    Cough    Controlled type 2 diabetes mellitus without complication, without long-term current use of insulin    GERD (gastroesophageal reflux disease)    Hyperlipidemia    Hypertension    Hypothyroidism    Stenosis of right carotid artery    Syncope    Urge incontinence of urine    Erectile dysfunction    Benign prostatic hyperplasia with urinary frequency    Cardiomyopathy    Cataract    Chest pain with low risk for cardiac etiology    Right-sided carotid artery occlusion without cerebral infarction    Depression    Esophageal reflux    Vaso vagal episode    Hypoxia    Chronic lung disease    Sepsis due to pneumonia    Acute respiratory failure with hypoxia    Multifocal pneumonia    Insulin dependent type 2 diabetes mellitus    Mixed hyperlipidemia    Diastolic CHF, chronic    Carotid artery occlusion    DM (diabetes mellitus)    Impacted cerumen, bilateral    Primary insomnia    Unsteady gait when walking    Encounter for annual wellness exam in Medicare patient    Acute metabolic encephalopathy     Past Medical History:   Diagnosis Date    Allergic rhinitis 2014    Will try Zyrtec, he didnt want to use a nasal spray.    Arthritis     Asthma     Cataract     left eye    Cough 2016    PFT and CXR ordered.    Depression     PTSD    Diabetes     Elevated cholesterol     H/O psychiatric care     PTSD    Hyperlipidemia 2016    Lipids ordered. Continue on current medication.    Hypertension     Hypothyroidism     Seasonal allergies     Shortness of  breath     Sleep apnea     Stenosis of right carotid artery 02/19/2017    Will refer to vascular surgeon.    Syncope 02/19/2017    Type 2 diabetes mellitus     Upper respiratory infection 10/18/2015    Will treat cough with Hydromet, otherwise over the counter meds for treatment.     Past Surgical History:   Procedure Laterality Date    CATARACT EXTRACTION Right     x2    COLONOSCOPY      JOINT REPLACEMENT      REPLACEMENT TOTAL HIP ONCOLOGIC Right     RETINAL DETACHMENT REPAIR      TOE AMPUTATION Left 11/2017    Second phalaeng.    TOE OSTEOPHYTE REMOVAL       PT Assessment (last 12 hours)       PT Evaluation and Treatment       Row Name 07/31/23 1200          Physical Therapy Time and Intention    Subjective Information no complaints  -DP     Document Type evaluation  -DP     Mode of Treatment individual therapy;physical therapy  -DP     Patient Effort good  -DP       Row Name 07/31/23 1200          General Information    Patient Profile Reviewed yes  -DP     Patient Observations alert;cooperative;agree to therapy  -DP     General Observations of Patient Patient reported that his wife occasionally assists him with all ADLs and transfers if needed.  -DP     Prior Level of Function min assist:;transfer;gait;bed mobility;ADL's  -DP     Equipment Currently Used at Home oxygen;walker, rolling  oxygen on 2L  -DP     Existing Precautions/Restrictions fall;oxygen therapy device and L/min  -DP     Barriers to Rehab none identified  -DP       Row Name 07/31/23 1200          Living Environment    Current Living Arrangements home  -DP     Home Accessibility stairs to enter home  -DP     People in Home spouse  -DP       Row Name 07/31/23 1200          Home Main Entrance    Number of Stairs, Main Entrance three  -DP       Row Name 07/31/23 1200          Cognition    Orientation Status (Cognition) oriented x 3  -DP       Row Name 07/31/23 1200          Range of Motion (ROM)    Range of Motion bilateral lower extremities;ROM is  WFL  -DP       Row Name 07/31/23 1200          Strength (Manual Muscle Testing)    Strength (Manual Muscle Testing) bilateral lower extremities  4-/5  -DP       Row Name 07/31/23 1200          Bed Mobility    Bed Mobility supine-sit  -DP     Supine-Sit [place] (Bed Mobility) contact guard  -DP     Assistive Device (Bed Mobility) bed rails;head of bed elevated  -DP       Row Name 07/31/23 1200          Transfers    Transfers sit-stand transfer  -DP       Row Name 07/31/23 1200          Sit-Stand Transfer    Sit-Stand [place] (Transfers) contact guard  -DP       Row Name 07/31/23 1200          Gait/Stairs (Locomotion)    Gait/Stairs Locomotion gait/ambulation assistive device  -DP     [place] Level (Gait) contact guard  -DP     Assistive Device (Gait) walker, front-wheeled  -DP     Distance in Feet (Gait) 80  -DP       Row Name 07/31/23 1200          Balance    Balance Assessment standing dynamic balance  -DP     Dynamic Standing Balance contact guard  -DP       Row Name 07/31/23 1200          Plan of Care Review    Plan of Care Reviewed With patient  -DP     Outcome Evaluation Patient presents with minimal limitations with ambulation.  He will benefit from inpatient PT services and home health services upon return home.  -DP       Row Name 07/31/23 1200          Therapy Assessment/Plan (PT)    Rehab Potential (PT) good, to achieve stated therapy goals  -DP     Criteria for Skilled Interventions Met (PT) yes;meets criteria  -DP     Therapy Frequency (PT) daily  -DP     Predicted Duration of Therapy Intervention (PT) 10 days  -DP     Problem List (PT) problems related to;mobility  -DP       Row Name 07/31/23 1200          PT Evaluation Complexity    History, PT Evaluation Complexity no personal factors and/or comorbidities  -DP     Examination of Body Systems (PT Eval Complexity) total of 4 or more elements  -DP     Clinical Presentation (PT Evaluation Complexity) stable  -DP     Clinical Decision Making  (PT Evaluation Complexity) low complexity  -DP     Overall Complexity (PT Evaluation Complexity) low complexity  -DP       Row Name 07/31/23 1200          Physical Therapy Goals    Gait Training Goal Selection (PT) gait training, PT goal 1  -DP       Row Name 07/31/23 1200          Gait Training Goal 1 (PT)    Activity/Assistive Device (Gait Training Goal 1, PT) assistive device use;walker, rolling  -DP     [place] (Gait Training Goal 1, PT) supervision required  -DP     Distance (Gait Training Goal 1, PT) 200  -DP     Time Frame (Gait Training Goal 1, PT) 10 days  -DP               User Key  (r) = Recorded By, (t) = Taken By, (c) = Cosigned By      Initials Name Provider Type    DP Brenda Lewis, PT Physical Therapist                      PT Recommendation and Plan  Anticipated Discharge Disposition (PT): home with home health  Planned Therapy Interventions (PT): gait training, bed mobility training, balance training, transfer training, strengthening  Therapy Frequency (PT): daily  Plan of Care Reviewed With: patient  Outcome Evaluation: Patient presents with minimal limitations with ambulation.  He will benefit from inpatient PT services and home health services upon return home.   Outcome Measures       Row Name 07/31/23 1200             How much help from another person do you currently need...    Turning from your back to your side while in flat bed without using bedrails? 4  -DP      Moving from lying on back to sitting on the side of a flat bed without bedrails? 3  -DP      Moving to and from a bed to a chair (including a wheelchair)? 3  -DP      Standing up from a chair using your arms (e.g., wheelchair, bedside chair)? 3  -DP      Climbing 3-5 steps with a railing? 3  -DP      To walk in hospital room? 3  -DP      AM-PAC 6 Clicks Score (PT) 19  -DP         Functional Assessment    Outcome Measure Options AM-PAC 6 Clicks Basic Mobility (PT)  -DP                User Key  (r) = Recorded By, (t) =  Taken By, (c) = Cosigned By      Initials Name Provider Type    Brenda Navarro, PT Physical Therapist                     Time Calculation:    PT Charges       Row Name 07/31/23 1237             Time Calculation    PT Received On 07/31/23  -DP      PT Goal Re-Cert Due Date 08/09/23  -DP         Untimed Charges    PT Eval/Re-eval Minutes 0.4  -DP         Total Minutes    Untimed Charges Total Minutes 0.4  -DP       Total Minutes 0.4  -DP                User Key  (r) = Recorded By, (t) = Taken By, (c) = Cosigned By      Initials Name Provider Type    Brenda Navarro, PT Physical Therapist                      PT G-Codes  Outcome Measure Options: AM-PAC 6 Clicks Basic Mobility (PT)  AM-PAC 6 Clicks Score (PT): 19    Brenda Lewis, PT  7/31/2023

## 2023-07-31 NOTE — PLAN OF CARE
Goal Outcome Evaluation:  Plan of Care Reviewed With: patient           Outcome Evaluation: Patient presents with minimal limitations with ambulation.  He will benefit from inpatient PT services and home health services upon return home.      Anticipated Discharge Disposition (PT): home with home health

## 2023-07-31 NOTE — PLAN OF CARE
Goal Outcome Evaluation:  Plan of Care Reviewed With: patient        Progress: no change  Outcome Evaluation: Patient presents with limitations affecting strength, activity tolerance, and balance impacting patient's ability to return home safely and independently.  The skills of a therapist will be required to safely and effectively implement the following treatment plan to restore maximal level of function      Anticipated Discharge Disposition (OT): home with home health

## 2023-08-01 ENCOUNTER — READMISSION MANAGEMENT (OUTPATIENT)
Dept: CALL CENTER | Facility: HOSPITAL | Age: 78
End: 2023-08-01
Payer: MEDICARE

## 2023-08-01 VITALS
WEIGHT: 219.58 LBS | HEIGHT: 67 IN | RESPIRATION RATE: 20 BRPM | BODY MASS INDEX: 34.46 KG/M2 | DIASTOLIC BLOOD PRESSURE: 67 MMHG | TEMPERATURE: 98.8 F | HEART RATE: 71 BPM | SYSTOLIC BLOOD PRESSURE: 125 MMHG | OXYGEN SATURATION: 100 %

## 2023-08-01 LAB
ANION GAP SERPL CALCULATED.3IONS-SCNC: 9.8 MMOL/L (ref 5–15)
BACTERIA SPEC AEROBE CULT: ABNORMAL
BASOPHILS # BLD AUTO: 0.05 10*3/MM3 (ref 0–0.2)
BASOPHILS NFR BLD AUTO: 0.5 % (ref 0–1.5)
BUN SERPL-MCNC: 13 MG/DL (ref 8–23)
BUN/CREAT SERPL: 15.1 (ref 7–25)
CALCIUM SPEC-SCNC: 9.3 MG/DL (ref 8.6–10.5)
CHLORIDE SERPL-SCNC: 100 MMOL/L (ref 98–107)
CO2 SERPL-SCNC: 27.2 MMOL/L (ref 22–29)
CREAT SERPL-MCNC: 0.86 MG/DL (ref 0.76–1.27)
DEPRECATED RDW RBC AUTO: 51.7 FL (ref 37–54)
EGFRCR SERPLBLD CKD-EPI 2021: 89.7 ML/MIN/1.73
EOSINOPHIL # BLD AUTO: 0.3 10*3/MM3 (ref 0–0.4)
EOSINOPHIL NFR BLD AUTO: 2.7 % (ref 0.3–6.2)
ERYTHROCYTE [DISTWIDTH] IN BLOOD BY AUTOMATED COUNT: 14.8 % (ref 12.3–15.4)
GLUCOSE BLDC GLUCOMTR-MCNC: 162 MG/DL (ref 70–99)
GLUCOSE BLDC GLUCOMTR-MCNC: 194 MG/DL (ref 70–99)
GLUCOSE SERPL-MCNC: 161 MG/DL (ref 65–99)
HCT VFR BLD AUTO: 34.2 % (ref 37.5–51)
HGB BLD-MCNC: 11 G/DL (ref 13–17.7)
IMM GRANULOCYTES # BLD AUTO: 0.03 10*3/MM3 (ref 0–0.05)
IMM GRANULOCYTES NFR BLD AUTO: 0.3 % (ref 0–0.5)
LYMPHOCYTES # BLD AUTO: 2.24 10*3/MM3 (ref 0.7–3.1)
LYMPHOCYTES NFR BLD AUTO: 20.4 % (ref 19.6–45.3)
MAGNESIUM SERPL-MCNC: 1.9 MG/DL (ref 1.6–2.4)
MCH RBC QN AUTO: 30.2 PG (ref 26.6–33)
MCHC RBC AUTO-ENTMCNC: 32.2 G/DL (ref 31.5–35.7)
MCV RBC AUTO: 94 FL (ref 79–97)
MONOCYTES # BLD AUTO: 1.06 10*3/MM3 (ref 0.1–0.9)
MONOCYTES NFR BLD AUTO: 9.6 % (ref 5–12)
NEUTROPHILS NFR BLD AUTO: 66.5 % (ref 42.7–76)
NEUTROPHILS NFR BLD AUTO: 7.31 10*3/MM3 (ref 1.7–7)
NRBC BLD AUTO-RTO: 0 /100 WBC (ref 0–0.2)
PLATELET # BLD AUTO: 179 10*3/MM3 (ref 140–450)
PMV BLD AUTO: 10.3 FL (ref 6–12)
POTASSIUM SERPL-SCNC: 4.1 MMOL/L (ref 3.5–5.2)
RBC # BLD AUTO: 3.64 10*6/MM3 (ref 4.14–5.8)
SODIUM SERPL-SCNC: 137 MMOL/L (ref 136–145)
WBC NRBC COR # BLD: 10.99 10*3/MM3 (ref 3.4–10.8)

## 2023-08-01 PROCEDURE — 63710000001 INSULIN LISPRO (HUMAN) PER 5 UNITS: Performed by: STUDENT IN AN ORGANIZED HEALTH CARE EDUCATION/TRAINING PROGRAM

## 2023-08-01 PROCEDURE — 85025 COMPLETE CBC W/AUTO DIFF WBC: CPT | Performed by: STUDENT IN AN ORGANIZED HEALTH CARE EDUCATION/TRAINING PROGRAM

## 2023-08-01 PROCEDURE — 36415 COLL VENOUS BLD VENIPUNCTURE: CPT | Performed by: STUDENT IN AN ORGANIZED HEALTH CARE EDUCATION/TRAINING PROGRAM

## 2023-08-01 PROCEDURE — 63710000001 INSULIN DETEMIR PER 5 UNITS: Performed by: INTERNAL MEDICINE

## 2023-08-01 PROCEDURE — 82948 REAGENT STRIP/BLOOD GLUCOSE: CPT

## 2023-08-01 PROCEDURE — 25010000002 ENOXAPARIN PER 10 MG: Performed by: STUDENT IN AN ORGANIZED HEALTH CARE EDUCATION/TRAINING PROGRAM

## 2023-08-01 PROCEDURE — 25010000002 CEFTRIAXONE PER 250 MG: Performed by: STUDENT IN AN ORGANIZED HEALTH CARE EDUCATION/TRAINING PROGRAM

## 2023-08-01 PROCEDURE — 99239 HOSP IP/OBS DSCHRG MGMT >30: CPT | Performed by: FAMILY MEDICINE

## 2023-08-01 PROCEDURE — 80048 BASIC METABOLIC PNL TOTAL CA: CPT | Performed by: STUDENT IN AN ORGANIZED HEALTH CARE EDUCATION/TRAINING PROGRAM

## 2023-08-01 PROCEDURE — 83735 ASSAY OF MAGNESIUM: CPT | Performed by: STUDENT IN AN ORGANIZED HEALTH CARE EDUCATION/TRAINING PROGRAM

## 2023-08-01 RX ORDER — AMOXICILLIN 500 MG/1
1000 CAPSULE ORAL 2 TIMES DAILY
Qty: 8 CAPSULE | Refills: 0 | Status: SHIPPED | OUTPATIENT
Start: 2023-08-02 | End: 2023-08-04

## 2023-08-01 RX ADMIN — INSULIN LISPRO 2 UNITS: 100 INJECTION, SOLUTION INTRAVENOUS; SUBCUTANEOUS at 08:14

## 2023-08-01 RX ADMIN — INSULIN DETEMIR 20 UNITS: 100 INJECTION, SOLUTION SUBCUTANEOUS at 08:14

## 2023-08-01 RX ADMIN — Medication 10 ML: at 08:15

## 2023-08-01 RX ADMIN — OXYBUTYNIN CHLORIDE 10 MG: 5 TABLET, EXTENDED RELEASE ORAL at 08:14

## 2023-08-01 RX ADMIN — CEFTRIAXONE SODIUM 1000 MG: 1 INJECTION, SOLUTION INTRAVENOUS at 06:07

## 2023-08-01 RX ADMIN — ASPIRIN 81 MG: 81 TABLET, COATED ORAL at 08:14

## 2023-08-01 RX ADMIN — LOSARTAN POTASSIUM 100 MG: 50 TABLET, FILM COATED ORAL at 08:15

## 2023-08-01 RX ADMIN — AMLODIPINE BESYLATE 5 MG: 5 TABLET ORAL at 08:15

## 2023-08-01 RX ADMIN — CARVEDILOL 12.5 MG: 12.5 TABLET, FILM COATED ORAL at 08:15

## 2023-08-01 RX ADMIN — LEVOTHYROXINE SODIUM 100 MCG: 0.1 TABLET ORAL at 08:15

## 2023-08-01 RX ADMIN — ENOXAPARIN SODIUM 40 MG: 100 INJECTION SUBCUTANEOUS at 08:14

## 2023-08-01 RX ADMIN — PANTOPRAZOLE SODIUM 40 MG: 40 TABLET, DELAYED RELEASE ORAL at 06:07

## 2023-08-01 RX ADMIN — INSULIN LISPRO 2 UNITS: 100 INJECTION, SOLUTION INTRAVENOUS; SUBCUTANEOUS at 11:52

## 2023-08-01 NOTE — CONSULTS
"Patient states that prior to being admitted to the hospital his FreeStyle Nina CGM sensor read HI, which means glucose is greater than 500 mg/dL. Patient states he proceeded to administer 28 units of Novolog, waited approximately 1 hour, checked his glucose again and it was 368, and he proceeded to administer another 24 units of Novolog.     Provided education regarding stacking insulin and not administering rapid acting insulin more often than every three hours to prevent severe hypoglycemia. Patient states that his blood glucose normally is never that high and he panicked. Discussed administering insulin, increasing water intake, and light exercise to reduce blood glucose. Patient states he \"will not make that mistake again.\"  "

## 2023-08-01 NOTE — DISCHARGE SUMMARY
[facility]         HOSPITALIST  DISCHARGE SUMMARY    Patient Name: Giles Donovan  : 1945  MRN: [id number]    Date of Admission: 2023  Date of Discharge: 2023  Primary Care Physician: Morena Masters MD    Consults       Date and Time Order Name Status Description    2023  6:12 AM Inpatient Hospitalist Consult              Active and Resolved Hospital Problems:  Acute metabolic encephalopathy likely secondary to UTI and hypoglycemia  E. coli urinary tract infection  Diabetes mellitus type 2, poorly controlled  Hypertension  Hyperlipidemia  Obstructive sleep apnea  Alcohol use  Elevated troponin without chest pain  Active Hospital Problems    Diagnosis POA    **Acute metabolic encephalopathy [G93.41] Yes      Resolved Hospital Problems   No resolved problems to display.       Hospital Course     Hospital Course:  Giles Donovan is a 77 y.o. male past medical history of chronic hypoxic respiratory failure on 2 L nasal cannula, hypertension, hyperlipidemia, carotid artery stenosis, poorly controlled type 2 diabetes, unhealthy alcohol use, obesity, MIAH with unclear adherence to BiPAP, depression/anxiety, prior diastolic heart failure who presents to the ER due to period of unresponsiveness.  Due to patient's altered mental status he is a poor historian and history supplemented by discussion with torres at the bedside.  Appears patient yesterday was urinating a lot and had glucoses in the 300s and may or may not have received more insulin than usual yesterday.  He was able to ambulate and had no issues throughout the day however last night after falling asleep his roby noted him to be minimally responsive to verbal and she was concerned that he was not responding even to sternal rub at which point she felt his chest and did not feel like he was breathing and at which point she started mouth-to-mouth and potentially CPR although he was on a recliner at the time  and it is unclear if she did full chest compressions.  She checked his sugar and it was in the 50s.  She was concern for a insulin, and thus notified EMS.  Upon EMSs arrival patient was already more awake and responding however did have a low blood pressure for which she was given a fluid bolus.  His glucose was in the 60s on their check and he was given oral glucose replacement with improvement in his mental status.  He was then brought to the ER for evaluation.  Upon arrival here he is afebrile and hemodynamically stable.  Lab work-up is revealing of a leukocytosis of 15,000, mildly elevated troponin at 28, hypokalemia of 3.4.  Normal lactate.  Anemia with hemoglobin 11.1.  Glucose checks while in the ER have gone from 62-1 06 with oral glucose replacement.  BNP is negative.  ABG showed chronic hypercapnia with compensation.  Chest x-ray showed bilateral infiltrates concerning for multifocal pneumonia and a CT of the head was negative.  Urinalysis showed extensive pyuria and there is concern for urinary tract infection.  Patient was given Rocephin and the hospitalist contacted for admission.  Long-acting insulin initially held and restarted at lower dosing as blood pressure stabilized.  Patient was able to relate that he had taken short acting insulin 25 units when his blood sugar reading read high.  Patient's blood sugar was still greater than 300 an hour later so he repeated insulin dosing at 23 units.  Patient instructed not to take more than 1 dose of short acting insulin in a 3-hour time.  Due to risk of hypoglycemia as he experienced.  Urine culture returned E. coli pansensitive.  Patient's mental status returned to baseline.  Leukocytosis improved.  Blood sugars remained stable.  Patient seen and evaluated on day of discharge and thought stable for discharge home to follow-up with his primary care provider in 1 to 2 weeks.        DISCHARGE Follow Up Recommendations for labs and diagnostics: As  above      Day of Discharge     Vital Signs:  Temp:  [98.1 øF (36.7 øC)-99.6 øF (37.6 øC)] 98.8 øF (37.1 øC)  Heart Rate:  [69-80] 71  Resp:  [20] 20  BP: (110-129)/(48-70) 125/67  Flow (L/min):  [2] 2  Physical Exam:   Gen. well-developed appearing stated age in no acute distress  HEENT: Normocephalic atraumatic moist membranes pupils equal round reactive light, no scleral icterus no conjunctival injection  Cardiovascular: regular rate and rhythm no murmurs rubs or gallops S1-S2, no lower extremity edema appreciated  Pulmonary: Clear to auscultation bilaterally no wheezes rales or rhonchi symmetric chest expansion, unlabored, no conversational dyspnea appreciated  Gastrointestinal: Soft nontender nondistended positive bowel sounds all 4 quadrants no rebound or guarding  Musculoskeletal: No clubbing cyanosis, warm and well-perfused, calves soft symmetric nontender bilaterally  Skin: Clean dry without rashs  Neuro: Cranial nerves II through XII intact grossly no sensorimotor deficits appreciated bilateral upper and lower extremities  Psych: Patient is calm cooperative and appropriate with exam not responding to internal stimuli  : No Gonzalez catheter no bladder distention no suprapubic tenderness      Discharge Details        Discharge Medications        New Medications        Instructions Start Date   amoxicillin 500 MG capsule  Commonly known as: AMOXIL   1,000 mg, Oral, 2 Times Daily   Start Date: August 2, 2023            Continue These Medications        Instructions Start Date   albuterol sulfate  (90 Base) MCG/ACT inhaler  Commonly known as: PROVENTIL HFA;VENTOLIN HFA;PROAIR HFA   2 puffs, Inhalation, Every 4 Hours PRN      aspirin EC 81 MG EC tablet  Generic drug: aspirin   81 mg, Oral, Daily      atorvastatin 80 MG tablet  Commonly known as: LIPITOR   80 mg, Oral, Daily      carvedilol 12.5 MG tablet  Commonly known as: COREG   12.5 mg, Oral, 2 Times Daily With Meals      cetirizine 10 MG  tablet  Commonly known as: ZyrTEC Allergy   10 mg, Oral, Daily      ezetimibe 10 MG tablet  Commonly known as: ZETIA   10 mg, Oral, Daily      felodipine 5 MG 24 hr tablet  Commonly known as: PLENDIL   Take 1 tablet by mouth Daily.      fluticasone-salmeterol 230-21 MCG/ACT inhaler  Commonly known as: ADVAIR HFA   2 puffs, Inhalation, 2 Times Daily      gabapentin 600 MG tablet  Commonly known as: NEURONTIN   600 mg, Oral, 2 Times Daily      insulin aspart 100 UNIT/ML injection  Commonly known as: novoLOG   24-26 Units, Subcutaneous, 3 Times Daily Before Meals      Lantus SoloStar 100 UNIT/ML injection pen  Generic drug: Insulin Glargine   Inject 44 Units under the skin into the appropriate area as directed Every Night.      levothyroxine 100 MCG tablet  Commonly known as: SYNTHROID, LEVOTHROID   100 mcg, Oral, Daily      losartan 100 MG tablet  Commonly known as: COZAAR   100 mg, Oral, Daily      metFORMIN 500 MG tablet  Commonly known as: GLUCOPHAGE   500 mg, Oral, 2 Times Daily With Meals      olopatadine 0.1 % ophthalmic solution  Commonly known as: PATANOL   1 drop, Both Eyes, Daily PRN      omeprazole 20 MG capsule  Commonly known as: priLOSEC   Take 1 capsule by mouth Daily.      oxybutynin XL 10 MG 24 hr tablet  Commonly known as: DITROPAN-XL   10 mg, Oral, Daily      PARoxetine 40 MG tablet  Commonly known as: PAXIL   40 mg, Oral, Every Evening      prazosin 1 MG capsule  Commonly known as: MINIPRESS   1 mg, Oral, Daily      pseudoephedrine-guaifenesin  MG per 12 hr tablet  Commonly known as: MUCINEX D   1 tablet, Oral, Every 12 Hours      traZODone 100 MG tablet  Commonly known as: DESYREL   100 mg, Oral, Nightly               Allergies   Allergen Reactions    Latex Rash and Anaphylaxis    Latex, Natural Rubber Itching       Discharge Disposition:      Diet:  Hospital:  Diet Order   Procedures    Diet: Cardiac Diets, Diabetic Diets; Healthy Heart (2-3 Na+); Consistent Carbohydrate; Texture: Regular  Texture (IDDSI 7); Fluid Consistency: Thin (IDDSI 0)       Discharge Activity:   Activity Instructions       Gradually Increase Activity Until at Pre-Hospitalization Level              CODE STATUS:  Code Status and Medical Interventions:   Ordered at: 07/30/23 0648     Code Status (Patient has no pulse and is not breathing):    CPR (Attempt to Resuscitate)     Medical Interventions (Patient has pulse or is breathing):    Full Support     Release to patient:    Routine Release         Future Appointments   Date Time Provider Department Center   10/24/2023  1:15 PM Aisha Garcia APRN [facility] PCC ETW [facility]   5/21/2024  1:15 PM Tabatha Oconnell APRN [facility] U ETRING [facility]       Additional Instructions for the Follow-ups that You Need to Schedule       Discharge Follow-up with PCP   As directed       Currently Documented PCP:    Morena Masters MD    PCP Phone Number:    [phone]     Follow Up Details: Hospital discharge follow up 1 to 2 weeks altered mental status secondary to hypoglycemia due to inappropriate insulin usage, urinary tract infection                Pertinent  and/or Most Recent Results     PROCEDURES:   None    LAB RESULTS:      Lab 08/01/23  0538 07/31/23  0524 07/30/23  0903 07/30/23  0655 07/30/23  0640 07/30/23  0227   WBC 10.99* 14.43*  --   --   --  15.64*   HEMOGLOBIN 11.0* 12.0*  --   --   --  11.1*   HEMATOCRIT 34.2* 35.8*  --   --   --  33.1*   PLATELETS 179 167  --   --   --  150   NEUTROS ABS 7.31* 11.53*  --   --   --  11.75*   IMMATURE GRANS (ABS) 0.03 0.10*  --   --   --  0.06*   LYMPHS ABS 2.24 1.41  --   --   --  1.92   MONOS ABS 1.06* 1.09*  --   --   --  1.72*   EOS ABS 0.30 0.25  --   --   --  0.14   MCV 94.0 92.3  --   --   --  91.7   PROCALCITONIN  --   --   --  0.15  --   --    LACTATE  --   --   --  1.4  --   --    LACTATE, ARTERIAL  --   --   --   --  1.28  --    PROTIME  --   --  14.7  --   --   --          Lab 08/01/23  0538 07/31/23  0524 07/30/23  0655 07/30/23  0640  07/30/23 0227   SODIUM 137 133*  --   --  140   SODIUM, ARTERIAL  --   --   --  138.3  --    POTASSIUM 4.1 4.0  --   --  3.4*   CHLORIDE 100 97*  --   --  101   CO2 27.2 22.9  --   --  29.2*   ANION GAP 9.8 13.1  --   --  9.8   BUN 13 17  --   --  13   CREATININE 0.86 1.10  --   --  1.27   EGFR 89.7 69.6  --   --  58.6*   GLUCOSE 161* 372*  --   --  55*   GLUCOSE, ARTERIAL  --   --   --  92  --    CALCIUM 9.3 9.1  --   --  9.1   IONIZED CALCIUM  --   --   --  1.12*  --    MAGNESIUM 1.9 1.6  --   --  1.7   TSH  --   --  1.050  --   --          Lab 07/30/23 0227   TOTAL PROTEIN 7.3   ALBUMIN 4.1   GLOBULIN 3.2   ALT (SGPT) 19   AST (SGOT) 23   BILIRUBIN 0.5   ALK PHOS 74         Lab 07/30/23  0959 07/30/23  0903 07/30/23  0655 07/30/23  0441 07/30/23 0227   PROBNP  --   --   --  1,657.0  --    HSTROP T 25*  --  26* 28* 30*   PROTIME  --  14.7  --   --   --    INR  --  1.14  --   --   --              Lab 07/30/23 0227   VITAMIN B 12 988*         Lab 07/30/23  0640   PH, ARTERIAL 7.392   PCO2, ARTERIAL 48.4*   PO2 .7*   O2 SATURATION ART 98.0   HCO3 ART 28.8*   BASE EXCESS ART 3.2*   CARBOXYHEMOGLOBIN 0.5     Brief Urine Lab Results  (Last result in the past 365 days)        Color   Clarity   Blood   Leuk Est   Nitrite   Protein   CREAT   Urine HCG        07/30/23 0511 Yellow   Cloudy   Small (1+)   Large (3+)   Negative   30 mg/dL (1+)                 Microbiology Results (last 10 days)       Procedure Component Value - Date/Time    Respiratory Panel PCR w/COVID-19(SARS-CoV-2) FRANK/ISABEL/JOHANNA/PAD/COR/MAD/GREGORIA In-House, NP Swab in UTM/VTM, 3-4 HR TAT - Swab, Nasopharynx [[id number]]  (Normal) Collected: 07/30/23 0740    Lab Status: Final result Specimen: Swab from Nasopharynx Updated: 07/30/23 0930     ADENOVIRUS, PCR Not Detected     Coronavirus 229E Not Detected     Coronavirus HKU1 Not Detected     Coronavirus NL63 Not Detected     Coronavirus OC43 Not Detected     COVID19 Not Detected     Human Metapneumovirus  Not Detected     Human Rhinovirus/Enterovirus Not Detected     Influenza A PCR Not Detected     Influenza B PCR Not Detected     Parainfluenza Virus 1 Not Detected     Parainfluenza Virus 2 Not Detected     Parainfluenza Virus 3 Not Detected     Parainfluenza Virus 4 Not Detected     RSV, PCR Not Detected     Bordetella pertussis pcr Not Detected     Bordetella parapertussis PCR Not Detected     Chlamydophila pneumoniae PCR Not Detected     Mycoplasma pneumo by PCR Not Detected    Narrative:      In the setting of a positive respiratory panel with a viral infection PLUS a negative procalcitonin without other underlying concern for bacterial infection, consider observing off antibiotics or discontinuation of antibiotics and continue supportive care. If the respiratory panel is positive for atypical bacterial infection (Bordetella pertussis, Chlamydophila pneumoniae, or Mycoplasma pneumoniae), consider antibiotic de-escalation to target atypical bacterial infection.    Blood Culture - Blood, Arm, Left [[id number]]  (Normal) Collected: 07/30/23 0655    Lab Status: Preliminary result Specimen: Blood from Arm, Left Updated: 08/01/23 0715     Blood Culture No growth at 2 days    Blood Culture - Blood, Arm, Left [[id number]]  (Normal) Collected: 07/30/23 0655    Lab Status: Preliminary result Specimen: Blood from Arm, Left Updated: 08/01/23 0715     Blood Culture No growth at 2 days    Urine Culture - Urine, Urine, Clean Catch [[id number]]  (Abnormal)  (Susceptibility) Collected: 07/30/23 0511    Lab Status: Final result Specimen: Urine, Clean Catch Updated: 08/01/23 1157     Urine Culture >100,000 CFU/mL Escherichia coli    Narrative:      Colonization of the urinary tract without infection is common. Treatment is discouraged unless the patient is symptomatic, pregnant, or undergoing an invasive urologic procedure.    Susceptibility        Escherichia coli      LAYA      Ampicillin Susceptible      Ampicillin + Sulbactam  Susceptible      Cefazolin Susceptible      Cefepime Susceptible      Ceftazidime Susceptible      Ceftriaxone Susceptible      Gentamicin Susceptible      Levofloxacin Susceptible      Nitrofurantoin Susceptible      Piperacillin + Tazobactam Susceptible      Trimethoprim + Sulfamethoxazole Susceptible                                   CT Head Without Contrast    Result Date: 7/30/2023  Impression:  No acute brain abnormality is seen.    Please note that portions of this note were completed with a voice recognition program.  LEXX GONZALEZ JR, MD       Electronically Signed and Approved By: LEXX GONZALEZ JR, MD on 7/30/2023 at 5:34              XR Chest 1 View    Result Date: 7/30/2023  Impression:  New bilateral infiltrates are seen.  The findings may represent infectious multifocal pneumonia.  Pulmonary edema is possible.       Please note that portions of this note were completed with a voice recognition program.  LEXX GONZALEZ JR, MD       Electronically Signed and Approved By: LEXX GONZALEZ JR, MD on 7/30/2023 at 5:07              XR Spine Lumbar 4+ View (In Office)    Result Date: 7/11/2023  Impression:   1. Mild-to-moderate lumbar spondylosis without acute osseous abnormality. 2. Right hip prosthesis partially seen.     ZOILA HUSSEIN MD       Electronically Signed and Approved By: ZOILA HUSSEIN MD on 7/11/2023 at 10:38                      Results for orders placed during the hospital encounter of 04/07/23    Adult Transthoracic Echo Complete W/ Cont if Necessary Per Protocol    Interpretation Summary    Left ventricular systolic function is normal. Calculated left ventricular EF = 51%    Left ventricular diastolic function is consistent with (grade I) impaired relaxation.    There is calcification of the aortic valve.      Labs Pending at Discharge:  Pending Labs       Order Current Status    Blood Culture - Blood, Arm, Left Preliminary result    Blood Culture - Blood, Arm, Left Preliminary result               Time spent on Discharge including face to face service: Greater than 35 minutes    Electronically signed by Jonah Aleman MD, 08/01/23, 1:48 PM EDT.

## 2023-08-01 NOTE — PLAN OF CARE
Problem: Adult Inpatient Plan of Care  Goal: Plan of Care Review  Outcome: Met  Flowsheets (Taken 8/1/2023 1503)  Plan of Care Reviewed With:   patient   significant other  Outcome Evaluation: Patient on 2L NC. No complaints of pain. Patient is being discharged home.   Goal Outcome Evaluation:  Plan of Care Reviewed With: patient, significant other           Outcome Evaluation: Patient on 2L NC. No complaints of pain. Patient is being discharged home.

## 2023-08-01 NOTE — PLAN OF CARE
Problem: Fall Injury Risk  Goal: Absence of Fall and Fall-Related Injury  Outcome: Ongoing, Progressing  Intervention: Identify and Manage Contributors  Recent Flowsheet Documentation  Taken 7/31/2023 2111 by Diana Gage RN  Medication Review/Management: medications reviewed  Self-Care Promotion: independence encouraged  Intervention: Promote Injury-Free Environment  Recent Flowsheet Documentation  Taken 7/31/2023 2111 by Diana Gage, RN  Safety Promotion/Fall Prevention: safety round/check completed   Goal Outcome Evaluation:

## 2023-08-01 NOTE — CONSULTS
08/01/23 1036   Coping/Psychosocial   Additional Documentation Spiritual Care (Group)   Spiritual Care   Use of Spiritual Resources spirituality for coping, indicated strong use of   Spiritual Care Source  initiative   Spiritual Care Follow-Up follow-up, none required as presently assessed   Response to Spiritual Care receptive of support   Spiritual Care Interventions supportive conversation provided;prayer support provided   Spiritual Care Visit Type initial   Spiritual Care Request coping/stress of illness support;spiritual/moral support   Receptivity to Spiritual Care visit welcomed   At time of visit pt is on 4MTU

## 2023-08-02 ENCOUNTER — TELEPHONE (OUTPATIENT)
Dept: INTERNAL MEDICINE | Facility: CLINIC | Age: 78
End: 2023-08-02

## 2023-08-02 ENCOUNTER — TRANSITIONAL CARE MANAGEMENT TELEPHONE ENCOUNTER (OUTPATIENT)
Dept: CALL CENTER | Facility: HOSPITAL | Age: 78
End: 2023-08-02
Payer: MEDICARE

## 2023-08-02 NOTE — TELEPHONE ENCOUNTER
Just FYI: Patient recently discharged from hospital. Home health will be sending an order for PT and OT to resume his care at home.

## 2023-08-03 ENCOUNTER — TRANSITIONAL CARE MANAGEMENT TELEPHONE ENCOUNTER (OUTPATIENT)
Dept: CALL CENTER | Facility: HOSPITAL | Age: 78
End: 2023-08-03
Payer: MEDICARE

## 2023-08-04 LAB
BACTERIA SPEC AEROBE CULT: NORMAL
BACTERIA SPEC AEROBE CULT: NORMAL

## 2023-08-09 ENCOUNTER — OFFICE VISIT (OUTPATIENT)
Dept: INTERNAL MEDICINE | Facility: CLINIC | Age: 78
End: 2023-08-09
Payer: MEDICARE

## 2023-08-09 VITALS
TEMPERATURE: 97.1 F | HEIGHT: 68 IN | OXYGEN SATURATION: 95 % | DIASTOLIC BLOOD PRESSURE: 52 MMHG | BODY MASS INDEX: 32.46 KG/M2 | WEIGHT: 214.2 LBS | HEART RATE: 97 BPM | SYSTOLIC BLOOD PRESSURE: 90 MMHG

## 2023-08-09 DIAGNOSIS — E11.9 CONTROLLED TYPE 2 DIABETES MELLITUS WITHOUT COMPLICATION, WITHOUT LONG-TERM CURRENT USE OF INSULIN: Primary | ICD-10-CM

## 2023-08-09 DIAGNOSIS — R82.90 BAD ODOR OF URINE: ICD-10-CM

## 2023-08-09 DIAGNOSIS — I10 PRIMARY HYPERTENSION: ICD-10-CM

## 2023-08-09 LAB
BILIRUB UR QL STRIP: NEGATIVE
CLARITY UR: CLEAR
COLOR UR: YELLOW
GLUCOSE UR STRIP-MCNC: ABNORMAL MG/DL
HGB UR QL STRIP.AUTO: NEGATIVE
KETONES UR QL STRIP: NEGATIVE
LEUKOCYTE ESTERASE UR QL STRIP.AUTO: NEGATIVE
NITRITE UR QL STRIP: NEGATIVE
PH UR STRIP.AUTO: 6 [PH] (ref 5–8)
PROT UR QL STRIP: ABNORMAL
SP GR UR STRIP: 1.01 (ref 1–1.03)
UROBILINOGEN UR QL STRIP: ABNORMAL

## 2023-08-09 PROCEDURE — 87086 URINE CULTURE/COLONY COUNT: CPT

## 2023-08-09 PROCEDURE — 81003 URINALYSIS AUTO W/O SCOPE: CPT | Performed by: INTERNAL MEDICINE

## 2023-08-09 RX ORDER — GUAIFENESIN AND DEXTROMETHORPHAN HYDROBROMIDE 600; 30 MG/1; MG/1
2 TABLET, EXTENDED RELEASE ORAL 2 TIMES DAILY PRN
Qty: 30 EACH | Refills: 3 | Status: SHIPPED | OUTPATIENT
Start: 2023-08-09

## 2023-08-09 RX ORDER — CARVEDILOL 6.25 MG/1
6.25 TABLET ORAL 2 TIMES DAILY WITH MEALS
Qty: 60 TABLET | Refills: 3 | Status: SHIPPED | OUTPATIENT
Start: 2023-08-09 | End: 2023-09-08

## 2023-08-09 RX ORDER — GUAIFENESIN, PSEUDOEPHEDRINE HYDROCHLORIDE 600; 60 MG/1; MG/1
1 TABLET, EXTENDED RELEASE ORAL EVERY 12 HOURS
Qty: 180 TABLET | Refills: 0 | Status: CANCELLED | OUTPATIENT
Start: 2023-08-09 | End: 2023-11-07

## 2023-08-09 NOTE — PROGRESS NOTES
Transitional Care Follow Up Visit  Subjective     Giles Donovan is a 77 y.o. male who presents for a transitional care management visit.    Within 48 business hours after discharge our office contacted him via telephone to coordinate his care and needs.      I reviewed and discussed the details of that call along with the discharge summary, hospital problems, inpatient lab results, inpatient diagnostic studies, and consultation reports with Giles.     Current outpatient and discharge medications have been reconciled for the patient.  Reviewed by: ANNABEL Ochoa     Pt presents to office with florentino, for follow up from Hospital Admission due to hypoglycemic episode.  Patient notes he administered too much NovoLog which lead to hypoglycemic episode.  During hospital admission patient was also diagnosed with UTI.  Patient completed amoxicillin.  Patient would like urine checked, still notices strong odor. Denies any other UTI symptoms     HTN- no at home BP readings. Felodipine 5 mg daily, losartan 100 mg daily, carvedilol 12.5 mg BID. Reports feeling light headed or dizziness only when pt stands up really quickly. Denies chest pain,dyspnea, abdominal pain     DM- This morning at home glucose level slightly over 300.  Patient uses short acting insulin about once or twice a day, 24-26 units. Glargine 44 units at night. Morning glucose readings vary . Had two episodes where it increased to 300 after eating, however patient did not administer NovoLog prior to eating.  Average glucose 194 per free style kallie the last 3 months   Patient has not seen endocrinology in a while, however speaks with endocrinologist once a month through the phone.      COPD- breathing improving.             8/1/2023     6:49 PM   Date of TCM Phone Call   [facility]   Date of Admission 7/30/2023   Date of Discharge 8/1/2023   Discharge Disposition Home-Health Care Svc     Risk for Readmission  (LACE) Score: 10 (8/1/2023  6:00 AM)      History of Present Illness   Course During Hospital Stay: 07/30-08/01/2023       The following portions of the patient's history were reviewed and updated as appropriate: allergies, current medications, past medical history, past social history, past surgical history, and problem list.    Review of Systems    Objective   Physical Exam  Constitutional:       Appearance: Normal appearance.   HENT:      Head: Normocephalic and atraumatic.      Right Ear: External ear normal.      Left Ear: External ear normal.      Nose: Nose normal.      Mouth/Throat:      Mouth: Mucous membranes are moist.      Pharynx: Oropharynx is clear.   Eyes:      Conjunctiva/sclera: Conjunctivae normal.   Cardiovascular:      Rate and Rhythm: Normal rate and regular rhythm.      Pulses: Normal pulses.      Heart sounds: Normal heart sounds.   Pulmonary:      Effort: Pulmonary effort is normal.      Breath sounds: Normal breath sounds.   Skin:     General: Skin is warm and dry.   Neurological:      Mental Status: He is alert and oriented to person, place, and time.   Psychiatric:         Mood and Affect: Mood normal.         Behavior: Behavior normal.         Thought Content: Thought content normal.         Judgment: Judgment normal.       Assessment & Plan   Problems Addressed this Visit          Cardiac and Vasculature    Hypertension     Patient has had multiple low blood pressure readings, reports worsening dizziness/lightheadedness with standing.  Will decrease metoprolol dose to 6.25 mg twice daily.  Continue to monitor blood pressure closely at home.  Would like for patient to follow-up 4-6 weeks.         Relevant Medications    carvedilol (COREG) 6.25 MG tablet       Endocrine and Metabolic    Controlled type 2 diabetes mellitus without complication, without long-term current use of insulin - Primary     -Discussed with patient varying NovoLog dose 15-26 units depending on blood glucose prior to  meals.  Discussed with patient adjusting dose based on meal.  Discussed what to do with hypoglycemic episodes.  Informed goal average glucose range around 160.         Relevant Orders    Ambulatory Referral to Case Management Disease Education (Diabetic Management)     Other Visit Diagnoses       Bad odor of urine        Urinalysis unremarkable, will send for culture    Relevant Orders    Urinalysis With Culture If Indicated - Urine, Clean Catch (Completed)    Urine Culture - Urine, Urine, Clean Catch          Diagnoses         Codes Comments    Controlled type 2 diabetes mellitus without complication, without long-term current use of insulin    -  Primary ICD-10-CM: E11.9  ICD-9-CM: 250.00     Bad odor of urine     ICD-10-CM: R82.90  ICD-9-CM: 791.9 Urinalysis unremarkable, will send for culture    Primary hypertension     ICD-10-CM: I10  ICD-9-CM: 401.9

## 2023-08-09 NOTE — ASSESSMENT & PLAN NOTE
-Discussed with patient varying NovoLog dose 15-26 units depending on blood glucose prior to meals.  Discussed with patient adjusting dose based on meal.  Discussed what to do with hypoglycemic episodes.  Informed goal average glucose range around 160.

## 2023-08-09 NOTE — ASSESSMENT & PLAN NOTE
Patient has had multiple low blood pressure readings, reports worsening dizziness/lightheadedness with standing.  Will decrease metoprolol dose to 6.25 mg twice daily.  Continue to monitor blood pressure closely at home.  Would like for patient to follow-up 4-6 weeks.

## 2023-08-10 ENCOUNTER — REFERRAL TRIAGE (OUTPATIENT)
Dept: CASE MANAGEMENT | Facility: OTHER | Age: 78
End: 2023-08-10
Payer: MEDICARE

## 2023-08-10 LAB — BACTERIA SPEC AEROBE CULT: NO GROWTH

## 2023-08-15 ENCOUNTER — OUTSIDE FACILITY SERVICE (OUTPATIENT)
Dept: INTERNAL MEDICINE | Facility: CLINIC | Age: 78
End: 2023-08-15
Payer: MEDICARE

## 2023-08-17 ENCOUNTER — PATIENT OUTREACH (OUTPATIENT)
Dept: CASE MANAGEMENT | Facility: OTHER | Age: 78
End: 2023-08-17
Payer: MEDICARE

## 2023-08-17 DIAGNOSIS — E11.9 INSULIN DEPENDENT TYPE 2 DIABETES MELLITUS: Primary | ICD-10-CM

## 2023-08-17 DIAGNOSIS — Z79.4 INSULIN DEPENDENT TYPE 2 DIABETES MELLITUS: Primary | ICD-10-CM

## 2023-08-17 NOTE — OUTREACH NOTE
"AMBULATORY CASE MANAGEMENT NOTE    Name and Relationship of Patient/Support Person: Giles Donovan - Self    Patient Outreach    Provider Referral    - T2DM    - HTN    Outreached to patient regarding the Chronic Case Management Program, patient states he is not interested right now, [facility] \"counselor\" there that assists with managing his diabetes.          Estefanía HURST  Ambulatory Case Management    8/17/2023, 12:20 EDT  "

## 2023-09-06 ENCOUNTER — OFFICE VISIT (OUTPATIENT)
Dept: INTERNAL MEDICINE | Facility: CLINIC | Age: 78
End: 2023-09-06
Payer: MEDICARE

## 2023-09-06 VITALS
BODY MASS INDEX: 31.49 KG/M2 | WEIGHT: 207.8 LBS | SYSTOLIC BLOOD PRESSURE: 109 MMHG | HEART RATE: 78 BPM | OXYGEN SATURATION: 98 % | HEIGHT: 68 IN | TEMPERATURE: 98 F | DIASTOLIC BLOOD PRESSURE: 72 MMHG

## 2023-09-06 DIAGNOSIS — N40.1 BENIGN PROSTATIC HYPERPLASIA WITH LOWER URINARY TRACT SYMPTOMS, SYMPTOM DETAILS UNSPECIFIED: ICD-10-CM

## 2023-09-06 DIAGNOSIS — J41.1 MUCOPURULENT CHRONIC BRONCHITIS: ICD-10-CM

## 2023-09-06 DIAGNOSIS — N52.9 ERECTILE DYSFUNCTION, UNSPECIFIED ERECTILE DYSFUNCTION TYPE: ICD-10-CM

## 2023-09-06 DIAGNOSIS — R26.81 UNSTEADY GAIT WHEN WALKING: ICD-10-CM

## 2023-09-06 DIAGNOSIS — E11.9 CONTROLLED TYPE 2 DIABETES MELLITUS WITHOUT COMPLICATION, WITHOUT LONG-TERM CURRENT USE OF INSULIN: Primary | ICD-10-CM

## 2023-09-06 RX ORDER — TADALAFIL 10 MG/1
10 TABLET ORAL DAILY PRN
Qty: 90 TABLET | Refills: 1 | Status: ON HOLD | OUTPATIENT
Start: 2023-09-06

## 2023-09-06 NOTE — PROGRESS NOTES
"Chief Complaint  Controlled type 2 diabetes mellitus without complication, w (4 week f/u-BS this morning 143. Blood Sugar has been ranging from . Pt states that he fell twice on Sunday with no injuries. Pt was at the park when his legs were shaky and second time he was when walking the icebox with using his walker.) and Depression (Pt states that he depressed d/t not being able to do things on his own. He use to doing things on his own. Pt came out of [employer] and then he had crazy look on his face and stated that he wanted to kill somebody or anybody. Pt not sure why he said but he doesn't to kill anybody.)    Subjective    Giles Donovan presents to [facility]  History of Present Illness    States that he has been very depressed  PT and OT and nursing has been working with him  He has been falling a little, he notices that his leg starts jerking and then he feels like he is going to fall; and he will fall unless he has his walker    [facility] that [facility] [place] [facility]  He also [facility]  He just [facility] but unsure of the results, [facility]     Monitoring sugars well his meter is helping him signifcantly    Still having significant BPH symptoms as well as some erectile dysfunction    Objective   Vital Signs:   Vitals:    09/06/23 1336   BP: 109/72   BP Location: Right arm   Patient Position: Sitting   Cuff Size: Adult   Pulse: 78   Temp: 98 °F (36.7 °C)   TempSrc: Temporal   SpO2: 98%  Comment: oxygen 2L   Weight: 94.3 kg (207 lb 12.8 oz)   Height: 171.5 cm (67.5\")   PainSc: 7  Comment: twich when walking       Wt Readings from Last 3 Encounters:   09/06/23 94.3 kg (207 lb 12.8 oz)   08/09/23 97.2 kg (214 lb 3.2 oz)   07/30/23 99.6 kg (219 lb 9.3 oz)     BP Readings from Last 3 Encounters:   09/06/23 109/72   08/09/23 90/52   08/01/23 125/67 "         Physical Exam  Vitals reviewed.   Constitutional:       Appearance: Normal appearance. He is well-developed.   HENT:      Head: Normocephalic and atraumatic.      Right Ear: External ear normal.      Left Ear: External ear normal.   Eyes:      Conjunctiva/sclera: Conjunctivae normal.      Pupils: Pupils are equal, round, and reactive to light.   Cardiovascular:      Rate and Rhythm: Normal rate and regular rhythm.      Heart sounds: No murmur heard.    No friction rub. No gallop.   Pulmonary:      Effort: Pulmonary effort is normal.      Breath sounds: Normal breath sounds. No wheezing or rhonchi.      Comments: Wearing oxygen currently  Musculoskeletal:      Comments: Has walker and cane for ambulation   Skin:     General: Skin is warm and dry.   Neurological:      Mental Status: He is alert and oriented to person, place, and time.   Psychiatric:         Mood and Affect: Affect normal.         Behavior: Behavior normal.         Thought Content: Thought content normal.        Result Review :  The following data was reviewed by: Morena Masters MD on 09/06/2023:      Procedures        Assessment and Plan   Diagnoses and all orders for this visit:    1. Controlled type 2 diabetes mellitus without complication, without long-term current use of insulin (Primary)  Comments:  Sugars have been fairly stable we will continue to monitor    2. Benign prostatic hyperplasia with lower urinary tract symptoms, symptom details unspecified  Comments:  Would like to try Cialis    3. Unsteady gait when walking  Comments:  Doing better continue to work with PT and OT    4. Mucopurulent chronic bronchitis  Comments:  Breathing much better currently continue current medication    5. Erectile dysfunction, unspecified erectile dysfunction type    Other orders  -     tadalafil (Cialis) 10 MG tablet; Take 1 tablet by mouth Daily As Needed for Erectile Dysfunction.  Dispense: 90 tablet; Refill: 1          FOLLOW UP  Return in  about 3 months (around 12/6/2023).  Patient was given instructions and counseling regarding his condition or for health maintenance advice. Please see specific information pulled into the AVS if appropriate.       Morena Masters MD  09/13/23  07:24 EDT    CURRENT & DISCONTINUED MEDICATIONS  Current Outpatient Medications   Medication Instructions    albuterol sulfate  (90 Base) MCG/ACT inhaler 2 puffs, Inhalation, Every 4 Hours PRN    aspirin EC 81 mg, Oral, Daily    atorvastatin (LIPITOR) 80 mg, Oral, Daily    carvedilol (COREG) 6.25 mg, Oral, 2 Times Daily With Meals    cetirizine (ZYRTEC ALLERGY) 10 mg, Oral, Daily    ezetimibe (ZETIA) 10 mg, Oral, Daily    felodipine (PLENDIL) 5 MG 24 hr tablet Take 1 tablet by mouth Daily.    fluticasone-salmeterol (ADVAIR HFA) 230-21 MCG/ACT inhaler 2 puffs, Inhalation, 2 Times Daily    gabapentin (NEURONTIN) 600 mg, Oral, 2 Times Daily    insulin aspart (NOVOLOG) 24-26 Units, Subcutaneous, 3 Times Daily Before Meals    Insulin Glargine (Lantus SoloStar) 100 UNIT/ML injection pen Inject 44 Units under the skin into the appropriate area as directed Every Night.    levothyroxine (SYNTHROID, LEVOTHROID) 100 mcg, Oral, Daily    losartan (COZAAR) 100 mg, Oral, Daily    metFORMIN (GLUCOPHAGE) 500 mg, Oral, 2 Times Daily With Meals    olopatadine (PATANOL) 0.1 % ophthalmic solution 1 drop, Both Eyes, Daily PRN    omeprazole (priLOSEC) 20 MG capsule Take 1 capsule by mouth Daily.    oxybutynin XL (DITROPAN-XL) 10 mg, Oral, Daily    PARoxetine (PAXIL) 40 mg, Oral, Every Evening    tadalafil (CIALIS) 10 mg, Oral, Daily PRN    traZODone (DESYREL) 100 mg, Oral, Nightly       Medications Discontinued During This Encounter   Medication Reason    pseudoephedrine-guaifenesin (MUCINEX D)  MG per 12 hr tablet     guaifenesin-dextromethorphan (MUCINEX DM)  MG tablet sustained-release 12 hour tablet     prazosin (MINIPRESS) 1 MG capsule

## 2023-09-13 PROBLEM — A41.9 SEPSIS DUE TO PNEUMONIA: Status: RESOLVED | Noted: 2023-04-07 | Resolved: 2023-09-13

## 2023-09-13 PROBLEM — J18.9 MULTIFOCAL PNEUMONIA: Status: RESOLVED | Noted: 2023-04-07 | Resolved: 2023-09-13

## 2023-09-13 PROBLEM — Z79.4 INSULIN DEPENDENT TYPE 2 DIABETES MELLITUS: Status: RESOLVED | Noted: 2023-04-07 | Resolved: 2023-09-13

## 2023-09-13 PROBLEM — G93.41 ACUTE METABOLIC ENCEPHALOPATHY: Status: RESOLVED | Noted: 2023-07-30 | Resolved: 2023-09-13

## 2023-09-13 PROBLEM — Z00.00 ENCOUNTER FOR ANNUAL WELLNESS EXAM IN MEDICARE PATIENT: Status: RESOLVED | Noted: 2023-06-06 | Resolved: 2023-09-13

## 2023-09-13 PROBLEM — J96.01 ACUTE RESPIRATORY FAILURE WITH HYPOXIA: Status: RESOLVED | Noted: 2023-04-07 | Resolved: 2023-09-13

## 2023-09-13 PROBLEM — J18.9 SEPSIS DUE TO PNEUMONIA: Status: RESOLVED | Noted: 2023-04-07 | Resolved: 2023-09-13

## 2023-09-13 PROBLEM — K21.9 ESOPHAGEAL REFLUX: Status: RESOLVED | Noted: 2021-12-21 | Resolved: 2023-09-13

## 2023-09-13 PROBLEM — E11.9 DM (DIABETES MELLITUS): Status: RESOLVED | Noted: 2023-06-06 | Resolved: 2023-09-13

## 2023-09-13 PROBLEM — E11.9 INSULIN DEPENDENT TYPE 2 DIABETES MELLITUS: Status: RESOLVED | Noted: 2023-04-07 | Resolved: 2023-09-13

## 2023-09-16 ENCOUNTER — APPOINTMENT (OUTPATIENT)
Dept: GENERAL RADIOLOGY | Facility: HOSPITAL | Age: 78
DRG: 309 | End: 2023-09-16
Payer: MEDICARE

## 2023-09-16 ENCOUNTER — HOSPITAL ENCOUNTER (INPATIENT)
Facility: HOSPITAL | Age: 78
LOS: 1 days | Discharge: HOME HEALTH | DRG: 309 | End: 2023-09-19
Attending: EMERGENCY MEDICINE | Admitting: FAMILY MEDICINE
Payer: MEDICARE

## 2023-09-16 DIAGNOSIS — Z78.9 DECREASED ACTIVITIES OF DAILY LIVING (ADL): ICD-10-CM

## 2023-09-16 DIAGNOSIS — R55 SYNCOPE AND COLLAPSE: Primary | ICD-10-CM

## 2023-09-16 DIAGNOSIS — R55 SYNCOPE, UNSPECIFIED SYNCOPE TYPE: ICD-10-CM

## 2023-09-16 DIAGNOSIS — R26.2 DIFFICULTY IN WALKING: ICD-10-CM

## 2023-09-16 LAB
ALBUMIN SERPL-MCNC: 4 G/DL (ref 3.5–5.2)
ALBUMIN/GLOB SERPL: 1.3 G/DL
ALP SERPL-CCNC: 56 U/L (ref 39–117)
ALT SERPL W P-5'-P-CCNC: 18 U/L (ref 1–41)
ANION GAP SERPL CALCULATED.3IONS-SCNC: 11.4 MMOL/L (ref 5–15)
AST SERPL-CCNC: 27 U/L (ref 1–40)
BASOPHILS # BLD AUTO: 0.03 10*3/MM3 (ref 0–0.2)
BASOPHILS NFR BLD AUTO: 0.3 % (ref 0–1.5)
BILIRUB SERPL-MCNC: 0.5 MG/DL (ref 0–1.2)
BUN SERPL-MCNC: 19 MG/DL (ref 8–23)
BUN/CREAT SERPL: 11.9 (ref 7–25)
CALCIUM SPEC-SCNC: 9.5 MG/DL (ref 8.6–10.5)
CHLORIDE SERPL-SCNC: 101 MMOL/L (ref 98–107)
CO2 SERPL-SCNC: 28.6 MMOL/L (ref 22–29)
CREAT SERPL-MCNC: 1.6 MG/DL (ref 0.76–1.27)
DEPRECATED RDW RBC AUTO: 45 FL (ref 37–54)
EGFRCR SERPLBLD CKD-EPI 2021: 44.4 ML/MIN/1.73
EOSINOPHIL # BLD AUTO: 0.16 10*3/MM3 (ref 0–0.4)
EOSINOPHIL NFR BLD AUTO: 1.9 % (ref 0.3–6.2)
ERYTHROCYTE [DISTWIDTH] IN BLOOD BY AUTOMATED COUNT: 13.2 % (ref 12.3–15.4)
GLOBULIN UR ELPH-MCNC: 3 GM/DL
GLUCOSE BLDC GLUCOMTR-MCNC: 157 MG/DL (ref 70–99)
GLUCOSE SERPL-MCNC: 181 MG/DL (ref 65–99)
HCT VFR BLD AUTO: 33.3 % (ref 37.5–51)
HGB BLD-MCNC: 11.2 G/DL (ref 13–17.7)
HOLD SPECIMEN: NORMAL
HOLD SPECIMEN: NORMAL
IMM GRANULOCYTES # BLD AUTO: 0.02 10*3/MM3 (ref 0–0.05)
IMM GRANULOCYTES NFR BLD AUTO: 0.2 % (ref 0–0.5)
LYMPHOCYTES # BLD AUTO: 1.42 10*3/MM3 (ref 0.7–3.1)
LYMPHOCYTES NFR BLD AUTO: 16.5 % (ref 19.6–45.3)
MAGNESIUM SERPL-MCNC: 1.6 MG/DL (ref 1.6–2.4)
MCH RBC QN AUTO: 30.9 PG (ref 26.6–33)
MCHC RBC AUTO-ENTMCNC: 33.6 G/DL (ref 31.5–35.7)
MCV RBC AUTO: 92 FL (ref 79–97)
MONOCYTES # BLD AUTO: 0.82 10*3/MM3 (ref 0.1–0.9)
MONOCYTES NFR BLD AUTO: 9.5 % (ref 5–12)
NEUTROPHILS NFR BLD AUTO: 6.15 10*3/MM3 (ref 1.7–7)
NEUTROPHILS NFR BLD AUTO: 71.6 % (ref 42.7–76)
NRBC BLD AUTO-RTO: 0 /100 WBC (ref 0–0.2)
PLATELET # BLD AUTO: 206 10*3/MM3 (ref 140–450)
PMV BLD AUTO: 10.1 FL (ref 6–12)
POTASSIUM SERPL-SCNC: 4.6 MMOL/L (ref 3.5–5.2)
PROT SERPL-MCNC: 7 G/DL (ref 6–8.5)
RBC # BLD AUTO: 3.62 10*6/MM3 (ref 4.14–5.8)
SODIUM SERPL-SCNC: 141 MMOL/L (ref 136–145)
TROPONIN T SERPL HS-MCNC: 33 NG/L
WBC NRBC COR # BLD: 8.6 10*3/MM3 (ref 3.4–10.8)
WHOLE BLOOD HOLD COAG: NORMAL
WHOLE BLOOD HOLD SPECIMEN: NORMAL

## 2023-09-16 PROCEDURE — 84484 ASSAY OF TROPONIN QUANT: CPT | Performed by: EMERGENCY MEDICINE

## 2023-09-16 PROCEDURE — 93010 ELECTROCARDIOGRAM REPORT: CPT | Performed by: INTERNAL MEDICINE

## 2023-09-16 PROCEDURE — 93005 ELECTROCARDIOGRAM TRACING: CPT | Performed by: EMERGENCY MEDICINE

## 2023-09-16 PROCEDURE — G0378 HOSPITAL OBSERVATION PER HR: HCPCS

## 2023-09-16 PROCEDURE — 85025 COMPLETE CBC W/AUTO DIFF WBC: CPT | Performed by: EMERGENCY MEDICINE

## 2023-09-16 PROCEDURE — 93005 ELECTROCARDIOGRAM TRACING: CPT

## 2023-09-16 PROCEDURE — 99285 EMERGENCY DEPT VISIT HI MDM: CPT

## 2023-09-16 PROCEDURE — 80053 COMPREHEN METABOLIC PANEL: CPT | Performed by: EMERGENCY MEDICINE

## 2023-09-16 PROCEDURE — 71045 X-RAY EXAM CHEST 1 VIEW: CPT

## 2023-09-16 PROCEDURE — 82948 REAGENT STRIP/BLOOD GLUCOSE: CPT

## 2023-09-16 PROCEDURE — 83735 ASSAY OF MAGNESIUM: CPT | Performed by: EMERGENCY MEDICINE

## 2023-09-16 RX ORDER — SODIUM CHLORIDE 0.9 % (FLUSH) 0.9 %
10 SYRINGE (ML) INJECTION AS NEEDED
Status: DISCONTINUED | OUTPATIENT
Start: 2023-09-16 | End: 2023-09-17

## 2023-09-16 RX ORDER — BENZONATATE 100 MG/1
100 CAPSULE ORAL 3 TIMES DAILY PRN
COMMUNITY

## 2023-09-16 NOTE — Clinical Note
[facility] EMERGENCY ROOM  [address]  [place] [address]  Phone: [phone]    Giles Donovan was seen and treated in our emergency department on 9/16/2023.  He may return to work on 09/18/2023.         Thank you for choosing [facility].    Jae Parker, DO

## 2023-09-17 ENCOUNTER — APPOINTMENT (OUTPATIENT)
Dept: CARDIOLOGY | Facility: HOSPITAL | Age: 78
DRG: 309 | End: 2023-09-17
Payer: MEDICARE

## 2023-09-17 LAB
ANION GAP SERPL CALCULATED.3IONS-SCNC: 9.2 MMOL/L (ref 5–15)
BASOPHILS # BLD AUTO: 0.03 10*3/MM3 (ref 0–0.2)
BASOPHILS NFR BLD AUTO: 0.4 % (ref 0–1.5)
BH CV ECHO MEAS - AO ROOT DIAM: 3.7 CM
BH CV ECHO MEAS - EDV(CUBED): 185.2 ML
BH CV ECHO MEAS - EDV(MOD-SP2): 99.2 ML
BH CV ECHO MEAS - EDV(MOD-SP4): 78.9 ML
BH CV ECHO MEAS - EF(MOD-BP): 45.1 %
BH CV ECHO MEAS - EF(MOD-SP2): 49.7 %
BH CV ECHO MEAS - EF(MOD-SP4): 35.2 %
BH CV ECHO MEAS - ESV(CUBED): 75.7 ML
BH CV ECHO MEAS - ESV(MOD-SP2): 49.9 ML
BH CV ECHO MEAS - ESV(MOD-SP4): 51.1 ML
BH CV ECHO MEAS - FS: 25.8 %
BH CV ECHO MEAS - IVS/LVPW: 1.16 CM
BH CV ECHO MEAS - IVSD: 1.17 CM
BH CV ECHO MEAS - LA DIMENSION: 3.8 CM
BH CV ECHO MEAS - LAT PEAK E' VEL: 5.6 CM/SEC
BH CV ECHO MEAS - LV DIASTOLIC VOL/BSA (35-75): 37.4 CM2
BH CV ECHO MEAS - LV MASS(C)D: 253.6 GRAMS
BH CV ECHO MEAS - LV SYSTOLIC VOL/BSA (12-30): 24.2 CM2
BH CV ECHO MEAS - LVIDD: 5.7 CM
BH CV ECHO MEAS - LVIDS: 4.2 CM
BH CV ECHO MEAS - LVOT AREA: 3.1 CM2
BH CV ECHO MEAS - LVOT DIAM: 2 CM
BH CV ECHO MEAS - LVPWD: 1.01 CM
BH CV ECHO MEAS - MED PEAK E' VEL: 3.8 CM/SEC
BH CV ECHO MEAS - MV A MAX VEL: 108 CM/SEC
BH CV ECHO MEAS - MV DEC SLOPE: 644 CM/SEC2
BH CV ECHO MEAS - MV DEC TIME: 0.16 MSEC
BH CV ECHO MEAS - MV E MAX VEL: 103 CM/SEC
BH CV ECHO MEAS - MV E/A: 0.95
BH CV ECHO MEAS - RVDD: 3.1 CM
BH CV ECHO MEAS - SI(MOD-SP2): 23.4 ML/M2
BH CV ECHO MEAS - SI(MOD-SP4): 13.2 ML/M2
BH CV ECHO MEAS - SV(MOD-SP2): 49.3 ML
BH CV ECHO MEAS - SV(MOD-SP4): 27.8 ML
BH CV ECHO MEAS - TAPSE (>1.6): 2.5 CM
BH CV ECHO MEASUREMENTS AVERAGE E/E' RATIO: 21.91
BILIRUB UR QL STRIP: NEGATIVE
BUN SERPL-MCNC: 18 MG/DL (ref 8–23)
BUN/CREAT SERPL: 13.7 (ref 7–25)
CALCIUM SPEC-SCNC: 9.5 MG/DL (ref 8.6–10.5)
CHLORIDE SERPL-SCNC: 101 MMOL/L (ref 98–107)
CHOLEST SERPL-MCNC: 92 MG/DL (ref 0–200)
CLARITY UR: CLEAR
CO2 SERPL-SCNC: 27.8 MMOL/L (ref 22–29)
COLOR UR: YELLOW
CREAT SERPL-MCNC: 1.31 MG/DL (ref 0.76–1.27)
DEPRECATED RDW RBC AUTO: 45 FL (ref 37–54)
EGFRCR SERPLBLD CKD-EPI 2021: 56.4 ML/MIN/1.73
EOSINOPHIL # BLD AUTO: 0.17 10*3/MM3 (ref 0–0.4)
EOSINOPHIL NFR BLD AUTO: 2.3 % (ref 0.3–6.2)
ERYTHROCYTE [DISTWIDTH] IN BLOOD BY AUTOMATED COUNT: 13.3 % (ref 12.3–15.4)
FERRITIN SERPL-MCNC: 333.4 NG/ML (ref 30–400)
FOLATE SERPL-MCNC: 17.1 NG/ML (ref 4.78–24.2)
GLUCOSE BLDC GLUCOMTR-MCNC: 142 MG/DL (ref 70–99)
GLUCOSE BLDC GLUCOMTR-MCNC: 157 MG/DL (ref 70–99)
GLUCOSE BLDC GLUCOMTR-MCNC: 188 MG/DL (ref 70–99)
GLUCOSE BLDC GLUCOMTR-MCNC: 242 MG/DL (ref 70–99)
GLUCOSE SERPL-MCNC: 326 MG/DL (ref 65–99)
GLUCOSE UR STRIP-MCNC: ABNORMAL MG/DL
HBA1C MFR BLD: 9 % (ref 4.8–5.6)
HCT VFR BLD AUTO: 34 % (ref 37.5–51)
HCT VFR BLD AUTO: 34.5 % (ref 37.5–51)
HCT VFR BLD AUTO: 34.7 % (ref 37.5–51)
HDLC SERPL-MCNC: 34 MG/DL (ref 40–60)
HGB BLD-MCNC: 11.5 G/DL (ref 13–17.7)
HGB BLD-MCNC: 11.6 G/DL (ref 13–17.7)
HGB BLD-MCNC: 11.6 G/DL (ref 13–17.7)
HGB UR QL STRIP.AUTO: NEGATIVE
IMM GRANULOCYTES # BLD AUTO: 0.01 10*3/MM3 (ref 0–0.05)
IMM GRANULOCYTES NFR BLD AUTO: 0.1 % (ref 0–0.5)
IRON 24H UR-MRATE: 66 MCG/DL (ref 59–158)
IRON SATN MFR SERPL: 30 % (ref 20–50)
IVRT: 66 MSEC
KETONES UR QL STRIP: NEGATIVE
LDLC SERPL CALC-MCNC: 34 MG/DL (ref 0–100)
LDLC/HDLC SERPL: 0.92 {RATIO}
LEFT ATRIUM VOLUME INDEX: 20 ML/M2
LEUKOCYTE ESTERASE UR QL STRIP.AUTO: NEGATIVE
LYMPHOCYTES # BLD AUTO: 2.02 10*3/MM3 (ref 0.7–3.1)
LYMPHOCYTES NFR BLD AUTO: 27.6 % (ref 19.6–45.3)
MAGNESIUM SERPL-MCNC: 1.6 MG/DL (ref 1.6–2.4)
MCH RBC QN AUTO: 31.3 PG (ref 26.6–33)
MCHC RBC AUTO-ENTMCNC: 33.6 G/DL (ref 31.5–35.7)
MCV RBC AUTO: 93 FL (ref 79–97)
MONOCYTES # BLD AUTO: 0.64 10*3/MM3 (ref 0.1–0.9)
MONOCYTES NFR BLD AUTO: 8.7 % (ref 5–12)
NEUTROPHILS NFR BLD AUTO: 4.45 10*3/MM3 (ref 1.7–7)
NEUTROPHILS NFR BLD AUTO: 60.9 % (ref 42.7–76)
NITRITE UR QL STRIP: NEGATIVE
NRBC BLD AUTO-RTO: 0 /100 WBC (ref 0–0.2)
PH UR STRIP.AUTO: 6 [PH] (ref 5–8)
PHOSPHATE SERPL-MCNC: 3.4 MG/DL (ref 2.5–4.5)
PLATELET # BLD AUTO: 188 10*3/MM3 (ref 140–450)
PMV BLD AUTO: 10.8 FL (ref 6–12)
POTASSIUM SERPL-SCNC: 4.6 MMOL/L (ref 3.5–5.2)
PROT UR QL STRIP: ABNORMAL
QT INTERVAL: 434 MS
QT INTERVAL: 444 MS
QTC INTERVAL: 498 MS
QTC INTERVAL: 509 MS
RBC # BLD AUTO: 3.71 10*6/MM3 (ref 4.14–5.8)
RETICS # AUTO: 0.04 10*6/MM3 (ref 0.02–0.13)
RETICS/RBC NFR AUTO: 1.07 % (ref 0.7–1.9)
SODIUM SERPL-SCNC: 138 MMOL/L (ref 136–145)
SP GR UR STRIP: 1.02 (ref 1–1.03)
TIBC SERPL-MCNC: 224 MCG/DL (ref 298–536)
TRANSFERRIN SERPL-MCNC: 150 MG/DL (ref 200–360)
TRIGL SERPL-MCNC: 134 MG/DL (ref 0–150)
TSH SERPL DL<=0.05 MIU/L-ACNC: 0.94 UIU/ML (ref 0.27–4.2)
UROBILINOGEN UR QL STRIP: ABNORMAL
VIT B12 BLD-MCNC: 699 PG/ML (ref 211–946)
VLDLC SERPL-MCNC: 24 MG/DL (ref 5–40)
WBC NRBC COR # BLD: 7.32 10*3/MM3 (ref 3.4–10.8)

## 2023-09-17 PROCEDURE — 80061 LIPID PANEL: CPT | Performed by: FAMILY MEDICINE

## 2023-09-17 PROCEDURE — 93010 ELECTROCARDIOGRAM REPORT: CPT | Performed by: INTERNAL MEDICINE

## 2023-09-17 PROCEDURE — 81003 URINALYSIS AUTO W/O SCOPE: CPT | Performed by: EMERGENCY MEDICINE

## 2023-09-17 PROCEDURE — 84466 ASSAY OF TRANSFERRIN: CPT | Performed by: FAMILY MEDICINE

## 2023-09-17 PROCEDURE — 84100 ASSAY OF PHOSPHORUS: CPT | Performed by: FAMILY MEDICINE

## 2023-09-17 PROCEDURE — 84443 ASSAY THYROID STIM HORMONE: CPT | Performed by: FAMILY MEDICINE

## 2023-09-17 PROCEDURE — 63710000001 INSULIN LISPRO (HUMAN) PER 5 UNITS: Performed by: INTERNAL MEDICINE

## 2023-09-17 PROCEDURE — 82728 ASSAY OF FERRITIN: CPT | Performed by: FAMILY MEDICINE

## 2023-09-17 PROCEDURE — 82607 VITAMIN B-12: CPT | Performed by: FAMILY MEDICINE

## 2023-09-17 PROCEDURE — 83735 ASSAY OF MAGNESIUM: CPT | Performed by: FAMILY MEDICINE

## 2023-09-17 PROCEDURE — 94799 UNLISTED PULMONARY SVC/PX: CPT

## 2023-09-17 PROCEDURE — 82746 ASSAY OF FOLIC ACID SERUM: CPT | Performed by: FAMILY MEDICINE

## 2023-09-17 PROCEDURE — 83540 ASSAY OF IRON: CPT | Performed by: FAMILY MEDICINE

## 2023-09-17 PROCEDURE — 94761 N-INVAS EAR/PLS OXIMETRY MLT: CPT

## 2023-09-17 PROCEDURE — 99205 OFFICE O/P NEW HI 60 MIN: CPT | Performed by: INTERNAL MEDICINE

## 2023-09-17 PROCEDURE — 85018 HEMOGLOBIN: CPT | Performed by: FAMILY MEDICINE

## 2023-09-17 PROCEDURE — 97161 PT EVAL LOW COMPLEX 20 MIN: CPT

## 2023-09-17 PROCEDURE — 94640 AIRWAY INHALATION TREATMENT: CPT

## 2023-09-17 PROCEDURE — 82948 REAGENT STRIP/BLOOD GLUCOSE: CPT

## 2023-09-17 PROCEDURE — 63710000001 INSULIN DETEMIR PER 5 UNITS: Performed by: INTERNAL MEDICINE

## 2023-09-17 PROCEDURE — 85014 HEMATOCRIT: CPT | Performed by: FAMILY MEDICINE

## 2023-09-17 PROCEDURE — 0 MAGNESIUM SULFATE 4 GM/100ML SOLUTION: Performed by: INTERNAL MEDICINE

## 2023-09-17 PROCEDURE — 83036 HEMOGLOBIN GLYCOSYLATED A1C: CPT | Performed by: FAMILY MEDICINE

## 2023-09-17 PROCEDURE — 93306 TTE W/DOPPLER COMPLETE: CPT

## 2023-09-17 PROCEDURE — 93005 ELECTROCARDIOGRAM TRACING: CPT

## 2023-09-17 PROCEDURE — 85025 COMPLETE CBC W/AUTO DIFF WBC: CPT | Performed by: FAMILY MEDICINE

## 2023-09-17 PROCEDURE — 99222 1ST HOSP IP/OBS MODERATE 55: CPT | Performed by: PSYCHIATRY & NEUROLOGY

## 2023-09-17 PROCEDURE — G0378 HOSPITAL OBSERVATION PER HR: HCPCS

## 2023-09-17 PROCEDURE — 25010000002 HEPARIN (PORCINE) PER 1000 UNITS: Performed by: FAMILY MEDICINE

## 2023-09-17 PROCEDURE — 80048 BASIC METABOLIC PNL TOTAL CA: CPT | Performed by: FAMILY MEDICINE

## 2023-09-17 PROCEDURE — 85045 AUTOMATED RETICULOCYTE COUNT: CPT | Performed by: FAMILY MEDICINE

## 2023-09-17 RX ORDER — OXYBUTYNIN CHLORIDE 5 MG/1
10 TABLET, EXTENDED RELEASE ORAL DAILY
Status: DISCONTINUED | OUTPATIENT
Start: 2023-09-17 | End: 2023-09-19 | Stop reason: HOSPADM

## 2023-09-17 RX ORDER — SODIUM CHLORIDE 0.9 % (FLUSH) 0.9 %
10 SYRINGE (ML) INJECTION AS NEEDED
Status: DISCONTINUED | OUTPATIENT
Start: 2023-09-17 | End: 2023-09-19 | Stop reason: HOSPADM

## 2023-09-17 RX ORDER — AMOXICILLIN 250 MG
2 CAPSULE ORAL 2 TIMES DAILY
Status: DISCONTINUED | OUTPATIENT
Start: 2023-09-17 | End: 2023-09-19 | Stop reason: HOSPADM

## 2023-09-17 RX ORDER — BISACODYL 5 MG/1
5 TABLET, DELAYED RELEASE ORAL DAILY PRN
Status: DISCONTINUED | OUTPATIENT
Start: 2023-09-17 | End: 2023-09-19 | Stop reason: HOSPADM

## 2023-09-17 RX ORDER — NITROGLYCERIN 0.4 MG/1
0.4 TABLET SUBLINGUAL
Status: DISCONTINUED | OUTPATIENT
Start: 2023-09-17 | End: 2023-09-19 | Stop reason: HOSPADM

## 2023-09-17 RX ORDER — PAROXETINE HYDROCHLORIDE 20 MG/1
40 TABLET, FILM COATED ORAL EVERY EVENING
Status: DISCONTINUED | OUTPATIENT
Start: 2023-09-17 | End: 2023-09-19 | Stop reason: HOSPADM

## 2023-09-17 RX ORDER — POLYETHYLENE GLYCOL 3350 17 G/17G
17 POWDER, FOR SOLUTION ORAL DAILY PRN
Status: DISCONTINUED | OUTPATIENT
Start: 2023-09-17 | End: 2023-09-19 | Stop reason: HOSPADM

## 2023-09-17 RX ORDER — HEPARIN SODIUM 5000 [USP'U]/ML
5000 INJECTION, SOLUTION INTRAVENOUS; SUBCUTANEOUS EVERY 12 HOURS SCHEDULED
Status: DISCONTINUED | OUTPATIENT
Start: 2023-09-17 | End: 2023-09-18

## 2023-09-17 RX ORDER — SODIUM CHLORIDE 0.9 % (FLUSH) 0.9 %
10 SYRINGE (ML) INJECTION EVERY 12 HOURS SCHEDULED
Status: DISCONTINUED | OUTPATIENT
Start: 2023-09-17 | End: 2023-09-19 | Stop reason: HOSPADM

## 2023-09-17 RX ORDER — AMLODIPINE BESYLATE 5 MG/1
5 TABLET ORAL
Status: DISCONTINUED | OUTPATIENT
Start: 2023-09-17 | End: 2023-09-18

## 2023-09-17 RX ORDER — GABAPENTIN 300 MG/1
600 CAPSULE ORAL EVERY 8 HOURS SCHEDULED
Status: DISCONTINUED | OUTPATIENT
Start: 2023-09-17 | End: 2023-09-17

## 2023-09-17 RX ORDER — BISACODYL 10 MG
10 SUPPOSITORY, RECTAL RECTAL DAILY PRN
Status: DISCONTINUED | OUTPATIENT
Start: 2023-09-17 | End: 2023-09-19 | Stop reason: HOSPADM

## 2023-09-17 RX ORDER — PANTOPRAZOLE SODIUM 40 MG/1
40 TABLET, DELAYED RELEASE ORAL
Status: DISCONTINUED | OUTPATIENT
Start: 2023-09-17 | End: 2023-09-19 | Stop reason: HOSPADM

## 2023-09-17 RX ORDER — TRAZODONE HYDROCHLORIDE 100 MG/1
100 TABLET ORAL NIGHTLY
Status: DISCONTINUED | OUTPATIENT
Start: 2023-09-17 | End: 2023-09-19 | Stop reason: HOSPADM

## 2023-09-17 RX ORDER — ASPIRIN 81 MG/1
81 TABLET ORAL DAILY
Status: DISCONTINUED | OUTPATIENT
Start: 2023-09-17 | End: 2023-09-19 | Stop reason: HOSPADM

## 2023-09-17 RX ORDER — BUDESONIDE AND FORMOTEROL FUMARATE DIHYDRATE 160; 4.5 UG/1; UG/1
2 AEROSOL RESPIRATORY (INHALATION)
Status: DISCONTINUED | OUTPATIENT
Start: 2023-09-17 | End: 2023-09-19 | Stop reason: HOSPADM

## 2023-09-17 RX ORDER — SODIUM CHLORIDE 9 MG/ML
40 INJECTION, SOLUTION INTRAVENOUS AS NEEDED
Status: DISCONTINUED | OUTPATIENT
Start: 2023-09-17 | End: 2023-09-19 | Stop reason: HOSPADM

## 2023-09-17 RX ORDER — GABAPENTIN 300 MG/1
300 CAPSULE ORAL EVERY 8 HOURS SCHEDULED
Status: DISCONTINUED | OUTPATIENT
Start: 2023-09-17 | End: 2023-09-19 | Stop reason: HOSPADM

## 2023-09-17 RX ORDER — INSULIN LISPRO 100 [IU]/ML
3-14 INJECTION, SOLUTION INTRAVENOUS; SUBCUTANEOUS
Status: DISCONTINUED | OUTPATIENT
Start: 2023-09-17 | End: 2023-09-18

## 2023-09-17 RX ORDER — ATORVASTATIN CALCIUM 40 MG/1
80 TABLET, FILM COATED ORAL NIGHTLY
Status: DISCONTINUED | OUTPATIENT
Start: 2023-09-17 | End: 2023-09-19 | Stop reason: HOSPADM

## 2023-09-17 RX ORDER — LEVOTHYROXINE SODIUM 0.1 MG/1
100 TABLET ORAL
Status: DISCONTINUED | OUTPATIENT
Start: 2023-09-17 | End: 2023-09-19 | Stop reason: HOSPADM

## 2023-09-17 RX ORDER — DEXTROSE MONOHYDRATE 25 G/50ML
25 INJECTION, SOLUTION INTRAVENOUS
Status: DISCONTINUED | OUTPATIENT
Start: 2023-09-17 | End: 2023-09-18 | Stop reason: SDUPTHER

## 2023-09-17 RX ORDER — SODIUM CHLORIDE, SODIUM LACTATE, POTASSIUM CHLORIDE, CALCIUM CHLORIDE 600; 310; 30; 20 MG/100ML; MG/100ML; MG/100ML; MG/100ML
75 INJECTION, SOLUTION INTRAVENOUS CONTINUOUS
Status: ACTIVE | OUTPATIENT
Start: 2023-09-17 | End: 2023-09-18

## 2023-09-17 RX ORDER — NICOTINE POLACRILEX 4 MG
15 LOZENGE BUCCAL
Status: DISCONTINUED | OUTPATIENT
Start: 2023-09-17 | End: 2023-09-18 | Stop reason: SDUPTHER

## 2023-09-17 RX ORDER — MAGNESIUM SULFATE HEPTAHYDRATE 40 MG/ML
4 INJECTION, SOLUTION INTRAVENOUS ONCE
Status: COMPLETED | OUTPATIENT
Start: 2023-09-17 | End: 2023-09-17

## 2023-09-17 RX ADMIN — GABAPENTIN 300 MG: 300 CAPSULE ORAL at 21:06

## 2023-09-17 RX ADMIN — SENNOSIDES AND DOCUSATE SODIUM 2 TABLET: 50; 8.6 TABLET ORAL at 08:31

## 2023-09-17 RX ADMIN — ATORVASTATIN CALCIUM 80 MG: 40 TABLET, FILM COATED ORAL at 21:07

## 2023-09-17 RX ADMIN — ASPIRIN 81 MG: 81 TABLET, COATED ORAL at 08:31

## 2023-09-17 RX ADMIN — INSULIN LISPRO 5 UNITS: 100 INJECTION, SOLUTION INTRAVENOUS; SUBCUTANEOUS at 08:32

## 2023-09-17 RX ADMIN — GABAPENTIN 300 MG: 300 CAPSULE ORAL at 13:29

## 2023-09-17 RX ADMIN — OXYBUTYNIN CHLORIDE 10 MG: 5 TABLET, EXTENDED RELEASE ORAL at 08:31

## 2023-09-17 RX ADMIN — PAROXETINE HYDROCHLORIDE 40 MG: 20 TABLET, FILM COATED ORAL at 17:13

## 2023-09-17 RX ADMIN — SODIUM CHLORIDE, POTASSIUM CHLORIDE, SODIUM LACTATE AND CALCIUM CHLORIDE 75 ML/HR: 600; 310; 30; 20 INJECTION, SOLUTION INTRAVENOUS at 00:58

## 2023-09-17 RX ADMIN — INSULIN LISPRO 3 UNITS: 100 INJECTION, SOLUTION INTRAVENOUS; SUBCUTANEOUS at 12:25

## 2023-09-17 RX ADMIN — INSULIN LISPRO 3 UNITS: 100 INJECTION, SOLUTION INTRAVENOUS; SUBCUTANEOUS at 17:13

## 2023-09-17 RX ADMIN — AMLODIPINE BESYLATE 5 MG: 5 TABLET ORAL at 13:29

## 2023-09-17 RX ADMIN — MAGNESIUM SULFATE HEPTAHYDRATE 4 G: 40 INJECTION, SOLUTION INTRAVENOUS at 13:29

## 2023-09-17 RX ADMIN — Medication 10 ML: at 00:58

## 2023-09-17 RX ADMIN — PANTOPRAZOLE SODIUM 40 MG: 40 TABLET, DELAYED RELEASE ORAL at 08:32

## 2023-09-17 RX ADMIN — HEPARIN SODIUM 5000 UNITS: 5000 INJECTION INTRAVENOUS; SUBCUTANEOUS at 00:58

## 2023-09-17 RX ADMIN — SODIUM CHLORIDE, POTASSIUM CHLORIDE, SODIUM LACTATE AND CALCIUM CHLORIDE 75 ML/HR: 600; 310; 30; 20 INJECTION, SOLUTION INTRAVENOUS at 13:30

## 2023-09-17 RX ADMIN — HEPARIN SODIUM 5000 UNITS: 5000 INJECTION INTRAVENOUS; SUBCUTANEOUS at 21:06

## 2023-09-17 RX ADMIN — Medication 10 ML: at 08:33

## 2023-09-17 RX ADMIN — INSULIN DETEMIR 20 UNITS: 100 INJECTION, SOLUTION SUBCUTANEOUS at 08:32

## 2023-09-17 RX ADMIN — HEPARIN SODIUM 5000 UNITS: 5000 INJECTION INTRAVENOUS; SUBCUTANEOUS at 08:32

## 2023-09-17 RX ADMIN — TRAZODONE HYDROCHLORIDE 100 MG: 100 TABLET ORAL at 21:06

## 2023-09-17 RX ADMIN — GABAPENTIN 300 MG: 300 CAPSULE ORAL at 08:32

## 2023-09-17 RX ADMIN — INSULIN DETEMIR 20 UNITS: 100 INJECTION, SOLUTION SUBCUTANEOUS at 21:06

## 2023-09-17 RX ADMIN — LEVOTHYROXINE SODIUM 100 MCG: 0.1 TABLET ORAL at 08:32

## 2023-09-17 RX ADMIN — Medication 10 ML: at 21:07

## 2023-09-17 RX ADMIN — BUDESONIDE AND FORMOTEROL FUMARATE DIHYDRATE 2 PUFF: 160; 4.5 AEROSOL RESPIRATORY (INHALATION) at 23:08

## 2023-09-17 NOTE — CONSULTS
"  Referring Provider: Cliff Sheth MD    Reason for Consultation: Syncope      Patient Care Team:  Morena Masters MD as PCP - General (Internal Medicine)  Issac Ortiz MD as Consulting Physician (Urology)      SUBJECTIVE     Chief Complaint: Passed out    History of present illness:  Giles Donovan is a 77 y.o. male with hypertension, hyperlipidemia, obesity, diabetes, hypothyroidism who presented to the hospital after 2 episodes of syncope which were witnessed by his wife.  Apparently the patient was eating dinner and started to slide down to the floor and became unresponsive.  His wife assisted the patient during this episode.  After the first episode the patient \" blacked out\" again.  His blood sugar was normal.  He has had recent changes to his antihypertensive regimen.  Cardiology has been consulted for further evaluation and management.        Review of systems:    Constitutional: No weakness, fatigue, fever, rigors, chills   Eyes: No vision changes, eye pain   ENT/oropharynx: No difficulty swallowing, sore throat, epistaxis, changes in hearing   Cardiovascular: No chest pain, chest tightness, palpitations, paroxysmal nocturnal dyspnea, orthopnea, diaphoresis, dizziness / +syncopal episode   Respiratory: No shortness of breath, dyspnea on exertion, cough, wheezing, hemoptysis   Gastrointestinal: No abdominal pain, nausea, vomiting, diarrhea, bloody stools   Genitourinary: No hematuria, dysuria   Neurological: No headache, tremors, numbness, one-sided weakness    Musculoskeletal: No cramps, myalgias, joint pain, joint swelling   Integument: No rash, edema        Personal History:      Past Medical History:   Diagnosis Date    Allergic rhinitis 12/27/2014    Will try Zyrtec, he didnt want to use a nasal spray.    Arthritis     Asthma     Cataract     left eye    Cough 03/30/2016    PFT and CXR ordered.    Depression     PTSD    Diabetes     Elevated cholesterol     H/O psychiatric care " 1999    PTSD    Hyperlipidemia 03/30/2016    Lipids ordered. Continue on current medication.    Hypertension     Hypothyroidism     Seasonal allergies     Shortness of breath     Sleep apnea     Stenosis of right carotid artery 02/19/2017    Will refer to vascular surgeon.    Syncope 02/19/2017    Type 2 diabetes mellitus     Upper respiratory infection 10/18/2015    Will treat cough with Hydromet, otherwise over the counter meds for treatment.       Past Surgical History:   Procedure Laterality Date    CATARACT EXTRACTION Right     x2    COLONOSCOPY      JOINT REPLACEMENT      REPLACEMENT TOTAL HIP ONCOLOGIC Right     RETINAL DETACHMENT REPAIR      TOE AMPUTATION Left 11/2017    Second phalaeng.    TOE OSTEOPHYTE REMOVAL         Family History   Problem Relation Age of Onset    Heart disease Mother     Lupus Mother     Arthritis Mother     Kidney disease Sister        Social History     Tobacco Use    Smoking status: Never    Smokeless tobacco: Never   Vaping Use    Vaping Use: Never used   Substance Use Topics    Alcohol use: Yes     Comment: 2TALL BEERS/DAY    Drug use: Never        Home meds:  Prior to Admission medications    Medication Sig Start Date End Date Taking? Authorizing Provider   aspirin EC 81 MG EC tablet Take 1 tablet by mouth Daily. 7/23/21  Yes ProviderKierra MD   atorvastatin (LIPITOR) 80 MG tablet Take 1 tablet by mouth Daily. 8/9/22  Yes Cathy Ochoa MD   carvedilol (COREG) 6.25 MG tablet Take 1 tablet by mouth 2 (Two) Times a Day With Meals for 30 days.  Patient taking differently: Take 2 tablets by mouth 2 (Two) Times a Day With Meals. 8/9/23 9/16/23 Yes Kati Warren PA-C   cetirizine (ZyrTEC Allergy) 10 MG tablet Take 1 tablet by mouth Daily. 8/9/22  Yes Cathy Ochoa MD   felodipine (PLENDIL) 5 MG 24 hr tablet Take 1 tablet by mouth Daily.   Yes ProviderKierra MD   fluticasone-salmeterol (ADVAIR HFA) 230-21 MCG/ACT inhaler Inhale 2 puffs 2 (Two) Times a Day.   Yes  Kierra Pitts MD   gabapentin (NEURONTIN) 600 MG tablet Take 1 tablet by mouth 3 (Three) Times a Day. 3/31/21  Yes Kierra Pitts MD   insulin aspart (novoLOG) 100 UNIT/ML injection Inject 24-26 Units under the skin into the appropriate area as directed 3 (Three) Times a Day Before Meals.   Yes Kierra Pitts MD   Insulin Glargine (Lantus SoloStar) 100 UNIT/ML injection pen Inject 40 Units under the skin into the appropriate area as directed Every Night.   Yes Kierra Pitts MD   levothyroxine (SYNTHROID, LEVOTHROID) 100 MCG tablet Take 1 tablet by mouth Daily.   Yes Kierra Pitts MD   losartan (COZAAR) 50 MG tablet Take 1 tablet by mouth Daily. 2/27/23  Yes Kierra Pitts MD   metFORMIN (GLUCOPHAGE) 500 MG tablet Take 1 tablet by mouth 2 (Two) Times a Day With Meals.   Yes Kierra Pitts MD   olopatadine (PATANOL) 0.1 % ophthalmic solution Administer 1 drop to both eyes Daily As Needed for Allergies.   Yes Kierra Pitts MD   omeprazole (priLOSEC) 20 MG capsule Take 1 capsule by mouth Daily.   Yes Kierra Pitts MD   oxybutynin XL (DITROPAN-XL) 10 MG 24 hr tablet Take 1 tablet by mouth Daily. 6/6/23  Yes Kati Warren PA-C   PARoxetine (PAXIL) 40 MG tablet Take 1 tablet by mouth Every Evening. 4/5/21  Yes Kierra Pitts MD   tadalafil (Cialis) 10 MG tablet Take 1 tablet by mouth Daily As Needed for Erectile Dysfunction. 9/6/23  Yes Morena Masters MD   traZODone (DESYREL) 100 MG tablet Take 1 tablet by mouth Every Night. 4/22/22  Yes Kierra Pitts MD   benzonatate (TESSALON) 100 MG capsule Take 1 capsule by mouth 3 (Three) Times a Day As Needed for Cough.    Kierra Pitts MD       Allergies:     Latex and Latex, natural rubber    Scheduled Meds:aspirin, 81 mg, Oral, Daily  atorvastatin, 80 mg, Oral, Nightly  budesonide-formoterol, 2 puff, Inhalation, BID - RT  gabapentin, 300 mg, Oral, Q8H  heparin (porcine), 5,000 Units,  "Subcutaneous, Q12H  insulin detemir, 20 Units, Subcutaneous, BID  insulin lispro, 3-14 Units, Subcutaneous, 4x Daily AC & at Bedtime  levothyroxine, 100 mcg, Oral, Q AM  oxybutynin XL, 10 mg, Oral, Daily  pantoprazole, 40 mg, Oral, Q AM  PARoxetine, 40 mg, Oral, Q PM  senna-docusate sodium, 2 tablet, Oral, BID  sodium chloride, 10 mL, Intravenous, Q12H  traZODone, 100 mg, Oral, Nightly      Continuous Infusions:lactated ringers, 75 mL/hr, Last Rate: 75 mL/hr (09/17/23 0058)      PRN Meds:  senna-docusate sodium **AND** polyethylene glycol **AND** bisacodyl **AND** bisacodyl    dextrose    dextrose    glucagon (human recombinant)    nitroglycerin    sodium chloride    sodium chloride      OBJECTIVE    Vital Signs  Vitals:    09/16/23 2015 09/17/23 0225 09/17/23 0354 09/17/23 0500   BP: 126/66  160/82    BP Location:   Right arm    Patient Position:   Lying    Pulse: 79  86    Resp:   16    Temp:   97.9 °F (36.6 °C)    TempSrc:   Oral    SpO2: 100% 100% 100%    Weight:    97.7 kg (215 lb 6.2 oz)   Height:           Flowsheet Rows      Flowsheet Row First Filed Value   Admission Height 171.5 cm (67.5\") Documented at 09/16/2023 1828   Admission Weight 98.1 kg (216 lb 4.3 oz) Documented at 09/16/2023 1828              Intake/Output Summary (Last 24 hours) at 9/17/2023 0850  Last data filed at 9/17/2023 0600  Gross per 24 hour   Intake --   Output 800 ml   Net -800 ml        Telemetry: Sinus rhythm    Physical Exam:  The patient is alert, oriented and in no distress.  Obese  Vital signs as noted above.  Head and neck revealed no carotid bruits or jugular venous distention.  No thyromegaly or lymphadenopathy is present  Lungs clear.  No wheezing.  Breath sounds are normal bilaterally.  Heart: Normal first and second heart sounds. No murmur.  No precordial rub is present.  No gallop is present.  Abdomen: Soft and nontender.  No organomegaly is present.  Extremities with good peripheral pulses without any pedal edema.  Skin: " Warm and dry.  Musculoskeletal system is grossly normal.  CNS grossly normal.       Results Review:  I have personally reviewed the results from the time of this admission to 9/17/2023 08:50 EDT and agree with these findings:  []  Laboratory  []  Microbiology  []  Radiology  []  EKG/Telemetry   []  Cardiology/Vascular   []  Pathology  []  Old records  []  Other:    Most notable findings include:     Lab Results (last 24 hours)       Procedure Component Value Units Date/Time    Hemoglobin & Hematocrit, Blood [[id number]]  (Abnormal) Collected: 09/17/23 0759    Specimen: Blood Updated: 09/17/23 0822     Hemoglobin 11.5 g/dL      Hematocrit 34.0 %     POC Glucose Once [[id number]]  (Abnormal) Collected: 09/17/23 0800    Specimen: Blood Updated: 09/17/23 0801     Glucose 242 mg/dL      Comment: Serial Number: [id number]:  [id number]       Ferritin [[id number]]  (Normal) Collected: 09/17/23 0453    Specimen: Blood from Arm, Left Updated: 09/17/23 0655     Ferritin 333.40 ng/mL     Narrative:      <12 ng/mL usually associated with Iron Deficiency Anemia. Above normal range levels may be due to Hepatic and/or Chronic Inflammatory Disease.  Results may be falsely decreased if patient taking Biotin.      TSH [[id number]]  (Normal) Collected: 09/17/23 0453    Specimen: Blood from Arm, Left Updated: 09/17/23 0603     TSH 0.945 uIU/mL     Basic Metabolic Panel [[id number]]  (Abnormal) Collected: 09/17/23 0453    Specimen: Blood from Arm, Left Updated: 09/17/23 0601     Glucose 326 mg/dL      BUN 18 mg/dL      Creatinine 1.31 mg/dL      Sodium 138 mmol/L      Potassium 4.6 mmol/L      Comment: Slight hemolysis detected by analyzer. Results may be affected.        Chloride 101 mmol/L      CO2 27.8 mmol/L      Calcium 9.5 mg/dL      BUN/Creatinine Ratio 13.7     Anion Gap 9.2 mmol/L      eGFR 56.4 mL/min/1.73     Narrative:      GFR Normal >60  Chronic Kidney Disease <60  Kidney Failure <15    The GFR formula is only valid for  adults with stable renal function between ages 18 and 70.    Magnesium [[id number]]  (Normal) Collected: 09/17/23 0453    Specimen: Blood from Arm, Left Updated: 09/17/23 0601     Magnesium 1.6 mg/dL     Lipid Panel [[id number]]  (Abnormal) Collected: 09/17/23 0453    Specimen: Blood from Arm, Left Updated: 09/17/23 0601     Total Cholesterol 92 mg/dL      Triglycerides 134 mg/dL      HDL Cholesterol 34 mg/dL      LDL Cholesterol  34 mg/dL      VLDL Cholesterol 24 mg/dL      LDL/HDL Ratio 0.92    Narrative:      Cholesterol Reference Ranges  (U.S. Department of Health and Human Services ATP III Classifications)    Desirable          <200 mg/dL  Borderline High    200-239 mg/dL  High Risk          >240 mg/dL      Triglyceride Reference Ranges  (U.S. Department of Health and Human Services ATP III Classifications)    Normal           <150 mg/dL  Borderline High  150-199 mg/dL  High             200-499 mg/dL  Very High        >500 mg/dL    HDL Reference Ranges  (U.S. Department of Health and Human Services ATP III Classifications)    Low     <40 mg/dl (major risk factor for CHD)  High    >60 mg/dl ('negative' risk factor for CHD)        LDL Reference Ranges  (U.S. Department of Health and Human Services ATP III Classifications)    Optimal          <100 mg/dL  Near Optimal     100-129 mg/dL  Borderline High  130-159 mg/dL  High             160-189 mg/dL  Very High        >189 mg/dL    Iron Profile [[id number]]  (Abnormal) Collected: 09/17/23 0453    Specimen: Blood from Arm, Left Updated: 09/17/23 0601     Iron 66 mcg/dL      Iron Saturation (TSAT) 30 %      Transferrin 150 mg/dL      TIBC 224 mcg/dL     Phosphorus [[id number]]  (Normal) Collected: 09/17/23 0453    Specimen: Blood from Arm, Left Updated: 09/17/23 0601     Phosphorus 3.4 mg/dL     CBC & Differential [[id number]]  (Abnormal) Collected: 09/17/23 0453    Specimen: Blood from Arm, Left Updated: 09/17/23 0536    Narrative:      The following orders were created  for panel order CBC & Differential.  Procedure                               Abnormality         Status                     ---------                               -----------         ------                     CBC Auto Differential[[id number]]        Abnormal            Final result                 Please view results for these tests on the individual orders.    Reticulocytes [[id number]]  (Normal) Collected: 09/17/23 0453    Specimen: Blood from Arm, Left Updated: 09/17/23 0536     Reticulocyte % 1.07 %      Reticulocyte Absolute 0.0397 10*6/mm3     CBC Auto Differential [[id number]]  (Abnormal) Collected: 09/17/23 0453    Specimen: Blood from Arm, Left Updated: 09/17/23 0536     WBC 7.32 10*3/mm3      RBC 3.71 10*6/mm3      Hemoglobin 11.6 g/dL      Hematocrit 34.5 %      MCV 93.0 fL      MCH 31.3 pg      MCHC 33.6 g/dL      RDW 13.3 %      RDW-SD 45.0 fl      MPV 10.8 fL      Platelets 188 10*3/mm3      Neutrophil % 60.9 %      Lymphocyte % 27.6 %      Monocyte % 8.7 %      Eosinophil % 2.3 %      Basophil % 0.4 %      Immature Grans % 0.1 %      Neutrophils, Absolute 4.45 10*3/mm3      Lymphocytes, Absolute 2.02 10*3/mm3      Monocytes, Absolute 0.64 10*3/mm3      Eosinophils, Absolute 0.17 10*3/mm3      Basophils, Absolute 0.03 10*3/mm3      Immature Grans, Absolute 0.01 10*3/mm3      nRBC 0.0 /100 WBC     Hemoglobin A1c [[id number]] Collected: 09/17/23 0453    Specimen: Blood from Arm, Left Updated: 09/17/23 0531    Folate [[id number]] Collected: 09/17/23 0453    Specimen: Blood from Arm, Left Updated: 09/17/23 0530    Vitamin B12 [[id number]] Collected: 09/17/23 0453    Specimen: Blood from Arm, Left Updated: 09/17/23 0530    Urinalysis With Microscopic If Indicated (No Culture) - Urine, Clean Catch [[id number]]  (Abnormal) Collected: 09/17/23 0006    Specimen: Urine, Clean Catch Updated: 09/17/23 0017     Color, UA Yellow     Appearance, UA Clear     pH, UA 6.0     Specific Gravity, UA 1.020     Glucose,   mg/dL (1+)     Ketones, UA Negative     Bilirubin, UA Negative     Blood, UA Negative     Protein, UA Trace     Leuk Esterase, UA Negative     Nitrite, UA Negative     Urobilinogen, UA 1.0 E.U./dL    Narrative:      Urine microscopic not indicated.    Comprehensive Metabolic Panel [[id number]]  (Abnormal) Collected: 09/16/23 1841    Specimen: Blood Updated: 09/16/23 1917     Glucose 181 mg/dL      BUN 19 mg/dL      Creatinine 1.60 mg/dL      Sodium 141 mmol/L      Potassium 4.6 mmol/L      Comment: Slight hemolysis detected by analyzer. Results may be affected.        Chloride 101 mmol/L      CO2 28.6 mmol/L      Calcium 9.5 mg/dL      Total Protein 7.0 g/dL      Albumin 4.0 g/dL      ALT (SGPT) 18 U/L      AST (SGOT) 27 U/L      Comment: Slight hemolysis detected by analyzer. Results may be affected.        Alkaline Phosphatase 56 U/L      Total Bilirubin 0.5 mg/dL      Globulin 3.0 gm/dL      A/G Ratio 1.3 g/dL      BUN/Creatinine Ratio 11.9     Anion Gap 11.4 mmol/L      eGFR 44.4 mL/min/1.73     Narrative:      GFR Normal >60  Chronic Kidney Disease <60  Kidney Failure <15    The GFR formula is only valid for adults with stable renal function between ages 18 and 70.    Magnesium [[id number]]  (Normal) Collected: 09/16/23 1841    Specimen: Blood Updated: 09/16/23 1917     Magnesium 1.6 mg/dL     Single High Sensitivity Troponin T [[id number]]  (Abnormal) Collected: 09/16/23 1841    Specimen: Blood Updated: 09/16/23 1909     HS Troponin T 33 ng/L     Narrative:      High Sensitive Troponin T Reference Range:  <10.0 ng/L- Negative Female for AMI  <15.0 ng/L- Negative Male for AMI  >=10 - Abnormal Female indicating possible myocardial injury.  >=15 - Abnormal Male indicating possible myocardial injury.   Clinicians would have to utilize clinical acumen, EKG, Troponin, and serial changes to determine if it is an Acute Myocardial Infarction or myocardial injury due to an underlying chronic condition.         POC Glucose  Once [[id number]]  (Abnormal) Collected: 09/16/23 1904    Specimen: Blood Updated: 09/16/23 1906     Glucose 157 mg/dL      Comment: Serial Number: [id number]:  [id number]       [place] Draw [[id number]] Collected: 09/16/23 1841    Specimen: Blood Updated: 09/16/23 1855    Narrative:      The following orders were created for panel order [place] Draw.  Procedure                               Abnormality         Status                     ---------                               -----------         ------                     Green Top (Gel)[[id number]]                                  Final result               Lavender Top[[id number]]                                     Final result               Gold Top - SST[[id number]]                                   Final result               Light Blue Top[[id number]]                                   Final result                 Please view results for these tests on the individual orders.    Light Blue Top [[id number]] Collected: 09/16/23 1841    Specimen: Blood Updated: 09/16/23 1855     Extra Tube Hold for add-ons.     Comment: Auto resulted       Gold Top - SST [[id number]] Collected: 09/16/23 1841    Specimen: Blood Updated: 09/16/23 1855     Extra Tube Hold for add-ons.     Comment: Auto resulted.       Green Top (Gel) [[id number]] Collected: 09/16/23 1841    Specimen: Blood Updated: 09/16/23 1855     Extra Tube Hold for add-ons.     Comment: Auto resulted.       Lavender Top [[id number]] Collected: 09/16/23 1841    Specimen: Blood Updated: 09/16/23 1853     Extra Tube hold for add-on     Comment: Auto resulted       CBC & Differential [[id number]]  (Abnormal) Collected: 09/16/23 1841    Specimen: Blood Updated: 09/16/23 1848    Narrative:      The following orders were created for panel order CBC & Differential.  Procedure                               Abnormality         Status                     ---------                               -----------         ------                      CBC Auto Differential[[id number]]        Abnormal            Final result                 Please view results for these tests on the individual orders.    CBC Auto Differential [[id number]]  (Abnormal) Collected: 09/16/23 1841    Specimen: Blood Updated: 09/16/23 1848     WBC 8.60 10*3/mm3      RBC 3.62 10*6/mm3      Hemoglobin 11.2 g/dL      Hematocrit 33.3 %      MCV 92.0 fL      MCH 30.9 pg      MCHC 33.6 g/dL      RDW 13.2 %      RDW-SD 45.0 fl      MPV 10.1 fL      Platelets 206 10*3/mm3      Neutrophil % 71.6 %      Lymphocyte % 16.5 %      Monocyte % 9.5 %      Eosinophil % 1.9 %      Basophil % 0.3 %      Immature Grans % 0.2 %      Neutrophils, Absolute 6.15 10*3/mm3      Lymphocytes, Absolute 1.42 10*3/mm3      Monocytes, Absolute 0.82 10*3/mm3      Eosinophils, Absolute 0.16 10*3/mm3      Basophils, Absolute 0.03 10*3/mm3      Immature Grans, Absolute 0.02 10*3/mm3      nRBC 0.0 /100 WBC             Imaging Results (Last 24 Hours)       Procedure Component Value Units Date/Time    XR Chest 1 View [[id number]] Collected: 09/16/23 1857     Updated: 09/16/23 1900    Narrative:      PROCEDURE: XR CHEST 1 VW     COMPARISON: [facility], CR, CHEST PA/AP & LAT 2V, 3/24/2015, 15:07.  [facility],   CR, XR CHEST 1 VW, 4/07/2023, 13:02.  [facility], CR, XR CHEST 1 VW, 7/30/2023, 4:55.     INDICATIONS: Weak/Dizzy/AMS triage protocol     FINDINGS:   The heart remains borderline in size.  The pulmonary vascularity does not appear congested.     The lungs are well-expanded and free of infiltrates.     Bony structures appear intact.       Impression:         Borderline cardiomegaly.     No active pulmonary disease is seen.                  JONATAN LONGORIA MD         Electronically Signed and Approved By: JONATAN LONGORIA MD on 9/16/2023 at 18:57                             LAB RESULTS (LAST 7 DAYS)    CBC  Results from last 7 days   Lab Units 09/17/23  0759 09/17/23  0453 09/16/23 1841    WBC 10*3/mm3  --  7.32 8.60   RBC 10*6/mm3  --  3.71* 3.62*   HEMOGLOBIN g/dL 11.5* 11.6* 11.2*   HEMATOCRIT % 34.0* 34.5* 33.3*   MCV fL  --  93.0 92.0   PLATELETS 10*3/mm3  --  188 206       BMP  Results from last 7 days   Lab Units 09/17/23  0453 09/16/23  1841   SODIUM mmol/L 138 141   POTASSIUM mmol/L 4.6 4.6   CHLORIDE mmol/L 101 101   CO2 mmol/L 27.8 28.6   BUN mg/dL 18 19   CREATININE mg/dL 1.31* 1.60*   GLUCOSE mg/dL 326* 181*   MAGNESIUM mg/dL 1.6 1.6   PHOSPHORUS mg/dL 3.4  --        CMP   Results from last 7 days   Lab Units 09/17/23  0453 09/16/23  1841   SODIUM mmol/L 138 141   POTASSIUM mmol/L 4.6 4.6   CHLORIDE mmol/L 101 101   CO2 mmol/L 27.8 28.6   BUN mg/dL 18 19   CREATININE mg/dL 1.31* 1.60*   GLUCOSE mg/dL 326* 181*   ALBUMIN g/dL  --  4.0   BILIRUBIN mg/dL  --  0.5   ALK PHOS U/L  --  56   AST (SGOT) U/L  --  27   ALT (SGPT) U/L  --  18       BNP        TROPONIN  Results from last 7 days   Lab Units 09/16/23  1841   HSTROP T ng/L 33*       CoAg        Creatinine Clearance  Estimated Creatinine Clearance: 53.9 mL/min (A) (by C-G formula based on SCr of 1.31 mg/dL (H)).    ABG          Radiology  XR Chest 1 View    Result Date: 9/16/2023    Borderline cardiomegaly.  No active pulmonary disease is seen.       JONATAN LONGORIA MD       Electronically Signed and Approved By: JONATAN LONGORIA MD on 9/16/2023 at 18:57                EKG  I personally viewed and interpreted the patient's EKG/Telemetry data:  ECG 12 Lead Rhythm Change   Preliminary Result   HEART RATE= 79  bpm   RR Interval= 760  ms   [place] Interval= 178  ms   P Horizontal Axis= 11  deg   P Front Axis= 38  deg   QRSD Interval= 148  ms   QT Interval= 434  ms   QTcB= 498  ms   QRS Axis= -75  deg   T Wave Axis= 74  deg   - ABNORMAL ECG -   Sinus rhythm   RBBB and LAFB   Electronically Signed By:    Date and Time of Study: 2023-09-17 01:06:50      ECG 12 Lead ED Triage Standing Order; Weak / Dizzy / AMS   Preliminary Result   HEART RATE= 79   bpm   RR Interval= 760  ms   [place] Interval= 192  ms   P Horizontal Axis= 3  deg   P Front Axis= 36  deg   QRSD Interval= 149  ms   QT Interval= 444  ms   QTcB= 509  ms   QRS Axis= -75  deg   T Wave Axis= 58  deg   - ABNORMAL ECG -   Sinus rhythm   Right bundle branch block   Electronically Signed By:    Date and Time of Study: 2023-09-16 18:41:44            Echocardiogram:    Results for orders placed during the hospital encounter of 04/07/23    Adult Transthoracic Echo Complete W/ Cont if Necessary Per Protocol    Interpretation Summary    Left ventricular systolic function is normal. Calculated left ventricular EF = 51%    Left ventricular diastolic function is consistent with (grade I) impaired relaxation.    There is calcification of the aortic valve.        Stress Test:  Results for orders placed during the hospital encounter of 06/05/23    Stress test with myocardial perfusion one day    Interpretation Summary    Left ventricular ejection fraction is moderately reduced (Calculated EF = 26%).    Low probability of ischemia in this study, the ejection fraction is probably not very accurate, I will review the echocardiogram.        Cardiac Catheterization:  No results found for this or any previous visit.        Other:      ASSESSMENT & PLAN:    Principal Problem:    Syncope    Syncope  He presents with episode of syncope witnessed by his wife.  ECG is abnormal suggestive of a wide QRS and right bundle branch block pattern.  He also has left anterior fascicular block.  Avoid beta-blockers.  Obtain an echocardiogram.  Previous echo 5 months ago was unremarkable except for grade 1 diastolic dysfunction.  A previous nuclear stress test in June 2023 was negative for ischemia.  He will need 14 days Holter/MCOT at the time of discharge to rule out arrhythmic/bradycardic causes for syncope.  IV fluids for JORGE    Paroxysmal atrial fibrillation  Overnight telemetry monitoring shows 10-12 beats of atrial  fibrillation.  [id number] score is 5.  Start anticoagulation with Eliquis 5 mg p.o. twice daily.  14 days Holter monitor/MCOT at the time of discharge.  Holding off on AV mary blocking agents due to right bundle branch block and left anterior fascicular block.  Heart rate is currently well controlled.    Hypertension  He is currently on losartan and Coreg.  Recommend alternative antihypertensive agent to Coreg.  Consider nifedipine/amlodipine for better blood pressure control.    Hyperlipidemia  He is currently on a high intensity statin.  LDL 34, HDL 34, triglyceride 134 and total cholesterol 92    Diabetes  He is on a insulin regimen.  Repeat A1c is pending, previous was 8.8    Hypothyroidism  Continue levothyroxine  TSH is 0.9, normal    Obesity  BMI is 33.  Lifestyle modification recommended to the patient.  Screening and treatment for sleep apnea suggested.    Anxiety/depression  He is typically on paroxetine      Deondre Galo MD  09/17/23  08:50 EDT

## 2023-09-17 NOTE — PLAN OF CARE
Goal Outcome Evaluation:  Plan of Care Reviewed With: patient, spouse        Progress: no change  Outcome Evaluation: New admit this shift. Patient rested well. No acute changes throughout night. VSS. Wife at bedside.

## 2023-09-17 NOTE — PLAN OF CARE
Goal Outcome Evaluation:  Plan of Care Reviewed With: patient        Progress: improving  Outcome Evaluation: Pt is at prior level of function therefore no skilled PT intervention is needed at this time.  Will DC PT.      Anticipated Discharge Disposition (PT): home

## 2023-09-17 NOTE — CONSULTS
"[facility]  [facility] Consult [facility]    Routine Consult New    Patient Name:   Giles Donovan  YOB: 1945  Identification Number:   MRN - [id number]  Date of Service:   09/17/2023 09:08:34    Diagnosis        R55 - Syncope (blackout, fainting, vasovagal attack)    Impression  76 yo male PMH HTN, HL, DM who presents with presyncope/syncope episodes x 2. Recommend:    - MRI brain  - Orthostatics  - F/up cardiology evaluation  - Stop energy drinks  - Neurology will follow    Our recommendations are outlined below    Diagnostic Studies :  Recommend MRI brain without contrast    Nursing Recommendations :  Orthostatic Hypotension check lying, sitting, and standingWhen possible avoid benzodiazepines, opioid pain medications, and anticholinergic medications    DVT Prophylaxis :  Choice of Primary Team    Disposition :  Neurology will follow    ----------------------------------------------------------------------------------------------------    Imaging  CT head w/o cont:  No acute brain abnormality is seen.    Chief Complaint:  syncope    History of Present Illness:  Patient is a 77 year old Male.  Per H&P: \"77 y.o. male brought to the emergency department for evaluation of passing out. Patient had 2 episodes of passing out while eating dinner tonight. Witnessed by his wife, who is at bedside. Patient had become unresponsive and slid down to the floor. Patient's wife had assisted the fall, patient did not strike his head. There is no reports of obvious seizure activity, however patient was \"completely unresponsive\". Patient's blood sugar was checked at home, which was reportedly not low at that time. Patient states that he has had his blood pressure medication recently titrated by his primary physician. After the first episode, he patient was sat up, passed completely back out again. Patient denies ever having these episodes in the past, denies any chest pain, dyspnea, headache, " "nausea, vomiting, diarrhea. Patient reports any changes in stool quality, including any gross bloody or black movements.\"    He was at the table eating brunch around 2PM and he had started eating and drinking an energy drink. He started to feel generalized weak, felt shakey in hand then whole body lasted about 5-10 minutes. Patient then was felt was going to pass out but states did not lose consciousness and lowered to ground. After few minutes, felt better and back in chair but then recurrent sx though did not fall to ground. Per ivan, first thing she noticed that he was shakey/trembling while he was seated. She then went to him and he seemed sleepy and looked like he was leaning over like he was going to pass out. He was answering her when she asked questions but wasn't saying too much. Because he appeared like he was going to pass out, her and sister laid him to the ground. On the ground, he was more awake and speaking so she had him laid down for a few minutes. They helped him off the floor and he sat in chair and started eating again. She noted he started trembling again and so she called 911. She was propping him and he vomited. She notes he was lethargic with second time but didn't completely pass out. He notes he drinks energy drinks once/day and has been drinking this daily for 2 hours. He tried a new energy drink yesterday that he doesn't usually drink. Ivan checked his BP 64/54ish, glu and O2 was normal.    He notes he passed out once before. Sometimes when he coughs hard, he looks like he might pass out and ivan will rub his chest and yell his name until he's back to his normal self. He once passed out in setting of severe hypoglycemia with glu in the 54.    He has a history of carotid occlusion on right side which is chronic, left sided mild stenosis.      Past Medical History:       Hypertension       Diabetes Mellitus       Hyperlipidemia       There is no history of Atrial Fibrillation       " There is no history of Coronary Artery Disease       There is no history of Stroke       There is no history of Covid-19    Medications:    No Anticoagulant use   No Antiplatelet use  Reviewed EMR for current medications    Allergies:   Reviewed    Social History:  Smoking: No  Alcohol Use: Yes  Drug Use: No    Family History:    There is no family history of premature cerebrovascular disease pertinent to this consultation    ROS :  14 Points Review of Systems was performed and was negative except mentioned in HPI.    Past Surgical History:  There Is No Surgical History Contributory To Today’s Visit    Examination  BP(160/82), Pulse(86), Temp(97.9), Resp(16),  1A: Level of Consciousness - Alert; keenly responsive + 0  1B: Ask Month and Age - Both Questions Right + 0  1C: Blink Eyes & Squeeze Hands - Performs Both Tasks + 0  2: Test Horizontal Extraocular Movements - Normal + 0  3: Test Visual Fields - No Visual Loss + 0  4: Test Facial Palsy (Use Grimace if Obtunded) - Normal symmetry + 0  5A: Test Left Arm Motor Drift - No Drift for 10 Seconds + 0  5B: Test Right Arm Motor Drift - No Drift for 10 Seconds + 0  6A: Test Left Leg Motor Drift - No Drift for 5 Seconds + 0  6B: Test Right Leg Motor Drift - No Drift for 5 Seconds + 0  7: Test Limb Ataxia (FNF/Heel-Shin) - No Ataxia + 0  8: Test Sensation - Normal; No sensory loss + 0  9: Test Language/Aphasia - Normal; No aphasia + 0  10: Test Dysarthria - Normal + 0  11: Test Extinction/Inattention - No abnormality + 0    NIHSS Score: 0  NIHSS Free Text : AAOx3  Chronic dec 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    Telehealth Neurology consultation was provided. I spent minutes providing telehealth care. This includes time spent for face to face visit via telemedicine,  review of medical records, imaging studies and discussion of findings with providers, the patient and/or family.      Dr Devi Almanza      [facility]  For Inpatient follow-up with [facility] physician please call [facility] [phone]. This is not an outpatient service. Post hospital discharge, please contact hospital directly.

## 2023-09-17 NOTE — PROGRESS NOTES
" [facility]   Hospitalist Progress Note  Date: 2023  Patient Name: Giles Donovan  : 1945  MRN: [id number]  Date of admission: 2023      Subjective   Subjective     Chief Complaint: Passing out    Summary: 77 y.o. male brought to the emergency department for evaluation of passing out.  Patient had 2 episodes of passing out while eating dinner tonight.  Witnessed by his wife, who is at bedside.  Patient had become unresponsive and slid down to the floor.  Patient's wife had assisted the fall, patient did not strike his head.  There is no reports of obvious seizure activity, however patient was \"completely unresponsive\".  Patient's blood sugar was checked at home, which was reportedly not low at that time.     Interval Followup: No acute events overnight.  He states he is back to his baseline, really has no new complaints today.  He has never experienced a presyncopal episode like he did yesterday.  He does state that he was aware the entire time and did not fully pass out.  Found to have short burst of atrial fibrillation overnight.    Objective   Objective     Vitals:   Temp:  [97.2 °F (36.2 °C)-98.4 °F (36.9 °C)] 98.4 °F (36.9 °C)  Heart Rate:  [78-86] 81  Resp:  [16] 16  BP: (121-160)/(66-84) 136/71  Flow (L/min):  [2] 2  Physical Exam    Constitutional: Awake, alert, no acute distress   Respiratory: Clear to auscultation bilaterally, nonlabored respirations    Cardiovascular: RRR, no MRG   Gastrointestinal: Positive bowel sounds, soft, nontender, nondistended   Neurologic: Oriented x 3, strength symmetric in all extremities, Cranial Nerves grossly intact to confrontation, speech clear    Result Review    Result Review:  I have personally reviewed the results below:  [x]  Laboratory personally reviewed BMP, CBC, iron panel, lipid panel, magnesium, phosphorus  []  Microbiology  []  Radiology  [x]  EKG/Telemetry telemetry reviewed were normal sinus rhythm with 10-12 beats of paroxysmal " atrial fibrillation overnight  []  Cardiology/Vascular   []  Pathology  []  Old records  []  Other:  CBC          8/1/2023    05:38 9/16/2023    18:41 9/17/2023    04:53 9/17/2023    07:59 9/17/2023    11:45   CBC   WBC 10.99  8.60  7.32      RBC 3.64  3.62  3.71      Hemoglobin 11.0  11.2  11.6  11.5  11.6    Hematocrit 34.2  33.3  34.5  34.0  34.7    MCV 94.0  92.0  93.0      MCH 30.2  30.9  31.3      MCHC 32.2  33.6  33.6      RDW 14.8  13.2  13.3      Platelets 179  206  188        CMP          8/1/2023    05:38 9/16/2023    18:41 9/17/2023    04:53   CMP   Glucose 161  181  326    BUN 13  19  18    Creatinine 0.86  1.60  1.31    EGFR 89.7  44.4  56.4    Sodium 137  141  138    Potassium 4.1  4.6  4.6    Chloride 100  101  101    Calcium 9.3  9.5  9.5    Total Protein  7.0     Albumin  4.0     Globulin  3.0     Total Bilirubin  0.5     Alkaline Phosphatase  56     AST (SGOT)  27     ALT (SGPT)  18     Albumin/Globulin Ratio  1.3     BUN/Creatinine Ratio 15.1  11.9  13.7    Anion Gap 9.8  11.4  9.2        Assessment & Plan   Assessment / Plan     Assessment/Plan:  Witnessed presyncope/presyncope  Questionable seizure  JORGE  Left anterior fascicular block  Right bundle branch block  Normocytic anemia  Paroxysmal atrial fibrillation  Hypertension  Hyperlipidemia  Type diabetes mellitus  Hypothyroidism    Continue to monitor in the hospital for work-up and management of the above  Consult cardiology and neurology, appreciate assistance  Found to have 10-12 beats of atrial fibrillation overnight, chads Vascor is 5, start anticoagulation with Eliquis 5 mg twice daily  Would benefit from 14-day Holter monitor at discharge, cardiology to set up  Hold all AV mary blocking agents due to right bundle branch block and left anterior fascicular block  Obtain orthostatic vital signs  Carotid duplex pending  Obtain 2D echo  DC Coreg now at discharge, will start amlodipine 5 mg daily for blood pressure control  Continue aspirin  and statin  Restart home gabapentin, Symbicort  Start detemir 20 units twice daily, start sliding scale insulin  Heparin for DVT prophylaxis  Trend renal function and electrolytes with a.m. BMP, magnesium   Trend Hgb and WBC with a.m. CBC     Discussed plan with RN, cardiology, neurology    DVT prophylaxis:  Medical DVT prophylaxis orders are present.    CODE STATUS:   Level Of Support Discussed With: Patient  Code Status (Patient has no pulse and is not breathing): CPR (Attempt to Resuscitate)  Medical Interventions (Patient has pulse or is breathing): Full Support      Electronically signed by Cliff Albert MD, 09/17/23, 12:11 PM EDT.

## 2023-09-17 NOTE — THERAPY EVALUATION
Acute Care - Physical Therapy Initial Evaluation   [facility]     Patient Name: Giles Donovan  : 1945  MRN: [id number]  Today's Date: 2023   Onset of Illness/Injury or Date of Surgery: 23  Visit Dx:     ICD-10-CM ICD-9-CM   1. Syncope and collapse  R55 780.2   2. Difficulty in walking  R26.2 719.7     Patient Active Problem List   Diagnosis    Allergic rhinitis    Controlled type 2 diabetes mellitus without complication, without long-term current use of insulin    GERD (gastroesophageal reflux disease)    Hyperlipidemia    Hypertension    Hypothyroidism    Stenosis of right carotid artery    Syncope    Urge incontinence of urine    Erectile dysfunction    Benign prostatic hyperplasia with urinary frequency    Cardiomyopathy    Cataract    Chest pain with low risk for cardiac etiology    Right-sided carotid artery occlusion without cerebral infarction    Depression    Vaso vagal episode    Hypoxia    Chronic lung disease    Mixed hyperlipidemia    Diastolic CHF, chronic    Carotid artery occlusion    Impacted cerumen, bilateral    Primary insomnia    Unsteady gait when walking     Past Medical History:   Diagnosis Date    Allergic rhinitis 2014    Will try Zyrtec, he didnt want to use a nasal spray.    Arthritis     Asthma     Cataract     left eye    Cough 2016    PFT and CXR ordered.    Depression     PTSD    Diabetes     Elevated cholesterol     H/O psychiatric care     PTSD    Hyperlipidemia 2016    Lipids ordered. Continue on current medication.    Hypertension     Hypothyroidism     Seasonal allergies     Shortness of breath     Sleep apnea     Stenosis of right carotid artery 2017    Will refer to vascular surgeon.    Syncope 2017    Type 2 diabetes mellitus     Upper respiratory infection 10/18/2015    Will treat cough with Hydromet, otherwise over the counter meds for treatment.     Past Surgical History:   Procedure Laterality Date    CATARACT  EXTRACTION Right     x2    COLONOSCOPY      JOINT REPLACEMENT      REPLACEMENT TOTAL HIP ONCOLOGIC Right     RETINAL DETACHMENT REPAIR      TOE AMPUTATION Left 11/2017    Second phalaeng.    TOE OSTEOPHYTE REMOVAL       PT Assessment (last 12 hours)       PT Evaluation and Treatment       Row Name 09/17/23 0921          Physical Therapy Time and Intention    Subjective Information no complaints  -DW     Document Type evaluation  -DW     Mode of Treatment individual therapy;physical therapy  -DW     Patient Effort excellent  -DW     Symptoms Noted During/After Treatment none  -DW       Row Name 09/17/23 0921          General Information    Patient Profile Reviewed yes  -DW     Onset of Illness/Injury or Date of Surgery 09/16/23  -DW     Referring Physician Cliff Sheth  -DW     Patient Observations alert  -DW     Prior Level of Function independent:;all household mobility;community mobility;ADL's  -DW     Equipment Currently Used at Home oxygen;walker, rolling  -DW     Risks Reviewed patient:  -DW     Benefits Reviewed patient:  -DW       Row Name 09/17/23 0921          Previous Level of Function/Home Environm    BADLs, Premorbid Functional Level independent  -DW     IADLs, Premorbid Functional Level independent  -DW     Bed Mobility, Premorbid Functional Level independent  -DW     Transfers, Premorbid Functional Level independent  -DW     Household Ambulation, Premorbid Functional Level independent  -DW     Stairs, Premorbid Functional Level independent  -DW     Community Ambulation, Premorbid Functional Level independent  -DW       Row Name 09/17/23 0921          Living Environment    Current Living Arrangements home  -DW     People in Home alone  -DW     Primary Care Provided by self  -DW       Row Name 09/17/23 0921          Home Use of Assistive/Adaptive Equipment    Equipment Currently Used at Home walker, standard;oxygen;cane, straight;pulse ox;glucometer;bp cuff;wheelchair  -DW       Row Name 09/17/23  0921          Pain    Pretreatment Pain Rating 0/10 - no pain  -DW       [facility] Name 09/17/23 0921          Cognition    Affect/Mental Status (Cognition) WNL  -DW     Orientation Status (Cognition) oriented x 3  -DW     Follows Commands (Cognition) WNL  -DW     Cognitive Function WNL  -DW       [facility] Name 09/17/23 0921          Range of Motion Comprehensive    General Range of Motion no range of motion deficits identified  -[facility] Name 09/17/23 0921          Strength Comprehensive (MMT)    General Manual Muscle Testing (MMT) Assessment no strength deficits identified  -[facility] Name 09/17/23 0921          Bed Mobility    Bed Mobility bed mobility (all) activities  -DW     All Activities, Angelina (Bed Mobility) independent  -DW       [facility] Name 09/17/23 0921          Transfers    Transfers sit-stand transfer;stand-sit transfer  -DW       [facility] Name 09/17/23 0921          Sit-Stand Transfer    Sit-Stand Angelina (Transfers) independent  -DW     Assistive Device (Sit-Stand Transfers) walker, front-wheeled  -DW       [facility] Name 09/17/23 0921          Stand-Sit Transfer    Stand-Sit Angelina (Transfers) independent  -DW     Assistive Device (Stand-Sit Transfers) walker, front-wheeled  -DW       [facility] Name 09/17/23 0921          Gait/Stairs (Locomotion)    Gait/Stairs Locomotion gait/ambulation independence;gait/ambulation assistive device;distance ambulated  -     Angelina Level (Gait) independent  -DW     Assistive Device (Gait) walker, front-wheeled  -     Distance in Feet (Gait) 50  -DW       [facility] Name 09/17/23 0921          Safety Issues, Functional Mobility    Impairments Affecting Function (Mobility) other (see comments)  None  -DW       Row Name 09/17/23 0921          Balance    Balance Assessment standing dynamic balance  -DW     Dynamic Standing Balance independent  -DW     Position/Device Used, Standing Balance walker, front-wheeled  -DW       Row Name 09/17/23 0921          Plan of Care Review     Plan of Care Reviewed With patient  -DW     Progress improving  -     Outcome Evaluation Pt is at prior level of function therefore no skilled PT intervention is needed at this time.  Will DC PT.  -DW       Row Name 09/17/23 0921          Therapy Assessment/Plan (PT)    PT Diagnosis (PT) Difficulty in walking  -DW     Criteria for Skilled Interventions Met (PT) no;no problems identified which require skilled intervention  -DW     Therapy Frequency (PT) evaluation only  -DW       Row Name 09/17/23 0921          PT Evaluation Complexity    History, PT Evaluation Complexity no personal factors and/or comorbidities  -DW     Examination of Body Systems (PT Eval Complexity) 1-2 elements  -DW     Clinical Presentation (PT Evaluation Complexity) stable  -DW     Clinical Decision Making (PT Evaluation Complexity) low complexity  -DW     Overall Complexity (PT Evaluation Complexity) low complexity  -DW       Row Name 09/17/23 0921          Therapy Plan Review/Discharge Plan (PT)    Therapy Plan Review (PT) evaluation/treatment results reviewed  -               User Key  (r) = Recorded By, (t) = Taken By, (c) = Cosigned By      Initials Name Provider Type    Jairo Meng, PT Physical Therapist                    Physical Therapy Education       Title: PT OT SLP Therapies (Done)       Topic: Physical Therapy (Done)       Point: Mobility training (Done)       Learning Progress Summary             Patient Acceptance, E,TB, VU by ARABELLA at 9/17/2023 0926                         Point: Home exercise program (Done)       Learning Progress Summary             Patient Acceptance, E,TB, VU by ARABELLA at 9/17/2023 0926                         Point: Body mechanics (Done)       Learning Progress Summary             Patient Acceptance, E,TB, VU by ARABELLA at 9/17/2023 0926                         Point: Precautions (Done)       Learning Progress Summary             Patient Acceptance, E,TB, VU by ARABELLA at 9/17/2023 0926                                          User Key       Initials Effective Dates Name Provider Type Discipline     04/25/21 -  Jairo Gandara PT Physical Therapist PT                  PT Recommendation and Plan  Anticipated Discharge Disposition (PT): home  Therapy Frequency (PT): evaluation only  Plan of Care Reviewed With: patient  Progress: improving  Outcome Evaluation: Pt is at prior level of function therefore no skilled PT intervention is needed at this time.  Will DC PT.   Outcome Measures       Row Name 09/17/23 0925             How much help from another person do you currently need...    Turning from your back to your side while in flat bed without using bedrails? 4  -DW      Moving from lying on back to sitting on the side of a flat bed without bedrails? 4  -DW      Moving to and from a bed to a chair (including a wheelchair)? 4  -DW      Standing up from a chair using your arms (e.g., wheelchair, bedside chair)? 4  -DW      Climbing 3-5 steps with a railing? 4  -DW      To walk in hospital room? 4  -DW      AM-PAC 6 Clicks Score (PT) 24  -DW         Functional Assessment    Outcome Measure Options AM-PAC 6 Clicks Basic Mobility (PT)  -DW                User Key  (r) = Recorded By, (t) = Taken By, (c) = Cosigned By      Initials Name Provider Type     Jairo Gandara PT Physical Therapist                     Time Calculation:    PT Charges       Row Name 09/17/23 0927             Time Calculation    PT Received On 09/17/23  -DW         Untimed Charges    PT Eval/Re-eval Minutes 15  -DW         Total Minutes    Untimed Charges Total Minutes 15  -DW       Total Minutes 15  -DW                User Key  (r) = Recorded By, (t) = Taken By, (c) = Cosigned By      Initials Name Provider Type     Jairo Gandara PT Physical Therapist                  Therapy Charges for Today       Code Description Service Date Service Provider Modifiers Qty    18292685455 HC PT EVAL LOW COMPLEXITY 2 9/17/2023 Jairo Gandara PT GP 1             PT G-Codes  Outcome Measure Options: AM-PAC 6 Clicks Basic Mobility (PT)  AM-PAC 6 Clicks Score (PT): 24    Jairo Gandara, PT  9/17/2023

## 2023-09-17 NOTE — ED PROVIDER NOTES
Time: 9:22 PM EDT  Date of encounter:  9/16/2023  Independent Historian/Clinical History and Information was obtained by:   Patient and Family    History is limited by: N/A    Chief Complaint: Passed out      History of Present Illness:  Patient is a 77 y.o. year old male who presents to the emergency department for evaluation of passing out.  The patient's wife states that he was sitting down and eating and while he was doing that he started becoming unresponsive and eventually collapsed down to the floor.  The patient not injure himself and did not have any obvious seizure activity however he did become completely unresponsive.  The patient's wife states that he had been eating and she did did check his blood sugar which was not low at the time however his blood pressure appeared to be somewhat low.  She states that she sat him up after that and he had a second episode where he completely passed out.  Currently the patient states that he feels better he has no headache or chest pain he denies any nausea vomiting or diarrhea he has had no bloody or black bowel movements    HPI    Patient Care Team  Primary Care Provider: Morena Masters MD    Past Medical History:     Allergies   Allergen Reactions    Latex Rash and Anaphylaxis    Latex, Natural Rubber Itching     Past Medical History:   Diagnosis Date    Allergic rhinitis 12/27/2014    Will try Zyrtec, he didnt want to use a nasal spray.    Arthritis     Asthma     Cataract     left eye    Cough 03/30/2016    PFT and CXR ordered.    Depression     PTSD    Diabetes     Elevated cholesterol     H/O psychiatric care 1999    PTSD    Hyperlipidemia 03/30/2016    Lipids ordered. Continue on current medication.    Hypertension     Hypothyroidism     Seasonal allergies     Shortness of breath     Sleep apnea     Stenosis of right carotid artery 02/19/2017    Will refer to vascular surgeon.    Syncope 02/19/2017    Type 2 diabetes mellitus     Upper respiratory  infection 10/18/2015    Will treat cough with Hydromet, otherwise over the counter meds for treatment.     Past Surgical History:   Procedure Laterality Date    CATARACT EXTRACTION Right     x2    COLONOSCOPY      JOINT REPLACEMENT      REPLACEMENT TOTAL HIP ONCOLOGIC Right     RETINAL DETACHMENT REPAIR      TOE AMPUTATION Left 11/2017    Second phalaeng.    TOE OSTEOPHYTE REMOVAL       Family History   Problem Relation Age of Onset    Heart disease Mother     Lupus Mother     Arthritis Mother     Kidney disease Sister        Home Medications:  Prior to Admission medications    Medication Sig Start Date End Date Taking? Authorizing Provider   albuterol sulfate  (90 Base) MCG/ACT inhaler Inhale 2 puffs Every 4 (Four) Hours As Needed for Wheezing or Shortness of Air. 2/21/22   Morena Masters MD   aspirin EC 81 MG EC tablet Take 1 tablet by mouth Daily. 7/23/21   Kierra Pitts MD   atorvastatin (LIPITOR) 80 MG tablet Take 1 tablet by mouth Daily. 8/9/22   Cathy Ochoa MD   carvedilol (COREG) 6.25 MG tablet Take 1 tablet by mouth 2 (Two) Times a Day With Meals for 30 days. 8/9/23 9/8/23  Kati Warren PA-C   cetirizine (ZyrTEC Allergy) 10 MG tablet Take 1 tablet by mouth Daily. 8/9/22   Cathy Ochoa MD   ezetimibe (ZETIA) 10 MG tablet Take 1 tablet by mouth Daily.    Kierra Pitts MD   felodipine (PLENDIL) 5 MG 24 hr tablet Take 1 tablet by mouth Daily.    Kierra Pitts MD   fluticasone-salmeterol (ADVAIR HFA) 230-21 MCG/ACT inhaler Inhale 2 puffs 2 (Two) Times a Day.    Kierra Pitts MD   gabapentin (NEURONTIN) 600 MG tablet Take 1 tablet by mouth 2 (Two) Times a Day. 3/31/21   Kierra Pitts MD   insulin aspart (novoLOG) 100 UNIT/ML injection Inject 24-26 Units under the skin into the appropriate area as directed 3 (Three) Times a Day Before Meals.    Kierra Pitts MD   Insulin Glargine (Lantus SoloStar) 100 UNIT/ML injection pen Inject 44 Units under  the skin into the appropriate area as directed Every Night.    Kierra Pitts MD   levothyroxine (SYNTHROID, LEVOTHROID) 100 MCG tablet Take 1 tablet by mouth Daily.    Kierra Pitts MD   losartan (COZAAR) 100 MG tablet Take 1 tablet by mouth Daily. 2/27/23   Kierra Pitts MD   metFORMIN (GLUCOPHAGE) 500 MG tablet Take 1 tablet by mouth 2 (Two) Times a Day With Meals.    Kierra Pitts MD   olopatadine (PATANOL) 0.1 % ophthalmic solution Administer 1 drop to both eyes Daily As Needed for Allergies.    Kierra Pitts MD   omeprazole (priLOSEC) 20 MG capsule Take 1 capsule by mouth Daily.    Kierra Pitts MD   oxybutynin XL (DITROPAN-XL) 10 MG 24 hr tablet Take 1 tablet by mouth Daily. 6/6/23   Kati Warren PA-C   PARoxetine (PAXIL) 40 MG tablet Take 1 tablet by mouth Every Evening. 4/5/21   Kierra Pitts MD   tadalafil (Cialis) 10 MG tablet Take 1 tablet by mouth Daily As Needed for Erectile Dysfunction. 9/6/23   Morena Masters MD   traZODone (DESYREL) 100 MG tablet Take 1 tablet by mouth Every Night. 4/22/22   Kierra Pitts MD        Social History:   Social History     Tobacco Use    Smoking status: Never    Smokeless tobacco: Never   Vaping Use    Vaping Use: Never used   Substance Use Topics    Alcohol use: Yes     Comment: 2TALL BEERS/DAY    Drug use: Never         Review of Systems:  Review of Systems   Constitutional:  Negative for chills and fever.   HENT:  Negative for congestion, ear pain and sore throat.    Eyes:  Negative for pain.   Respiratory:  Negative for cough, chest tightness and shortness of breath.    Cardiovascular:  Negative for chest pain.   Gastrointestinal:  Negative for abdominal pain, diarrhea, nausea and vomiting.   Genitourinary:  Negative for flank pain and hematuria.   Musculoskeletal:  Negative for joint swelling.   Skin:  Negative for pallor.   Neurological:  Positive for syncope, weakness and light-headedness.  "Negative for seizures and headaches.   Hematological: Negative.    Psychiatric/Behavioral: Negative.     All other systems reviewed and are negative.     Physical Exam:  /66   Pulse 79   Temp 97.2 °F (36.2 °C) (Oral)   Resp 16   Ht 171.5 cm (67.5\")   Wt 98.1 kg (216 lb 4.3 oz)   SpO2 100%   BMI 33.37 kg/m²     Physical Exam  Vitals and nursing note reviewed.   Constitutional:       General: He is not in acute distress.     Appearance: Normal appearance. He is not toxic-appearing.   HENT:      Head: Normocephalic and atraumatic.      Mouth/Throat:      Mouth: Mucous membranes are moist.   Eyes:      General: No scleral icterus.  Cardiovascular:      Rate and Rhythm: Normal rate and regular rhythm.      Pulses: Normal pulses.      Heart sounds: Normal heart sounds.   Pulmonary:      Effort: Pulmonary effort is normal. No respiratory distress.      Breath sounds: Normal breath sounds.   Abdominal:      General: Abdomen is flat.      Palpations: Abdomen is soft.      Tenderness: There is no abdominal tenderness.   Musculoskeletal:         General: Normal range of motion.      Cervical back: Normal range of motion and neck supple.   Skin:     General: Skin is warm and dry.   Neurological:      Mental Status: He is alert and oriented to person, place, and time. Mental status is at baseline.   Psychiatric:         Mood and Affect: Mood normal.         Behavior: Behavior normal.         Thought Content: Thought content normal.         Judgment: Judgment normal.              Procedures:  Procedures      Medical Decision Making:      Comorbidities that affect care:    Diabetes, Hypertension    External Notes reviewed:    Previous Clinic Note: Primary care visit for diabetes.      The following orders were placed and all results were independently analyzed by me:  Orders Placed This Encounter   Procedures    XR Chest 1 View    [place] Draw    Comprehensive Metabolic Panel    Single High Sensitivity Troponin T    " Magnesium    Urinalysis With Microscopic If Indicated (No Culture) - Urine, Clean Catch    CBC Auto Differential    NPO Diet NPO Type: Strict NPO    Undress & Gown    Continuous Pulse Oximetry    Vital Signs    Orthostatic Blood Pressure    Code Status and Medical Interventions:    Hospitalist (on-call MD unless specified)    Oxygen Therapy- Nasal Cannula; Titrate 1-6 LPM Per SpO2; 90 - 95%    POC Glucose Once    POC Glucose STAT    POC Glucose Once    ECG 12 Lead ED Triage Standing Order; Weak / Dizzy / AMS    Insert Peripheral IV    Initiate Observation Status    Fall Precautions    CBC & Differential    Green Top (Gel)    Lavender Top    Gold Top - SST    Light Blue Top       Medications Given in the Emergency Department:  Medications   sodium chloride 0.9 % flush 10 mL (has no administration in time range)        ED Course:    ED Course as of 09/16/23 2311   Sat Sep 16, 2023   1840 BP at home PTA was 98/54 [AJ]      ED Course User Index  [AJ] Letty Mcmullen PA-C       EKG:  Sinus rhythm with a rate of 79 beats per  Normal P wave and ND interval  Right bundle branch block with left axis deviation  Nonspecific ST changes  Normal QT QTc interval.    Labs:    Lab Results (last 24 hours)       Procedure Component Value Units Date/Time    CBC & Differential [[id number]]  (Abnormal) Collected: 09/16/23 1841    Specimen: Blood Updated: 09/16/23 1848    Narrative:      The following orders were created for panel order CBC & Differential.  Procedure                               Abnormality         Status                     ---------                               -----------         ------                     CBC Auto Differential[[id number]]        Abnormal            Final result                 Please view results for these tests on the individual orders.    Comprehensive Metabolic Panel [[id number]]  (Abnormal) Collected: 09/16/23 1841    Specimen: Blood Updated: 09/16/23 1917     Glucose 181 mg/dL      BUN 19  mg/dL      Creatinine 1.60 mg/dL      Sodium 141 mmol/L      Potassium 4.6 mmol/L      Comment: Slight hemolysis detected by analyzer. Results may be affected.        Chloride 101 mmol/L      CO2 28.6 mmol/L      Calcium 9.5 mg/dL      Total Protein 7.0 g/dL      Albumin 4.0 g/dL      ALT (SGPT) 18 U/L      AST (SGOT) 27 U/L      Comment: Slight hemolysis detected by analyzer. Results may be affected.        Alkaline Phosphatase 56 U/L      Total Bilirubin 0.5 mg/dL      Globulin 3.0 gm/dL      A/G Ratio 1.3 g/dL      BUN/Creatinine Ratio 11.9     Anion Gap 11.4 mmol/L      eGFR 44.4 mL/min/1.73     Narrative:      GFR Normal >60  Chronic Kidney Disease <60  Kidney Failure <15    The GFR formula is only valid for adults with stable renal function between ages 18 and 70.    Single High Sensitivity Troponin T [[id number]]  (Abnormal) Collected: 09/16/23 1841    Specimen: Blood Updated: 09/16/23 1909     HS Troponin T 33 ng/L     Narrative:      High Sensitive Troponin T Reference Range:  <10.0 ng/L- Negative Female for AMI  <15.0 ng/L- Negative Male for AMI  >=10 - Abnormal Female indicating possible myocardial injury.  >=15 - Abnormal Male indicating possible myocardial injury.   Clinicians would have to utilize clinical acumen, EKG, Troponin, and serial changes to determine if it is an Acute Myocardial Infarction or myocardial injury due to an underlying chronic condition.         Magnesium [[id number]]  (Normal) Collected: 09/16/23 1841    Specimen: Blood Updated: 09/16/23 1917     Magnesium 1.6 mg/dL     CBC Auto Differential [[id number]]  (Abnormal) Collected: 09/16/23 1841    Specimen: Blood Updated: 09/16/23 1848     WBC 8.60 10*3/mm3      RBC 3.62 10*6/mm3      Hemoglobin 11.2 g/dL      Hematocrit 33.3 %      MCV 92.0 fL      MCH 30.9 pg      MCHC 33.6 g/dL      RDW 13.2 %      RDW-SD 45.0 fl      MPV 10.1 fL      Platelets 206 10*3/mm3      Neutrophil % 71.6 %      Lymphocyte % 16.5 %      Monocyte % 9.5 %       Eosinophil % 1.9 %      Basophil % 0.3 %      Immature Grans % 0.2 %      Neutrophils, Absolute 6.15 10*3/mm3      Lymphocytes, Absolute 1.42 10*3/mm3      Monocytes, Absolute 0.82 10*3/mm3      Eosinophils, Absolute 0.16 10*3/mm3      Basophils, Absolute 0.03 10*3/mm3      Immature Grans, Absolute 0.02 10*3/mm3      nRBC 0.0 /100 WBC     POC Glucose Once [[id number]]  (Abnormal) Collected: 09/16/23 1904    Specimen: Blood Updated: 09/16/23 1906     Glucose 157 mg/dL      Comment: Serial Number: [id number]:  [id number]                Imaging:    XR Chest 1 View    Result Date: 9/16/2023  PROCEDURE: XR CHEST 1 VW  COMPARISON: [facility], CR, CHEST PA/AP & LAT 2V, 3/24/2015, 15:07.  [facility], CR, XR CHEST 1 VW, 4/07/2023, 13:02.  [facility], CR, XR CHEST 1 VW, 7/30/2023, 4:55.  INDICATIONS: Weak/Dizzy/AMS triage protocol  FINDINGS:  The heart remains borderline in size.  The pulmonary vascularity does not appear congested.  The lungs are well-expanded and free of infiltrates.  Bony structures appear intact.        Borderline cardiomegaly.  No active pulmonary disease is seen.       JONATAN LONGORIA MD       Electronically Signed and Approved By: JONATAN LONGORIA MD on 9/16/2023 at 18:57                Differential Diagnosis and Discussion:    Syncope: Differential diagnosis includes but is not limited to TIA, hyperventilation, aortic stenosis, pulmonary emboli, myocardial disease, bradycardia arrhythmia, heart block, tachyarrhythmia, vasovagal, orthostatic hypotension, ruptured AAA, aortic dissection, subarachnoid hemorrhage, seizure, hypoglycemia.    All labs were reviewed and interpreted by me.  EKG was interpreted by me.    MDM     Amount and/or Complexity of Data Reviewed  Clinical lab tests: reviewed  Tests in the radiology section of CPT®: reviewed  Tests in the medicine section of CPT®: reviewed  Decide to obtain previous medical records or to obtain history from someone other  than the patient: yes             Patient Care Considerations:    CT HEAD: I considered ordering a noncontrast CT of the head, however the patient had no head trauma and no focal neurologic findings.      Consultants/Shared Management Plan:    Hospitalist: I have discussed the case with hospitalist who agrees to accept the patient for admission.    Social Determinants of Health:    Patient has presented with family members who are responsible, reliable and will ensure follow up care.      Disposition and Care Coordination:    Admit:   Through independent evaluation of the patient's history, physical, and imperical data, the patient meets criteria for observation/admission to the hospital.        Final diagnoses:   Syncope and collapse        ED Disposition       ED Disposition   Decision to Admit    Condition   --    Comment   Level of Care: Telemetry [5]   Diagnosis: Syncope [[id number]]   Admitting Physician: CAMRYN TAVAREZ [[id number]]   Attending Physician: CAMRYN TAVAREZ [[id number]]                 This medical record created using voice recognition software.             Jae Parker, DO  09/16/23 [id number]

## 2023-09-17 NOTE — H&P
" [facility]  Date: 2023   Patient Name: Giles Donovan  : 1945  MRN: [id number]  Primary Care Physician:  Morean Masters MD  Date of admission: 2023    Subjective   Subjective     Chief Complaint: Passing out    HPI:    Giles Donovan is a 77 y.o. male brought to the emergency department for evaluation of passing out.  Patient had 2 episodes of passing out while eating dinner tonight.  Witnessed by his wife, who is at bedside.  Patient had become unresponsive and slid down to the floor.  Patient's wife had assisted the fall, patient did not strike his head.  There is no reports of obvious seizure activity, however patient was \"completely unresponsive\".  Patient's blood sugar was checked at home, which was reportedly not low at that time.  Patient states that he has had his blood pressure medication recently titrated by his primary physician.  After the first episode, he patient was sat up, passed completely back out again.  Patient denies ever having these episodes in the past, denies any chest pain, dyspnea, headache, nausea, vomiting, diarrhea.  Patient reports any changes in stool quality, including any gross bloody or black movements.      Personal History     Past Medical History:  Past Medical History:   Diagnosis Date    Allergic rhinitis 2014    Will try Zyrtec, he didnt want to use a nasal spray.    Arthritis     Asthma     Cataract     left eye    Cough 2016    PFT and CXR ordered.    Depression     PTSD    Diabetes     Elevated cholesterol     H/O psychiatric care     PTSD    Hyperlipidemia 2016    Lipids ordered. Continue on current medication.    Hypertension     Hypothyroidism     Seasonal allergies     Shortness of breath     Sleep apnea     Stenosis of right carotid artery 2017    Will refer to vascular surgeon.    Syncope 2017    Type 2 diabetes mellitus     Upper respiratory infection " 10/18/2015    Will treat cough with Hydromet, otherwise over the counter meds for treatment.         Past Surgical History:  Past Surgical History:   Procedure Laterality Date    CATARACT EXTRACTION Right     x2    COLONOSCOPY      JOINT REPLACEMENT      REPLACEMENT TOTAL HIP ONCOLOGIC Right     RETINAL DETACHMENT REPAIR      TOE AMPUTATION Left 11/2017    Second phalaeng.    TOE OSTEOPHYTE REMOVAL           Family History:   Family History   Problem Relation Age of Onset    Heart disease Mother     Lupus Mother     Arthritis Mother     Kidney disease Sister          Social History:   Social History     Socioeconomic History    Marital status:    Tobacco Use    Smoking status: Never    Smokeless tobacco: Never   Vaping Use    Vaping Use: Never used   Substance and Sexual Activity    Alcohol use: Yes     Comment: 2TALL BEERS/DAY    Drug use: Never    Sexual activity: Defer         Home Medications:  Insulin Glargine, PARoxetine, albuterol sulfate HFA, aspirin, atorvastatin, carvedilol, cetirizine, ezetimibe, felodipine, fluticasone-salmeterol, gabapentin, insulin aspart, levothyroxine, losartan, metFORMIN, olopatadine, omeprazole, oxybutynin XL, tadalafil, and traZODone    Allergies:  Allergies   Allergen Reactions    Latex Rash and Anaphylaxis    Latex, Natural Rubber Itching       Review of Systems   All systems were reviewed and negative except for: Syncope    Objective   Objective     Vitals:   Temp:  [97.2 °F (36.2 °C)] 97.2 °F (36.2 °C)  Heart Rate:  [78-79] 79  Resp:  [16] 16  BP: (121-126)/(66-68) 126/66    Physical Exam    Constitutional: Awake, alert, no acute distress   Eyes: Pupils equal, sclerae anicteric, no conjunctival injection   HENT: NCAT, mucous membranes moist   Neck: Supple, no thyromegaly, no lymphadenopathy, trachea midline   Respiratory: Clear to auscultation bilaterally, nonlabored respirations    Cardiovascular: RRR, no murmurs, rubs, or gallops, palpable pedal pulses  bilaterally   Gastrointestinal: Positive bowel sounds, soft, nontender, nondistended   Musculoskeletal: No bilateral ankle edema, no clubbing or cyanosis to extremities   Psychiatric: Appropriate affect, cooperative   Neurologic: Oriented x 3, strength symmetric in all extremities, Cranial Nerves grossly intact to confrontation, speech clear   Skin: No rashes     Result Review    Result Review:  I have personally reviewed the results from the time of this admission to 9/16/2023 22:06 EDT and agree with these findings:  [x]  Laboratory  []  Microbiology  [x]  Radiology  [x]  EKG/Telemetry   []  Cardiology/Vascular   []  Pathology  []  Old records  []  Other:      Assessment & Plan   Assessment / Plan     Assessment/Plan:   Syncope-2D echo.  Bilateral carotid ultrasound.  Telemetry.  Patient with chronic right bundle branch block, chronically elevated troponin.  EKG and troponins are not significantly off from baseline.  Patient reports history of carotid stenosis in the past, depending on results of carotid ultrasound and degree of stenosis, consider vascular surgery consultation for possible surgical intervention.  Acute kidney injury-IV fluids.  If no improvement next 24 to 48 hours, consider nephrology consultation.  Anemia, unknown etiology-anemia panel.  Hemoccult.  Trend H&H.      DVT prophylaxis: Heparin    CODE STATUS: Full code    Admission Status:  I believe this patient meets sufficient status.    Electronically signed by Abebe Ayala DO, 09/16/23, 10:06 PM EDT.

## 2023-09-17 NOTE — PLAN OF CARE
Goal Outcome Evaluation:   No complaints at this time. Call light in reach. Wife at bedside. Plan of care reviewed with patient and spouse.

## 2023-09-18 ENCOUNTER — APPOINTMENT (OUTPATIENT)
Dept: MRI IMAGING | Facility: HOSPITAL | Age: 78
DRG: 309 | End: 2023-09-18
Payer: MEDICARE

## 2023-09-18 ENCOUNTER — APPOINTMENT (OUTPATIENT)
Dept: CARDIOLOGY | Facility: HOSPITAL | Age: 78
DRG: 309 | End: 2023-09-18
Payer: MEDICARE

## 2023-09-18 ENCOUNTER — APPOINTMENT (OUTPATIENT)
Dept: NEUROLOGY | Facility: HOSPITAL | Age: 78
DRG: 309 | End: 2023-09-18
Payer: MEDICARE

## 2023-09-18 LAB
ANION GAP SERPL CALCULATED.3IONS-SCNC: 6.8 MMOL/L (ref 5–15)
BASOPHILS # BLD AUTO: 0.04 10*3/MM3 (ref 0–0.2)
BASOPHILS NFR BLD AUTO: 0.5 % (ref 0–1.5)
BH CV XLRA MEAS LEFT CAROTID BULB EDV: 18.6 CM/SEC
BH CV XLRA MEAS LEFT CAROTID BULB PSV: 46.6 CM/SEC
BH CV XLRA MEAS LEFT DIST CCA EDV: -15.5 CM/SEC
BH CV XLRA MEAS LEFT DIST CCA PSV: -82 CM/SEC
BH CV XLRA MEAS LEFT DIST ICA EDV: -53.4 CM/SEC
BH CV XLRA MEAS LEFT DIST ICA PSV: -125.5 CM/SEC
BH CV XLRA MEAS LEFT ICA/CCA RATIO: 1.13
BH CV XLRA MEAS LEFT MID ICA EDV: -34.2 CM/SEC
BH CV XLRA MEAS LEFT MID ICA PSV: -105 CM/SEC
BH CV XLRA MEAS LEFT PROX CCA EDV: -18 CM/SEC
BH CV XLRA MEAS LEFT PROX CCA PSV: -98.2 CM/SEC
BH CV XLRA MEAS LEFT PROX ECA EDV: -6.2 CM/SEC
BH CV XLRA MEAS LEFT PROX ECA PSV: -91.9 CM/SEC
BH CV XLRA MEAS LEFT PROX ICA EDV: -28.6 CM/SEC
BH CV XLRA MEAS LEFT PROX ICA PSV: -92.6 CM/SEC
BH CV XLRA MEAS LEFT VERTEBRAL A EDV: 11.2 CM/SEC
BH CV XLRA MEAS LEFT VERTEBRAL A PSV: 47.2 CM/SEC
BH CV XLRA MEAS RIGHT CAROTID BULB EDV: 10.6 CM/SEC
BH CV XLRA MEAS RIGHT CAROTID BULB PSV: 110 CM/SEC
BH CV XLRA MEAS RIGHT DIST CCA EDV: 0.62 CM/SEC
BH CV XLRA MEAS RIGHT DIST CCA PSV: 57.8 CM/SEC
BH CV XLRA MEAS RIGHT DIST ICA EDV: 0 CM/SEC
BH CV XLRA MEAS RIGHT DIST ICA PSV: 0 CM/SEC
BH CV XLRA MEAS RIGHT ICA/CCA RATIO: 0
BH CV XLRA MEAS RIGHT MID ICA EDV: 0 CM/SEC
BH CV XLRA MEAS RIGHT MID ICA PSV: 0 CM/SEC
BH CV XLRA MEAS RIGHT PROX CCA EDV: 6.2 CM/SEC
BH CV XLRA MEAS RIGHT PROX CCA PSV: 61.5 CM/SEC
BH CV XLRA MEAS RIGHT PROX ECA EDV: -8.7 CM/SEC
BH CV XLRA MEAS RIGHT PROX ECA PSV: -127.4 CM/SEC
BH CV XLRA MEAS RIGHT PROX ICA EDV: 0 CM/SEC
BH CV XLRA MEAS RIGHT PROX ICA PSV: 0 CM/SEC
BH CV XLRA MEAS RIGHT VERTEBRAL A EDV: 9.8 CM/SEC
BH CV XLRA MEAS RIGHT VERTEBRAL A PSV: 39.3 CM/SEC
BUN SERPL-MCNC: 15 MG/DL (ref 8–23)
BUN/CREAT SERPL: 14.7 (ref 7–25)
CALCIUM SPEC-SCNC: 9.4 MG/DL (ref 8.6–10.5)
CHLORIDE SERPL-SCNC: 100 MMOL/L (ref 98–107)
CO2 SERPL-SCNC: 31.2 MMOL/L (ref 22–29)
CREAT SERPL-MCNC: 1.02 MG/DL (ref 0.76–1.27)
DEPRECATED RDW RBC AUTO: 45 FL (ref 37–54)
EGFRCR SERPLBLD CKD-EPI 2021: 76.2 ML/MIN/1.73
EOSINOPHIL # BLD AUTO: 0.17 10*3/MM3 (ref 0–0.4)
EOSINOPHIL NFR BLD AUTO: 2.2 % (ref 0.3–6.2)
ERYTHROCYTE [DISTWIDTH] IN BLOOD BY AUTOMATED COUNT: 13.2 % (ref 12.3–15.4)
GLUCOSE BLDC GLUCOMTR-MCNC: 151 MG/DL (ref 70–99)
GLUCOSE BLDC GLUCOMTR-MCNC: 210 MG/DL (ref 70–99)
GLUCOSE BLDC GLUCOMTR-MCNC: 217 MG/DL (ref 70–99)
GLUCOSE BLDC GLUCOMTR-MCNC: 337 MG/DL (ref 70–99)
GLUCOSE SERPL-MCNC: 206 MG/DL (ref 65–99)
HCT VFR BLD AUTO: 33.9 % (ref 37.5–51)
HGB BLD-MCNC: 11 G/DL (ref 13–17.7)
IMM GRANULOCYTES # BLD AUTO: 0.02 10*3/MM3 (ref 0–0.05)
IMM GRANULOCYTES NFR BLD AUTO: 0.3 % (ref 0–0.5)
LEFT ARM BP: NORMAL MMHG
LYMPHOCYTES # BLD AUTO: 2.51 10*3/MM3 (ref 0.7–3.1)
LYMPHOCYTES NFR BLD AUTO: 32.4 % (ref 19.6–45.3)
MAGNESIUM SERPL-MCNC: 1.8 MG/DL (ref 1.6–2.4)
MCH RBC QN AUTO: 30.2 PG (ref 26.6–33)
MCHC RBC AUTO-ENTMCNC: 32.4 G/DL (ref 31.5–35.7)
MCV RBC AUTO: 93.1 FL (ref 79–97)
MONOCYTES # BLD AUTO: 0.84 10*3/MM3 (ref 0.1–0.9)
MONOCYTES NFR BLD AUTO: 10.8 % (ref 5–12)
NEUTROPHILS NFR BLD AUTO: 4.17 10*3/MM3 (ref 1.7–7)
NEUTROPHILS NFR BLD AUTO: 53.8 % (ref 42.7–76)
NRBC BLD AUTO-RTO: 0 /100 WBC (ref 0–0.2)
PHOSPHATE SERPL-MCNC: 3.4 MG/DL (ref 2.5–4.5)
PLATELET # BLD AUTO: 194 10*3/MM3 (ref 140–450)
PMV BLD AUTO: 10.4 FL (ref 6–12)
POTASSIUM SERPL-SCNC: 4.2 MMOL/L (ref 3.5–5.2)
RBC # BLD AUTO: 3.64 10*6/MM3 (ref 4.14–5.8)
RIGHT ARM BP: NORMAL MMHG
SODIUM SERPL-SCNC: 138 MMOL/L (ref 136–145)
WBC NRBC COR # BLD: 7.75 10*3/MM3 (ref 3.4–10.8)

## 2023-09-18 PROCEDURE — 99233 SBSQ HOSP IP/OBS HIGH 50: CPT | Performed by: INTERNAL MEDICINE

## 2023-09-18 PROCEDURE — 97165 OT EVAL LOW COMPLEX 30 MIN: CPT

## 2023-09-18 PROCEDURE — 80048 BASIC METABOLIC PNL TOTAL CA: CPT | Performed by: FAMILY MEDICINE

## 2023-09-18 PROCEDURE — 63710000001 INSULIN LISPRO (HUMAN) PER 5 UNITS: Performed by: INTERNAL MEDICINE

## 2023-09-18 PROCEDURE — 84100 ASSAY OF PHOSPHORUS: CPT | Performed by: FAMILY MEDICINE

## 2023-09-18 PROCEDURE — 95819 EEG AWAKE AND ASLEEP: CPT

## 2023-09-18 PROCEDURE — 93880 EXTRACRANIAL BILAT STUDY: CPT | Performed by: SURGERY

## 2023-09-18 PROCEDURE — 63710000001 INSULIN DETEMIR PER 5 UNITS: Performed by: INTERNAL MEDICINE

## 2023-09-18 PROCEDURE — 94664 DEMO&/EVAL PT USE INHALER: CPT

## 2023-09-18 PROCEDURE — 70551 MRI BRAIN STEM W/O DYE: CPT

## 2023-09-18 PROCEDURE — 94799 UNLISTED PULMONARY SVC/PX: CPT

## 2023-09-18 PROCEDURE — 25010000002 HEPARIN (PORCINE) PER 1000 UNITS: Performed by: FAMILY MEDICINE

## 2023-09-18 PROCEDURE — 85025 COMPLETE CBC W/AUTO DIFF WBC: CPT | Performed by: FAMILY MEDICINE

## 2023-09-18 PROCEDURE — 83735 ASSAY OF MAGNESIUM: CPT | Performed by: FAMILY MEDICINE

## 2023-09-18 PROCEDURE — 93880 EXTRACRANIAL BILAT STUDY: CPT

## 2023-09-18 PROCEDURE — 82948 REAGENT STRIP/BLOOD GLUCOSE: CPT

## 2023-09-18 PROCEDURE — 94761 N-INVAS EAR/PLS OXIMETRY MLT: CPT

## 2023-09-18 RX ORDER — LISINOPRIL 5 MG/1
5 TABLET ORAL
Status: DISCONTINUED | OUTPATIENT
Start: 2023-09-18 | End: 2023-09-19 | Stop reason: HOSPADM

## 2023-09-18 RX ORDER — NICOTINE POLACRILEX 4 MG
15 LOZENGE BUCCAL
Status: DISCONTINUED | OUTPATIENT
Start: 2023-09-18 | End: 2023-09-19 | Stop reason: HOSPADM

## 2023-09-18 RX ORDER — INSULIN LISPRO 100 [IU]/ML
8 INJECTION, SOLUTION INTRAVENOUS; SUBCUTANEOUS
Status: DISCONTINUED | OUTPATIENT
Start: 2023-09-18 | End: 2023-09-19 | Stop reason: HOSPADM

## 2023-09-18 RX ORDER — INSULIN LISPRO 100 [IU]/ML
4-24 INJECTION, SOLUTION INTRAVENOUS; SUBCUTANEOUS
Status: DISCONTINUED | OUTPATIENT
Start: 2023-09-18 | End: 2023-09-19 | Stop reason: HOSPADM

## 2023-09-18 RX ORDER — DEXTROSE MONOHYDRATE 25 G/50ML
25 INJECTION, SOLUTION INTRAVENOUS
Status: DISCONTINUED | OUTPATIENT
Start: 2023-09-18 | End: 2023-09-19 | Stop reason: HOSPADM

## 2023-09-18 RX ORDER — AMLODIPINE BESYLATE 5 MG/1
5 TABLET ORAL
Status: DISCONTINUED | OUTPATIENT
Start: 2023-09-18 | End: 2023-09-19 | Stop reason: HOSPADM

## 2023-09-18 RX ADMIN — LEVOTHYROXINE SODIUM 100 MCG: 0.1 TABLET ORAL at 05:40

## 2023-09-18 RX ADMIN — GABAPENTIN 300 MG: 300 CAPSULE ORAL at 14:27

## 2023-09-18 RX ADMIN — ASPIRIN 81 MG: 81 TABLET, COATED ORAL at 09:19

## 2023-09-18 RX ADMIN — PANTOPRAZOLE SODIUM 40 MG: 40 TABLET, DELAYED RELEASE ORAL at 05:40

## 2023-09-18 RX ADMIN — Medication 10 ML: at 21:13

## 2023-09-18 RX ADMIN — INSULIN LISPRO 4 UNITS: 100 INJECTION, SOLUTION INTRAVENOUS; SUBCUTANEOUS at 21:14

## 2023-09-18 RX ADMIN — OXYBUTYNIN CHLORIDE 10 MG: 5 TABLET, EXTENDED RELEASE ORAL at 09:19

## 2023-09-18 RX ADMIN — INSULIN LISPRO 5 UNITS: 100 INJECTION, SOLUTION INTRAVENOUS; SUBCUTANEOUS at 09:19

## 2023-09-18 RX ADMIN — TRAZODONE HYDROCHLORIDE 100 MG: 100 TABLET ORAL at 21:15

## 2023-09-18 RX ADMIN — Medication 10 ML: at 09:20

## 2023-09-18 RX ADMIN — INSULIN LISPRO 8 UNITS: 100 INJECTION, SOLUTION INTRAVENOUS; SUBCUTANEOUS at 17:34

## 2023-09-18 RX ADMIN — GABAPENTIN 300 MG: 300 CAPSULE ORAL at 05:40

## 2023-09-18 RX ADMIN — HEPARIN SODIUM 5000 UNITS: 5000 INJECTION INTRAVENOUS; SUBCUTANEOUS at 09:20

## 2023-09-18 RX ADMIN — APIXABAN 5 MG: 5 TABLET, FILM COATED ORAL at 12:38

## 2023-09-18 RX ADMIN — ATORVASTATIN CALCIUM 80 MG: 40 TABLET, FILM COATED ORAL at 21:14

## 2023-09-18 RX ADMIN — PAROXETINE HYDROCHLORIDE 40 MG: 20 TABLET, FILM COATED ORAL at 17:36

## 2023-09-18 RX ADMIN — AMLODIPINE BESYLATE 5 MG: 5 TABLET ORAL at 09:59

## 2023-09-18 RX ADMIN — BUDESONIDE AND FORMOTEROL FUMARATE DIHYDRATE 2 PUFF: 160; 4.5 AEROSOL RESPIRATORY (INHALATION) at 06:29

## 2023-09-18 RX ADMIN — INSULIN LISPRO 8 UNITS: 100 INJECTION, SOLUTION INTRAVENOUS; SUBCUTANEOUS at 17:36

## 2023-09-18 RX ADMIN — INSULIN DETEMIR 20 UNITS: 100 INJECTION, SOLUTION SUBCUTANEOUS at 09:18

## 2023-09-18 RX ADMIN — GABAPENTIN 300 MG: 300 CAPSULE ORAL at 21:14

## 2023-09-18 RX ADMIN — INSULIN DETEMIR 30 UNITS: 100 INJECTION, SOLUTION SUBCUTANEOUS at 21:14

## 2023-09-18 RX ADMIN — BUDESONIDE AND FORMOTEROL FUMARATE DIHYDRATE 2 PUFF: 160; 4.5 AEROSOL RESPIRATORY (INHALATION) at 18:43

## 2023-09-18 RX ADMIN — INSULIN LISPRO 10 UNITS: 100 INJECTION, SOLUTION INTRAVENOUS; SUBCUTANEOUS at 12:38

## 2023-09-18 RX ADMIN — LISINOPRIL 5 MG: 5 TABLET ORAL at 09:20

## 2023-09-18 RX ADMIN — APIXABAN 5 MG: 5 TABLET, FILM COATED ORAL at 21:14

## 2023-09-18 RX ADMIN — SENNOSIDES AND DOCUSATE SODIUM 2 TABLET: 50; 8.6 TABLET ORAL at 09:19

## 2023-09-18 NOTE — PROGRESS NOTES
[facility]     Cardiology Progress Note    Patient Name: Giles Donovan  : 1945  MRN: [id number]  Primary Care Physician:  Morena Masters MD  Date of admission: 2023    Subjective   Subjective     No acute events overnight.  He slept well.  He has no complaint this morning.  He has no chest discomfort or other concerns.    Review of Systems   Noncontributory    Objective   Objective     Vitals:   Temp:  [97.8 °F (36.6 °C)-98.6 °F (37 °C)] 98.4 °F (36.9 °C)  Heart Rate:  [75-86] 86  Resp:  [14-18] 18  BP: (113-163)/(60-92) 145/82  Flow (L/min):  [2] 2  Physical Exam      The patient is alert, oriented and in no distress.   Neck supple  Lungs clear.  No wheezing.    Heart: Normal first and second heart sounds. No murmur.  No precordial rub is present.  No gallop is present.  Abdomen: Soft.  Skin: Warm and dry.  Musculoskeletal system is grossly normal.  CNS grossly normal.     Scheduled Meds:aspirin, 81 mg, Oral, Daily  atorvastatin, 80 mg, Oral, Nightly  budesonide-formoterol, 2 puff, Inhalation, BID - RT  gabapentin, 300 mg, Oral, Q8H  heparin (porcine), 5,000 Units, Subcutaneous, Q12H  insulin detemir, 20 Units, Subcutaneous, BID  insulin lispro, 3-14 Units, Subcutaneous, 4x Daily AC & at Bedtime  levothyroxine, 100 mcg, Oral, Q AM  lisinopril, 5 mg, Oral, Q24H  oxybutynin XL, 10 mg, Oral, Daily  pantoprazole, 40 mg, Oral, Q AM  PARoxetine, 40 mg, Oral, Q PM  senna-docusate sodium, 2 tablet, Oral, BID  sodium chloride, 10 mL, Intravenous, Q12H  traZODone, 100 mg, Oral, Nightly      Continuous Infusions:        Result Review    Result Review:  I have personally reviewed the results from the time of this admission to 2023 09:02 EDT and agree with these findings:  [x]  Laboratory  []  Microbiology  [x]  Radiology  [x]  EKG/Telemetry   [x]  Cardiology/Vascular   []  Pathology  []  Old records  []  Other:  Most notable findings include:     CBC          2023    18:41 2023     04:53 9/17/2023    07:59 9/17/2023    11:45 9/18/2023    04:32   CBC   WBC 8.60  7.32    7.75    RBC 3.62  3.71    3.64    Hemoglobin 11.2  11.6  11.5  11.6  11.0    Hematocrit 33.3  34.5  34.0  34.7  33.9    MCV 92.0  93.0    93.1    MCH 30.9  31.3    30.2    MCHC 33.6  33.6    32.4    RDW 13.2  13.3    13.2    Platelets 206  188    194      CMP          9/16/2023    18:41 9/17/2023    04:53 9/18/2023    04:32   CMP   Glucose 181  326  206    BUN 19  18  15    Creatinine 1.60  1.31  1.02    EGFR 44.4  56.4  76.2    Sodium 141  138  138    Potassium 4.6  4.6  4.2    Chloride 101  101  100    Calcium 9.5  9.5  9.4    Total Protein 7.0      Albumin 4.0      Globulin 3.0      Total Bilirubin 0.5      Alkaline Phosphatase 56      AST (SGOT) 27      ALT (SGPT) 18      Albumin/Globulin Ratio 1.3      BUN/Creatinine Ratio 11.9  13.7  14.7    Anion Gap 11.4  9.2  6.8       CARDIAC LABS:      Lab 09/16/23  1841   HSTROP T 33*        Assessment & Plan   Assessment / Plan     Brief Patient Summary:  Giles Donovan is a 77 y.o. male with:    Seizure-like activity  Syncope  Paroxysmal atrial fibrillation, started on Eliquis due to highly elevated [id number] score  Hypertension, stable  Hyperlipidemia, statin  Diabetes  Hypothyroidism  Depression,/anxiety    Plan:   Echocardiogram was noted.  LVEF is mildly reduced.  No significant valvular pathology was reported.  Patient remains in normal sinus rhythm.    Continue cardiac monitoring.    He will need 4-week event monitor as an outpatient.    Continue Eliquis at the current dose.  Holding on AV mary blockers due to bifascicular block.  Will start amlodipine 5 mg daily for better blood pressure control.  Seizure evaluation is in progress.  Continue as per primary team/neurology.       CODE STATUS:   Level Of Support Discussed With: Patient  Code Status (Patient has no pulse and is not breathing): CPR (Attempt to Resuscitate)  Medical Interventions (Patient has pulse or is  breathing): Full Support      Electronically signed by Dara Peterson MD, 09/18/23, 9:02 AM EDT.

## 2023-09-18 NOTE — PLAN OF CARE
"Goal Outcome Evaluation:     VSS; no acute changes   /87 (BP Location: Right arm, Patient Position: Lying)   Pulse 83   Temp 98.6 °F (37 °C) (Oral)   Resp 19   Ht 171.5 cm (67.5\")   Wt 98.1 kg (216 lb 4.3 oz)   SpO2 100%   BMI 33.37 kg/m²                          "

## 2023-09-18 NOTE — PLAN OF CARE
Goal Outcome Evaluation:  Plan of Care Reviewed With: patient, spouse        Progress: no change  Outcome Evaluation: Patient presents at or near baseline functional status with no functional deficits that impede patient independence with activities of daily living.  No indicated need for skilled occupational therapy intervention in the acute care setting.  Occupational therapy will sign off at this time.      Anticipated Discharge Disposition (OT): home

## 2023-09-18 NOTE — PROGRESS NOTES
" [facility]   Hospitalist Progress Note  Date: 2023  Patient Name: Giles Donovan  : 1945  MRN: [id number]  Date of admission: 2023      Subjective   Subjective     Chief Complaint: Passing out    Summary: 77 y.o. male brought to the emergency department for evaluation of passing out.  Patient had 2 episodes of passing out while eating dinner.  Witnessed by his wife, who is at bedside.  Patient had become unresponsive and slid down to the floor.  Patient's wife had assisted the fall, patient did not strike his head.  There is no reports of obvious seizure activity, however patient was \"completely unresponsive\".  Patient's blood sugar was checked at home, reportedly normal.  He was admitted for further care, cardiology and neurology been consulted.  Found to have atrial fibrillation overnight, start Eliquis.  Avoiding AV mary blockers due to fascicular block on EKG.  Obtaining MRI and EEG but seizure seems less likely.  Possibly home in the next 24 to 48 hours.    Interval Followup: No acute events overnight.  Still feeling well this morning with no new complaints.  Ate breakfast, no abdominal pain.  No palpitations or shortness of breath.  He has never been diagnosed with atrial fibrillation before and has never experienced palpitations or rapid heart rate.     Objective   Objective     Vitals:   Temp:  [97.8 °F (36.6 °C)-98.6 °F (37 °C)] 98.4 °F (36.9 °C)  Heart Rate:  [75-86] 86  Resp:  [14-18] 18  BP: (113-163)/(60-92) 145/82  Flow (L/min):  [2] 2  Physical Exam    Constitutional: Awake, alert, no acute distress   Respiratory: Clear to auscultation bilaterally, nonlabored respirations    Cardiovascular: RRR, no MRG   Gastrointestinal: Positive bowel sounds, soft, nontender, nondistended   Neurologic: Oriented x 3, strength symmetric in all extremities, Cranial Nerves grossly intact to confrontation, speech clear    Result Review    Result Review:  I have personally reviewed the " results below:  [x]  Laboratory personally reviewed BMP, CBC, blood sugars, magnesium, phosphorus  []  Microbiology  []  Radiology  [x]  EKG/Telemetry telemetry reviewed   []  Cardiology/Vascular   []  Pathology  []  Old records  []  Other:  CBC          9/16/2023    18:41 9/17/2023    04:53 9/17/2023    07:59 9/17/2023    11:45 9/18/2023    04:32   CBC   WBC 8.60  7.32    7.75    RBC 3.62  3.71    3.64    Hemoglobin 11.2  11.6  11.5  11.6  11.0    Hematocrit 33.3  34.5  34.0  34.7  33.9    MCV 92.0  93.0    93.1    MCH 30.9  31.3    30.2    MCHC 33.6  33.6    32.4    RDW 13.2  13.3    13.2    Platelets 206  188    194      CMP          9/16/2023    18:41 9/17/2023    04:53 9/18/2023    04:32   CMP   Glucose 181  326  206    BUN 19  18  15    Creatinine 1.60  1.31  1.02    EGFR 44.4  56.4  76.2    Sodium 141  138  138    Potassium 4.6  4.6  4.2    Chloride 101  101  100    Calcium 9.5  9.5  9.4    Total Protein 7.0      Albumin 4.0      Globulin 3.0      Total Bilirubin 0.5      Alkaline Phosphatase 56      AST (SGOT) 27      ALT (SGPT) 18      Albumin/Globulin Ratio 1.3      BUN/Creatinine Ratio 11.9  13.7  14.7    Anion Gap 11.4  9.2  6.8      Assessment & Plan   Assessment / Plan     Assessment/Plan:  Witnessed presyncope/presyncope  Questionable seizure  JORGE  Left anterior fascicular block  Right bundle branch block  Normocytic anemia  New onset paroxysmal atrial fibrillation  Hypertension  Hyperlipidemia  Type diabetes mellitus  Hypothyroidism    Continue to monitor in the hospital for work-up and management of the above  Cardiology and neurology following, appreciate assistance  Chads Vasc score is 5, start anticoagulation with Eliquis 5 mg twice daily  Would benefit from 14-day Holter monitor at discharge, cardiology to set up  Hold all AV mary blocking agents due to right bundle branch block and left anterior fascicular block  DC Coreg now and at discharge, transition amlodipine to lisinopril 5 mg daily for  blood pressure control  Continue aspirin and statin  Orthostatics negative  2D echo reviewed, EF 45%, previously around 50, otherwise no significant changes.  Does not appear the patient will require any further ischemic evaluation  Per neurology recommendations, will obtain MRI and EEG  Carotid duplex pending  Restart home gabapentin, Symbicort  Continue detemir 20 units twice daily, start sliding scale insulin  Heparin for DVT prophylaxis  Trend renal function and electrolytes with a.m. BMP, magnesium   Trend Hgb and WBC with a.m. CBC     Discussed plan with RN, cardiology, neurology    DVT prophylaxis:  Medical DVT prophylaxis orders are present.    CODE STATUS:   Level Of Support Discussed With: Patient  Code Status (Patient has no pulse and is not breathing): CPR (Attempt to Resuscitate)  Medical Interventions (Patient has pulse or is breathing): Full Support

## 2023-09-18 NOTE — PROGRESS NOTES
[facility]  [facility]    Routine Consult Follow-Up    Patient Name:   Giles Donovan  YOB: 1945  Identification Number:   MRN - [id number]  Date of Service:   09/18/2023 09:47:49    Diagnosis        R55 - Syncope (blackout, fainting, vasovagal attack)    Impression  78 yo male PMH HTN, HL, DM who presents with presyncope/syncope episodes x 2.      1. 2 episodes of altered awareness while eating dinner+ shaking: convulsive syncope/presyncope vs seizure;  2. Afib; newly diagnosed.    Plan:  MRI brain pending.  Orthostatic vitals.  Routine EEG to evaluate for seizure.  Cardiology following.  anticoagulation per cardiology  Continue telemetry.  Neurology will follow    Our recommendations are outlined below    Diagnostic Studies :  Recommend MRI brain without contrast    Nursing Recommendations :  Orthostatic Hypotension check lying, sitting, and standingWhen possible avoid benzodiazepines, opioid pain medications, and anticholinergic medications    Consultations :  Cardiology consult    DVT Prophylaxis :  Choice of Primary Team    Disposition :  Neurology will follow    Subjective  Patient seen and examined.    Imaging  CT head w/o cont:  No acute brain abnormality is seen.      Examination  1A: Level of Consciousness - Alert; keenly responsive + 0  1B: Ask Month and Age - Both Questions Right + 0  1C: Blink Eyes & Squeeze Hands - Performs Both Tasks + 0  2: Test Horizontal Extraocular Movements - Normal + 0  3: Test Visual Fields - No Visual Loss + 0  4: Test Facial Palsy (Use Grimace if Obtunded) - Normal symmetry + 0  5A: Test Left Arm Motor Drift - No Drift for 10 Seconds + 0  5B: Test Right Arm Motor Drift - No Drift for 10 Seconds + 0  6A: Test Left Leg Motor Drift - No Drift for 5 Seconds + 0  6B: Test Right Leg Motor Drift - No Drift for 5 Seconds + 0  7: Test Limb Ataxia (FNF/Heel-Shin) - No Ataxia + 0  8: Test Sensation - Mild-Moderate Loss: Less  Sharp/More Dull + 1  9: Test Language/Aphasia - Normal; No aphasia + 0  10: Test Dysarthria - Normal + 0  11: Test Extinction/Inattention - No abnormality + 0    NIHSS Score: 1          Patient/Family was informed the Neurology Consult would happen via TeleHealth consult by way of interactive audio and video telecommunications and consented to receiving care in this manner.    Telehealth Neurology consultation was provided. I spent 25 minutes providing telehealth care. This includes time spent for face to face visit via telemedicine, review of medical records, imaging studies and discussion of findings with providers, the patient and/or family.      Dr Chadwick Alvarenga      [facility]  For Inpatient follow-up with [facility] physician please call [facility] [phone]. This is not an outpatient service. Post hospital discharge, please contact hospital directly.

## 2023-09-18 NOTE — PLAN OF CARE
Goal Outcome Evaluation:  Plan of Care Reviewed With: patient, spouse        Progress: no change  Outcome Evaluation: No acute changes throughout shift. VSS. Wife at bedside.

## 2023-09-18 NOTE — THERAPY EVALUATION
Patient Name: Giles Donovan  : 1945    MRN: [id number]                              Today's Date: 2023       Admit Date: 2023    Visit Dx:     ICD-10-CM ICD-9-CM   1. Syncope and collapse  R55 780.2   2. Difficulty in walking  R26.2 719.7   3. Decreased activities of daily living (ADL)  Z78.9 V49.89     Patient Active Problem List   Diagnosis    Allergic rhinitis    Controlled type 2 diabetes mellitus without complication, without long-term current use of insulin    GERD (gastroesophageal reflux disease)    Hyperlipidemia    Hypertension    Hypothyroidism    Stenosis of right carotid artery    Syncope    Urge incontinence of urine    Erectile dysfunction    Benign prostatic hyperplasia with urinary frequency    Cardiomyopathy    Cataract    Chest pain with low risk for cardiac etiology    Right-sided carotid artery occlusion without cerebral infarction    Depression    Vaso vagal episode    Hypoxia    Chronic lung disease    Mixed hyperlipidemia    Diastolic CHF, chronic    Carotid artery occlusion    Impacted cerumen, bilateral    Primary insomnia    Unsteady gait when walking     Past Medical History:   Diagnosis Date    Allergic rhinitis 2014    Will try Zyrtec, he didnt want to use a nasal spray.    Arthritis     Asthma     Cataract     left eye    Cough 2016    PFT and CXR ordered.    Depression     PTSD    Diabetes     Elevated cholesterol     H/O psychiatric care     PTSD    Hyperlipidemia 2016    Lipids ordered. Continue on current medication.    Hypertension     Hypothyroidism     Seasonal allergies     Shortness of breath     Sleep apnea     Stenosis of right carotid artery 2017    Will refer to vascular surgeon.    Syncope 2017    Type 2 diabetes mellitus     Upper respiratory infection 10/18/2015    Will treat cough with Hydromet, otherwise over the counter meds for treatment.     Past Surgical History:   Procedure Laterality Date    CATARACT  EXTRACTION Right     x2    COLONOSCOPY      JOINT REPLACEMENT      REPLACEMENT TOTAL HIP ONCOLOGIC Right     RETINAL DETACHMENT REPAIR      TOE AMPUTATION Left 11/2017    Second phalaeng.    TOE OSTEOPHYTE REMOVAL        General Information       Row Name 09/18/23 1412          OT Time and Intention    Document Type evaluation  -ES     Mode of Treatment individual therapy;occupational therapy  -ES       Row Name 09/18/23 1412          General Information    Patient Profile Reviewed yes  -ES     Prior Level of Function independent:;all household mobility;ADL's;community mobility  Patient independent with ADLs at baseline. Rolling walker or cane for functional mobility. Tub/shower, standing shower completion. [place] in stance. No home O2 use. Denies recent falls.  -ES     Existing Precautions/Restrictions no known precautions/restrictions  -ES     Barriers to Rehab none identified  -ES       Row Name 09/18/23 1412          Occupational Profile    Reason for Services/Referral (Occupational Profile) Patient is 77 yr old male admitted to [facility] on 9/16/2023 with reports of x2 syncopal episodes with LOC. OT evaluation and treatment ordered d/t recent decline in ADLs/transfer ability and discharge planning recommendations. No previous OT services for current condition.  -ES       Row Name 09/18/23 1412          Living Environment    People in Home spouse  -ES       Row Name 09/18/23 1412          Stairs Within Home, Primary    Number of Stairs, Within Home, Primary none  -ES       Row Name 09/18/23 1412          Cognition    Orientation Status (Cognition) oriented x 4  Patient pleasant and cooperative, agreeable to therapy evaluation. Patient spouse present.  -ES               User Key  (r) = Recorded By, (t) = Taken By, (c) = Cosigned By      Initials Name Provider Type    ES Nereida Dickson, OTR/L, CSRS Occupational Therapist                     Mobility/ADL's       Row Name 09/18/23 1415          Bed  Mobility    Bed Mobility supine-sit;sit-supine  -ES     Supine-Sit [place] (Bed Mobility) independent  -ES     Sit-Supine [place] (Bed Mobility) independent  -ES       Row Name 09/18/23 1415          Transfers    Transfers sit-stand transfer;stand-sit transfer  -ES       Row Name 09/18/23 1415          Sit-Stand Transfer    Sit-Stand [place] (Transfers) modified independence  -ES     Assistive Device (Sit-Stand Transfers) walker, front-wheeled  -ES       Row Name 09/18/23 1415          Stand-Sit Transfer    Stand-Sit [place] (Transfers) modified independence  -ES     Assistive Device (Stand-Sit Transfers) walker, front-wheeled  -ES       Row Name 09/18/23 1415          Functional Mobility    Functional Mobility- Ind. Level conditional independence  -ES     Functional Mobility- Device walker, front-wheeled  -ES     Functional Mobility- Comment patient performed functional mobility to/from bathroom, mod I with use of rolling walker.  -ES       Row Name 09/18/23 1415          Activities of Daily Living    BADL Assessment/Intervention bathing;upper body dressing;lower body dressing;grooming;feeding;toileting  -ES       Row Name 09/18/23 1415          Bathing Assessment/Intervention    [place] Level (Bathing) bathing skills;independent  -ES       Row Name 09/18/23 1415          Upper Body Dressing Assessment/Training    [place] Level (Upper Body Dressing) upper body dressing skills;independent  -ES       Row Name 09/18/23 1415          Lower Body Dressing Assessment/Training    [place] Level (Lower Body Dressing) lower body dressing skills;independent  -ES       Row Name 09/18/23 1415          Grooming Assessment/Training    [place] Level (Grooming) grooming skills;independent  -ES       Row Name 09/18/23 1415          Self-Feeding Assessment/Training    [place] Level (Feeding) feeding skills;independent  -ES       Row Name 09/18/23 1415          Toileting Assessment/Training     [place] Level (Toileting) toileting skills;independent  -ES               User Key  (r) = Recorded By, (t) = Taken By, (c) = Cosigned By      Initials Name Provider Type    ES Nereida Dickson OTR/L, RNOIT Occupational Therapist                   Obj/Interventions       Row Name 09/18/23 1417          Sensory Assessment (Somatosensory)    Sensory Assessment (Somatosensory) sensation intact  -ES       Row Name 09/18/23 1417          Vision Assessment/Intervention    Visual Impairment/Limitations WFL  -ES       Row Name 09/18/23 1417          Range of Motion Comprehensive    General Range of Motion bilateral upper extremity ROM WNL  -ES       Row Name 09/18/23 1417          Strength Comprehensive (MMT)    General Manual Muscle Testing (MMT) Assessment no strength deficits identified  -ES       [facility] Name 09/18/23 1417          Motor Skills    Motor Skills functional endurance  -ES     Functional Endurance good  -ES       Row Name 09/18/23 1417          Balance    Balance Assessment sitting dynamic balance;standing dynamic balance  -ES     Dynamic Sitting Balance independent  -ES     Position, Sitting Balance unsupported;sitting edge of bed  -ES     Dynamic Standing Balance modified independence  -ES     Position/Device Used, Standing Balance supported;walker, front-wheeled  -ES               User Key  (r) = Recorded By, (t) = Taken By, (c) = Cosigned By      Initials Name Provider Type    Nereida Musa OTR/L, RONIT Occupational Therapist                   Goals/Plan    No documentation.                  Clinical Impression       [facility] Name 09/18/23 1418          Plan of Care Review    Plan of Care Reviewed With patient;spouse  -ES     Progress no change  -ES     Outcome Evaluation Patient presents at or near baseline functional status with no functional deficits that impede patient independence with activities of daily living.  No indicated need for skilled occupational therapy intervention in the acute care  setting.  Occupational therapy will sign off at this time.  -ES       Row Name 09/18/23 1418          Therapy Assessment/Plan (OT)    Criteria for Skilled Therapeutic Interventions Met (OT) no problems identified which require skilled intervention  -ES     Therapy Frequency (OT) evaluation only  -ES       Row Name 09/18/23 1418          Therapy Plan Review/Discharge Plan (OT)    Anticipated Discharge Disposition (OT) home  -ES       Row Name 09/18/23 1418          Positioning and Restraints    Pre-Treatment Position in bed  -ES     Post Treatment Position bed  -ES     In Bed --  left sitting EOB eating breakfast  -ES               User Key  (r) = Recorded By, (t) = Taken By, (c) = Cosigned By      Initials Name Provider Type    ES Nereida Dickson, OTR/L, CSRS Occupational Therapist                   Outcome Measures       Row Name 09/18/23 1418          How much help from another is currently needed...    Putting on and taking off regular lower body clothing? 4  -ES     Bathing (including washing, rinsing, and drying) 4  -ES     Toileting (which includes using toilet bed pan or urinal) 4  -ES     Putting on and taking off regular upper body clothing 4  -ES     Taking care of personal grooming (such as brushing teeth) 4  -ES     Eating meals 4  -ES     AM-PAC 6 Clicks Score (OT) 24  -ES       Row Name 09/18/23 0742          How much help from another person do you currently need...    Turning from your back to your side while in flat bed without using bedrails? 4  -AL     Moving from lying on back to sitting on the side of a flat bed without bedrails? 4  -AL     Moving to and from a bed to a chair (including a wheelchair)? 4  -AL     Standing up from a chair using your arms (e.g., wheelchair, bedside chair)? 4  -AL     Climbing 3-5 steps with a railing? 4  -AL     To walk in hospital room? 3  -AL     AM-PAC 6 Clicks Score (PT) 23  -AL     Highest level of mobility 7 --> Walked 25 feet or more  -AL       Row Name  09/18/23 1418          Functional Assessment    Outcome Measure Options AM-PAC 6 Clicks Daily Activity (OT);Optimal Instrument  -ES       Row Name 09/18/23 1418          Optimal Instrument    Optimal Instrument Optimal - 3  -ES     Bending/Stooping 1  -ES     Standing 1  -ES     Reaching 1  -ES     From the list, choose the 3 activities you would most like to be able to do without any difficulty Bending/stooping;Standing;Reaching  -ES     Total Score Optimal - 3 3  -ES               User Key  (r) = Recorded By, (t) = Taken By, (c) = Cosigned By      Initials Name Provider Type    ES Nereida Dickson, OTR/L, CSRS Occupational Therapist    Morena Zarate RN Registered Nurse                      OT Recommendation and Plan  Therapy Frequency (OT): evaluation only  Plan of Care Review  Plan of Care Reviewed With: patient, spouse  Progress: no change  Outcome Evaluation: Patient presents at or near baseline functional status with no functional deficits that impede patient independence with activities of daily living.  No indicated need for skilled occupational therapy intervention in the acute care setting.  Occupational therapy will sign off at this time.     Time Calculation:   Evaluation Complexity (OT)  Review Occupational Profile/Medical/Therapy History Complexity: brief/low complexity  Assessment, Occupational Performance/Identification of Deficit Complexity: 3-5 performance deficits  Clinical Decision Making Complexity (OT): problem focused assessment/low complexity  Overall Complexity of Evaluation (OT): low complexity     Time Calculation- OT       Row Name 09/18/23 1419             Time Calculation- OT    OT Received On 09/18/23  -ES         Untimed Charges    OT Eval/Re-eval Minutes 20  -ES         Total Minutes    Untimed Charges Total Minutes 20  -ES       Total Minutes 20  -ES                User Key  (r) = Recorded By, (t) = Taken By, (c) = Cosigned By      Initials Name Provider Type    ES Nereida Dickson,  OTR/L, CSRS Occupational Therapist                  Therapy Charges for Today       Code Description Service Date Service Provider Modifiers Qty    77133202955 HC OT EVAL LOW COMPLEXITY 2 9/18/2023 Nereida Dickson, OTR/L, CSRS GO 1                 RHONA Lund/L, CSRS  9/18/2023

## 2023-09-19 ENCOUNTER — READMISSION MANAGEMENT (OUTPATIENT)
Dept: CALL CENTER | Facility: HOSPITAL | Age: 78
End: 2023-09-19
Payer: MEDICARE

## 2023-09-19 ENCOUNTER — TELEPHONE (OUTPATIENT)
Dept: NEUROLOGY | Facility: CLINIC | Age: 78
End: 2023-09-19
Payer: MEDICARE

## 2023-09-19 VITALS
TEMPERATURE: 97.7 F | WEIGHT: 223.77 LBS | HEIGHT: 68 IN | DIASTOLIC BLOOD PRESSURE: 84 MMHG | HEART RATE: 80 BPM | SYSTOLIC BLOOD PRESSURE: 131 MMHG | BODY MASS INDEX: 33.91 KG/M2 | OXYGEN SATURATION: 98 % | RESPIRATION RATE: 18 BRPM

## 2023-09-19 LAB
ANION GAP SERPL CALCULATED.3IONS-SCNC: 9.5 MMOL/L (ref 5–15)
BASOPHILS # BLD AUTO: 0.05 10*3/MM3 (ref 0–0.2)
BASOPHILS NFR BLD AUTO: 0.7 % (ref 0–1.5)
BUN SERPL-MCNC: 15 MG/DL (ref 8–23)
BUN/CREAT SERPL: 15.3 (ref 7–25)
CALCIUM SPEC-SCNC: 9.4 MG/DL (ref 8.6–10.5)
CHLORIDE SERPL-SCNC: 100 MMOL/L (ref 98–107)
CO2 SERPL-SCNC: 26.5 MMOL/L (ref 22–29)
CREAT SERPL-MCNC: 0.98 MG/DL (ref 0.76–1.27)
DEPRECATED RDW RBC AUTO: 45.2 FL (ref 37–54)
EGFRCR SERPLBLD CKD-EPI 2021: 79.9 ML/MIN/1.73
EOSINOPHIL # BLD AUTO: 0.19 10*3/MM3 (ref 0–0.4)
EOSINOPHIL NFR BLD AUTO: 2.8 % (ref 0.3–6.2)
ERYTHROCYTE [DISTWIDTH] IN BLOOD BY AUTOMATED COUNT: 13.2 % (ref 12.3–15.4)
GLUCOSE BLDC GLUCOMTR-MCNC: 167 MG/DL (ref 70–99)
GLUCOSE BLDC GLUCOMTR-MCNC: 186 MG/DL (ref 70–99)
GLUCOSE BLDC GLUCOMTR-MCNC: 193 MG/DL (ref 70–99)
GLUCOSE SERPL-MCNC: 197 MG/DL (ref 65–99)
HCT VFR BLD AUTO: 33.2 % (ref 37.5–51)
HGB BLD-MCNC: 10.9 G/DL (ref 13–17.7)
IMM GRANULOCYTES # BLD AUTO: 0.02 10*3/MM3 (ref 0–0.05)
IMM GRANULOCYTES NFR BLD AUTO: 0.3 % (ref 0–0.5)
LYMPHOCYTES # BLD AUTO: 2.32 10*3/MM3 (ref 0.7–3.1)
LYMPHOCYTES NFR BLD AUTO: 34.6 % (ref 19.6–45.3)
MAGNESIUM SERPL-MCNC: 1.7 MG/DL (ref 1.6–2.4)
MCH RBC QN AUTO: 30.9 PG (ref 26.6–33)
MCHC RBC AUTO-ENTMCNC: 32.8 G/DL (ref 31.5–35.7)
MCV RBC AUTO: 94.1 FL (ref 79–97)
MONOCYTES # BLD AUTO: 0.79 10*3/MM3 (ref 0.1–0.9)
MONOCYTES NFR BLD AUTO: 11.8 % (ref 5–12)
NEUTROPHILS NFR BLD AUTO: 3.34 10*3/MM3 (ref 1.7–7)
NEUTROPHILS NFR BLD AUTO: 49.8 % (ref 42.7–76)
NRBC BLD AUTO-RTO: 0 /100 WBC (ref 0–0.2)
PHOSPHATE SERPL-MCNC: 3.4 MG/DL (ref 2.5–4.5)
PLATELET # BLD AUTO: 193 10*3/MM3 (ref 140–450)
PMV BLD AUTO: 10.5 FL (ref 6–12)
POTASSIUM SERPL-SCNC: 4.4 MMOL/L (ref 3.5–5.2)
RBC # BLD AUTO: 3.53 10*6/MM3 (ref 4.14–5.8)
SODIUM SERPL-SCNC: 136 MMOL/L (ref 136–145)
WBC NRBC COR # BLD: 6.71 10*3/MM3 (ref 3.4–10.8)

## 2023-09-19 PROCEDURE — 63710000001 INSULIN LISPRO (HUMAN) PER 5 UNITS: Performed by: INTERNAL MEDICINE

## 2023-09-19 PROCEDURE — 80048 BASIC METABOLIC PNL TOTAL CA: CPT | Performed by: INTERNAL MEDICINE

## 2023-09-19 PROCEDURE — 85025 COMPLETE CBC W/AUTO DIFF WBC: CPT | Performed by: INTERNAL MEDICINE

## 2023-09-19 PROCEDURE — 63710000001 INSULIN DETEMIR PER 5 UNITS: Performed by: INTERNAL MEDICINE

## 2023-09-19 PROCEDURE — 83735 ASSAY OF MAGNESIUM: CPT | Performed by: INTERNAL MEDICINE

## 2023-09-19 PROCEDURE — 94799 UNLISTED PULMONARY SVC/PX: CPT

## 2023-09-19 PROCEDURE — 82948 REAGENT STRIP/BLOOD GLUCOSE: CPT

## 2023-09-19 PROCEDURE — 94664 DEMO&/EVAL PT USE INHALER: CPT

## 2023-09-19 PROCEDURE — 84100 ASSAY OF PHOSPHORUS: CPT | Performed by: INTERNAL MEDICINE

## 2023-09-19 PROCEDURE — 99221 1ST HOSP IP/OBS SF/LOW 40: CPT | Performed by: SURGERY

## 2023-09-19 PROCEDURE — 99233 SBSQ HOSP IP/OBS HIGH 50: CPT | Performed by: INTERNAL MEDICINE

## 2023-09-19 RX ORDER — LISINOPRIL 5 MG/1
5 TABLET ORAL DAILY
Qty: 30 TABLET | Refills: 0 | Status: SHIPPED | OUTPATIENT
Start: 2023-09-19 | End: 2023-10-19

## 2023-09-19 RX ADMIN — INSULIN LISPRO 8 UNITS: 100 INJECTION, SOLUTION INTRAVENOUS; SUBCUTANEOUS at 08:44

## 2023-09-19 RX ADMIN — GABAPENTIN 300 MG: 300 CAPSULE ORAL at 14:01

## 2023-09-19 RX ADMIN — PANTOPRAZOLE SODIUM 40 MG: 40 TABLET, DELAYED RELEASE ORAL at 05:52

## 2023-09-19 RX ADMIN — LISINOPRIL 5 MG: 5 TABLET ORAL at 08:43

## 2023-09-19 RX ADMIN — INSULIN LISPRO 4 UNITS: 100 INJECTION, SOLUTION INTRAVENOUS; SUBCUTANEOUS at 08:45

## 2023-09-19 RX ADMIN — ASPIRIN 81 MG: 81 TABLET, COATED ORAL at 08:42

## 2023-09-19 RX ADMIN — PAROXETINE HYDROCHLORIDE 40 MG: 20 TABLET, FILM COATED ORAL at 17:10

## 2023-09-19 RX ADMIN — INSULIN LISPRO 8 UNITS: 100 INJECTION, SOLUTION INTRAVENOUS; SUBCUTANEOUS at 17:12

## 2023-09-19 RX ADMIN — APIXABAN 5 MG: 5 TABLET, FILM COATED ORAL at 08:42

## 2023-09-19 RX ADMIN — BUDESONIDE AND FORMOTEROL FUMARATE DIHYDRATE 2 PUFF: 160; 4.5 AEROSOL RESPIRATORY (INHALATION) at 06:38

## 2023-09-19 RX ADMIN — INSULIN DETEMIR 30 UNITS: 100 INJECTION, SOLUTION SUBCUTANEOUS at 08:43

## 2023-09-19 RX ADMIN — INSULIN LISPRO 4 UNITS: 100 INJECTION, SOLUTION INTRAVENOUS; SUBCUTANEOUS at 12:52

## 2023-09-19 RX ADMIN — Medication 10 ML: at 08:45

## 2023-09-19 RX ADMIN — INSULIN LISPRO 8 UNITS: 100 INJECTION, SOLUTION INTRAVENOUS; SUBCUTANEOUS at 12:51

## 2023-09-19 RX ADMIN — GABAPENTIN 300 MG: 300 CAPSULE ORAL at 05:53

## 2023-09-19 RX ADMIN — LEVOTHYROXINE SODIUM 100 MCG: 0.1 TABLET ORAL at 05:53

## 2023-09-19 RX ADMIN — OXYBUTYNIN CHLORIDE 10 MG: 5 TABLET, EXTENDED RELEASE ORAL at 08:42

## 2023-09-19 RX ADMIN — AMLODIPINE BESYLATE 5 MG: 5 TABLET ORAL at 08:42

## 2023-09-19 RX ADMIN — INSULIN LISPRO 4 UNITS: 100 INJECTION, SOLUTION INTRAVENOUS; SUBCUTANEOUS at 17:12

## 2023-09-19 NOTE — TELEPHONE ENCOUNTER
HOSPITAL FOLLOW-UP REQUEST    Caller: MAURICE    Relationship to patient: [facility]/ [facility]    New or established patient? [x] New  [] Established    Date of admission: 9/16/2023    Date of discharge: 9/19/2023    Length of stay (If applicable): 3 DAYS    Facility discharged from: [facility]    Diagnosis/Symptoms: SYNCOPE VS. SEIZURE    Suggested follow-up timeframe: 1-3 WEEKS    Specialty Only: Did you see a [facility] health provider?    [x] Yes  [] No    If so, who? DR. KIMO RUEDA    Additional notes: HOSPITAL CALLED TO SCHEDULE HOSPITAL F/U APPT.    PLEASE REVIEW AND ADVISE.

## 2023-09-19 NOTE — PLAN OF CARE
Goal Outcome Evaluation:  Plan of Care Reviewed With: patient        Progress: improving  Outcome Evaluation: No acute changes throughouot shift. VSS. Pt rested well.

## 2023-09-19 NOTE — CONSULTS
"Nutrition Services    Patient Name: Giles Donovan  YOB: 1945  MRN: [id number]  Admission date: 9/16/2023      CLINICAL NUTRITION ASSESSMENT      Reason for Assessment  MST score 2+, Physician consult     H&P:    Past Medical History:   Diagnosis Date    Allergic rhinitis 12/27/2014    Will try Zyrtec, he didnt want to use a nasal spray.    Arthritis     Asthma     Cataract     left eye    Cough 03/30/2016    PFT and CXR ordered.    Depression     PTSD    Diabetes     Elevated cholesterol     H/O psychiatric care 1999    PTSD    Hyperlipidemia 03/30/2016    Lipids ordered. Continue on current medication.    Hypertension     Hypothyroidism     Seasonal allergies     Shortness of breath     Sleep apnea     Stenosis of right carotid artery 02/19/2017    Will refer to vascular surgeon.    Syncope 02/19/2017    Type 2 diabetes mellitus     Upper respiratory infection 10/18/2015    Will treat cough with Hydromet, otherwise over the counter meds for treatment.        Current Problems:   Active Hospital Problems    Diagnosis     **Syncope         Nutrition/Diet History         Narrative     Nutrition assessment related to MST score 2+.  Chart review reveals stable body weight x 1 year.  Patient consuming 100% of meals since admission.      Pt is at low risk per nutrition risk screening. No acute nutrition concerns or interventions at this time. RD will continue to follow and monitor per protocol.     Anthropometrics        Current Height, Weight Height: 171.5 cm (67.5\")  Weight: 101 kg (223 lb 12.3 oz)   Current BMI Body mass index is 34.53 kg/m².       Weight Hx  Wt Readings from Last 30 Encounters:   09/19/23 0500 101 kg (223 lb 12.3 oz)   09/18/23 0549 98.1 kg (216 lb 4.3 oz)   09/17/23 0500 97.7 kg (215 lb 6.2 oz)   09/16/23 1828 98.1 kg (216 lb 4.3 oz)   09/06/23 1336 94.3 kg (207 lb 12.8 oz)   08/09/23 1055 97.2 kg (214 lb 3.2 oz)   07/30/23 0204 99.6 kg (219 lb 9.3 oz)   07/11/23 0814 97.3 kg " (214 lb 9.6 oz)   06/06/23 0943 97.8 kg (215 lb 9.6 oz)   05/31/23 0844 99.1 kg (218 lb 6.4 oz)   05/18/23 1406 98 kg (216 lb)   04/27/23 1512 98.1 kg (216 lb 3.2 oz)   04/18/23 0923 95.7 kg (211 lb)   04/07/23 1133 102 kg (225 lb)   12/21/22 1107 99.3 kg (219 lb)   12/05/22 1347 99.8 kg (220 lb)   08/09/22 1345 99.3 kg (219 lb)   05/26/22 0937 99.8 kg (220 lb)   05/09/22 1843 99.8 kg (220 lb)   04/14/22 1405 102 kg (225 lb)   03/28/22 1304 97.5 kg (215 lb)   02/21/22 1401 97.7 kg (215 lb 6.4 oz)   01/17/22 1015 98.8 kg (217 lb 12.8 oz)   12/29/21 1053 98.9 kg (218 lb)   12/27/21 1619 98.9 kg (218 lb 0.6 oz)   12/21/21 1424 101 kg (222 lb 6.4 oz)   09/28/21 1302 96.3 kg (212 lb 6.4 oz)   06/14/21 0925 95.3 kg (210 lb)   03/26/21 0000 95.7 kg (211 lb)   03/25/21 0000 96.7 kg (213 lb 4 oz)   01/07/21 0000 93 kg (205 lb)   12/02/20 0000 95.3 kg (210 lb)   11/10/20 0000 96.6 kg (213 lb)            Wt Change Observation stable     Estimated/Assessed Needs       Energy Requirements 30 kcal/kg adj BW (75.7 kg)   EST Needs (kcal/day) 2271 kcal        Protein Requirements 1.0 g/kg adj BW   EST Daily Needs (g/day) 76 g       Fluid Requirements 25 ml/kg    Estimated Needs (mL/day) 1893 ml      Labs/Medications         Pertinent Labs Reviewed.   Results from last 7 days   Lab Units 09/19/23  0403 09/18/23  0432 09/17/23  0453 09/16/23  1841   SODIUM mmol/L 136 138 138 141   POTASSIUM mmol/L 4.4 4.2 4.6 4.6   CHLORIDE mmol/L 100 100 101 101   CO2 mmol/L 26.5 31.2* 27.8 28.6   BUN mg/dL 15 15 18 19   CREATININE mg/dL 0.98 1.02 1.31* 1.60*   CALCIUM mg/dL 9.4 9.4 9.5 9.5   BILIRUBIN mg/dL  --   --   --  0.5   ALK PHOS U/L  --   --   --  56   ALT (SGPT) U/L  --   --   --  18   AST (SGOT) U/L  --   --   --  27   GLUCOSE mg/dL 197* 206* 326* 181*     Results from last 7 days   Lab Units 09/19/23  0403 09/18/23  0432 09/17/23  0759 09/17/23  0453   MAGNESIUM mg/dL 1.7 1.8  --  1.6   PHOSPHORUS mg/dL 3.4 3.4  --  3.4   HEMOGLOBIN g/dL  10.9* 11.0*   < > 11.6*   HEMATOCRIT % 33.2* 33.9*   < > 34.5*   TRIGLYCERIDES mg/dL  --   --   --  134    < > = values in this interval not displayed.     COVID19   Date Value Ref Range Status   07/30/2023 Not Detected Not Detected - Ref. Range Final     Lab Results   Component Value Date    HGBA1C 9.00 (H) 09/17/2023         Pertinent Medications Reviewed.     Current Nutrition Orders & Evaluation of Intake       Oral Nutrition     Current PO Diet Diet: Cardiac Diets; Healthy Heart (2-3 Na+); Texture: Regular Texture (IDDSI 7); Fluid Consistency: Thin (IDDSI 0)   Supplement No active supplement orders       Malnutrition Severity Assessment                Nutrition Diagnosis         Nutrition Dx Problem 1 No nutrition diagnosis at this time.       Nutrition Intervention         No intervention indicated.      Medical Nutrition Therapy/Nutrition Education          Learner     Readiness N/A  N/A     Method     Response N/A  N/A     Monitor/Evaluation        Monitor Per protocol.       Nutrition Discharge Plan         No nutrition needs identified at this time.       Electronically signed by:  Vicky Fisher RD  09/19/23 09:39 EDT

## 2023-09-19 NOTE — DISCHARGE INSTR - APPOINTMENTS
Patient needs to follow up with Morena Masters(PCP) on 9/20/23 @10:45am [phone]  Patient needs to follow up with Yahaira Dickinson(Neuro) office will call with. [phone]  Patient needs to follow up with (Cardiology) on 9/27/23 @1:15pm [phone]

## 2023-09-19 NOTE — PROGRESS NOTES
[facility]  [facility] Consult [facility]    Routine Consult Follow-Up    Patient Name:   Giles Donovan  YOB: 1945  Identification Number:   MRN - [id number]  Date of Service:   09/19/2023 09:35:01    Diagnosis        R55 - Syncope (blackout, fainting, vasovagal attack)    Impression  78 yo male PMH HTN, HL, DM who presents with presyncope/syncope episodes x 2.    Findings:  MRI of the brain: No acute findings.  EEG: Left fronto-temporal and generalized slowing.  Right carotid occlusion    1. 2 episodes of altered awareness while eating dinner+ shaking: convulsive syncope/presyncope vs less likely seizure  2. Afib; newly diagnosed.    Plan:  Orthostatic vitals negative.  Cardiology following.  anticoagulation per cardiology  Continue telemetry.  Neurology will sign off. Please reconsult if needed. Outpatient neurology and cardiology follow-up in 1-3 weeks.    Our recommendations are outlined below    Diagnostic Studies :  Recommend MRI brain without contrast    Nursing Recommendations :  Orthostatic Hypotension check lying, sitting, and standingWhen possible avoid benzodiazepines, opioid pain medications, and anticholinergic medications    Consultations :  Cardiology consult    DVT Prophylaxis :  Choice of Primary Team    Disposition :  Neurology will sign off. Reconsult if Needed.    Subjective  Patient seen and examined.    Imaging  CT head w/o cont:  No acute brain abnormality is seen.      Examination  1A: Level of Consciousness - Alert; keenly responsive + 0  1B: Ask Month and Age - Both Questions Right + 0  1C: Blink Eyes & Squeeze Hands - Performs Both Tasks + 0  2: Test Horizontal Extraocular Movements - Normal + 0  3: Test Visual Fields - No Visual Loss + 0  4: Test Facial Palsy (Use Grimace if Obtunded) - Normal symmetry + 0  5A: Test Left Arm Motor Drift - No Drift for 10 Seconds + 0  5B: Test Right Arm Motor Drift - No Drift for 10 Seconds + 0  6A: Test Left Leg  Motor Drift - No Drift for 5 Seconds + 0  6B: Test Right Leg Motor Drift - No Drift for 5 Seconds + 0  7: Test Limb Ataxia (FNF/Heel-Shin) - No Ataxia + 0  8: Test Sensation - Mild-Moderate Loss: Less Sharp/More Dull + 1  9: Test Language/Aphasia - Normal; No aphasia + 0  10: Test Dysarthria - Normal + 0  11: Test Extinction/Inattention - No abnormality + 0    NIHSS Score: 1 (sensation loss in left arm and leg)          Patient/Family was informed the Neurology Consult would happen via TeleHealth consult by way of interactive audio and video telecommunications and consented to receiving care in this manner.    Telehealth Neurology consultation was provided. I spent 25 minutes providing telehealth care. This includes time spent for face to face visit via telemedicine, review of medical records, imaging studies and discussion of findings with providers, the patient and/or family.      Dr Chawdick Alvarenga      [facility]  For Inpatient follow-up with [facility] physician please call [facility] [phone]. This is not an outpatient service. Post hospital discharge, please contact hospital directly.

## 2023-09-19 NOTE — CONSULTS
[facility]   VASCULAR SURGERY CONSULT    Patient Name: Giles Donovan  : 1945  MRN: [id number]  Primary Care Physician:  Morena Masters MD  Date of admission: 2023    Subjective   Subjective     Chief Complaint: Syncope found to have right internal carotid artery occlusion    HPI:    Giles Donovan is a 77 y.o. male who presented with 2 episodes of syncope and was helped to the floor by his wife without hitting his head.  EMS was called and he was taken to the [facility] and admitted.  He is undergone neurologic and cardiac work-up and was found to have new onset atrial fibrillation for which he has been started on Eliquis.  His MRI and EEG studies were also within normal limits per neurology.  The patient had a bilateral carotid artery duplex study which showed evidence of a right internal carotid artery occlusion as such vascular surgery was consulted.   Upon further discussion with the patient regarding this finding the patient states that he is known about this for the past 5 years and has not followed with annual surveillance carotid duplex studies at the [facility] on [facility].  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    Review of Systems    General: Patient denies unintended weight loss or weight gain.  Denies fatigue, and weakness.  Denies any fevers, chills or night sweats.  HEENT: Denies any recent visual or hearing changes.  Denies any headaches, rhinorrhea or epistaxis.  Cardiac: Patient denies any significant chest pain, chest tightness or palpitations.  Denies dyspnea on exertion or denies any paroxysmal nocturnal dyspnea orthopnea.  Respiratory: Patient denies any shortness of breath, cough, sputum production or hemoptysis.    Gastrointestinal: Patient denies any significant changes in appetite, nausea, vomiting or dysphagia.  Denies any  hematemesis, melena or hematochezia.  Denies any abdominal pain or tenderness to palpation.  Genitourinary: Patient denies any oliguria, dysuria or hematuria.  Vascular: Patient denies any claudication, rest pain or tissue loss.  Denies any history of thrombosis or embolic phenomenon.  Endocrine: Denies any hot or cold intolerance, is not a diabetic and denies any thyroid problems.  Neurologic: Patient denies any loss of sensation, numbness or tingling.  Denies any syncopal episodes, blackouts or seizure history.  Psychiatric: Patient denies any anxiety, depression or mood disorder.       Personal History     Past Medical History:   Diagnosis Date    Allergic rhinitis 12/27/2014    Will try Zyrtec, he didnt want to use a nasal spray.    Arthritis     Asthma     Cataract     left eye    Cough 03/30/2016    PFT and CXR ordered.    Depression     PTSD    Diabetes     Elevated cholesterol     H/O psychiatric care 1999    PTSD    Hyperlipidemia 03/30/2016    Lipids ordered. Continue on current medication.    Hypertension     Hypothyroidism     Seasonal allergies     Shortness of breath     Sleep apnea     Stenosis of right carotid artery 02/19/2017    Will refer to vascular surgeon.    Syncope 02/19/2017    Type 2 diabetes mellitus     Upper respiratory infection 10/18/2015    Will treat cough with Hydromet, otherwise over the counter meds for treatment.       Past Surgical History:   Procedure Laterality Date    CATARACT EXTRACTION Right     x2    COLONOSCOPY      JOINT REPLACEMENT      REPLACEMENT TOTAL HIP ONCOLOGIC Right     RETINAL DETACHMENT REPAIR      TOE AMPUTATION Left 11/2017    Second phalaeng.    TOE OSTEOPHYTE REMOVAL         Family History: family history includes Arthritis in his mother; Heart disease in his mother; Kidney disease in his sister; Lupus in his mother. Otherwise pertinent FHx was reviewed and not pertinent to current issue.    Social History:  reports that he has never smoked. He has never  used smokeless tobacco. He reports current alcohol use. He reports that he does not use drugs.    Home Medications:  Insulin Glargine, PARoxetine, aspirin, atorvastatin, benzonatate, carvedilol, cetirizine, felodipine, fluticasone-salmeterol, gabapentin, insulin aspart, levothyroxine, losartan, metFORMIN, olopatadine, omeprazole, oxybutynin XL, tadalafil, and traZODone      Allergies:  Allergies   Allergen Reactions    Latex Rash and Anaphylaxis    Latex, Natural Rubber Itching       Objective   Objective     Vitals:   Temp:  [97.5 °F (36.4 °C)-99 °F (37.2 °C)] 97.5 °F (36.4 °C)  Heart Rate:  [67-86] 85  Resp:  [16-19] 18  BP: (116-152)/(64-87) 122/79  Flow (L/min):  [2] 2    Physical Exam    General: Awake, alert, NAD   Eyes:  LIDA, EOMI, Sclera non-icteric   Neck: Supple, no LAD or carotid bruits present   Lungs: Clear to auscultation B   Heart: RRR   Abdomen: Soft, nontender, nondistended with positive bowel sounds   Ext: No clubbing cyanosis or edema.  B radial and femoral pulses palp.   Neuro: CN's II-XII grossly intact.  No focal or lateralizing deficits present currently.    Pertinent Lab Data:    CBC:      Lab 09/19/23  0403 09/18/23  0432 09/17/23  0759 09/17/23  0453 09/16/23  1841   WBC 6.71 7.75  --  7.32 8.60   HEMOGLOBIN 10.9* 11.0*   < > 11.6* 11.2*   HEMATOCRIT 33.2* 33.9*   < > 34.5* 33.3*   PLATELETS 193 194  --  188 206   NEUTROS ABS 3.34 4.17  --  4.45 6.15   IMMATURE GRANS (ABS) 0.02 0.02  --  0.01 0.02   LYMPHS ABS 2.32 2.51  --  2.02 1.42   MONOS ABS 0.79 0.84  --  0.64 0.82   EOS ABS 0.19 0.17  --  0.17 0.16   MCV 94.1 93.1  --  93.0 92.0    < > = values in this interval not displayed.        CMP:        Lab 09/19/23  0403 09/18/23  0432 09/17/23  0453 09/16/23  1841   SODIUM 136 138 138 141   POTASSIUM 4.4 4.2 4.6 4.6   CHLORIDE 100 100 101 101   CO2 26.5 31.2* 27.8 28.6   ANION GAP 9.5 6.8 9.2 11.4   BUN 15 15 18 19   CREATININE 0.98 1.02 1.31* 1.60*   EGFR 79.9 76.2 56.4* 44.4*   GLUCOSE  197* 206* 326* 181*   CALCIUM 9.4 9.4 9.5 9.5   MAGNESIUM 1.7 1.8 1.6 1.6   PHOSPHORUS 3.4 3.4 3.4  --    TOTAL PROTEIN  --   --   --  7.0   ALBUMIN  --   --   --  4.0   GLOBULIN  --   --   --  3.0   ALT (SGPT)  --   --   --  18   AST (SGOT)  --   --   --  27   BILIRUBIN  --   --   --  0.5   ALK PHOS  --   --   --  56        MRI Brain Without Contrast    Result Date: 9/18/2023     1. No acute intracranial process  2. White matter abnormality compatible with chronic microvascular ischemic change      KADEN ARANDA MD       Electronically Signed and Approved By: KADEN ARANDA MD on 9/18/2023 at 10:00             XR Chest 1 View    Result Date: 9/16/2023    Borderline cardiomegaly.  No active pulmonary disease is seen.       JONATAN LONGORIA MD       Electronically Signed and Approved By: JONATAN LONGORIA MD on 9/16/2023 at 18:57                Assessment & Plan   Assessment / Plan     Active Hospital Problems:  Active Hospital Problems    Diagnosis     **Syncope        Assessment/plan:   77-year-old -American male who presented with syncopal episodes and underwent cardiac and neurologic work-up with negative MRI and EEG but found to have evidence of new onset atrial fibrillation and started on anticoagulation with Eliquis per cardiology.  2.  Patient also found to have evidence of a bundle branch block and after cardiac consultation no further intervention is deemed necessary during this hospitalization from their perspective.  He is likely to have a Holter monitor check in the next few weeks.  3.  Patient was found to have evidence of a right internal carotid artery complete occlusion as such vascular surgery was consulted however upon further discussion with the patient he is known about this for the past 5 years making it chronic issue which is followed at the [facility] with annual surveillance duplex.  4.  Given the presence of an asymptomatic right internal carotid artery occlusion which is chronic in  nature no further intervention or vascular surgical indication is present as such we will sign off and the patient requires no follow-up other than his typical follow-up at the [facility] with annual surveillance duplex.      Thank you for the opportunity see his patient in consultation.    Electronically signed by Conrad Treviño MD, 09/19/23, 2:31 PM EDT.

## 2023-09-19 NOTE — PROGRESS NOTES
[facility]     Cardiology Progress Note    Patient Name: Giles Donovan  : 1945  MRN: [id number]  Primary Care Physician:  Morena Masters MD  Date of admission: 2023    Subjective   Subjective     No acute events overnight.  He is feeling well this morning.  He has no chest discomfort, dyspnea or other complaints.    Review of Systems   All systems were reviewed and negative except for: As above    Objective   Objective     Vitals:   Temp:  [97.5 °F (36.4 °C)-99 °F (37.2 °C)] 97.7 °F (36.5 °C)  Heart Rate:  [67-86] 80  Resp:  [16-19] 18  BP: (116-152)/(64-87) 131/84  Flow (L/min):  [2] 2  Physical Exam      The patient is alert, oriented and in no distress.   Neck supple  Lungs clear.  No wheezing.    Heart: Normal first and second heart sounds. No murmur.  No precordial rub is present.  No gallop is present.  Abdomen: Soft.  Skin: Warm and dry.  Musculoskeletal system is grossly normal.  CNS grossly normal.     Scheduled Meds:amLODIPine, 5 mg, Oral, Q24H  apixaban, 5 mg, Oral, Q12H  aspirin, 81 mg, Oral, Daily  atorvastatin, 80 mg, Oral, Nightly  budesonide-formoterol, 2 puff, Inhalation, BID - RT  gabapentin, 300 mg, Oral, Q8H  insulin detemir, 30 Units, Subcutaneous, BID  insulin lispro, 4-24 Units, Subcutaneous, 4x Daily AC & at Bedtime  insulin lispro, 8 Units, Subcutaneous, TID With Meals  levothyroxine, 100 mcg, Oral, Q AM  lisinopril, 5 mg, Oral, Q24H  oxybutynin XL, 10 mg, Oral, Daily  pantoprazole, 40 mg, Oral, Q AM  PARoxetine, 40 mg, Oral, Q PM  senna-docusate sodium, 2 tablet, Oral, BID  sodium chloride, 10 mL, Intravenous, Q12H  traZODone, 100 mg, Oral, Nightly      Continuous Infusions:        Result Review    Result Review:  I have personally reviewed the results from the time of this admission to 2023 15:02 EDT and agree with these findings:  [x]  Laboratory  []  Microbiology  [x]  Radiology  [x]  EKG/Telemetry   [x]  Cardiology/Vascular   []  Pathology  []  Old  records  []  Other:  Most notable findings include:     CBC          9/17/2023    04:53 9/17/2023    07:59 9/17/2023    11:45 9/18/2023    04:32 9/19/2023    04:03   CBC   WBC 7.32    7.75  6.71    RBC 3.71    3.64  3.53    Hemoglobin 11.6  11.5  11.6  11.0  10.9    Hematocrit 34.5  34.0  34.7  33.9  33.2    MCV 93.0    93.1  94.1    MCH 31.3    30.2  30.9    MCHC 33.6    32.4  32.8    RDW 13.3    13.2  13.2    Platelets 188    194  193      CMP          9/17/2023    04:53 9/18/2023    04:32 9/19/2023    04:03   CMP   Glucose 326  206  197    BUN 18  15  15    Creatinine 1.31  1.02  0.98    EGFR 56.4  76.2  79.9    Sodium 138  138  136    Potassium 4.6  4.2  4.4    Chloride 101  100  100    Calcium 9.5  9.4  9.4    BUN/Creatinine Ratio 13.7  14.7  15.3    Anion Gap 9.2  6.8  9.5       CARDIAC LABS:      Lab 09/16/23  1841   HSTROP T 33*        Assessment & Plan   Assessment / Plan     Brief Patient Summary:  Giles Donovan is a 77 y.o. male with:     Seizure-like activity  Syncope  Paroxysmal atrial fibrillation, started on Eliquis due to highly elevated [id number] score  Hypertension, stable  Hyperlipidemia, statin  Diabetes  Hypothyroidism  Depression,/anxiety     Plan:   Echocardiogram was noted.  LVEF is mildly reduced.  No significant valvular pathology was reported.  Patient remains in normal sinus rhythm.    Continue cardiac monitoring.    He will need 4-week event monitor as an outpatient.    Continue Eliquis at the current dose.  Holding on AV mary blockers due to bifascicular block.  Seizure evaluation as per primary team/neurology.    Patient may be discharged from cardiac perspective.  He will need close outpatient follow-up to arrange for event monitor and cardiac stress test.    CODE STATUS:   Level Of Support Discussed With: Patient  Code Status (Patient has no pulse and is not breathing): CPR (Attempt to Resuscitate)  Medical Interventions (Patient has pulse or is breathing): Full  Support      Electronically signed by Dara Peterson MD, 09/19/23, 3:02 PM EDT.

## 2023-09-19 NOTE — CASE MANAGEMENT/SOCIAL WORK
OBS 9/16/23  INPT 9/18/23  DC 9/19/23  LOS <2 MN s- Met INPT per INDICIA- no exception   Ftsg Text: The defect edges were debeveled with a #15 scalpel blade.  Given the location of the defect, shape of the defect and the proximity to free margins a full thickness skin graft was deemed most appropriate.  Using a sterile surgical marker, the primary defect shape was transferred to the donor site. The area thus outlined was incised deep to adipose tissue with a #15 scalpel blade.  The harvested graft was then trimmed of adipose tissue until only dermis and epidermis was left.  The skin margins of the secondary defect were undermined to an appropriate distance in all directions utilizing iris scissors.  The secondary defect was closed with interrupted buried subcutaneous sutures.  The skin edges were then re-apposed with running  sutures.  The skin graft was then placed in the primary defect and oriented appropriately.

## 2023-09-19 NOTE — DISCHARGE SUMMARY
[facility]    Patient Name: Giles Donovan  : 1945  MRN: [id number]    Date of Admission: 2023  Date of Discharge:  23  Primary Care Physician: Morena Masters MD    Consultants:  -Cardiology: Dr. Dara Peterson  -Neurology  -Right internal carotid artery occlusion    Hospital Problems:  Syncope  Questionable seizure  Acute renal failure (Creatinine peak at 1.60, baseline creatinine: 0.9-1.0)  Left anterior fascicular block  Right bundle branch block  Right internal carotid artery  Normocytic anemia  New onset paroxysmal atrial fibrillation  Hypertension  Hyperlipidemia  Type 2 diabetes mellitus  Hypothyroidism    Hospital Course     Hospital Course:  Giles Donovan is a 77 y.o. male with essential hypertension, hyperlipidemia, hypothyroidism and type 2 diabetes mellitus presented to the ED for evaluation of passing out.  Patient reportedly had 2 episodes of passing out while eating dinner which were witnessed by his wife and patient had become unresponsive and slid down to the floor.  Patient did not strike his head and per reports there is no obvious seizure activity.  Cardiology and neurology consulted.  Patient found to have paroxysmal atrial fibrillation during hospitalization and was initiated on Eliquis therapy.  Due to fascicular block on EKG AV mary blockers were avoided per cardiology recommendation.  Patient also will need to be set up with outpatient Holter monitor per cardiology after discharge.  MRI of the brain was obtained and no acute intracranial processes were noted.  Carotid duplex was obtained and patient found to have right internal carotid artery which was occluded (apparently this is a known diagnosis for the patient).  Vascular surgery was consulted and no additional current inpatient evaluation is at this time.  EEG was also obtained that showed left frontotemporal generalized slowing but no  epileptiform discharges were noted.  On day of discharge patient hemodynamically stable and no additional inpatient evaluation recommended at this time, he will discharge home with home health is to follow-up with PCP, cardiology Neurology.    DISCHARGE Follow Up Recommendations for labs and diagnostics:   -Follow-up with PCP in 3 to 5 days  -Follow-up with cardiology in 1-2 weeks and patient is to have Holter monitor placed as an outpatient  -Follow-up with neurology in 1-3 weeks.  Referral placed at time of discharge.    Day of Discharge     Vital Signs:  Temp:  [97.5 °F (36.4 °C)-99 °F (37.2 °C)] 97.7 °F (36.5 °C)  Heart Rate:  [67-86] 80  Resp:  [16-19] 18  BP: (116-152)/(64-87) 131/84  Flow (L/min):  [2] 2  Physical Exam:   Gen: No acute distress, Conversant, Pleasant, lying in bed  Resp: CTAB, No w/r/r, No respiratory distress appreciated, equal chest rise bilaterally  Card: RRR, No m/r/g  Abd: Soft, Nontender, Nondistended, + bowel sounds    Discharge Details        Discharge Medications        New Medications        Instructions Start Date   apixaban 5 MG tablet tablet  Commonly known as: ELIQUIS   5 mg, Oral, 2 Times Daily      lisinopril 5 MG tablet  Commonly known as: PRINIVIL,ZESTRIL   5 mg, Oral, Daily             Continue These Medications        Instructions Start Date   aspirin EC 81 MG EC tablet  Generic drug: aspirin   81 mg, Oral, Daily      atorvastatin 80 MG tablet  Commonly known as: LIPITOR   80 mg, Oral, Daily      benzonatate 100 MG capsule  Commonly known as: TESSALON   100 mg, Oral, 3 Times Daily PRN      cetirizine 10 MG tablet  Commonly known as: ZyrTEC Allergy   10 mg, Oral, Daily      felodipine 5 MG 24 hr tablet  Commonly known as: PLENDIL   Take 1 tablet by mouth Daily.      fluticasone-salmeterol 230-21 MCG/ACT inhaler  Commonly known as: ADVAIR HFA   2 puffs, Inhalation, 2 Times Daily      gabapentin 600 MG tablet  Commonly known as: NEURONTIN   600 mg, Oral, 3 Times Daily       insulin aspart 100 UNIT/ML injection  Commonly known as: novoLOG   24-26 Units, Subcutaneous, 3 Times Daily Before Meals      Lantus SoloStar 100 UNIT/ML injection pen  Generic drug: Insulin Glargine   Inject 40 Units under the skin into the appropriate area as directed Every Night.      levothyroxine 100 MCG tablet  Commonly known as: SYNTHROID, LEVOTHROID   100 mcg, Oral, Daily      metFORMIN 500 MG tablet  Commonly known as: GLUCOPHAGE   500 mg, Oral, 2 Times Daily With Meals      olopatadine 0.1 % ophthalmic solution  Commonly known as: PATANOL   1 drop, Both Eyes, Daily PRN      omeprazole 20 MG capsule  Commonly known as: priLOSEC   Take 1 capsule by mouth Daily.      oxybutynin XL 10 MG 24 hr tablet  Commonly known as: DITROPAN-XL   10 mg, Oral, Daily      PARoxetine 40 MG tablet  Commonly known as: PAXIL   40 mg, Oral, Every Evening      tadalafil 10 MG tablet  Commonly known as: Cialis   10 mg, Oral, Daily PRN      traZODone 100 MG tablet  Commonly known as: DESYREL   100 mg, Oral, Nightly             Stop These Medications      carvedilol 6.25 MG tablet  Commonly known as: COREG     losartan 50 MG tablet  Commonly known as: COZAAR              Allergies   Allergen Reactions    Latex Rash and Anaphylaxis    Latex, Natural Rubber Itching       Discharge Disposition:  Home-Health Care c    Diet:  Hospital:  Diet Order   Procedures    Diet: Cardiac Diets; Healthy Heart (2-3 Na+); Texture: Regular Texture (IDDSI 7); Fluid Consistency: Thin (IDDSI 0)       Discharge Activity:   Activity Instructions       Activity as Tolerated              CODE STATUS:  Code Status and Medical Interventions:   Ordered at: 09/16/23 1194     Level Of Support Discussed With:    Patient     Code Status (Patient has no pulse and is not breathing):    CPR (Attempt to Resuscitate)     Medical Interventions (Patient has pulse or is breathing):    Full Support       Future Appointments   Date Time Provider Department Center    10/24/2023  1:15 PM Aisha Garcia APRN [facility] PCC ETW [facility]   12/20/2023  2:45 PM Morena Masters MD [facility] IMP RADC [facility]   5/21/2024  1:15 PM Tabatha Oconnell APRBRANDON [facility] U ETRING [facility]       Additional Instructions for the Follow-ups that You Need to Schedule       Discharge Follow-up with PCP   As directed       Currently Documented PCP:    Morena Masters MD    PCP Phone Number:    [phone]     Follow Up Details: Follow-up in 3-5 days        Discharge Follow-up with Specified Provider: Dr. Dara Peterson; 1 Week   As directed      To: Dr. Dara Peterson   Follow Up: 1 Week   Follow Up Details: Patient needing to be set up for outpatient holter monitor.                Pertinent  and/or Most Recent Results     RADIOLOGY:  Bilateral Carotid Duplex [[id number]] Davy as Reviewed   Order Status: Completed Resulted: 09/18/23 1651    Updated: 09/18/23 1657    Prox CCA PSV 61.5 cm/sec     Prox CCA EDV 6.2 cm/sec     Dist CCA PSV 57.8 cm/sec     Dist CCA EDV 0.62 cm/sec     Prox ICA PSV 0.00 cm/sec     Prox ICA EDV 0.00 cm/sec     Mid ICA PSV 0.00 cm/sec     Mid ICA EDV 0.00 cm/sec     Dist ICA PSV 0.00 cm/sec     Dist ICA EDV 0.00 cm/sec     Prox ECA PSV -127.4 cm/sec     Prox ECA EDV -8.7 cm/sec     Vertebral A PSV 39.3 cm/sec     Vertebral A EDV 9.8 cm/sec     Prox CCA PSV -98.2 cm/sec     Prox CCA EDV -18.0 cm/sec     Dist CCA PSV -82.0 cm/sec     Dist CCA EDV -15.5 cm/sec     Prox ICA PSV -92.6 cm/sec     Prox ICA EDV -28.6 cm/sec     Mid ICA PSV -105.0 cm/sec     Mid ICA EDV -34.2 cm/sec     Dist ICA PSV -125.5 cm/sec     Dist ICA EDV -53.4 cm/sec     Prox ECA PSV -91.9 cm/sec     Prox ECA EDV -6.2 cm/sec     Vertebral A PSV 47.2 cm/sec     Vertebral A EDV 11.2 cm/sec     ICA/CCA ratio 0.00    ICA/CCA ratio 1.13    XLRA OMER RIGHT CAROTID BULB .00 cm/sec     XLRA OMER RIGHT CAROTID BULB EDV 10.60 cm/sec     XLRA OMER LEFT CAROTID BULB PSV 46.60 cm/sec     XLRA OMER LEFT CAROTID BULB EDV 18.60 cm/sec      Right arm /70 mmHg     Left arm /74 mmHg    Narrative:       Right internal carotid artery is occluded.    Left internal carotid artery demonstrates normal flow without evidence  of hemodynamically significant stenosis.    Heavy plaque in the bifurcations bilaterally.    Vertebral flow is antegrade bilaterally.    No prior study available for comparison.    MRI Brain Without Contrast [[id number]] Davy as Reviewed   Order Status: Completed Collected: 09/18/23 1000    Updated: 09/18/23 1003   Narrative:     PROCEDURE: MRI BRAIN WO CONTRAST     COMPARISON: None     INDICATIONS: Delirium.           TECHNIQUE: A variety of imaging planes and parameters were utilized for visualization of suspected  pathology.  Images were performed without contrast.     FINDINGS:  No restricted diffusion or abnormal susceptibility.  No edema or mass effect.  T2 signal  hyperintensities in the supratentorial white matter.  Generalized parenchymal volume loss with  proportionate ventricles.  No abnormal extra-axial fluid collection.  Partially empty sella  anatomic variant.  No suprasellar mass.  Major intracranial flow voids present.  No skull or  orbital abnormality demonstrated.  No fluid in the paranasal sinuses      Impression:          1. No acute intracranial process     2. White matter abnormality compatible with chronic microvascular ischemic change               KADEN ARANDA MD        Electronically Signed and Approved By: KADEN ARANDA MD on 9/18/2023 at 10:00                   XR Chest 1 View [[id number]] Davy as Reviewed   Order Status: Completed Collected: 09/16/23 1857    Updated: 09/16/23 1900   Narrative:     PROCEDURE: XR CHEST 1 VW     COMPARISON: [facility], CR, CHEST PA/AP & LAT 2V, 3/24/2015, 15:07.  [facility],  CR, XR CHEST 1 VW, 4/07/2023, 13:02.  [facility], CR, XR CHEST 1 VW, 7/30/2023, 4:55.     INDICATIONS: Weak/Dizzy/AMS triage protocol     FINDINGS:  The heart  remains borderline in size.  The pulmonary vascularity does not appear congested.     The lungs are well-expanded and free of infiltrates.     Bony structures appear intact.      Impression:       Borderline cardiomegaly.     No active pulmonary disease is seen.                  JONATAN LONGORIA MD        Electronically Signed and Approved By: JONATAN LONGORIA MD on 9/16/2023 at 18:57       LAB RESULTS:      Lab 09/19/23  0403 09/18/23  0432 09/17/23  1145 09/17/23  0759 09/17/23  0453 09/16/23  1841   WBC 6.71 7.75  --   --  7.32 8.60   HEMOGLOBIN 10.9* 11.0* 11.6* 11.5* 11.6* 11.2*   HEMATOCRIT 33.2* 33.9* 34.7* 34.0* 34.5* 33.3*   PLATELETS 193 194  --   --  188 206   NEUTROS ABS 3.34 4.17  --   --  4.45 6.15   IMMATURE GRANS (ABS) 0.02 0.02  --   --  0.01 0.02   LYMPHS ABS 2.32 2.51  --   --  2.02 1.42   MONOS ABS 0.79 0.84  --   --  0.64 0.82   EOS ABS 0.19 0.17  --   --  0.17 0.16   MCV 94.1 93.1  --   --  93.0 92.0         Lab 09/19/23  0403 09/18/23  0432 09/17/23  0453 09/16/23  1841   SODIUM 136 138 138 141   POTASSIUM 4.4 4.2 4.6 4.6   CHLORIDE 100 100 101 101   CO2 26.5 31.2* 27.8 28.6   ANION GAP 9.5 6.8 9.2 11.4   BUN 15 15 18 19   CREATININE 0.98 1.02 1.31* 1.60*   EGFR 79.9 76.2 56.4* 44.4*   GLUCOSE 197* 206* 326* 181*   CALCIUM 9.4 9.4 9.5 9.5   MAGNESIUM 1.7 1.8 1.6 1.6   PHOSPHORUS 3.4 3.4 3.4  --    HEMOGLOBIN A1C  --   --  9.00*  --    TSH  --   --  0.945  --          Lab 09/16/23  1841   TOTAL PROTEIN 7.0   ALBUMIN 4.0   GLOBULIN 3.0   ALT (SGPT) 18   AST (SGOT) 27   BILIRUBIN 0.5   ALK PHOS 56         Lab 09/16/23  1841   HSTROP T 33*         Lab 09/17/23  0453   CHOLESTEROL 92   LDL CHOL 34   HDL CHOL 34*   TRIGLYCERIDES 134         Lab 09/17/23  0453   IRON 66   IRON SATURATION (TSAT) 30   TIBC 224*   TRANSFERRIN 150*   FERRITIN 333.40   FOLATE 17.10   VITAMIN B 12 699         Brief Urine Lab Results  (Last result in the past 365 days)        Color   Clarity   Blood   Leuk Est   Nitrite    Protein   CREAT   Urine HCG        09/17/23 0006 Yellow   Clear   Negative   Negative   Negative   Trace                 Microbiology Results (last 10 days)       ** No results found for the last 240 hours. **              Results for orders placed during the hospital encounter of 09/16/23    Bilateral Carotid Duplex    Interpretation Summary    Right internal carotid artery is occluded.    Left internal carotid artery demonstrates normal flow without evidence of hemodynamically significant stenosis.    Heavy plaque in the bifurcations bilaterally.    Vertebral flow is antegrade bilaterally.    No prior study available for comparison.      Results for orders placed during the hospital encounter of 09/16/23    Bilateral Carotid Duplex    Interpretation Summary    Right internal carotid artery is occluded.    Left internal carotid artery demonstrates normal flow without evidence of hemodynamically significant stenosis.    Heavy plaque in the bifurcations bilaterally.    Vertebral flow is antegrade bilaterally.    No prior study available for comparison.      Results for orders placed during the hospital encounter of 09/16/23    Adult Transthoracic Echo Complete w/ Color, Spectral and Contrast if necessary per protocol    Interpretation Summary    Left ventricular systolic function is low normal. Calculated left ventricular EF = 45.1%    The left ventricular cavity is borderline dilated.    Left ventricular diastolic function is consistent with (grade I) impaired relaxation.    There is mild calcification of the aortic valve.      Time spent on Discharge including face to face service: Greater than 30 minutes    Electronically signed by Owen Vanessa MD, 09/19/23, 2:54 PM EDT.

## 2023-09-20 ENCOUNTER — TRANSITIONAL CARE MANAGEMENT TELEPHONE ENCOUNTER (OUTPATIENT)
Dept: CALL CENTER | Facility: HOSPITAL | Age: 78
End: 2023-09-20
Payer: MEDICARE

## 2023-09-20 ENCOUNTER — OFFICE VISIT (OUTPATIENT)
Dept: INTERNAL MEDICINE | Facility: CLINIC | Age: 78
End: 2023-09-20
Payer: MEDICARE

## 2023-09-20 VITALS
BODY MASS INDEX: 31.77 KG/M2 | SYSTOLIC BLOOD PRESSURE: 126 MMHG | WEIGHT: 209.6 LBS | HEART RATE: 77 BPM | HEIGHT: 68 IN | DIASTOLIC BLOOD PRESSURE: 72 MMHG | TEMPERATURE: 97.5 F | OXYGEN SATURATION: 97 %

## 2023-09-20 DIAGNOSIS — E78.2 MIXED HYPERLIPIDEMIA: ICD-10-CM

## 2023-09-20 DIAGNOSIS — I65.21 STENOSIS OF RIGHT CAROTID ARTERY: ICD-10-CM

## 2023-09-20 DIAGNOSIS — I10 PRIMARY HYPERTENSION: ICD-10-CM

## 2023-09-20 DIAGNOSIS — R55 SYNCOPE, UNSPECIFIED SYNCOPE TYPE: Primary | ICD-10-CM

## 2023-09-20 DIAGNOSIS — E11.9 CONTROLLED TYPE 2 DIABETES MELLITUS WITHOUT COMPLICATION, WITHOUT LONG-TERM CURRENT USE OF INSULIN: ICD-10-CM

## 2023-09-20 NOTE — OUTREACH NOTE
Prep Survey      Flowsheet Row Responses   [facility] patient discharged from? [facility]   Is LACE score < 7 ? No   Eligibility [facility]   Date of Admission 09/16/23   Date of Discharge 09/19/23   Discharge Disposition Home-Health Care Svc   Discharge diagnosis Syncope   Does the patient have one of the following disease processes/diagnoses(primary or secondary)? Other   Does the patient have Home health ordered? No   Is there a DME ordered? No   Prep survey completed? Yes            STEPHANIE A - Registered Nurse

## 2023-09-20 NOTE — PROGRESS NOTES
Transitional Care Follow Up Visit  Subjective     Giles Donovan is a 77 y.o. male who presents for a transitional care management visit.    Within 48 business hours after discharge our office contacted him via telephone to coordinate his care and needs.      I reviewed and discussed the details of that call along with the discharge summary, hospital problems, inpatient lab results, inpatient diagnostic studies, and consultation reports with Giles.     Current outpatient and discharge medications have been reconciled for the patient.  Reviewed by: Morena Masters MD          9/19/2023     8:16 PM   Date of TCM Phone Call   [facility]   Date of Admission 9/16/2023   Date of Discharge 9/19/2023   Discharge Disposition Home-Health Care Svc     Risk for Readmission (LACE) Score: 12 (9/19/2023  6:00 AM)      History of Present Illness   Reviewed discharge summary  Reviewed scans done at visit    No further syncope episodes since d/c    Has adjusted meds and tolerated this well    Brings blood pressure cuff with him today that appears to be reading well    He has follow-up with cardiology  Has appointment for Holter placement  Has appointment with neurology    Feels like sugars are doing well    No other concerns for today    Review of Systems    Objective   Physical Exam  Vitals reviewed.   Constitutional:       Appearance: Normal appearance. He is well-developed.   HENT:      Head: Normocephalic and atraumatic.      Right Ear: External ear normal.      Left Ear: External ear normal.   Eyes:      Conjunctiva/sclera: Conjunctivae normal.      Pupils: Pupils are equal, round, and reactive to light.   Cardiovascular:      Rate and Rhythm: Normal rate and regular rhythm.      Heart sounds: No murmur heard.    No friction rub. No gallop.   Pulmonary:      Effort: Pulmonary effort is normal.      Breath sounds: Normal breath sounds. No wheezing or rhonchi.      Comments: Wearing his oxygen  today  Musculoskeletal:      Comments: Using walker for ambulation sitting on walker today   Skin:     General: Skin is warm and dry.   Neurological:      Mental Status: He is alert and oriented to person, place, and time.   Psychiatric:         Mood and Affect: Affect normal.         Behavior: Behavior normal.         Thought Content: Thought content normal.       Assessment & Plan   Problems Addressed this Visit          Cardiac and Vasculature    Hyperlipidemia    Hypertension    Stenosis of right carotid artery       Endocrine and Metabolic    Controlled type 2 diabetes mellitus without complication, without long-term current use of insulin       Symptoms and Signs    Syncope - Primary     Diagnoses         Codes Comments    Syncope, unspecified syncope type    -  Primary ICD-10-CM: R55  ICD-9-CM: 780.2 No further episodes appears more cardiac related and likely hypotensive rather than neurologic but did have some slowing on EEG Will await neurology appointment    Mixed hyperlipidemia     ICD-10-CM: E78.2  ICD-9-CM: 272.2 Well-controlled continue to monitor    Primary hypertension     ICD-10-CM: I10  ICD-9-CM: 401.9 Doing great on current meds continue for now he will continue to monitor pressures particularly if he does have a syncopal episode    Stenosis of right carotid artery     ICD-10-CM: I65.21  ICD-9-CM: 433.10 Stable    Controlled type 2 diabetes mellitus without complication, without long-term current use of insulin     ICD-10-CM: E11.9  ICD-9-CM: 250.00 Controlled continue monitor levels          Will await Holter monitor as well

## 2023-09-20 NOTE — OUTREACH NOTE
Call Center TCM Note      Flowsheet Row Responses   [facility] patient discharged from? [facility]   Does the patient have one of the following disease processes/diagnoses(primary or secondary)? Other   TCM attempt successful? Yes   TCM call completed? Yes   Wrap up additional comments Pt arrived at TCM follow up appt today.  TCM completed            Laureen Bennett LPN    9/20/2023, 11:11 EDT

## 2023-09-26 ENCOUNTER — OFFICE VISIT (OUTPATIENT)
Dept: CARDIOLOGY | Facility: CLINIC | Age: 78
End: 2023-09-26
Payer: MEDICARE

## 2023-09-26 ENCOUNTER — HOSPITAL ENCOUNTER (EMERGENCY)
Facility: HOSPITAL | Age: 78
Discharge: HOME | End: 2023-09-26
Attending: EMERGENCY MEDICINE | Admitting: EMERGENCY MEDICINE
Payer: MEDICARE

## 2023-09-26 ENCOUNTER — APPOINTMENT (OUTPATIENT)
Dept: GENERAL RADIOLOGY | Facility: HOSPITAL | Age: 78
End: 2023-09-26
Payer: MEDICARE

## 2023-09-26 VITALS
HEIGHT: 68 IN | TEMPERATURE: 97.8 F | RESPIRATION RATE: 17 BRPM | DIASTOLIC BLOOD PRESSURE: 84 MMHG | WEIGHT: 225.53 LBS | OXYGEN SATURATION: 100 % | BODY MASS INDEX: 34.18 KG/M2 | SYSTOLIC BLOOD PRESSURE: 153 MMHG | HEART RATE: 79 BPM

## 2023-09-26 VITALS
HEIGHT: 68 IN | DIASTOLIC BLOOD PRESSURE: 73 MMHG | HEART RATE: 96 BPM | SYSTOLIC BLOOD PRESSURE: 134 MMHG | WEIGHT: 225.6 LBS | BODY MASS INDEX: 34.19 KG/M2

## 2023-09-26 DIAGNOSIS — I10 PRIMARY HYPERTENSION: ICD-10-CM

## 2023-09-26 DIAGNOSIS — I48.91 ATRIAL FIBRILLATION, UNSPECIFIED TYPE: Primary | ICD-10-CM

## 2023-09-26 DIAGNOSIS — E78.2 MIXED HYPERLIPIDEMIA: ICD-10-CM

## 2023-09-26 DIAGNOSIS — R55 NEAR SYNCOPE: Primary | ICD-10-CM

## 2023-09-26 DIAGNOSIS — I50.22 CHRONIC HFREF (HEART FAILURE WITH REDUCED EJECTION FRACTION): ICD-10-CM

## 2023-09-26 LAB
ALBUMIN SERPL-MCNC: 3.8 G/DL (ref 3.5–5.2)
ALBUMIN/GLOB SERPL: 1.5 G/DL
ALP SERPL-CCNC: 54 U/L (ref 39–117)
ALT SERPL W P-5'-P-CCNC: 25 U/L (ref 1–41)
ANION GAP SERPL CALCULATED.3IONS-SCNC: 7.2 MMOL/L (ref 5–15)
AST SERPL-CCNC: 20 U/L (ref 1–40)
BASOPHILS # BLD AUTO: 0.03 10*3/MM3 (ref 0–0.2)
BASOPHILS NFR BLD AUTO: 0.4 % (ref 0–1.5)
BILIRUB SERPL-MCNC: 0.4 MG/DL (ref 0–1.2)
BILIRUB UR QL STRIP: NEGATIVE
BUN SERPL-MCNC: 18 MG/DL (ref 8–23)
BUN/CREAT SERPL: 15.7 (ref 7–25)
CALCIUM SPEC-SCNC: 9.1 MG/DL (ref 8.6–10.5)
CHLORIDE SERPL-SCNC: 97 MMOL/L (ref 98–107)
CLARITY UR: CLEAR
CO2 SERPL-SCNC: 28.8 MMOL/L (ref 22–29)
COLOR UR: YELLOW
CREAT SERPL-MCNC: 1.15 MG/DL (ref 0.76–1.27)
DEPRECATED RDW RBC AUTO: 45.1 FL (ref 37–54)
EGFRCR SERPLBLD CKD-EPI 2021: 66 ML/MIN/1.73
EOSINOPHIL # BLD AUTO: 0.23 10*3/MM3 (ref 0–0.4)
EOSINOPHIL NFR BLD AUTO: 3.3 % (ref 0.3–6.2)
ERYTHROCYTE [DISTWIDTH] IN BLOOD BY AUTOMATED COUNT: 13.2 % (ref 12.3–15.4)
GLOBULIN UR ELPH-MCNC: 2.6 GM/DL
GLUCOSE SERPL-MCNC: 226 MG/DL (ref 65–99)
GLUCOSE UR STRIP-MCNC: ABNORMAL MG/DL
HCT VFR BLD AUTO: 30 % (ref 37.5–51)
HGB BLD-MCNC: 9.8 G/DL (ref 13–17.7)
HGB UR QL STRIP.AUTO: NEGATIVE
HOLD SPECIMEN: NORMAL
HOLD SPECIMEN: NORMAL
IMM GRANULOCYTES # BLD AUTO: 0.01 10*3/MM3 (ref 0–0.05)
IMM GRANULOCYTES NFR BLD AUTO: 0.1 % (ref 0–0.5)
KETONES UR QL STRIP: NEGATIVE
LEUKOCYTE ESTERASE UR QL STRIP.AUTO: NEGATIVE
LYMPHOCYTES # BLD AUTO: 1.96 10*3/MM3 (ref 0.7–3.1)
LYMPHOCYTES NFR BLD AUTO: 28.5 % (ref 19.6–45.3)
MAGNESIUM SERPL-MCNC: 1.5 MG/DL (ref 1.6–2.4)
MCH RBC QN AUTO: 30.6 PG (ref 26.6–33)
MCHC RBC AUTO-ENTMCNC: 32.7 G/DL (ref 31.5–35.7)
MCV RBC AUTO: 93.8 FL (ref 79–97)
MONOCYTES # BLD AUTO: 0.81 10*3/MM3 (ref 0.1–0.9)
MONOCYTES NFR BLD AUTO: 11.8 % (ref 5–12)
NEUTROPHILS NFR BLD AUTO: 3.84 10*3/MM3 (ref 1.7–7)
NEUTROPHILS NFR BLD AUTO: 55.9 % (ref 42.7–76)
NITRITE UR QL STRIP: NEGATIVE
NRBC BLD AUTO-RTO: 0 /100 WBC (ref 0–0.2)
NT-PROBNP SERPL-MCNC: 329.7 PG/ML (ref 0–1800)
PH UR STRIP.AUTO: 7 [PH] (ref 5–8)
PLATELET # BLD AUTO: 170 10*3/MM3 (ref 140–450)
PMV BLD AUTO: 9.9 FL (ref 6–12)
POTASSIUM SERPL-SCNC: 3.6 MMOL/L (ref 3.5–5.2)
PROT SERPL-MCNC: 6.4 G/DL (ref 6–8.5)
PROT UR QL STRIP: ABNORMAL
QT INTERVAL: 455 MS
QTC INTERVAL: 511 MS
RBC # BLD AUTO: 3.2 10*6/MM3 (ref 4.14–5.8)
SODIUM SERPL-SCNC: 133 MMOL/L (ref 136–145)
SP GR UR STRIP: 1.02 (ref 1–1.03)
TROPONIN T SERPL HS-MCNC: 28 NG/L
TROPONIN T SERPL HS-MCNC: 30 NG/L
UROBILINOGEN UR QL STRIP: ABNORMAL
WBC NRBC COR # BLD: 6.88 10*3/MM3 (ref 3.4–10.8)
WHOLE BLOOD HOLD COAG: NORMAL
WHOLE BLOOD HOLD SPECIMEN: NORMAL

## 2023-09-26 PROCEDURE — 71045 X-RAY EXAM CHEST 1 VIEW: CPT

## 2023-09-26 PROCEDURE — 96365 THER/PROPH/DIAG IV INF INIT: CPT

## 2023-09-26 PROCEDURE — 25010000002 MAGNESIUM SULFATE IN D5W 1G/100ML (PREMIX) 1-5 GM/100ML-% SOLUTION: Performed by: EMERGENCY MEDICINE

## 2023-09-26 PROCEDURE — 83735 ASSAY OF MAGNESIUM: CPT | Performed by: EMERGENCY MEDICINE

## 2023-09-26 PROCEDURE — 99284 EMERGENCY DEPT VISIT MOD MDM: CPT

## 2023-09-26 PROCEDURE — 93005 ELECTROCARDIOGRAM TRACING: CPT | Performed by: EMERGENCY MEDICINE

## 2023-09-26 PROCEDURE — 96366 THER/PROPH/DIAG IV INF ADDON: CPT

## 2023-09-26 PROCEDURE — 84484 ASSAY OF TROPONIN QUANT: CPT | Performed by: EMERGENCY MEDICINE

## 2023-09-26 PROCEDURE — 83880 ASSAY OF NATRIURETIC PEPTIDE: CPT | Performed by: EMERGENCY MEDICINE

## 2023-09-26 PROCEDURE — 81003 URINALYSIS AUTO W/O SCOPE: CPT | Performed by: EMERGENCY MEDICINE

## 2023-09-26 PROCEDURE — 80053 COMPREHEN METABOLIC PANEL: CPT | Performed by: EMERGENCY MEDICINE

## 2023-09-26 PROCEDURE — 85025 COMPLETE CBC W/AUTO DIFF WBC: CPT | Performed by: EMERGENCY MEDICINE

## 2023-09-26 PROCEDURE — 36415 COLL VENOUS BLD VENIPUNCTURE: CPT

## 2023-09-26 RX ORDER — SODIUM CHLORIDE 0.9 % (FLUSH) 0.9 %
10 SYRINGE (ML) INJECTION AS NEEDED
Status: DISCONTINUED | OUTPATIENT
Start: 2023-09-26 | End: 2023-09-26 | Stop reason: HOSPADM

## 2023-09-26 RX ORDER — FUROSEMIDE 40 MG/1
40 TABLET ORAL DAILY
Qty: 90 TABLET | Refills: 3 | Status: SHIPPED | OUTPATIENT
Start: 2023-09-26

## 2023-09-26 RX ORDER — MAGNESIUM SULFATE 1 G/100ML
1 INJECTION INTRAVENOUS ONCE
Status: COMPLETED | OUTPATIENT
Start: 2023-09-26 | End: 2023-09-26

## 2023-09-26 RX ADMIN — MAGNESIUM SULFATE 1 G: 1 INJECTION INTRAVENOUS at 04:26

## 2023-09-26 NOTE — ED PROVIDER NOTES
Time: 5:58 AM EDT  Date of encounter:  9/26/2023  Independent Historian/Clinical History and Information was obtained by:   Patient and Family    History is limited by: N/A    Chief Complaint: near syncope      History of Present Illness:  Patient is a 77 y.o. year old male who presents to the emergency department for evaluation of Near syncopal episode.  According to patient's wife they were having dinner.  Patient got up did not appear well.  She helped him down she took his blood pressure and found his blood pressure to be low.  She also realized that he was not wearing his oxygen.  At baseline he is on 2 L oxygen at all times.  O2 sats were in the 80s.  She put him back on his oxygen.  She decided to call EMS for further evaluation.  Patient did receive a bolus of fluid by EMS.  Patient currently has no complaints and states he feels well.    HPI    Patient Care Team  Primary Care Provider: Morena Masters MD    Past Medical History:     Allergies   Allergen Reactions    Latex Rash and Anaphylaxis    Latex, Natural Rubber Itching     Past Medical History:   Diagnosis Date    Allergic rhinitis 12/27/2014    Will try Zyrtec, he didnt want to use a nasal spray.    Arthritis     Asthma     Cataract     left eye    Cough 03/30/2016    PFT and CXR ordered.    Depression     PTSD    Diabetes     Elevated cholesterol     H/O psychiatric care 1999    PTSD    Hyperlipidemia 03/30/2016    Lipids ordered. Continue on current medication.    Hypertension     Hypothyroidism     Seasonal allergies     Shortness of breath     Sleep apnea     Stenosis of right carotid artery 02/19/2017    Will refer to vascular surgeon.    Syncope 02/19/2017    Type 2 diabetes mellitus     Upper respiratory infection 10/18/2015    Will treat cough with Hydromet, otherwise over the counter meds for treatment.     Past Surgical History:   Procedure Laterality Date    CATARACT EXTRACTION Right     x2    COLONOSCOPY      JOINT REPLACEMENT       REPLACEMENT TOTAL HIP ONCOLOGIC Right     RETINAL DETACHMENT REPAIR      TOE AMPUTATION Left 11/2017    Second phalaeng.    TOE OSTEOPHYTE REMOVAL       Family History   Problem Relation Age of Onset    Heart disease Mother     Lupus Mother     Arthritis Mother     Kidney disease Sister        Home Medications:  Prior to Admission medications    Medication Sig Start Date End Date Taking? Authorizing Provider   apixaban (ELIQUIS) 5 MG tablet tablet Take 1 tablet by mouth 2 (Two) Times a Day for 30 days. Indications: Atrial Fibrillation 9/19/23 10/19/23  Owen Vanessa MD   aspirin EC 81 MG EC tablet Take 1 tablet by mouth Daily. 7/23/21   Kierra Pitts MD   atorvastatin (LIPITOR) 80 MG tablet Take 1 tablet by mouth Daily. 8/9/22   Cathy Ochoa MD   benzonatate (TESSALON) 100 MG capsule Take 1 capsule by mouth 3 (Three) Times a Day As Needed for Cough.    Kierra Pitts MD   cetirizine (ZyrTEC Allergy) 10 MG tablet Take 1 tablet by mouth Daily. 8/9/22   Cathy Ochoa MD   felodipine (PLENDIL) 5 MG 24 hr tablet Take 1 tablet by mouth Daily.    Kierra Pitts MD   fluticasone-salmeterol (ADVAIR HFA) 230-21 MCG/ACT inhaler Inhale 2 puffs 2 (Two) Times a Day.    Kierra Pitts MD   gabapentin (NEURONTIN) 600 MG tablet Take 1 tablet by mouth 3 (Three) Times a Day. 3/31/21   Kierra Pitts MD   insulin aspart (novoLOG) 100 UNIT/ML injection Inject 24-26 Units under the skin into the appropriate area as directed 3 (Three) Times a Day Before Meals.    Kierra Pitts MD   Insulin Glargine (Lantus SoloStar) 100 UNIT/ML injection pen Inject 40 Units under the skin into the appropriate area as directed Every Night.    Kierra Pitts MD   levothyroxine (SYNTHROID, LEVOTHROID) 100 MCG tablet Take 1 tablet by mouth Daily.    Kierra Pitts MD   lisinopril (PRINIVIL,ZESTRIL) 5 MG tablet Take 1 tablet by mouth Daily for 30 days. 9/19/23 10/19/23  Owen Vanessa MD  "  metFORMIN (GLUCOPHAGE) 500 MG tablet Take 1 tablet by mouth 2 (Two) Times a Day With Meals.    Kierra Pitts MD   olopatadine (PATANOL) 0.1 % ophthalmic solution Administer 1 drop to both eyes Daily As Needed for Allergies.    Kierra Pitts MD   omeprazole (priLOSEC) 20 MG capsule Take 1 capsule by mouth Daily.    Kierra Pitts MD   oxybutynin XL (DITROPAN-XL) 10 MG 24 hr tablet Take 1 tablet by mouth Daily. 6/6/23   Kati Warren PA-C   PARoxetine (PAXIL) 40 MG tablet Take 1 tablet by mouth Every Evening. 4/5/21   Kierra Pitts MD   tadalafil (Cialis) 10 MG tablet Take 1 tablet by mouth Daily As Needed for Erectile Dysfunction. 9/6/23   Morena Masters MD   traZODone (DESYREL) 100 MG tablet Take 1 tablet by mouth Every Night. 4/22/22   Kierra Pitts MD        Social History:   Social History     Tobacco Use    Smoking status: Never    Smokeless tobacco: Never   Vaping Use    Vaping Use: Never used   Substance Use Topics    Alcohol use: Yes     Comment: 2TALL BEERS/DAY    Drug use: Never         Review of Systems:  Review of Systems   Constitutional:  Negative for chills and fever.   HENT:  Negative for congestion, ear pain and sore throat.    Eyes:  Negative for pain.   Respiratory:  Negative for cough, chest tightness and shortness of breath.    Cardiovascular:  Negative for chest pain.   Gastrointestinal:  Negative for abdominal pain, diarrhea, nausea and vomiting.   Genitourinary:  Negative for flank pain and hematuria.   Musculoskeletal:  Negative for joint swelling.   Skin:  Negative for pallor.   Neurological:  Positive for light-headedness. Negative for seizures and headaches.   All other systems reviewed and are negative.     Physical Exam:  /84   Pulse 79   Temp 97.8 °F (36.6 °C) (Oral)   Resp 17   Ht 171.5 cm (67.5\")   Wt 102 kg (225 lb 8.5 oz)   SpO2 100%   BMI 34.80 kg/m²     Physical Exam  Constitutional:       Appearance: Normal appearance. "   HENT:      Head: Normocephalic and atraumatic.      Nose: Nose normal.      Mouth/Throat:      Mouth: Mucous membranes are moist.   Eyes:      Extraocular Movements: Extraocular movements intact.      Conjunctiva/sclera: Conjunctivae normal.      Pupils: Pupils are equal, round, and reactive to light.   Cardiovascular:      Rate and Rhythm: Normal rate and regular rhythm.      Pulses: Normal pulses.      Heart sounds: Normal heart sounds.   Pulmonary:      Effort: Pulmonary effort is normal.      Breath sounds: Normal breath sounds.   Abdominal:      General: There is no distension.      Palpations: Abdomen is soft.      Tenderness: There is no abdominal tenderness.   Musculoskeletal:         General: Normal range of motion.      Cervical back: Normal range of motion.   Skin:     General: Skin is warm and dry.      Capillary Refill: Capillary refill takes less than 2 seconds.   Neurological:      General: No focal deficit present.      Mental Status: He is alert and oriented to person, place, and time. Mental status is at baseline.   Psychiatric:         Mood and Affect: Mood normal.         Behavior: Behavior normal.                Procedures:  Procedures      Medical Decision Making:      Comorbidities that affect care:    Asthma, Diabetes, Hypertension    External Notes reviewed:    Previous Clinic Note: Patient was seen by his PCP on 9/20/2023 for syncope, hyperlipidemia, hypertension, stenosis for carotid artery, diabetes.      The following orders were placed and all results were independently analyzed by me:  Orders Placed This Encounter   Procedures    XR Chest 1 View    [place] Draw    Comprehensive Metabolic Panel    Single High Sensitivity Troponin T    Magnesium    Urinalysis With Microscopic If Indicated (No Culture) - Urine, Clean Catch    CBC Auto Differential    High Sensitivity Troponin T    BNP    High Sensitivity Troponin T 2Hr    NPO Diet NPO Type: Strict NPO    Undress & Gown    Continuous  Pulse Oximetry    Vital Signs    Orthostatic Blood Pressure    Ambulate patient    Oxygen Therapy- Nasal Cannula; Titrate 1-6 LPM Per SpO2; 90 - 95%    POC Glucose Once    ECG 12 Lead ED Triage Standing Order; Weak / Dizzy / AMS    Insert Peripheral IV    Fall Precautions    CBC & Differential    Green Top (Gel)    Lavender Top    Gold Top - SST    Light Blue Top       Medications Given in the Emergency Department:  Medications   sodium chloride 0.9 % flush 10 mL (has no administration in time range)   magnesium sulfate in D5W 1g/100mL (PREMIX) (1 g Intravenous New Bag 9/26/23 0426)        ED Course:    ED Course as of 09/26/23 0609   Tue Sep 26, 2023   0607 ECG 12 Lead ED Triage Standing Order; Weak / Dizzy / AMS  Sinus rhythm rate of 78.  Right bundle branch block.  No acute ST elevation.  Normal IA and QTc.  EKG interpreted by me [LD]      ED Course User Index  [LD] Lucy Monge MD       Labs:    Lab Results (last 24 hours)       Procedure Component Value Units Date/Time    CBC & Differential [[id number]]  (Abnormal) Collected: 09/26/23 0143    Specimen: Blood Updated: 09/26/23 0151    Narrative:      The following orders were created for panel order CBC & Differential.  Procedure                               Abnormality         Status                     ---------                               -----------         ------                     CBC Auto Differential[[id number]]        Abnormal            Final result                 Please view results for these tests on the individual orders.    Comprehensive Metabolic Panel [[id number]]  (Abnormal) Collected: 09/26/23 0143    Specimen: Blood Updated: 09/26/23 0223     Glucose 226 mg/dL      BUN 18 mg/dL      Creatinine 1.15 mg/dL      Sodium 133 mmol/L      Potassium 3.6 mmol/L      Chloride 97 mmol/L      CO2 28.8 mmol/L      Calcium 9.1 mg/dL      Total Protein 6.4 g/dL      Albumin 3.8 g/dL      ALT (SGPT) 25 U/L      AST (SGOT) 20 U/L      Alkaline  Phosphatase 54 U/L      Total Bilirubin 0.4 mg/dL      Globulin 2.6 gm/dL      A/G Ratio 1.5 g/dL      BUN/Creatinine Ratio 15.7     Anion Gap 7.2 mmol/L      eGFR 66.0 mL/min/1.73     Narrative:      GFR Normal >60  Chronic Kidney Disease <60  Kidney Failure <15    The GFR formula is only valid for adults with stable renal function between ages 18 and 70.    Single High Sensitivity Troponin T [[id number]]  (Abnormal) Collected: 09/26/23 0143    Specimen: Blood Updated: 09/26/23 0223     HS Troponin T 30 ng/L     Narrative:      High Sensitive Troponin T Reference Range:  <10.0 ng/L- Negative Female for AMI  <15.0 ng/L- Negative Male for AMI  >=10 - Abnormal Female indicating possible myocardial injury.  >=15 - Abnormal Male indicating possible myocardial injury.   Clinicians would have to utilize clinical acumen, EKG, Troponin, and serial changes to determine if it is an Acute Myocardial Infarction or myocardial injury due to an underlying chronic condition.         Magnesium [[id number]]  (Abnormal) Collected: 09/26/23 0143    Specimen: Blood Updated: 09/26/23 0223     Magnesium 1.5 mg/dL     CBC Auto Differential [[id number]]  (Abnormal) Collected: 09/26/23 0143    Specimen: Blood Updated: 09/26/23 0151     WBC 6.88 10*3/mm3      RBC 3.20 10*6/mm3      Hemoglobin 9.8 g/dL      Hematocrit 30.0 %      MCV 93.8 fL      MCH 30.6 pg      MCHC 32.7 g/dL      RDW 13.2 %      RDW-SD 45.1 fl      MPV 9.9 fL      Platelets 170 10*3/mm3      Neutrophil % 55.9 %      Lymphocyte % 28.5 %      Monocyte % 11.8 %      Eosinophil % 3.3 %      Basophil % 0.4 %      Immature Grans % 0.1 %      Neutrophils, Absolute 3.84 10*3/mm3      Lymphocytes, Absolute 1.96 10*3/mm3      Monocytes, Absolute 0.81 10*3/mm3      Eosinophils, Absolute 0.23 10*3/mm3      Basophils, Absolute 0.03 10*3/mm3      Immature Grans, Absolute 0.01 10*3/mm3      nRBC 0.0 /100 WBC     BNP [[id number]]  (Normal) Collected: 09/26/23 0143    Specimen: Blood Updated:  09/26/23 0404     proBNP 329.7 pg/mL     Narrative:      Among patients with dyspnea, NT-proBNP is highly sensitive for the detection of acute congestive heart failure. In addition NT-proBNP of <300 pg/ml effectively rules out acute congestive heart failure with 99% negative predictive value.      Urinalysis With Microscopic If Indicated (No Culture) - Urine, Clean Catch [[id number]]  (Abnormal) Collected: 09/26/23 0407    Specimen: Urine, Clean Catch Updated: 09/26/23 0428     Color, UA Yellow     Appearance, UA Clear     pH, UA 7.0     Specific Gravity, UA 1.016     Glucose, UA >=1000 mg/dL (3+)     Ketones, UA Negative     Bilirubin, UA Negative     Blood, UA Negative     Protein, UA Trace     Leuk Esterase, UA Negative     Nitrite, UA Negative     Urobilinogen, UA 2.0 E.U./dL    Narrative:      Urine microscopic not indicated.    High Sensitivity Troponin T [[id number]]  (Abnormal) Collected: 09/26/23 0407    Specimen: Blood Updated: 09/26/23 0445     HS Troponin T 28 ng/L     Narrative:      High Sensitive Troponin T Reference Range:  <10.0 ng/L- Negative Female for AMI  <15.0 ng/L- Negative Male for AMI  >=10 - Abnormal Female indicating possible myocardial injury.  >=15 - Abnormal Male indicating possible myocardial injury.   Clinicians would have to utilize clinical acumen, EKG, Troponin, and serial changes to determine if it is an Acute Myocardial Infarction or myocardial injury due to an underlying chronic condition.                  Imaging:    XR Chest 1 View    Result Date: 9/26/2023  PROCEDURE: XR CHEST 1 VW  COMPARISON: 9/16/2023.  INDICATIONS: Weak/Dizzy/AMS.  FINDINGS:  A single AP (or PA) upright portable chest radiograph was performed.  Borderline cardiac enlargement is seen.  No acute infiltrate is appreciated.  No pleural effusion or pneumothorax is identified.  External artifacts obscure detail.  There is pulmonary hypoinflation.  Degenerative changes involve the shoulders, probably greater on  the right.  No significant interval change is seen since the prior study (or studies).        No acute infiltrate is appreciated.     Please note that portions of this note were completed with a voice recognition program.  LEXX GONZALEZ JR, MD       Electronically Signed and Approved By: LEXX GONZALEZ JR, MD on 9/26/2023 at 2:26                 Differential Diagnosis and Discussion:    Syncope: Differential diagnosis includes but is not limited to TIA, hyperventilation, aortic stenosis, pulmonary emboli, myocardial disease, bradycardia arrhythmia, heart block, tachyarrhythmia, vasovagal, orthostatic hypotension, ruptured AAA, aortic dissection, subarachnoid hemorrhage, seizure, hypoglycemia.    All labs were reviewed and interpreted by me.  All X-rays impressions were independently interpreted by me.  EKG was interpreted by me.    MDM  Number of Diagnoses or Management Options  Near syncope  Diagnosis management comments: Patient is afebrile nontoxic-appearing.  Vital signs are stable.  Patient states he currently feels well.  Per report patient was not wearing his oxygen when he got up he suction was in the 80s and he became hypotensive and had a near syncopal episode.  Patient did receive fluids by EMS.  He currently has no complaints.  Labs showed magnesium of 1.5.  Patient given dose of magnesium emergency department.  Troponin initially 30 repeat 28.  This is also similar when compared to previous.  He denies any chest pain.  Patient able to ambulate without difficulty.  Recommend close follow-up with his primary physician.  Discussed return precautions, discharge instructions and answered all his questions.       Amount and/or Complexity of Data Reviewed  Clinical lab tests: reviewed  Tests in the radiology section of CPT®: reviewed  Review and summarize past medical records: yes  Independent visualization of images, tracings, or specimens: yes    Risk of Complications, Morbidity, and/or  Mortality  Presenting problems: moderate  Management options: moderate             Patient Care Considerations:    CT HEAD: I considered ordering a noncontrast CT of the head, however no head injury, no focal deficits, no headache      Consultants/Shared Management Plan:    None    Social Determinants of Health:    Patient is independent, reliable, and has access to care.       Disposition and Care Coordination:    Discharged: I considered escalation of care by admitting this patient for observation, however the patient has improved and is suitable and  stable for discharge.    I have explained the patient´s condition, diagnoses and treatment plan based on the information available to me at this time. I have answered questions and addressed any concerns. The patient has a good  understanding of the patient´s diagnosis, condition, and treatment plan as can be expected at this point. The vital signs have been stable. The patient´s condition is stable and appropriate for discharge from the emergency department.      The patient will pursue further outpatient evaluation with the primary care physician or other designated or consulting physician as outlined in the discharge instructions. They are agreeable to this plan of care and follow-up instructions have been explained in detail. The patient has received these instructions in written format and have expressed an understanding of the discharge instructions. The patient is aware that any significant change in condition or worsening of symptoms should prompt an immediate return to this or the closest emergency department or call to 911.  I have explained discharge medications and the need for follow up with the patient/caretakers. This was also printed in the discharge instructions. Patient was discharged with the following medications and follow up:      Medication List      No changes were made to your prescriptions during this visit.      Morena Masters MD  [address] [address]  [phone]    In 2 days         Final diagnoses:   Near syncope        ED Disposition       ED Disposition   Discharge    Condition   Stable    Comment   --               This medical record created using voice recognition software.             Lucy Monge MD  09/26/23 0609

## 2023-09-26 NOTE — PROGRESS NOTES
Chief Complaint  Hypertension, Hyperlipidemia, and Hospital Follow Up Visit (1 week hospital, [facility], f/u)    Subjective        History of Present Illness  Giles Donovan presents to [facility] for follow up.  Patient is a 77-year-old male with past medical history outlined below, significant for hypertension, hyperlipidemia, type 2 diabetes, newly diagnosed atrial fibrillation who presents for follow-up on recent hospitalization for syncope.  He has had 2 syncopal episodes and several near syncopal episodes over the last couple of days.  During his recent hospitalization he was diagnosed with paroxysmal atrial fibrillation and started on Eliquis 5 mg twice daily which she is tolerating well without any bleeding issues or concerns.  He is on home oxygen.  He denies any chest pain or discomfort.  He denies shortness of breath as long as he wears his oxygen.  He notes several episodes of dizziness and lightheadedness the most recent of which was yesterday while he was eating dinner and not wearing his oxygen for prolonged period of time.  He noted during this time he checked his blood pressure and it was in the 60s systolic.  He was brought to the emergency room by ambulance however by the time he arrived his blood pressure and oxygen had stabilized.  He has pitting bilateral lower extremity edema.  He denies any palpitations.      Past Medical History:   Diagnosis Date    Allergic rhinitis 12/27/2014    Will try Zyrtec, he didnt want to use a nasal spray.    Arthritis     Asthma     Cataract     left eye    Cough 03/30/2016    PFT and CXR ordered.    Depression     PTSD    Diabetes     Elevated cholesterol     H/O psychiatric care 1999    PTSD    Hyperlipidemia 03/30/2016    Lipids ordered. Continue on current medication.    Hypertension     Hypothyroidism     Seasonal allergies     Shortness of breath     Sleep apnea     Stenosis of right carotid artery 02/19/2017    Will refer to  vascular surgeon.    Syncope 02/19/2017    Type 2 diabetes mellitus     Upper respiratory infection 10/18/2015    Will treat cough with Hydromet, otherwise over the counter meds for treatment.       ALLERGY  Allergies   Allergen Reactions    Latex Rash and Anaphylaxis    Latex, Natural Rubber Itching        Past Surgical History:   Procedure Laterality Date    CATARACT EXTRACTION Right     x2    COLONOSCOPY      JOINT REPLACEMENT      REPLACEMENT TOTAL HIP ONCOLOGIC Right     RETINAL DETACHMENT REPAIR      TOE AMPUTATION Left 11/2017    Second phalaeng.    TOE OSTEOPHYTE REMOVAL          Social History     Socioeconomic History    Marital status: Single   Tobacco Use    Smoking status: Never    Smokeless tobacco: Never   Vaping Use    Vaping Use: Never used   Substance and Sexual Activity    Alcohol use: Yes     Comment: 2TALL BEERS/DAY    Drug use: Never    Sexual activity: Defer       Family History   Problem Relation Age of Onset    Heart disease Mother     Lupus Mother     Arthritis Mother     Kidney disease Sister         Current Outpatient Medications on File Prior to Visit   Medication Sig    apixaban (ELIQUIS) 5 MG tablet tablet Take 1 tablet by mouth 2 (Two) Times a Day for 30 days. Indications: Atrial Fibrillation    aspirin EC 81 MG EC tablet Take 1 tablet by mouth Daily.    atorvastatin (LIPITOR) 80 MG tablet Take 1 tablet by mouth Daily.    benzonatate (TESSALON) 100 MG capsule Take 1 capsule by mouth 3 (Three) Times a Day As Needed for Cough.    cetirizine (ZyrTEC Allergy) 10 MG tablet Take 1 tablet by mouth Daily.    felodipine (PLENDIL) 5 MG 24 hr tablet Take 1 tablet by mouth Daily.    fluticasone-salmeterol (ADVAIR HFA) 230-21 MCG/ACT inhaler Inhale 2 puffs 2 (Two) Times a Day.    gabapentin (NEURONTIN) 600 MG tablet Take 1 tablet by mouth 3 (Three) Times a Day.    insulin aspart (novoLOG) 100 UNIT/ML injection Inject 24-26 Units under the skin into the appropriate area as directed 3 (Three) Times  "a Day Before Meals.    Insulin Glargine (Lantus SoloStar) 100 UNIT/ML injection pen Inject 40 Units under the skin into the appropriate area as directed Every Night.    levothyroxine (SYNTHROID, LEVOTHROID) 100 MCG tablet Take 1 tablet by mouth Daily.    metFORMIN (GLUCOPHAGE) 500 MG tablet Take 1 tablet by mouth 2 (Two) Times a Day With Meals.    olopatadine (PATANOL) 0.1 % ophthalmic solution Administer 1 drop to both eyes Daily As Needed for Allergies.    omeprazole (priLOSEC) 20 MG capsule Take 1 capsule by mouth Daily.    oxybutynin XL (DITROPAN-XL) 10 MG 24 hr tablet Take 1 tablet by mouth Daily.    PARoxetine (PAXIL) 40 MG tablet Take 1 tablet by mouth Every Evening.    tadalafil (Cialis) 10 MG tablet Take 1 tablet by mouth Daily As Needed for Erectile Dysfunction.    traZODone (DESYREL) 100 MG tablet Take 1 tablet by mouth Every Night.    [DISCONTINUED] lisinopril (PRINIVIL,ZESTRIL) 5 MG tablet Take 1 tablet by mouth Daily for 30 days.     No current facility-administered medications on file prior to visit.       Objective   Vitals:    09/26/23 1306   BP: 134/73   Pulse: 96   Weight: 102 kg (225 lb 9.6 oz)   Height: 171.5 cm (67.5\")       Physical Exam  Constitutional:       General: He is awake. He is not in acute distress.     Appearance: Normal appearance.   HENT:      Head: Normocephalic.      Nose: Nose normal. No congestion.   Eyes:      Extraocular Movements: Extraocular movements intact.      Conjunctiva/sclera: Conjunctivae normal.      Pupils: Pupils are equal, round, and reactive to light.   Neck:      Thyroid: No thyromegaly.      Vascular: No JVD.   Cardiovascular:      Rate and Rhythm: Normal rate and regular rhythm.      Chest Wall: PMI is not displaced.      Pulses: Normal pulses.      Heart sounds: Normal heart sounds, S1 normal and S2 normal. No murmur heard.    No friction rub. No gallop. No S3 or S4 sounds.   Pulmonary:      Effort: Pulmonary effort is normal.      Breath sounds: Normal " breath sounds. No wheezing, rhonchi or rales.   Abdominal:      General: Bowel sounds are normal.      Palpations: Abdomen is soft.      Tenderness: There is no abdominal tenderness.   Musculoskeletal:      Cervical back: No tenderness.      Right lower leg: Edema present.      Left lower leg: Edema present.   Lymphadenopathy:      Cervical: No cervical adenopathy.   Skin:     General: Skin is warm and dry.      Capillary Refill: Capillary refill takes less than 2 seconds.      Coloration: Skin is not cyanotic.      Findings: No petechiae or rash.      Nails: There is no clubbing.   Neurological:      Mental Status: He is alert.   Psychiatric:         Mood and Affect: Mood normal.         Behavior: Behavior is cooperative.         Result Review     The following data was reviewed by TENISHA Cronin on 09/26/23.    proBNP   Date Value Ref Range Status   09/26/2023 329.7 0.0 - 1,800.0 pg/mL Final     CMP          9/18/2023    04:32 9/19/2023    04:03 9/26/2023    01:43   CMP   Glucose 206  197  226    BUN 15  15  18    Creatinine 1.02  0.98  1.15    EGFR 76.2  79.9  66.0    Sodium 138  136  133    Potassium 4.2  4.4  3.6    Chloride 100  100  97    Calcium 9.4  9.4  9.1    Total Protein   6.4    Albumin   3.8    Globulin   2.6    Total Bilirubin   0.4    Alkaline Phosphatase   54    AST (SGOT)   20    ALT (SGPT)   25    Albumin/Globulin Ratio   1.5    BUN/Creatinine Ratio 14.7  15.3  15.7    Anion Gap 6.8  9.5  7.2      CBC w/diff          9/18/2023    04:32 9/19/2023    04:03 9/26/2023    01:43   CBC w/Diff   WBC 7.75  6.71  6.88    RBC 3.64  3.53  3.20    Hemoglobin 11.0  10.9  9.8    Hematocrit 33.9  33.2  30.0    MCV 93.1  94.1  93.8    MCH 30.2  30.9  30.6    MCHC 32.4  32.8  32.7    RDW 13.2  13.2  13.2    Platelets 194  193  170    Neutrophil Rel % 53.8  49.8  55.9    Immature Granulocyte Rel % 0.3  0.3  0.1    Lymphocyte Rel % 32.4  34.6  28.5    Monocyte Rel % 10.8  11.8  11.8    Eosinophil Rel % 2.2   2.8  3.3    Basophil Rel % 0.5  0.7  0.4       Lipid Panel          12/5/2022    15:10 6/6/2023    11:58 9/17/2023    04:53   Lipid Panel   Total Cholesterol 220  128  92    Triglycerides 162  86  134    HDL Cholesterol 51  46  34    VLDL Cholesterol 29  17  24    LDL Cholesterol  140  65  34    LDL/HDL Ratio 2.68  1.41  0.92        Results for orders placed during the hospital encounter of 09/16/23    Adult Transthoracic Echo Complete w/ Color, Spectral and Contrast if necessary per protocol    Interpretation Summary    Left ventricular systolic function is low normal. Calculated left ventricular EF = 45.1%    The left ventricular cavity is borderline dilated.    Left ventricular diastolic function is consistent with (grade I) impaired relaxation.    There is mild calcification of the aortic valve.    Results for orders placed during the hospital encounter of 06/05/23    Stress test with myocardial perfusion one day    Interpretation Summary    Left ventricular ejection fraction is moderately reduced (Calculated EF = 26%).    Low probability of ischemia in this study, the ejection fraction is probably not very accurate, I will review the echocardiogram.         Procedures    Assessment & Plan  Diagnoses and all orders for this visit:    1. Atrial fibrillation, unspecified type (Primary)  -     Cancel: Holter Monitor - 72 Hour Up To 15 Days; Future  -     Cardiac Event Monitor; Future    2. Mixed hyperlipidemia    3. Primary hypertension    4. Chronic HFrEF (heart failure with reduced ejection fraction)    Other orders  -     furosemide (LASIX) 40 MG tablet; Take 1 tablet by mouth Daily.  Dispense: 90 tablet; Refill: 3    1.  Newly diagnosed atrial fibrillation.  Undetermined whether it is paroxysmal or persistent.  We will place a 4-week Holter monitor to determine the nature of his atrial fibrillation and rule out any other arrhythmias.  Continue Eliquis 5 mg twice daily.  Continue to hold beta-blocker due to  left anterior fascicular block and right bundle branch block.  May reconsider this at next follow-up visit.    2.  Continue statin therapy with atorvastatin 80 mg daily.  Most recent cholesterol demonstrates total cholesterol 92, LDL 34, HDL 34, triglycerides 134.  These are under excellent control.    3.  Blood pressure has been labile with significantly low blood pressures noted at home.  I have asked him to hold his lisinopril and monitor his blood pressure for the next 2 weeks and submit a blood pressure log for review.    4.  Most recent echocardiogram demonstrated an ejection fraction of 45%.  He will be started on Lasix 20 mg daily.  Do not feel at this point that he would tolerate any other guideline directed medical therapy for HFrEF.  May consider the addition of beta-blocker at next follow-up visit.    Follow Up   Return in about 6 weeks (around 11/7/2023) for With .    Patient was given instructions and counseling regarding his condition or for health maintenance advice. Please see specific information pulled into the AVS if appropriate.     Bharti Kessler, APRN  09/26/23  13:27 EDT    Dictated Utilizing Dragon Dictation

## 2023-09-27 ENCOUNTER — TELEPHONE (OUTPATIENT)
Dept: INTERNAL MEDICINE | Facility: CLINIC | Age: 78
End: 2023-09-27

## 2023-09-27 NOTE — TELEPHONE ENCOUNTER
Caller: ADRIANA PATIÑO    Relationship to patient:     Best call back number: [phone]       Patient is needing: [facility] IS REPORTING THE PATIENT FELL AFTER TRIPPING ON HIS OXYGEN TUBING ON 9.26.23. [facility] STATES THE PATIENT'S CARE GIVER WAS ABLE TO LIFT THE PATIENT UP. [facility] REPORTS THE PATIENT STATES THERE IS NO PAIN AND NO SIGNS OF CUTS OR BRUISING.

## 2023-10-03 ENCOUNTER — HOSPITAL ENCOUNTER (EMERGENCY)
Facility: HOSPITAL | Age: 78
Discharge: HOME | End: 2023-10-04
Attending: EMERGENCY MEDICINE | Admitting: EMERGENCY MEDICINE
Payer: MEDICARE

## 2023-10-03 ENCOUNTER — APPOINTMENT (OUTPATIENT)
Dept: GENERAL RADIOLOGY | Facility: HOSPITAL | Age: 78
End: 2023-10-03
Payer: MEDICARE

## 2023-10-03 DIAGNOSIS — I95.1 ORTHOSTATIC HYPOTENSION: Primary | ICD-10-CM

## 2023-10-03 DIAGNOSIS — I50.9 CONGESTIVE HEART FAILURE, UNSPECIFIED HF CHRONICITY, UNSPECIFIED HEART FAILURE TYPE: ICD-10-CM

## 2023-10-03 LAB
ALBUMIN SERPL-MCNC: 3.8 G/DL (ref 3.5–5.2)
ALBUMIN/GLOB SERPL: 1.5 G/DL
ALP SERPL-CCNC: 56 U/L (ref 39–117)
ALT SERPL W P-5'-P-CCNC: 18 U/L (ref 1–41)
ANION GAP SERPL CALCULATED.3IONS-SCNC: 9.2 MMOL/L (ref 5–15)
AST SERPL-CCNC: 22 U/L (ref 1–40)
BASOPHILS # BLD AUTO: 0.04 10*3/MM3 (ref 0–0.2)
BASOPHILS NFR BLD AUTO: 0.4 % (ref 0–1.5)
BILIRUB SERPL-MCNC: 0.6 MG/DL (ref 0–1.2)
BUN SERPL-MCNC: 15 MG/DL (ref 8–23)
BUN/CREAT SERPL: 11.9 (ref 7–25)
CALCIUM SPEC-SCNC: 9.1 MG/DL (ref 8.6–10.5)
CHLORIDE SERPL-SCNC: 97 MMOL/L (ref 98–107)
CO2 SERPL-SCNC: 28.8 MMOL/L (ref 22–29)
CREAT SERPL-MCNC: 1.26 MG/DL (ref 0.76–1.27)
DEPRECATED RDW RBC AUTO: 44.1 FL (ref 37–54)
EGFRCR SERPLBLD CKD-EPI 2021: 59.1 ML/MIN/1.73
EOSINOPHIL # BLD AUTO: 0.18 10*3/MM3 (ref 0–0.4)
EOSINOPHIL NFR BLD AUTO: 1.8 % (ref 0.3–6.2)
ERYTHROCYTE [DISTWIDTH] IN BLOOD BY AUTOMATED COUNT: 12.9 % (ref 12.3–15.4)
GLOBULIN UR ELPH-MCNC: 2.6 GM/DL
GLUCOSE SERPL-MCNC: 244 MG/DL (ref 65–99)
HCT VFR BLD AUTO: 33.1 % (ref 37.5–51)
HGB BLD-MCNC: 11 G/DL (ref 13–17.7)
HOLD SPECIMEN: NORMAL
HOLD SPECIMEN: NORMAL
IMM GRANULOCYTES # BLD AUTO: 0.03 10*3/MM3 (ref 0–0.05)
IMM GRANULOCYTES NFR BLD AUTO: 0.3 % (ref 0–0.5)
LYMPHOCYTES # BLD AUTO: 2.02 10*3/MM3 (ref 0.7–3.1)
LYMPHOCYTES NFR BLD AUTO: 19.7 % (ref 19.6–45.3)
MAGNESIUM SERPL-MCNC: 1.3 MG/DL (ref 1.6–2.4)
MCH RBC QN AUTO: 30.8 PG (ref 26.6–33)
MCHC RBC AUTO-ENTMCNC: 33.2 G/DL (ref 31.5–35.7)
MCV RBC AUTO: 92.7 FL (ref 79–97)
MONOCYTES # BLD AUTO: 0.96 10*3/MM3 (ref 0.1–0.9)
MONOCYTES NFR BLD AUTO: 9.4 % (ref 5–12)
NEUTROPHILS NFR BLD AUTO: 68.4 % (ref 42.7–76)
NEUTROPHILS NFR BLD AUTO: 7.03 10*3/MM3 (ref 1.7–7)
NRBC BLD AUTO-RTO: 0 /100 WBC (ref 0–0.2)
PLATELET # BLD AUTO: 185 10*3/MM3 (ref 140–450)
PMV BLD AUTO: 10.1 FL (ref 6–12)
POTASSIUM SERPL-SCNC: 4.1 MMOL/L (ref 3.5–5.2)
PROT SERPL-MCNC: 6.4 G/DL (ref 6–8.5)
RBC # BLD AUTO: 3.57 10*6/MM3 (ref 4.14–5.8)
SODIUM SERPL-SCNC: 135 MMOL/L (ref 136–145)
TROPONIN T SERPL HS-MCNC: 31 NG/L
WBC NRBC COR # BLD: 10.26 10*3/MM3 (ref 3.4–10.8)
WHOLE BLOOD HOLD COAG: NORMAL
WHOLE BLOOD HOLD SPECIMEN: NORMAL

## 2023-10-03 PROCEDURE — 93005 ELECTROCARDIOGRAM TRACING: CPT

## 2023-10-03 PROCEDURE — 83735 ASSAY OF MAGNESIUM: CPT

## 2023-10-03 PROCEDURE — 84484 ASSAY OF TROPONIN QUANT: CPT

## 2023-10-03 PROCEDURE — 99284 EMERGENCY DEPT VISIT MOD MDM: CPT

## 2023-10-03 PROCEDURE — 80053 COMPREHEN METABOLIC PANEL: CPT

## 2023-10-03 PROCEDURE — 83880 ASSAY OF NATRIURETIC PEPTIDE: CPT | Performed by: EMERGENCY MEDICINE

## 2023-10-03 PROCEDURE — 71045 X-RAY EXAM CHEST 1 VIEW: CPT

## 2023-10-03 PROCEDURE — 85025 COMPLETE CBC W/AUTO DIFF WBC: CPT

## 2023-10-03 PROCEDURE — 93005 ELECTROCARDIOGRAM TRACING: CPT | Performed by: EMERGENCY MEDICINE

## 2023-10-03 PROCEDURE — 93010 ELECTROCARDIOGRAM REPORT: CPT | Performed by: SPECIALIST

## 2023-10-03 RX ORDER — SODIUM CHLORIDE 0.9 % (FLUSH) 0.9 %
10 SYRINGE (ML) INJECTION AS NEEDED
Status: DISCONTINUED | OUTPATIENT
Start: 2023-10-03 | End: 2023-10-04 | Stop reason: HOSPADM

## 2023-10-04 VITALS
HEART RATE: 68 BPM | WEIGHT: 208.56 LBS | DIASTOLIC BLOOD PRESSURE: 64 MMHG | TEMPERATURE: 98.4 F | SYSTOLIC BLOOD PRESSURE: 97 MMHG | OXYGEN SATURATION: 98 % | BODY MASS INDEX: 31.61 KG/M2 | HEIGHT: 68 IN | RESPIRATION RATE: 18 BRPM

## 2023-10-04 LAB
BACTERIA UR QL AUTO: ABNORMAL /HPF
BILIRUB UR QL STRIP: NEGATIVE
CLARITY UR: CLEAR
COLOR UR: YELLOW
FLUAV AG NPH QL: NEGATIVE
FLUBV AG NPH QL IA: NEGATIVE
GLUCOSE UR STRIP-MCNC: ABNORMAL MG/DL
HGB UR QL STRIP.AUTO: NEGATIVE
HYALINE CASTS UR QL AUTO: ABNORMAL /LPF
KETONES UR QL STRIP: ABNORMAL
LEUKOCYTE ESTERASE UR QL STRIP.AUTO: NEGATIVE
NITRITE UR QL STRIP: NEGATIVE
NT-PROBNP SERPL-MCNC: 3424 PG/ML (ref 0–1800)
PH UR STRIP.AUTO: 7.5 [PH] (ref 5–8)
PROT UR QL STRIP: ABNORMAL
QT INTERVAL: 442 MS
QTC INTERVAL: 507 MS
RBC # UR STRIP: ABNORMAL /HPF
REF LAB TEST METHOD: ABNORMAL
SARS-COV-2 RNA RESP QL NAA+PROBE: NOT DETECTED
SP GR UR STRIP: 1.02 (ref 1–1.03)
SQUAMOUS #/AREA URNS HPF: ABNORMAL /HPF
TROPONIN T SERPL HS-MCNC: 28 NG/L
UROBILINOGEN UR QL STRIP: ABNORMAL
WBC # UR STRIP: ABNORMAL /HPF

## 2023-10-04 PROCEDURE — 36415 COLL VENOUS BLD VENIPUNCTURE: CPT

## 2023-10-04 PROCEDURE — 87635 SARS-COV-2 COVID-19 AMP PRB: CPT | Performed by: EMERGENCY MEDICINE

## 2023-10-04 PROCEDURE — 84484 ASSAY OF TROPONIN QUANT: CPT | Performed by: EMERGENCY MEDICINE

## 2023-10-04 PROCEDURE — 87804 INFLUENZA ASSAY W/OPTIC: CPT | Performed by: EMERGENCY MEDICINE

## 2023-10-04 PROCEDURE — 81001 URINALYSIS AUTO W/SCOPE: CPT

## 2023-10-04 NOTE — ED PROVIDER NOTES
Time: 3:04 AM EDT  Date of encounter:  10/3/2023  Independent Historian/Clinical History and Information was obtained by:   Patient  Chief Complaint: Weakness    History is limited by: N/A    History of Present Illness:  Patient is a 77 y.o. year old male who presents to the emergency department for evaluation of weakness.  Patient notes that he had stood up and developed acute lightheadedness and weakness.  Family checked his blood pressure and his blood pressure was 80s over 40s.  Patient denied any chest pain, chest pressure or shortness of breath.  The patient does have chronic shortness of breath and respiratory failure and does wear oxygen at home.  The patient's had no fever rigors or cough.  The patient's had no abdominal pain.  The patient's had no nausea or vomiting.  The patient denies any urinary frequency urgency or dysuria.  The patient's had no diarrhea.  Family does note that he has recently been taken off all of his blood pressure medicine due to low blood pressure.  Patient notes that he is feeling much better at this time    HPI    Patient Care Team  Primary Care Provider: Morena Masters MD    Past Medical History:     Allergies   Allergen Reactions    Latex Rash and Anaphylaxis    Latex, Natural Rubber Itching     Past Medical History:   Diagnosis Date    Allergic rhinitis 12/27/2014    Will try Zyrtec, he didnt want to use a nasal spray.    Arthritis     Asthma     Cataract     left eye    Cough 03/30/2016    PFT and CXR ordered.    Depression     PTSD    Diabetes     Elevated cholesterol     H/O psychiatric care 1999    PTSD    Hyperlipidemia 03/30/2016    Lipids ordered. Continue on current medication.    Hypertension     Hypothyroidism     Seasonal allergies     Shortness of breath     Sleep apnea     Stenosis of right carotid artery 02/19/2017    Will refer to vascular surgeon.    Syncope 02/19/2017    Type 2 diabetes mellitus     Upper respiratory infection 10/18/2015    Will treat  cough with Hydromet, otherwise over the counter meds for treatment.     Past Surgical History:   Procedure Laterality Date    CATARACT EXTRACTION Right     x2    COLONOSCOPY      JOINT REPLACEMENT      REPLACEMENT TOTAL HIP ONCOLOGIC Right     RETINAL DETACHMENT REPAIR      TOE AMPUTATION Left 11/2017    Second phalaeng.    TOE OSTEOPHYTE REMOVAL       Family History   Problem Relation Age of Onset    Heart disease Mother     Lupus Mother     Arthritis Mother     Kidney disease Sister        Home Medications:  Prior to Admission medications    Medication Sig Start Date End Date Taking? Authorizing Provider   apixaban (ELIQUIS) 5 MG tablet tablet Take 1 tablet by mouth 2 (Two) Times a Day for 30 days. Indications: Atrial Fibrillation 9/19/23 10/19/23 Yes Owen Vanessa MD   aspirin EC 81 MG EC tablet Take 1 tablet by mouth Daily. 7/23/21  Yes Kierra iPtts MD   atorvastatin (LIPITOR) 80 MG tablet Take 1 tablet by mouth Daily. 8/9/22  Yes Cathy Ochoa MD   cetirizine (ZyrTEC Allergy) 10 MG tablet Take 1 tablet by mouth Daily. 8/9/22  Yes Cathy Ochoa MD   gabapentin (NEURONTIN) 600 MG tablet Take 1 tablet by mouth 3 (Three) Times a Day. 3/31/21  Yes Kierra Pitts MD   insulin aspart (novoLOG) 100 UNIT/ML injection Inject 24-26 Units under the skin into the appropriate area as directed 3 (Three) Times a Day Before Meals.   Yes Kierra Pitts MD   Insulin Glargine (Lantus SoloStar) 100 UNIT/ML injection pen Inject 40 Units under the skin into the appropriate area as directed Every Night.   Yes Kierra Pitts MD   levothyroxine (SYNTHROID, LEVOTHROID) 100 MCG tablet Take 1 tablet by mouth Daily.   Yes Kierra Pitts MD   metFORMIN (GLUCOPHAGE) 500 MG tablet Take 1 tablet by mouth 2 (Two) Times a Day With Meals.   Yes Kierra Pitts MD   olopatadine (PATANOL) 0.1 % ophthalmic solution Administer 1 drop to both eyes Daily As Needed for Allergies.   Yes Gisselle  MD Kierra   omeprazole (priLOSEC) 20 MG capsule Take 1 capsule by mouth Daily.   Yes ProviderKierra MD   oxybutynin XL (DITROPAN-XL) 10 MG 24 hr tablet Take 1 tablet by mouth Daily. 6/6/23  Yes Kati Warren PA-C   PARoxetine (PAXIL) 40 MG tablet Take 1 tablet by mouth Every Evening. 4/5/21  Yes Kierra Pitts MD   traZODone (DESYREL) 100 MG tablet Take 1 tablet by mouth Every Night. 4/22/22  Yes Kierra Pitts MD   benzonatate (TESSALON) 100 MG capsule Take 1 capsule by mouth 3 (Three) Times a Day As Needed for Cough.    ProviderKierra MD   felodipine (PLENDIL) 5 MG 24 hr tablet Take 1 tablet by mouth Daily.    ProviderKierra MD   fluticasone-salmeterol (ADVAIR HFA) 230-21 MCG/ACT inhaler Inhale 2 puffs 2 (Two) Times a Day.    ProviderKierra MD   furosemide (LASIX) 40 MG tablet Take 1 tablet by mouth Daily. 9/26/23   Bharti Kessler APRN   tadalafil (Cialis) 10 MG tablet Take 1 tablet by mouth Daily As Needed for Erectile Dysfunction. 9/6/23   Morena Masters MD        Social History:   Social History     Tobacco Use    Smoking status: Never    Smokeless tobacco: Never   Vaping Use    Vaping Use: Never used   Substance Use Topics    Alcohol use: Yes     Comment: 2TALL BEERS/DAY    Drug use: Never         Review of Systems:  Review of Systems   Constitutional:  Positive for fatigue. Negative for chills, diaphoresis and fever.   HENT:  Negative for congestion, postnasal drip, rhinorrhea and sore throat.    Eyes:  Negative for photophobia.   Respiratory:  Positive for shortness of breath. Negative for cough and chest tightness.    Cardiovascular:  Negative for chest pain, palpitations and leg swelling.   Gastrointestinal:  Negative for abdominal pain, diarrhea, nausea and vomiting.   Genitourinary:  Negative for difficulty urinating, dysuria, flank pain, frequency, hematuria and urgency.   Musculoskeletal:  Negative for neck pain and neck stiffness.   Skin:   "Negative for pallor and rash.   Neurological:  Positive for weakness and light-headedness. Negative for dizziness, syncope, numbness and headaches.   Hematological:  Negative for adenopathy. Does not bruise/bleed easily.   Psychiatric/Behavioral: Negative.        Physical Exam:  BP 97/64 (Patient Position: Standing)   Pulse 68   Temp 98.4 °F (36.9 °C) (Oral)   Resp 18   Ht 171.5 cm (67.5\")   Wt 94.6 kg (208 lb 8.9 oz)   SpO2 98%   BMI 32.18 kg/m²     Physical Exam  Vitals and nursing note reviewed.   Constitutional:       General: He is not in acute distress.     Appearance: Normal appearance. He is not ill-appearing, toxic-appearing or diaphoretic.   HENT:      Head: Normocephalic and atraumatic.      Mouth/Throat:      Mouth: Mucous membranes are moist.   Eyes:      Pupils: Pupils are equal, round, and reactive to light.   Cardiovascular:      Rate and Rhythm: Normal rate and regular rhythm.      Pulses: Normal pulses.           Carotid pulses are 2+ on the right side and 2+ on the left side.       Radial pulses are 2+ on the right side and 2+ on the left side.        Femoral pulses are 2+ on the right side and 2+ on the left side.       Popliteal pulses are 2+ on the right side and 2+ on the left side.        Dorsalis pedis pulses are 2+ on the right side and 2+ on the left side.        Posterior tibial pulses are 2+ on the right side and 2+ on the left side.      Heart sounds: Normal heart sounds. No murmur heard.  Pulmonary:      Effort: Pulmonary effort is normal. No accessory muscle usage, respiratory distress or retractions.      Breath sounds: Examination of the right-upper field reveals decreased breath sounds. Examination of the left-upper field reveals decreased breath sounds. Examination of the right-middle field reveals decreased breath sounds. Examination of the left-middle field reveals decreased breath sounds. Examination of the right-lower field reveals decreased breath sounds. Examination " of the left-lower field reveals decreased breath sounds. No wheezing, rhonchi or rales.   Abdominal:      General: Abdomen is flat. There is no distension.      Palpations: Abdomen is soft. There is no mass or pulsatile mass.      Tenderness: There is no abdominal tenderness. There is no right CVA tenderness, left CVA tenderness, guarding or rebound.      Comments: No rigidity   Musculoskeletal:         General: No swelling, tenderness or deformity.      Cervical back: Neck supple. No tenderness.      Right lower leg: No edema.      Left lower leg: No edema.   Skin:     General: Skin is warm and dry.      Capillary Refill: Capillary refill takes less than 2 seconds.      Coloration: Skin is not jaundiced or pale.      Findings: No erythema.   Neurological:      General: No focal deficit present.      Mental Status: He is alert and oriented to person, place, and time. Mental status is at baseline.      Cranial Nerves: Cranial nerves 2-12 are intact. No cranial nerve deficit.      Sensory: Sensation is intact. No sensory deficit.      Motor: Motor function is intact. No weakness or pronator drift.      Coordination: Coordination is intact. Coordination normal.   Psychiatric:         Mood and Affect: Mood normal.         Behavior: Behavior normal.                Procedures:  Procedures      Medical Decision Making:      Comorbidities that affect care:    Diabetes, hyperlipidemia, hypothyroidism, carotid artery disease, asthma, diabetes, hypertension, chronic respiratory failure, hyperlipidemia    External Notes reviewed:    None      The following orders were placed and all results were independently analyzed by me:  Orders Placed This Encounter   Procedures    COVID-19,CEPHEID/AMELIA,COR/JOHANNA/PAD/[facility] IN-HOUSE(OR EMERGENT/ADD-ON),NP SWAB IN TRANSPORT MEDIA 3-4 HR TAT, RT-PCR - Swab, Nasopharynx    Influenza Antigen, Rapid - Swab, Nasopharynx    XR Chest 1 View    [place] Draw    Comprehensive Metabolic Panel    Single  High Sensitivity Troponin T    Magnesium    Urinalysis With Microscopic If Indicated (No Culture) - Urine, Clean Catch    CBC Auto Differential    Urinalysis, Microscopic Only - Urine, Clean Catch    BNP    High Sensitivity Troponin T    Undress & Gown    Continuous Pulse Oximetry    Vital Signs    Orthostatic Vitals    ECG 12 Lead ED Triage Standing Order; Weak / Dizzy / AMS    CBC & Differential    Green Top (Gel)    Lavender Top    Gold Top - SST    Light Blue Top       Medications Given in the Emergency Department:  Medications - No data to display       ED Course:    ED Course as of 10/05/23 0305   Tue Oct 03, 2023   5498 EKG:    Rhythm: Normal sinus rhythm  Rate: 79  Intervals: Normal IN and QT interval, right bundle branch block and left anterior fascicular block present  T-wave: Isolated nonspecific T wave inversion III, diffuse nonspecific T wave flattening  ST Segment: No pathological ST elevation or reciprocal ST depression to suggest STEMI    EKG Comparison: No change in the QRS and ST morphology from the EKG it was performed September 26, 2023    Interpreted by me   [SD]      ED Course User Index  [SD] Carlos Nava DO       Labs:    Lab Results (last 24 hours)       Procedure Component Value Units Date/Time    High Sensitivity Troponin T [[id number]]  (Abnormal) Collected: 10/04/23 0318    Specimen: Blood Updated: 10/04/23 0351     HS Troponin T 28 ng/L     Narrative:      High Sensitive Troponin T Reference Range:  <10.0 ng/L- Negative Female for AMI  <15.0 ng/L- Negative Male for AMI  >=10 - Abnormal Female indicating possible myocardial injury.  >=15 - Abnormal Male indicating possible myocardial injury.   Clinicians would have to utilize clinical acumen, EKG, Troponin, and serial changes to determine if it is an Acute Myocardial Infarction or myocardial injury due to an underlying chronic condition.                  Imaging:    No Radiology Exams Resulted Within Past 24 Hours      Differential  Diagnosis and Discussion:    Weakness: Based on the patient's history, signs, and symptoms, the diffential diagnosis includes but is not limited to meningitis, stroke, sepsis, subarachnoid hemorrhage, intracranial bleeding, encephalitis, acute uti, dehydration, MS, myasthenia gravis, [place], migraine variant, neuromuscular disorders vertigo, electrolyte imbalance, and metabolic disorders.    All labs were reviewed and interpreted by me.  All X-rays impressions were independently interpreted by me.  EKG was interpreted by me.    MDM  Number of Diagnoses or Management Options  Congestive heart failure, unspecified HF chronicity, unspecified heart failure type  Orthostatic hypotension  Diagnosis management comments: Patient's vital signs including room air pulse ox were normal on the emergency room    The patient's Covid swab was negative   The patient's Influenza swab was negative     Patient's urinalysis was negative for infection or blood    Patient's proBNP was elevated at 3400    The patient's CMP was reviewed and shows no abnormalities of critical concern.  Of note, the patient's sodium and potassium are acceptable.  The patient's liver enzymes are unremarkable.  The patient's renal function including creatinine is preserved.  The patient has a normal anion gap.    The patient's CBC was reviewed and shows no abnormalities of critical concern.  Of note, there is no anemia requiring a blood transfusion and the platelet count is acceptable    The patient first high sensitivity troponin was 31.  Was repeated 2 hours later.  It returned at 28.  This is a negative delta and considered clinically insignificant.    The patient's EKG demonstrated no evidence of STEMI or dysrhythmia    The patient's chest x-ray demonstrated bibasilar air space opacities greater on the left than the right.  Atelectasis, pneumonia and pulmonary edema is in the differential.  There is a tiny left pleural effusion.  I do feel with the  elevated BNP and the patient's past medical history this most likely represents mild pulmonary edema and congestive heart failure          Orthostatic vital signs were performed.  The patient was slightly orthostatic by blood pressure.  The patient's blood pressure was 113/69 lying down and repeated to standing it was 97/64.  The patient's heart rate did not change significantly.  The patient was asymptomatic      04:05 EDT  At the time of discharge, the patient appeared well, no distress and nontoxic.  The patient's vital signs are stable.  The patient was asymptomatic.  Patient states that he feels much better.  The patient states that he feels comfortable go home.  I will have the patient follow-up with her primary care provider the following day and to call his cardiologist to schedule an appointment.    The patient was given very specific instructions on when and why to return to the emergency room.  The patient voiced understanding and felt comfortable with the discharge instructions.  They would return to the emergency room if necessary.  The patient appears appropriate for discharge and outpatient follow-up.         Amount and/or Complexity of Data Reviewed  Clinical lab tests: reviewed  Tests in the radiology section of CPT®: reviewed  Tests in the medicine section of CPT®: reviewed  Review and summarize past medical records: yes (I reviewed the patient's echocardiogram from September 17, 2023.  The patient had a decreased ejection fraction of 45%.  The patient's left ventricular cavity is borderline dilated.)         Social Determinants of Health:    Patient is independent, reliable, and has access to care.       Disposition and Care Coordination:    Discharged: I considered escalation of care by admitting this patient for observation, however the patient has improved and is suitable and  stable for discharge.    I have explained discharge medications and the need for follow up with the patient/caretakers.  This was also printed in the discharge instructions. Patient was discharged with the following medications and follow up:      Medication List      No changes were made to your prescriptions during this visit.      Morena Masters MD  [address] [address] [phone]    On 10/5/2023  Orthostatic hypotension congestive heart failure, call for appointment    Dara Peterson MD  [address] [place] [id number]  [phone]    On 10/5/2023  Heart failure, call for appointment       Final diagnoses:   Orthostatic hypotension   Congestive heart failure, unspecified HF chronicity, unspecified heart failure type        ED Disposition       ED Disposition   Discharge    Condition   Stable    Comment   --               This medical record created using voice recognition software.             Carlos Nava DO  10/05/23 0300

## 2023-10-04 NOTE — DISCHARGE INSTRUCTIONS
Please follow-up with your primary care physician in 24 hours for evaluation    Please return to emergency room for chest pain, chest pressure, increasing shortness of breath, near passing out, passing out, unusual fatigue, usual sweating, nausea, vomiting or any new symptoms you are concerned

## 2023-10-04 NOTE — ED NOTES
Pt used personal blood glucose monitor upon arrival. Pt reports blood sugar of 248 from personal device. Pt has not had evening insulin.

## 2023-10-04 NOTE — ED NOTES
Pt verbalized that he would like his daughter mOkar Donovan, updated with medical information. Telephone number [phone].

## 2023-10-05 ENCOUNTER — TELEPHONE (OUTPATIENT)
Dept: UROLOGY | Facility: CLINIC | Age: 78
End: 2023-10-05
Payer: MEDICARE

## 2023-10-05 DIAGNOSIS — N40.1 BENIGN PROSTATIC HYPERPLASIA WITH URINARY FREQUENCY: Primary | ICD-10-CM

## 2023-10-05 DIAGNOSIS — R35.0 BENIGN PROSTATIC HYPERPLASIA WITH URINARY FREQUENCY: Primary | ICD-10-CM

## 2023-10-05 NOTE — TELEPHONE ENCOUNTER
PATIENT CALLED AND SAID DR. ALLISON IS HIS DOCTOR, AND HE PRESCRIBES OMEPRAZOLE 20 MG 1 DAILY FOR HIM.  HE REQUESTS REFILLS TO GO TO [facility] AT [facility].

## 2023-10-06 ENCOUNTER — READMISSION MANAGEMENT (OUTPATIENT)
Dept: CALL CENTER | Facility: HOSPITAL | Age: 78
End: 2023-10-06
Payer: MEDICARE

## 2023-10-06 NOTE — OUTREACH NOTE
Medical Week 3 Survey      Flowsheet Row Responses   [facility] patient discharged from? [facility]   Does the patient have one of the following disease processes/diagnoses(primary or secondary)? Other   Week 3 attempt successful? Yes   Call start time 0903   Call end time 0904   Discharge diagnosis Syncope   Meds reviewed with patient/caregiver? Yes   Is the patient taking all medications as directed (includes completed medication regime)? Yes   Does the patient have a primary care provider?  Yes   Comments regarding PCP 10/10/23 PCP   Has the patient kept scheduled appointments due by today? Yes   Home health comments HH/PT still visits   Psychosocial issues? No   Did the patient receive a copy of their discharge instructions? Yes   Nursing interventions Reviewed instructions with patient   What is the patient's perception of their health status since discharge? Improving   Is the patient/caregiver able to teach back signs and symptoms related to disease process for when to call PCP? Yes   Is the patient/caregiver able to teach back signs and symptoms related to disease process for when to call 911? Yes   Is the patient/caregiver able to teach back the hierarchy of who to call/visit for symptoms/problems? PCP, Specialist, Home health nurse, Urgent Care, ED, 911 Yes   If the patient is a current smoker, are they able to teach back resources for cessation? Not a smoker   Week 3 Call Completed? Yes   Graduated Yes   Graduated/Revoked comments Pt improving-no issues or concerns during call-pt states HH/PT still continues to visit.  Pt has an upcoming apt on 10/10/23   Call end time 0904            Ashley COULTER - Registered Nurse

## 2023-10-09 RX ORDER — OMEPRAZOLE 20 MG/1
20 CAPSULE, DELAYED RELEASE ORAL DAILY
Qty: 90 CAPSULE | Refills: 4 | Status: SHIPPED | OUTPATIENT
Start: 2023-10-09

## 2023-10-10 ENCOUNTER — OFFICE VISIT (OUTPATIENT)
Dept: INTERNAL MEDICINE | Facility: CLINIC | Age: 78
End: 2023-10-10
Payer: MEDICARE

## 2023-10-10 VITALS
WEIGHT: 208.9 LBS | HEART RATE: 95 BPM | OXYGEN SATURATION: 95 % | HEIGHT: 68 IN | BODY MASS INDEX: 31.66 KG/M2 | SYSTOLIC BLOOD PRESSURE: 164 MMHG | TEMPERATURE: 98.2 F | DIASTOLIC BLOOD PRESSURE: 84 MMHG

## 2023-10-10 DIAGNOSIS — I10 PRIMARY HYPERTENSION: Primary | ICD-10-CM

## 2023-10-10 DIAGNOSIS — Z99.81 SUPPLEMENTAL OXYGEN DEPENDENT: ICD-10-CM

## 2023-10-10 DIAGNOSIS — I48.91 ATRIAL FIBRILLATION, UNSPECIFIED TYPE: ICD-10-CM

## 2023-10-10 DIAGNOSIS — I95.1 ORTHOSTATIC HYPOTENSION: ICD-10-CM

## 2023-10-10 DIAGNOSIS — J98.4 CHRONIC LUNG DISEASE: ICD-10-CM

## 2023-10-10 PROCEDURE — 3077F SYST BP >= 140 MM HG: CPT | Performed by: NURSE PRACTITIONER

## 2023-10-10 PROCEDURE — 3079F DIAST BP 80-89 MM HG: CPT | Performed by: NURSE PRACTITIONER

## 2023-10-10 PROCEDURE — 99214 OFFICE O/P EST MOD 30 MIN: CPT | Performed by: NURSE PRACTITIONER

## 2023-10-10 RX ORDER — ISOPROPYL ALCOHOL 70 ML/100ML
SWAB TOPICAL
COMMUNITY
Start: 2023-09-26

## 2023-10-10 NOTE — PROGRESS NOTES
"Chief Complaint  Hospital Follow Up Visit ( Follow up from ER- blood pressure issue. )    Subjective         Giles Donovan presents to [facility]  HPI     Patient evaluated in the ED 10/3 for weakness, lightheadedness.  Home blood pressure reading reported was 80s/40s.  Orthostatic vital signs confirmed orthostatic hypotension.  Patient had recently been taken off of blood pressure medicine due to consistent lows by cardiology after diagnosis of atrial fibrillation. Patient currently with holter to evaluate for paroxysmal versus persistent afib. Patient significant other states that cardiology did not want him on any blood pressure medications while wearing this device. Patient reports since discharge from the ED his blood pressure has been fluctuating. Will be in the 150s/70s in the morning and the 90s/70s at night. Patients significant other states the patient will stay up all night in the recliner and sleep all day, will sometimes snack late and then not be hungry for supper. She states that he will often just sit and watch TV. Has not been compliant in taking or recording his blood pressure. States she is in charge of doing everything for him, including getting him to his appointments. This has been challenging for her and she has asked that he help with this but he has not taken the initiative to do so. Patient has the Holter until October 26th and follows up with cardiology November 8th. He denies chest pain, worsening shortness of breath, leg swelling. Weight has been stable.     Objective     Vitals:    10/10/23 1537   BP: 164/84   Pulse: 95   Temp: 98.2 øF (36.8 øC)   TempSrc: Temporal   SpO2: 95%   Weight: 94.8 kg (208 lb 14.4 oz)   Height: 171.5 cm (67.52\")      Body mass index is 32.22 kg/mý.    Wt Readings from Last 3 Encounters:   10/10/23 94.8 kg (208 lb 14.4 oz)   10/03/23 94.6 kg (208 lb 8.9 oz)   09/26/23 102 kg (225 lb 9.6 oz)     BP Readings " from Last 3 Encounters:   10/10/23 164/84   10/04/23 97/64   09/26/23 134/73                  Physical Exam  Constitutional:       Appearance: Normal appearance.   HENT:      Head: Normocephalic and atraumatic.      Nose: Nose normal.      Mouth/Throat:      Mouth: Mucous membranes are moist.      Pharynx: Oropharynx is clear.   Eyes:      Extraocular Movements: Extraocular movements intact.      Conjunctiva/sclera: Conjunctivae normal.      Pupils: Pupils are equal, round, and reactive to light.   Cardiovascular:      Rate and Rhythm: Normal rate and regular rhythm.      Heart sounds: Normal heart sounds.   Pulmonary:      Effort: Pulmonary effort is normal.      Breath sounds: Normal breath sounds.   Skin:     General: Skin is warm and dry.   Neurological:      General: No focal deficit present.      Mental Status: He is alert and oriented to person, place, and time.      Comments: Walks with walker, supplemental oxygen dependent    Psychiatric:         Mood and Affect: Mood normal.         Behavior: Behavior normal.         Thought Content: Thought content normal.          Result Review :   The following data was reviewed by: TENISHA Burgess on 10/10/2023:  CMP          9/19/2023    04:03 9/26/2023    01:43 10/3/2023    23:07   CMP   Glucose 197  226  244    BUN 15  18  15    Creatinine 0.98  1.15  1.26    EGFR 79.9  66.0  59.1    Sodium 136  133  135    Potassium 4.4  3.6  4.1    Chloride 100  97  97    Calcium 9.4  9.1  9.1    Total Protein  6.4  6.4    Albumin  3.8  3.8    Globulin  2.6  2.6    Total Bilirubin  0.4  0.6    Alkaline Phosphatase  54  56    AST (SGOT)  20  22    ALT (SGPT)  25  18    Albumin/Globulin Ratio  1.5  1.5    BUN/Creatinine Ratio 15.3  15.7  11.9    Anion Gap 9.5  7.2  9.2      CBC w/diff          9/19/2023    04:03 9/26/2023    01:43 10/3/2023    23:07   CBC w/Diff   WBC 6.71  6.88  10.26    RBC 3.53  3.20  3.57    Hemoglobin 10.9  9.8  11.0    Hematocrit 33.2  30.0  33.1    MCV  94.1  93.8  92.7    MCH 30.9  30.6  30.8    MCHC 32.8  32.7  33.2    RDW 13.2  13.2  12.9    Platelets 193  170  185    Neutrophil Rel % 49.8  55.9  68.4    Immature Granulocyte Rel % 0.3  0.1  0.3    Lymphocyte Rel % 34.6  28.5  19.7    Monocyte Rel % 11.8  11.8  9.4    Eosinophil Rel % 2.8  3.3  1.8    Basophil Rel % 0.7  0.4  0.4        Procedures    Assessment and Plan   Diagnoses and all orders for this visit:    1. Primary hypertension (Primary)  Assessment & Plan:  Discussed with patient in detail the importance of monitoring blood pressure closely for persistent elevations or lows. Discussed that his blood pressure log will guide the decision on how to proceed with medication management. Discussed the option for restarting low dose ACE or CCB, patient defers at this time. He is agreeable to completing blood pressure log and will notify clinic with consistent elevations or lows. He will return cardiac monitor and follow up with cardiology as scheduled to discuss management of atrial fibrillation, from previous note they are considering restarting beta blocker. He is to continue Eliquis. Discussed risk of heart attack and stroke with uncontrolled hypertension as well as risk for falls with orthostatic hypotension, reiterated to patient the importance of documenting home blood pressure readings. Discussed repeating labs today, patient defers. He will follow up with PCP as scheduled in one month, sooner if concerns arise.       2. Orthostatic hypotension    3. Atrial fibrillation, unspecified type  Assessment & Plan:  Continue Eliquis, follow up with cardiology as scheduled.      4. Chronic lung disease  Assessment & Plan:  Continue continuous supplemental O2 and follow up with pulmonology as scheduled.      5. Supplemental oxygen dependent        I spent 35 minutes caring for Giles on this date of service. This time includes time spent by me in the following activities:preparing for the visit, reviewing  tests, performing a medically appropriate examination and/or evaluation , counseling and educating the patient/family/caregiver, and documenting information in the medical record  Follow Up   Return for Next scheduled follow up or sooner if concerns arise.  Patient was given instructions and counseling regarding his condition or for health maintenance advice. Please see specific information pulled into the AVS if appropriate.

## 2023-10-10 NOTE — ASSESSMENT & PLAN NOTE
Discussed with patient in detail the importance of monitoring blood pressure closely for persistent elevations or lows. Discussed that his blood pressure log will guide the decision on how to proceed with medication management. Discussed the option for restarting low dose ACE or CCB, patient defers at this time. He is agreeable to completing blood pressure log and will notify clinic with consistent elevations or lows. He will return cardiac monitor and follow up with cardiology as scheduled to discuss management of atrial fibrillation, from previous note they are considering restarting beta blocker. He is to continue Eliquis. Discussed risk of heart attack and stroke with uncontrolled hypertension as well as risk for falls with orthostatic hypotension, reiterated to patient the importance of documenting home blood pressure readings. Discussed repeating labs today, patient defers. He will follow up with PCP as scheduled in one month, sooner if concerns arise.

## 2023-10-24 ENCOUNTER — OFFICE VISIT (OUTPATIENT)
Dept: PULMONOLOGY | Facility: CLINIC | Age: 78
End: 2023-10-24
Payer: MEDICARE

## 2023-10-24 VITALS
BODY MASS INDEX: 32.65 KG/M2 | OXYGEN SATURATION: 98 % | HEIGHT: 67 IN | WEIGHT: 208 LBS | TEMPERATURE: 97.8 F | DIASTOLIC BLOOD PRESSURE: 62 MMHG | SYSTOLIC BLOOD PRESSURE: 123 MMHG | RESPIRATION RATE: 18 BRPM | HEART RATE: 91 BPM

## 2023-10-24 DIAGNOSIS — Z23 ENCOUNTER FOR IMMUNIZATION: ICD-10-CM

## 2023-10-24 DIAGNOSIS — J98.4 RESTRICTIVE LUNG DISEASE: Primary | ICD-10-CM

## 2023-10-24 DIAGNOSIS — I50.32 DIASTOLIC CHF, CHRONIC: ICD-10-CM

## 2023-10-24 DIAGNOSIS — I48.91 ATRIAL FIBRILLATION, UNSPECIFIED TYPE: ICD-10-CM

## 2023-10-24 DIAGNOSIS — R06.09 DYSPNEA ON EXERTION: ICD-10-CM

## 2023-10-24 DIAGNOSIS — J96.11 CHRONIC RESPIRATORY FAILURE WITH HYPOXIA: ICD-10-CM

## 2023-10-24 NOTE — PROGRESS NOTES
"Primary Care Provider  Morena Masters MD     Referring Provider  No ref. provider found     Chief Complaint  Shortness of Breath (With exertion), Cough (Clear phlegm ), and Follow-up (6 month f/up - PFT results )    Subjective          Giles Donovan presents to [facility]MON[facility] MEDICINE  History of Present Illness  Giles Donovan is a 78 y.o. male patient previously seen by TENISHA Thompson.  He is here for a follow-up visit today.    Patient had a pulmonary function test on 5/11/2023.  The results of his pulmonary function test show a severe restrictive defect with severe reduction in diffusion capacity.  Patient's total lung capacity is 31% with a residual volume of 5%.  His diffusion capacity is 40%.  After speaking with Dr. Woods, I will order patient a high-resolution chest CT.  Patient states that he has been doing okay since his last office visit.  He denies using any antibiotics or steroids for his lungs.  He denies any current fevers or chills.  Patient states that he has been on oxygen at liters since his pneumonia in April 2023.  Patient states that he also had COVID \"pretty bad\" 2 years ago.  I explained to patient and family that pneumonia caused scarring of the lungs and the high-resolution chest CT will show us how his lungs look, as his previous CT scan from April 2023 did show pneumonia.  He has been using Advair intermittently but states that he has not noticed a difference.  His shortness of breath is moderate in severity, worse with exertion and improved with rest.  He states that he has not been using his albuterol.  He does have difficulties with inclines and stairs.  Patient spouse states that she tries to to make sure he does not use stairs or go get the mail at the end of the driveway.  He does have a history of heart failure and atrial fibrillation and is being followed by cardiology.  Patient is currently wearing a 30-day Holter " monitor.  Otherwise, he has no additional questions or respiratory concerns.  He is able to perform his ADLs with minor modifications.  He is up-to-date with his COVID and pneumonia vaccine.  He would like to receive a flu vaccine today.     His history of smoking is   Tobacco Use: Low Risk  (10/24/2023)    Patient History     Smoking Tobacco Use: Never     Smokeless Tobacco Use: Never     Passive Exposure: Never   .    Review of Systems   Constitutional:  Negative for chills, fatigue, fever, unexpected weight gain and unexpected weight loss.   HENT:  Negative for congestion (Nasal), hearing loss, mouth sores, nosebleeds, postnasal drip, sore throat and trouble swallowing.    Respiratory:  Positive for cough and shortness of breath. Negative for apnea and wheezing.         Negative for Hemoptysis     Cardiovascular:  Negative for chest pain, palpitations and leg swelling.   Gastrointestinal:  Negative for constipation, diarrhea, nausea, vomiting and GERD.   Skin:  Negative for color change.        Negative for cyanosis   Neurological:  Negative for syncope, weakness, numbness and headache.      Sleep: Negative for Excessive daytime sleepiness  Negative for morning headaches  Negative for Snoring    Family History   Problem Relation Age of Onset    Heart disease Mother     Lupus Mother     Arthritis Mother     Kidney disease Sister         Social History     Socioeconomic History    Marital status: Single   Tobacco Use    Smoking status: Never     Passive exposure: Never    Smokeless tobacco: Never   Vaping Use    Vaping Use: Never used   Substance and Sexual Activity    Alcohol use: Yes     Comment: 2TALL BEERS/DAY    Drug use: Never    Sexual activity: Defer        Past Medical History:   Diagnosis Date    Allergic rhinitis 12/27/2014    Will try Zyrtec, he didnt want to use a nasal spray.    Arthritis     Asthma     Cataract     left eye    Cough 03/30/2016    PFT and CXR ordered.    Depression     PTSD     Diabetes     Elevated cholesterol     H/O psychiatric care 1999    PTSD    Hyperlipidemia 03/30/2016    Lipids ordered. Continue on current medication.    Hypertension     Hypothyroidism     Seasonal allergies     Shortness of breath     Sleep apnea     Stenosis of right carotid artery 02/19/2017    Will refer to vascular surgeon.    Syncope 02/19/2017    Type 2 diabetes mellitus     Upper respiratory infection 10/18/2015    Will treat cough with Hydromet, otherwise over the counter meds for treatment.        Immunization History   Administered Date(s) Administered    COVID-19 (MODERNA) 1st,2nd,3rd Dose Monovalent 01/01/2021, 01/27/2021    COVID-19 (MODERNA) Monovalent Original Booster 01/18/2022    COVID-19 (PFIZER) BIVALENT 12+YRS 10/05/2022    Flu Vaccine Intradermal Quad 18-64YR 12/02/2020    Flu Vaccine Quad PF >36MO 10/01/2019, 12/02/2020    Flublok 18+yrs 10/05/2022    Fluzone High-Dose 65+yrs 10/24/2023    Fluzone Quad >6mos (Multi-dose) 09/17/2018    Pneumococcal Conjugate 20-Valent (PCV20) 12/05/2022         Allergies   Allergen Reactions    Latex Rash and Anaphylaxis    Latex, Natural Rubber Itching          Current Outpatient Medications:     Alcohol Swabs (Easy Touch Alcohol Prep Medium) 70 % pads, , Disp: , Rfl:     aspirin EC 81 MG EC tablet, Take 1 tablet by mouth Daily., Disp: , Rfl:     atorvastatin (LIPITOR) 80 MG tablet, Take 1 tablet by mouth Daily., Disp: 90 tablet, Rfl: 1    benzonatate (TESSALON) 100 MG capsule, Take 1 capsule by mouth 3 (Three) Times a Day As Needed for Cough., Disp: , Rfl:     cetirizine (ZyrTEC Allergy) 10 MG tablet, Take 1 tablet by mouth Daily., Disp: 90 tablet, Rfl: 3    felodipine (PLENDIL) 5 MG 24 hr tablet, Take 1 tablet by mouth Daily., Disp: , Rfl:     fluticasone-salmeterol (ADVAIR HFA) 230-21 MCG/ACT inhaler, Inhale 2 puffs 2 (Two) Times a Day., Disp: , Rfl:     furosemide (LASIX) 40 MG tablet, Take 1 tablet by mouth Daily., Disp: 90 tablet, Rfl: 3    gabapentin  (NEURONTIN) 600 MG tablet, Take 1 tablet by mouth 3 (Three) Times a Day., Disp: , Rfl:     insulin aspart (novoLOG) 100 UNIT/ML injection, Inject 24-26 Units under the skin into the appropriate area as directed 3 (Three) Times a Day Before Meals., Disp: , Rfl:     Insulin Glargine (Lantus SoloStar) 100 UNIT/ML injection pen, Inject 40 Units under the skin into the appropriate area as directed Every Night., Disp: , Rfl:     levothyroxine (SYNTHROID, LEVOTHROID) 100 MCG tablet, Take 1 tablet by mouth Daily., Disp: , Rfl:     metFORMIN (GLUCOPHAGE) 500 MG tablet, Take 1 tablet by mouth 2 (Two) Times a Day With Meals., Disp: , Rfl:     olopatadine (PATANOL) 0.1 % ophthalmic solution, Administer 1 drop to both eyes Daily As Needed for Allergies., Disp: , Rfl:     omeprazole (priLOSEC) 20 MG capsule, Take 1 capsule by mouth Daily., Disp: 90 capsule, Rfl: 4    oxybutynin XL (DITROPAN-XL) 10 MG 24 hr tablet, Take 1 tablet by mouth Daily., Disp: 90 tablet, Rfl: 4    PARoxetine (PAXIL) 40 MG tablet, Take 1 tablet by mouth Every Evening., Disp: , Rfl:     tadalafil (Cialis) 10 MG tablet, Take 1 tablet by mouth Daily As Needed for Erectile Dysfunction., Disp: 90 tablet, Rfl: 1    traZODone (DESYREL) 100 MG tablet, Take 1 tablet by mouth Every Night., Disp: , Rfl:      Objective   Physical Exam  Constitutional:       General: He is not in acute distress.     Appearance: Normal appearance. He is normal weight.   HENT:      Right Ear: Hearing normal.      Left Ear: Hearing normal.      Nose: No nasal tenderness or congestion.      Mouth/Throat:      Mouth: Mucous membranes are moist. No oral lesions.   Eyes:      Extraocular Movements: Extraocular movements intact.      Pupils: Pupils are equal, round, and reactive to light.   Neck:      Thyroid: No thyroid mass or thyromegaly.   Cardiovascular:      Rate and Rhythm: Normal rate and regular rhythm.      Pulses: Normal pulses.      Heart sounds: Normal heart sounds. No murmur  "heard.  Pulmonary:      Effort: Pulmonary effort is normal.      Breath sounds: Decreased breath sounds present. No wheezing, rhonchi or rales.      Comments: Patient is on 2L of oxygen. He is able to speak full sentences without difficulty.  Musculoskeletal:      Cervical back: Neck supple.      Right lower leg: Edema present.      Left lower leg: Edema present.   Lymphadenopathy:      Cervical: No cervical adenopathy.      Upper Body:      Right upper body: No axillary adenopathy.   Skin:     General: Skin is warm and dry.      Findings: No lesion or rash.   Neurological:      General: No focal deficit present.      Mental Status: He is alert and oriented to person, place, and time.   Psychiatric:         Mood and Affect: Affect normal. Mood is not anxious or depressed.         Vital Signs:   /62 (BP Location: Right arm, Patient Position: Sitting, Cuff Size: Large Adult)   Pulse 91   Temp 97.8 °F (36.6 °C) (Temporal)   Resp 18   Ht 170.2 cm (67\")   Wt 94.3 kg (208 lb)   SpO2 98% Comment: 2 L's PD  BMI 32.58 kg/m²        Result Review :   The following data was reviewed by: TENISHA Grant on 10/24/2023:  CMP          9/19/2023    04:03 9/26/2023    01:43 10/3/2023    23:07   CMP   Glucose 197  226  244    BUN 15  18  15    Creatinine 0.98  1.15  1.26    EGFR 79.9  66.0  59.1    Sodium 136  133  135    Potassium 4.4  3.6  4.1    Chloride 100  97  97    Calcium 9.4  9.1  9.1    Total Protein  6.4  6.4    Albumin  3.8  3.8    Globulin  2.6  2.6    Total Bilirubin  0.4  0.6    Alkaline Phosphatase  54  56    AST (SGOT)  20  22    ALT (SGPT)  25  18    Albumin/Globulin Ratio  1.5  1.5    BUN/Creatinine Ratio 15.3  15.7  11.9    Anion Gap 9.5  7.2  9.2      CBC w/diff          9/19/2023    04:03 9/26/2023    01:43 10/3/2023    23:07   CBC w/Diff   WBC 6.71  6.88  10.26    RBC 3.53  3.20  3.57    Hemoglobin 10.9  9.8  11.0    Hematocrit 33.2  30.0  33.1    MCV 94.1  93.8  92.7    MCH 30.9  30.6  30.8  "   MCHC 32.8  32.7  33.2    RDW 13.2  13.2  12.9    Platelets 193  170  185    Neutrophil Rel % 49.8  55.9  68.4    Immature Granulocyte Rel % 0.3  0.1  0.3    Lymphocyte Rel % 34.6  28.5  19.7    Monocyte Rel % 11.8  11.8  9.4    Eosinophil Rel % 2.8  3.3  1.8    Basophil Rel % 0.7  0.4  0.4      Data reviewed : Radiologic studies chest CT 4/7/2023, pulmonary function test 5/11/2023, Cardiology studies echocardiogram 9/17/2020., and Laureen STEVEN last office note    Procedures        Assessment and Plan    Diagnoses and all orders for this visit:    1. Restrictive lung disease (Primary)  -     CT Chest Hi Resolution Diagnostic; Future    2. Encounter for immunization  -     Fluzone High-Dose 65+yrs ([id number])    3. Chronic respiratory failure with hypoxia    4. Dyspnea on exertion    5. Diastolic CHF, chronic    6. Atrial fibrillation, unspecified type    7.  Continue Advair as prescribed.  Rinse mouth out after each use.  8.  Continue albuterol as needed.  9.  High-resolution chest CT ordered to evaluate restrictive lung disease seen on pulmonary function test  10.Follow-up cardiology as scheduled for management of chronic heart failure with reduced ejection fraction and atrial fibrillation  11.  Follow-up with our office in 4 months, sooner if needed    I spent 43 minutes caring for Giles on this date of service. This time includes time spent by me in the following activities:preparing for the visit, reviewing tests, obtaining and/or reviewing a separately obtained history, performing a medically appropriate examination and/or evaluation , counseling and educating the patient/family/caregiver, ordering medications, tests, or procedures, and documenting information in the medical record    Follow Up   Return in about 4 months (around 2/24/2024) for Recheck with Don.  Patient was given instructions and counseling regarding his condition or for health maintenance advice. Please see specific information  pulled into the AVS if appropriate.

## 2023-10-25 ENCOUNTER — OFFICE VISIT (OUTPATIENT)
Dept: NEUROLOGY | Facility: CLINIC | Age: 78
End: 2023-10-25
Payer: MEDICARE

## 2023-10-25 VITALS
BODY MASS INDEX: 32.02 KG/M2 | WEIGHT: 204 LBS | SYSTOLIC BLOOD PRESSURE: 102 MMHG | DIASTOLIC BLOOD PRESSURE: 53 MMHG | HEIGHT: 67 IN | HEART RATE: 92 BPM

## 2023-10-25 DIAGNOSIS — R55 VASO VAGAL EPISODE: Primary | ICD-10-CM

## 2023-10-25 DIAGNOSIS — R55 SYNCOPE, UNSPECIFIED SYNCOPE TYPE: ICD-10-CM

## 2023-10-25 PROCEDURE — 3078F DIAST BP <80 MM HG: CPT | Performed by: PSYCHIATRY & NEUROLOGY

## 2023-10-25 PROCEDURE — 3074F SYST BP LT 130 MM HG: CPT | Performed by: PSYCHIATRY & NEUROLOGY

## 2023-10-25 PROCEDURE — 99214 OFFICE O/P EST MOD 30 MIN: CPT | Performed by: PSYCHIATRY & NEUROLOGY

## 2023-10-25 PROCEDURE — 1160F RVW MEDS BY RX/DR IN RCRD: CPT | Performed by: PSYCHIATRY & NEUROLOGY

## 2023-10-25 PROCEDURE — 1159F MED LIST DOCD IN RCRD: CPT | Performed by: PSYCHIATRY & NEUROLOGY

## 2023-10-25 NOTE — PROGRESS NOTES
Chief Complaint  Neurologic Problem (Syncope vs  seizure. /ED 09/12/23- Teleneuro)    Subjective          Giles Donovan is a 78 y.o. male who presents to [facility] NEUROLOGY [facility]URGERY  History of Present Illness  78-year-old man evaluated for syncope.  He he was seen by neurology and admitted to [facility] on 9/16/2023 and was found to have syncope versus questionable seizure.  He was found to have new onset atrial fibrillation and had as well left anterior fascicular block, right bundle branch block, right internal carotid artery occlusion and new onset paroxysmal atrial fibrillation.  He has a history of hypertension, hyperlipidemia, type 2 diabetes mellitus.  According to his fiancée who is with him all the time she has seen at least 10 times a spell in which was triggered by coughing.  This is prior to his admission.  If he would cough and loses breath he would bring his hands up and then he would become limp and not breathing and that she has to shake him and put him on his front and back until he comes to.  The event that was noted during the hospitalization was that he had 2 episodes of passing out while eating dinner that was witnessed by his fiancée and he became unresponsive and slid down to the floor.  There was no obvious seizure activity.  Patient never had any stiffness, jerking and postictal state.  Recently his blood pressure did go down if he gets up too fast and his legs would start getting shaky and if she does not sit him down he will fall down.  He is able to walk using his rollator walker.  He is using oxygen.  He needs assistance for activities of daily living.    Is never had a stroke in the past.  He is noted to have a right carotid occlusion.  He has never had focal neurologic deficits, acute gait abnormalities, language difficulties or focal weakness.    Objective   Vital Signs:   /53 (BP Location: Right arm, Patient Position: Sitting, Cuff Size:  "Adult)   Pulse 92   Ht 170.2 cm (67.01\")   Wt 92.5 kg (204 lb)   BMI 31.94 kg/m²     Physical Exam   He is alert, fluent, phasic, follows commands well.  There is no focal weakness.  He is able to get up by himself and ambulate using his roller walker without difficulty.  Heart is regular and rhythm normal in rate.        Assessment and Plan  Diagnoses and all orders for this visit:    1. Vaso vagal episode (Primary)    2. Syncope, unspecified syncope type  Assessment & Plan:  He was admitted to [facility] and is presently having a long-term cardiac event monitor.  He is being followed by cardiology.  I discussed with him and his fiancée that he does not have seizures and that his syncope is related to cardiac etiology.  He also has had reflex syncope from coughing however this may have also caused bradycardia.  There is also will be followed up by cardiology.  He does not have a history of seizure or stroke.  He is to follow-up with cardiology and his primary care.  Thank you for letting me participate in his care.           Total time spent with the patient and coordinating patient care was 30 minutes.    Follow Up  No follow-ups on file.  Patient was given instructions and counseling regarding his condition or for health maintenance advice. Please see specific information pulled into the AVS if appropriate.       "

## 2023-10-25 NOTE — ASSESSMENT & PLAN NOTE
He was admitted to [facility] and is presently having a long-term cardiac event monitor.  He is being followed by cardiology.  I discussed with him and his fiancée that he does not have seizures and that his syncope is related to cardiac etiology.  He also has had reflex syncope from coughing however this may have also caused bradycardia.  There is also will be followed up by cardiology.  He does not have a history of seizure or stroke.  He is to follow-up with cardiology and his primary care.  Thank you for letting me participate in his care.

## 2023-10-30 ENCOUNTER — TELEPHONE (OUTPATIENT)
Dept: INTERNAL MEDICINE | Facility: CLINIC | Age: 78
End: 2023-10-30
Payer: MEDICARE

## 2023-10-30 NOTE — TELEPHONE ENCOUNTER
Caller: DANIELLE ALVES    Relationship: Emergency Contact    Best call back number: [phone]     What form or medical record are you requesting: JURY DUTY EXEMPTION FORM    Who is requesting this form or medical record from you: [facility]    How would you like to receive the form or medical records (pick-up, mail, fax):       Timeframe paperwork needed: AS SOON AS POSSIBLE. TIME SENSITIVE FORM    Additional notes: CALLER IS PRIMARY CARE GIVER FOR PATIENT. CALLER SUMMONED TO JURY DUTY FOR MONTHS OF JANUARY AND FEBRUARY. CALLER IS NOT ABLE TO ATTEND JURY DUTY DUE TO CARING FOR PATIENT. CALLER NEEDING EXEMPTION LETTER MADE FOR HER TO .

## 2023-10-31 ENCOUNTER — OUTSIDE FACILITY SERVICE (OUTPATIENT)
Dept: INTERNAL MEDICINE | Facility: CLINIC | Age: 78
End: 2023-10-31
Payer: MEDICARE

## 2023-11-01 ENCOUNTER — TELEPHONE (OUTPATIENT)
Dept: CARDIOLOGY | Facility: CLINIC | Age: 78
End: 2023-11-01
Payer: MEDICARE

## 2023-11-01 NOTE — TELEPHONE ENCOUNTER
----- Message from TENISHA Brooks sent at 10/31/2023  9:36 AM EDT -----  Holter monitor demonstrated an average heart rate of 92.  Frequent PACs constituting 6% of the total beats.  Patient's triggered events correlated with sinus rhythm or occasional PACs.

## 2023-11-06 ENCOUNTER — TELEPHONE (OUTPATIENT)
Dept: INTERNAL MEDICINE | Facility: CLINIC | Age: 78
End: 2023-11-06

## 2023-11-06 NOTE — TELEPHONE ENCOUNTER
Caller: Zion Giles Benny    Relationship: Self    Best call back number: [phone]    What form or medical record are you requesting: PAPERWORK STATING PATIENTS LEXIS IS HIS CAREGIVER AND IS UNABLE TO DO JURY DUTY IN JANUARY/FEBRUARY     Who is requesting this form or medical record from you: JURY DUTY     How would you like to receive the form or medical records (pick-up, mail, fax):      Timeframe paperwork needed: ASAP     Additional notes: PATIENT CALLED STATING HIS FIANCE HAS BEEN CALLED TO DO JURY DUTY FOR ALL OF JANUARY AND FEBRUARY. PATIENT STATES PATIENT IS HIS FULL TIME CAREGIVER AND HE WOULD NOT BE ABLE TO BE ALONE FOR THE AMOUNT OF TIME SHE WOULD BE GONE FOR JURY DUTY. PATIENT WANTING TO KNOW IF MD MARTINEZ COULD WRITE A JURY DUTY EXCUSE FOR HIS FIANCE. PATIENT STATES SHE IS NOT A PATIENT OF MD PRABHAKAR ONLY HE IS. PATIENT LEXIS IS NAMED DANIELLE SALAMANCA.

## 2023-11-06 NOTE — LETTER
November 7, 2023    Gilse Donovan  [address]  [place] [address]            To Whom This May Concern:            Please excuse Vicky Allen, she is unable to serve as a juror due being the primary caregiver to my patient Giles Donovan. It is unsafe for my patient Giles Donovan to be alone. Please excuse Vicky Allen from jury duty January 2024-February 2024. Please feel to contact my office at [phone] if you have any questions.        Sincerely,             This document signed by TC MARTINEZ MDNovember 8, 2023 18:12 EST”     Tc Martinez MD

## 2023-11-07 NOTE — TELEPHONE ENCOUNTER
Caller: Giles Donovan    Relationship to patient: Self    Best call back number: [phone]     Patient is needing: PATIENT IS CALLING TO INQUIRE ABOUT WHETHER LETTER IS READY TO BE PICKED UP FROM OFFICE. PLEASE ADVISE.

## 2023-11-13 ENCOUNTER — OFFICE VISIT (OUTPATIENT)
Dept: CARDIOLOGY | Facility: CLINIC | Age: 78
End: 2023-11-13
Payer: MEDICARE

## 2023-11-13 VITALS
BODY MASS INDEX: 32.49 KG/M2 | WEIGHT: 207 LBS | DIASTOLIC BLOOD PRESSURE: 67 MMHG | HEART RATE: 85 BPM | HEIGHT: 67 IN | SYSTOLIC BLOOD PRESSURE: 118 MMHG

## 2023-11-13 DIAGNOSIS — I50.32 CHRONIC HEART FAILURE WITH PRESERVED EJECTION FRACTION (HFPEF): ICD-10-CM

## 2023-11-13 DIAGNOSIS — E78.2 MIXED HYPERLIPIDEMIA: ICD-10-CM

## 2023-11-13 DIAGNOSIS — I48.0 PAROXYSMAL ATRIAL FIBRILLATION: Primary | ICD-10-CM

## 2023-11-13 DIAGNOSIS — I10 PRIMARY HYPERTENSION: ICD-10-CM

## 2023-11-13 PROCEDURE — 99214 OFFICE O/P EST MOD 30 MIN: CPT | Performed by: INTERNAL MEDICINE

## 2023-11-13 PROCEDURE — 3078F DIAST BP <80 MM HG: CPT | Performed by: INTERNAL MEDICINE

## 2023-11-13 PROCEDURE — 3074F SYST BP LT 130 MM HG: CPT | Performed by: INTERNAL MEDICINE

## 2023-11-13 NOTE — PROGRESS NOTES
Chief Complaint  No chief complaint on file.    Subjective      Patient is here for follow-up on paroxysmal atrial fibrillation.  He is doing well.  He has no complaints or concerns.  He has no chest comfort.  He has chronic shortness of breath which has been stable. He is on supplemental O2. He has no palpitations, dizziness, presyncope or syncope.  He is not very physically active.     Past Medical History:   Diagnosis Date    Allergic rhinitis 12/27/2014    Will try Zyrtec, he didnt want to use a nasal spray.    Arthritis     Asthma     Cataract     left eye    Cough 03/30/2016    PFT and CXR ordered.    Depression     PTSD    Diabetes     Elevated cholesterol     H/O psychiatric care 1999    PTSD    Hyperlipidemia 03/30/2016    Lipids ordered. Continue on current medication.    Hypertension     Hypothyroidism     Seasonal allergies     Shortness of breath     Sleep apnea     Stenosis of right carotid artery 02/19/2017    Will refer to vascular surgeon.    Syncope 02/19/2017    Type 2 diabetes mellitus     Upper respiratory infection 10/18/2015    Will treat cough with Hydromet, otherwise over the counter meds for treatment.         Current Outpatient Medications:     Alcohol Swabs (Easy Touch Alcohol Prep Medium) 70 % pads, , Disp: , Rfl:     apixaban (Eliquis) 5 MG tablet tablet, Take 1 tablet by mouth Every 12 (Twelve) Hours., Disp: , Rfl:     aspirin EC 81 MG EC tablet, Take 1 tablet by mouth Daily., Disp: , Rfl:     atorvastatin (LIPITOR) 80 MG tablet, Take 1 tablet by mouth Daily., Disp: 90 tablet, Rfl: 1    benzonatate (TESSALON) 100 MG capsule, Take 1 capsule by mouth 3 (Three) Times a Day As Needed for Cough., Disp: , Rfl:     cetirizine (ZyrTEC Allergy) 10 MG tablet, Take 1 tablet by mouth Daily., Disp: 90 tablet, Rfl: 3    felodipine (PLENDIL) 5 MG 24 hr tablet, Take 1 tablet by mouth Daily., Disp: , Rfl:     fluticasone-salmeterol (ADVAIR HFA) 230-21 MCG/ACT inhaler, Inhale 2 puffs 2 (Two) Times a  Day., Disp: , Rfl:     furosemide (LASIX) 40 MG tablet, Take 1 tablet by mouth Daily., Disp: 90 tablet, Rfl: 3    gabapentin (NEURONTIN) 600 MG tablet, Take 1 tablet by mouth 3 (Three) Times a Day., Disp: , Rfl:     insulin aspart (novoLOG) 100 UNIT/ML injection, Inject 24-26 Units under the skin into the appropriate area as directed 3 (Three) Times a Day Before Meals., Disp: , Rfl:     Insulin Glargine (Lantus SoloStar) 100 UNIT/ML injection pen, Inject 40 Units under the skin into the appropriate area as directed Every Night., Disp: , Rfl:     levothyroxine (SYNTHROID, LEVOTHROID) 100 MCG tablet, Take 1 tablet by mouth Daily., Disp: , Rfl:     metFORMIN (GLUCOPHAGE) 500 MG tablet, Take 1 tablet by mouth 2 (Two) Times a Day With Meals., Disp: , Rfl:     olopatadine (PATANOL) 0.1 % ophthalmic solution, Administer 1 drop to both eyes Daily As Needed for Allergies., Disp: , Rfl:     omeprazole (priLOSEC) 20 MG capsule, Take 1 capsule by mouth Daily., Disp: 90 capsule, Rfl: 4    oxybutynin XL (DITROPAN-XL) 10 MG 24 hr tablet, Take 1 tablet by mouth Daily., Disp: 90 tablet, Rfl: 4    PARoxetine (PAXIL) 40 MG tablet, Take 1 tablet by mouth Every Evening., Disp: , Rfl:     tadalafil (Cialis) 10 MG tablet, Take 1 tablet by mouth Daily As Needed for Erectile Dysfunction., Disp: 90 tablet, Rfl: 1    traZODone (DESYREL) 100 MG tablet, Take 1 tablet by mouth Every Night., Disp: , Rfl:     There are no discontinued medications.  Allergies   Allergen Reactions    Latex Rash and Anaphylaxis    Latex, Natural Rubber Itching        Social History     Tobacco Use    Smoking status: Never     Passive exposure: Never    Smokeless tobacco: Never   Vaping Use    Vaping Use: Never used   Substance Use Topics    Alcohol use: Yes     Comment: 2TALL BEERS/DAY    Drug use: Never       Family History   Problem Relation Age of Onset    Heart disease Mother     Lupus Mother     Arthritis Mother     Kidney disease Sister         Objective  "    There were no vitals taken for this visit.      Physical Exam    General Appearance:   no acute distress  Alert and oriented x3  HENT:   lips not cyanotic  Atraumatic  Neck:  No jvd   supple  Respiratory:  no respiratory distress  normal breath sounds  no rales  Cardiovascular:  no S3, no S4   no murmur  no rub  Extremities  No cyanosis  lower extremity edema: none    Skin:   warm, dry  No rashes      Result Review :     proBNP   Date Value Ref Range Status   10/03/2023 3,424.0 (H) 0.0 - 1,800.0 pg/mL Final     CMP          9/19/2023    04:03 9/26/2023    01:43 10/3/2023    23:07   CMP   Glucose 197  226  244    BUN 15  18  15    Creatinine 0.98  1.15  1.26    EGFR 79.9  66.0  59.1    Sodium 136  133  135    Potassium 4.4  3.6  4.1    Chloride 100  97  97    Calcium 9.4  9.1  9.1    Total Protein  6.4  6.4    Albumin  3.8  3.8    Globulin  2.6  2.6    Total Bilirubin  0.4  0.6    Alkaline Phosphatase  54  56    AST (SGOT)  20  22    ALT (SGPT)  25  18    Albumin/Globulin Ratio  1.5  1.5    BUN/Creatinine Ratio 15.3  15.7  11.9    Anion Gap 9.5  7.2  9.2      CBC w/diff          9/19/2023    04:03 9/26/2023    01:43 10/3/2023    23:07   CBC w/Diff   WBC 6.71  6.88  10.26    RBC 3.53  3.20  3.57    Hemoglobin 10.9  9.8  11.0    Hematocrit 33.2  30.0  33.1    MCV 94.1  93.8  92.7    MCH 30.9  30.6  30.8    MCHC 32.8  32.7  33.2    RDW 13.2  13.2  12.9    Platelets 193  170  185    Neutrophil Rel % 49.8  55.9  68.4    Immature Granulocyte Rel % 0.3  0.1  0.3    Lymphocyte Rel % 34.6  28.5  19.7    Monocyte Rel % 11.8  11.8  9.4    Eosinophil Rel % 2.8  3.3  1.8    Basophil Rel % 0.7  0.4  0.4       Lab Results   Component Value Date    TSH 0.945 09/17/2023      Lab Results   Component Value Date    FREET4 1.09 06/06/2023      No results found for: \"DDIMERQUANT\"  Magnesium   Date Value Ref Range Status   10/03/2023 1.3 (L) 1.6 - 2.4 mg/dL Final      No results found for: \"DIGOXIN\"   Lab Results   Component Value Date " "   TROPONINT 28 (H) 10/04/2023           Lipid Panel          12/5/2022    15:10 6/6/2023    11:58 9/17/2023    04:53   Lipid Panel   Total Cholesterol 220  128  92    Triglycerides 162  86  134    HDL Cholesterol 51  46  34    VLDL Cholesterol 29  17  24    LDL Cholesterol  140  65  34    LDL/HDL Ratio 2.68  1.41  0.92      No results found for: \"POCTROP\"    Results for orders placed during the hospital encounter of 09/16/23    Adult Transthoracic Echo Complete w/ Color, Spectral and Contrast if necessary per protocol    Interpretation Summary    Left ventricular systolic function is low normal. Calculated left ventricular EF = 45.1%    The left ventricular cavity is borderline dilated.    Left ventricular diastolic function is consistent with (grade I) impaired relaxation.    There is mild calcification of the aortic valve.                 Diagnoses and all orders for this visit:    1. Paroxysmal atrial fibrillation (Primary)    2. Mixed hyperlipidemia    3. Primary hypertension    4. Chronic HFrEF (heart failure with reduced ejection fraction)      Assessment:    Paroxysmal atrial fibrillation: He is currently in normal sinus rhythm.  He has no significant palpitations.  Recent event monitor was noted and was benign.  LVEF is preserved.  Continue Eliquis for stroke risk reduction.  He is not on AV mary blockers due to bifascicular block and history of syncope.    Essential hypertension: Stable on current regimen piquantly the same.    Chronic HFpEF: Well compensated on today's exam.  Continue furosemide 40 mg daily.    Mixed dyslipidemia: On atorvastatin.  Continue the same.          Follow Up     No follow-ups on file.        Patient was given instructions and counseling regarding his condition or for health maintenance advice. Please see specific information pulled into the AVS if appropriate.     "

## 2023-11-27 ENCOUNTER — HOSPITAL ENCOUNTER (OUTPATIENT)
Dept: CT IMAGING | Facility: HOSPITAL | Age: 78
Discharge: HOME | End: 2023-11-27
Admitting: NURSE PRACTITIONER
Payer: MEDICARE

## 2023-11-27 DIAGNOSIS — J98.4 RESTRICTIVE LUNG DISEASE: ICD-10-CM

## 2023-11-27 PROCEDURE — 71250 CT THORAX DX C-: CPT

## 2023-11-29 ENCOUNTER — TELEPHONE (OUTPATIENT)
Dept: INTERNAL MEDICINE | Facility: CLINIC | Age: 78
End: 2023-11-29
Payer: MEDICARE

## 2023-11-29 NOTE — TELEPHONE ENCOUNTER
Caller: Giles Donovan    Relationship to patient: Self    Best call back number: [phone]     Patient is needing: PATIENTS STATES THAT HE WOKE UP DIZZY TODAY THEN VOMITED AND HAD LOSS OF BALANCE. I ATTEMPTED TO SCHEDULE SAME DAY BUT THERE WAS NO AVAILABILITY.      UNABLE TO WARM TRANSFER

## 2023-12-01 ENCOUNTER — TELEPHONE (OUTPATIENT)
Dept: PULMONOLOGY | Facility: CLINIC | Age: 78
End: 2023-12-01
Payer: MEDICARE

## 2023-12-01 NOTE — TELEPHONE ENCOUNTER
Patient called back, and let us know that he is interested now in getting his lungs drained. Also, clarified he is not taking Lasix as prescribed.

## 2023-12-11 ENCOUNTER — TELEPHONE (OUTPATIENT)
Dept: PULMONOLOGY | Facility: CLINIC | Age: 78
End: 2023-12-11
Payer: MEDICARE

## 2023-12-11 DIAGNOSIS — J90 PLEURAL EFFUSION: Primary | ICD-10-CM

## 2023-12-15 ENCOUNTER — HOSPITAL ENCOUNTER (OUTPATIENT)
Dept: INFUSION THERAPY | Facility: HOSPITAL | Age: 78
Discharge: HOME | End: 2023-12-15
Attending: INTERNAL MEDICINE | Admitting: INTERNAL MEDICINE
Payer: MEDICARE

## 2023-12-15 DIAGNOSIS — J90 PLEURAL EFFUSION: ICD-10-CM

## 2023-12-15 RX ORDER — FUROSEMIDE 40 MG/1
40 TABLET ORAL 2 TIMES DAILY
Qty: 60 TABLET | Refills: 1 | Status: SHIPPED | OUTPATIENT
Start: 2023-12-15 | End: 2024-02-12

## 2023-12-15 RX ORDER — SPIRONOLACTONE 25 MG/1
25 TABLET ORAL DAILY
Qty: 30 TABLET | Refills: 0 | Status: SHIPPED | OUTPATIENT
Start: 2023-12-15 | End: 2024-01-16

## 2023-12-18 ENCOUNTER — PREP FOR SURGERY (OUTPATIENT)
Dept: OTHER | Facility: HOSPITAL | Age: 78
End: 2023-12-18
Payer: MEDICARE

## 2023-12-18 DIAGNOSIS — J90 RECURRENT PLEURAL EFFUSION ON LEFT: Primary | ICD-10-CM

## 2023-12-20 ENCOUNTER — APPOINTMENT (OUTPATIENT)
Dept: INFUSION THERAPY | Facility: HOSPITAL | Age: 78
End: 2023-12-20
Payer: MEDICARE

## 2023-12-20 ENCOUNTER — OFFICE VISIT (OUTPATIENT)
Dept: INTERNAL MEDICINE | Facility: CLINIC | Age: 78
End: 2023-12-20
Payer: MEDICARE

## 2023-12-20 VITALS
HEIGHT: 67 IN | SYSTOLIC BLOOD PRESSURE: 122 MMHG | TEMPERATURE: 98.9 F | BODY MASS INDEX: 33.09 KG/M2 | DIASTOLIC BLOOD PRESSURE: 74 MMHG | HEART RATE: 85 BPM | OXYGEN SATURATION: 95 % | WEIGHT: 210.8 LBS

## 2023-12-20 DIAGNOSIS — E03.8 HYPOTHYROIDISM DUE TO HASHIMOTO'S THYROIDITIS: ICD-10-CM

## 2023-12-20 DIAGNOSIS — E11.9 CONTROLLED TYPE 2 DIABETES MELLITUS WITHOUT COMPLICATION, WITHOUT LONG-TERM CURRENT USE OF INSULIN: Primary | ICD-10-CM

## 2023-12-20 DIAGNOSIS — E06.3 HYPOTHYROIDISM DUE TO HASHIMOTO'S THYROIDITIS: ICD-10-CM

## 2023-12-20 DIAGNOSIS — I10 PRIMARY HYPERTENSION: ICD-10-CM

## 2023-12-20 DIAGNOSIS — Z23 NEED FOR COVID-19 VACCINE: ICD-10-CM

## 2023-12-20 DIAGNOSIS — J90 RECURRENT PLEURAL EFFUSION ON LEFT: ICD-10-CM

## 2023-12-20 DIAGNOSIS — E78.2 MIXED HYPERLIPIDEMIA: ICD-10-CM

## 2023-12-20 DIAGNOSIS — I50.32 CHRONIC HEART FAILURE WITH PRESERVED EJECTION FRACTION: ICD-10-CM

## 2023-12-20 PROBLEM — I48.0 PAROXYSMAL ATRIAL FIBRILLATION: Status: ACTIVE | Noted: 2023-12-20

## 2023-12-20 PROBLEM — I48.91 ATRIAL FIBRILLATION: Status: RESOLVED | Noted: 2023-10-10 | Resolved: 2023-12-20

## 2023-12-20 LAB
ALBUMIN SERPL-MCNC: 3.9 G/DL (ref 3.5–5.2)
ALBUMIN/GLOB SERPL: 1.3 G/DL
ALP SERPL-CCNC: 71 U/L (ref 39–117)
ALT SERPL W P-5'-P-CCNC: 22 U/L (ref 1–41)
ANION GAP SERPL CALCULATED.3IONS-SCNC: 10 MMOL/L (ref 5–15)
AST SERPL-CCNC: 17 U/L (ref 1–40)
BASOPHILS # BLD AUTO: 0.03 10*3/MM3 (ref 0–0.2)
BASOPHILS NFR BLD AUTO: 0.4 % (ref 0–1.5)
BILIRUB SERPL-MCNC: 0.9 MG/DL (ref 0–1.2)
BUN SERPL-MCNC: 14 MG/DL (ref 8–23)
BUN/CREAT SERPL: 14.4 (ref 7–25)
CALCIUM SPEC-SCNC: 9.7 MG/DL (ref 8.6–10.5)
CHLORIDE SERPL-SCNC: 98 MMOL/L (ref 98–107)
CHOLEST SERPL-MCNC: 100 MG/DL (ref 0–200)
CO2 SERPL-SCNC: 28 MMOL/L (ref 22–29)
CREAT SERPL-MCNC: 0.97 MG/DL (ref 0.76–1.27)
DEPRECATED RDW RBC AUTO: 42.6 FL (ref 37–54)
EGFRCR SERPLBLD CKD-EPI 2021: 80.4 ML/MIN/1.73
EOSINOPHIL # BLD AUTO: 0.15 10*3/MM3 (ref 0–0.4)
EOSINOPHIL NFR BLD AUTO: 2.1 % (ref 0.3–6.2)
ERYTHROCYTE [DISTWIDTH] IN BLOOD BY AUTOMATED COUNT: 12.7 % (ref 12.3–15.4)
GLOBULIN UR ELPH-MCNC: 3 GM/DL
GLUCOSE SERPL-MCNC: 286 MG/DL (ref 65–99)
HBA1C MFR BLD: 8.8 % (ref 4.8–5.6)
HCT VFR BLD AUTO: 29.8 % (ref 37.5–51)
HDLC SERPL-MCNC: 38 MG/DL (ref 40–60)
HGB BLD-MCNC: 9.9 G/DL (ref 13–17.7)
IMM GRANULOCYTES # BLD AUTO: 0.02 10*3/MM3 (ref 0–0.05)
IMM GRANULOCYTES NFR BLD AUTO: 0.3 % (ref 0–0.5)
LDLC SERPL CALC-MCNC: 45 MG/DL (ref 0–100)
LDLC/HDLC SERPL: 1.18 {RATIO}
LYMPHOCYTES # BLD AUTO: 0.88 10*3/MM3 (ref 0.7–3.1)
LYMPHOCYTES NFR BLD AUTO: 12.4 % (ref 19.6–45.3)
MCH RBC QN AUTO: 30.7 PG (ref 26.6–33)
MCHC RBC AUTO-ENTMCNC: 33.2 G/DL (ref 31.5–35.7)
MCV RBC AUTO: 92.3 FL (ref 79–97)
MONOCYTES # BLD AUTO: 0.59 10*3/MM3 (ref 0.1–0.9)
MONOCYTES NFR BLD AUTO: 8.3 % (ref 5–12)
NEUTROPHILS NFR BLD AUTO: 5.43 10*3/MM3 (ref 1.7–7)
NEUTROPHILS NFR BLD AUTO: 76.5 % (ref 42.7–76)
NRBC BLD AUTO-RTO: 0 /100 WBC (ref 0–0.2)
PLATELET # BLD AUTO: 191 10*3/MM3 (ref 140–450)
PMV BLD AUTO: 10.9 FL (ref 6–12)
POTASSIUM SERPL-SCNC: 4.8 MMOL/L (ref 3.5–5.2)
PROT SERPL-MCNC: 6.9 G/DL (ref 6–8.5)
RBC # BLD AUTO: 3.23 10*6/MM3 (ref 4.14–5.8)
SODIUM SERPL-SCNC: 136 MMOL/L (ref 136–145)
TRIGL SERPL-MCNC: 86 MG/DL (ref 0–150)
TSH SERPL DL<=0.05 MIU/L-ACNC: 0.65 UIU/ML (ref 0.27–4.2)
VLDLC SERPL-MCNC: 17 MG/DL (ref 5–40)
WBC NRBC COR # BLD AUTO: 7.1 10*3/MM3 (ref 3.4–10.8)

## 2023-12-20 PROCEDURE — 80061 LIPID PANEL: CPT | Performed by: INTERNAL MEDICINE

## 2023-12-20 PROCEDURE — 84443 ASSAY THYROID STIM HORMONE: CPT | Performed by: INTERNAL MEDICINE

## 2023-12-20 PROCEDURE — 80053 COMPREHEN METABOLIC PANEL: CPT | Performed by: INTERNAL MEDICINE

## 2023-12-20 PROCEDURE — 85025 COMPLETE CBC W/AUTO DIFF WBC: CPT | Performed by: INTERNAL MEDICINE

## 2023-12-20 PROCEDURE — 83036 HEMOGLOBIN GLYCOSYLATED A1C: CPT | Performed by: INTERNAL MEDICINE

## 2023-12-20 RX ORDER — SEMAGLUTIDE 1.34 MG/ML
0.25 INJECTION, SOLUTION SUBCUTANEOUS WEEKLY
Qty: 3 ML | Refills: 1 | Status: SHIPPED | OUTPATIENT
Start: 2023-12-20

## 2023-12-20 RX ORDER — BUPROPION HYDROCHLORIDE 150 MG/1
150 TABLET ORAL DAILY
Qty: 90 TABLET | Refills: 1 | Status: SHIPPED | OUTPATIENT
Start: 2023-12-20

## 2023-12-20 RX ORDER — BUPROPION HYDROCHLORIDE 75 MG/1
75 TABLET ORAL 2 TIMES DAILY
COMMUNITY
Start: 2023-11-10 | End: 2023-12-20

## 2023-12-20 RX ORDER — PRAZOSIN HYDROCHLORIDE 1 MG/1
1 CAPSULE ORAL NIGHTLY
COMMUNITY
Start: 2023-11-03

## 2023-12-20 NOTE — PROGRESS NOTES
"Chief Complaint  Controlled type 2 diabetes mellitus without complication, w (3 month f/u); Fatigue; and Shortness of Breath (Pt has been doing breathe treatments. Pt states that Dr. Ochoa started on Spirolactone and lasix to help )    Subjective      Giles Donovan is a 78 y.o. male who presents to [facility] with a past medical history of  Past Medical History:   Diagnosis Date    Allergic rhinitis 12/27/2014    Will try Zyrtec, he didnt want to use a nasal spray.    Arthritis     Asthma     Cataract     left eye    Cough 03/30/2016    PFT and CXR ordered.    Depression     PTSD    Diabetes     Elevated cholesterol     H/O psychiatric care 1999    PTSD    Hyperlipidemia 03/30/2016    Lipids ordered. Continue on current medication.    Hypertension     Hypothyroidism     Seasonal allergies     Shortness of breath     Sleep apnea     Stenosis of right carotid artery 02/19/2017    Will refer to vascular surgeon.    Syncope 02/19/2017    Type 2 diabetes mellitus     Upper respiratory infection 10/18/2015    Will treat cough with Hydromet, otherwise over the counter meds for treatment.     Recently went to consider having pleural effusion drained however once arriving in the hospital it was determined that there was not enough fluid to drain therefore Dr. Ochoa called in medications for him  He states he has not gotten them but plans to go pick them up today  Still having some dyspnea  No syncope  Sugars have been on the higher side  Does admit that he is having some depression due to his current illness  Feels like he has 0 energy      Objective   Vital Signs:   Vitals:    12/20/23 1529   BP: 122/74   Pulse: 85   Temp: 98.9 °F (37.2 °C)   TempSrc: Temporal   SpO2: 95%  Comment: 2 liters   Weight: 95.6 kg (210 lb 12.8 oz)   Height: 170.2 cm (67.01\")   PainSc:   6   PainLoc: Back  Comment: lower- hurts with walking     Body mass index is 33.01 kg/m².    Wt " Readings from Last 3 Encounters:   12/20/23 95.6 kg (210 lb 12.8 oz)   11/13/23 93.9 kg (207 lb)   10/25/23 92.5 kg (204 lb)     BP Readings from Last 3 Encounters:   12/20/23 122/74   11/13/23 118/67   10/25/23 102/53       Health Maintenance   Topic Date Due    URINE MICROALBUMIN  12/05/2023    DIABETIC EYE EXAM  02/07/2024 (Originally 6/1/2023)    TDAP/TD VACCINES (1 - Tdap) 03/20/2024 (Originally 10/21/1964)    ZOSTER VACCINE (1 of 2) 12/20/2024 (Originally 10/21/1996)    ANNUAL WELLNESS VISIT  06/06/2024    HEMOGLOBIN A1C  06/20/2024    COLORECTAL CANCER SCREENING  10/07/2024    LIPID PANEL  12/20/2024    BMI FOLLOWUP  12/20/2024    HEPATITIS C SCREENING  Completed    COVID-19 Vaccine  Completed    INFLUENZA VACCINE  Completed    Pneumococcal Vaccine 65+  Completed       Physical Exam  Vitals reviewed.   Constitutional:       Appearance: Normal appearance. He is well-developed.   HENT:      Head: Normocephalic and atraumatic.      Right Ear: External ear normal.      Left Ear: External ear normal.   Eyes:      Conjunctiva/sclera: Conjunctivae normal.      Pupils: Pupils are equal, round, and reactive to light.   Cardiovascular:      Rate and Rhythm: Normal rate and regular rhythm.      Heart sounds: No murmur heard.     No friction rub. No gallop.   Pulmonary:      Effort: Pulmonary effort is normal.      Breath sounds: Normal breath sounds. No wheezing or rhonchi.      Comments: Currently wearing oxygen  Skin:     General: Skin is warm and dry.   Neurological:      Mental Status: He is alert and oriented to person, place, and time.   Psychiatric:         Mood and Affect: Affect normal.         Behavior: Behavior normal.         Thought Content: Thought content normal.            Result Review :  The following data was reviewed by: Morena Masters MD on 12/20/2023:      Procedures        Assessment and Plan   Diagnoses and all orders for this visit:    1. Controlled type 2 diabetes mellitus without  complication, without long-term current use of insulin (Primary)  Comments:  Try getting him Ozempic discussed side effects and may need to decrease meds as he starts this  Orders:  -     CBC & Differential  -     Comprehensive Metabolic Panel  -     Hemoglobin A1c  -     Lipid Panel  -     TSH    2. Need for COVID-19 vaccine  -     COVID-19 F23 (Pfizer) 12yrs+ (COMIRNATY)    3. Chronic heart failure with preserved ejection fraction  Comments:  Will start medications per Dr. Ochoa and see how he does    4. Primary hypertension  -     CBC & Differential  -     Comprehensive Metabolic Panel  -     Hemoglobin A1c  -     Lipid Panel  -     TSH    5. Mixed hyperlipidemia  Comments:  Will check labs and adjust meds as needed based on results  Overview:  Lipids ordered. Continue on current medication      6. Hypothyroidism due to Hashimoto's thyroiditis  Comments:  Will check labs and adjust as needed    Other orders  -     buPROPion XL (Wellbutrin XL) 150 MG 24 hr tablet; Take 1 tablet by mouth Daily.  Dispense: 90 tablet; Refill: 1  -     Semaglutide,0.25 or 0.5MG/DOS, (Ozempic, 0.25 or 0.5 MG/DOSE,) 2 MG/1.5ML solution pen-injector; Inject 0.25 mg under the skin into the appropriate area as directed 1 (One) Time Per Week.  Dispense: 3 mL; Refill: 1                  FOLLOW UP  Return in about 6 weeks (around 1/31/2024).  Patient was given instructions and counseling regarding his condition or for health maintenance advice. Please see specific information pulled into the AVS if appropriate.       Morena Masters MD  12/22/23  18:03 EST    CURRENT & DISCONTINUED MEDICATIONS  Current Outpatient Medications   Medication Instructions    Alcohol Swabs (Easy Touch Alcohol Prep Medium) 70 % pads     apixaban (ELIQUIS) 5 mg, Oral, Every 12 Hours Scheduled    aspirin EC 81 mg, Oral, Daily    atorvastatin (LIPITOR) 80 mg, Oral, Daily    benzonatate (TESSALON) 100 mg, Oral, 3 Times Daily PRN    buPROPion XL (WELLBUTRIN XL) 150  mg, Oral, Daily    cetirizine (ZYRTEC ALLERGY) 10 mg, Oral, Daily    felodipine (PLENDIL) 5 MG 24 hr tablet Take 1 tablet by mouth Daily. On hold d/t BP doing    fluticasone-salmeterol (ADVAIR HFA) 230-21 MCG/ACT inhaler 2 puffs, Inhalation, 2 Times Daily    furosemide (LASIX) 40 mg, Oral, Daily    furosemide (LASIX) 40 mg, Oral, 2 Times Daily    gabapentin (NEURONTIN) 600 mg, Oral, 2 Times Daily    insulin aspart (NOVOLOG) 24-26 Units, Subcutaneous, 3 Times Daily Before Meals    Insulin Glargine (Lantus SoloStar) 100 UNIT/ML injection pen Inject 40 Units under the skin into the appropriate area as directed Every Night.    levothyroxine (SYNTHROID, LEVOTHROID) 100 mcg, Oral, Daily    metFORMIN (GLUCOPHAGE) 500 mg, Oral, 2 Times Daily With Meals    olopatadine (PATANOL) 0.1 % ophthalmic solution 1 drop, Both Eyes, Daily PRN    omeprazole (PRILOSEC) 20 mg, Oral, Daily    oxybutynin XL (DITROPAN-XL) 10 mg, Oral, Daily    Ozempic (0.25 or 0.5 MG/DOSE) 0.25 mg, Subcutaneous, Weekly    PARoxetine (PAXIL) 40 mg, Oral, Every Evening    prazosin (MINIPRESS) 1 mg, Oral, Nightly    spironolactone (ALDACTONE) 25 mg, Oral, Daily    traZODone (DESYREL) 100 mg, Oral, Nightly       Medications Discontinued During This Encounter   Medication Reason    buPROPion (WELLBUTRIN) 75 MG tablet

## 2023-12-21 DIAGNOSIS — D64.9 ANEMIA, UNSPECIFIED TYPE: Primary | ICD-10-CM

## 2023-12-21 DIAGNOSIS — E68 SEQUELAE OF HYPERALIMENTATION: ICD-10-CM

## 2023-12-22 ENCOUNTER — TELEPHONE (OUTPATIENT)
Dept: INTERNAL MEDICINE | Facility: CLINIC | Age: 78
End: 2023-12-22
Payer: MEDICARE

## 2023-12-22 NOTE — TELEPHONE ENCOUNTER
Caller: Giles Donovan    Relationship: Self    Best call back number: [phone]     Caller requesting test results: YES    What test was performed: BLOOD WORK     When was the test performed: 12/19/23    Where was the test performed: IN OFFICE

## 2023-12-29 ENCOUNTER — CLINICAL SUPPORT (OUTPATIENT)
Dept: INTERNAL MEDICINE | Facility: CLINIC | Age: 78
End: 2023-12-29
Payer: MEDICARE

## 2023-12-29 DIAGNOSIS — D64.9 ANEMIA, UNSPECIFIED TYPE: ICD-10-CM

## 2023-12-29 PROCEDURE — 84466 ASSAY OF TRANSFERRIN: CPT | Performed by: INTERNAL MEDICINE

## 2023-12-29 PROCEDURE — 83540 ASSAY OF IRON: CPT | Performed by: INTERNAL MEDICINE

## 2023-12-29 PROCEDURE — 85025 COMPLETE CBC W/AUTO DIFF WBC: CPT | Performed by: INTERNAL MEDICINE

## 2023-12-29 PROCEDURE — 36415 COLL VENOUS BLD VENIPUNCTURE: CPT | Performed by: INTERNAL MEDICINE

## 2023-12-29 PROCEDURE — 82746 ASSAY OF FOLIC ACID SERUM: CPT | Performed by: INTERNAL MEDICINE

## 2023-12-29 PROCEDURE — 82306 VITAMIN D 25 HYDROXY: CPT | Performed by: INTERNAL MEDICINE

## 2023-12-29 PROCEDURE — 82607 VITAMIN B-12: CPT | Performed by: INTERNAL MEDICINE

## 2023-12-29 PROCEDURE — 82728 ASSAY OF FERRITIN: CPT | Performed by: INTERNAL MEDICINE

## 2023-12-29 PROCEDURE — 83735 ASSAY OF MAGNESIUM: CPT | Performed by: INTERNAL MEDICINE

## 2023-12-29 NOTE — PROGRESS NOTES
Venipuncture Blood Specimen Collection  Venipuncture performed in [facility] by Valerie Swain MA with good hemostasis. Patient tolerated the procedure well without complications.   12/29/23   Valerie Swain MA

## 2023-12-30 LAB
25(OH)D3 SERPL-MCNC: 32.4 NG/ML (ref 30–100)
BASOPHILS # BLD AUTO: 0.06 10*3/MM3 (ref 0–0.2)
BASOPHILS NFR BLD AUTO: 0.7 % (ref 0–1.5)
DEPRECATED RDW RBC AUTO: 40.7 FL (ref 37–54)
EOSINOPHIL # BLD AUTO: 0.17 10*3/MM3 (ref 0–0.4)
EOSINOPHIL NFR BLD AUTO: 2 % (ref 0.3–6.2)
ERYTHROCYTE [DISTWIDTH] IN BLOOD BY AUTOMATED COUNT: 11.8 % (ref 12.3–15.4)
FERRITIN SERPL-MCNC: 427 NG/ML (ref 30–400)
FOLATE SERPL-MCNC: 15.4 NG/ML (ref 4.78–24.2)
HCT VFR BLD AUTO: 36 % (ref 37.5–51)
HGB BLD-MCNC: 11.9 G/DL (ref 13–17.7)
IMM GRANULOCYTES # BLD AUTO: 0.02 10*3/MM3 (ref 0–0.05)
IMM GRANULOCYTES NFR BLD AUTO: 0.2 % (ref 0–0.5)
IRON 24H UR-MRATE: 106 MCG/DL (ref 59–158)
IRON SATN MFR SERPL: 36 % (ref 20–50)
LYMPHOCYTES # BLD AUTO: 1.84 10*3/MM3 (ref 0.7–3.1)
LYMPHOCYTES NFR BLD AUTO: 21.3 % (ref 19.6–45.3)
MAGNESIUM SERPL-MCNC: 1.9 MG/DL (ref 1.6–2.4)
MCH RBC QN AUTO: 31.2 PG (ref 26.6–33)
MCHC RBC AUTO-ENTMCNC: 33.1 G/DL (ref 31.5–35.7)
MCV RBC AUTO: 94.2 FL (ref 79–97)
MONOCYTES # BLD AUTO: 0.7 10*3/MM3 (ref 0.1–0.9)
MONOCYTES NFR BLD AUTO: 8.1 % (ref 5–12)
NEUTROPHILS NFR BLD AUTO: 5.84 10*3/MM3 (ref 1.7–7)
NEUTROPHILS NFR BLD AUTO: 67.7 % (ref 42.7–76)
NRBC BLD AUTO-RTO: 0 /100 WBC (ref 0–0.2)
PLATELET # BLD AUTO: 246 10*3/MM3 (ref 140–450)
PMV BLD AUTO: 11 FL (ref 6–12)
RBC # BLD AUTO: 3.82 10*6/MM3 (ref 4.14–5.8)
TIBC SERPL-MCNC: 295 MCG/DL (ref 298–536)
TRANSFERRIN SERPL-MCNC: 198 MG/DL (ref 200–360)
VIT B12 BLD-MCNC: 789 PG/ML (ref 211–946)
WBC NRBC COR # BLD AUTO: 8.63 10*3/MM3 (ref 3.4–10.8)

## 2024-01-10 ENCOUNTER — HOSPITAL ENCOUNTER (OUTPATIENT)
Dept: GENERAL RADIOLOGY | Facility: HOSPITAL | Age: 79
Discharge: HOME | End: 2024-01-10
Admitting: INTERNAL MEDICINE
Payer: MEDICARE

## 2024-01-10 ENCOUNTER — OFFICE VISIT (OUTPATIENT)
Dept: PULMONOLOGY | Facility: CLINIC | Age: 79
End: 2024-01-10
Payer: MEDICARE

## 2024-01-10 VITALS
BODY MASS INDEX: 30.76 KG/M2 | TEMPERATURE: 99 F | OXYGEN SATURATION: 97 % | DIASTOLIC BLOOD PRESSURE: 71 MMHG | WEIGHT: 196 LBS | SYSTOLIC BLOOD PRESSURE: 121 MMHG | RESPIRATION RATE: 18 BRPM | HEIGHT: 67 IN | HEART RATE: 88 BPM

## 2024-01-10 DIAGNOSIS — I50.32 CHRONIC HEART FAILURE WITH PRESERVED EJECTION FRACTION: ICD-10-CM

## 2024-01-10 DIAGNOSIS — R06.02 SOB (SHORTNESS OF BREATH): ICD-10-CM

## 2024-01-10 DIAGNOSIS — J98.4 RESTRICTIVE LUNG DISEASE: ICD-10-CM

## 2024-01-10 DIAGNOSIS — R06.02 SOB (SHORTNESS OF BREATH): Primary | ICD-10-CM

## 2024-01-10 PROCEDURE — 71046 X-RAY EXAM CHEST 2 VIEWS: CPT

## 2024-01-12 PROBLEM — J90 CHRONIC BILATERAL PLEURAL EFFUSIONS: Status: ACTIVE | Noted: 2024-01-12

## 2024-01-12 NOTE — PROGRESS NOTES
"Chief Complaint  Restrictive lung disease, Follow-up, and Cough (No phlegm )    Subjective        Giles Donovan presents to [facility]  History of Present Illness  Pleasant male with heart failure here today for follow-up  Patient was last seen in the hospital he was found to have small bilateral pleural effusions on ultrasound  The patient was given increased dose of diuretics  Since being on diuretics he has had weight loss and has had significant improvement in his symptoms  Objective   Vital Signs:  /71 (BP Location: Left arm, Patient Position: Sitting, Cuff Size: Large Adult)   Pulse 88   Temp 99 °F (37.2 °C) (Oral)   Resp 18   Ht 170.2 cm (67.01\")   Wt 88.9 kg (196 lb)   SpO2 97% Comment: nasal canula 2 liters  BMI 30.69 kg/m²   Estimated body mass index is 30.69 kg/m² as calculated from the following:    Height as of this encounter: 170.2 cm (67.01\").    Weight as of this encounter: 88.9 kg (196 lb).               Physical Exam   Vital Signs Reviewed  General WDWN, Alert, NAD.    Chest:  good aeration, clear to auscultation bilaterally, tympanic to percussion bilaterally, no work of breathing noted  CV: RRR, no MGR, .  EXT: Does have significant thenar and hypothenar muscle wasting     Result Review :                   Assessment and Plan   Diagnoses and all orders for this visit:    1. SOB (shortness of breath) (Primary)  -     XR Chest 2 View; Future  -     Renal Function Panel; Future    2. Chronic heart failure with preserved ejection fraction    3. Restrictive lung disease    Plan  SPECT of the restrictive lung disease with due to the patient's bilateral pleural effusions, there is also the possibility patient could have some respiratory muscle weakness as he does have significant thenar and hypothenar muscle wasting as well as wasting of the lumbrical muscles in his hands    Will continue to follow    Will continue with diuresis " repeat renal panel to check electrolytes as well is to make sure the patient's renal function is tolerating a higher dose of diuretics which it has been on last renal panel    Will continue on Aldactone, explained to the patient that he have any breast tenderness or enlargement that he can call me and we can stop this medication as this medication is well-known to cause gynecomastia    Would also recommend for the patient to be started on Entresto along with Jardiance or Farxiga due to his congestive heart failure he does have plenty blood pressure and a normal kidney function spoke with his primary care provider regarding this, will defer this to the primary care as well as cardiology which the patient follows with    Continue current bronchodilator therapies patient currently on Advair    Continue as needed albuterol    Will repeat chest x-ray today as well to assess the effusions       Follow Up   Return in about 4 weeks (around 2/7/2024).  Patient was given instructions and counseling regarding his condition or for health maintenance advice. Please see specific information pulled into the AVS if appropriate.

## 2024-01-16 RX ORDER — SPIRONOLACTONE 25 MG/1
25 TABLET ORAL DAILY
Qty: 30 TABLET | Refills: 0 | Status: SHIPPED | OUTPATIENT
Start: 2024-01-16

## 2024-02-06 ENCOUNTER — OFFICE VISIT (OUTPATIENT)
Dept: INTERNAL MEDICINE | Facility: CLINIC | Age: 79
End: 2024-02-06
Payer: MEDICARE

## 2024-02-06 VITALS
TEMPERATURE: 98.4 F | SYSTOLIC BLOOD PRESSURE: 100 MMHG | OXYGEN SATURATION: 99 % | BODY MASS INDEX: 30.32 KG/M2 | WEIGHT: 193.2 LBS | DIASTOLIC BLOOD PRESSURE: 52 MMHG | HEART RATE: 78 BPM | HEIGHT: 67 IN

## 2024-02-06 DIAGNOSIS — I10 PRIMARY HYPERTENSION: ICD-10-CM

## 2024-02-06 DIAGNOSIS — E78.2 MIXED HYPERLIPIDEMIA: ICD-10-CM

## 2024-02-06 DIAGNOSIS — E11.9 CONTROLLED TYPE 2 DIABETES MELLITUS WITHOUT COMPLICATION, WITHOUT LONG-TERM CURRENT USE OF INSULIN: Primary | ICD-10-CM

## 2024-02-06 DIAGNOSIS — Z74.09 MOBILITY IMPAIRED: ICD-10-CM

## 2024-02-06 DIAGNOSIS — I50.32 CHRONIC HEART FAILURE WITH PRESERVED EJECTION FRACTION: ICD-10-CM

## 2024-02-06 LAB
ALBUMIN SERPL-MCNC: 4.2 G/DL (ref 3.5–5.2)
ALBUMIN UR-MCNC: 2.2 MG/DL
ALBUMIN/GLOB SERPL: 1.2 G/DL
ALP SERPL-CCNC: 72 U/L (ref 39–117)
ALT SERPL W P-5'-P-CCNC: 25 U/L (ref 1–41)
ANION GAP SERPL CALCULATED.3IONS-SCNC: 11.3 MMOL/L (ref 5–15)
AST SERPL-CCNC: 31 U/L (ref 1–40)
BASOPHILS # BLD AUTO: 0.02 10*3/MM3 (ref 0–0.2)
BASOPHILS NFR BLD AUTO: 0.3 % (ref 0–1.5)
BILIRUB SERPL-MCNC: 0.4 MG/DL (ref 0–1.2)
BUN SERPL-MCNC: 20 MG/DL (ref 8–23)
BUN/CREAT SERPL: 17.4 (ref 7–25)
CALCIUM SPEC-SCNC: 9.4 MG/DL (ref 8.6–10.5)
CHLORIDE SERPL-SCNC: 96 MMOL/L (ref 98–107)
CHOLEST SERPL-MCNC: 113 MG/DL (ref 0–200)
CO2 SERPL-SCNC: 28.7 MMOL/L (ref 22–29)
CREAT SERPL-MCNC: 1.15 MG/DL (ref 0.76–1.27)
DEPRECATED RDW RBC AUTO: 40.8 FL (ref 37–54)
EGFRCR SERPLBLD CKD-EPI 2021: 65.5 ML/MIN/1.73
EOSINOPHIL # BLD AUTO: 0.14 10*3/MM3 (ref 0–0.4)
EOSINOPHIL NFR BLD AUTO: 2 % (ref 0.3–6.2)
ERYTHROCYTE [DISTWIDTH] IN BLOOD BY AUTOMATED COUNT: 12.5 % (ref 12.3–15.4)
GLOBULIN UR ELPH-MCNC: 3.5 GM/DL
GLUCOSE SERPL-MCNC: 242 MG/DL (ref 65–99)
HBA1C MFR BLD: 9.8 % (ref 4.8–5.6)
HCT VFR BLD AUTO: 34.7 % (ref 37.5–51)
HDLC SERPL-MCNC: 42 MG/DL (ref 40–60)
HGB BLD-MCNC: 11.4 G/DL (ref 13–17.7)
IMM GRANULOCYTES # BLD AUTO: 0.02 10*3/MM3 (ref 0–0.05)
IMM GRANULOCYTES NFR BLD AUTO: 0.3 % (ref 0–0.5)
LDLC SERPL CALC-MCNC: 41 MG/DL (ref 0–100)
LDLC/HDLC SERPL: 0.83 {RATIO}
LYMPHOCYTES # BLD AUTO: 1.77 10*3/MM3 (ref 0.7–3.1)
LYMPHOCYTES NFR BLD AUTO: 24.9 % (ref 19.6–45.3)
MCH RBC QN AUTO: 29.8 PG (ref 26.6–33)
MCHC RBC AUTO-ENTMCNC: 32.9 G/DL (ref 31.5–35.7)
MCV RBC AUTO: 90.8 FL (ref 79–97)
MONOCYTES # BLD AUTO: 0.75 10*3/MM3 (ref 0.1–0.9)
MONOCYTES NFR BLD AUTO: 10.5 % (ref 5–12)
NEUTROPHILS NFR BLD AUTO: 4.42 10*3/MM3 (ref 1.7–7)
NEUTROPHILS NFR BLD AUTO: 62 % (ref 42.7–76)
NRBC BLD AUTO-RTO: 0 /100 WBC (ref 0–0.2)
NT-PROBNP SERPL-MCNC: 297 PG/ML (ref 0–1800)
PLATELET # BLD AUTO: 233 10*3/MM3 (ref 140–450)
PMV BLD AUTO: 10.7 FL (ref 6–12)
POTASSIUM SERPL-SCNC: 4.9 MMOL/L (ref 3.5–5.2)
PROT SERPL-MCNC: 7.7 G/DL (ref 6–8.5)
RBC # BLD AUTO: 3.82 10*6/MM3 (ref 4.14–5.8)
SODIUM SERPL-SCNC: 136 MMOL/L (ref 136–145)
TRIGL SERPL-MCNC: 181 MG/DL (ref 0–150)
TSH SERPL DL<=0.05 MIU/L-ACNC: 0.65 UIU/ML (ref 0.27–4.2)
VLDLC SERPL-MCNC: 30 MG/DL (ref 5–40)
WBC NRBC COR # BLD AUTO: 7.12 10*3/MM3 (ref 3.4–10.8)

## 2024-02-06 PROCEDURE — 83036 HEMOGLOBIN GLYCOSYLATED A1C: CPT | Performed by: INTERNAL MEDICINE

## 2024-02-06 PROCEDURE — 83880 ASSAY OF NATRIURETIC PEPTIDE: CPT | Performed by: INTERNAL MEDICINE

## 2024-02-06 PROCEDURE — 84443 ASSAY THYROID STIM HORMONE: CPT | Performed by: INTERNAL MEDICINE

## 2024-02-06 PROCEDURE — 82043 UR ALBUMIN QUANTITATIVE: CPT | Performed by: INTERNAL MEDICINE

## 2024-02-06 PROCEDURE — 80053 COMPREHEN METABOLIC PANEL: CPT | Performed by: INTERNAL MEDICINE

## 2024-02-06 PROCEDURE — 85025 COMPLETE CBC W/AUTO DIFF WBC: CPT | Performed by: INTERNAL MEDICINE

## 2024-02-06 PROCEDURE — 80061 LIPID PANEL: CPT | Performed by: INTERNAL MEDICINE

## 2024-02-06 RX ORDER — SACUBITRIL AND VALSARTAN 24; 26 MG/1; MG/1
1 TABLET, FILM COATED ORAL 2 TIMES DAILY
Qty: 180 TABLET | Refills: 1 | Status: SHIPPED | OUTPATIENT
Start: 2024-02-06

## 2024-02-06 RX ORDER — SEMAGLUTIDE 1.34 MG/ML
0.5 INJECTION, SOLUTION SUBCUTANEOUS WEEKLY
Qty: 3 ML | Refills: 1 | Status: SHIPPED | OUTPATIENT
Start: 2024-02-06

## 2024-02-06 NOTE — PROGRESS NOTES
Chief Complaint  Controlled type 2 diabetes mellitus without complication, w (Pt didn't get the Ozempic, pharmacy never informed pt of medication. Blood Sugar has been running in 200's and couple times in the 100's. This morning )    Subjective      History of Present Illness  Giles Donovan is a 78 y.o. male who presents to [facility] with a past medical history of  Past Medical History:   Diagnosis Date    Allergic rhinitis 12/27/2014    Will try Zyrtec, he didnt want to use a nasal spray.    Arthritis     Asthma     Cataract     left eye    Cough 03/30/2016    PFT and CXR ordered.    Depression     PTSD    Diabetes     Elevated cholesterol     H/O psychiatric care 1999    PTSD    Hyperlipidemia 03/30/2016    Lipids ordered. Continue on current medication.    Hypertension     Hypothyroidism     Seasonal allergies     Shortness of breath     Sleep apnea     Stenosis of right carotid artery 02/19/2017    Will refer to vascular surgeon.    Syncope 02/19/2017    Type 2 diabetes mellitus     Upper respiratory infection 10/18/2015    Will treat cough with Hydromet, otherwise over the counter meds for treatment.     States that recently he has noticed that he is having a little more trouble with some breathing  Breathing is ok for the most part  Usually using 2 L  Has been doing furosemide BID and feels like that helps  No swelling on his legs  No other signs of fluid overload    DM II-   Sugars running around 200-300; he hasn't been taking his insulin prior to meals    [facility] care team:  [facility]  Psych at the [facility]  [facility]  [facility]  Foot doctor    States that he hasn't passed out recently    He did fall outside on the snow a few weeks ago  Has been falling quite a bit at home and when walking outside  He is interested in a motorized scooter to help him with this      Objective   Vital Signs:   Vitals:    02/06/24  "1031   BP: 100/52   Pulse: 78   Temp: 98.4 °F (36.9 °C)   TempSrc: Temporal   SpO2: 99%  Comment: oxygen 2L   Weight: 87.6 kg (193 lb 3.2 oz)   Height: 170.2 cm (67\")   PainSc: 0-No pain     Body mass index is 30.26 kg/m².    Wt Readings from Last 3 Encounters:   02/06/24 87.6 kg (193 lb 3.2 oz)   01/10/24 88.9 kg (196 lb)   12/20/23 95.6 kg (210 lb 12.8 oz)     BP Readings from Last 3 Encounters:   02/06/24 100/52   01/10/24 121/71   12/20/23 122/74       Health Maintenance   Topic Date Due    URINE MICROALBUMIN  12/05/2023    DIABETIC EYE EXAM  02/07/2024 (Originally 6/1/2023)    RSV Vaccine - Adults >60 Years or Pregnant 32-36 Weeks (1 - 1-dose 60+ series) 03/06/2024 (Originally 10/21/2006)    TDAP/TD VACCINES (1 - Tdap) 03/20/2024 (Originally 10/21/1965)    ZOSTER VACCINE (1 of 2) 12/20/2024 (Originally 10/21/1996)    ANNUAL WELLNESS VISIT  06/06/2024    HEMOGLOBIN A1C  06/20/2024    COLORECTAL CANCER SCREENING  10/07/2024    LIPID PANEL  12/20/2024    BMI FOLLOWUP  12/20/2024    HEPATITIS C SCREENING  Completed    COVID-19 Vaccine  Completed    INFLUENZA VACCINE  Completed    Pneumococcal Vaccine 65+  Completed       Physical Exam  Vitals reviewed.   Constitutional:       Appearance: Normal appearance. He is well-developed.   HENT:      Head: Normocephalic and atraumatic.      Right Ear: External ear normal.      Left Ear: External ear normal.   Eyes:      Conjunctiva/sclera: Conjunctivae normal.      Pupils: Pupils are equal, round, and reactive to light.   Cardiovascular:      Rate and Rhythm: Normal rate and regular rhythm.      Heart sounds: No murmur heard.     No friction rub. No gallop.   Pulmonary:      Effort: Pulmonary effort is normal.      Breath sounds: Normal breath sounds. No wheezing or rhonchi.   Skin:     General: Skin is warm and dry.   Neurological:      Mental Status: He is alert and oriented to person, place, and time.   Psychiatric:         Mood and Affect: Affect normal.         " Behavior: Behavior normal.         Thought Content: Thought content normal.            Result Review :  The following data was reviewed by: Morena Masters MD on 02/06/2024:      Procedures        Assessment and Plan   Diagnoses and all orders for this visit:    1. Controlled type 2 diabetes mellitus without complication, without long-term current use of insulin (Primary)  Comments:  will retry sending ozempic; warned of side effects; will also have him do a little more insulin right now  Orders:  -     MicroAlbumin, Urine, Random - Urine, Random Void  -     Comprehensive Metabolic Panel  -     CBC & Differential  -     TSH  -     Lipid Panel  -     proBNP; Future  -     Hemoglobin A1c    2. Primary hypertension  Comments:  well controlled, cont current meds  Orders:  -     MicroAlbumin, Urine, Random - Urine, Random Void  -     Comprehensive Metabolic Panel  -     CBC & Differential  -     TSH  -     Lipid Panel  -     proBNP; Future  -     Hemoglobin A1c    3. Chronic heart failure with preserved ejection fraction  Comments:  will start entresto; will stop spironolactone for now  Orders:  -     MicroAlbumin, Urine, Random - Urine, Random Void  -     Comprehensive Metabolic Panel  -     CBC & Differential  -     TSH  -     Lipid Panel  -     proBNP; Future  -     Hemoglobin A1c  -     Miscellaneous DME    4. Mixed hyperlipidemia  Comments:  will check labs  Orders:  -     MicroAlbumin, Urine, Random - Urine, Random Void  -     Comprehensive Metabolic Panel  -     CBC & Differential  -     TSH  -     Lipid Panel  -     proBNP; Future  -     Hemoglobin A1c    5. Mobility impaired  Comments:  will send for motorized wheelchair  Orders:  -     Miscellaneous DME    Other orders  -     sacubitril-valsartan (Entresto) 24-26 MG tablet; Take 1 tablet by mouth 2 (Two) Times a Day.  Dispense: 180 tablet; Refill: 1  -     Semaglutide,0.25 or 0.5MG/DOS, (Ozempic, 0.25 or 0.5 MG/DOSE,) 2 MG/1.5ML solution pen-injector; Inject  0.5 mg under the skin into the appropriate area as directed 1 (One) Time Per Week.  Dispense: 3 mL; Refill: 1         Power Wheelchair: Patient is unable to safely use a cane, walker or manual wheelchair. Due to immobility related to diagnosis, a power wheelchair is needed to assist with daily living activities inside the home. Patient has the physical ability and mental capabilities to control the power wheelchair inside the home.           FOLLOW UP  Return in about 6 weeks (around 3/19/2024).  Patient was given instructions and counseling regarding his condition or for health maintenance advice. Please see specific information pulled into the AVS if appropriate.       Morena Masters MD  02/06/24  11:34 EST    CURRENT & DISCONTINUED MEDICATIONS  Current Outpatient Medications   Medication Instructions    Alcohol Swabs (Easy Touch Alcohol Prep Medium) 70 % pads     apixaban (ELIQUIS) 5 mg, Oral, Every 12 Hours Scheduled    aspirin EC 81 mg, Oral, Daily    atorvastatin (LIPITOR) 80 mg, Oral, Daily    benzonatate (TESSALON) 100 mg, Oral, 3 Times Daily PRN    buPROPion XL (WELLBUTRIN XL) 150 mg, Oral, Daily    cetirizine (ZYRTEC ALLERGY) 10 mg, Oral, Daily    felodipine (PLENDIL) 5 MG 24 hr tablet Take 1 tablet by mouth Daily. On hold d/t BP doing    fluticasone-salmeterol (ADVAIR HFA) 230-21 MCG/ACT inhaler 2 puffs, Inhalation, 2 Times Daily    furosemide (LASIX) 40 mg, Oral, 2 Times Daily    gabapentin (NEURONTIN) 600 mg, Oral, 2 Times Daily    insulin aspart (NOVOLOG) 24-26 Units, Subcutaneous, 3 Times Daily Before Meals    Insulin Glargine (Lantus SoloStar) 100 UNIT/ML injection pen Inject 40 Units under the skin into the appropriate area as directed Every Night.    levothyroxine (SYNTHROID, LEVOTHROID) 100 mcg, Oral, Daily    metFORMIN (GLUCOPHAGE) 500 mg, Oral, 2 Times Daily With Meals    olopatadine (PATANOL) 0.1 % ophthalmic solution 1 drop, Both Eyes, Daily PRN    omeprazole (PRILOSEC) 20 mg, Oral, Daily     oxybutynin XL (DITROPAN-XL) 10 mg, Oral, Daily    Ozempic (0.25 or 0.5 MG/DOSE) 0.5 mg, Subcutaneous, Weekly    PARoxetine (PAXIL) 40 mg, Oral, Every Evening    prazosin (MINIPRESS) 1 mg, Oral, Nightly    sacubitril-valsartan (Entresto) 24-26 MG tablet 1 tablet, Oral, 2 Times Daily    traZODone (DESYREL) 100 mg, Oral, Nightly       Medications Discontinued During This Encounter   Medication Reason    furosemide (LASIX) 40 MG tablet     Semaglutide,0.25 or 0.5MG/DOS, (Ozempic, 0.25 or 0.5 MG/DOSE,) 2 MG/1.5ML solution pen-injector     spironolactone (ALDACTONE) 25 MG tablet

## 2024-02-12 ENCOUNTER — TELEPHONE (OUTPATIENT)
Dept: INTERNAL MEDICINE | Facility: CLINIC | Age: 79
End: 2024-02-12
Payer: MEDICARE

## 2024-02-12 RX ORDER — SPIRONOLACTONE 25 MG/1
25 TABLET ORAL DAILY
Qty: 30 TABLET | Refills: 0 | Status: SHIPPED | OUTPATIENT
Start: 2024-02-12 | End: 2024-02-19

## 2024-02-12 RX ORDER — FUROSEMIDE 40 MG/1
40 TABLET ORAL 2 TIMES DAILY
Qty: 60 TABLET | Refills: 1 | Status: ON HOLD | OUTPATIENT
Start: 2024-02-12

## 2024-02-13 ENCOUNTER — OFFICE VISIT (OUTPATIENT)
Dept: CARDIOLOGY | Facility: CLINIC | Age: 79
End: 2024-02-13
Payer: MEDICARE

## 2024-02-13 VITALS
SYSTOLIC BLOOD PRESSURE: 97 MMHG | WEIGHT: 193.4 LBS | HEART RATE: 85 BPM | DIASTOLIC BLOOD PRESSURE: 60 MMHG | BODY MASS INDEX: 30.35 KG/M2 | HEIGHT: 67 IN

## 2024-02-13 DIAGNOSIS — I10 PRIMARY HYPERTENSION: ICD-10-CM

## 2024-02-13 DIAGNOSIS — E78.2 MIXED HYPERLIPIDEMIA: ICD-10-CM

## 2024-02-13 DIAGNOSIS — I48.0 PAROXYSMAL ATRIAL FIBRILLATION: ICD-10-CM

## 2024-02-13 DIAGNOSIS — I50.32 CHRONIC HEART FAILURE WITH PRESERVED EJECTION FRACTION (HFPEF): Primary | ICD-10-CM

## 2024-02-13 PROCEDURE — 1159F MED LIST DOCD IN RCRD: CPT

## 2024-02-13 PROCEDURE — 1160F RVW MEDS BY RX/DR IN RCRD: CPT

## 2024-02-13 PROCEDURE — 99214 OFFICE O/P EST MOD 30 MIN: CPT

## 2024-02-13 PROCEDURE — 3078F DIAST BP <80 MM HG: CPT

## 2024-02-13 PROCEDURE — 3074F SYST BP LT 130 MM HG: CPT

## 2024-02-13 PROCEDURE — G2211 COMPLEX E/M VISIT ADD ON: HCPCS

## 2024-02-15 ENCOUNTER — TELEPHONE (OUTPATIENT)
Dept: INTERNAL MEDICINE | Facility: CLINIC | Age: 79
End: 2024-02-15
Payer: MEDICARE

## 2024-02-19 ENCOUNTER — HOSPITAL ENCOUNTER (OUTPATIENT)
Facility: HOSPITAL | Age: 79
Setting detail: OBSERVATION
Discharge: NURSING, REHAB OR LONG-TERM CARE | End: 2024-02-21
Attending: EMERGENCY MEDICINE | Admitting: INTERNAL MEDICINE
Payer: MEDICARE

## 2024-02-19 ENCOUNTER — APPOINTMENT (OUTPATIENT)
Dept: CT IMAGING | Facility: HOSPITAL | Age: 79
End: 2024-02-19
Payer: MEDICARE

## 2024-02-19 DIAGNOSIS — R26.2 DIFFICULTY WALKING: ICD-10-CM

## 2024-02-19 DIAGNOSIS — I95.1 ORTHOSTATIC HYPOTENSION: ICD-10-CM

## 2024-02-19 DIAGNOSIS — Z91.81 HISTORY OF RECENT FALL: Primary | ICD-10-CM

## 2024-02-19 DIAGNOSIS — Z79.01 CHRONIC ANTICOAGULATION: ICD-10-CM

## 2024-02-19 DIAGNOSIS — N17.9 AKI (ACUTE KIDNEY INJURY): ICD-10-CM

## 2024-02-19 DIAGNOSIS — I50.32 CHRONIC HEART FAILURE WITH PRESERVED EJECTION FRACTION: ICD-10-CM

## 2024-02-19 LAB
ACETONE BLD QL: NEGATIVE
ALBUMIN SERPL-MCNC: 4.1 G/DL (ref 3.5–5.2)
ALBUMIN/GLOB SERPL: 1.2 G/DL
ALP SERPL-CCNC: 71 U/L (ref 39–117)
ALT SERPL W P-5'-P-CCNC: 23 U/L (ref 1–41)
ANION GAP SERPL CALCULATED.3IONS-SCNC: 11.2 MMOL/L (ref 5–15)
ANION GAP SERPL CALCULATED.3IONS-SCNC: 12.4 MMOL/L (ref 5–15)
AST SERPL-CCNC: 19 U/L (ref 1–40)
BASOPHILS # BLD AUTO: 0.03 10*3/MM3 (ref 0–0.2)
BASOPHILS # BLD AUTO: 0.04 10*3/MM3 (ref 0–0.2)
BASOPHILS NFR BLD AUTO: 0.4 % (ref 0–1.5)
BASOPHILS NFR BLD AUTO: 0.6 % (ref 0–1.5)
BILIRUB SERPL-MCNC: 0.2 MG/DL (ref 0–1.2)
BILIRUB UR QL STRIP: NEGATIVE
BUN SERPL-MCNC: 31 MG/DL (ref 8–23)
BUN SERPL-MCNC: 36 MG/DL (ref 8–23)
BUN/CREAT SERPL: 20.1 (ref 7–25)
BUN/CREAT SERPL: 22 (ref 7–25)
CALCIUM SPEC-SCNC: 9.6 MG/DL (ref 8.6–10.5)
CALCIUM SPEC-SCNC: 9.8 MG/DL (ref 8.6–10.5)
CHLORIDE SERPL-SCNC: 100 MMOL/L (ref 98–107)
CHLORIDE SERPL-SCNC: 94 MMOL/L (ref 98–107)
CLARITY UR: CLEAR
CO2 SERPL-SCNC: 26.8 MMOL/L (ref 22–29)
CO2 SERPL-SCNC: 27.6 MMOL/L (ref 22–29)
COLOR UR: YELLOW
CREAT SERPL-MCNC: 1.41 MG/DL (ref 0.76–1.27)
CREAT SERPL-MCNC: 1.79 MG/DL (ref 0.76–1.27)
CREAT UR-MCNC: 57.7 MG/DL
DEPRECATED RDW RBC AUTO: 42.8 FL (ref 37–54)
DEPRECATED RDW RBC AUTO: 43 FL (ref 37–54)
EGFRCR SERPLBLD CKD-EPI 2021: 38.5 ML/MIN/1.73
EGFRCR SERPLBLD CKD-EPI 2021: 51.3 ML/MIN/1.73
EOSINOPHIL # BLD AUTO: 0.12 10*3/MM3 (ref 0–0.4)
EOSINOPHIL # BLD AUTO: 0.13 10*3/MM3 (ref 0–0.4)
EOSINOPHIL NFR BLD AUTO: 1.6 % (ref 0.3–6.2)
EOSINOPHIL NFR BLD AUTO: 1.8 % (ref 0.3–6.2)
ERYTHROCYTE [DISTWIDTH] IN BLOOD BY AUTOMATED COUNT: 13.1 % (ref 12.3–15.4)
ERYTHROCYTE [DISTWIDTH] IN BLOOD BY AUTOMATED COUNT: 13.2 % (ref 12.3–15.4)
GEN 5 2HR TROPONIN T REFLEX: 29 NG/L
GLOBULIN UR ELPH-MCNC: 3.3 GM/DL
GLUCOSE BLDC GLUCOMTR-MCNC: 197 MG/DL (ref 70–99)
GLUCOSE BLDC GLUCOMTR-MCNC: 266 MG/DL (ref 70–99)
GLUCOSE BLDC GLUCOMTR-MCNC: 385 MG/DL (ref 70–99)
GLUCOSE BLDC GLUCOMTR-MCNC: 97 MG/DL (ref 70–99)
GLUCOSE SERPL-MCNC: 130 MG/DL (ref 65–99)
GLUCOSE SERPL-MCNC: 322 MG/DL (ref 65–99)
GLUCOSE UR STRIP-MCNC: ABNORMAL MG/DL
HCT VFR BLD AUTO: 35.1 % (ref 37.5–51)
HCT VFR BLD AUTO: 35.9 % (ref 37.5–51)
HGB BLD-MCNC: 11.4 G/DL (ref 13–17.7)
HGB BLD-MCNC: 11.9 G/DL (ref 13–17.7)
HGB UR QL STRIP.AUTO: NEGATIVE
IMM GRANULOCYTES # BLD AUTO: 0.01 10*3/MM3 (ref 0–0.05)
IMM GRANULOCYTES # BLD AUTO: 0.02 10*3/MM3 (ref 0–0.05)
IMM GRANULOCYTES NFR BLD AUTO: 0.1 % (ref 0–0.5)
IMM GRANULOCYTES NFR BLD AUTO: 0.3 % (ref 0–0.5)
KETONES UR QL STRIP: NEGATIVE
LEUKOCYTE ESTERASE UR QL STRIP.AUTO: NEGATIVE
LYMPHOCYTES # BLD AUTO: 2.03 10*3/MM3 (ref 0.7–3.1)
LYMPHOCYTES # BLD AUTO: 2.15 10*3/MM3 (ref 0.7–3.1)
LYMPHOCYTES NFR BLD AUTO: 28.6 % (ref 19.6–45.3)
LYMPHOCYTES NFR BLD AUTO: 28.6 % (ref 19.6–45.3)
MAGNESIUM SERPL-MCNC: 1.8 MG/DL (ref 1.6–2.4)
MCH RBC QN AUTO: 29.5 PG (ref 26.6–33)
MCH RBC QN AUTO: 30.1 PG (ref 26.6–33)
MCHC RBC AUTO-ENTMCNC: 32.5 G/DL (ref 31.5–35.7)
MCHC RBC AUTO-ENTMCNC: 33.1 G/DL (ref 31.5–35.7)
MCV RBC AUTO: 90.7 FL (ref 79–97)
MCV RBC AUTO: 90.9 FL (ref 79–97)
MONOCYTES # BLD AUTO: 0.79 10*3/MM3 (ref 0.1–0.9)
MONOCYTES # BLD AUTO: 0.82 10*3/MM3 (ref 0.1–0.9)
MONOCYTES NFR BLD AUTO: 10.5 % (ref 5–12)
MONOCYTES NFR BLD AUTO: 11.5 % (ref 5–12)
NEUTROPHILS NFR BLD AUTO: 4.07 10*3/MM3 (ref 1.7–7)
NEUTROPHILS NFR BLD AUTO: 4.4 10*3/MM3 (ref 1.7–7)
NEUTROPHILS NFR BLD AUTO: 57.4 % (ref 42.7–76)
NEUTROPHILS NFR BLD AUTO: 58.6 % (ref 42.7–76)
NITRITE UR QL STRIP: NEGATIVE
NRBC BLD AUTO-RTO: 0 /100 WBC (ref 0–0.2)
NRBC BLD AUTO-RTO: 0 /100 WBC (ref 0–0.2)
PH BLDV: 7.35 PH UNITS (ref 7.31–7.41)
PH UR STRIP.AUTO: 6 [PH] (ref 5–8)
PLATELET # BLD AUTO: 171 10*3/MM3 (ref 140–450)
PLATELET # BLD AUTO: 178 10*3/MM3 (ref 140–450)
PMV BLD AUTO: 10.2 FL (ref 6–12)
PMV BLD AUTO: 9.8 FL (ref 6–12)
POTASSIUM SERPL-SCNC: 3.9 MMOL/L (ref 3.5–5.2)
POTASSIUM SERPL-SCNC: 3.9 MMOL/L (ref 3.5–5.2)
PROT SERPL-MCNC: 7.4 G/DL (ref 6–8.5)
PROT UR QL STRIP: NEGATIVE
QT INTERVAL: 428 MS
QTC INTERVAL: 496 MS
RBC # BLD AUTO: 3.87 10*6/MM3 (ref 4.14–5.8)
RBC # BLD AUTO: 3.95 10*6/MM3 (ref 4.14–5.8)
SODIUM SERPL-SCNC: 134 MMOL/L (ref 136–145)
SODIUM SERPL-SCNC: 138 MMOL/L (ref 136–145)
SODIUM UR-SCNC: 51 MMOL/L
SP GR UR STRIP: 1.01 (ref 1–1.03)
TROPONIN T DELTA: -6 NG/L
TROPONIN T SERPL HS-MCNC: 35 NG/L
UROBILINOGEN UR QL STRIP: ABNORMAL
WBC NRBC COR # BLD AUTO: 7.1 10*3/MM3 (ref 3.4–10.8)
WBC NRBC COR # BLD AUTO: 7.51 10*3/MM3 (ref 3.4–10.8)

## 2024-02-19 PROCEDURE — 82948 REAGENT STRIP/BLOOD GLUCOSE: CPT

## 2024-02-19 PROCEDURE — G0378 HOSPITAL OBSERVATION PER HR: HCPCS

## 2024-02-19 PROCEDURE — 83735 ASSAY OF MAGNESIUM: CPT | Performed by: EMERGENCY MEDICINE

## 2024-02-19 PROCEDURE — 85025 COMPLETE CBC W/AUTO DIFF WBC: CPT | Performed by: INTERNAL MEDICINE

## 2024-02-19 PROCEDURE — 36415 COLL VENOUS BLD VENIPUNCTURE: CPT

## 2024-02-19 PROCEDURE — 93005 ELECTROCARDIOGRAM TRACING: CPT | Performed by: EMERGENCY MEDICINE

## 2024-02-19 PROCEDURE — 81003 URINALYSIS AUTO W/O SCOPE: CPT

## 2024-02-19 PROCEDURE — 96372 THER/PROPH/DIAG INJ SC/IM: CPT

## 2024-02-19 PROCEDURE — 93010 ELECTROCARDIOGRAM REPORT: CPT | Performed by: SPECIALIST

## 2024-02-19 PROCEDURE — 25810000003 SODIUM CHLORIDE 0.9 % SOLUTION: Performed by: EMERGENCY MEDICINE

## 2024-02-19 PROCEDURE — 82009 KETONE BODYS QUAL: CPT | Performed by: EMERGENCY MEDICINE

## 2024-02-19 PROCEDURE — 25810000003 SODIUM CHLORIDE 0.9 % SOLUTION

## 2024-02-19 PROCEDURE — 63710000001 INSULIN DETEMIR PER 5 UNITS: Performed by: INTERNAL MEDICINE

## 2024-02-19 PROCEDURE — 99222 1ST HOSP IP/OBS MODERATE 55: CPT | Performed by: INTERNAL MEDICINE

## 2024-02-19 PROCEDURE — 82948 REAGENT STRIP/BLOOD GLUCOSE: CPT | Performed by: INTERNAL MEDICINE

## 2024-02-19 PROCEDURE — 82570 ASSAY OF URINE CREATININE: CPT | Performed by: INTERNAL MEDICINE

## 2024-02-19 PROCEDURE — 80053 COMPREHEN METABOLIC PANEL: CPT

## 2024-02-19 PROCEDURE — 99285 EMERGENCY DEPT VISIT HI MDM: CPT

## 2024-02-19 PROCEDURE — 85025 COMPLETE CBC W/AUTO DIFF WBC: CPT

## 2024-02-19 PROCEDURE — 82800 BLOOD PH: CPT | Performed by: EMERGENCY MEDICINE

## 2024-02-19 PROCEDURE — 63710000001 INSULIN LISPRO (HUMAN) PER 5 UNITS: Performed by: INTERNAL MEDICINE

## 2024-02-19 PROCEDURE — 84300 ASSAY OF URINE SODIUM: CPT | Performed by: INTERNAL MEDICINE

## 2024-02-19 PROCEDURE — 63710000001 INSULIN REGULAR HUMAN PER 5 UNITS

## 2024-02-19 PROCEDURE — 70450 CT HEAD/BRAIN W/O DYE: CPT

## 2024-02-19 PROCEDURE — 84484 ASSAY OF TROPONIN QUANT: CPT | Performed by: EMERGENCY MEDICINE

## 2024-02-19 RX ORDER — BUPROPION HYDROCHLORIDE 150 MG/1
150 TABLET ORAL DAILY
Status: DISCONTINUED | OUTPATIENT
Start: 2024-02-19 | End: 2024-02-21 | Stop reason: HOSPADM

## 2024-02-19 RX ORDER — BISACODYL 10 MG
10 SUPPOSITORY, RECTAL RECTAL DAILY PRN
Status: DISCONTINUED | OUTPATIENT
Start: 2024-02-19 | End: 2024-02-21 | Stop reason: HOSPADM

## 2024-02-19 RX ORDER — NICOTINE POLACRILEX 4 MG
15 LOZENGE BUCCAL
Status: DISCONTINUED | OUTPATIENT
Start: 2024-02-19 | End: 2024-02-21 | Stop reason: HOSPADM

## 2024-02-19 RX ORDER — INSULIN LISPRO 100 [IU]/ML
2-9 INJECTION, SOLUTION INTRAVENOUS; SUBCUTANEOUS
Status: DISCONTINUED | OUTPATIENT
Start: 2024-02-19 | End: 2024-02-20

## 2024-02-19 RX ORDER — BUPROPION HYDROCHLORIDE 75 MG/1
150 TABLET ORAL DAILY
COMMUNITY
Start: 2024-02-13

## 2024-02-19 RX ORDER — PRAZOSIN HYDROCHLORIDE 1 MG/1
1 CAPSULE ORAL NIGHTLY
Status: DISCONTINUED | OUTPATIENT
Start: 2024-02-19 | End: 2024-02-21 | Stop reason: HOSPADM

## 2024-02-19 RX ORDER — SODIUM CHLORIDE 9 MG/ML
40 INJECTION, SOLUTION INTRAVENOUS AS NEEDED
Status: DISCONTINUED | OUTPATIENT
Start: 2024-02-19 | End: 2024-02-21 | Stop reason: HOSPADM

## 2024-02-19 RX ORDER — AMOXICILLIN 250 MG
2 CAPSULE ORAL 2 TIMES DAILY PRN
Status: DISCONTINUED | OUTPATIENT
Start: 2024-02-19 | End: 2024-02-21 | Stop reason: HOSPADM

## 2024-02-19 RX ORDER — ASPIRIN 81 MG/1
81 TABLET ORAL DAILY
Status: DISCONTINUED | OUTPATIENT
Start: 2024-02-19 | End: 2024-02-21 | Stop reason: HOSPADM

## 2024-02-19 RX ORDER — POLYETHYLENE GLYCOL 3350 17 G/17G
17 POWDER, FOR SOLUTION ORAL DAILY PRN
Status: DISCONTINUED | OUTPATIENT
Start: 2024-02-19 | End: 2024-02-21 | Stop reason: HOSPADM

## 2024-02-19 RX ORDER — IBUPROFEN 600 MG/1
1 TABLET ORAL
Status: DISCONTINUED | OUTPATIENT
Start: 2024-02-19 | End: 2024-02-21 | Stop reason: HOSPADM

## 2024-02-19 RX ORDER — DEXTROSE MONOHYDRATE 25 G/50ML
25 INJECTION, SOLUTION INTRAVENOUS
Status: DISCONTINUED | OUTPATIENT
Start: 2024-02-19 | End: 2024-02-21 | Stop reason: HOSPADM

## 2024-02-19 RX ORDER — LEVOTHYROXINE SODIUM 0.1 MG/1
100 TABLET ORAL
Status: DISCONTINUED | OUTPATIENT
Start: 2024-02-19 | End: 2024-02-21 | Stop reason: HOSPADM

## 2024-02-19 RX ORDER — GABAPENTIN 300 MG/1
300 CAPSULE ORAL DAILY
Status: DISCONTINUED | OUTPATIENT
Start: 2024-02-19 | End: 2024-02-21 | Stop reason: HOSPADM

## 2024-02-19 RX ORDER — OXYBUTYNIN CHLORIDE 5 MG/1
10 TABLET, EXTENDED RELEASE ORAL DAILY
Status: DISCONTINUED | OUTPATIENT
Start: 2024-02-19 | End: 2024-02-21

## 2024-02-19 RX ORDER — ATORVASTATIN CALCIUM 40 MG/1
80 TABLET, FILM COATED ORAL DAILY
Status: DISCONTINUED | OUTPATIENT
Start: 2024-02-19 | End: 2024-02-21

## 2024-02-19 RX ORDER — SODIUM CHLORIDE 0.9 % (FLUSH) 0.9 %
10 SYRINGE (ML) INJECTION AS NEEDED
Status: DISCONTINUED | OUTPATIENT
Start: 2024-02-19 | End: 2024-02-21 | Stop reason: HOSPADM

## 2024-02-19 RX ORDER — BISACODYL 5 MG/1
5 TABLET, DELAYED RELEASE ORAL DAILY PRN
Status: DISCONTINUED | OUTPATIENT
Start: 2024-02-19 | End: 2024-02-21 | Stop reason: HOSPADM

## 2024-02-19 RX ORDER — SODIUM CHLORIDE 0.9 % (FLUSH) 0.9 %
10 SYRINGE (ML) INJECTION EVERY 12 HOURS SCHEDULED
Status: DISCONTINUED | OUTPATIENT
Start: 2024-02-19 | End: 2024-02-21 | Stop reason: HOSPADM

## 2024-02-19 RX ADMIN — GABAPENTIN 300 MG: 300 CAPSULE ORAL at 11:18

## 2024-02-19 RX ADMIN — SODIUM CHLORIDE 500 ML: 9 INJECTION, SOLUTION INTRAVENOUS at 07:53

## 2024-02-19 RX ADMIN — INSULIN LISPRO 6 UNITS: 100 INJECTION, SOLUTION INTRAVENOUS; SUBCUTANEOUS at 20:12

## 2024-02-19 RX ADMIN — APIXABAN 5 MG: 5 TABLET, FILM COATED ORAL at 11:18

## 2024-02-19 RX ADMIN — ATORVASTATIN CALCIUM 80 MG: 40 TABLET, FILM COATED ORAL at 11:18

## 2024-02-19 RX ADMIN — APIXABAN 5 MG: 5 TABLET, FILM COATED ORAL at 20:13

## 2024-02-19 RX ADMIN — ASPIRIN 81 MG: 81 TABLET, COATED ORAL at 11:17

## 2024-02-19 RX ADMIN — INSULIN LISPRO 2 UNITS: 100 INJECTION, SOLUTION INTRAVENOUS; SUBCUTANEOUS at 17:04

## 2024-02-19 RX ADMIN — INSULIN HUMAN 9 UNITS: 100 INJECTION, SOLUTION PARENTERAL at 06:21

## 2024-02-19 RX ADMIN — Medication 10 ML: at 20:13

## 2024-02-19 RX ADMIN — LEVOTHYROXINE SODIUM 100 MCG: 0.1 TABLET ORAL at 11:18

## 2024-02-19 RX ADMIN — SODIUM CHLORIDE 500 ML: 9 INJECTION, SOLUTION INTRAVENOUS at 06:53

## 2024-02-19 RX ADMIN — BUPROPION HYDROCHLORIDE 150 MG: 150 TABLET, EXTENDED RELEASE ORAL at 11:18

## 2024-02-19 RX ADMIN — SODIUM CHLORIDE 500 ML: 9 INJECTION, SOLUTION INTRAVENOUS at 05:52

## 2024-02-19 RX ADMIN — Medication 10 ML: at 11:25

## 2024-02-19 RX ADMIN — INSULIN DETEMIR 30 UNITS: 100 INJECTION, SOLUTION SUBCUTANEOUS at 11:17

## 2024-02-19 RX ADMIN — OXYBUTYNIN CHLORIDE 10 MG: 5 TABLET, EXTENDED RELEASE ORAL at 11:17

## 2024-02-19 RX ADMIN — PRAZOSIN HYDROCHLORIDE 1 MG: 1 CAPSULE ORAL at 20:13

## 2024-02-19 NOTE — DISCHARGE INSTRUCTIONS
Your CT imaging is negative for any acute head bleed. Your lab work does show that you have an Acute Kidney Injury. Your blood pressure is a little bit low as well. Hold off on your Entresto. Monitor your blood pressure twice a day until you see your primary care provider.    Follow up with your primary care provider in 3-5 days. Your PCP will need to check your kidney functions then.    Return to the Emergency Department if you develop any uncontrollable fever, intractable pain, nausea, vomiting.

## 2024-02-19 NOTE — ED PROVIDER NOTES
Time: 6:27 AM EST  Date of encounter:  2/19/2024  Independent Historian/Clinical History and Information was obtained by:   Patient    History is limited by: N/A    Chief Complaint: Fall      History of Present Illness:  Patient is a 78 y.o. year old male who presents to the emergency department for evaluation of fall.  Patient states that he was going to the garage when he had a misstep causing him to fall.  Denies hitting his head or losing consciousness.  Denies any dizziness prior to falling.  Denies any syncope and vomiting.  Patient does take Eliquis.  Denies any fever, chills.      *Per wife, patient's sugar was high and his blood pressure was low.      Patient does take Lasix, Entresto, and ozempic.    HPI    Patient Care Team  Primary Care Provider: Morena Masters MD    Past Medical History:     Allergies   Allergen Reactions    Latex Rash and Anaphylaxis    Latex, Natural Rubber Itching     Past Medical History:   Diagnosis Date    Allergic rhinitis 12/27/2014    Will try Zyrtec, he didnt want to use a nasal spray.    Arthritis     Asthma     Cataract     left eye    Cough 03/30/2016    PFT and CXR ordered.    Depression     PTSD    Diabetes     Elevated cholesterol     H/O psychiatric care 1999    PTSD    Hyperlipidemia 03/30/2016    Lipids ordered. Continue on current medication.    Hypertension     Hypothyroidism     Seasonal allergies     Shortness of breath     Sleep apnea     Stenosis of right carotid artery 02/19/2017    Will refer to vascular surgeon.    Syncope 02/19/2017    Type 2 diabetes mellitus     Upper respiratory infection 10/18/2015    Will treat cough with Hydromet, otherwise over the counter meds for treatment.     Past Surgical History:   Procedure Laterality Date    CATARACT EXTRACTION Right     x2    COLONOSCOPY      JOINT REPLACEMENT      REPLACEMENT TOTAL HIP ONCOLOGIC Right     RETINAL DETACHMENT REPAIR      TOE AMPUTATION Left 11/2017    Second phalaeng.    TOE OSTEOPHYTE  REMOVAL       Family History   Problem Relation Age of Onset    Heart disease Mother     Lupus Mother     Arthritis Mother     Kidney disease Sister        Home Medications:  Prior to Admission medications    Medication Sig Start Date End Date Taking? Authorizing Provider   Alcohol Swabs (Easy Touch Alcohol Prep Medium) 70 % pads  9/26/23   Kierra Pitts MD   apixaban (Eliquis) 5 MG tablet tablet Take 1 tablet by mouth Every 12 (Twelve) Hours.    Kierra Pitts MD   aspirin EC 81 MG EC tablet Take 1 tablet by mouth Daily. 7/23/21   Kierra Pitts MD   atorvastatin (LIPITOR) 80 MG tablet Take 1 tablet by mouth Daily. 8/9/22   Cathy Ochoa MD   benzonatate (TESSALON) 100 MG capsule Take 1 capsule by mouth 3 (Three) Times a Day As Needed for Cough.    Kierra Pitts MD   buPROPion XL (Wellbutrin XL) 150 MG 24 hr tablet Take 1 tablet by mouth Daily. 12/20/23   Morena Masters MD   cetirizine (ZyrTEC Allergy) 10 MG tablet Take 1 tablet by mouth Daily. 8/9/22   Cathy Ochoa MD   fluticasone-salmeterol (ADVAIR HFA) 230-21 MCG/ACT inhaler Inhale 2 puffs 2 (Two) Times a Day.    Kierra Pitts MD   furosemide (LASIX) 40 MG tablet TAKE 1 TABLET BY MOUTH TWICE DAILY 2/12/24   Herb Ochoa,    gabapentin (NEURONTIN) 600 MG tablet Take 1 tablet by mouth 2 (Two) Times a Day. 3/31/21   Kierra Pitts MD   insulin aspart (novoLOG) 100 UNIT/ML injection Inject 24-26 Units under the skin into the appropriate area as directed 3 (Three) Times a Day Before Meals.    Kierra Pitts MD   Insulin Glargine (Lantus SoloStar) 100 UNIT/ML injection pen Inject 40 Units under the skin into the appropriate area as directed Every Night.    Kierra Pitts MD   levothyroxine (SYNTHROID, LEVOTHROID) 100 MCG tablet Take 1 tablet by mouth Daily.    Kierra Pitts MD   metFORMIN (GLUCOPHAGE) 500 MG tablet Take 1 tablet by mouth 2 (Two) Times a Day With Meals.     Kierra Pitts MD   olopatadine (PATANOL) 0.1 % ophthalmic solution Administer 1 drop to both eyes Daily As Needed for Allergies.    Kierra Pitts MD   omeprazole (priLOSEC) 20 MG capsule Take 1 capsule by mouth Daily. 10/9/23   Issac Ortiz MD   oxybutynin XL (DITROPAN-XL) 10 MG 24 hr tablet Take 1 tablet by mouth Daily. 6/6/23   Kati Warren PA-C   PARoxetine (PAXIL) 40 MG tablet Take 1 tablet by mouth Every Evening. 4/5/21   Kierra Pitts MD   prazosin (MINIPRESS) 1 MG capsule Take 1 capsule by mouth Every Night. 11/3/23   Kierra Pitts MD   sacubitril-valsartan (Entresto) 24-26 MG tablet Take 1 tablet by mouth 2 (Two) Times a Day. 2/6/24   Morena Masters MD   Semaglutide,0.25 or 0.5MG/DOS, (Ozempic, 0.25 or 0.5 MG/DOSE,) 2 MG/1.5ML solution pen-injector Inject 0.5 mg under the skin into the appropriate area as directed 1 (One) Time Per Week. 2/6/24   Morena Masters MD   traZODone (DESYREL) 100 MG tablet Take 1 tablet by mouth Every Night. 4/22/22   Kierra Pitts MD   felodipine (PLENDIL) 5 MG 24 hr tablet Take 1 tablet by mouth Daily. On hold d/t BP doing  2/19/24  Kierra Pitts MD   spironolactone (ALDACTONE) 25 MG tablet TAKE 1 TABLET BY MOUTH DAILY 2/12/24 2/19/24  Herb Ochoa DO        Social History:   Social History     Tobacco Use    Smoking status: Never     Passive exposure: Never    Smokeless tobacco: Never   Vaping Use    Vaping Use: Never used   Substance Use Topics    Alcohol use: Yes     Alcohol/week: 5.0 standard drinks of alcohol     Types: 5 Shots of liquor per week     Comment: 2TALL BEERS/DAY    Drug use: Never         Review of Systems:  Review of Systems   Constitutional:  Negative for chills and fever.   HENT:  Negative for congestion and ear pain.    Eyes:  Negative for pain.   Respiratory:  Negative for cough and shortness of breath.    Cardiovascular:  Negative for chest pain.   Gastrointestinal:  Negative for  "abdominal pain, diarrhea, nausea and vomiting.   Genitourinary:  Negative for dysuria and hematuria.   Musculoskeletal:  Negative for myalgias.   Skin:  Negative for rash.   Neurological:  Negative for dizziness and headaches.        Physical Exam:  /71 (BP Location: Right arm, Patient Position: Lying)   Pulse 80   Temp 98 °F (36.7 °C) (Oral)   Resp 16   Ht 170.2 cm (67\")   Wt 91 kg (200 lb 9.9 oz)   SpO2 100%   BMI 31.42 kg/m²     Physical Exam  Vitals and nursing note reviewed.   Constitutional:       Appearance: Normal appearance. He is normal weight.   HENT:      Head: Normocephalic and atraumatic.      Nose: Nose normal.   Eyes:      Conjunctiva/sclera: Conjunctivae normal.      Pupils: Pupils are equal, round, and reactive to light.   Cardiovascular:      Rate and Rhythm: Normal rate and regular rhythm.      Pulses: Normal pulses.      Heart sounds: Normal heart sounds.   Pulmonary:      Effort: Pulmonary effort is normal.      Breath sounds: Normal breath sounds.   Abdominal:      General: Abdomen is flat. Bowel sounds are normal.      Palpations: Abdomen is soft.   Musculoskeletal:         General: Normal range of motion.      Cervical back: Normal range of motion and neck supple.   Skin:     General: Skin is warm and dry.   Neurological:      General: No focal deficit present.      Mental Status: He is alert and oriented to person, place, and time.   Psychiatric:         Mood and Affect: Mood normal.         Behavior: Behavior normal.              Procedures:  Procedures      Medical Decision Making:      Comorbidities that affect care:    DM, HTN, HLD, URI    External Notes reviewed:    None      The following orders were placed and all results were independently analyzed by me:  Orders Placed This Encounter   Procedures    CT Head Without Contrast    Comprehensive Metabolic Panel    Urinalysis With Culture If Indicated - Urine, Clean Catch    CBC Auto Differential    Acetone    pH, Venous    " Magnesium    High Sensitivity Troponin T    High Sensitivity Troponin T 2Hr    Continuous Pulse Oximetry    Orthostatic Blood Pressure    Hospitalist (on-call MD unless specified)    POC Glucose Once    ECG 12 Lead Syncope    CBC & Differential       Medications Given in the Emergency Department:  Medications   sodium chloride 0.9 % bolus 500 mL (has no administration in time range)   sodium chloride 0.9 % bolus 500 mL (0 mL Intravenous Stopped 2/19/24 0637)   insulin regular (humuLIN R,novoLIN R) injection 9 Units (9 Units Subcutaneous Given 2/19/24 0621)   sodium chloride 0.9 % bolus 500 mL (0 mL Intravenous Stopped 2/19/24 0723)        ED Course:         Labs:    Lab Results (last 24 hours)       Procedure Component Value Units Date/Time    POC Glucose Once [[id number]]  (Abnormal) Collected: 02/19/24 0351    Specimen: Blood Updated: 02/19/24 0352     Glucose 385 mg/dL      Comment: Serial Number: [id number]:  [id number]       CBC & Differential [[id number]]  (Abnormal) Collected: 02/19/24 0552    Specimen: Blood Updated: 02/19/24 0558    Narrative:      The following orders were created for panel order CBC & Differential.  Procedure                               Abnormality         Status                     ---------                               -----------         ------                     CBC Auto Differential[[id number]]        Abnormal            Final result                 Please view results for these tests on the individual orders.    Comprehensive Metabolic Panel [[id number]]  (Abnormal) Collected: 02/19/24 0552    Specimen: Blood Updated: 02/19/24 0619     Glucose 322 mg/dL      BUN 36 mg/dL      Creatinine 1.79 mg/dL      Sodium 134 mmol/L      Potassium 3.9 mmol/L      Chloride 94 mmol/L      CO2 27.6 mmol/L      Calcium 9.8 mg/dL      Total Protein 7.4 g/dL      Albumin 4.1 g/dL      ALT (SGPT) 23 U/L      AST (SGOT) 19 U/L      Alkaline Phosphatase 71 U/L      Total Bilirubin 0.2 mg/dL       Globulin 3.3 gm/dL      A/G Ratio 1.2 g/dL      BUN/Creatinine Ratio 20.1     Anion Gap 12.4 mmol/L      eGFR 38.5 mL/min/1.73     Narrative:      GFR Normal >60  Chronic Kidney Disease <60  Kidney Failure <15    The GFR formula is only valid for adults with stable renal function between ages 18 and 70.    CBC Auto Differential [[id number]]  (Abnormal) Collected: 02/19/24 0552    Specimen: Blood Updated: 02/19/24 0558     WBC 7.51 10*3/mm3      RBC 3.87 10*6/mm3      Hemoglobin 11.4 g/dL      Hematocrit 35.1 %      MCV 90.7 fL      MCH 29.5 pg      MCHC 32.5 g/dL      RDW 13.1 %      RDW-SD 42.8 fl      MPV 9.8 fL      Platelets 178 10*3/mm3      Neutrophil % 58.6 %      Lymphocyte % 28.6 %      Monocyte % 10.5 %      Eosinophil % 1.6 %      Basophil % 0.4 %      Immature Grans % 0.3 %      Neutrophils, Absolute 4.40 10*3/mm3      Lymphocytes, Absolute 2.15 10*3/mm3      Monocytes, Absolute 0.79 10*3/mm3      Eosinophils, Absolute 0.12 10*3/mm3      Basophils, Absolute 0.03 10*3/mm3      Immature Grans, Absolute 0.02 10*3/mm3      nRBC 0.0 /100 WBC     Acetone [[id number]]  (Normal) Collected: 02/19/24 0552    Specimen: Blood Updated: 02/19/24 0602     Acetone Negative    pH, Venous [[id number]]  (Normal) Collected: 02/19/24 0552    Specimen: Blood Updated: 02/19/24 0558     pH, Venous 7.347 pH Units     Magnesium [[id number]]  (Normal) Collected: 02/19/24 0552    Specimen: Blood Updated: 02/19/24 0619     Magnesium 1.8 mg/dL     High Sensitivity Troponin T [[id number]]  (Abnormal) Collected: 02/19/24 0552    Specimen: Blood Updated: 02/19/24 0619     HS Troponin T 35 ng/L     Narrative:      High Sensitive Troponin T Reference Range:  <14.0 ng/L- Negative Female for AMI  <22.0 ng/L- Negative Male for AMI  >=14 - Abnormal Female indicating possible myocardial injury.  >=22 - Abnormal Male indicating possible myocardial injury.   Clinicians would have to utilize clinical acumen, EKG, Troponin, and serial changes to  determine if it is an Acute Myocardial Infarction or myocardial injury due to an underlying chronic condition.         Urinalysis With Culture If Indicated - Urine, Clean Catch [[id number]]  (Abnormal) Collected: 02/19/24 0620    Specimen: Urine, Clean Catch Updated: 02/19/24 0633     Color, UA Yellow     Appearance, UA Clear     pH, UA 6.0     Specific Gravity, UA 1.014     Glucose, UA >=1000 mg/dL (3+)     Ketones, UA Negative     Bilirubin, UA Negative     Blood, UA Negative     Protein, UA Negative     Leuk Esterase, UA Negative     Nitrite, UA Negative     Urobilinogen, UA 0.2 E.U./dL    Narrative:      In absence of clinical symptoms, the presence of pyuria, bacteria, and/or nitrites on the urinalysis result does not correlate with infection.  Urine microscopic not indicated.             Imaging:    CT Head Without Contrast    Result Date: 2/19/2024  PROCEDURE: CT HEAD WO CONTRAST  COMPARISON:  [facility], CT, CT HEAD WO CONTRAST, 7/30/2023, 4:38. INDICATIONS: fall  PROTOCOL:   Standard imaging protocol performed    RADIATION:   DLP: 1018.2 mGy*cm   MA and/or KV was adjusted to minimize radiation dose.     TECHNIQUE: After obtaining the patient's consent, CT images were obtained without non-ionic intravenous contrast material.  FINDINGS:  Generalized atrophy is noted with chronic small-vessel ischemic disease in the white matter.  Ventricular size is within normal limits.  Atherosclerotic calcifications are noted in the carotid siphons and vertebral arteries.  No acute infarct or hemorrhage is seen.  There are no masses.  There is no skull fracture.  Empty sella is present.       Chronic senescent changes.  No acute findings.     CAMRYN ESPINOZA MD       Electronically Signed and Approved By: CAMRYN ESPINOZA MD on 2/19/2024 at 5:42                Differential Diagnosis and Discussion:    Trauma:  Differential diagnosis considered but not limited to were subarachnoid hemorrhage, intracranial  bleeding, pneumothorax, cardiac contusion, lung contusion, intra-abdominal bleeding, and compartment syndrome of any extremity or other significant traumatic pathology    All labs were reviewed and interpreted by me.  EKG was interpreted by supervising attending.  CT scan radiology impression was interpreted by me.        MDM     Amount and/or Complexity of Data Reviewed  Clinical lab tests: reviewed  Tests in the radiology section of CPT®: reviewed  Tests in the medicine section of CPT®: reviewed  Decide to obtain previous medical records or to obtain history from someone other than the patient: yes    Risk of Complications, Morbidity, and/or Mortality  Presenting problems: moderate  Diagnostic procedures: moderate  Management options: low    Patient Progress  Patient progress: stable          Patient presents after a recent fall.  States that he had a misstep going to the garage.  Denies any head trauma, loss of consciousness, dizziness, syncope.  Wife states that when she checked his blood pressure, it was 60/50.  His sugar was also in the 600s.  On physical exam, patient states that he is not having any significant pain anywhere.  He does not have any weakness on bilateral upper or lower extremities.     The patient´s CBC that was reviewed and interpreted by me shows no abnormalities of critical concern. Of note, there is no anemia requiring a blood transfusion and the platelet count is acceptable.  CMP does show an elevated glucose of 322, BUN of 36.  Patient also has an JORGE with a creatinine of 1.79.  Creatinine 13 days ago was 1.15.    UA is negative for any infection.  Acetone negative.  pH venous is 7.347.    EKG shows sinus rhythm with right bundle branch block and LAFB.  Heart rate 81.  FL interval 170.  QT interval of 428.    Started the patient on fluids, insulin.    CT head shows chronic senescent changes.  No acute findings.        After a liter of IV fluids we assessed patient and did orthostatic  vitals and he was significantly orthostatic with blood pressure of 70s over 50s upon standing.    In the setting of orthostatic hypotension and JORGE and him being anticoagulated on Eliquis and now fall risk I think he may need to come into the hospital for further rehydration and management.            Patient Care Considerations:    SEPSIS was considered but is NOT present in the emergency department as SIRS criteria is not present.      Consultants/Shared Management Plan:    This plan of admission to the hospital was discussed with the admitting hospitalist on-call.      Social Determinants of Health:    Patient has presented with family members who are responsible, reliable and will ensure follow up care.      Disposition and Care Coordination:    Admit:   Through independent evaluation of the patient's history, physical, and imperical data, the patient meets criteria for inpatient admission to the hospital.    I have explained the patient´s condition, diagnoses and treatment plan based on the information available to me at this time. I have answered questions and addressed any concerns. The patient has a good  understanding of the patient´s diagnosis, condition, and treatment plan as can be expected at this point. The vital signs have been stable. The patient´s condition is stable and appropriate for discharge from the emergency department.      The patient will pursue further outpatient evaluation with the primary care physician or other designated or consulting physician as outlined in the discharge instructions. They are agreeable to this plan of care and follow-up instructions have been explained in detail. The patient has received these instructions in written format and has expressed an understanding of the discharge instructions. The patient is aware that any significant change in condition or worsening of symptoms should prompt an immediate return to this or the closest emergency department or call to  911.  I have explained discharge medications and the need for follow up with the patient/caretakers. This was also printed in the discharge instructions. Patient was discharged with the following medications and follow up:      Medication List      No changes were made to your prescriptions during this visit.      No follow-up provider specified.     Final diagnoses:   History of recent fall   JORGE (acute kidney injury)   Orthostatic hypotension   Chronic anticoagulation        ED Disposition       ED Disposition   Decision to Admit    Condition   --    Comment   --               This medical record created using voice recognition software.             Rodríguez Davila MD  02/19/24 5991

## 2024-02-19 NOTE — H&P
[facility]  Date: 2024   Patient Name: Giles Donovan  : 1945  MRN: [id number]  Primary Care Physician:  Morena Masters MD  Date of admission: 2024    Subjective   Subjective     Chief Complaint: fall    HPI:    Giles Donovan is a 78 y.o. male with HTN, DM, CHF, COPD on home 02 who presents after a fall.  Patient uses a walked due to neuropathy from DM and he missed a step at home.  Per fiance' and patient, fall seemed completely mechanical in nature.   He remembers the event and denied any chest pain, SOA, or palpitations.  Patient's finace reports watching him closely as he is prone to falls.  Last fall prior to this was 3 days ago.  At present he feels well. Denies f/c, cough, chest pain, SOA, abd pain, melena, brpbr, or urinary complaints.       Personal History     Past Medical History:  Past Medical History:   Diagnosis Date    Allergic rhinitis 2014    Will try Zyrtec, he didnt want to use a nasal spray.    Arthritis     Asthma     Cataract     left eye    Cough 2016    PFT and CXR ordered.    Depression     PTSD    Diabetes     Elevated cholesterol     H/O psychiatric care     PTSD    Hyperlipidemia 2016    Lipids ordered. Continue on current medication.    Hypertension     Hypothyroidism     Seasonal allergies     Shortness of breath     Sleep apnea     Stenosis of right carotid artery 2017    Will refer to vascular surgeon.    Syncope 2017    Type 2 diabetes mellitus     Upper respiratory infection 10/18/2015    Will treat cough with Hydromet, otherwise over the counter meds for treatment.       Past Surgical History:  Past Surgical History:   Procedure Laterality Date    CATARACT EXTRACTION Right     x2    COLONOSCOPY      JOINT REPLACEMENT      REPLACEMENT TOTAL HIP ONCOLOGIC Right     RETINAL DETACHMENT REPAIR      TOE AMPUTATION Left 2017    Second phalaeng.    TOE OSTEOPHYTE REMOVAL          Family History:   Family History   Problem Relation Age of Onset    Heart disease Mother     Lupus Mother     Arthritis Mother     Kidney disease Sister        Social History:   Social History     Socioeconomic History    Marital status: Single   Tobacco Use    Smoking status: Never     Passive exposure: Never    Smokeless tobacco: Never   Vaping Use    Vaping Use: Never used   Substance and Sexual Activity    Alcohol use: Yes     Alcohol/week: 5.0 standard drinks of alcohol     Types: 5 Shots of liquor per week     Comment: 2TALL BEERS/DAY    Drug use: Never    Sexual activity: Defer       Home Medications:  Easy Touch Alcohol Prep Medium, Insulin Glargine, PARoxetine, Semaglutide(0.25 or 0.5MG/DOS), apixaban, aspirin, atorvastatin, benzonatate, buPROPion XL, cetirizine, fluticasone-salmeterol, furosemide, gabapentin, insulin aspart, levothyroxine, metFORMIN, olopatadine, omeprazole, oxybutynin XL, prazosin, sacubitril-valsartan, and traZODone    Allergies:  Allergies   Allergen Reactions    Latex Rash and Anaphylaxis    Latex, Natural Rubber Itching       Review of Systems   All systems were reviewed and negative except for: fall    Objective   Objective     Vitals:   Temp:  [98 °F (36.7 °C)] 98 °F (36.7 °C)  Heart Rate:  [80-88] 84  Resp:  [16-22] 16  BP: (102-130)/(58-75) 130/75  Flow (L/min):  [2] 2    Physical Exam    Constitutional: Awake, alert, no acute distress   Eyes: Pupils equal, sclerae anicteric, no conjunctival injection   HENT: NCAT, mucous membranes moist   Neck: Supple, no thyromegaly, no lymphadenopathy, trachea midline   Respiratory: Clear to auscultation bilaterally, nonlabored respirations    Cardiovascular: irregular, trace edema   Gastrointestinal: Positive bowel sounds, soft, nontender, nondistended   Musculoskeletal: No bilateral ankle edema, no clubbing or cyanosis to extremities   Psychiatric: Appropriate affect, cooperative   Neurologic: Oriented x 3, strength symmetric in all  extremities, Cranial Nerves grossly intact to confrontation, speech clear   Skin: No rashes     Result Review    Result Review:  I have personally reviewed the results from the time of this admission to 2/19/2024 09:55 EST and agree with these findings:  [x]  Laboratory  [x]  Microbiology  [x]  Radiology  []  EKG/Telemetry   []  Cardiology/Vascular   []  Pathology  []  Old records  []  Other:      Assessment & Plan   Assessment / Plan     Assessment/Plan:     Fall  -seems completely mechanical.   -will repeat trop  -last echo was 9/23 showed ef of 45%  -was on lasix 40mg BID, hold for now as he was hypotensive on admit  -patient recently started on entresto, hold for now.  Per chart review it was started 2/6, but fiance and patient state he only started taking it 2 days ago  -PT to see, seems to be falling frequently.  May benefit from VIKTOR, home PT at the minimum  -I don't think cards needs to see at this point    2. JORGE  -UA negative  -check Fena  -likely from hypotension from entresto  -hold entresto and lasix for  now but would   -got IVF in ED, expect improvement in Cr in AM  -would consult renal if not improved    3. Systolic CHF  -EF 45 % 6 months ago  -was on lasix 40mg BID  -hold given JORGE, but would restart tomorrow if Cr improved    4. DM  -last A1c was 9.8 this month  -started on ozempic  -cont home lantus 40u and SSI while in the hosptial    5. Afib  -cont home eliquis,   -rate controlled      DVT prophylaxis:  No DVT prophylaxis order currently exists.        CODE STATUS:    Level Of Support Discussed With: Patient  Code Status (Patient has no pulse and is not breathing): CPR (Attempt to Resuscitate)  Medical Interventions (Patient has pulse or is breathing): Full Support      Admission Status:  I believe this patient meets obs status.    Electronically signed by Fletcher Padilla DO, 02/19/24, 9:55 AM EST.

## 2024-02-20 LAB
ALBUMIN SERPL-MCNC: 3.6 G/DL (ref 3.5–5.2)
ALBUMIN/GLOB SERPL: 1.3 G/DL
ALP SERPL-CCNC: 61 U/L (ref 39–117)
ALT SERPL W P-5'-P-CCNC: 18 U/L (ref 1–41)
ANION GAP SERPL CALCULATED.3IONS-SCNC: 8.6 MMOL/L (ref 5–15)
AST SERPL-CCNC: 16 U/L (ref 1–40)
BASOPHILS # BLD AUTO: 0.05 10*3/MM3 (ref 0–0.2)
BASOPHILS NFR BLD AUTO: 0.7 % (ref 0–1.5)
BILIRUB SERPL-MCNC: 0.2 MG/DL (ref 0–1.2)
BUN SERPL-MCNC: 20 MG/DL (ref 8–23)
BUN/CREAT SERPL: 21.1 (ref 7–25)
CALCIUM SPEC-SCNC: 9.7 MG/DL (ref 8.6–10.5)
CHLORIDE SERPL-SCNC: 100 MMOL/L (ref 98–107)
CO2 SERPL-SCNC: 29.4 MMOL/L (ref 22–29)
CREAT SERPL-MCNC: 0.95 MG/DL (ref 0.76–1.27)
DEPRECATED RDW RBC AUTO: 42.3 FL (ref 37–54)
EGFRCR SERPLBLD CKD-EPI 2021: 82.4 ML/MIN/1.73
EOSINOPHIL # BLD AUTO: 0.18 10*3/MM3 (ref 0–0.4)
EOSINOPHIL NFR BLD AUTO: 2.4 % (ref 0.3–6.2)
ERYTHROCYTE [DISTWIDTH] IN BLOOD BY AUTOMATED COUNT: 13.1 % (ref 12.3–15.4)
GLOBULIN UR ELPH-MCNC: 2.7 GM/DL
GLUCOSE BLDC GLUCOMTR-MCNC: 257 MG/DL (ref 70–99)
GLUCOSE BLDC GLUCOMTR-MCNC: 292 MG/DL (ref 70–99)
GLUCOSE BLDC GLUCOMTR-MCNC: 339 MG/DL (ref 70–99)
GLUCOSE BLDC GLUCOMTR-MCNC: 408 MG/DL (ref 70–99)
GLUCOSE BLDC GLUCOMTR-MCNC: 57 MG/DL (ref 70–99)
GLUCOSE SERPL-MCNC: 105 MG/DL (ref 65–99)
HCT VFR BLD AUTO: 33.9 % (ref 37.5–51)
HGB BLD-MCNC: 11.1 G/DL (ref 13–17.7)
IMM GRANULOCYTES # BLD AUTO: 0.02 10*3/MM3 (ref 0–0.05)
IMM GRANULOCYTES NFR BLD AUTO: 0.3 % (ref 0–0.5)
LYMPHOCYTES # BLD AUTO: 2.33 10*3/MM3 (ref 0.7–3.1)
LYMPHOCYTES NFR BLD AUTO: 31.7 % (ref 19.6–45.3)
MCH RBC QN AUTO: 29.3 PG (ref 26.6–33)
MCHC RBC AUTO-ENTMCNC: 32.7 G/DL (ref 31.5–35.7)
MCV RBC AUTO: 89.4 FL (ref 79–97)
MONOCYTES # BLD AUTO: 0.79 10*3/MM3 (ref 0.1–0.9)
MONOCYTES NFR BLD AUTO: 10.7 % (ref 5–12)
NEUTROPHILS NFR BLD AUTO: 3.99 10*3/MM3 (ref 1.7–7)
NEUTROPHILS NFR BLD AUTO: 54.2 % (ref 42.7–76)
NRBC BLD AUTO-RTO: 0 /100 WBC (ref 0–0.2)
PLATELET # BLD AUTO: 164 10*3/MM3 (ref 140–450)
PMV BLD AUTO: 10.2 FL (ref 6–12)
POTASSIUM SERPL-SCNC: 3.8 MMOL/L (ref 3.5–5.2)
PROT SERPL-MCNC: 6.3 G/DL (ref 6–8.5)
RBC # BLD AUTO: 3.79 10*6/MM3 (ref 4.14–5.8)
SODIUM SERPL-SCNC: 138 MMOL/L (ref 136–145)
WBC NRBC COR # BLD AUTO: 7.36 10*3/MM3 (ref 3.4–10.8)

## 2024-02-20 PROCEDURE — G0378 HOSPITAL OBSERVATION PER HR: HCPCS

## 2024-02-20 PROCEDURE — 97161 PT EVAL LOW COMPLEX 20 MIN: CPT

## 2024-02-20 PROCEDURE — 80053 COMPREHEN METABOLIC PANEL: CPT | Performed by: INTERNAL MEDICINE

## 2024-02-20 PROCEDURE — 94799 UNLISTED PULMONARY SVC/PX: CPT

## 2024-02-20 PROCEDURE — 94640 AIRWAY INHALATION TREATMENT: CPT

## 2024-02-20 PROCEDURE — 82948 REAGENT STRIP/BLOOD GLUCOSE: CPT

## 2024-02-20 PROCEDURE — 82948 REAGENT STRIP/BLOOD GLUCOSE: CPT | Performed by: INTERNAL MEDICINE

## 2024-02-20 PROCEDURE — 85025 COMPLETE CBC W/AUTO DIFF WBC: CPT | Performed by: INTERNAL MEDICINE

## 2024-02-20 PROCEDURE — 63710000001 INSULIN DETEMIR PER 5 UNITS: Performed by: INTERNAL MEDICINE

## 2024-02-20 PROCEDURE — 36415 COLL VENOUS BLD VENIPUNCTURE: CPT | Performed by: INTERNAL MEDICINE

## 2024-02-20 PROCEDURE — 99232 SBSQ HOSP IP/OBS MODERATE 35: CPT | Performed by: INTERNAL MEDICINE

## 2024-02-20 PROCEDURE — 63710000001 INSULIN LISPRO (HUMAN) PER 5 UNITS: Performed by: INTERNAL MEDICINE

## 2024-02-20 RX ORDER — INSULIN LISPRO 100 [IU]/ML
2-9 INJECTION, SOLUTION INTRAVENOUS; SUBCUTANEOUS
Status: DISCONTINUED | OUTPATIENT
Start: 2024-02-20 | End: 2024-02-21

## 2024-02-20 RX ORDER — CETIRIZINE HYDROCHLORIDE 10 MG/1
10 TABLET ORAL DAILY
Status: DISCONTINUED | OUTPATIENT
Start: 2024-02-20 | End: 2024-02-21 | Stop reason: HOSPADM

## 2024-02-20 RX ORDER — PANTOPRAZOLE SODIUM 40 MG/1
40 TABLET, DELAYED RELEASE ORAL
Status: DISCONTINUED | OUTPATIENT
Start: 2024-02-20 | End: 2024-02-21 | Stop reason: HOSPADM

## 2024-02-20 RX ORDER — BUDESONIDE AND FORMOTEROL FUMARATE DIHYDRATE 160; 4.5 UG/1; UG/1
2 AEROSOL RESPIRATORY (INHALATION)
Status: DISCONTINUED | OUTPATIENT
Start: 2024-02-20 | End: 2024-02-21 | Stop reason: HOSPADM

## 2024-02-20 RX ORDER — FUROSEMIDE 40 MG/1
40 TABLET ORAL 2 TIMES DAILY
Status: DISCONTINUED | OUTPATIENT
Start: 2024-02-20 | End: 2024-02-21 | Stop reason: HOSPADM

## 2024-02-20 RX ORDER — PAROXETINE HYDROCHLORIDE 20 MG/1
40 TABLET, FILM COATED ORAL EVERY EVENING
Status: DISCONTINUED | OUTPATIENT
Start: 2024-02-20 | End: 2024-02-21 | Stop reason: HOSPADM

## 2024-02-20 RX ADMIN — FUROSEMIDE 40 MG: 40 TABLET ORAL at 20:16

## 2024-02-20 RX ADMIN — GABAPENTIN 300 MG: 300 CAPSULE ORAL at 09:34

## 2024-02-20 RX ADMIN — CETIRIZINE HYDROCHLORIDE 10 MG: 10 TABLET, FILM COATED ORAL at 12:45

## 2024-02-20 RX ADMIN — INSULIN DETEMIR 25 UNITS: 100 INJECTION, SOLUTION SUBCUTANEOUS at 12:44

## 2024-02-20 RX ADMIN — Medication 10 ML: at 09:34

## 2024-02-20 RX ADMIN — OXYBUTYNIN CHLORIDE 10 MG: 5 TABLET, EXTENDED RELEASE ORAL at 09:34

## 2024-02-20 RX ADMIN — PRAZOSIN HYDROCHLORIDE 1 MG: 1 CAPSULE ORAL at 20:16

## 2024-02-20 RX ADMIN — INSULIN LISPRO 9 UNITS: 100 INJECTION, SOLUTION INTRAVENOUS; SUBCUTANEOUS at 12:45

## 2024-02-20 RX ADMIN — APIXABAN 5 MG: 5 TABLET, FILM COATED ORAL at 20:16

## 2024-02-20 RX ADMIN — ASPIRIN 81 MG: 81 TABLET, COATED ORAL at 09:34

## 2024-02-20 RX ADMIN — PANTOPRAZOLE SODIUM 40 MG: 40 TABLET, DELAYED RELEASE ORAL at 12:45

## 2024-02-20 RX ADMIN — INSULIN LISPRO 7 UNITS: 100 INJECTION, SOLUTION INTRAVENOUS; SUBCUTANEOUS at 17:58

## 2024-02-20 RX ADMIN — BUDESONIDE AND FORMOTEROL FUMARATE DIHYDRATE 2 PUFF: 160; 4.5 AEROSOL RESPIRATORY (INHALATION) at 18:26

## 2024-02-20 RX ADMIN — PAROXETINE HYDROCHLORIDE 40 MG: 20 TABLET, FILM COATED ORAL at 17:58

## 2024-02-20 RX ADMIN — Medication 10 ML: at 20:16

## 2024-02-20 RX ADMIN — APIXABAN 5 MG: 5 TABLET, FILM COATED ORAL at 09:34

## 2024-02-20 RX ADMIN — ATORVASTATIN CALCIUM 80 MG: 40 TABLET, FILM COATED ORAL at 09:33

## 2024-02-20 RX ADMIN — BUPROPION HYDROCHLORIDE 150 MG: 150 TABLET, EXTENDED RELEASE ORAL at 09:34

## 2024-02-20 RX ADMIN — BUDESONIDE AND FORMOTEROL FUMARATE DIHYDRATE 2 PUFF: 160; 4.5 AEROSOL RESPIRATORY (INHALATION) at 11:19

## 2024-02-20 RX ADMIN — LEVOTHYROXINE SODIUM 100 MCG: 0.1 TABLET ORAL at 05:02

## 2024-02-20 NOTE — PLAN OF CARE
Goal Outcome Evaluation:   Calm and cooperative this shift. On 2 liters of oxygen, which is baseline. Participated with Physical Therapy this shift. No s/s of distress noted. Plan of care ongoing.

## 2024-02-20 NOTE — PROGRESS NOTES
[facility]   Hospitalist Progress Note  Date: 2024  Patient Name: Giles Donovan  : 1945  MRN: [id number]  Date of admission: 2024    Subjective   Subjective     Chief Complaint: Fall and generalized weakness    Summary:   Giles Donovan is a 78 y.o. male with essential hypertension, type 2 diabetes mellitus, COPD and chronic heart failure with preserved ejection fraction that presented to the ED after fall at home.  Patient reportedly had been having more frequent falls at home.  Eval in ED significant for labs showing elevated creatinine compared to baseline and hyperglycemia.  Hospitalist service contacted for further evaluation and management.  Home diuretics and home Entresto held due to soft blood pressures initially.    Interval Followup:   No acute events overnight.  The patient stated he is feeling better today.  Renal function improved to within normal limits.  He denies any chest pain or shortness of breath.  Nursing with no additional acute issues to report.    Objective   Objective     Vitals:   Temp:  [97.3 °F (36.3 °C)-98.4 °F (36.9 °C)] 98.1 °F (36.7 °C)  Heart Rate:  [78-90] 89  Resp:  [18-20] 20  BP: (118-159)/(64-92) 142/70  Flow (L/min):  [2] 2  Physical Exam   Gen: No acute distress, Conversant, Pleasant, lying in bed  Resp: CTAB, No w/r/r, No respiratory distress appreciated  Card: RRR, No m/r/g  Abd: Soft, Nontender, Nondistended, + bowel sounds    Result Review    Result Review:  I have personally reviewed the results as below and agree with these findings:  []  Laboratory:   CMP          2024    11:48 2024    05:52 2024    10:40 2024    04:31   CMP   Glucose 242  322  130  105    BUN 20  36  31  20    Creatinine 1.15  1.79  1.41  0.95    EGFR 65.5  38.5  51.3  82.4    Sodium 136  134  138  138    Potassium 4.9  3.9  3.9  3.8    Chloride 96  94  100  100    Calcium 9.4  9.8  9.6  9.7    Total Protein 7.7  7.4   6.3    Albumin 4.2  4.1    3.6    Globulin 3.5  3.3   2.7    Total Bilirubin 0.4  0.2   0.2    Alkaline Phosphatase 72  71   61    AST (SGOT) 31  19   16    ALT (SGPT) 25  23   18    Albumin/Globulin Ratio 1.2  1.2   1.3    BUN/Creatinine Ratio 17.4  20.1  22.0  21.1    Anion Gap 11.3  12.4  11.2  8.6      CBC          2/6/2024    11:48 2/19/2024    05:52 2/19/2024    10:40 2/20/2024    04:31   CBC   WBC 7.12  7.51  7.10  7.36    RBC 3.82  3.87  3.95  3.79    Hemoglobin 11.4  11.4  11.9  11.1    Hematocrit 34.7  35.1  35.9  33.9    MCV 90.8  90.7  90.9  89.4    MCH 29.8  29.5  30.1  29.3    MCHC 32.9  32.5  33.1  32.7    RDW 12.5  13.1  13.2  13.1    Platelets 233  178  171  164    Hypoglycemia this morning.  []  Microbiology:   []  Radiology:   []  EKG/Telemetry:    []  Cardiology/Vascular:    []  Pathology:  []  Old records:  []  Other:    Assessment & Plan   Assessment / Plan     Assessment:  Acute renal failure  Mechanical fall  Generalized weakness and debility  Chronic heart failure with preserved ejection fraction  Type 2 diabetes mellitus  Hypothyroidism  Chronic paroxysmal atrial fibrillation    Plan:  -Continue holding Entresto given issues with hypotension.  Will plan on referring patient to heart failure clinic after discharge and further medical therapy for heart failure can be determined at that appointment  -Resume home Lasix  -PT/OT consulted  -Continue Levemir and SSI.  Monitor blood glucose level and SSI requirement titrate insulin regimen accordingly  -Continue appropriate home medications  -Will monitor electrolytes and renal function with BMP and magnesium level in the AM  -Will monitor WBC and Hgb with CBC in the AM  -Clinical course will dictate further management     DVT Prophylaxis: Apixaban  GI Prophylaxis: Pantoprazole  Diet:   Diet Order   Procedures    Diet: Diabetic Diets, Cardiac Diets; Healthy Heart (2-3 Na+); Consistent Carbohydrate; Texture: Regular Texture (IDDSI 7); Fluid Consistency: Thin (IDDSI 0)      Dispo: PT/OT consulted  Code Status: Full code     Personally reviewed patients labs and imaging, discussed with patient and nurse at bedside.     Part of this note may be an electronic transcription/translation of spoken language to printed text using the Dragon dictation system.    DVT prophylaxis:  Medical DVT prophylaxis orders are present.        CODE STATUS:   Level Of Support Discussed With: Patient  Code Status (Patient has no pulse and is not breathing): CPR (Attempt to Resuscitate)  Medical Interventions (Patient has pulse or is breathing): Full Support        Electronically signed by Owen Vanessa MD, 02/20/24, 2:44 PM EST.

## 2024-02-20 NOTE — SIGNIFICANT NOTE
Wound Eval / Progress Noted    EDUARDA Wang     Patient Name: Giles Donovan  : 1945  MRN: [id number]  Today's Date: 2024                 Admit Date: 2024    Visit Dx:    ICD-10-CM ICD-9-CM   1. History of recent fall  Z91.81 V15.88   2. JORGE (acute kidney injury)  N17.9 584.9   3. Orthostatic hypotension  I95.1 458.0   4. Chronic anticoagulation  Z79.01 V58.61         JORGE (acute kidney injury)        Past Medical History:   Diagnosis Date    A-fib     Allergic rhinitis 2014    Will try Zyrtec, he didnt want to use a nasal spray.    Arthritis     Asthma     Cataract     left eye    Cough 2016    PFT and CXR ordered.    Depression     PTSD    Diabetes     Elevated cholesterol     H/O psychiatric care     PTSD    Hyperlipidemia 2016    Lipids ordered. Continue on current medication.    Hypertension     Hypothyroidism     Seasonal allergies     Shortness of breath     Sleep apnea     Stenosis of right carotid artery 2017    Will refer to vascular surgeon.    Syncope 2017    Type 2 diabetes mellitus     Upper respiratory infection 10/18/2015    Will treat cough with Hydromet, otherwise over the counter meds for treatment.        Past Surgical History:   Procedure Laterality Date    CATARACT EXTRACTION Right     x2    COLONOSCOPY      JOINT REPLACEMENT      REPLACEMENT TOTAL HIP ONCOLOGIC Right     RETINAL DETACHMENT REPAIR      TOE AMPUTATION Left 2017    Second phalaeng.    TOE OSTEOPHYTE REMOVAL           Physical Assessment:  Wound 24 1758 Right anterior greater trochanter Other (comment) (Active)   Wound Image   24 1805   Dressing Appearance open to air 24 1001   Closure None 24 1001   Base dry 24 1001   Periwound dry;intact 24 1001   Periwound Temperature warm 24 1001   Periwound Skin Turgor soft 24 1001   Edges rolled/closed 24 1001   Drainage Amount none 24 1001   Care, Wound cleansed with;soap  and water 02/20/24 1001   Dressing Care open to air 02/20/24 1001      Wound Check / Follow-up: Patient seen today for wound consult. Patient is awake and alert at time of visit. He is agreeable to assessment. Patient reports wearing briefs at home due to incontinence. Right groin crease / fold with raised area with darkened discoloration. No drainage is noted. Area is dry. Recommending to provide skin care with application of barrier cream.    Patient denies any other wounds / skin issues and the need for further assessment at this time.       Impression: Raised, darkened discoloration to right groin.      Short term goals: Regain skin integrity, skin protection, moisture prevention, skin care.       Michelle Gonzalez RN    2/20/2024    12:28 EST

## 2024-02-20 NOTE — PLAN OF CARE
Goal Outcome Evaluation:      Patient A&O x4. VSS. No complaints from patient on shift. Plan of care continue.

## 2024-02-20 NOTE — THERAPY EVALUATION
Acute Care - Physical Therapy Initial Evaluation   Felipe     Patient Name: Giles Donovan  : 1945  MRN: [id number]  Today's Date: 2024      Visit Dx:     ICD-10-CM ICD-9-CM   1. History of recent fall  Z91.81 V15.88   2. JORGE (acute kidney injury)  N17.9 584.9   3. Orthostatic hypotension  I95.1 458.0   4. Chronic anticoagulation  Z79.01 V58.61   5. Difficulty walking  R26.2 719.7     Patient Active Problem List   Diagnosis    Allergic rhinitis    Controlled type 2 diabetes mellitus without complication, without long-term current use of insulin    GERD (gastroesophageal reflux disease)    Hyperlipidemia    Hypertension    Hypothyroidism    Stenosis of right carotid artery    Syncope    Urge incontinence of urine    Erectile dysfunction    Benign prostatic hyperplasia with urinary frequency    Cardiomyopathy    Cataract    Chest pain with low risk for cardiac etiology    Right-sided carotid artery occlusion without cerebral infarction    Depression    Vaso vagal episode    Hypoxia    Restrictive lung disease    Mixed hyperlipidemia    Chronic heart failure with preserved ejection fraction    Carotid artery occlusion    Impacted cerumen, bilateral    Primary insomnia    Unsteady gait when walking    Orthostatic hypotension    Supplemental oxygen dependent    Paroxysmal atrial fibrillation    Chronic bilateral pleural effusions    JORGE (acute kidney injury)     Past Medical History:   Diagnosis Date    A-fib     Allergic rhinitis 2014    Will try Zyrtec, he didnt want to use a nasal spray.    Arthritis     Asthma     Cataract     left eye    Cough 2016    PFT and CXR ordered.    Depression     PTSD    Diabetes     Elevated cholesterol     H/O psychiatric care     PTSD    Hyperlipidemia 2016    Lipids ordered. Continue on current medication.    Hypertension     Hypothyroidism     Seasonal allergies     Shortness of breath     Sleep apnea     Stenosis of right carotid artery  02/19/2017    Will refer to vascular surgeon.    Syncope 02/19/2017    Type 2 diabetes mellitus     Upper respiratory infection 10/18/2015    Will treat cough with Hydromet, otherwise over the counter meds for treatment.     Past Surgical History:   Procedure Laterality Date    CATARACT EXTRACTION Right     x2    COLONOSCOPY      JOINT REPLACEMENT      REPLACEMENT TOTAL HIP ONCOLOGIC Right     RETINAL DETACHMENT REPAIR      TOE AMPUTATION Left 11/2017    Second phalaeng.    TOE OSTEOPHYTE REMOVAL       PT Assessment (last 12 hours)       PT Evaluation and Treatment       Row Name 02/20/24 1500          Physical Therapy Time and Intention    Subjective Information no complaints  -CS     Document Type evaluation  -CS     Mode of Treatment individual therapy;physical therapy  -CS     Patient Effort good  -CS     Symptoms Noted During/After Treatment none  -CS       Row Name 02/20/24 1500          General Information    Patient Profile Reviewed yes  -CS     Patient Observations alert;cooperative;agree to therapy  -CS     Prior Level of Function --  Pt reports he is independent using his rollator around the house but has assist with mobility outside the home and with ADLs using a walk-in shower with built-in seat. 2L O2  -CS     Equipment Currently Used at Home rollator;shower chair;oxygen;wheelchair  -CS     Existing Precautions/Restrictions fall  -CS     Limitations/Impairments safety/cognitive  -CS     Barriers to Rehab none identified  -CS       Row Name 02/20/24 1500          Living Environment    Current Living Arrangements home  -CS     Home Accessibility stairs to enter home;stairs within home  -CS     People in Home significant other  -CS     Primary Care Provided by spouse/significant other;self  -CS       Row Name 02/20/24 1500          Home Main Entrance    Number of Stairs, Main Entrance two  -CS     Stair Railings, Main Entrance none  grab bar on one side of the door frame  -CS       Row Name 02/20/24 1500           Stairs Within Home, Primary    Stairs, Within Home, Primary Non-essential flight of steps to the basement  -     Stair Railings, Within Home, Primary railings safe and in good condition  -[facility] Name 02/20/24 1500          Pain    Pretreatment Pain Rating 0/10 - no pain  -     Posttreatment Pain Rating 0/10 - no pain  -[facility] [place] Name 02/20/24 1500          Range of Motion Comprehensive    General Range of Motion bilateral lower extremity ROM WFL  -[facility] [place] Name 02/20/24 1500          Strength Comprehensive (MMT)    General Manual Muscle Testing (MMT) Assessment lower extremity strength deficits identified  -     Comment, General Manual Muscle Testing (MMT) Assessment BLEs assessed at 3+/5  -[facility] Name 02/20/24 1500          Bed Mobility    Bed Mobility bed mobility (all) activities  -     All Activities, [place] (Bed Mobility) verbal cues;contact guard;minimum assist (75% patient effort)  -     Bed Mobility, Safety Issues decreased use of legs for bridging/pushing;decreased use of arms for pushing/pulling  -     Assistive Device (Bed Mobility) bed rails;head of bed elevated  -CS       Row Name 02/20/24 1500          Transfers    Transfers sit-stand transfer;stand-sit transfer;toilet transfer  -[facility] Name 02/20/24 1500          Sit-Stand Transfer    Sit-Stand [place] (Transfers) verbal cues;contact guard  -     Assistive Device (Sit-Stand Transfers) other (see comments)  HHA  -[facility] Name 02/20/24 1500          Stand-Sit Transfer    Stand-Sit [place] (Transfers) verbal cues;contact guard  -     Assistive Device (Stand-Sit Transfers) other (see comments)  HHA  -[facility] [place] Name 02/20/24 1500          Toilet Transfer    Type (Toilet Transfer) stand-sit;sit-stand  -     [place] Level (Toilet Transfer) verbal cues;contact guard  -     Assistive Device (Toilet Transfer) commode;grab bars/safety frame  -       Row Name 02/20/24 1500           Gait/Stairs (Locomotion)    Gait/Stairs Locomotion gait/ambulation assistive device  -CS     [place] Level (Gait) verbal cues;contact guard;minimum assist (75% patient effort)  -CS     Assistive Device (Gait) other (see comments)  HHA  -CS     Patient was able to Ambulate yes  -CS     Distance in Feet (Gait) 15  -CS     Pattern (Gait) step-through  -CS     Deviations/Abnormal Patterns (Gait) gait speed decreased;stride length decreased  -CS     Bilateral Gait Deviations forward flexed posture  -CS       Row Name 02/20/24 1500          Safety Issues, Functional Mobility    Safety Issues Affecting Function (Mobility) sequencing abilities;problem-solving;awareness of need for assistance  -CS     Impairments Affecting Function (Mobility) balance;cognition;endurance/activity tolerance;pain;strength;shortness of breath  -CS       Row Name 02/20/24 1500          Balance    Balance Assessment standing dynamic balance  -CS     Dynamic Standing Balance verbal cues;minimal assist  -CS     Position/Device Used, Standing Balance other (see comments)  HHA  -CS       Row Name             Wound 02/19/24 1758 Right anterior greater trochanter Other (comment)    Wound - Properties Group Placement Date: 02/19/24  -DR Placement Time: 1758 -DR Present on Original Admission: Y  -DR Side: Right  -DR Orientation: anterior  -DR Location: greater trochanter  -DR Primary Wound Type: Other  -DR, raised, formed, area to groin     Retired Wound - Properties Group Placement Date: 02/19/24  -DR Placement Time: 1758 -DR Present on Original Admission: Y  -DR Side: Right  -DR Orientation: anterior  -DR Location: greater trochanter  -DR Primary Wound Type: Other  -DR, raised, formed, area to groin     Retired Wound - Properties Group Date first assessed: 02/19/24  -DR Time first assessed: 1758 -DR Present on Original Admission: Y  -DR Side: Right  -DR Location: greater trochanter  -DR Primary Wound Type: Other  -DR, raised, formed, area to  groin       Row Name 02/20/24 1500          Plan of Care Review    Plan of Care Reviewed With patient  -CS     Progress no change  -CS     Outcome Evaluation Pt presents with limitations that impede their ability to safely and independently transfer and ambulate. The skills of a therapist will be required to safely and effectively implement the following treatment plan to restore maximal level of function.  -CS       Row Name 02/20/24 1500          Positioning and Restraints    Pre-Treatment Position bathroom  -CS     Post Treatment Position bed  -CS     In Bed supine;call light within reach;encouraged to call for assist;exit alarm on  -CS       Row Name 02/20/24 1500          Therapy Assessment/Plan (PT)    Rehab Potential (PT) good, to achieve stated therapy goals  -CS     Criteria for Skilled Interventions Met (PT) yes;skilled treatment is necessary  -CS     Therapy Frequency (PT) daily  -CS     Predicted Duration of Therapy Intervention (PT) 10 days  -CS     Problem List (PT) problems related to;balance;mobility;strength  -CS     Activity Limitations Related to Problem List (PT) unable to ambulate safely;unable to transfer safely  -CS       Row Name 02/20/24 1500          PT Evaluation Complexity    History, PT Evaluation Complexity 1-2 personal factors and/or comorbidities  -CS     Examination of Body Systems (PT Eval Complexity) total of 4 or more elements  -CS     Clinical Presentation (PT Evaluation Complexity) stable  -CS     Clinical Decision Making (PT Evaluation Complexity) low complexity  -CS     Overall Complexity (PT Evaluation Complexity) low complexity  -CS       Row Name 02/20/24 1500          Therapy Plan Review/Discharge Plan (PT)    Therapy Plan Review (PT) evaluation/treatment results reviewed;patient  -CS       Row Name 02/20/24 1500          Physical Therapy Goals    Bed Mobility Goal Selection (PT) bed mobility, PT goal 1  -CS     Transfer Goal Selection (PT) transfer, PT goal 1  -CS      Gait Training Goal Selection (PT) gait training, PT goal 1  -CS       Row Name 02/20/24 1500          Bed Mobility Goal 1 (PT)    Activity/Assistive Device (Bed Mobility Goal 1, PT) bed mobility activities, all  -CS     [place] Level/Cues Needed (Bed Mobility Goal 1, PT) modified independence  -CS     Time Frame (Bed Mobility Goal 1, PT) long term goal (LTG);10 days  -CS       Row Name 02/20/24 1500          Transfer Goal 1 (PT)    Activity/Assistive Device (Transfer Goal 1, PT) sit-to-stand/stand-to-sit;bed-to-chair/chair-to-bed;walker, rolling  -CS     [place] Level/Cues Needed (Transfer Goal 1, PT) standby assist  -CS     Time Frame (Transfer Goal 1, PT) long term goal (LTG);10 days  -CS       Row Name 02/20/24 1500          Gait Training Goal 1 (PT)    Activity/Assistive Device (Gait Training Goal 1, PT) gait (walking locomotion);assistive device use;walker, rolling  -CS     [place] Level (Gait Training Goal 1, PT) standby assist  -CS     Distance (Gait Training Goal 1, PT) 150  -CS     Time Frame (Gait Training Goal 1, PT) long term goal (LTG);10 days  -CS               User Key  (r) = Recorded By, (t) = Taken By, (c) = Cosigned By      Initials Name Provider Type    CS Salena Abarca, PT Physical Therapist    DR Runner, Debra, RN Registered Nurse                    Physical Therapy Education       Title: PT OT SLP Therapies (In Progress)       Topic: Physical Therapy (In Progress)       Point: Mobility training (In Progress)       Learning Progress Summary             Patient Acceptance, E, NR by CS at 2/20/2024 1520                         Point: Home exercise program (Not Started)       Learner Progress:  Not documented in this visit.              Point: Body mechanics (In Progress)       Learning Progress Summary             Patient Acceptance, E, NR by CS at 2/20/2024 1520                         Point: Precautions (In Progress)       Learning Progress Summary             Patient  Acceptance, E, NR by  at 2/20/2024 1520                                         User Key       Initials Effective Dates Name Provider Type Discipline     04/25/21 -  Salena Abarca PT Physical Therapist PT                  PT Recommendation and Plan  Anticipated Discharge Disposition (PT): inpatient rehabilitation facility  Planned Therapy Interventions (PT): balance training, bed mobility training, gait training, transfer training, strengthening  Therapy Frequency (PT): daily  Plan of Care Reviewed With: patient  Progress: no change  Outcome Evaluation: Pt presents with limitations that impede their ability to safely and independently transfer and ambulate. The skills of a therapist will be required to safely and effectively implement the following treatment plan to restore maximal level of function.   Outcome Measures       Row Name 02/20/24 1400             How much help from another person do you currently need...    Turning from your back to your side while in flat bed without using bedrails? 3  -CS      Moving from lying on back to sitting on the side of a flat bed without bedrails? 3  -CS      Moving to and from a bed to a chair (including a wheelchair)? 3  -CS      Standing up from a chair using your arms (e.g., wheelchair, bedside chair)? 3  -CS      Climbing 3-5 steps with a railing? 2  -CS      To walk in hospital room? 3  -CS      AM-PAC 6 Clicks Score (PT) 17  -CS      Highest Level of Mobility Goal 5 --> Static standing  -         Functional Assessment    Outcome Measure Options AM-PAC 6 Clicks Basic Mobility (PT)  -                User Key  (r) = Recorded By, (t) = Taken By, (c) = Cosigned By      Initials Name Provider Type     Salena Abarca PT Physical Therapist                     Time Calculation:    PT Charges       Row Name 02/20/24 1503             Time Calculation    PT Received On 02/20/24  -      PT Goal Re-Cert Due Date 02/29/24  -         Untimed Charges    PT  Eval/Re-eval Minutes 46  -CS         Total Minutes    Untimed Charges Total Minutes 46  -CS       Total Minutes 46  -CS                User Key  (r) = Recorded By, (t) = Taken By, (c) = Cosigned By      Initials Name Provider Type    Salena Garcia, PT Physical Therapist                  Therapy Charges for Today       Code Description Service Date Service Provider Modifiers Qty    [id number] HC PT EVAL LOW COMPLEXITY 4 2/20/2024 Salena Abarca, CHAPIS GP 1            PT G-Codes  Outcome Measure Options: AM-PAC 6 Clicks Basic Mobility (PT)  AM-PAC 6 Clicks Score (PT): 17    Salena Abarca, PT  2/20/2024

## 2024-02-21 VITALS
HEART RATE: 89 BPM | SYSTOLIC BLOOD PRESSURE: 118 MMHG | OXYGEN SATURATION: 99 % | WEIGHT: 200.62 LBS | BODY MASS INDEX: 31.49 KG/M2 | RESPIRATION RATE: 16 BRPM | HEIGHT: 67 IN | TEMPERATURE: 98.2 F | DIASTOLIC BLOOD PRESSURE: 73 MMHG

## 2024-02-21 LAB
ANION GAP SERPL CALCULATED.3IONS-SCNC: 14.5 MMOL/L (ref 5–15)
BACTERIA UR QL AUTO: NORMAL /HPF
BILIRUB UR QL STRIP: NEGATIVE
BUN SERPL-MCNC: 21 MG/DL (ref 8–23)
BUN/CREAT SERPL: 18.1 (ref 7–25)
CALCIUM SPEC-SCNC: 10.3 MG/DL (ref 8.6–10.5)
CHLORIDE SERPL-SCNC: 95 MMOL/L (ref 98–107)
CLARITY UR: CLEAR
CO2 SERPL-SCNC: 25.5 MMOL/L (ref 22–29)
COLOR UR: YELLOW
CREAT SERPL-MCNC: 1.16 MG/DL (ref 0.76–1.27)
DEPRECATED RDW RBC AUTO: 43.4 FL (ref 37–54)
EGFRCR SERPLBLD CKD-EPI 2021: 64.9 ML/MIN/1.73
ERYTHROCYTE [DISTWIDTH] IN BLOOD BY AUTOMATED COUNT: 13.3 % (ref 12.3–15.4)
GLUCOSE BLDC GLUCOMTR-MCNC: 314 MG/DL (ref 70–99)
GLUCOSE BLDC GLUCOMTR-MCNC: 359 MG/DL (ref 70–99)
GLUCOSE BLDC GLUCOMTR-MCNC: 371 MG/DL (ref 70–99)
GLUCOSE SERPL-MCNC: 398 MG/DL (ref 65–99)
GLUCOSE UR STRIP-MCNC: ABNORMAL MG/DL
HCT VFR BLD AUTO: 39.4 % (ref 37.5–51)
HGB BLD-MCNC: 13 G/DL (ref 13–17.7)
HGB UR QL STRIP.AUTO: ABNORMAL
HYALINE CASTS UR QL AUTO: NORMAL /LPF
KETONES UR QL STRIP: ABNORMAL
LEUKOCYTE ESTERASE UR QL STRIP.AUTO: NEGATIVE
MAGNESIUM SERPL-MCNC: 1.7 MG/DL (ref 1.6–2.4)
MCH RBC QN AUTO: 29.3 PG (ref 26.6–33)
MCHC RBC AUTO-ENTMCNC: 33 G/DL (ref 31.5–35.7)
MCV RBC AUTO: 88.7 FL (ref 79–97)
NITRITE UR QL STRIP: NEGATIVE
PH UR STRIP.AUTO: 7.5 [PH] (ref 5–8)
PHOSPHATE SERPL-MCNC: 3.4 MG/DL (ref 2.5–4.5)
PLATELET # BLD AUTO: 193 10*3/MM3 (ref 140–450)
PMV BLD AUTO: 9.9 FL (ref 6–12)
POTASSIUM SERPL-SCNC: 4.1 MMOL/L (ref 3.5–5.2)
PROT UR QL STRIP: NEGATIVE
RBC # BLD AUTO: 4.44 10*6/MM3 (ref 4.14–5.8)
RBC # UR STRIP: NORMAL /HPF
REF LAB TEST METHOD: NORMAL
SODIUM SERPL-SCNC: 135 MMOL/L (ref 136–145)
SP GR UR STRIP: 1.02 (ref 1–1.03)
SQUAMOUS #/AREA URNS HPF: NORMAL /HPF
UROBILINOGEN UR QL STRIP: ABNORMAL
WBC # UR STRIP: NORMAL /HPF
WBC NRBC COR # BLD AUTO: 7.65 10*3/MM3 (ref 3.4–10.8)

## 2024-02-21 PROCEDURE — 82948 REAGENT STRIP/BLOOD GLUCOSE: CPT | Performed by: INTERNAL MEDICINE

## 2024-02-21 PROCEDURE — 97165 OT EVAL LOW COMPLEX 30 MIN: CPT

## 2024-02-21 PROCEDURE — 82948 REAGENT STRIP/BLOOD GLUCOSE: CPT

## 2024-02-21 PROCEDURE — 94664 DEMO&/EVAL PT USE INHALER: CPT

## 2024-02-21 PROCEDURE — 63710000001 INSULIN LISPRO (HUMAN) PER 5 UNITS: Performed by: INTERNAL MEDICINE

## 2024-02-21 PROCEDURE — 94799 UNLISTED PULMONARY SVC/PX: CPT

## 2024-02-21 PROCEDURE — 80048 BASIC METABOLIC PNL TOTAL CA: CPT | Performed by: INTERNAL MEDICINE

## 2024-02-21 PROCEDURE — 81001 URINALYSIS AUTO W/SCOPE: CPT

## 2024-02-21 PROCEDURE — 84100 ASSAY OF PHOSPHORUS: CPT | Performed by: INTERNAL MEDICINE

## 2024-02-21 PROCEDURE — 63710000001 INSULIN LISPRO (HUMAN) PER 5 UNITS

## 2024-02-21 PROCEDURE — 51798 US URINE CAPACITY MEASURE: CPT

## 2024-02-21 PROCEDURE — 96365 THER/PROPH/DIAG IV INF INIT: CPT

## 2024-02-21 PROCEDURE — 63710000001 INSULIN DETEMIR PER 5 UNITS: Performed by: INTERNAL MEDICINE

## 2024-02-21 PROCEDURE — 25010000002 MAGNESIUM SULFATE 2 GM/50ML SOLUTION: Performed by: INTERNAL MEDICINE

## 2024-02-21 PROCEDURE — 85027 COMPLETE CBC AUTOMATED: CPT | Performed by: INTERNAL MEDICINE

## 2024-02-21 PROCEDURE — 83735 ASSAY OF MAGNESIUM: CPT | Performed by: INTERNAL MEDICINE

## 2024-02-21 PROCEDURE — G0378 HOSPITAL OBSERVATION PER HR: HCPCS

## 2024-02-21 PROCEDURE — 99239 HOSP IP/OBS DSCHRG MGMT >30: CPT | Performed by: INTERNAL MEDICINE

## 2024-02-21 RX ORDER — MAGNESIUM SULFATE HEPTAHYDRATE 40 MG/ML
2 INJECTION, SOLUTION INTRAVENOUS ONCE
Status: COMPLETED | OUTPATIENT
Start: 2024-02-21 | End: 2024-02-21

## 2024-02-21 RX ORDER — INSULIN LISPRO 100 [IU]/ML
3 INJECTION, SOLUTION INTRAVENOUS; SUBCUTANEOUS ONCE
Status: COMPLETED | OUTPATIENT
Start: 2024-02-21 | End: 2024-02-21

## 2024-02-21 RX ORDER — ATORVASTATIN CALCIUM 40 MG/1
80 TABLET, FILM COATED ORAL NIGHTLY
Status: DISCONTINUED | OUTPATIENT
Start: 2024-02-21 | End: 2024-02-21 | Stop reason: HOSPADM

## 2024-02-21 RX ORDER — INSULIN LISPRO 100 [IU]/ML
3-14 INJECTION, SOLUTION INTRAVENOUS; SUBCUTANEOUS
Status: DISCONTINUED | OUTPATIENT
Start: 2024-02-21 | End: 2024-02-21 | Stop reason: HOSPADM

## 2024-02-21 RX ORDER — INSULIN LISPRO 100 [IU]/ML
10 INJECTION, SOLUTION INTRAVENOUS; SUBCUTANEOUS
Status: DISCONTINUED | OUTPATIENT
Start: 2024-02-21 | End: 2024-02-21 | Stop reason: HOSPADM

## 2024-02-21 RX ADMIN — PANTOPRAZOLE SODIUM 40 MG: 40 TABLET, DELAYED RELEASE ORAL at 05:54

## 2024-02-21 RX ADMIN — INSULIN LISPRO 8 UNITS: 100 INJECTION, SOLUTION INTRAVENOUS; SUBCUTANEOUS at 08:43

## 2024-02-21 RX ADMIN — MAGNESIUM SULFATE HEPTAHYDRATE 2 G: 2 INJECTION, SOLUTION INTRAVENOUS at 12:41

## 2024-02-21 RX ADMIN — CETIRIZINE HYDROCHLORIDE 10 MG: 10 TABLET, FILM COATED ORAL at 08:42

## 2024-02-21 RX ADMIN — APIXABAN 5 MG: 5 TABLET, FILM COATED ORAL at 08:42

## 2024-02-21 RX ADMIN — INSULIN DETEMIR 25 UNITS: 100 INJECTION, SOLUTION SUBCUTANEOUS at 08:43

## 2024-02-21 RX ADMIN — Medication 10 ML: at 08:43

## 2024-02-21 RX ADMIN — ASPIRIN 81 MG: 81 TABLET, COATED ORAL at 08:42

## 2024-02-21 RX ADMIN — FUROSEMIDE 40 MG: 40 TABLET ORAL at 08:42

## 2024-02-21 RX ADMIN — OXYBUTYNIN CHLORIDE 10 MG: 5 TABLET, EXTENDED RELEASE ORAL at 08:42

## 2024-02-21 RX ADMIN — GABAPENTIN 300 MG: 300 CAPSULE ORAL at 08:42

## 2024-02-21 RX ADMIN — INSULIN LISPRO 3 UNITS: 100 INJECTION, SOLUTION INTRAVENOUS; SUBCUTANEOUS at 03:47

## 2024-02-21 RX ADMIN — INSULIN LISPRO 12 UNITS: 100 INJECTION, SOLUTION INTRAVENOUS; SUBCUTANEOUS at 12:41

## 2024-02-21 RX ADMIN — LEVOTHYROXINE SODIUM 100 MCG: 0.1 TABLET ORAL at 05:54

## 2024-02-21 RX ADMIN — BUPROPION HYDROCHLORIDE 150 MG: 150 TABLET, EXTENDED RELEASE ORAL at 08:42

## 2024-02-21 RX ADMIN — INSULIN LISPRO 10 UNITS: 100 INJECTION, SOLUTION INTRAVENOUS; SUBCUTANEOUS at 12:41

## 2024-02-21 RX ADMIN — BUDESONIDE AND FORMOTEROL FUMARATE DIHYDRATE 2 PUFF: 160; 4.5 AEROSOL RESPIRATORY (INHALATION) at 06:54

## 2024-02-21 NOTE — PLAN OF CARE
Goal Outcome Evaluation:  Plan of Care Reviewed With: patient        Progress: improving               Pt discharging to [facility] via private vehicle. IV dc'ed, report called to Connie OSMAN. Transfer form completed.

## 2024-02-21 NOTE — THERAPY EVALUATION
Patient Name: Giles Donovan  : 1945    MRN: [id number]                              Today's Date: 2024       Admit Date: 2024    Visit Dx:     ICD-10-CM ICD-9-CM   1. History of recent fall  Z91.81 V15.88   2. JORGE (acute kidney injury)  N17.9 584.9   3. Orthostatic hypotension  I95.1 458.0   4. Chronic anticoagulation  Z79.01 V58.61   5. Difficulty walking  R26.2 719.7     Patient Active Problem List   Diagnosis    Allergic rhinitis    Controlled type 2 diabetes mellitus without complication, without long-term current use of insulin    GERD (gastroesophageal reflux disease)    Hyperlipidemia    Hypertension    Hypothyroidism    Stenosis of right carotid artery    Syncope    Urge incontinence of urine    Erectile dysfunction    Benign prostatic hyperplasia with urinary frequency    Cardiomyopathy    Cataract    Chest pain with low risk for cardiac etiology    Right-sided carotid artery occlusion without cerebral infarction    Depression    Vaso vagal episode    Hypoxia    Restrictive lung disease    Mixed hyperlipidemia    Chronic heart failure with preserved ejection fraction    Carotid artery occlusion    Impacted cerumen, bilateral    Primary insomnia    Unsteady gait when walking    Orthostatic hypotension    Supplemental oxygen dependent    Paroxysmal atrial fibrillation    Chronic bilateral pleural effusions    JORGE (acute kidney injury)     Past Medical History:   Diagnosis Date    A-fib     Allergic rhinitis 2014    Will try Zyrtec, he didnt want to use a nasal spray.    Arthritis     Asthma     Cataract     left eye    Cough 2016    PFT and CXR ordered.    Depression     PTSD    Diabetes     Elevated cholesterol     H/O psychiatric care     PTSD    Hyperlipidemia 2016    Lipids ordered. Continue on current medication.    Hypertension     Hypothyroidism     Seasonal allergies     Shortness of breath     Sleep apnea     Stenosis of right carotid artery 2017     Will refer to vascular surgeon.    Syncope 02/19/2017    Type 2 diabetes mellitus     Upper respiratory infection 10/18/2015    Will treat cough with Hydromet, otherwise over the counter meds for treatment.     Past Surgical History:   Procedure Laterality Date    CATARACT EXTRACTION Right     x2    COLONOSCOPY      JOINT REPLACEMENT      REPLACEMENT TOTAL HIP ONCOLOGIC Right     RETINAL DETACHMENT REPAIR      TOE AMPUTATION Left 11/2017    Second phalaeng.    TOE OSTEOPHYTE REMOVAL        General Information       Row Name 02/21/24 1224          OT Time and Intention    Document Type evaluation  -PG     Mode of Treatment individual therapy;occupational therapy  -PG       Row Name 02/21/24 1224          General Information    Patient Profile Reviewed --  Patient lives with his significant other, uses rolling walker and assist needed with self-care activities.  Also owns a electric wheelchair but rarely uses  -PG     Prior Level of Function ADL's;mod assist:  -PG     Existing Precautions/Restrictions fall  -PG     Barriers to Rehab none identified  -PG       Row Name 02/21/24 1224          Occupational Profile    Reason for Services/Referral (Occupational Profile) Patient is a pleasant 78-year-old male admitted for fall, orthostatic hypotension and JORGE.  Patient being evaluated by Occupational Therapy due to recent decline in ADL function.  No prior OT services identified  -PG       Row Name 02/21/24 1224          Living Environment    People in Home significant other  -PG       Row Name 02/21/24 1224          Cognition    Orientation Status (Cognition) oriented x 3  -PG       Row Name 02/21/24 1224          Safety Issues, Functional Mobility    Impairments Affecting Function (Mobility) balance;cognition;endurance/activity tolerance;pain;strength;shortness of breath  -PG               User Key  (r) = Recorded By, (t) = Taken By, (c) = Cosigned By      Initials Name Provider Type    PG Inder Villeda, OT  Occupational Therapist                     Mobility/ADL's       Row Name 02/21/24 1226          Transfers    Transfers sit-stand transfer;stand-sit transfer  -PG       Row Name 02/21/24 1226          Sit-Stand Transfer    Sit-Stand [place] (Transfers) verbal cues;contact guard  -PG     Assistive Device (Sit-Stand Transfers) walker, front-wheeled  -PG       Row Name 02/21/24 1226          Stand-Sit Transfer    Stand-Sit [place] (Transfers) verbal cues;contact guard  -PG     Assistive Device (Stand-Sit Transfers) walker, front-wheeled  -PG       Row Name 02/21/24 1226          Activities of Daily Living    BADL Assessment/Intervention bathing;upper body dressing;lower body dressing;grooming;toileting  -PG       Row Name 02/21/24 1226          Bathing Assessment/Intervention    [place] Level (Bathing) bathing skills;moderate assist (50% patient effort)  -PG       Row Name 02/21/24 1226          Upper Body Dressing Assessment/Training    [place] Level (Upper Body Dressing) upper body dressing skills;set up  -PG       Row Name 02/21/24 1226          Lower Body Dressing Assessment/Training    [place] Level (Lower Body Dressing) lower body dressing skills;maximum assist (25% patient effort)  -PG       Row Name 02/21/24 1226          Grooming Assessment/Training    [place] Level (Grooming) grooming skills;set up  -PG       Row Name 02/21/24 1226          Toileting Assessment/Training    [place] Level (Toileting) toileting skills;maximum assist (25% patient effort)  -PG               User Key  (r) = Recorded By, (t) = Taken By, (c) = Cosigned By      Initials Name Provider Type    PG Inder Villeda OT Occupational Therapist                   Obj/Interventions       Row Name 02/21/24 1227          Sensory Assessment (Somatosensory)    Sensory Assessment (Somatosensory) sensation intact  -PG       Row Name 02/21/24 1227          Vision Assessment/Intervention    Visual Impairment/Limitations  WFL  -PG       Row Name 02/21/24 1227          Range of Motion Comprehensive    General Range of Motion no range of motion deficits identified  -PG       Row Name 02/21/24 1227          Strength Comprehensive (MMT)    Comment, General Manual Muscle Testing (MMT) Assessment 4 -/5 bilateral upper extremities  -PG       Row Name 02/21/24 1227          Motor Skills    Motor Skills coordination;functional endurance  -PG     Coordination WFL  -PG     Functional Endurance Fair minus  -PG               User Key  (r) = Recorded By, (t) = Taken By, (c) = Cosigned By      Initials Name Provider Type    PG Inder Villeda, OT Occupational Therapist                   Goals/Plan       Row Name 02/21/24 1228          Transfer Goal 1 (OT)    Activity/Assistive Device (Transfer Goal 1, OT) transfers, all  -PG     [place] Level/Cues Needed (Transfer Goal 1, OT) modified independence  -PG     Time Frame (Transfer Goal 1, OT) long term goal (LTG);10 days  -PG       Row Name 02/21/24 1228          Bathing Goal 1 (OT)    Activity/Device (Bathing Goal 1, OT) bathing skills, all  -PG     [place]ana Level/Cues Needed (Bathing Goal 1, OT) modified independence  -PG     Time Frame (Bathing Goal 1, OT) long term goal (LTG);10 days  -PG       Row Name 02/21/24 1228          Dressing Goal 1 (OT)    Activity/Device (Dressing Goal 1, OT) dressing skills, all  -PG     Wana/Cues Needed (Dressing Goal 1, OT) modified independence  -PG     Time Frame (Dressing Goal 1, OT) long term goal (LTG);10 days  -PG       Row Name 02/21/24 1228          Toileting Goal 1 (OT)    Activity/Device (Toileting Goal 1, OT) toileting skills, all  -PG     Wana Level/Cues Needed (Toileting Goal 1, OT) modified independence  -PG     Time Frame (Toileting Goal 1, OT) long term goal (LTG);10 days  -PG       Row Name 02/21/24 1228          Grooming Goal 1 (OT)    Activity/Device (Grooming Goal 1, OT) grooming skills, all  -PG     Wana (Grooming  Goal 1, OT) modified independence  -PG     Time Frame (Grooming Goal 1, OT) long term goal (LTG);10 days  -PG       Row Name 02/21/24 1228          Strength Goal 1 (OT)    Strength Goal 1 (OT) Patient will improve bilateral upper extremity strength to 4+/5 to support independence with ADL activities  -PG     Time Frame (Strength Goal 1, OT) long term goal (LTG);10 days  -PG       Row Name 02/21/24 1228          Problem Specific Goal 1 (OT)    Problem Specific Goal 1 (OT) Patient will improve activity tolerance to fair plus to support independence with self-care activities  -PG     Time Frame (Problem Specific Goal 1, OT) long term goal (LTG);10 days  -PG       Row Name 02/21/24 1228          Therapy Assessment/Plan (OT)    Planned Therapy Interventions (OT) activity tolerance training;BADL retraining;transfer/mobility retraining;strengthening exercise;patient/caregiver education/training;occupation/activity based interventions  -PG               User Key  (r) = Recorded By, (t) = Taken By, (c) = Cosigned By      Initials Name Provider Type    PG Inder Villeda, OT Occupational Therapist                   Clinical Impression       Row Name 02/21/24 1227          Pain Assessment    Pretreatment Pain Rating 0/10 - no pain  -PG     Posttreatment Pain Rating 0/10 - no pain  -PG       Row Name 02/21/24 1227          Plan of Care Review    Plan of Care Reviewed With patient  -PG     Progress no change  -PG     Outcome Evaluation Patient presents with limitations affecting strength, activity tolerance, and balance impacting patient's ability to return home safely and independently.  The skills of a therapist will be required to safely and effectively implement the following treatment plan to restore maximal level of function  -PG       Row Name 02/21/24 1227          Therapy Assessment/Plan (OT)    Patient/Family Therapy Goal Statement (OT) Get stronger and return home independently  -PG     Rehab Potential (OT) good, to  achieve stated therapy goals  -PG     Criteria for Skilled Therapeutic Interventions Met (OT) yes;meets criteria;skilled treatment is necessary  -PG     Therapy Frequency (OT) 5 times/wk  -PG       Row Name 02/21/24 1227          Therapy Plan Review/Discharge Plan (OT)    Anticipated Discharge Disposition (OT) sub acute care setting  -PG               User Key  (r) = Recorded By, (t) = Taken By, (c) = Cosigned By      Initials Name Provider Type    PG Inder Villeda, OT Occupational Therapist                   Outcome Measures       Row Name 02/21/24 1229          How much help from another is currently needed...    Putting on and taking off regular lower body clothing? 2  -PG     Bathing (including washing, rinsing, and drying) 2  -PG     Toileting (which includes using toilet bed pan or urinal) 2  -PG     Putting on and taking off regular upper body clothing 3  -PG     Taking care of personal grooming (such as brushing teeth) 3  -PG     Eating meals 4  -PG     AM-PAC 6 Clicks Score (OT) 16  -PG       Row Name 02/21/24 0737          How much help from another person do you currently need...    Turning from your back to your side while in flat bed without using bedrails? 3  -AR     Moving from lying on back to sitting on the side of a flat bed without bedrails? 3  -AR     Moving to and from a bed to a chair (including a wheelchair)? 3  -AR     Standing up from a chair using your arms (e.g., wheelchair, bedside chair)? 3  -AR     Climbing 3-5 steps with a railing? 2  -AR     To walk in hospital room? 3  -AR     AM-PAC 6 Clicks Score (PT) 17  -AR     Highest Level of Mobility Goal 5 --> Static standing  -AR       Row Name 02/21/24 1229          Functional Assessment    Outcome Measure Options AM-PAC 6 Clicks Daily Activity (OT);Optimal Instrument  -PG       Row Name 02/21/24 1229          Optimal Instrument    Optimal Instrument Optimal - 3  -PG     Bending/Stooping 3  -PG     Standing 2  -PG     Reaching 2  -PG      From the list, choose the 3 activities you would most like to be able to do without any difficulty Bending/stooping;Standing;Reaching  -PG     Total Score Optimal - 3 7  -PG               User Key  (r) = Recorded By, (t) = Taken By, (c) = Cosigned By      Initials Name Provider Type    PG Inder Villeda OT Occupational Therapist    Nazia Cabrera, RN Registered Nurse                    Occupational Therapy Education       Title: PT OT SLP Therapies (Done)       Topic: Occupational Therapy (Done)       Point: ADL training (Done)       Description:   Instruct learner(s) on proper safety adaptation and remediation techniques during self care or transfers.   Instruct in proper use of assistive devices.                  Learning Progress Summary             Patient Acceptance, E,D, DU by PG at 2/21/2024 1230                         Point: Home exercise program (Done)       Description:   Instruct learner(s) on appropriate technique for monitoring, assisting and/or progressing therapeutic exercises/activities.                  Learning Progress Summary             Patient Acceptance, E,D, DU by PG at 2/21/2024 1230                         Point: Precautions (Done)       Description:   Instruct learner(s) on prescribed precautions during self-care and functional transfers.                  Learning Progress Summary             Patient Acceptance, E,D, DU by PG at 2/21/2024 1230                         Point: Body mechanics (Done)       Description:   Instruct learner(s) on proper positioning and spine alignment during self-care, functional mobility activities and/or exercises.                  Learning Progress Summary             Patient Acceptance, E,D, DU by PG at 2/21/2024 1230                                         User Key       Initials Effective Dates Name Provider Type Discipline    PG 06/16/21 -  Inder Villeda OT Occupational Therapist OT                  OT Recommendation and Plan  Planned Therapy  Interventions (OT): activity tolerance training, BADL retraining, transfer/mobility retraining, strengthening exercise, patient/caregiver education/training, occupation/activity based interventions  Therapy Frequency (OT): 5 times/wk  Plan of Care Review  Plan of Care Reviewed With: patient  Progress: no change  Outcome Evaluation: Patient presents with limitations affecting strength, activity tolerance, and balance impacting patient's ability to return home safely and independently.  The skills of a therapist will be required to safely and effectively implement the following treatment plan to restore maximal level of function     Time Calculation:   Evaluation Complexity (OT)  Review Occupational Profile/Medical/Therapy History Complexity: brief/low complexity  Assessment, Occupational Performance/Identification of Deficit Complexity: 3-5 performance deficits  Clinical Decision Making Complexity (OT): problem focused assessment/low complexity  Overall Complexity of Evaluation (OT): low complexity     Time Calculation- OT       Row Name 02/21/24 1231             Time Calculation- OT    OT Received On 02/21/24  -PG      OT Goal Re-Cert Due Date 03/01/24  -PG         Untimed Charges    OT Eval/Re-eval Minutes 30  -PG         Total Minutes    Untimed Charges Total Minutes 30  -PG       Total Minutes 30  -PG                User Key  (r) = Recorded By, (t) = Taken By, (c) = Cosigned By      Initials Name Provider Type    PG Inder Villeda OT Occupational Therapist                  Therapy Charges for Today       Code Description Service Date Service Provider Modifiers Qty    [id number]  OT EVAL LOW COMPLEXITY 2 2/21/2024 Inder Villeda OT GO 1                 Inder Villeda OT  2/21/2024

## 2024-02-21 NOTE — DISCHARGE SUMMARY
[facility]         HOSPITALIST  DISCHARGE SUMMARY    Patient Name: Giles Donovan  : 1945  MRN: [id number]    Date of Admission: 2024  Date of Discharge:  24  Primary Care Physician: Morena Masters MD    Hospital Problems:  Acute renal failure  Mechanical fall  Generalized weakness and debility  Chronic heart failure with preserved ejection fraction  Type 2 diabetes mellitus  Hypothyroidism  Chronic paroxysmal atrial fibrillation    Hospital Course     Hospital Course:  Giles Donovan is a 78 y.o. male with essential hypertension, type 2 diabetes mellitus, COPD and chronic heart failure with preserved ejection fraction that presented to the ED after fall at home. Patient reportedly had been having more frequent falls at home. Eval in ED significant for labs showing elevated creatinine compared to baseline and hyperglycemia. Hospitalist service contacted for further evaluation and management. Home diuretics and home Entresto held due to soft blood pressures initially.  At discharge patient was noted to be normotensive but given issues with hypotension Entresto was not restarted, referral to heart failure clinic was made at time of discharge and patient is to have close follow-up.  Further titration of home medications for patient's diastolic heart failure will be made at that time.  During hospitalization patient did have some issues with urinary retention, home oxybutynin was discontinued at discharge for this reason, close following/monitoring as an outpatient recommended.  Patient worked with PT/OT services and rehab placement was recommended.  Patient agreeable to placement at [facility], patient discharged to rehab needs to follow-up with PCP and cardiology after discharge.    DISCHARGE Follow Up Recommendations for labs and diagnostics:   -Follow-up with PCP in 3 to 5 days  -Ambulatory referral to heart failure clinic placed at discharge    Day of  Discharge     Vital Signs:  Temp:  [97.8 °F (36.6 °C)-98.6 °F (37 °C)] 98.2 °F (36.8 °C)  Heart Rate:  [] 95  Resp:  [16-20] 16  BP: (117-143)/(71-85) 128/79  Flow (L/min):  [2] 2  Physical Exam:   Gen: No acute distress, lying in bed, pleasant, conversant  Resp: CTAB, No w/r/r, good aeration, equal chest rise bilaterally  Card: Regular rhythm, tachycardic, No m/r/g  Abd: Soft, Nontender, Nondistended, + bowel sounds     Discharge Details        Discharge Medications        Continue These Medications        Instructions Start Date   aspirin EC 81 MG EC tablet  Generic drug: aspirin   81 mg, Oral, Daily      atorvastatin 80 MG tablet  Commonly known as: LIPITOR   80 mg, Oral, Daily      benzonatate 100 MG capsule  Commonly known as: TESSALON   100-200 mg, Oral, 3 Times Daily PRN      buPROPion 75 MG tablet  Commonly known as: WELLBUTRIN   150 mg, Oral, Daily      cetirizine 10 MG tablet  Commonly known as: ZyrTEC Allergy   10 mg, Oral, Daily      Eliquis 5 MG tablet tablet  Generic drug: apixaban   5 mg, Oral, Every 12 Hours Scheduled      fluticasone-salmeterol 230-21 MCG/ACT inhaler  Commonly known as: ADVAIR HFA   2 puffs, Inhalation, 2 Times Daily      furosemide 40 MG tablet  Commonly known as: LASIX   40 mg, Oral, 2 Times Daily      gabapentin 600 MG tablet  Commonly known as: NEURONTIN   600 mg, Oral, 3 Times Daily      insulin aspart 100 UNIT/ML injection  Commonly known as: novoLOG   24-26 Units, Subcutaneous, 3 Times Daily Before Meals      Lantus SoloStar 100 UNIT/ML injection pen  Generic drug: Insulin Glargine   Inject 40-44 Units under the skin into the appropriate area as directed Every Night.      levothyroxine 100 MCG tablet  Commonly known as: SYNTHROID, LEVOTHROID   100 mcg, Oral, Daily      metFORMIN 500 MG tablet  Commonly known as: GLUCOPHAGE   500 mg, Oral, 2 Times Daily With Meals      olopatadine 0.1 % ophthalmic solution  Commonly known as: PATANOL   1 drop, Both Eyes, Daily PRN       omeprazole 20 MG capsule  Commonly known as: priLOSEC   20 mg, Oral, Daily      Ozempic (0.25 or 0.5 MG/DOSE) 2 MG/1.5ML solution pen-injector  Generic drug: Semaglutide(0.25 or 0.5MG/DOS)   0.5 mg, Subcutaneous, Weekly      PARoxetine 40 MG tablet  Commonly known as: PAXIL   40 mg, Oral, Every Evening      prazosin 1 MG capsule  Commonly known as: MINIPRESS   1 mg, Oral, Nightly      traZODone 100 MG tablet  Commonly known as: DESYREL   100 mg, Oral, Nightly PRN             Stop These Medications      Entresto 24-26 MG tablet  Generic drug: sacubitril-valsartan     oxybutynin XL 10 MG 24 hr tablet  Commonly known as: DITROPAN-XL              Allergies   Allergen Reactions    Latex Rash and Anaphylaxis    Latex, Natural Rubber Itching       Discharge Disposition:  Rehab Facility or Unit (DC - External)    Diet:  Hospital:  Diet Order   Procedures    Diet: Diabetic Diets, Cardiac Diets; Healthy Heart (2-3 Na+); Consistent Carbohydrate; Texture: Regular Texture (IDDSI 7); Fluid Consistency: Thin (IDDSI 0)       Discharge Activity:   Activity Instructions       Activity as Tolerated              CODE STATUS:  Code Status and Medical Interventions:   Ordered at: 02/19/24 0821     Level Of Support Discussed With:    Patient     Code Status (Patient has no pulse and is not breathing):    CPR (Attempt to Resuscitate)     Medical Interventions (Patient has pulse or is breathing):    Full Support       Future Appointments   Date Time Provider Department Center   2/29/2024  3:15 PM Herb Ochoa DO [facility] PCC ETW [facility]   3/20/2024 11:30 AM Morena Masters MD [facility] IMP RADC [facility]   5/21/2024  1:15 PM Tabatha Oconnell APRN [facility] U ETRING [facility]   8/15/2024 11:30 AM Dara Peterson MD [facility] CD EDIXE [facility]       Additional Instructions for the Follow-ups that You Need to Schedule       Discharge Follow-up with PCP   As directed       Currently Documented PCP:    Morena Masters MD    PCP Phone Number:    [phone]      Follow Up Details: Follow-up in 3-5 days after discharge from rehab facility                Pertinent  and/or Most Recent Results     RADIOLOGY:  CT Head Without Contrast [[id number]] Davy as Reviewed   Order Status: Completed Collected: 02/19/24 0542    Updated: 02/19/24 0545   Narrative:     PROCEDURE: CT HEAD WO CONTRAST     COMPARISON: [facility], CT, CT HEAD WO CONTRAST, 7/30/2023, 4:38.  INDICATIONS: fall     PROTOCOL:   Standard imaging protocol performed     RADIATION:   DLP: 1018.2 mGy*cm    MA and/or KV was adjusted to minimize radiation dose.        TECHNIQUE: After obtaining the patient's consent, CT images were obtained without non-ionic  intravenous contrast material.     FINDINGS:  Generalized atrophy is noted with chronic small-vessel ischemic disease in the white matter.    Ventricular size is within normal limits.  Atherosclerotic calcifications are noted in the carotid  siphons and vertebral arteries.  No acute infarct or hemorrhage is seen.  There are no masses.    There is no skull fracture.  Empty sella is present.      Impression:     Chronic senescent changes.  No acute findings.            CAMRYN ESPINOZA MD        Electronically Signed and Approved By: CAMRYN ESPINOZA MD on 2/19/2024 at 5:42       LAB RESULTS:      Lab 02/21/24 0352 02/20/24 0431 02/19/24  1040 02/19/24  0552   WBC 7.65 7.36 7.10 7.51   HEMOGLOBIN 13.0 11.1* 11.9* 11.4*   HEMATOCRIT 39.4 33.9* 35.9* 35.1*   PLATELETS 193 164 171 178   NEUTROS ABS  --  3.99 4.07 4.40   IMMATURE GRANS (ABS)  --  0.02 0.01 0.02   LYMPHS ABS  --  2.33 2.03 2.15   MONOS ABS  --  0.79 0.82 0.79   EOS ABS  --  0.18 0.13 0.12   MCV 88.7 89.4 90.9 90.7         Lab 02/21/24  0352 02/20/24  0431 02/19/24  1040 02/19/24  0552   SODIUM 135* 138 138 134*   POTASSIUM 4.1 3.8 3.9 3.9   CHLORIDE 95* 100 100 94*   CO2 25.5 29.4* 26.8 27.6   ANION GAP 14.5 8.6 11.2 12.4   BUN 21 20 31* 36*   CREATININE 1.16 0.95 1.41* 1.79*   EGFR 64.9 82.4  51.3* 38.5*   GLUCOSE 398* 105* 130* 322*   CALCIUM 10.3 9.7 9.6 9.8   MAGNESIUM 1.7  --   --  1.8   PHOSPHORUS 3.4  --   --   --          Lab 02/20/24  0431 02/19/24  0552   TOTAL PROTEIN 6.3 7.4   ALBUMIN 3.6 4.1   GLOBULIN 2.7 3.3   ALT (SGPT) 18 23   AST (SGOT) 16 19   BILIRUBIN 0.2 0.2   ALK PHOS 61 71         Lab 02/19/24  0753 02/19/24  0552   HSTROP T 29* 35*                 Brief Urine Lab Results  (Last result in the past 365 days)        Color   Clarity   Blood   Leuk Est   Nitrite   Protein   CREAT   Urine HCG        02/21/24 0815 Yellow   Clear   Trace   Negative   Negative   Negative                 Microbiology Results (last 10 days)       ** No results found for the last 240 hours. **              Results for orders placed during the hospital encounter of 09/16/23    Bilateral Carotid Duplex    Interpretation Summary    Right internal carotid artery is occluded.    Left internal carotid artery demonstrates normal flow without evidence of hemodynamically significant stenosis.    Heavy plaque in the bifurcations bilaterally.    Vertebral flow is antegrade bilaterally.    No prior study available for comparison.      Results for orders placed during the hospital encounter of 09/16/23    Bilateral Carotid Duplex    Interpretation Summary    Right internal carotid artery is occluded.    Left internal carotid artery demonstrates normal flow without evidence of hemodynamically significant stenosis.    Heavy plaque in the bifurcations bilaterally.    Vertebral flow is antegrade bilaterally.    No prior study available for comparison.      Results for orders placed during the hospital encounter of 09/16/23    Adult Transthoracic Echo Complete w/ Color, Spectral and Contrast if necessary per protocol    Interpretation Summary    Left ventricular systolic function is low normal. Calculated left ventricular EF = 45.1%    The left ventricular cavity is borderline dilated.    Left ventricular diastolic function is  consistent with (grade I) impaired relaxation.    There is mild calcification of the aortic valve.      Time spent on Discharge including face to face service:  35 minutes    Electronically signed by Owen Vanessa MD, 02/21/24, 2:31 PM EST.

## 2024-02-21 NOTE — PLAN OF CARE
Goal Outcome Evaluation:  Plan of Care Reviewed With: patient        Progress: no change  Outcome Evaluation: Patient presents with limitations affecting strength, activity tolerance, and balance impacting patient's ability to return home safely and independently.  The skills of a therapist will be required to safely and effectively implement the following treatment plan to restore maximal level of function      Anticipated Discharge Disposition (OT): sub acute care setting

## 2024-02-21 NOTE — PLAN OF CARE
Goal Outcome Evaluation:      A&O x4. VSS. Patient complained of increase urination and feeling sweaty on shift, POC glucose obtain result 314 on call provider notified, new orders placed. Patient also complained of feeling of fullness in bladder, bladder scan read 748 patient up to bathroom x2 bladder scan then read 462 will continue to monitor output status.

## 2024-02-28 ENCOUNTER — READMISSION MANAGEMENT (OUTPATIENT)
Dept: CALL CENTER | Facility: HOSPITAL | Age: 79
End: 2024-02-28
Payer: MEDICARE

## 2024-02-28 NOTE — OUTREACH NOTE
Prep Survey      Flowsheet Row Responses   [facility] facility patient discharged from? Non-BH   Is LACE score < 7 ? Non-BH Discharge   Eligibility [facility] -   Date of Discharge 02/28/24   Discharge diagnosis Unspecified abnormalities of gait and mobility   Does the patient have one of the following disease processes/diagnoses(primary or secondary)? Other   Prep survey completed? Yes            Mabel BRYAN - Registered Nurse

## 2024-02-29 ENCOUNTER — OFFICE VISIT (OUTPATIENT)
Dept: PULMONOLOGY | Facility: CLINIC | Age: 79
End: 2024-02-29
Payer: MEDICARE

## 2024-02-29 ENCOUNTER — TRANSITIONAL CARE MANAGEMENT TELEPHONE ENCOUNTER (OUTPATIENT)
Dept: CALL CENTER | Facility: HOSPITAL | Age: 79
End: 2024-02-29
Payer: MEDICARE

## 2024-02-29 VITALS
OXYGEN SATURATION: 96 % | HEIGHT: 67 IN | TEMPERATURE: 98.4 F | HEART RATE: 100 BPM | SYSTOLIC BLOOD PRESSURE: 104 MMHG | BODY MASS INDEX: 30.61 KG/M2 | DIASTOLIC BLOOD PRESSURE: 61 MMHG | RESPIRATION RATE: 18 BRPM | WEIGHT: 195 LBS

## 2024-02-29 DIAGNOSIS — J98.4 RESTRICTIVE LUNG DISEASE: Primary | ICD-10-CM

## 2024-02-29 RX ORDER — FUROSEMIDE 40 MG/1
40 TABLET ORAL 2 TIMES DAILY
Qty: 60 TABLET | Refills: 1 | Status: SHIPPED | OUTPATIENT
Start: 2024-02-29

## 2024-02-29 NOTE — OUTREACH NOTE
Call Center TCM Note      Flowsheet Row Responses   [facility] [place] patient discharged from? Non-BH   Does the patient have one of the following disease processes/diagnoses(primary or secondary)? Other   TCM attempt successful? No   Unsuccessful attempts Attempt 1   Call Status Voice mail issues  [mailbox is full]            Karla Mclaughlin RN    2/29/2024, 09:00 EST

## 2024-02-29 NOTE — PROGRESS NOTES
"Chief Complaint  Restrictive lung disease, Follow-up (4-5 month ), and Shortness of Breath (Nothing more then normal )    Subjective        Giles Donovan presents to [facility] PULMONARY & CRITICAL CARE MEDICINE  History of Present Illness  Shanae male here today for follow-up  Breathing has improved with diuresis  Recently Entresto was discontinued due to low blood pressure and dizziness  No increased cough no increased shortness of breath  Objective   Vital Signs:  /61 (BP Location: Right arm, Patient Position: Sitting, Cuff Size: Large Adult)   Pulse 100   Temp 98.4 °F (36.9 °C) (Temporal)   Resp 18   Ht 170.2 cm (67\")   Wt 88.5 kg (195 lb)   SpO2 96% Comment: room air  BMI 30.54 kg/m²   Estimated body mass index is 30.54 kg/m² as calculated from the following:    Height as of this encounter: 170.2 cm (67\").    Weight as of this encounter: 88.5 kg (195 lb).               Physical Exam   Shanae male  Speaks full sentences without difficulties  Has improved aeration at the bases  Result Review :                     Assessment and Plan     There are no diagnoses linked to this encounter.    Assessment  Bilateral pleural effusions  Chronic heart failure with preserved ejection fraction  Restrictive lung disease    Plan  Patient's symptoms have improved with diuresis  His restriction on his pulmonary function study likely due to his pleural effusions  Will continue with diuresis    Continue with cardiac medications per cardiology and primary care    Continue albuterol as needed    No need for thoracentesis at this time as the effusions appear to have decreased in size on most recent ultrasound done as an outpatient       Follow Up     Return in about 6 months (around 8/29/2024).  Patient was given instructions and counseling regarding his condition or for health maintenance advice. Please see specific information pulled into the AVS if appropriate.         "

## 2024-02-29 NOTE — OUTREACH NOTE
Call Center TCM Note      Flowsheet Row Responses   [facility] patient discharged from? Non-BH   Does the patient have one of the following disease processes/diagnoses(primary or secondary)? Other   TCM attempt successful? No   Unsuccessful attempts Attempt 2            Karla Mclaughlin RN    2/29/2024, 15:12 EST

## 2024-03-01 ENCOUNTER — TRANSITIONAL CARE MANAGEMENT TELEPHONE ENCOUNTER (OUTPATIENT)
Dept: CALL CENTER | Facility: HOSPITAL | Age: 79
End: 2024-03-01
Payer: MEDICARE

## 2024-03-01 NOTE — OUTREACH NOTE
Call Center TCM Note      Flowsheet Row Responses   [facility] [place] facility patient discharged from? Non-BH  [[facility]]   Does the patient have one of the following disease processes/diagnoses(primary or secondary)? Other   TCM attempt successful? No   Unsuccessful attempts Attempt 3            Brit Muñoz RN    3/1/2024, 09:41 EST

## 2024-03-04 ENCOUNTER — OFFICE VISIT (OUTPATIENT)
Dept: INTERNAL MEDICINE | Facility: CLINIC | Age: 79
End: 2024-03-04
Payer: MEDICARE

## 2024-03-04 VITALS
RESPIRATION RATE: 12 BRPM | HEART RATE: 106 BPM | SYSTOLIC BLOOD PRESSURE: 92 MMHG | TEMPERATURE: 98 F | BODY MASS INDEX: 30.86 KG/M2 | DIASTOLIC BLOOD PRESSURE: 60 MMHG | OXYGEN SATURATION: 93 % | HEIGHT: 67 IN | WEIGHT: 196.6 LBS

## 2024-03-04 DIAGNOSIS — I50.32 CHRONIC HEART FAILURE WITH PRESERVED EJECTION FRACTION: ICD-10-CM

## 2024-03-04 DIAGNOSIS — E03.8 HYPOTHYROIDISM DUE TO HASHIMOTO'S THYROIDITIS: ICD-10-CM

## 2024-03-04 DIAGNOSIS — E06.3 HYPOTHYROIDISM DUE TO HASHIMOTO'S THYROIDITIS: ICD-10-CM

## 2024-03-04 DIAGNOSIS — I10 PRIMARY HYPERTENSION: ICD-10-CM

## 2024-03-04 DIAGNOSIS — E11.9 CONTROLLED TYPE 2 DIABETES MELLITUS WITHOUT COMPLICATION, WITHOUT LONG-TERM CURRENT USE OF INSULIN: ICD-10-CM

## 2024-03-04 DIAGNOSIS — Z09 HOSPITAL DISCHARGE FOLLOW-UP: Primary | ICD-10-CM

## 2024-03-04 DIAGNOSIS — J98.4 RESTRICTIVE LUNG DISEASE: ICD-10-CM

## 2024-03-04 DIAGNOSIS — R26.81 UNSTEADY GAIT WHEN WALKING: ICD-10-CM

## 2024-03-04 NOTE — PROGRESS NOTES
Transitional Care Follow Up Visit  Subjective     Giles is a 78 y.o. male who presents for a transitional care management visit.    Within 48 business hours after discharge our office contacted him via telephone to coordinate his care and needs.      I reviewed and discussed the details of that call along with the discharge summary, hospital problems, inpatient lab results, inpatient diagnostic studies, and consultation reports with Giles.     Current outpatient and discharge medications have been reconciled for the patient.  Reviewed by: TENISHA Carrillo          2/28/2024     5:36 PM   Date of TCM Phone Call   [facility] -   Date of Discharge 2/28/2024       Risk for Readmission (LACE) Score: 9 (2/21/2024  6:00 AM)      Hospital discharge follow-up.  Patient admitted to [facility] from 2/19/2024 to 2/21/2024 due to acute renal failure, frequent falls, weakness and debility.  Patient with chronic medical conditions at baseline.  He was rehabilitated in the hospital, he was discharged to rehab facility, was there for 1 week.  Focus was PT and OT, patient tolerated well.  Since returning home, patient reports improvement in mobility, strength.  No significant issues to report.  He does have several specialist on board, including endocrinology, urology, pulmonology, cardiology.  He does report his insulin was out of stock, so he went a couple of weeks without any long-acting insulin, relying on his fast acting only.  He has been poorly controlled for quite some time.    Course During Hospital Stay The following information was reviewed by: TENISHA Carrillo on 03/04/2024: Emergency room history physical, admission history physical, consultation notes, lab work, imaging.     The following portions of the patient's history were reviewed and updated as appropriate: allergies, current medications, past family history, past medical history, past social  "history, past surgical history, and problem list.     Vitals:    03/04/24 1118   BP: 92/60   BP Location: Right arm   Patient Position: Sitting   Cuff Size: Adult   Pulse: 106   Resp: 12   Temp: 98 °F (36.7 °C)   TempSrc: Temporal   SpO2: 93%   Weight: 89.2 kg (196 lb 9.6 oz)   Height: 170.2 cm (67.01\")       Objective   Physical Exam  Vitals and nursing note reviewed.   Constitutional:       Appearance: Normal appearance. He is obese.   HENT:      Head: Normocephalic and atraumatic.      Right Ear: External ear normal.      Left Ear: External ear normal.   Cardiovascular:      Rate and Rhythm: Normal rate and regular rhythm.      Pulses: Normal pulses.      Heart sounds: Normal heart sounds.   Pulmonary:      Effort: Pulmonary effort is normal.      Breath sounds: Normal breath sounds.   Neurological:      Mental Status: He is alert.   Psychiatric:         Mood and Affect: Mood normal.         Thought Content: Thought content normal.           Assessment & Plan     Diagnoses and all orders for this visit:    1. Hospital discharge follow-up (Primary)    2. Unsteady gait when walking    3. Primary hypertension    4. Chronic heart failure with preserved ejection fraction    5. Controlled type 2 diabetes mellitus without complication, without long-term current use of insulin    6. Hypothyroidism due to Hashimoto's thyroiditis    7. Restrictive lung disease    After full review, discussed with patient the importance of controlling all medical problems.  His diabetes has not been well-controlled, he will further discuss this with endocrinology.  We discussed likely needs some changes in his insulin regimen, including both fast acting and long-acting.  He will discuss with them.  His strength is improving, his gait is improving, he uses an assistive device, walker with chair.  Reviewed directions.  Blood pressure was somewhat labile in the hospital, continues to be, he did restart his Entresto at the recommendation of " pulmonology who saw him last week.  He has follow-up next week with cardiology, further discuss with him as well.  Continue to follow-up with PCP on 3/20/2024.  He has no other concerns or complaints today, wife also agrees that no concerns or complaints today.    Follow Up     No follow-ups on file.

## 2024-03-11 ENCOUNTER — TELEPHONE (OUTPATIENT)
Dept: CARDIOLOGY | Facility: CLINIC | Age: 79
End: 2024-03-11
Payer: MEDICARE

## 2024-03-13 ENCOUNTER — OFFICE VISIT (OUTPATIENT)
Dept: CARDIOLOGY | Facility: CLINIC | Age: 79
End: 2024-03-13
Payer: MEDICARE

## 2024-03-13 VITALS
BODY MASS INDEX: 30.92 KG/M2 | SYSTOLIC BLOOD PRESSURE: 100 MMHG | DIASTOLIC BLOOD PRESSURE: 64 MMHG | HEART RATE: 93 BPM | HEIGHT: 67 IN | WEIGHT: 197 LBS

## 2024-03-13 DIAGNOSIS — I48.0 PAROXYSMAL ATRIAL FIBRILLATION: ICD-10-CM

## 2024-03-13 DIAGNOSIS — E78.2 MIXED HYPERLIPIDEMIA: ICD-10-CM

## 2024-03-13 DIAGNOSIS — I50.32 CHRONIC HEART FAILURE WITH PRESERVED EJECTION FRACTION: Primary | ICD-10-CM

## 2024-03-13 DIAGNOSIS — N18.31 STAGE 3A CHRONIC KIDNEY DISEASE: ICD-10-CM

## 2024-03-13 DIAGNOSIS — I10 PRIMARY HYPERTENSION: ICD-10-CM

## 2024-03-13 LAB
ALBUMIN SERPL-MCNC: 4.3 G/DL (ref 3.5–5.2)
ALBUMIN/GLOB SERPL: 1.2 G/DL
ALP SERPL-CCNC: 67 U/L (ref 39–117)
ALT SERPL W P-5'-P-CCNC: 41 U/L (ref 1–41)
ANION GAP SERPL CALCULATED.3IONS-SCNC: 11.1 MMOL/L (ref 5–15)
AST SERPL-CCNC: 32 U/L (ref 1–40)
BASOPHILS # BLD AUTO: 0.04 10*3/MM3 (ref 0–0.2)
BASOPHILS NFR BLD AUTO: 0.5 % (ref 0–1.5)
BILIRUB SERPL-MCNC: 0.3 MG/DL (ref 0–1.2)
BUN SERPL-MCNC: 13 MG/DL (ref 8–23)
BUN/CREAT SERPL: 11.5 (ref 7–25)
CALCIUM SPEC-SCNC: 9.6 MG/DL (ref 8.6–10.5)
CHLORIDE SERPL-SCNC: 95 MMOL/L (ref 98–107)
CO2 SERPL-SCNC: 29.9 MMOL/L (ref 22–29)
CREAT SERPL-MCNC: 1.13 MG/DL (ref 0.76–1.27)
DEPRECATED RDW RBC AUTO: 46 FL (ref 37–54)
EGFRCR SERPLBLD CKD-EPI 2021: 66.9 ML/MIN/1.73
EOSINOPHIL # BLD AUTO: 0.15 10*3/MM3 (ref 0–0.4)
EOSINOPHIL NFR BLD AUTO: 2 % (ref 0.3–6.2)
ERYTHROCYTE [DISTWIDTH] IN BLOOD BY AUTOMATED COUNT: 13.5 % (ref 12.3–15.4)
GLOBULIN UR ELPH-MCNC: 3.5 GM/DL
GLUCOSE SERPL-MCNC: 243 MG/DL (ref 65–99)
HCT VFR BLD AUTO: 36.5 % (ref 37.5–51)
HGB BLD-MCNC: 11.9 G/DL (ref 13–17.7)
IMM GRANULOCYTES # BLD AUTO: 0.01 10*3/MM3 (ref 0–0.05)
IMM GRANULOCYTES NFR BLD AUTO: 0.1 % (ref 0–0.5)
IRON 24H UR-MRATE: 69 MCG/DL (ref 59–158)
IRON SATN MFR SERPL: 24 % (ref 20–50)
LYMPHOCYTES # BLD AUTO: 1.35 10*3/MM3 (ref 0.7–3.1)
LYMPHOCYTES NFR BLD AUTO: 17.7 % (ref 19.6–45.3)
MAGNESIUM SERPL-MCNC: 1.4 MG/DL (ref 1.6–2.4)
MCH RBC QN AUTO: 30 PG (ref 26.6–33)
MCHC RBC AUTO-ENTMCNC: 32.6 G/DL (ref 31.5–35.7)
MCV RBC AUTO: 91.9 FL (ref 79–97)
MONOCYTES # BLD AUTO: 0.51 10*3/MM3 (ref 0.1–0.9)
MONOCYTES NFR BLD AUTO: 6.7 % (ref 5–12)
NEUTROPHILS NFR BLD AUTO: 5.55 10*3/MM3 (ref 1.7–7)
NEUTROPHILS NFR BLD AUTO: 73 % (ref 42.7–76)
NRBC BLD AUTO-RTO: 0 /100 WBC (ref 0–0.2)
NT-PROBNP SERPL-MCNC: 733.1 PG/ML (ref 0–1800)
PLATELET # BLD AUTO: 238 10*3/MM3 (ref 140–450)
PMV BLD AUTO: 10.7 FL (ref 6–12)
POTASSIUM SERPL-SCNC: 3.9 MMOL/L (ref 3.5–5.2)
PROT SERPL-MCNC: 7.8 G/DL (ref 6–8.5)
RBC # BLD AUTO: 3.97 10*6/MM3 (ref 4.14–5.8)
SODIUM SERPL-SCNC: 136 MMOL/L (ref 136–145)
T4 FREE SERPL-MCNC: 1.09 NG/DL (ref 0.93–1.7)
TIBC SERPL-MCNC: 283 MCG/DL (ref 298–536)
TRANSFERRIN SERPL-MCNC: 190 MG/DL (ref 200–360)
TSH SERPL DL<=0.05 MIU/L-ACNC: 1.78 UIU/ML (ref 0.27–4.2)
WBC NRBC COR # BLD AUTO: 7.61 10*3/MM3 (ref 3.4–10.8)

## 2024-03-13 PROCEDURE — 85025 COMPLETE CBC W/AUTO DIFF WBC: CPT | Performed by: INTERNAL MEDICINE

## 2024-03-13 PROCEDURE — 83880 ASSAY OF NATRIURETIC PEPTIDE: CPT | Performed by: INTERNAL MEDICINE

## 2024-03-13 PROCEDURE — 84165 PROTEIN E-PHORESIS SERUM: CPT | Performed by: INTERNAL MEDICINE

## 2024-03-13 PROCEDURE — 80053 COMPREHEN METABOLIC PANEL: CPT | Performed by: INTERNAL MEDICINE

## 2024-03-13 PROCEDURE — 84466 ASSAY OF TRANSFERRIN: CPT | Performed by: INTERNAL MEDICINE

## 2024-03-13 PROCEDURE — 83540 ASSAY OF IRON: CPT | Performed by: INTERNAL MEDICINE

## 2024-03-13 PROCEDURE — 82784 ASSAY IGA/IGD/IGG/IGM EACH: CPT | Performed by: INTERNAL MEDICINE

## 2024-03-13 PROCEDURE — 82306 VITAMIN D 25 HYDROXY: CPT | Performed by: INTERNAL MEDICINE

## 2024-03-13 PROCEDURE — 84443 ASSAY THYROID STIM HORMONE: CPT | Performed by: INTERNAL MEDICINE

## 2024-03-13 PROCEDURE — 84439 ASSAY OF FREE THYROXINE: CPT | Performed by: INTERNAL MEDICINE

## 2024-03-13 PROCEDURE — 86334 IMMUNOFIX E-PHORESIS SERUM: CPT | Performed by: INTERNAL MEDICINE

## 2024-03-13 PROCEDURE — 83735 ASSAY OF MAGNESIUM: CPT | Performed by: INTERNAL MEDICINE

## 2024-03-13 PROCEDURE — 83521 IG LIGHT CHAINS FREE EACH: CPT | Performed by: INTERNAL MEDICINE

## 2024-03-13 RX ORDER — SPIRONOLACTONE 25 MG/1
25 TABLET ORAL EVERY OTHER DAY
Qty: 45 TABLET | Refills: 3 | Status: SHIPPED | OUTPATIENT
Start: 2024-03-13

## 2024-03-13 RX ORDER — FUROSEMIDE 40 MG/1
40 TABLET ORAL DAILY
Qty: 60 TABLET | Refills: 1 | Status: SHIPPED | OUTPATIENT
Start: 2024-03-13

## 2024-03-13 NOTE — PATIENT INSTRUCTIONS
1.  Start spironolactone 25 mg 1 tablet every other day.  2.  Start Jardiance 10 mg every other day alternating with spironolactone.  3.  Reduce the Lasix/furosemide 40 mg once a day.  4.  Try to cut down the gabapentin and prazosin as much as possible.  5.  Continue to monitor his heart rate blood pressure and weight every day and bring it to us log.  6.  Do blood work 1 or 2 days before your next appointment.  Okay until explained to you what changes are made in his medications

## 2024-03-13 NOTE — ASSESSMENT & PLAN NOTE
Patient has had hypertension for quite some Time.  However recently his blood pressure runs low and he frequently falls.  We will be careful with his diuresis and adjust the dosage based on his blood pressure.  He will maintain a 2-week blood pressure log and his girlfriend understands that

## 2024-03-13 NOTE — ASSESSMENT & PLAN NOTE
Although currently he appears to be in stable sinus rhythm, he has had bouts of atrial fibs with rapid ventricular response.  We will obtain an EKG at the time of his next visit.

## 2024-03-13 NOTE — PROGRESS NOTES
New Patient Office Visit    Chief Complaint  Chronic heart failure with preserved ejection fraction (HFpE    Subjective            Giles Donovan presents to [facility] CARDIOLOGY  History of Present Illness  Mr. Donovan is a 78 years old gentleman with hypertension hyperlipidemia congestive heart failure for many years who has been getting worse over the last 2 to 3 years.  The last year and a half she has not at least 4 or 5 admissions to the hospital with shortness of breath and worsening CHF.  He denies any definite chest pain.  He denies palpitations.  He has had episodes of dizziness and multiple episodes of 4 due to syncope.  He frequently gets postural hypotension.  He is a non-smoker who stopped smoking many years ago and used to drink fairly heavily until recently.  He does not have carpal tunnel syndrome, however, he has had back pain for many years and is starting to have problems with neuropathy.  I suspect he may have amyloidosis and I am going to do workup for the same      Past Medical History:   Diagnosis Date    A-fib     Allergic rhinitis 12/27/2014    Will try Zyrtec, he didnt want to use a nasal spray.    Arthritis     Asthma     Cataract     left eye    CHF (congestive heart failure)     Cough 03/30/2016    PFT and CXR ordered.    Depression     PTSD    Diabetes     Elevated cholesterol     H/O psychiatric care 1999    PTSD    Hyperlipidemia 03/30/2016    Lipids ordered. Continue on current medication.    Hypertension     Hypothyroidism     Seasonal allergies     Shortness of breath     Sleep apnea     Stenosis of right carotid artery 02/19/2017    Will refer to vascular surgeon.    Syncope 02/19/2017    Type 2 diabetes mellitus     Upper respiratory infection 10/18/2015    Will treat cough with Hydromet, otherwise over the counter meds for treatment.       Allergies   Allergen Reactions    Latex Rash and Anaphylaxis    Latex, Natural Rubber Itching        Past Surgical  History:   Procedure Laterality Date    CATARACT EXTRACTION Right     x2    COLONOSCOPY      JOINT REPLACEMENT      REPLACEMENT TOTAL HIP ONCOLOGIC Right     RETINAL DETACHMENT REPAIR      TOE AMPUTATION Left 11/2017    Second phalaeng.    TOE OSTEOPHYTE REMOVAL          Social History     Tobacco Use    Smoking status: Never     Passive exposure: Never    Smokeless tobacco: Never   Vaping Use    Vaping status: Never Used   Substance Use Topics    Alcohol use: Yes     Alcohol/week: 8.0 standard drinks of alcohol     Types: 7 Cans of beer, 1 Shots of liquor per week    Drug use: Never       Family History   Problem Relation Age of Onset    Heart disease Mother     Lupus Mother     Arthritis Mother     Kidney disease Sister         Prior to Admission medications    Medication Sig Start Date End Date Taking? Authorizing Provider   apixaban (Eliquis) 5 MG tablet tablet Take 1 tablet by mouth Every 12 (Twelve) Hours.   Yes Kierra Pitts MD   aspirin EC 81 MG EC tablet Take 1 tablet by mouth Daily. 7/23/21  Yes Kierra Pitts MD   atorvastatin (LIPITOR) 80 MG tablet Take 1 tablet by mouth Daily. 8/9/22  Yes Cathy Ochoa MD   benzonatate (TESSALON) 100 MG capsule Take 1-2 capsules by mouth 3 (Three) Times a Day As Needed for Cough.   Yes Kierra Pitts MD   buPROPion (WELLBUTRIN) 75 MG tablet Take 2 tablets by mouth Daily. 2/13/24  Yes Kierra Pitts MD   cetirizine (ZyrTEC Allergy) 10 MG tablet Take 1 tablet by mouth Daily. 8/9/22  Yes Cathy Ochoa MD   fluticasone-salmeterol (ADVAIR HFA) 230-21 MCG/ACT inhaler Inhale 2 puffs 2 (Two) Times a Day.   Yes Kierra Pitts MD   furosemide (LASIX) 40 MG tablet Take 1 tablet by mouth 2 (Two) Times a Day. 2/29/24  Yes Herb Ochoa,    gabapentin (NEURONTIN) 600 MG tablet Take 1 tablet by mouth 2 (Two) Times a Day. 3/31/21  Yes Kierra Pitts MD   insulin aspart (novoLOG) 100 UNIT/ML injection Inject 24-26 Units  under the skin into the appropriate area as directed 3 (Three) Times a Day Before Meals.   Yes Kierra Pitts MD   Insulin Glargine (Lantus SoloStar) 100 UNIT/ML injection pen Inject 40-44 Units under the skin into the appropriate area as directed Every Night.   Yes Kierra Pitts MD   levothyroxine (SYNTHROID, LEVOTHROID) 100 MCG tablet Take 1 tablet by mouth Daily.   Yes Kierra Pitts MD   metFORMIN (GLUCOPHAGE) 500 MG tablet Take 1 tablet by mouth 2 (Two) Times a Day With Meals.   Yes Kierra Pitts MD   olopatadine (PATANOL) 0.1 % ophthalmic solution Administer 1 drop to both eyes Daily As Needed for Allergies.   Yes Kierra Pitts MD   omeprazole (priLOSEC) 20 MG capsule Take 1 capsule by mouth Daily. 10/9/23  Yes Issac Ortiz MD   PARoxetine (PAXIL) 40 MG tablet Take 1 tablet by mouth Every Evening. 4/5/21  Yes Kierra Pitts MD   prazosin (MINIPRESS) 1 MG capsule Take 1 capsule by mouth Every Night. 11/3/23  Yes Kierra Pitts MD   Semaglutide,0.25 or 0.5MG/DOS, (Ozempic, 0.25 or 0.5 MG/DOSE,) 2 MG/1.5ML solution pen-injector Inject 0.5 mg under the skin into the appropriate area as directed 1 (One) Time Per Week. 2/6/24  Yes Morena Masters MD   traZODone (DESYREL) 100 MG tablet Take 1 tablet by mouth At Night As Needed for Sleep. 4/22/22  Yes Kierra Pitts MD        Review of Systems   Constitutional:  Negative for fatigue, unexpected weight gain and unexpected weight loss.   HENT:  Positive for hearing loss. Negative for sinus pressure and swollen glands.    Eyes:  Positive for blurred vision. Negative for double vision.   Respiratory:  Negative for cough, shortness of breath and wheezing.    Cardiovascular:  Negative for chest pain, palpitations and leg swelling.   Gastrointestinal:  Negative for nausea and vomiting.   Endocrine: Positive for heat intolerance, polydipsia and polyuria. Negative for cold intolerance.   Musculoskeletal:   "Positive for back pain. Negative for arthralgias.   Neurological:  Negative for dizziness, light-headedness and headache.   Hematological:  Does not bruise/bleed easily.        Symptom Course: Improved    Weight Trend: Stable     Objective     /64   Pulse 93   Ht 170.2 cm (67.01\")   Wt 89.4 kg (197 lb)   BMI 30.85 kg/m²       Physical Exam  Constitutional:       General: He is awake.      Appearance: Normal appearance.   Neck:      Thyroid: No thyromegaly.      Vascular: No carotid bruit or JVD.   Cardiovascular:      Rate and Rhythm: Normal rate and regular rhythm.      Chest Wall: PMI is not displaced.      Pulses: Normal pulses.      Heart sounds: Normal heart sounds, S1 normal and S2 normal. No murmur heard.     No friction rub. No gallop. No S3 or S4 sounds.   Pulmonary:      Effort: Pulmonary effort is normal.      Breath sounds: Normal breath sounds and air entry. No wheezing, rhonchi or rales.   Abdominal:      General: Bowel sounds are normal.      Palpations: Abdomen is soft. There is no mass.      Tenderness: There is no abdominal tenderness.   Musculoskeletal:      Cervical back: Neck supple.      Right lower leg: No edema.      Left lower leg: No edema.   Neurological:      Mental Status: He is alert and oriented to person, place, and time.   Psychiatric:         Mood and Affect: Mood normal.         Behavior: Behavior is cooperative.           Result Review :                      Lab Results   Component Value Date    PROBNP 297.0 02/06/2024    PROBNP 3,424.0 (H) 10/03/2023    PROBNP 329.7 09/26/2023     CMP          2/20/2024    04:31 2/21/2024    03:52 2/22/2024    04:00   CMP   Glucose 105  398  411    BUN 20  21  19    Creatinine 0.95  1.16  1.09    EGFR 82.4  64.9  69.9    Sodium 138  135  134    Potassium 3.8  4.1  4.0    Chloride 100  95  94    Calcium 9.7  10.3  9.9    Total Protein 6.3   7.6    Albumin 3.6   4.2    Globulin 2.7   3.4    Total Bilirubin 0.2   0.4    Alkaline " "Phosphatase 61   75    AST (SGOT) 16   77    ALT (SGPT) 18   52    Albumin/Globulin Ratio 1.3   1.2    BUN/Creatinine Ratio 21.1  18.1  17.4    Anion Gap 8.6  14.5  12.9      CBC w/diff          2/20/2024    04:31 2/21/2024    03:52 2/22/2024    04:00   CBC w/Diff   WBC 7.36  7.65  7.97    RBC 3.79  4.44  4.08    Hemoglobin 11.1  13.0  12.0    Hematocrit 33.9  39.4  36.7    MCV 89.4  88.7  90.0    MCH 29.3  29.3  29.4    MCHC 32.7  33.0  32.7    RDW 13.1  13.3  13.5    Platelets 164  193  187    Neutrophil Rel % 54.2   55.8    Immature Granulocyte Rel % 0.3   0.3    Lymphocyte Rel % 31.7   29.9    Monocyte Rel % 10.7   11.4    Eosinophil Rel % 2.4   2.0    Basophil Rel % 0.7   0.6       Lipid Panel          9/17/2023    04:53 12/20/2023    16:34 2/6/2024    11:48   Lipid Panel   Total Cholesterol 92  100  113    Triglycerides 134  86  181    HDL Cholesterol 34  38  42    VLDL Cholesterol 24  17  30    LDL Cholesterol  34  45  41    LDL/HDL Ratio 0.92  1.18  0.83       Lab Results   Component Value Date    TSH 0.652 02/06/2024    TSH 0.653 12/20/2023    TSH 0.945 09/17/2023      Lab Results   Component Value Date    FREET4 1.09 06/06/2023    FREET4 1.1 12/02/2020    FREET4 0.9 02/04/2020      No results found for: \"DDIMERQUANT\"  Magnesium   Date Value Ref Range Status   02/21/2024 1.7 1.6 - 2.4 mg/dL Final      No results found for: \"DIGOXIN\"   A1C Last 3 Results          9/17/2023    04:53 12/20/2023    16:34 2/6/2024    11:48   HGBA1C Last 3 Results   Hemoglobin A1C 9.00  8.80  9.80       We will do a buccal swab for genetic testing for ATTR amyloidosis.    Results for orders placed during the hospital encounter of 09/16/23    Adult Transthoracic Echo Complete w/ Color, Spectral and Contrast if necessary per protocol    Interpretation Summary    Left ventricular systolic function is low normal. Calculated left ventricular EF = 45.1%    The left ventricular cavity is borderline dilated.    Left ventricular diastolic " function is consistent with (grade I) impaired relaxation.    There is mild calcification of the aortic valve.        Assessment and Plan        Diagnoses and all orders for this visit:    1. Chronic heart failure with preserved ejection fraction (Primary)  Assessment & Plan:  Patient has chronic diastolic heart failure for quite some time which has become worse lately.  He continues to have some degree of fluid retention but the rate limiting factor is his blood pressure because he quite frequently has falls with low blood pressure.  Secondly, he is supersensitive to diuresis.  Currently appears relatively well compensated and I am going to take the liberty of stopping his Lasix in the afternoon and having him get only once a day in the morning.  We are going to do amyloid workup including PYP scan and immunoelectrophoresis.  The following changes were made to his medications:  1.  Start spironolactone 25 mg 1 tablet every other day.  2.  Start Jardiance 10 mg every other day alternating with spironolactone.  3.  Reduce the Lasix/furosemide 40 mg once a day.  4.  Try to cut down the gabapentin and prazosin as much as possible.  5.  Continue to monitor his heart rate blood pressure and weight every day and bring it to us log.  6.  Do blood work 1 or 2 days before your next appointment.  Okay until explained to you what changes are made in his medications      Orders:  -     CBC & Differential  -     Comprehensive Metabolic Panel  -     proBNP  -     T4, Free  -     TSH  -     Iron Profile; Future  -     25-HydroxyVitamin D LCMS D2+D3; Future  -     Immunofixation Electrophoresis, Protein Electrophoresis & Immunoglobulin Free Light Chains (Kappa / Lambda), Serum; Future  -     Magnesium  -     Cardiac Amyloid Pyrophosphate (PYP) Scan; Future  -     Iron Profile  -     25-HydroxyVitamin D LCMS D2+D3  -     Immunofixation Electrophoresis, Protein Electrophoresis & Immunoglobulin Free Light Chains (Kappa / Lambda),  Serum    2. Mixed hyperlipidemia    3. Primary hypertension  Assessment & Plan:  Patient has had hypertension for quite some Time.  However recently his blood pressure runs low and he frequently falls.  We will be careful with his diuresis and adjust the dosage based on his blood pressure.  He will maintain a 2-week blood pressure log and his girlfriend understands that    Orders:  -     CBC & Differential  -     Comprehensive Metabolic Panel  -     proBNP  -     T4, Free  -     TSH  -     Iron Profile; Future  -     25-HydroxyVitamin D LCMS D2+D3; Future  -     Immunofixation Electrophoresis, Protein Electrophoresis & Immunoglobulin Free Light Chains (Kappa / Lambda), Serum; Future  -     Magnesium  -     Cardiac Amyloid Pyrophosphate (PYP) Scan; Future  -     Iron Profile  -     25-HydroxyVitamin D LCMS D2+D3  -     Immunofixation Electrophoresis, Protein Electrophoresis & Immunoglobulin Free Light Chains (Kappa / Lambda), Serum    4. Paroxysmal atrial fibrillation  Assessment & Plan:  Although currently he appears to be in stable sinus rhythm, he has had bouts of atrial fibs with rapid ventricular response.  We will obtain an EKG at the time of his next visit.    Orders:  -     CBC & Differential  -     Comprehensive Metabolic Panel  -     proBNP  -     T4, Free  -     TSH  -     Iron Profile; Future  -     25-HydroxyVitamin D LCMS D2+D3; Future  -     Immunofixation Electrophoresis, Protein Electrophoresis & Immunoglobulin Free Light Chains (Kappa / Lambda), Serum; Future  -     Magnesium  -     Cardiac Amyloid Pyrophosphate (PYP) Scan; Future  -     Iron Profile  -     25-HydroxyVitamin D LCMS D2+D3  -     Immunofixation Electrophoresis, Protein Electrophoresis & Immunoglobulin Free Light Chains (Kappa / Lambda), Serum    5. Stage 3a chronic kidney disease  -     25-HydroxyVitamin D LCMS D2+D3; Future  -     25-HydroxyVitamin D LCMS D2+D3    Other orders  -     furosemide (LASIX) 40 MG tablet; Take 1 tablet by  mouth Daily.  Dispense: 60 tablet; Refill: 1  -     spironolactone (ALDACTONE) 25 MG tablet; Take 1 tablet by mouth Every Other Day.  Dispense: 45 tablet; Refill: 3  -     empagliflozin (Jardiance) 10 MG tablet tablet; Take 1 tablet by mouth Every Other Day.  Dispense: 45 tablet; Refill: 3  -     No Caffeine Prior to Cardiac Amyloid Pyrophosphate (PYP) Scan            Follow Up     Return for fu 3 weeks.  , EKG with next office visit.    Patient was given instructions and counseling regarding his condition or for health maintenance advice. Please see specific information pulled into the AVS if appropriate.     Total time spent, 60 minutes.This time includes time spent by me for the following activities:  Reviewing past records including hospitalizations, performing a cardiac focused examination and/or evaluation, educating and counselling the patient and family, independently interpreting results and diagnostic tests and communicating that information to patient and family, documenting information in the medical record, etc. E/M time spent excludes any time spent on other reported services.    Electronically signed by Maury Carpio MD, 03/13/24, 3:29 PM EDT.

## 2024-03-13 NOTE — ASSESSMENT & PLAN NOTE
Patient has chronic diastolic heart failure for quite some time which has become worse lately.  He continues to have some degree of fluid retention but the rate limiting factor is his blood pressure because he quite frequently has falls with low blood pressure.  Secondly, he is supersensitive to diuresis.  Currently appears relatively well compensated and I am going to take the liberty of stopping his Lasix in the afternoon and having him get only once a day in the morning.  We are going to do amyloid workup including PYP scan and immunoelectrophoresis.  The following changes were made to his medications:  1.  Start spironolactone 25 mg 1 tablet every other day.  2.  Start Jardiance 10 mg every other day alternating with spironolactone.  3.  Reduce the Lasix/furosemide 40 mg once a day.  4.  Try to cut down the gabapentin and prazosin as much as possible.  5.  Continue to monitor his heart rate blood pressure and weight every day and bring it to us log.  6.  Do blood work 1 or 2 days before your next appointment.  Okay until explained to you what changes are made in his medications

## 2024-03-15 LAB
ALBUMIN SERPL ELPH-MCNC: 3.8 G/DL (ref 2.9–4.4)
ALBUMIN/GLOB SERPL: 1.2 {RATIO} (ref 0.7–1.7)
ALPHA1 GLOB SERPL ELPH-MCNC: 0.2 G/DL (ref 0–0.4)
ALPHA2 GLOB SERPL ELPH-MCNC: 0.9 G/DL (ref 0.4–1)
B-GLOBULIN SERPL ELPH-MCNC: 1 G/DL (ref 0.7–1.3)
GAMMA GLOB SERPL ELPH-MCNC: 1.4 G/DL (ref 0.4–1.8)
GLOBULIN SER-MCNC: 3.4 G/DL (ref 2.2–3.9)
IGA SERPL-MCNC: 419 MG/DL (ref 61–437)
IGG SERPL-MCNC: 1413 MG/DL (ref 603–1613)
IGM SERPL-MCNC: 106 MG/DL (ref 15–143)
INTERPRETATION SERPL IEP-IMP: ABNORMAL
KAPPA LC FREE SER-MCNC: 30 MG/L (ref 3.3–19.4)
KAPPA LC FREE/LAMBDA FREE SER: 1.34 {RATIO} (ref 0.26–1.65)
LABORATORY COMMENT REPORT: ABNORMAL
LAMBDA LC FREE SERPL-MCNC: 22.4 MG/L (ref 5.7–26.3)
M PROTEIN SERPL ELPH-MCNC: ABNORMAL G/DL
PROT SERPL-MCNC: 7.2 G/DL (ref 6–8.5)

## 2024-03-20 ENCOUNTER — TELEPHONE (OUTPATIENT)
Dept: CARDIOLOGY | Facility: CLINIC | Age: 79
End: 2024-03-20
Payer: MEDICARE

## 2024-03-20 ENCOUNTER — OFFICE VISIT (OUTPATIENT)
Dept: INTERNAL MEDICINE | Facility: CLINIC | Age: 79
End: 2024-03-20
Payer: MEDICARE

## 2024-03-20 VITALS
WEIGHT: 193.4 LBS | BODY MASS INDEX: 30.35 KG/M2 | OXYGEN SATURATION: 99 % | HEART RATE: 91 BPM | TEMPERATURE: 98.4 F | DIASTOLIC BLOOD PRESSURE: 56 MMHG | SYSTOLIC BLOOD PRESSURE: 90 MMHG | HEIGHT: 67 IN

## 2024-03-20 DIAGNOSIS — E78.2 MIXED HYPERLIPIDEMIA: ICD-10-CM

## 2024-03-20 DIAGNOSIS — I10 PRIMARY HYPERTENSION: ICD-10-CM

## 2024-03-20 DIAGNOSIS — E11.9 CONTROLLED TYPE 2 DIABETES MELLITUS WITHOUT COMPLICATION, WITHOUT LONG-TERM CURRENT USE OF INSULIN: ICD-10-CM

## 2024-03-20 DIAGNOSIS — I95.9 HYPOTENSION, UNSPECIFIED HYPOTENSION TYPE: Primary | ICD-10-CM

## 2024-03-20 LAB
25(OH)D2 SERPL-MCNC: <1 NG/ML
25(OH)D3 SERPL-MCNC: 30 NG/ML
25(OH)D3+25(OH)D2 SERPL-MCNC: 31 NG/ML

## 2024-03-20 RX ORDER — SEMAGLUTIDE 1.34 MG/ML
1 INJECTION, SOLUTION SUBCUTANEOUS WEEKLY
Qty: 9 ML | Refills: 2 | Status: SHIPPED | OUTPATIENT
Start: 2024-03-20

## 2024-03-20 NOTE — PROGRESS NOTES
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  Needs an referral ENT, they're getting appt on post.)      Subjective      History of Present Illness  The patient presents for evaluation of multiple medical concerns. He is accompanied by his wife.    Entresto was too strong for him and it caused his blood pressure to drop and he ended up going to the hospital. He is doing pretty well now. He denies any passing out episodes. He denies any chest discomfort or trouble breathing. He has an appointment with Dr. Carpio's clinic for heart failure tomorrow. He denies any falls. He denies any leg swelling.    His blood sugar is 110 now, but it goes up and sometimes it is 80. It goes too low every now and then, but this morning it was 95. He was told not to take insulin. If he ate a banana, it went up to 152. He denies any side effects from Ozempic 0.5 mg. He denies any nausea or constipation. They are trying to get Ozempic from the pharmacy. He does not check his blood sugar before he eats. When he was at rehab, his blood sugar was 120, they would give him 20 units of insulin before he ate. It would help keep his sugar levels from going real high. He has a Dexcom.    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 He has seen audiology. The next step is going to an ENT doctor.         Objective   Vital Signs:   Vitals:    03/20/24 1158   BP: 90/56   Pulse: 91   Temp: 98.4 °F (36.9 °C)   TempSrc: Temporal   SpO2: 99%  Comment: oxygen:2L   Weight: 87.7 kg (193 lb 6.4 oz)   Height: 170.2 cm (67.01\")   PainSc: 0-No pain     Body mass index is 30.28 kg/m².    Wt Readings from Last 3 Encounters:   03/21/24 87.7 kg (193 lb 5.5 oz)   03/20/24 87.7 kg (193 lb 6.4 oz)   03/13/24 89.4 kg (197 lb)     BP Readings from Last 3 Encounters:   03/20/24 90/56 "   03/13/24 100/64   03/04/24 92/60       Health Maintenance   Topic Date Due    TDAP/TD VACCINES (1 - Tdap) Never done    ANNUAL WELLNESS VISIT  06/06/2024    RSV Vaccine - Adults (1 - 1-dose 60+ series) 04/20/2024 (Originally 10/21/2006)    DIABETIC EYE EXAM  05/08/2024 (Originally 6/1/2023)    ZOSTER VACCINE (1 of 2) 12/20/2024 (Originally 10/21/1996)    HEMOGLOBIN A1C  08/06/2024    COLORECTAL CANCER SCREENING  10/07/2024    LIPID PANEL  02/06/2025    URINE MICROALBUMIN  02/06/2025    BMI FOLLOWUP  03/20/2025    HEPATITIS C SCREENING  Completed    COVID-19 Vaccine  Completed    INFLUENZA VACCINE  Completed    Pneumococcal Vaccine 65+  Completed       Physical Exam  Vitals reviewed.   Constitutional:       Appearance: Normal appearance. He is well-developed.   HENT:      Head: Normocephalic and atraumatic.      Right Ear: External ear normal.      Left Ear: External ear normal.   Eyes:      Conjunctiva/sclera: Conjunctivae normal.      Pupils: Pupils are equal, round, and reactive to light.   Cardiovascular:      Rate and Rhythm: Normal rate and regular rhythm.      Heart sounds: No murmur heard.     No friction rub. No gallop.   Pulmonary:      Effort: Pulmonary effort is normal.      Breath sounds: Normal breath sounds. No wheezing or rhonchi.   Skin:     General: Skin is warm and dry.   Neurological:      Mental Status: He is alert and oriented to person, place, and time.   Psychiatric:         Mood and Affect: Affect normal.         Behavior: Behavior normal.         Thought Content: Thought content normal.        Physical Exam        Result Review :  The following data was reviewed by: Morena Masters MD on 03/20/2024:         Results  Laboratory Studies  Labs were reviewed with the patient.            Procedures            Assessment & Plan       New Medications Ordered This Visit   Medications    Semaglutide, 1 MG/DOSE, (Ozempic, 1 MG/DOSE,) 2 MG/1.5ML solution pen-injector     Sig: Inject 1 mg under the  skin into the appropriate area as directed 1 (One) Time Per Week.     Dispense:  9 mL     Refill:  2          Assessment & Plan  1. Diabetes.  I will increase his Ozempic to 1 mg. He will watch his insulin usage. If he finds the Ozempic is too strong, we can go back down to the 0.5 mg dose.  He will start trying to do better with giving his insulin prior to meals rather than chasing levels afterward.    2. Hearing issues.  [facility].    3. Heart failure.  He has an appointment with Dr. Carpio's clinic for heart failure tomorrow.    Follow-up  I will have [facility] call him about the motorized scooter.    Patient or patient representative verbalized consent for the use of Ambient Listening during the visit with  Morena Masters MD for chart documentation. 3/25/2024  08:12 EDT      FOLLOW UP  No follow-ups on file.  Patient was given instructions and counseling regarding his condition or for health maintenance advice. Please see specific information pulled into the AVS if appropriate.     Morena Masters MD  03/25/24  08:14 EDT    CURRENT & DISCONTINUED MEDICATIONS  Current Outpatient Medications   Medication Instructions    apixaban (ELIQUIS) 5 mg, Oral, Every 12 Hours Scheduled    aspirin EC 81 mg, Oral, Daily    atorvastatin (LIPITOR) 80 mg, Oral, Daily    benzonatate (TESSALON) 100-200 mg, Oral, 3 Times Daily PRN    buPROPion (WELLBUTRIN) 150 mg, Oral, Daily    cetirizine (ZYRTEC ALLERGY) 10 mg, Oral, Daily    empagliflozin (JARDIANCE) 10 mg, Oral, Every Other Day    fluticasone-salmeterol (ADVAIR HFA) 230-21 MCG/ACT inhaler 2 puffs, Inhalation, 2 Times Daily    furosemide (LASIX) 40 mg, Oral, Daily    gabapentin (NEURONTIN) 600 mg, Oral, 2 Times Daily    insulin aspart (NOVOLOG) 24-26 Units, Subcutaneous, 3 Times Daily Before Meals    Insulin Glargine (Lantus SoloStar) 100 UNIT/ML injection pen Inject 40-44 Units under the skin into the appropriate area as directed  Every Night.    levothyroxine (SYNTHROID, LEVOTHROID) 100 mcg, Oral, Daily    metFORMIN (GLUCOPHAGE) 500 mg, Oral, 2 Times Daily With Meals    olopatadine (PATANOL) 0.1 % ophthalmic solution 1 drop, Both Eyes, Daily PRN    omeprazole (PRILOSEC) 20 mg, Oral, Daily    Ozempic (1 MG/DOSE) 1 mg, Subcutaneous, Weekly    PARoxetine (PAXIL) 40 mg, Oral, Every Evening    prazosin (MINIPRESS) 1 mg, Oral, Nightly    spironolactone (ALDACTONE) 25 mg, Oral, Every Other Day    traZODone (DESYREL) 100 mg, Oral, Nightly PRN       Medications Discontinued During This Encounter   Medication Reason    Semaglutide,0.25 or 0.5MG/DOS, (Ozempic, 0.25 or 0.5 MG/DOSE,) 2 MG/1.5ML solution pen-injector

## 2024-03-21 ENCOUNTER — HOSPITAL ENCOUNTER (OUTPATIENT)
Dept: CARDIOLOGY | Facility: HOSPITAL | Age: 79
Discharge: HOME | End: 2024-03-21
Admitting: INTERNAL MEDICINE
Payer: MEDICARE

## 2024-03-21 VITALS — WEIGHT: 193.34 LBS | HEIGHT: 67 IN | BODY MASS INDEX: 30.35 KG/M2

## 2024-03-21 DIAGNOSIS — I10 PRIMARY HYPERTENSION: ICD-10-CM

## 2024-03-21 DIAGNOSIS — I50.32 CHRONIC HEART FAILURE WITH PRESERVED EJECTION FRACTION: ICD-10-CM

## 2024-03-21 DIAGNOSIS — I48.0 PAROXYSMAL ATRIAL FIBRILLATION: ICD-10-CM

## 2024-03-21 PROCEDURE — 0 TECHNETIUM TC99M PYROPHOSPHATE: Performed by: INTERNAL MEDICINE

## 2024-03-21 PROCEDURE — 78803 RP LOCLZJ TUM SPECT 1 AREA: CPT

## 2024-03-21 PROCEDURE — A9538 TC99M PYROPHOSPHATE: HCPCS | Performed by: INTERNAL MEDICINE

## 2024-03-21 RX ADMIN — TECHNETIUM TC99M PYROPHOSPHATE 1 DOSE: 12 INJECTION INTRAVENOUS at 11:46

## 2024-03-28 ENCOUNTER — TELEPHONE (OUTPATIENT)
Dept: INTERNAL MEDICINE | Facility: CLINIC | Age: 79
End: 2024-03-28
Payer: MEDICARE

## 2024-03-28 NOTE — TELEPHONE ENCOUNTER
Late documentation for 3/27/2024 - 5:05pm    Patient and significant other (Vicky Arellano) came into office for assistance with prior authorization papers for Ozempic required by  and for teaching / education to be completed on increased dose per provider.  Patient recently increased to 1mg of Ozempic once weekly and was given sample by provider in office after appointment.  Medication patient brought in was for the 0.25-0.5mg dosing only; this particular PEN will not give a dose higher than 0.5mg.      Education provided to patient to continue on the 0.5mg dose once weekly until  / insurance approval for the patient to increase up the 1mg per Dr. Masters.  Paperwork to be completed by JANET CHEEMA.  Patient and Vicky asked to be contacted once the paperwork was completed - message sent to MA.      Patient and Vicky verbalized understanding of remaining on the 0.5mg dose; both parties return demonstrated how to apply PEN needles and dial required dosing.  No further questions at this time.

## 2024-03-29 ENCOUNTER — TELEPHONE (OUTPATIENT)
Dept: CARDIOLOGY | Facility: CLINIC | Age: 79
End: 2024-03-29
Payer: MEDICARE

## 2024-03-29 DIAGNOSIS — I50.32 CHRONIC HEART FAILURE WITH PRESERVED EJECTION FRACTION: Primary | ICD-10-CM

## 2024-03-29 NOTE — TELEPHONE ENCOUNTER
LVM to remind pt of appt on Tuesday 4/2 @ 2:30, to get lab work done and to bring meds and bp log to appt.

## 2024-04-01 ENCOUNTER — LAB (OUTPATIENT)
Dept: LAB | Facility: HOSPITAL | Age: 79
End: 2024-04-01
Payer: MEDICARE

## 2024-04-01 DIAGNOSIS — I50.32 CHRONIC HEART FAILURE WITH PRESERVED EJECTION FRACTION: ICD-10-CM

## 2024-04-01 LAB
ALBUMIN SERPL-MCNC: 4.6 G/DL (ref 3.5–5.2)
ALBUMIN/GLOB SERPL: 1.2 G/DL
ALP SERPL-CCNC: 75 U/L (ref 39–117)
ALT SERPL W P-5'-P-CCNC: 34 U/L (ref 1–41)
ANION GAP SERPL CALCULATED.3IONS-SCNC: 15.8 MMOL/L (ref 5–15)
AST SERPL-CCNC: 28 U/L (ref 1–40)
BILIRUB SERPL-MCNC: 0.4 MG/DL (ref 0–1.2)
BUN SERPL-MCNC: 23 MG/DL (ref 8–23)
BUN/CREAT SERPL: 17.4 (ref 7–25)
CALCIUM SPEC-SCNC: 10.5 MG/DL (ref 8.6–10.5)
CHLORIDE SERPL-SCNC: 96 MMOL/L (ref 98–107)
CO2 SERPL-SCNC: 26.2 MMOL/L (ref 22–29)
CREAT SERPL-MCNC: 1.32 MG/DL (ref 0.76–1.27)
EGFRCR SERPLBLD CKD-EPI 2021: 55.6 ML/MIN/1.73
GLOBULIN UR ELPH-MCNC: 3.7 GM/DL
GLUCOSE SERPL-MCNC: 201 MG/DL (ref 65–99)
MAGNESIUM SERPL-MCNC: 2.2 MG/DL (ref 1.6–2.4)
NT-PROBNP SERPL-MCNC: 449.3 PG/ML (ref 0–1800)
POTASSIUM SERPL-SCNC: 4.4 MMOL/L (ref 3.5–5.2)
PROT SERPL-MCNC: 8.3 G/DL (ref 6–8.5)
SODIUM SERPL-SCNC: 138 MMOL/L (ref 136–145)

## 2024-04-01 PROCEDURE — 83735 ASSAY OF MAGNESIUM: CPT

## 2024-04-01 PROCEDURE — 80053 COMPREHEN METABOLIC PANEL: CPT

## 2024-04-01 PROCEDURE — 83880 ASSAY OF NATRIURETIC PEPTIDE: CPT

## 2024-04-01 PROCEDURE — 36415 COLL VENOUS BLD VENIPUNCTURE: CPT

## 2024-04-02 ENCOUNTER — OFFICE VISIT (OUTPATIENT)
Dept: CARDIOLOGY | Facility: CLINIC | Age: 79
End: 2024-04-02
Payer: MEDICARE

## 2024-04-02 VITALS
BODY MASS INDEX: 29.98 KG/M2 | HEART RATE: 90 BPM | WEIGHT: 191 LBS | DIASTOLIC BLOOD PRESSURE: 62 MMHG | HEIGHT: 67 IN | SYSTOLIC BLOOD PRESSURE: 124 MMHG

## 2024-04-02 DIAGNOSIS — I50.32 CHRONIC HEART FAILURE WITH PRESERVED EJECTION FRACTION: Primary | ICD-10-CM

## 2024-04-02 DIAGNOSIS — I48.0 PAROXYSMAL ATRIAL FIBRILLATION: ICD-10-CM

## 2024-04-02 DIAGNOSIS — I10 PRIMARY HYPERTENSION: ICD-10-CM

## 2024-04-02 DIAGNOSIS — E78.2 MIXED HYPERLIPIDEMIA: ICD-10-CM

## 2024-04-02 RX ORDER — SPIRONOLACTONE 25 MG/1
25 TABLET ORAL DAILY
Qty: 90 TABLET | Refills: 3 | Status: SHIPPED | OUTPATIENT
Start: 2024-04-02

## 2024-04-02 RX ORDER — EZETIMIBE 10 MG/1
10 TABLET ORAL DAILY
COMMUNITY

## 2024-04-02 RX ORDER — CHOLECALCIFEROL (VITAMIN D3) 25 MCG
1 CAPSULE ORAL DAILY
COMMUNITY

## 2024-04-02 RX ORDER — CARVEDILOL 3.12 MG/1
3.12 TABLET ORAL 2 TIMES DAILY
Qty: 180 TABLET | Refills: 3 | Status: SHIPPED | OUTPATIENT
Start: 2024-04-02

## 2024-04-02 RX ORDER — FUROSEMIDE 40 MG/1
40 TABLET ORAL AS NEEDED
Start: 2024-04-02

## 2024-04-02 NOTE — ASSESSMENT & PLAN NOTE
Mr. 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  His short of air has improved and pedal edema has resolved.  Per his home log his systolic blood pressure is very labile from low 100s to 147, but his heart rate generally runs in the 90s.  I will make the following changes to optimize his heart failure medications and address his elevated heart rate:    1.  Start carvedilol 3.125 mg take 1 tablet twice daily.  2.  Discontinue furosemide/Lasix 40 mg.  Call the office if develop swelling of the feet or increased short of air.  3.  Increase spironolactone 25 mg take every day.  4.  Do heart rate blood pressure and daily weight log and bring it to next appointment.  5.  Do blood work 1 to 2 days before your next appointment.

## 2024-04-02 NOTE — ASSESSMENT & PLAN NOTE
His blood pressure is very labile per his home log.  He was previously on Entresto and carvedilol which were discontinued due to hypotension.  Will restart carvedilol at a low dose to address his elevated heart rate and monitor his blood pressure very closely.

## 2024-04-02 NOTE — PATIENT INSTRUCTIONS
1.  Start carvedilol 3.125 mg take 1 tablet twice daily.  2.  Discontinue furosemide/Lasix 40 mg.  Call the office if develop swelling of the feet or increased short of air.  3.  Increase spironolactone 25 mg take every day.  4.  Do heart rate blood pressure and daily weight log and bring it to next appointment.  5.  Do blood work 1 to 2 days before your next appointment.

## 2024-04-02 NOTE — PROGRESS NOTES
Office Visit    Chief Complaint  Chronic heart failure with preserved ejection fraction    Subjective            Giles Donovan presents to [facility] CARDIOLOGY  History of Present Illness  Mr. Donovan is a 78-year-old male that presented to the office today for follow-up due to hypertension, hyperlipidemia, and congestive heart failure.  He is doing very well today.  His short of air has improved and pedal edema has resolved.  He has not had any more episodes of low blood pressure or dizziness since he was last seen.  He denies any chest pain, orthopnea or syncopal episodes.      Past Medical History:   Diagnosis Date    A-fib     Allergic rhinitis 12/27/2014    Will try Zyrtec, he didnt want to use a nasal spray.    Arthritis     Asthma     Cataract     left eye    CHF (congestive heart failure)     Cough 03/30/2016    PFT and CXR ordered.    Depression     PTSD    Diabetes     Elevated cholesterol     H/O psychiatric care 1999    PTSD    Hyperlipidemia 03/30/2016    Lipids ordered. Continue on current medication.    Hypertension     Hypothyroidism     Seasonal allergies     Shortness of breath     Sleep apnea     Stenosis of right carotid artery 02/19/2017    Will refer to vascular surgeon.    Syncope 02/19/2017    Type 2 diabetes mellitus     Upper respiratory infection 10/18/2015    Will treat cough with Hydromet, otherwise over the counter meds for treatment.       Allergies   Allergen Reactions    Latex Rash and Anaphylaxis    Latex, Natural Rubber Itching        Past Surgical History:   Procedure Laterality Date    CATARACT EXTRACTION Right     x2    COLONOSCOPY      JOINT REPLACEMENT      REPLACEMENT TOTAL HIP ONCOLOGIC Right     RETINAL DETACHMENT REPAIR      TOE AMPUTATION Left 11/2017    Second phalaeng.    TOE OSTEOPHYTE REMOVAL          Social History     Tobacco Use    Smoking status: Never     Passive exposure: Never    Smokeless tobacco: Never   Vaping Use    Vaping status:  Never Used   Substance Use Topics    Alcohol use: Yes     Alcohol/week: 8.0 standard drinks of alcohol     Types: 7 Cans of beer, 1 Shots of liquor per week    Drug use: Never       Family History   Problem Relation Age of Onset    Heart disease Mother     Lupus Mother     Arthritis Mother     Kidney disease Sister         Prior to Admission medications    Medication Sig Start Date End Date Taking? Authorizing Provider   apixaban (Eliquis) 5 MG tablet tablet Take 1 tablet by mouth Every 12 (Twelve) Hours.   Yes Kierra Pitts MD   aspirin EC 81 MG EC tablet Take 1 tablet by mouth Daily. 7/23/21  Yes Kierra Pitts MD   atorvastatin (LIPITOR) 80 MG tablet Take 1 tablet by mouth Daily. 8/9/22  Yes Cathy Ochoa MD   benzonatate (TESSALON) 100 MG capsule Take 1-2 capsules by mouth 3 (Three) Times a Day As Needed for Cough.   Yes Kierra Pitts MD   buPROPion (WELLBUTRIN) 75 MG tablet Take 2 tablets by mouth Daily. 2/13/24  Yes Kierra Pitts MD   cetirizine (ZyrTEC Allergy) 10 MG tablet Take 1 tablet by mouth Daily. 8/9/22  Yes Cathy Ochoa MD   Cholecalciferol (Vitamin D-3) 25 MCG (1000 UT) capsule Take 1,000 Units by mouth Daily.   Yes Kierra Pitts MD   empagliflozin (Jardiance) 10 MG tablet tablet Take 1 tablet by mouth Every Other Day. 3/13/24  Yes Maury Carpio MD   ezetimibe (ZETIA) 10 MG tablet Take 1 tablet by mouth Daily.   Yes Kierra Pitts MD   fluticasone-salmeterol (ADVAIR HFA) 230-21 MCG/ACT inhaler Inhale 2 puffs 2 (Two) Times a Day.   Yes Kierra Pitts MD   furosemide (LASIX) 40 MG tablet Take 1 tablet by mouth Daily. 3/13/24  Yes Maury Carpio MD   gabapentin (NEURONTIN) 600 MG tablet Take 1 tablet by mouth 2 (Two) Times a Day. 3/31/21  Yes Kierra Pitts MD   insulin aspart (novoLOG) 100 UNIT/ML injection Inject 24-26 Units under the skin into the appropriate area as directed 3 (Three) Times a Day Before Meals.   Yes Gisselle  "MD Kierra   Insulin Glargine (Lantus SoloStar) 100 UNIT/ML injection pen Inject 40-44 Units under the skin into the appropriate area as directed Every Night.   Yes Keirra Pitts MD   levothyroxine (SYNTHROID, LEVOTHROID) 100 MCG tablet Take 1 tablet by mouth Daily.   Yes Kierra Pitts MD   metFORMIN (GLUCOPHAGE) 500 MG tablet Take 1 tablet by mouth 2 (Two) Times a Day With Meals.   Yes Kierra Pitts MD   olopatadine (PATANOL) 0.1 % ophthalmic solution Administer 1 drop to both eyes Daily As Needed for Allergies.   Yes Kierra Pitts MD   omeprazole (priLOSEC) 20 MG capsule Take 1 capsule by mouth Daily. 10/9/23  Yes Issac Ortiz MD   PARoxetine (PAXIL) 40 MG tablet Take 1 tablet by mouth Every Evening. 4/5/21  Yes Kierra Pitts MD   Semaglutide, 1 MG/DOSE, (Ozempic, 1 MG/DOSE,) 2 MG/1.5ML solution pen-injector Inject 1 mg under the skin into the appropriate area as directed 1 (One) Time Per Week. 3/20/24  Yes Morena Masters MD   spironolactone (ALDACTONE) 25 MG tablet Take 1 tablet by mouth Every Other Day. 3/13/24  Yes Maury Carpio MD   traZODone (DESYREL) 100 MG tablet Take 1 tablet by mouth At Night As Needed for Sleep. 4/22/22  Yes Kierra Pitts MD   prazosin (MINIPRESS) 1 MG capsule Take 1 capsule by mouth Every Night.  Patient not taking: Reported on 4/2/2024 11/3/23   Kierra Pitts MD        Review of Systems   Constitutional:  Negative for fatigue.   Respiratory:  Positive for cough. Negative for shortness of breath.    Cardiovascular:  Negative for chest pain, palpitations and leg swelling.   Neurological:  Positive for dizziness.        Symptom Course: Been Stable    Weight Trend: Decreasing Steadily      Objective     /62   Pulse 90   Ht 170.2 cm (67.01\")   Wt 86.6 kg (191 lb)   BMI 29.91 kg/m²       Physical Exam  Constitutional:       General: He is awake.      Appearance: Normal appearance.   Neck:      Thyroid: No " thyromegaly.      Vascular: No carotid bruit or JVD.   Cardiovascular:      Rate and Rhythm: Normal rate and regular rhythm.      Chest Wall: PMI is not displaced.      Pulses: Normal pulses.      Heart sounds: Normal heart sounds, S1 normal and S2 normal. No murmur heard.     No friction rub. No gallop. No S3 or S4 sounds.   Pulmonary:      Effort: Pulmonary effort is normal.      Breath sounds: Normal breath sounds and air entry. No wheezing, rhonchi or rales.   Abdominal:      General: Bowel sounds are normal.      Palpations: Abdomen is soft. There is no mass.      Tenderness: There is no abdominal tenderness.   Musculoskeletal:      Cervical back: Neck supple.      Right lower leg: No edema.      Left lower leg: No edema.   Neurological:      Mental Status: He is alert and oriented to person, place, and time.   Psychiatric:         Mood and Affect: Mood normal.         Behavior: Behavior is cooperative.           Result Review :                    ECG 12 Lead    Date/Time: 4/2/2024 3:26 PM  Performed by: Talia Marcelo APRN    Authorized by: Talia Marcelo APRN  Comments: Sinus rhythm, RBBB, LAFB, heart rate of 86.  Compared to EKG on 2/19/2024 no significant changes.        Lab Results   Component Value Date    PROBNP 449.3 04/01/2024    PROBNP 733.1 03/13/2024    PROBNP 297.0 02/06/2024     CMP          2/22/2024    04:00 3/13/2024    15:12 4/1/2024    15:48   CMP   Glucose 411  243  201    BUN 19  13  23    Creatinine 1.09  1.13  1.32    EGFR 69.9  66.9  55.6    Sodium 134  136  138    Potassium 4.0  3.9  4.4    Chloride 94  95  96    Calcium 9.9  9.6  10.5    Total Protein  7.2     Total Protein 7.6  7.8  8.3    Albumin 4.2  4.3     3.8  4.6    Globulin  3.4     Globulin 3.4  3.5  3.7    Total Bilirubin 0.4  0.3  0.4    Alkaline Phosphatase 75  67  75    AST (SGOT) 77  32  28    ALT (SGPT) 52  41  34    Albumin/Globulin Ratio 1.2  1.2  1.2    BUN/Creatinine Ratio 17.4  11.5  17.4    Anion Gap 12.9   "11.1  15.8      CBC w/diff          2/21/2024    03:52 2/22/2024    04:00 3/13/2024    15:12   CBC w/Diff   WBC 7.65  7.97  7.61    RBC 4.44  4.08  3.97    Hemoglobin 13.0  12.0  11.9    Hematocrit 39.4  36.7  36.5    MCV 88.7  90.0  91.9    MCH 29.3  29.4  30.0    MCHC 33.0  32.7  32.6    RDW 13.3  13.5  13.5    Platelets 193  187  238    Neutrophil Rel %  55.8  73.0    Immature Granulocyte Rel %  0.3  0.1    Lymphocyte Rel %  29.9  17.7    Monocyte Rel %  11.4  6.7    Eosinophil Rel %  2.0  2.0    Basophil Rel %  0.6  0.5       Lipid Panel          9/17/2023    04:53 12/20/2023    16:34 2/6/2024    11:48   Lipid Panel   Total Cholesterol 92  100  113    Triglycerides 134  86  181    HDL Cholesterol 34  38  42    VLDL Cholesterol 24  17  30    LDL Cholesterol  34  45  41    LDL/HDL Ratio 0.92  1.18  0.83       Lab Results   Component Value Date    TSH 1.780 03/13/2024    TSH 0.652 02/06/2024    TSH 0.653 12/20/2023      Lab Results   Component Value Date    FREET4 1.09 03/13/2024    FREET4 1.09 06/06/2023    FREET4 1.1 12/02/2020      No results found for: \"DDIMERQUANT\"  Magnesium   Date Value Ref Range Status   04/01/2024 2.2 1.6 - 2.4 mg/dL Final      No results found for: \"DIGOXIN\"   A1C Last 3 Results          9/17/2023    04:53 12/20/2023    16:34 2/6/2024    11:48   HGBA1C Last 3 Results   Hemoglobin A1C 9.00  8.80  9.80           Results for orders placed during the hospital encounter of 09/16/23    Adult Transthoracic Echo Complete w/ Color, Spectral and Contrast if necessary per protocol    Interpretation Summary    Left ventricular systolic function is low normal. Calculated left ventricular EF = 45.1%    The left ventricular cavity is borderline dilated.    Left ventricular diastolic function is consistent with (grade I) impaired relaxation.    There is mild calcification of the aortic valve.           Assessment and Plan        Diagnoses and all orders for this visit:    1. Chronic heart failure with " preserved ejection fraction (Primary)  Assessment & Plan:  Mr. 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  His short of air has improved and pedal edema has resolved.  Per his home log his systolic blood pressure is very labile from low 100s to 147, but his heart rate generally runs in the 90s.  I will make the following changes to optimize his heart failure medications and address his elevated heart rate:    1.  Start carvedilol 3.125 mg take 1 tablet twice daily.  2.  Discontinue furosemide/Lasix 40 mg.  Call the office if develop swelling of the feet or increased short of air.  3.  Increase spironolactone 25 mg take every day.  4.  Do heart rate blood pressure and daily weight log and bring it to next appointment.  5.  Do blood work 1 to 2 days before your next appointment.    Orders:  -     Comprehensive Metabolic Panel; Future  -     Magnesium; Future  -     CBC & Differential; Future  -     proBNP; Future    2. Mixed hyperlipidemia  Assessment & Plan:  Most recent lipid panel was within normal limits.  Will continue atorvastatin 80 mg      3. Primary hypertension  Assessment & Plan:  His blood pressure is very labile per his home log.  He was previously on Entresto and carvedilol which were discontinued due to hypotension.  Will restart carvedilol at a low dose to address his elevated heart rate and monitor his blood pressure very closely.      4. Paroxysmal atrial fibrillation  Assessment & Plan:  Mr. Donovan is currently in sinus rhythm with a heart rate of 86.  Will start carvedilol 3.125 mg twice daily to address his elevated heart rates at home.  He does take apixaban for stroke prevention.    Orders:  -     Comprehensive Metabolic Panel; Future  -     Magnesium; Future  -     CBC & Differential; Future  -     proBNP; Future    Other orders  -     carvedilol (COREG) 3.125 MG tablet; Take 1 tablet by mouth 2 (Two) Times a Day.  Dispense: 180  tablet; Refill: 3  -     furosemide (LASIX) 40 MG tablet; Take 1 tablet by mouth As Needed (For weight gain of 3 pounds overnight or 5 pounds in a week).  -     spironolactone (ALDACTONE) 25 MG tablet; Take 1 tablet by mouth Daily.  Dispense: 90 tablet; Refill: 3  -     ECG 12 Lead            Follow Up     Return for 3 to 4 weeks with Dr. Carpio.    Patient was given instructions and counseling regarding his condition or for health maintenance advice. Please see specific information pulled into the AVS if appropriate.     Electronically signed by TENISHA Lopez, 04/02/24, 3:32 PM EDT.

## 2024-04-02 NOTE — ASSESSMENT & PLAN NOTE
Mr. Donovan is currently in sinus rhythm with a heart rate of 86.  Will start carvedilol 3.125 mg twice daily to address his elevated heart rates at home.  He does take apixaban for stroke prevention.

## 2024-04-08 ENCOUNTER — TELEPHONE (OUTPATIENT)
Dept: INTERNAL MEDICINE | Facility: CLINIC | Age: 79
End: 2024-04-08
Payer: MEDICARE

## 2024-04-22 ENCOUNTER — APPOINTMENT (OUTPATIENT)
Dept: ULTRASOUND IMAGING | Facility: HOSPITAL | Age: 79
DRG: 872 | End: 2024-04-22
Payer: MEDICARE

## 2024-04-22 ENCOUNTER — APPOINTMENT (OUTPATIENT)
Dept: GENERAL RADIOLOGY | Facility: HOSPITAL | Age: 79
DRG: 872 | End: 2024-04-22
Payer: MEDICARE

## 2024-04-22 ENCOUNTER — APPOINTMENT (OUTPATIENT)
Dept: CT IMAGING | Facility: HOSPITAL | Age: 79
DRG: 872 | End: 2024-04-22
Payer: MEDICARE

## 2024-04-22 ENCOUNTER — HOSPITAL ENCOUNTER (INPATIENT)
Facility: HOSPITAL | Age: 79
LOS: 3 days | Discharge: HOME HEALTH | DRG: 872 | End: 2024-04-25
Attending: EMERGENCY MEDICINE | Admitting: HOSPITALIST
Payer: MEDICARE

## 2024-04-22 DIAGNOSIS — N45.3 EPIDIDYMO-ORCHITIS: ICD-10-CM

## 2024-04-22 DIAGNOSIS — A41.9 SEPSIS, DUE TO UNSPECIFIED ORGANISM, UNSPECIFIED WHETHER ACUTE ORGAN DYSFUNCTION PRESENT: ICD-10-CM

## 2024-04-22 DIAGNOSIS — N39.0 URINARY TRACT INFECTION WITHOUT HEMATURIA, SITE UNSPECIFIED: ICD-10-CM

## 2024-04-22 DIAGNOSIS — R26.2 DIFFICULTY WALKING: ICD-10-CM

## 2024-04-22 DIAGNOSIS — Z78.9 DECREASED ACTIVITIES OF DAILY LIVING (ADL): ICD-10-CM

## 2024-04-22 DIAGNOSIS — N45.2 ORCHITIS: Primary | ICD-10-CM

## 2024-04-22 LAB
ALBUMIN SERPL-MCNC: 3.6 G/DL (ref 3.5–5.2)
ALBUMIN/GLOB SERPL: 0.9 G/DL
ALP SERPL-CCNC: 95 U/L (ref 39–117)
ALT SERPL W P-5'-P-CCNC: 19 U/L (ref 1–41)
ANION GAP SERPL CALCULATED.3IONS-SCNC: 13.6 MMOL/L (ref 5–15)
ANION GAP SERPL CALCULATED.3IONS-SCNC: 17 MMOL/L (ref 5–15)
AST SERPL-CCNC: 23 U/L (ref 1–40)
BACTERIA UR QL AUTO: ABNORMAL /HPF
BASOPHILS # BLD AUTO: 0.07 10*3/MM3 (ref 0–0.2)
BASOPHILS # BLD AUTO: 0.09 10*3/MM3 (ref 0–0.2)
BASOPHILS NFR BLD AUTO: 0.3 % (ref 0–1.5)
BASOPHILS NFR BLD AUTO: 0.4 % (ref 0–1.5)
BILIRUB SERPL-MCNC: 0.8 MG/DL (ref 0–1.2)
BILIRUB UR QL STRIP: NEGATIVE
BUN SERPL-MCNC: 17 MG/DL (ref 8–23)
BUN SERPL-MCNC: 18 MG/DL (ref 8–23)
BUN/CREAT SERPL: 14.4 (ref 7–25)
BUN/CREAT SERPL: 15.6 (ref 7–25)
CALCIUM SPEC-SCNC: 8.5 MG/DL (ref 8.6–10.5)
CALCIUM SPEC-SCNC: 9.2 MG/DL (ref 8.6–10.5)
CHLORIDE SERPL-SCNC: 95 MMOL/L (ref 98–107)
CHLORIDE SERPL-SCNC: 98 MMOL/L (ref 98–107)
CLARITY UR: ABNORMAL
CO2 SERPL-SCNC: 21 MMOL/L (ref 22–29)
CO2 SERPL-SCNC: 23.4 MMOL/L (ref 22–29)
COLOR UR: YELLOW
CREAT SERPL-MCNC: 1.09 MG/DL (ref 0.76–1.27)
CREAT SERPL-MCNC: 1.25 MG/DL (ref 0.76–1.27)
D-LACTATE SERPL-SCNC: 0.8 MMOL/L (ref 0.5–2)
D-LACTATE SERPL-SCNC: 2.3 MMOL/L (ref 0.5–2)
DEPRECATED RDW RBC AUTO: 46.8 FL (ref 37–54)
DEPRECATED RDW RBC AUTO: 48 FL (ref 37–54)
EGFRCR SERPLBLD CKD-EPI 2021: 59.3 ML/MIN/1.73
EGFRCR SERPLBLD CKD-EPI 2021: 69.9 ML/MIN/1.73
EOSINOPHIL # BLD AUTO: 0.02 10*3/MM3 (ref 0–0.4)
EOSINOPHIL # BLD AUTO: 0.02 10*3/MM3 (ref 0–0.4)
EOSINOPHIL NFR BLD AUTO: 0.1 % (ref 0.3–6.2)
EOSINOPHIL NFR BLD AUTO: 0.1 % (ref 0.3–6.2)
ERYTHROCYTE [DISTWIDTH] IN BLOOD BY AUTOMATED COUNT: 14.4 % (ref 12.3–15.4)
ERYTHROCYTE [DISTWIDTH] IN BLOOD BY AUTOMATED COUNT: 14.4 % (ref 12.3–15.4)
FLUAV SUBTYP SPEC NAA+PROBE: NOT DETECTED
FLUBV RNA ISLT QL NAA+PROBE: NOT DETECTED
GLOBULIN UR ELPH-MCNC: 4 GM/DL
GLUCOSE BLDC GLUCOMTR-MCNC: 135 MG/DL (ref 70–99)
GLUCOSE BLDC GLUCOMTR-MCNC: 236 MG/DL (ref 70–99)
GLUCOSE BLDC GLUCOMTR-MCNC: 320 MG/DL (ref 70–99)
GLUCOSE SERPL-MCNC: 132 MG/DL (ref 65–99)
GLUCOSE SERPL-MCNC: 165 MG/DL (ref 65–99)
GLUCOSE UR STRIP-MCNC: ABNORMAL MG/DL
HBA1C MFR BLD: 8.8 % (ref 4.8–5.6)
HCT VFR BLD AUTO: 29.7 % (ref 37.5–51)
HCT VFR BLD AUTO: 34 % (ref 37.5–51)
HGB BLD-MCNC: 10.9 G/DL (ref 13–17.7)
HGB BLD-MCNC: 9.8 G/DL (ref 13–17.7)
HGB UR QL STRIP.AUTO: ABNORMAL
HOLD SPECIMEN: NORMAL
HOLD SPECIMEN: NORMAL
HYALINE CASTS UR QL AUTO: ABNORMAL /LPF
IMM GRANULOCYTES # BLD AUTO: 0.39 10*3/MM3 (ref 0–0.05)
IMM GRANULOCYTES # BLD AUTO: 1.03 10*3/MM3 (ref 0–0.05)
IMM GRANULOCYTES NFR BLD AUTO: 1.6 % (ref 0–0.5)
IMM GRANULOCYTES NFR BLD AUTO: 4.1 % (ref 0–0.5)
KETONES UR QL STRIP: ABNORMAL
LEUKOCYTE ESTERASE UR QL STRIP.AUTO: ABNORMAL
LYMPHOCYTES # BLD AUTO: 1.23 10*3/MM3 (ref 0.7–3.1)
LYMPHOCYTES # BLD AUTO: 1.71 10*3/MM3 (ref 0.7–3.1)
LYMPHOCYTES NFR BLD AUTO: 5.2 % (ref 19.6–45.3)
LYMPHOCYTES NFR BLD AUTO: 6.9 % (ref 19.6–45.3)
MAGNESIUM SERPL-MCNC: 2 MG/DL (ref 1.6–2.4)
MAGNESIUM SERPL-MCNC: 2 MG/DL (ref 1.6–2.4)
MCH RBC QN AUTO: 29.4 PG (ref 26.6–33)
MCH RBC QN AUTO: 29.6 PG (ref 26.6–33)
MCHC RBC AUTO-ENTMCNC: 32.1 G/DL (ref 31.5–35.7)
MCHC RBC AUTO-ENTMCNC: 33 G/DL (ref 31.5–35.7)
MCV RBC AUTO: 89.7 FL (ref 79–97)
MCV RBC AUTO: 91.6 FL (ref 79–97)
MONOCYTES # BLD AUTO: 2.5 10*3/MM3 (ref 0.1–0.9)
MONOCYTES # BLD AUTO: 3.25 10*3/MM3 (ref 0.1–0.9)
MONOCYTES NFR BLD AUTO: 10.5 % (ref 5–12)
MONOCYTES NFR BLD AUTO: 13.1 % (ref 5–12)
NEUTROPHILS NFR BLD AUTO: 18.78 10*3/MM3 (ref 1.7–7)
NEUTROPHILS NFR BLD AUTO: 19.63 10*3/MM3 (ref 1.7–7)
NEUTROPHILS NFR BLD AUTO: 75.5 % (ref 42.7–76)
NEUTROPHILS NFR BLD AUTO: 82.2 % (ref 42.7–76)
NITRITE UR QL STRIP: NEGATIVE
NRBC BLD AUTO-RTO: 0 /100 WBC (ref 0–0.2)
NRBC BLD AUTO-RTO: 0 /100 WBC (ref 0–0.2)
PH UR STRIP.AUTO: 6 [PH] (ref 5–8)
PHOSPHATE SERPL-MCNC: 4.2 MG/DL (ref 2.5–4.5)
PLATELET # BLD AUTO: 208 10*3/MM3 (ref 140–450)
PLATELET # BLD AUTO: 249 10*3/MM3 (ref 140–450)
PMV BLD AUTO: 8.6 FL (ref 6–12)
PMV BLD AUTO: 9.6 FL (ref 6–12)
POTASSIUM SERPL-SCNC: 4.2 MMOL/L (ref 3.5–5.2)
POTASSIUM SERPL-SCNC: 4.4 MMOL/L (ref 3.5–5.2)
PROT SERPL-MCNC: 7.6 G/DL (ref 6–8.5)
PROT UR QL STRIP: ABNORMAL
QT INTERVAL: 366 MS
QTC INTERVAL: 489 MS
RBC # BLD AUTO: 3.31 10*6/MM3 (ref 4.14–5.8)
RBC # BLD AUTO: 3.71 10*6/MM3 (ref 4.14–5.8)
RBC # UR STRIP: ABNORMAL /HPF
RBC MORPH BLD: NORMAL
REF LAB TEST METHOD: ABNORMAL
RSV RNA NPH QL NAA+NON-PROBE: NOT DETECTED
SARS-COV-2 RNA RESP QL NAA+PROBE: NOT DETECTED
SMALL PLATELETS BLD QL SMEAR: ADEQUATE
SODIUM SERPL-SCNC: 133 MMOL/L (ref 136–145)
SODIUM SERPL-SCNC: 135 MMOL/L (ref 136–145)
SP GR UR STRIP: >1.03 (ref 1–1.03)
SQUAMOUS #/AREA URNS HPF: ABNORMAL /HPF
TOXIC GRANULATION: NORMAL
TROPONIN T SERPL HS-MCNC: 42 NG/L
UROBILINOGEN UR QL STRIP: ABNORMAL
WBC # UR STRIP: ABNORMAL /HPF
WBC NRBC COR # BLD AUTO: 23.86 10*3/MM3 (ref 3.4–10.8)
WBC NRBC COR # BLD AUTO: 24.86 10*3/MM3 (ref 3.4–10.8)
WHOLE BLOOD HOLD COAG: NORMAL
WHOLE BLOOD HOLD SPECIMEN: NORMAL

## 2024-04-22 PROCEDURE — 84484 ASSAY OF TROPONIN QUANT: CPT | Performed by: EMERGENCY MEDICINE

## 2024-04-22 PROCEDURE — 99291 CRITICAL CARE FIRST HOUR: CPT

## 2024-04-22 PROCEDURE — 25810000003 SODIUM CHLORIDE 0.9 % SOLUTION: Performed by: EMERGENCY MEDICINE

## 2024-04-22 PROCEDURE — 93005 ELECTROCARDIOGRAM TRACING: CPT | Performed by: EMERGENCY MEDICINE

## 2024-04-22 PROCEDURE — 81001 URINALYSIS AUTO W/SCOPE: CPT | Performed by: EMERGENCY MEDICINE

## 2024-04-22 PROCEDURE — 82948 REAGENT STRIP/BLOOD GLUCOSE: CPT | Performed by: HOSPITALIST

## 2024-04-22 PROCEDURE — 87086 URINE CULTURE/COLONY COUNT: CPT | Performed by: EMERGENCY MEDICINE

## 2024-04-22 PROCEDURE — 85025 COMPLETE CBC W/AUTO DIFF WBC: CPT | Performed by: HOSPITALIST

## 2024-04-22 PROCEDURE — 87040 BLOOD CULTURE FOR BACTERIA: CPT | Performed by: EMERGENCY MEDICINE

## 2024-04-22 PROCEDURE — 94640 AIRWAY INHALATION TREATMENT: CPT

## 2024-04-22 PROCEDURE — 74177 CT ABD & PELVIS W/CONTRAST: CPT

## 2024-04-22 PROCEDURE — 83735 ASSAY OF MAGNESIUM: CPT | Performed by: HOSPITALIST

## 2024-04-22 PROCEDURE — 82948 REAGENT STRIP/BLOOD GLUCOSE: CPT

## 2024-04-22 PROCEDURE — 84100 ASSAY OF PHOSPHORUS: CPT | Performed by: HOSPITALIST

## 2024-04-22 PROCEDURE — 99223 1ST HOSP IP/OBS HIGH 75: CPT | Performed by: HOSPITALIST

## 2024-04-22 PROCEDURE — 85025 COMPLETE CBC W/AUTO DIFF WBC: CPT | Performed by: EMERGENCY MEDICINE

## 2024-04-22 PROCEDURE — 71045 X-RAY EXAM CHEST 1 VIEW: CPT

## 2024-04-22 PROCEDURE — 87637 SARSCOV2&INF A&B&RSV AMP PRB: CPT | Performed by: EMERGENCY MEDICINE

## 2024-04-22 PROCEDURE — 83735 ASSAY OF MAGNESIUM: CPT | Performed by: EMERGENCY MEDICINE

## 2024-04-22 PROCEDURE — 85007 BL SMEAR W/DIFF WBC COUNT: CPT | Performed by: HOSPITALIST

## 2024-04-22 PROCEDURE — 63710000001 INSULIN LISPRO (HUMAN) PER 5 UNITS: Performed by: HOSPITALIST

## 2024-04-22 PROCEDURE — 83605 ASSAY OF LACTIC ACID: CPT | Performed by: EMERGENCY MEDICINE

## 2024-04-22 PROCEDURE — 80053 COMPREHEN METABOLIC PANEL: CPT | Performed by: EMERGENCY MEDICINE

## 2024-04-22 PROCEDURE — 87186 SC STD MICRODIL/AGAR DIL: CPT | Performed by: EMERGENCY MEDICINE

## 2024-04-22 PROCEDURE — 93010 ELECTROCARDIOGRAM REPORT: CPT | Performed by: INTERNAL MEDICINE

## 2024-04-22 PROCEDURE — 87088 URINE BACTERIA CULTURE: CPT | Performed by: EMERGENCY MEDICINE

## 2024-04-22 PROCEDURE — 76870 US EXAM SCROTUM: CPT

## 2024-04-22 PROCEDURE — 83036 HEMOGLOBIN GLYCOSYLATED A1C: CPT | Performed by: HOSPITALIST

## 2024-04-22 PROCEDURE — 63710000001 INSULIN DETEMIR PER 5 UNITS: Performed by: HOSPITALIST

## 2024-04-22 PROCEDURE — 94799 UNLISTED PULMONARY SVC/PX: CPT

## 2024-04-22 PROCEDURE — 99222 1ST HOSP IP/OBS MODERATE 55: CPT | Performed by: UROLOGY

## 2024-04-22 PROCEDURE — 25010000002 CEFTRIAXONE PER 250 MG: Performed by: EMERGENCY MEDICINE

## 2024-04-22 PROCEDURE — 36415 COLL VENOUS BLD VENIPUNCTURE: CPT | Performed by: HOSPITALIST

## 2024-04-22 PROCEDURE — 25510000001 IOPAMIDOL PER 1 ML: Performed by: EMERGENCY MEDICINE

## 2024-04-22 RX ORDER — TAMSULOSIN HYDROCHLORIDE 0.4 MG/1
0.4 CAPSULE ORAL DAILY
Status: DISCONTINUED | OUTPATIENT
Start: 2024-04-22 | End: 2024-04-25 | Stop reason: HOSPADM

## 2024-04-22 RX ORDER — IBUPROFEN 600 MG/1
1 TABLET ORAL
Status: DISCONTINUED | OUTPATIENT
Start: 2024-04-22 | End: 2024-04-25 | Stop reason: HOSPADM

## 2024-04-22 RX ORDER — ENOXAPARIN SODIUM 100 MG/ML
40 INJECTION SUBCUTANEOUS DAILY
Status: DISCONTINUED | OUTPATIENT
Start: 2024-04-22 | End: 2024-04-22

## 2024-04-22 RX ORDER — CHOLECALCIFEROL (VITAMIN D3) 125 MCG
5 CAPSULE ORAL NIGHTLY PRN
Status: DISCONTINUED | OUTPATIENT
Start: 2024-04-22 | End: 2024-04-25 | Stop reason: HOSPADM

## 2024-04-22 RX ORDER — PAROXETINE HYDROCHLORIDE 20 MG/1
40 TABLET, FILM COATED ORAL EVERY EVENING
Status: DISCONTINUED | OUTPATIENT
Start: 2024-04-22 | End: 2024-04-25 | Stop reason: HOSPADM

## 2024-04-22 RX ORDER — ACETAMINOPHEN 325 MG/1
650 TABLET ORAL EVERY 4 HOURS PRN
Status: DISCONTINUED | OUTPATIENT
Start: 2024-04-22 | End: 2024-04-25 | Stop reason: HOSPADM

## 2024-04-22 RX ORDER — INSULIN LISPRO 100 [IU]/ML
3-14 INJECTION, SOLUTION INTRAVENOUS; SUBCUTANEOUS
Status: DISCONTINUED | OUTPATIENT
Start: 2024-04-22 | End: 2024-04-25 | Stop reason: HOSPADM

## 2024-04-22 RX ORDER — SODIUM CHLORIDE 0.9 % (FLUSH) 0.9 %
10 SYRINGE (ML) INJECTION EVERY 12 HOURS SCHEDULED
Status: DISCONTINUED | OUTPATIENT
Start: 2024-04-22 | End: 2024-04-22

## 2024-04-22 RX ORDER — DEXTROSE MONOHYDRATE 25 G/50ML
25 INJECTION, SOLUTION INTRAVENOUS
Status: DISCONTINUED | OUTPATIENT
Start: 2024-04-22 | End: 2024-04-25 | Stop reason: HOSPADM

## 2024-04-22 RX ORDER — POLYETHYLENE GLYCOL 3350 17 G/17G
17 POWDER, FOR SOLUTION ORAL DAILY PRN
Status: DISCONTINUED | OUTPATIENT
Start: 2024-04-22 | End: 2024-04-25 | Stop reason: HOSPADM

## 2024-04-22 RX ORDER — ASPIRIN 81 MG/1
81 TABLET ORAL DAILY
Status: DISCONTINUED | OUTPATIENT
Start: 2024-04-22 | End: 2024-04-22

## 2024-04-22 RX ORDER — BISACODYL 5 MG/1
5 TABLET, DELAYED RELEASE ORAL DAILY PRN
Status: DISCONTINUED | OUTPATIENT
Start: 2024-04-22 | End: 2024-04-25 | Stop reason: HOSPADM

## 2024-04-22 RX ORDER — ALBUTEROL SULFATE 2.5 MG/3ML
2.5 SOLUTION RESPIRATORY (INHALATION) EVERY 4 HOURS PRN
COMMUNITY

## 2024-04-22 RX ORDER — BISACODYL 5 MG/1
5 TABLET, DELAYED RELEASE ORAL DAILY PRN
Status: DISCONTINUED | OUTPATIENT
Start: 2024-04-22 | End: 2024-04-22

## 2024-04-22 RX ORDER — HYDROCODONE BITARTRATE AND ACETAMINOPHEN 5; 325 MG/1; MG/1
1 TABLET ORAL EVERY 4 HOURS PRN
Status: DISCONTINUED | OUTPATIENT
Start: 2024-04-22 | End: 2024-04-25 | Stop reason: HOSPADM

## 2024-04-22 RX ORDER — ONDANSETRON 4 MG/1
4 TABLET, ORALLY DISINTEGRATING ORAL EVERY 6 HOURS PRN
Status: DISCONTINUED | OUTPATIENT
Start: 2024-04-22 | End: 2024-04-25 | Stop reason: HOSPADM

## 2024-04-22 RX ORDER — HYDROCODONE BITARTRATE AND ACETAMINOPHEN 5; 325 MG/1; MG/1
1 TABLET ORAL EVERY 4 HOURS PRN
Status: DISCONTINUED | OUTPATIENT
Start: 2024-04-22 | End: 2024-04-22

## 2024-04-22 RX ORDER — GUAIFENESIN 200 MG/10ML
200 LIQUID ORAL 3 TIMES DAILY PRN
Status: ON HOLD | COMMUNITY
End: 2024-04-22

## 2024-04-22 RX ORDER — SODIUM CHLORIDE 0.9 % (FLUSH) 0.9 %
10 SYRINGE (ML) INJECTION EVERY 12 HOURS SCHEDULED
Status: DISCONTINUED | OUTPATIENT
Start: 2024-04-22 | End: 2024-04-25 | Stop reason: HOSPADM

## 2024-04-22 RX ORDER — CHOLECALCIFEROL (VITAMIN D3) 125 MCG
5 CAPSULE ORAL NIGHTLY PRN
Status: DISCONTINUED | OUTPATIENT
Start: 2024-04-22 | End: 2024-04-22

## 2024-04-22 RX ORDER — ATORVASTATIN CALCIUM 40 MG/1
80 TABLET, FILM COATED ORAL DAILY
Status: DISCONTINUED | OUTPATIENT
Start: 2024-04-22 | End: 2024-04-25 | Stop reason: HOSPADM

## 2024-04-22 RX ORDER — OXYBUTYNIN CHLORIDE 5 MG/1
10 TABLET, EXTENDED RELEASE ORAL DAILY
Status: DISCONTINUED | OUTPATIENT
Start: 2024-04-22 | End: 2024-04-22

## 2024-04-22 RX ORDER — SODIUM CHLORIDE 9 MG/ML
40 INJECTION, SOLUTION INTRAVENOUS AS NEEDED
Status: DISCONTINUED | OUTPATIENT
Start: 2024-04-22 | End: 2024-04-25 | Stop reason: HOSPADM

## 2024-04-22 RX ORDER — ONDANSETRON 2 MG/ML
4 INJECTION INTRAMUSCULAR; INTRAVENOUS EVERY 6 HOURS PRN
Status: DISCONTINUED | OUTPATIENT
Start: 2024-04-22 | End: 2024-04-25 | Stop reason: HOSPADM

## 2024-04-22 RX ORDER — FINASTERIDE 5 MG/1
5 TABLET, FILM COATED ORAL DAILY
Status: DISCONTINUED | OUTPATIENT
Start: 2024-04-22 | End: 2024-04-25 | Stop reason: HOSPADM

## 2024-04-22 RX ORDER — BISACODYL 10 MG
10 SUPPOSITORY, RECTAL RECTAL DAILY PRN
Status: DISCONTINUED | OUTPATIENT
Start: 2024-04-22 | End: 2024-04-22

## 2024-04-22 RX ORDER — SODIUM CHLORIDE 0.9 % (FLUSH) 0.9 %
10 SYRINGE (ML) INJECTION AS NEEDED
Status: DISCONTINUED | OUTPATIENT
Start: 2024-04-22 | End: 2024-04-25 | Stop reason: HOSPADM

## 2024-04-22 RX ORDER — PANTOPRAZOLE SODIUM 40 MG/1
40 TABLET, DELAYED RELEASE ORAL
Status: DISCONTINUED | OUTPATIENT
Start: 2024-04-23 | End: 2024-04-25 | Stop reason: HOSPADM

## 2024-04-22 RX ORDER — OXYBUTYNIN CHLORIDE 10 MG/1
10 TABLET, EXTENDED RELEASE ORAL DAILY
COMMUNITY

## 2024-04-22 RX ORDER — NICOTINE POLACRILEX 4 MG
15 LOZENGE BUCCAL
Status: DISCONTINUED | OUTPATIENT
Start: 2024-04-22 | End: 2024-04-25 | Stop reason: HOSPADM

## 2024-04-22 RX ORDER — POLYETHYLENE GLYCOL 3350 17 G/17G
17 POWDER, FOR SOLUTION ORAL DAILY PRN
Status: DISCONTINUED | OUTPATIENT
Start: 2024-04-22 | End: 2024-04-22

## 2024-04-22 RX ORDER — LEVOTHYROXINE SODIUM 0.05 MG/1
100 TABLET ORAL
Status: DISCONTINUED | OUTPATIENT
Start: 2024-04-22 | End: 2024-04-25 | Stop reason: HOSPADM

## 2024-04-22 RX ORDER — CETIRIZINE HYDROCHLORIDE 10 MG/1
10 TABLET ORAL DAILY
Status: DISCONTINUED | OUTPATIENT
Start: 2024-04-22 | End: 2024-04-25 | Stop reason: HOSPADM

## 2024-04-22 RX ORDER — CARVEDILOL 3.12 MG/1
3.12 TABLET ORAL 2 TIMES DAILY
Status: DISCONTINUED | OUTPATIENT
Start: 2024-04-22 | End: 2024-04-25 | Stop reason: HOSPADM

## 2024-04-22 RX ORDER — AMOXICILLIN 250 MG
2 CAPSULE ORAL 2 TIMES DAILY
Status: DISCONTINUED | OUTPATIENT
Start: 2024-04-22 | End: 2024-04-22

## 2024-04-22 RX ORDER — SODIUM CHLORIDE 0.9 % (FLUSH) 0.9 %
10 SYRINGE (ML) INJECTION AS NEEDED
Status: DISCONTINUED | OUTPATIENT
Start: 2024-04-22 | End: 2024-04-22

## 2024-04-22 RX ORDER — ALBUTEROL SULFATE 2.5 MG/3ML
2.5 SOLUTION RESPIRATORY (INHALATION) EVERY 4 HOURS PRN
Status: DISCONTINUED | OUTPATIENT
Start: 2024-04-22 | End: 2024-04-25 | Stop reason: HOSPADM

## 2024-04-22 RX ORDER — BISACODYL 10 MG
10 SUPPOSITORY, RECTAL RECTAL DAILY PRN
Status: DISCONTINUED | OUTPATIENT
Start: 2024-04-22 | End: 2024-04-25 | Stop reason: HOSPADM

## 2024-04-22 RX ORDER — NALOXONE HCL 0.4 MG/ML
0.4 VIAL (ML) INJECTION
Status: DISCONTINUED | OUTPATIENT
Start: 2024-04-22 | End: 2024-04-25 | Stop reason: HOSPADM

## 2024-04-22 RX ORDER — PRAZOSIN HYDROCHLORIDE 1 MG/1
1 CAPSULE ORAL NIGHTLY PRN
Status: DISCONTINUED | OUTPATIENT
Start: 2024-04-22 | End: 2024-04-25 | Stop reason: HOSPADM

## 2024-04-22 RX ORDER — GUAIFENESIN 600 MG/1
1200 TABLET, EXTENDED RELEASE ORAL 2 TIMES DAILY PRN
COMMUNITY

## 2024-04-22 RX ORDER — GABAPENTIN 300 MG/1
600 CAPSULE ORAL EVERY 8 HOURS SCHEDULED
Status: DISCONTINUED | OUTPATIENT
Start: 2024-04-22 | End: 2024-04-25 | Stop reason: HOSPADM

## 2024-04-22 RX ORDER — AMOXICILLIN 250 MG
2 CAPSULE ORAL 2 TIMES DAILY
Status: DISCONTINUED | OUTPATIENT
Start: 2024-04-22 | End: 2024-04-25 | Stop reason: HOSPADM

## 2024-04-22 RX ORDER — BUPROPION HYDROCHLORIDE 75 MG/1
150 TABLET ORAL DAILY
Status: DISCONTINUED | OUTPATIENT
Start: 2024-04-22 | End: 2024-04-25 | Stop reason: HOSPADM

## 2024-04-22 RX ORDER — ASPIRIN 81 MG/1
81 TABLET ORAL DAILY
Status: DISCONTINUED | OUTPATIENT
Start: 2024-04-23 | End: 2024-04-25 | Stop reason: HOSPADM

## 2024-04-22 RX ORDER — ACETAMINOPHEN 500 MG
1000 TABLET ORAL 3 TIMES DAILY
Status: DISCONTINUED | OUTPATIENT
Start: 2024-04-22 | End: 2024-04-22

## 2024-04-22 RX ORDER — BUDESONIDE AND FORMOTEROL FUMARATE DIHYDRATE 160; 4.5 UG/1; UG/1
2 AEROSOL RESPIRATORY (INHALATION)
Status: DISCONTINUED | OUTPATIENT
Start: 2024-04-22 | End: 2024-04-25 | Stop reason: HOSPADM

## 2024-04-22 RX ORDER — ACETAMINOPHEN 325 MG/1
975 TABLET ORAL ONCE
Status: COMPLETED | OUTPATIENT
Start: 2024-04-22 | End: 2024-04-22

## 2024-04-22 RX ADMIN — CEFTRIAXONE SODIUM 2000 MG: 2 INJECTION, POWDER, FOR SOLUTION INTRAMUSCULAR; INTRAVENOUS at 07:01

## 2024-04-22 RX ADMIN — PAROXETINE HYDROCHLORIDE 40 MG: 20 TABLET, FILM COATED ORAL at 17:46

## 2024-04-22 RX ADMIN — FINASTERIDE 5 MG: 5 TABLET, FILM COATED ORAL at 15:56

## 2024-04-22 RX ADMIN — ATORVASTATIN CALCIUM 80 MG: 40 TABLET, FILM COATED ORAL at 15:56

## 2024-04-22 RX ADMIN — SODIUM CHLORIDE 500 ML: 9 INJECTION, SOLUTION INTRAVENOUS at 04:59

## 2024-04-22 RX ADMIN — GABAPENTIN 600 MG: 300 CAPSULE ORAL at 21:08

## 2024-04-22 RX ADMIN — LEVOTHYROXINE SODIUM 100 MCG: 50 TABLET ORAL at 15:56

## 2024-04-22 RX ADMIN — ACETAMINOPHEN 975 MG: 325 TABLET ORAL at 04:58

## 2024-04-22 RX ADMIN — BUDESONIDE AND FORMOTEROL FUMARATE DIHYDRATE 2 PUFF: 160; 4.5 AEROSOL RESPIRATORY (INHALATION) at 20:37

## 2024-04-22 RX ADMIN — ASPIRIN 81 MG: 81 TABLET, COATED ORAL at 13:12

## 2024-04-22 RX ADMIN — BUPROPION HYDROCHLORIDE 150 MG: 75 TABLET ORAL at 15:56

## 2024-04-22 RX ADMIN — Medication 10 ML: at 20:30

## 2024-04-22 RX ADMIN — APIXABAN 5 MG: 5 TABLET, FILM COATED ORAL at 13:12

## 2024-04-22 RX ADMIN — ACETAMINOPHEN 650 MG: 325 TABLET ORAL at 20:52

## 2024-04-22 RX ADMIN — INSULIN LISPRO 5 UNITS: 100 INJECTION, SOLUTION INTRAVENOUS; SUBCUTANEOUS at 17:46

## 2024-04-22 RX ADMIN — APIXABAN 5 MG: 5 TABLET, FILM COATED ORAL at 20:51

## 2024-04-22 RX ADMIN — INSULIN DETEMIR 40 UNITS: 100 INJECTION, SOLUTION SUBCUTANEOUS at 20:50

## 2024-04-22 RX ADMIN — TAMSULOSIN HYDROCHLORIDE 0.4 MG: 0.4 CAPSULE ORAL at 15:56

## 2024-04-22 RX ADMIN — INSULIN LISPRO 10 UNITS: 100 INJECTION, SOLUTION INTRAVENOUS; SUBCUTANEOUS at 21:05

## 2024-04-22 RX ADMIN — Medication 10 ML: at 13:12

## 2024-04-22 RX ADMIN — IOPAMIDOL 100 ML: 755 INJECTION, SOLUTION INTRAVENOUS at 06:07

## 2024-04-22 RX ADMIN — GABAPENTIN 600 MG: 300 CAPSULE ORAL at 15:56

## 2024-04-22 RX ADMIN — SODIUM CHLORIDE 500 ML: 9 INJECTION, SOLUTION INTRAVENOUS at 07:01

## 2024-04-22 NOTE — PLAN OF CARE
Goal Outcome Evaluation:  Plan of Care Reviewed With: patient, spouse        Progress: no change     Pt is a&o4, stable vital signs except for his temperature is on the higher side. lucy SALAZAR, patient is an assist x2 to the BSC. Bed alarm is on.

## 2024-04-22 NOTE — CONSULTS
[facility]   UROLOGY Consult Note    Patient Name: Giles Donovan  : 1945  MRN: [id number]  Primary Care Physician:  Morena Masters MD  Referring Physician: No ref. provider found  Date of admission: 2024    Subjective   Subjective     Reason for Consult/ Chief Complaint: Left epididymoorchitis    HPI:  Giles Donovan is a 78 y.o. male presented to ER with complaint of left testicular pain after a fall.  Fiancé at bedside who also assists with history.  Notes patient becomes unsteady on his feet a lot and will fall.  Patient reports a couple falls involving pain of his left hemiscrotum.    Patient also reports urinary tract infection symptoms including discomfort with urination.  Also had some associated emesis.  History of refractory urinary urge incontinence, wears a brief at all times.  Last seen by urology ARNP, Tabatha Kahn, has also seen Dr. Ortiz in the past.    Patient noted to be febrile to 103 in ER and tachycardic.  Labs with leukocytosis to 23.86, creatinine 1.25, UA with TNTC WBCs, 4+ bacteria, small leukocyte esterase, nitrite negative.  Blood cultures and urine cultures pending.    CT abdomen pelvis with contrast revealed mild prominence of the bilateral pelvic calyceal system, mild prominence of the urinary bladder possibly underdistention artifact, streak artifact from right hip prosthesis.    Scrotal ultrasound reveals right testis and epididymis without abnormality; moderate left hydrocele with debris and septations, left varicocele, hypervascularity of the left testicle and epididymis suggesting left epididymal orchitis.        Review of Systems   All systems were reviewed and negative except for: ER H&P and the above    Personal History     Past Medical History:   Diagnosis Date    A-fib     Allergic rhinitis 2014    Will try Zyrtec, he didnt want to use a nasal spray.    Arthritis     Asthma     Cataract     left eye    CHF (congestive heart  failure)     Cough 03/30/2016    PFT and CXR ordered.    Depression     PTSD    Diabetes     Elevated cholesterol     H/O psychiatric care 1999    PTSD    Hyperlipidemia 03/30/2016    Lipids ordered. Continue on current medication.    Hypertension     Hypothyroidism     Seasonal allergies     Shortness of breath     Sleep apnea     Stenosis of right carotid artery 02/19/2017    Will refer to vascular surgeon.    Syncope 02/19/2017    Type 2 diabetes mellitus     Upper respiratory infection 10/18/2015    Will treat cough with Hydromet, otherwise over the counter meds for treatment.       Past Surgical History:   Procedure Laterality Date    CATARACT EXTRACTION Right     x2    COLONOSCOPY      JOINT REPLACEMENT      REPLACEMENT TOTAL HIP ONCOLOGIC Right     RETINAL DETACHMENT REPAIR      TOE AMPUTATION Left 11/2017    Second phalaeng.    TOE OSTEOPHYTE REMOVAL         Family History: family history includes Arthritis in his mother; Heart disease in his mother; Kidney disease in his sister; Lupus in his mother. Otherwise pertinent FHx was reviewed and not pertinent to current issue.    Social History:  reports that he has never smoked. He has never been exposed to tobacco smoke. He has never used smokeless tobacco. He reports current alcohol use of about 8.0 standard drinks of alcohol per week. He reports that he does not use drugs.    Home Medications:  Insulin Glargine, PARoxetine, Semaglutide (1 MG/DOSE), Vitamin D-3, albuterol, apixaban, aspirin, atorvastatin, benzonatate, buPROPion, carvedilol, cetirizine, empagliflozin, ezetimibe, fluticasone-salmeterol, gabapentin, guaiFENesin, insulin aspart, levothyroxine, metFORMIN, olopatadine, omeprazole, oxybutynin XL, prazosin, spironolactone, and traZODone    Allergies:  Allergies   Allergen Reactions    Latex Rash and Anaphylaxis    Latex, Natural Rubber Itching       Objective    Objective     Vitals:   Temp:  [97.7 °F (36.5 °C)-103 °F (39.4 °C)] 97.7 °F (36.5  °C)  Heart Rate:  [] 99  Resp:  [16-18] 16  BP: ()/(42-67) 112/57  Flow (L/min):  [2] 2    Physical Exam:   No acute distress, well-nourished  Awake alert and oriented  Mood normal; affect normal  : No penile lesions; patient incontinent; left hemiscrotal swelling with significant discomfort to manipulation and movement; no significant right hemiscrotal findings    Result Review    Result Review:  I have personally reviewed the results from the time of this admission to 4/22/2024 15:08 EDT and agree with these findings:  [x]  Laboratory  [x]  Microbiology  [x]  Radiology  []  EKG/Telemetry   []  Cardiology/Vascular   []  Pathology  []  Old records  []  Other:      Assessment & Plan   Assessment / Plan     Brief Patient Summary:  Giles Donovan is a 78 y.o. male history of BPH, urge urinary incontinence who presented with left epididymoorchitis, UTI    Active Hospital Problems:  Active Hospital Problems    Diagnosis     **Orchitis        Plan:   Chart, imaging, and labs reviewed; findings discussed with patient and fiancé    No acute urologic intervention indicated at this time    Urine and blood cultures pending    Continue empiric antibiotic; obtain morning labs    Serial examinations    Symptom control  Elevate scrotum  Ice pack as needed comfort    Will follow    Electronically signed by Aisha Somers MD, 04/22/24, 3:08 PM EDT.

## 2024-04-22 NOTE — ED PROVIDER NOTES
Time: 6:31 AM EDT  Date of encounter:  4/22/2024  Independent Historian/Clinical History and Information was obtained by:   Patient    History is limited by: N/A    Chief Complaint: testicular pain      History of Present Illness:  Patient is a 78 y.o. year old male who presents to the emergency department for evaluation of Testicular pain patient states he accidentally fell back and hit his left testicle.  He also reports fever.  Wife believes he might have urinary tract infection.  No vomiting or diarrhea.  No chest pain or shortness of breath.  Patient does have some congestion.  He has a mild cough.  He does report generalized weakness    HPI    Patient Care Team  Primary Care Provider: Morena Masters MD    Past Medical History:     Allergies   Allergen Reactions    Latex Rash and Anaphylaxis    Latex, Natural Rubber Itching     Past Medical History:   Diagnosis Date    A-fib     Allergic rhinitis 12/27/2014    Will try Zyrtec, he didnt want to use a nasal spray.    Arthritis     Asthma     Cataract     left eye    CHF (congestive heart failure)     Cough 03/30/2016    PFT and CXR ordered.    Depression     PTSD    Diabetes     Elevated cholesterol     H/O psychiatric care 1999    PTSD    Hyperlipidemia 03/30/2016    Lipids ordered. Continue on current medication.    Hypertension     Hypothyroidism     Seasonal allergies     Shortness of breath     Sleep apnea     Stenosis of right carotid artery 02/19/2017    Will refer to vascular surgeon.    Syncope 02/19/2017    Type 2 diabetes mellitus     Upper respiratory infection 10/18/2015    Will treat cough with Hydromet, otherwise over the counter meds for treatment.     Past Surgical History:   Procedure Laterality Date    CATARACT EXTRACTION Right     x2    COLONOSCOPY      JOINT REPLACEMENT      REPLACEMENT TOTAL HIP ONCOLOGIC Right     RETINAL DETACHMENT REPAIR      TOE AMPUTATION Left 11/2017    Second phalaeng.    TOE OSTEOPHYTE REMOVAL       Family  History   Problem Relation Age of Onset    Heart disease Mother     Lupus Mother     Arthritis Mother     Kidney disease Sister        Home Medications:  Prior to Admission medications    Medication Sig Start Date End Date Taking? Authorizing Provider   apixaban (Eliquis) 5 MG tablet tablet Take 1 tablet by mouth Every 12 (Twelve) Hours.    Kierra Pitts MD   aspirin EC 81 MG EC tablet Take 1 tablet by mouth Daily. 7/23/21   Kierra Pitts MD   atorvastatin (LIPITOR) 80 MG tablet Take 1 tablet by mouth Daily. 8/9/22   Cathy Ochoa MD   benzonatate (TESSALON) 100 MG capsule Take 1-2 capsules by mouth 3 (Three) Times a Day As Needed for Cough.    Kierra Pitts MD   buPROPion (WELLBUTRIN) 75 MG tablet Take 2 tablets by mouth Daily. 2/13/24   Kierra Pitts MD   carvedilol (COREG) 3.125 MG tablet Take 1 tablet by mouth 2 (Two) Times a Day. 4/2/24   Talia Marcelo APRN   cetirizine (ZyrTEC Allergy) 10 MG tablet Take 1 tablet by mouth Daily. 8/9/22   Cathy Ochoa MD   Cholecalciferol (Vitamin D-3) 25 MCG (1000 UT) capsule Take 1,000 Units by mouth Daily.    Kierra Pitts MD   empagliflozin (Jardiance) 10 MG tablet tablet Take 1 tablet by mouth Every Other Day. 3/13/24   Maury Carpio MD   ezetimibe (ZETIA) 10 MG tablet Take 1 tablet by mouth Daily.    Kierra Pitts MD   fluticasone-salmeterol (ADVAIR HFA) 230-21 MCG/ACT inhaler Inhale 2 puffs 2 (Two) Times a Day.    Kierra Pitts MD   furosemide (LASIX) 40 MG tablet Take 1 tablet by mouth As Needed (For weight gain of 3 pounds overnight or 5 pounds in a week). 4/2/24   Talia Marcelo APRN   gabapentin (NEURONTIN) 600 MG tablet Take 1 tablet by mouth Every Night. 3/31/21   Kierra Pitts MD   insulin aspart (novoLOG) 100 UNIT/ML injection Inject 24-26 Units under the skin into the appropriate area as directed 3 (Three) Times a Day Before Meals.    Kierra Pitts MD   Insulin Glargine  (Lantus SoloStar) 100 UNIT/ML injection pen Inject 40-44 Units under the skin into the appropriate area as directed Every Night.    Kierra Pitts MD   levothyroxine (SYNTHROID, LEVOTHROID) 100 MCG tablet Take 1 tablet by mouth Daily.    Kierra Pitts MD   metFORMIN (GLUCOPHAGE) 500 MG tablet Take 1 tablet by mouth 2 (Two) Times a Day With Meals.    Kierra Pitts MD   olopatadine (PATANOL) 0.1 % ophthalmic solution Administer 1 drop to both eyes Daily As Needed for Allergies.    Kierra Pitts MD   omeprazole (priLOSEC) 20 MG capsule Take 1 capsule by mouth Daily. 10/9/23   Issac Ortiz MD   PARoxetine (PAXIL) 40 MG tablet Take 1 tablet by mouth Every Evening. 4/5/21   Kierra Pitts MD   prazosin (MINIPRESS) 1 MG capsule Take 1 capsule by mouth Every Night.  Patient not taking: Reported on 4/2/2024 11/3/23   Kierra Pitts MD   Semaglutide, 1 MG/DOSE, (Ozempic, 1 MG/DOSE,) 2 MG/1.5ML solution pen-injector Inject 1 mg under the skin into the appropriate area as directed 1 (One) Time Per Week. 3/20/24   Morena Masters MD   spironolactone (ALDACTONE) 25 MG tablet Take 1 tablet by mouth Daily. 4/2/24   Talia Marcelo APRN   traZODone (DESYREL) 100 MG tablet Take 0.5 tablets by mouth At Night As Needed for Sleep. 4/22/22   Kierra Pitts MD        Social History:   Social History     Tobacco Use    Smoking status: Never     Passive exposure: Never    Smokeless tobacco: Never   Vaping Use    Vaping status: Never Used   Substance Use Topics    Alcohol use: Yes     Alcohol/week: 8.0 standard drinks of alcohol     Types: 7 Cans of beer, 1 Shots of liquor per week    Drug use: Never         Review of Systems:  Review of Systems   Constitutional:  Positive for chills and fever.   HENT:  Positive for congestion. Negative for ear pain and sore throat.    Eyes:  Negative for pain.   Respiratory:  Positive for cough. Negative for chest tightness and shortness of  "breath.    Cardiovascular:  Negative for chest pain.   Gastrointestinal:  Negative for abdominal pain, diarrhea, nausea and vomiting.   Genitourinary:  Positive for frequency and testicular pain. Negative for flank pain and hematuria.   Musculoskeletal:  Negative for joint swelling.   Skin:  Negative for pallor.   Neurological:  Negative for seizures and headaches.   All other systems reviewed and are negative.       Physical Exam:  /66 (Patient Position: Lying)   Pulse 94   Temp 99.1 °F (37.3 °C) (Oral)   Resp 18   Ht 170.2 cm (67\")   Wt 88.4 kg (194 lb 14.2 oz)   SpO2 94%   BMI 30.52 kg/m²     Physical Exam  Constitutional:       Appearance: Normal appearance.   HENT:      Head: Normocephalic and atraumatic.      Nose: Nose normal.      Mouth/Throat:      Mouth: Mucous membranes are moist.   Eyes:      Extraocular Movements: Extraocular movements intact.      Conjunctiva/sclera: Conjunctivae normal.      Pupils: Pupils are equal, round, and reactive to light.   Cardiovascular:      Rate and Rhythm: Regular rhythm. Tachycardia present.      Pulses: Normal pulses.      Heart sounds: Normal heart sounds.   Pulmonary:      Effort: Pulmonary effort is normal.      Breath sounds: Normal breath sounds.   Abdominal:      General: There is no distension.      Palpations: Abdomen is soft.      Tenderness: There is no abdominal tenderness.   Genitourinary:     Comments: Tenderness to left testicle.  No penile discharge.  Mild erythema of left testicle.  Musculoskeletal:         General: Normal range of motion.      Cervical back: Normal range of motion.   Skin:     General: Skin is warm and dry.      Capillary Refill: Capillary refill takes less than 2 seconds.   Neurological:      General: No focal deficit present.      Mental Status: He is alert and oriented to person, place, and time. Mental status is at baseline.   Psychiatric:         Mood and Affect: Mood normal.         Behavior: Behavior normal.      "             Procedures:  Procedures      Medical Decision Making:      Comorbidities that affect care:    HLD, Asthma, Atrial Fibrillation, Congestive Heart Failure, Diabetes, Thyroid Disease    External Notes reviewed:    Previous Clinic Note: Patient seen by cardiology on 4/20/2024 for congestive heart failure, hyperlipidemia, hypertension, atrial fibrillation.      The following orders were placed and all results were independently analyzed by me:  Orders Placed This Encounter   Procedures    Blood Culture - Blood,    Blood Culture - Blood,    COVID PRE-OP / PRE-PROCEDURE SCREENING ORDER (NO ISOLATION) - Swab, Nasopharynx    COVID-19, FLU A/B, RSV PCR 1 HR TAT - Swab, Nasopharynx    Urine Culture - Urine, Urine, Clean Catch    XR Chest 1 View    CT Abdomen Pelvis With Contrast    US Scrotum & Testicles    [place] Draw    Comprehensive Metabolic Panel    Single High Sensitivity Troponin T    Magnesium    Urinalysis With Microscopic If Indicated (No Culture) - Urine, Clean Catch    Lactic Acid, Plasma    CBC Auto Differential    Urinalysis, Microscopic Only - Urine, Clean Catch    STAT Lactic Acid, Reflex    Basic Metabolic Panel    CBC Auto Differential    Scan Slide    Magnesium    Phosphorus    Hemoglobin A1c    Diet: Regular/House; Fluid Consistency: Thin (IDDSI 0)    Undress & Gown    Continuous Pulse Oximetry    Vital Signs    Orthostatic Blood Pressure    Intake & Output    Oral Care    Place Sequential Compression Device    Maintain Sequential Compression Device    Vital Signs    Intake & Output    Oral Care    Saline Lock & Maintain IV Access    Weigh Patient    Elevate scrotum; do not allow patient to sit on scrotum  Nursing Communication    Cold Water Compress    Code Status and Medical Interventions:    Urology (on-call MD unless specified)    Hospitalist (on-call MD unless specified)    OT Consult: Eval & Treat ADL Performance Below Baseline    PT Consult: Eval & Treat As Tolerated; Discharge Placement  Assessment    Oxygen Therapy- Nasal Cannula; Titrate 1-6 LPM Per SpO2; 90 - 95%    Patient May Use Home CPAP / BIPAP For Sleep or As Needed    POC Glucose Once    POC Glucose Once    POC Glucose 4x Daily Before Meals & at Bedtime    ECG 12 Lead ED Triage Standing Order; Weak / Dizzy / AMS    Insert Peripheral IV    Insert Peripheral IV    Insert Peripheral IV    Inpatient Admission    Fall Precautions    CBC & Differential    Green Top (Gel)    Lavender Top    Gold Top - SST    Light Blue Top    CBC & Differential       Medications Given in the Emergency Department:  Medications   sodium chloride 0.9 % infusion 40 mL (has no administration in time range)   acetaminophen (TYLENOL) tablet 650 mg (650 mg Oral Given 4/22/24 2052)   ondansetron ODT (ZOFRAN-ODT) disintegrating tablet 4 mg (has no administration in time range)     Or   ondansetron (ZOFRAN) injection 4 mg (has no administration in time range)   HYDROmorphone (DILAUDID) injection 0.5 mg (has no administration in time range)     And   naloxone (NARCAN) injection 0.4 mg (has no administration in time range)   sennosides-docusate (PERICOLACE) 8.6-50 MG per tablet 2 tablet (2 tablets Oral Not Given 4/22/24 2106)     And   polyethylene glycol (MIRALAX) packet 17 g (has no administration in time range)     And   bisacodyl (DULCOLAX) EC tablet 5 mg (has no administration in time range)     And   bisacodyl (DULCOLAX) suppository 10 mg (has no administration in time range)   cefTRIAXone (ROCEPHIN) 2000 mg/100 mL 0.9% NS IVPB (MBP) (has no administration in time range)   sodium chloride 0.9 % bolus 500 mL (500 mL Intravenous Not Given 4/22/24 1419)   sodium chloride 0.9 % flush 10 mL (10 mL Intravenous Given 4/22/24 1312)   sodium chloride 0.9 % flush 10 mL (has no administration in time range)   sodium chloride 0.9 % infusion 40 mL (has no administration in time range)   HYDROcodone-acetaminophen (NORCO) 5-325 MG per tablet 1 tablet (has no administration in time  range)   melatonin tablet 5 mg (has no administration in time range)   dextrose (GLUTOSE) oral gel 15 g (has no administration in time range)   dextrose (D50W) (25 g/50 mL) IV injection 25 g (has no administration in time range)   glucagon (GLUCAGEN) injection 1 mg (has no administration in time range)   insulin detemir (LEVEMIR) injection 40 Units (40 Units Subcutaneous Given 4/22/24 2050)   Insulin Lispro (humaLOG) injection 3-14 Units (10 Units Subcutaneous Given 4/22/24 2105)   albuterol (PROVENTIL) nebulizer solution 0.083% 2.5 mg/3mL (has no administration in time range)   apixaban (ELIQUIS) tablet 5 mg (5 mg Oral Given 4/22/24 2051)   aspirin EC tablet 81 mg (has no administration in time range)   atorvastatin (LIPITOR) tablet 80 mg (80 mg Oral Given 4/22/24 1556)   buPROPion (WELLBUTRIN) tablet 150 mg (150 mg Oral Given 4/22/24 1556)   carvedilol (COREG) tablet 3.125 mg ( Oral Dose Auto Held 4/30/24 2100)   cetirizine (zyrTEC) tablet 10 mg ( Oral Dose Auto Held 4/30/24 0900)   empagliflozin (JARDIANCE) tablet 10 mg ( Oral Dose Auto Held 4/30/24 0900)   budesonide-formoterol (SYMBICORT) 160-4.5 MCG/ACT inhaler 2 puff (2 puffs Inhalation Given 4/22/24 2037)   gabapentin (NEURONTIN) capsule 600 mg (600 mg Oral Given 4/22/24 2108)   levothyroxine (SYNTHROID, LEVOTHROID) tablet 100 mcg (100 mcg Oral Given 4/22/24 1556)   pantoprazole (PROTONIX) EC tablet 40 mg (has no administration in time range)   PARoxetine (PAXIL) tablet 40 mg (40 mg Oral Given 4/22/24 1746)   prazosin (MINIPRESS) capsule 1 mg (has no administration in time range)   finasteride (PROSCAR) tablet 5 mg (5 mg Oral Given 4/22/24 1556)   tamsulosin (FLOMAX) 24 hr capsule 0.4 mg (0.4 mg Oral Given 4/22/24 1556)   sodium chloride 0.9 % bolus 500 mL (0 mL Intravenous Stopped 4/22/24 3340)   acetaminophen (TYLENOL) tablet 975 mg (975 mg Oral Given 4/22/24 1388)   iopamidol (ISOVUE-370) 76 % injection 100 mL (100 mL Intravenous Given 4/22/24 0667)    cefTRIAXone (ROCEPHIN) 2000 mg/100 mL 0.9% NS IVPB (MBP) (0 mg Intravenous Stopped 4/22/24 0909)   sodium chloride 0.9 % bolus 500 mL (0 mL Intravenous Stopped 4/22/24 0910)        ED Course:    ED Course as of 04/22/24 2110 Mon Apr 22, 2024 2110 ECG 12 Lead ED Triage Standing Order; Weak / Dizzy / AMS  Sinus tachycardia with rate of 107.  No acute ST elevation.  Right bundle branch block.  Normal SD and QTc.  EKG interpreted by me [LD]      ED Course User Index  [LD] Lucy Monge MD       Labs:    Lab Results (last 24 hours)       Procedure Component Value Units Date/Time    COVID PRE-OP / PRE-PROCEDURE SCREENING ORDER (NO ISOLATION) - Swab, Nasopharynx [[id number]]  (Normal) Collected: 04/22/24 0427    Specimen: Swab from Nasopharynx Updated: 04/22/24 0541    Narrative:      The following orders were created for panel order COVID PRE-OP / PRE-PROCEDURE SCREENING ORDER (NO ISOLATION) - Swab, Nasopharynx.  Procedure                               Abnormality         Status                     ---------                               -----------         ------                     COVID-19, FLU A/B, RSV P...[[id number]]  Normal              Final result                 Please view results for these tests on the individual orders.    COVID-19, FLU A/B, RSV PCR 1 HR TAT - Swab, Nasopharynx [[id number]]  (Normal) Collected: 04/22/24 0427    Specimen: Swab from Nasopharynx Updated: 04/22/24 0541     COVID19 Not Detected     Influenza A PCR Not Detected     Influenza B PCR Not Detected     RSV, PCR Not Detected    Narrative:      Fact sheet for providers: https://www.fda.gov/media/298365/download    Fact sheet for patients: https://www.fda.gov/media/507346/download    Test performed by PCR.    CBC & Differential [[id number]]  (Abnormal) Collected: 04/22/24 0442    Specimen: Blood from Arm, Left Updated: 04/22/24 0506    Narrative:      The following orders were created for panel order CBC & Differential.  Procedure                                Abnormality         Status                     ---------                               -----------         ------                     CBC Auto Differential[[id number]]        Abnormal            Final result                 Please view results for these tests on the individual orders.    Comprehensive Metabolic Panel [[id number]]  (Abnormal) Collected: 04/22/24 0442    Specimen: Blood from Arm, Left Updated: 04/22/24 0515     Glucose 165 mg/dL      BUN 18 mg/dL      Creatinine 1.25 mg/dL      Sodium 133 mmol/L      Potassium 4.4 mmol/L      Comment: Slight hemolysis detected by analyzer. Result may be falsely elevated.        Chloride 95 mmol/L      CO2 21.0 mmol/L      Calcium 9.2 mg/dL      Total Protein 7.6 g/dL      Albumin 3.6 g/dL      ALT (SGPT) 19 U/L      AST (SGOT) 23 U/L      Alkaline Phosphatase 95 U/L      Total Bilirubin 0.8 mg/dL      Globulin 4.0 gm/dL      A/G Ratio 0.9 g/dL      BUN/Creatinine Ratio 14.4     Anion Gap 17.0 mmol/L      eGFR 59.3 mL/min/1.73     Narrative:      GFR Normal >60  Chronic Kidney Disease <60  Kidney Failure <15    The GFR formula is only valid for adults with stable renal function between ages 18 and 70.    Single High Sensitivity Troponin T [[id number]]  (Abnormal) Collected: 04/22/24 0442    Specimen: Blood from Arm, Left Updated: 04/22/24 0515     HS Troponin T 42 ng/L     Narrative:      High Sensitive Troponin T Reference Range:  <14.0 ng/L- Negative Female for AMI  <22.0 ng/L- Negative Male for AMI  >=14 - Abnormal Female indicating possible myocardial injury.  >=22 - Abnormal Male indicating possible myocardial injury.   Clinicians would have to utilize clinical acumen, EKG, Troponin, and serial changes to determine if it is an Acute Myocardial Infarction or myocardial injury due to an underlying chronic condition.         Magnesium [[id number]]  (Normal) Collected: 04/22/24 0442    Specimen: Blood from Arm, Left Updated: 04/22/24 0515      Magnesium 2.0 mg/dL     Blood Culture - Blood, Arm, Right [[id number]] Collected: 04/22/24 0442    Specimen: Blood from Arm, Right Updated: 04/22/24 0451    Blood Culture - Blood, Arm, Left [[id number]] Collected: 04/22/24 0442    Specimen: Blood from Arm, Left Updated: 04/22/24 0451    Lactic Acid, Plasma [[id number]]  (Abnormal) Collected: 04/22/24 0442    Specimen: Blood from Arm, Left Updated: 04/22/24 0525     Lactate 2.3 mmol/L     CBC Auto Differential [[id number]]  (Abnormal) Collected: 04/22/24 0442    Specimen: Blood from Arm, Left Updated: 04/22/24 0506     WBC 23.86 10*3/mm3      RBC 3.71 10*6/mm3      Hemoglobin 10.9 g/dL      Hematocrit 34.0 %      MCV 91.6 fL      MCH 29.4 pg      MCHC 32.1 g/dL      RDW 14.4 %      RDW-SD 48.0 fl      MPV 9.6 fL      Platelets 249 10*3/mm3      Neutrophil % 82.2 %      Lymphocyte % 5.2 %      Monocyte % 10.5 %      Eosinophil % 0.1 %      Basophil % 0.4 %      Immature Grans % 1.6 %      Neutrophils, Absolute 19.63 10*3/mm3      Lymphocytes, Absolute 1.23 10*3/mm3      Monocytes, Absolute 2.50 10*3/mm3      Eosinophils, Absolute 0.02 10*3/mm3      Basophils, Absolute 0.09 10*3/mm3      Immature Grans, Absolute 0.39 10*3/mm3      nRBC 0.0 /100 WBC     Urinalysis With Microscopic If Indicated (No Culture) - Urine, Clean Catch [[id number]]  (Abnormal) Collected: 04/22/24 0443    Specimen: Urine, Clean Catch Updated: 04/22/24 0514     Color, UA Yellow     Appearance, UA Cloudy     pH, UA 6.0     Specific Gravity, UA >1.030     Glucose, UA >=1000 mg/dL (3+)     Ketones, UA 15 mg/dL (1+)     Bilirubin, UA Negative     Blood, UA Moderate (2+)     Protein,  mg/dL (2+)     Leuk Esterase, UA Small (1+)     Nitrite, UA Negative     Urobilinogen, UA 1.0 E.U./dL    Urinalysis, Microscopic Only - Urine, Clean Catch [[id number]]  (Abnormal) Collected: 04/22/24 0443    Specimen: Urine, Clean Catch Updated: 04/22/24 0528     RBC, UA 3-5 /HPF      WBC, UA Too Numerous to Count /HPF       Bacteria, UA 4+ /HPF      Squamous Epithelial Cells, UA 0-2 /HPF      Hyaline Casts, UA 0-2 /LPF      Methodology Manual Light Microscopy    Urine Culture - Urine, Urine, Clean Catch [[id number]] Collected: 04/22/24 0443    Specimen: Urine, Clean Catch Updated: 04/22/24 0633    STAT Lactic Acid, Reflex [[id number]]  (Normal) Collected: 04/22/24 0839    Specimen: Blood Updated: 04/22/24 0900     Lactate 0.8 mmol/L     Basic Metabolic Panel [[id number]]  (Abnormal) Collected: 04/22/24 0934    Specimen: Blood from Hand, Right Updated: 04/22/24 0957     Glucose 132 mg/dL      BUN 17 mg/dL      Creatinine 1.09 mg/dL      Sodium 135 mmol/L      Potassium 4.2 mmol/L      Chloride 98 mmol/L      CO2 23.4 mmol/L      Calcium 8.5 mg/dL      BUN/Creatinine Ratio 15.6     Anion Gap 13.6 mmol/L      eGFR 69.9 mL/min/1.73     Narrative:      GFR Normal >60  Chronic Kidney Disease <60  Kidney Failure <15    The GFR formula is only valid for adults with stable renal function between ages 18 and 70.    CBC & Differential [[id number]]  (Abnormal) Collected: 04/22/24 0934    Specimen: Blood from Hand, Right Updated: 04/22/24 1025    Narrative:      The following orders were created for panel order CBC & Differential.  Procedure                               Abnormality         Status                     ---------                               -----------         ------                     CBC Auto Differential[[id number]]        Abnormal            Final result               Scan Slide[[id number]]                                       Final result                 Please view results for these tests on the individual orders.    Magnesium [[id number]]  (Normal) Collected: 04/22/24 0934    Specimen: Blood from Hand, Right Updated: 04/22/24 0957     Magnesium 2.0 mg/dL     Phosphorus [[id number]]  (Normal) Collected: 04/22/24 0934    Specimen: Blood from Hand, Right Updated: 04/22/24 0957     Phosphorus 4.2 mg/dL     CBC Auto Differential  [[id number]]  (Abnormal) Collected: 04/22/24 0934    Specimen: Blood from Hand, Right Updated: 04/22/24 1025     WBC 24.86 10*3/mm3      RBC 3.31 10*6/mm3      Hemoglobin 9.8 g/dL      Hematocrit 29.7 %      MCV 89.7 fL      MCH 29.6 pg      MCHC 33.0 g/dL      RDW 14.4 %      RDW-SD 46.8 fl      MPV 8.6 fL      Platelets 208 10*3/mm3      Neutrophil % 75.5 %      Lymphocyte % 6.9 %      Monocyte % 13.1 %      Eosinophil % 0.1 %      Basophil % 0.3 %      Immature Grans % 4.1 %      Neutrophils, Absolute 18.78 10*3/mm3      Lymphocytes, Absolute 1.71 10*3/mm3      Monocytes, Absolute 3.25 10*3/mm3      Eosinophils, Absolute 0.02 10*3/mm3      Basophils, Absolute 0.07 10*3/mm3      Immature Grans, Absolute 1.03 10*3/mm3      nRBC 0.0 /100 WBC     Scan Slide [[id number]] Collected: 04/22/24 0934    Specimen: Blood from Hand, Right Updated: 04/22/24 1025     RBC Morphology Normal     Toxic Granulation Slight/1+     Platelet Estimate Adequate    Hemoglobin A1c [[id number]]  (Abnormal) Collected: 04/22/24 0934    Specimen: Blood from Hand, Right Updated: 04/22/24 1644     Hemoglobin A1C 8.80 %     Narrative:      Hemoglobin A1C Ranges:    Increased Risk for Diabetes  5.7% to 6.4%  Diabetes                     >= 6.5%  Diabetic Goal                < 7.0%    POC Glucose Once [[id number]]  (Abnormal) Collected: 04/22/24 1206    Specimen: Blood Updated: 04/22/24 1207     Glucose 135 mg/dL      Comment: Serial Number: [id number]:  [id number]       POC Glucose 4x Daily Before Meals & at Bedtime [[id number]]  (Abnormal) Collected: 04/22/24 1706    Specimen: Blood Updated: 04/22/24 1718     Glucose 236 mg/dL      Comment: Serial Number: [id number]:  [id number]                Imaging:    US Scrotum & Testicles    Result Date: 4/22/2024  US SCROTUM AND TESTICLES-  Date of Exam: 4/22/2024 6:59 AM  Indication: left testicular pain.  Comparison: CT abdomen pelvis from earlier today  Technique: Multiple sonographic images of  the scrotum were obtained in transverse and longitudinal planes. Grayscale and color Doppler duplex techniques were utilized.   Findings: Both testicles appear normal in size and echotexture, although the left testicle appears hypervascular. The right testicle measures 4.0 x 2.0 cm, while the left testicle measures 4.4 x 2.2 x 3.0 cm. The right epididymis appears normal in size and vascularity. The left epidermis appears enlarged and hypervascular. There is a moderate left hydrocele with debris and septations, likely a complex hydrocele. There is a left varicocele.      Impression: 1.  Left testicle and epididymis appear hypervascular and left epidermis appears enlarged, suggesting left epididymoorchitis. 2.  Moderate complex left hydrocele. 3.  Left varicocele.    Electronically Signed By-Demsond Sosa MD On:4/22/2024 8:04 AM      CT Abdomen Pelvis With Contrast    Result Date: 4/22/2024  CT ABDOMEN PELVIS W CONTRAST-  Date of Exam: 4/22/2024 6:06 AM  Indication: Left-sided abdominal pain for 1 week; vomiting & increased abdominal pain for 1 day.  Comparison: None available.  Technique: Axial CT images were obtained of the abdomen and pelvis following the uneventful intravenous administration of 80 mL of Isovue-370 contrast agent. Reconstructed 2D coronal and sagittal images were also obtained. Automated exposure control and iterative construction methods were used. No oral contrast agent was administered for the study.  Findings: Coronary artery calcifications are seen. Atherosclerotic changes are seen elsewhere. No aneurysmal dilatation of the abdominal aorta. No acute intraperitoneal or retroperitoneal hemorrhage. There may be borderline cardiac enlargement. A tiny hiatal hernia is possible. Mild subsegmental atelectasis and/or fibrosis involve(s) the lung bases. Probably no acute infiltrate. Mild prominence of the bilateral pelvicalyceal systems is seen. No ureterolithiasis. Tiny left calyceal stones are  suspected, estimated at 3 mm or less. Probably no right nephrolithiasis. Mild prominence of the urinary bladder wall is seen, which may represent an underdistention artifact. An age-indeterminate infectious/inflammatory cystitis is possible. There is suspected prostamegaly. Streak artifact from a right hip prosthesis obscures detail, especially with regard to evaluating the distal ureters and the lower pelvis. Pelvic phleboliths are seen. Formed stool is present of the colon. There may be fecal stasis (or fecal retention) as with constipation. No acute appendicitis or colitis. No acute diverticulitis. No pneumoperitoneum or pneumatosis. No portal or mesenteric venous gas. The gallbladder appears contracted. No gallstones or acute cholecystitis. No acute pancreatitis. No adrenal mass. Degenerative changes are seen throughout the imaged spine. There may be diffuse idiopathic skeletal hyperostosis (DISH). Degenerative changes involve the bilateral sacroiliac (SI) joints and the left hip joint. There is suspected pubic osteitis. No acute fracture. No aggressive osseous lesion is suggested. There are nonspecific prominent bilateral inguinal lymph nodes without definite suppuration or necrosis. No extranodal fluid collection is seen to suggest abscess. There may be a small right inguinal hernia. It does not contain bowel. There is a tiny umbilical hernia, which contains fat and no bowel.      An age-indeterminate infectious/inflammatory cystitis is possible. There may be diffuse prostatomegaly. Otherwise, no definite significant acute findings are appreciated. Please see above comments for further detail.    Electronically Signed By-Kalpesh Oconnell MD On:4/22/2024 6:31 AM      XR Chest 1 View    Result Date: 4/22/2024  XR CHEST 1 VW-  Date of Exam: 4/22/2024 5:02 AM  Indication: Weak/Dizzy/AMS triage protocol.  Comparison: 1/10/2024.  FINDINGS: A single AP (or PA) upright portable chest radiograph was performed. No cardiac  enlargement is seen. No acute infiltrate is appreciated. No pleural effusion or pneumothorax is identified. There is suspected mild chronic blunting of the left lateral costophrenic sulcus, as before. External artifacts obscure detail. Degenerative changes involve the imaged spine and the bilateral shoulders. There is possible mild levoscoliosis of the upper thoracic spine. No significant interval change is seen since the prior study (or studies).  CONCLUSION: No acute infiltrate is appreciated.   Electronically Signed By-Kalpesh Oconnell MD On:4/22/2024 5:15 AM         Differential Diagnosis and Discussion:    Fever: Based on the complaint of fever, differential diagnosis includes but is not limited to meningitis, pneumonia, pyelonephritis, acute uti,  systemic immune response syndrome, sepsis, viral syndrome, fungal infection, tick born illness and other bacterial infections.  Testicular Pain: Differential diagnosis includes but is not limited to epididymitis, orchitis, testicular torsion, testicular tumor, testicular trauma, hydrocele, varicocele, spermatocele, prostatitis, scrotal cellulitis, and urolithiasis.    All labs were reviewed and interpreted by me.  All X-rays impressions were independently interpreted by me.  EKG was interpreted by me.  CT scan radiology impression was interpreted by me.  Ultrasound impression was interpreted by me.     MDM  Number of Diagnoses or Management Options  Diagnosis management comments: On arrival patient is febrile with a temp of 103.  Blood pressure borderline low.  Labs show an elevated white count of 24.  UA concerning for urinary tract infection.  Patient was started antibiotics.  He reports left testicular pain.  Ultrasound concerning for epididymoorchitis.  CT showed infectious/inflammatory cystitis.  Patient has history of congestive heart failure.  He was given IV fluids but limited amount due to congestive heart failure.  Patient will be admitted for further  care.       Amount and/or Complexity of Data Reviewed  Clinical lab tests: reviewed  Tests in the radiology section of CPT®: reviewed  Review and summarize past medical records: yes  Independent visualization of images, tracings, or specimens: yes    Risk of Complications, Morbidity, and/or Mortality  Presenting problems: moderate  Management options: moderate         Critical Care Note: Total Critical Care time of 40 minutes. Total critical care time documented does not include time spent on separately billed procedures for services of nurses or physician assistants. I personally saw and examined the patient. I have reviewed all diagnostic interpretations and treatment plans as written. I was present for the key portions of any procedures performed and the inclusive time noted in any critical care statement. Critical care time includes patient management by me, time spent at the patients bedside,  time to review lab and imaging results, discussing patient care, documentation in the medical record, and time spent with family or caregiver.    Sepsis criteria was met in the emergency department and the Sepsis protocol (including antibiotic administration) was initiated.      SIRS criteria considered:   1.  Temperature > 100.4 or <96.8    2.  Heart Rate > 90    3.  Respiratory Rate > 22    4.  WBC > 12K or <4K.             Severe Sepsis:     Respiratory: Mechanical Ventilation or Bipap  Hypotension: SBP > 90 or MAP < 65  Renal: Creatinine > 2  Metabolic: Lactic Acid > 2  Hematologic: Platelets < 100K or INR > 1.5  Hepatic: BILI  >  2  CNS: Sudden AMS     Septic Shock:     Severe Sepsis + Persistent hypotension or Lactic Acid > 4     Normal saline bolus, Antibiotics, and final disposition was based on these definitions.        Sepsis was recognized at 0630    Antibiotics were ordered.     30 cc/kg bolus was indicated.       Patient did not receive the recommended 30ml/kg fluid bolus for sepsis because it would be  harmful or detrimental to the patient.    The patient has Heart Failure.   The patient was ordered 1L of fluids.        Patient Care Considerations:    CT HEAD: I considered ordering a noncontrast CT of the head, however no focal deficits. Patient alert and oriented      Consultants/Shared Management Plan:    Hospitalist: I have discussed the case with Dr Werner who agrees to accept the patient for admission.    Social Determinants of Health:    Patient is independent, reliable, and has access to care.       Disposition and Care Coordination:    Admit:   Through independent evaluation of the patient's history, physical, and imperical data, the patient meets criteria for inpatient admission to the hospital.        Final diagnoses:   Epididymo-orchitis   Urinary tract infection without hematuria, site unspecified   Sepsis, due to unspecified organism, unspecified whether acute organ dysfunction present        ED Disposition       ED Disposition   Decision to Admit    Condition   --    Comment   Level of Care: Progressive Care [20]   Diagnosis: Orchitis [202279]   Admitting Physician: WARREN WERNER [[id number]]   Attending Physician: WARREN WERNER [[id number]]   Certification: I Certify That Inpatient Hospital Services Are Medically Necessary For Greater Than 2 Midnights                 This medical record created using voice recognition software.             Lucy Monge MD  04/22/24 0162

## 2024-04-22 NOTE — Clinical Note
Level of Care: Med/Surg [1]   Diagnosis: Orchitis [202279]   Admitting Physician: WARREN WERNER [[id number]]   Attending Physician: WARREN WERNER [[id number]]   Certification: I Certify That Inpatient Hospital Services Are Medically Necessary For Greater Than 2 Midnights

## 2024-04-22 NOTE — H&P
[facility] HISTORY AND PHYSICAL  Date: 2024   Patient Name: Giles Donovan  : 1945  MRN: [id number]  Primary Care Physician:  Morena Masters MD  Date of admission: 2024    Subjective testicular pain  Subjective   Chief Complaint: Testicular pain    HPI: Patient is a 78-year-old man that presents to the emergency room for testicular pain after he accidentally fell on his back and hit his left testicle.  Patient's significant other believes that he has had a urinary tract infection.  Patient denies no vomiting or diarrhea.  No chest pain or shortness of breath.    On arrival to the ED, patient's temperature 103, pulse 111, respiratory rate of 18, blood pressure 101/42, and he saturating 94% on 2 L of oxygen.    Ultrasound of scrotum and testes shows: Left testicle and epididymis appear hypervascular and the left epididymis appears enlarged, suggesting left epididymorchitis.  There is a moderate complex left hydrocele.  There is a left varicocele.    CT of his abdomen shows: Age-indeterminate infectious/inflammatory cystitis is possible.  There may be diffuse prostatomegaly.    Chest x-ray shows no acute infiltrate.    On labs, patient's sodium is 135, glucose is 135, calcium is 8.5, magnesium is 2.0, phosphorus 4.2.  Patient's initial lactate was 2.3, second lactate was 0.8.  White blood cell count is 24.86.  Hemoglobin is 9.8.    Urine shows cloudiness, specific gravity greater than 1.030, glucose greater than 1000, ketones 15, moderate amount of blood, small amount of leukocytes, too many white blood cells to count.    Personal History     Past Medical History:  Past Medical History:   Diagnosis Date    A-fib     Allergic rhinitis 2014    Will try Zyrtec, he didnt want to use a nasal spray.    Arthritis     Asthma     Cataract     left eye    CHF (congestive heart failure)     Cough 2016    PFT and CXR ordered.    Depression     PTSD    Diabetes      Elevated cholesterol     H/O psychiatric care 1999    PTSD    Hyperlipidemia 03/30/2016    Lipids ordered. Continue on current medication.    Hypertension     Hypothyroidism     Seasonal allergies     Shortness of breath     Sleep apnea     Stenosis of right carotid artery 02/19/2017    Will refer to vascular surgeon.    Syncope 02/19/2017    Type 2 diabetes mellitus     Upper respiratory infection 10/18/2015    Will treat cough with Hydromet, otherwise over the counter meds for treatment.         Past Surgical History:  Past Surgical History:   Procedure Laterality Date    CATARACT EXTRACTION Right     x2    COLONOSCOPY      JOINT REPLACEMENT      REPLACEMENT TOTAL HIP ONCOLOGIC Right     RETINAL DETACHMENT REPAIR      TOE AMPUTATION Left 11/2017    Second phalaeng.    TOE OSTEOPHYTE REMOVAL          Family History:   Breast Cancer-related family history is not on file.      Social History:   Social History     Socioeconomic History    Marital status: Single   Tobacco Use    Smoking status: Never     Passive exposure: Never    Smokeless tobacco: Never   Vaping Use    Vaping status: Never Used   Substance and Sexual Activity    Alcohol use: Yes     Alcohol/week: 8.0 standard drinks of alcohol     Types: 7 Cans of beer, 1 Shots of liquor per week    Drug use: Never    Sexual activity: Defer         Home Medications:  Insulin Glargine, PARoxetine, Semaglutide (1 MG/DOSE), Vitamin D-3, albuterol, apixaban, aspirin, atorvastatin, benzonatate, buPROPion, carvedilol, cetirizine, empagliflozin, ezetimibe, fluticasone-salmeterol, gabapentin, guaiFENesin, insulin aspart, levothyroxine, metFORMIN, olopatadine, omeprazole, oxybutynin XL, prazosin, spironolactone, and traZODone    Allergies:  Allergies   Allergen Reactions    Latex Rash and Anaphylaxis    Latex, Natural Rubber Itching       Review of Systems   All systems were reviewed and negative except for: scrotal pain     Objective   Objective     Vitals:   Temp:  [97.7 °F  (36.5 °C)-103 °F (39.4 °C)] 97.7 °F (36.5 °C)  Heart Rate:  [] 99  Resp:  [16-18] 16  BP: ()/(42-67) 112/57  Flow (L/min):  [2] 2    Physical Exam    Appearance: Normal appearance. Somnolent just received pain medications  HENT:      Head: Normocephalic and atraumatic.      Nose: Nose normal.      Mouth/Throat:      Mouth: Mucous membranes are moist.   Eyes:      Extraocular Movements: Extraocular movements intact.      Conjunctiva/sclera: Conjunctivae normal.      Pupils: Pupils are equal, round, and reactive to light.   Cardiovascular:      Rate and Rhythm: Regular rhythm. Tachycardia present.      Pulses: Normal pulses.      Heart sounds: Normal heart sounds.   Pulmonary:      Effort: Pulmonary effort is normal.      Breath sounds: Normal breath sounds.   Abdominal:      General: There is no distension.      Palpations: Abdomen is soft.      Tenderness: There is no abdominal tenderness.   Genitourinary:     Comments: Tenderness to left testicle.    Musculoskeletal:         General: Normal range of motion.      Cervical back: Normal range of motion.   Skin:     General: Skin is warm and dry.      Capillary Refill: Capillary refill takes less than 2 seconds.   Neurological:      General: No focal deficit present.      Mental Status: He is alert and oriented to person, place, and time. Mental status is at baseline.   Psychiatric:         Mood and Affect: Mood normal.         Behavior: Behavior normal.        Result Review      Result Review:  I have personally reviewed the results from the time of this admission to 4/22/2024 14:01 EDT and agree with these findings:  [x]  Laboratory  []  Microbiology  [x]  Radiology  [x]  EKG/Telemetry   [x]  Cardiology/Vascular   [x]  Pathology  [x]  Old records  []  Other:    Lab Results (most recent)       Procedure Component Value Units Date/Time    POC Glucose Once [[id number]]  (Abnormal) Collected: 04/22/24 1206    Specimen: Blood Updated: 04/22/24 1207     Glucose  135 mg/dL      Comment: Serial Number: [id number]:  [id number]       Hemoglobin A1c [[id number]] Collected: 04/22/24 0934    Specimen: Blood from Hand, Right Updated: 04/22/24 1107    CBC & Differential [[id number]]  (Abnormal) Collected: 04/22/24 0934    Specimen: Blood from Hand, Right Updated: 04/22/24 1025    Narrative:      The following orders were created for panel order CBC & Differential.  Procedure                               Abnormality         Status                     ---------                               -----------         ------                     CBC Auto Differential[[id number]]        Abnormal            Final result               Scan Slide[[id number]]                                       Final result                 Please view results for these tests on the individual orders.    CBC Auto Differential [[id number]]  (Abnormal) Collected: 04/22/24 0934    Specimen: Blood from Hand, Right Updated: 04/22/24 1025     WBC 24.86 10*3/mm3      RBC 3.31 10*6/mm3      Hemoglobin 9.8 g/dL      Hematocrit 29.7 %      MCV 89.7 fL      MCH 29.6 pg      MCHC 33.0 g/dL      RDW 14.4 %      RDW-SD 46.8 fl      MPV 8.6 fL      Platelets 208 10*3/mm3      Neutrophil % 75.5 %      Lymphocyte % 6.9 %      Monocyte % 13.1 %      Eosinophil % 0.1 %      Basophil % 0.3 %      Immature Grans % 4.1 %      Neutrophils, Absolute 18.78 10*3/mm3      Lymphocytes, Absolute 1.71 10*3/mm3      Monocytes, Absolute 3.25 10*3/mm3      Eosinophils, Absolute 0.02 10*3/mm3      Basophils, Absolute 0.07 10*3/mm3      Immature Grans, Absolute 1.03 10*3/mm3      nRBC 0.0 /100 WBC     Scan Slide [[id number]] Collected: 04/22/24 0934    Specimen: Blood from Hand, Right Updated: 04/22/24 1025     RBC Morphology Normal     Toxic Granulation Slight/1+     Platelet Estimate Adequate    Basic Metabolic Panel [[id number]]  (Abnormal) Collected: 04/22/24 0934    Specimen: Blood from Hand, Right Updated: 04/22/24 0957     Glucose 132 mg/dL       BUN 17 mg/dL      Creatinine 1.09 mg/dL      Sodium 135 mmol/L      Potassium 4.2 mmol/L      Chloride 98 mmol/L      CO2 23.4 mmol/L      Calcium 8.5 mg/dL      BUN/Creatinine Ratio 15.6     Anion Gap 13.6 mmol/L      eGFR 69.9 mL/min/1.73     Narrative:      GFR Normal >60  Chronic Kidney Disease <60  Kidney Failure <15    The GFR formula is only valid for adults with stable renal function between ages 18 and 70.    Magnesium [[id number]]  (Normal) Collected: 04/22/24 0934    Specimen: Blood from Hand, Right Updated: 04/22/24 0957     Magnesium 2.0 mg/dL     Phosphorus [[id number]]  (Normal) Collected: 04/22/24 0934    Specimen: Blood from Hand, Right Updated: 04/22/24 0957     Phosphorus 4.2 mg/dL     STAT Lactic Acid, Reflex [[id number]]  (Normal) Collected: 04/22/24 0839    Specimen: Blood Updated: 04/22/24 0900     Lactate 0.8 mmol/L     Urine Culture - Urine, Urine, Clean Catch [[id number]] Collected: 04/22/24 0443    Specimen: Urine, Clean Catch Updated: 04/22/24 0633    [place] Draw [[id number]] Collected: 04/22/24 0442    Specimen: Blood from Arm, Left Updated: 04/22/24 0545    Narrative:      The following orders were created for panel order [place] Draw.  Procedure                               Abnormality         Status                     ---------                               -----------         ------                     Green Top (Gel)[[id number]]                                  Final result               Lavender Top[[id number]]                                     Final result               Gold Top - SST[[id number]]                                   Final result               Light Blue Top[[id number]]                                   Final result                 Please view results for these tests on the individual orders.    Green Top (Gel) [[id number]] Collected: 04/22/24 0442    Specimen: Blood from Arm, Left Updated: 04/22/24 0545     Extra Tube Hold for add-ons.     Comment: Auto resulted.        Lavender Top [[id number]] Collected: 04/22/24 0442    Specimen: Blood from Arm, Left Updated: 04/22/24 0545     Extra Tube hold for add-on     Comment: Auto resulted       Gold Top - SST [[id number]] Collected: 04/22/24 0442    Specimen: Blood from Arm, Left Updated: 04/22/24 0545     Extra Tube Hold for add-ons.     Comment: Auto resulted.       Light Blue Top [[id number]] Collected: 04/22/24 0442    Specimen: Blood from Arm, Left Updated: 04/22/24 0545     Extra Tube Hold for add-ons.     Comment: Auto resulted       COVID PRE-OP / PRE-PROCEDURE SCREENING ORDER (NO ISOLATION) - Swab, Nasopharynx [[id number]]  (Normal) Collected: 04/22/24 0427    Specimen: Swab from Nasopharynx Updated: 04/22/24 0541    Narrative:      The following orders were created for panel order COVID PRE-OP / PRE-PROCEDURE SCREENING ORDER (NO ISOLATION) - Swab, Nasopharynx.  Procedure                               Abnormality         Status                     ---------                               -----------         ------                     COVID-19, FLU A/B, RSV P...[[id number]]  Normal              Final result                 Please view results for these tests on the individual orders.    COVID-19, FLU A/B, RSV PCR 1 HR TAT - Swab, Nasopharynx [[id number]]  (Normal) Collected: 04/22/24 0427    Specimen: Swab from Nasopharynx Updated: 04/22/24 0541     COVID19 Not Detected     Influenza A PCR Not Detected     Influenza B PCR Not Detected     RSV, PCR Not Detected    Narrative:      Fact sheet for providers: https://www.fda.gov/media/262231/download    Fact sheet for patients: https://www.fda.gov/media/109359/download    Test performed by PCR.    Urinalysis, Microscopic Only - Urine, Clean Catch [[id number]]  (Abnormal) Collected: 04/22/24 0443    Specimen: Urine, Clean Catch Updated: 04/22/24 0528     RBC, UA 3-5 /HPF      WBC, UA Too Numerous to Count /HPF      Bacteria, UA 4+ /HPF      Squamous Epithelial Cells, UA 0-2 /HPF       Hyaline Casts, UA 0-2 /LPF      Methodology Manual Light Microscopy    Lactic Acid, Plasma [[id number]]  (Abnormal) Collected: 04/22/24 0442    Specimen: Blood from Arm, Left Updated: 04/22/24 0525     Lactate 2.3 mmol/L     Comprehensive Metabolic Panel [[id number]]  (Abnormal) Collected: 04/22/24 0442    Specimen: Blood from Arm, Left Updated: 04/22/24 0515     Glucose 165 mg/dL      BUN 18 mg/dL      Creatinine 1.25 mg/dL      Sodium 133 mmol/L      Potassium 4.4 mmol/L      Comment: Slight hemolysis detected by analyzer. Result may be falsely elevated.        Chloride 95 mmol/L      CO2 21.0 mmol/L      Calcium 9.2 mg/dL      Total Protein 7.6 g/dL      Albumin 3.6 g/dL      ALT (SGPT) 19 U/L      AST (SGOT) 23 U/L      Alkaline Phosphatase 95 U/L      Total Bilirubin 0.8 mg/dL      Globulin 4.0 gm/dL      A/G Ratio 0.9 g/dL      BUN/Creatinine Ratio 14.4     Anion Gap 17.0 mmol/L      eGFR 59.3 mL/min/1.73     Narrative:      GFR Normal >60  Chronic Kidney Disease <60  Kidney Failure <15    The GFR formula is only valid for adults with stable renal function between ages 18 and 70.    Single High Sensitivity Troponin T [[id number]]  (Abnormal) Collected: 04/22/24 0442    Specimen: Blood from Arm, Left Updated: 04/22/24 0515     HS Troponin T 42 ng/L     Narrative:      High Sensitive Troponin T Reference Range:  <14.0 ng/L- Negative Female for AMI  <22.0 ng/L- Negative Male for AMI  >=14 - Abnormal Female indicating possible myocardial injury.  >=22 - Abnormal Male indicating possible myocardial injury.   Clinicians would have to utilize clinical acumen, EKG, Troponin, and serial changes to determine if it is an Acute Myocardial Infarction or myocardial injury due to an underlying chronic condition.         Magnesium [[id number]]  (Normal) Collected: 04/22/24 0442    Specimen: Blood from Arm, Left Updated: 04/22/24 0515     Magnesium 2.0 mg/dL     Urinalysis With Microscopic If Indicated (No Culture) - Urine, Clean  Catch [[id number]]  (Abnormal) Collected: 04/22/24 0443    Specimen: Urine, Clean Catch Updated: 04/22/24 0514     Color, UA Yellow     Appearance, UA Cloudy     pH, UA 6.0     Specific Gravity, UA >1.030     Glucose, UA >=1000 mg/dL (3+)     Ketones, UA 15 mg/dL (1+)     Bilirubin, UA Negative     Blood, UA Moderate (2+)     Protein,  mg/dL (2+)     Leuk Esterase, UA Small (1+)     Nitrite, UA Negative     Urobilinogen, UA 1.0 E.U./dL    CBC & Differential [[id number]]  (Abnormal) Collected: 04/22/24 0442    Specimen: Blood from Arm, Left Updated: 04/22/24 0506    Narrative:      The following orders were created for panel order CBC & Differential.  Procedure                               Abnormality         Status                     ---------                               -----------         ------                     CBC Auto Differential[[id number]]        Abnormal            Final result                 Please view results for these tests on the individual orders.    CBC Auto Differential [[id number]]  (Abnormal) Collected: 04/22/24 0442    Specimen: Blood from Arm, Left Updated: 04/22/24 0506     WBC 23.86 10*3/mm3      RBC 3.71 10*6/mm3      Hemoglobin 10.9 g/dL      Hematocrit 34.0 %      MCV 91.6 fL      MCH 29.4 pg      MCHC 32.1 g/dL      RDW 14.4 %      RDW-SD 48.0 fl      MPV 9.6 fL      Platelets 249 10*3/mm3      Neutrophil % 82.2 %      Lymphocyte % 5.2 %      Monocyte % 10.5 %      Eosinophil % 0.1 %      Basophil % 0.4 %      Immature Grans % 1.6 %      Neutrophils, Absolute 19.63 10*3/mm3      Lymphocytes, Absolute 1.23 10*3/mm3      Monocytes, Absolute 2.50 10*3/mm3      Eosinophils, Absolute 0.02 10*3/mm3      Basophils, Absolute 0.09 10*3/mm3      Immature Grans, Absolute 0.39 10*3/mm3      nRBC 0.0 /100 WBC     Blood Culture - Blood, Arm, Left [[id number]] Collected: 04/22/24 0442    Specimen: Blood from Arm, Left Updated: 04/22/24 0451    Blood Culture - Blood, Arm, Right [[id number]]  Collected: 04/22/24 0442    Specimen: Blood from Arm, Right Updated: 04/22/24 0451            US Scrotum & Testicles    Result Date: 4/22/2024  Impression: 1.  Left testicle and epididymis appear hypervascular and left epidermis appears enlarged, suggesting left epididymoorchitis. 2.  Moderate complex left hydrocele. 3.  Left varicocele.    Electronically Signed By-Desmond Sosa MD On:4/22/2024 8:04 AM      CT Abdomen Pelvis With Contrast    Result Date: 4/22/2024  An age-indeterminate infectious/inflammatory cystitis is possible. There may be diffuse prostatomegaly. Otherwise, no definite significant acute findings are appreciated. Please see above comments for further detail.    Electronically Signed By-Kalpesh Oconnell MD On:4/22/2024 6:31 AM       Assessment & Plan   Assessment / Plan   #1 left epididymorchitis/UTI with concern for sepsis  -Elevated leukocytosis, tachycardia, hypotension, elevated lactic acid  -Started on Rocephin, patient not given sepsis level fluids because of CHF.  Given 500 mL bolus.  -Dr. Somers with urology consulted.    #2 chronic heart failure with preserved ejection fraction  -GDMT: Patient on Coreg, spironolactone, Jardiance.  He was on diuretics and Entresto but it was held because of hypotension.  -For now holding all CHF medications because of hypotension.    #3 type 2 diabetes  -Continue insulin sliding scale and basal dosing.    #4 hypothyroidism continue Synthroid    #5 chronic paroxysmal atrial fibrillation continue Eliquis.    #6 depression, PTSD continue Wellbutrin, Paxil, prazosin    #7 CAD continue Lipitor and statin    #8 MIAH allow patient to use his device    #9 GERD continue PPI    #10 BPH  -Patient had issues with urinary retention recently.  His oxybutynin was stopped.  Start Flomax and Proscar.        DVT prophylaxis:  Medical and mechanical DVT prophylaxis orders are present.        CODE STATUS:    Code Status (Patient has no pulse and is not breathing): CPR  (Attempt to Resuscitate)  Medical Interventions (Patient has pulse or is breathing): Full Support      Admission Status:  I believe this patient meets inpatient status.    Electronically signed by Mikal Lewis DO, 04/22/24, 2:01 PM EDT.

## 2024-04-23 ENCOUNTER — TELEPHONE (OUTPATIENT)
Dept: INTERNAL MEDICINE | Facility: CLINIC | Age: 79
End: 2024-04-23
Payer: MEDICARE

## 2024-04-23 LAB
ANION GAP SERPL CALCULATED.3IONS-SCNC: 10.7 MMOL/L (ref 5–15)
BASOPHILS # BLD AUTO: 0.05 10*3/MM3 (ref 0–0.2)
BASOPHILS NFR BLD AUTO: 0.3 % (ref 0–1.5)
BUN SERPL-MCNC: 19 MG/DL (ref 8–23)
BUN/CREAT SERPL: 19 (ref 7–25)
CALCIUM SPEC-SCNC: 8.7 MG/DL (ref 8.6–10.5)
CHLORIDE SERPL-SCNC: 99 MMOL/L (ref 98–107)
CO2 SERPL-SCNC: 23.3 MMOL/L (ref 22–29)
CREAT SERPL-MCNC: 1 MG/DL (ref 0.76–1.27)
DEPRECATED RDW RBC AUTO: 49.1 FL (ref 37–54)
EGFRCR SERPLBLD CKD-EPI 2021: 77.5 ML/MIN/1.73
EOSINOPHIL # BLD AUTO: 0.13 10*3/MM3 (ref 0–0.4)
EOSINOPHIL NFR BLD AUTO: 0.8 % (ref 0.3–6.2)
ERYTHROCYTE [DISTWIDTH] IN BLOOD BY AUTOMATED COUNT: 14.6 % (ref 12.3–15.4)
GLUCOSE BLDC GLUCOMTR-MCNC: 109 MG/DL (ref 70–99)
GLUCOSE BLDC GLUCOMTR-MCNC: 270 MG/DL (ref 70–99)
GLUCOSE BLDC GLUCOMTR-MCNC: 306 MG/DL (ref 70–99)
GLUCOSE BLDC GLUCOMTR-MCNC: 315 MG/DL (ref 70–99)
GLUCOSE SERPL-MCNC: 155 MG/DL (ref 65–99)
HCT VFR BLD AUTO: 31 % (ref 37.5–51)
HGB BLD-MCNC: 9.9 G/DL (ref 13–17.7)
IMM GRANULOCYTES # BLD AUTO: 0.14 10*3/MM3 (ref 0–0.05)
IMM GRANULOCYTES NFR BLD AUTO: 0.8 % (ref 0–0.5)
LYMPHOCYTES # BLD AUTO: 1.44 10*3/MM3 (ref 0.7–3.1)
LYMPHOCYTES NFR BLD AUTO: 8.5 % (ref 19.6–45.3)
MAGNESIUM SERPL-MCNC: 2.4 MG/DL (ref 1.6–2.4)
MCH RBC QN AUTO: 29.2 PG (ref 26.6–33)
MCHC RBC AUTO-ENTMCNC: 31.9 G/DL (ref 31.5–35.7)
MCV RBC AUTO: 91.4 FL (ref 79–97)
MONOCYTES # BLD AUTO: 2.08 10*3/MM3 (ref 0.1–0.9)
MONOCYTES NFR BLD AUTO: 12.3 % (ref 5–12)
NEUTROPHILS NFR BLD AUTO: 13.09 10*3/MM3 (ref 1.7–7)
NEUTROPHILS NFR BLD AUTO: 77.3 % (ref 42.7–76)
NRBC BLD AUTO-RTO: 0 /100 WBC (ref 0–0.2)
PHOSPHATE SERPL-MCNC: 2.9 MG/DL (ref 2.5–4.5)
PLATELET # BLD AUTO: 278 10*3/MM3 (ref 140–450)
PMV BLD AUTO: 9.6 FL (ref 6–12)
POTASSIUM SERPL-SCNC: 4.3 MMOL/L (ref 3.5–5.2)
RBC # BLD AUTO: 3.39 10*6/MM3 (ref 4.14–5.8)
SODIUM SERPL-SCNC: 133 MMOL/L (ref 136–145)
WBC NRBC COR # BLD AUTO: 16.93 10*3/MM3 (ref 3.4–10.8)

## 2024-04-23 PROCEDURE — 84100 ASSAY OF PHOSPHORUS: CPT | Performed by: HOSPITALIST

## 2024-04-23 PROCEDURE — 99233 SBSQ HOSP IP/OBS HIGH 50: CPT | Performed by: INTERNAL MEDICINE

## 2024-04-23 PROCEDURE — 25010000002 CEFTRIAXONE PER 250 MG: Performed by: HOSPITALIST

## 2024-04-23 PROCEDURE — 97165 OT EVAL LOW COMPLEX 30 MIN: CPT

## 2024-04-23 PROCEDURE — 99231 SBSQ HOSP IP/OBS SF/LOW 25: CPT | Performed by: UROLOGY

## 2024-04-23 PROCEDURE — 63710000001 INSULIN DETEMIR PER 5 UNITS: Performed by: HOSPITALIST

## 2024-04-23 PROCEDURE — 63710000001 INSULIN LISPRO (HUMAN) PER 5 UNITS: Performed by: HOSPITALIST

## 2024-04-23 PROCEDURE — 82948 REAGENT STRIP/BLOOD GLUCOSE: CPT

## 2024-04-23 PROCEDURE — 85025 COMPLETE CBC W/AUTO DIFF WBC: CPT | Performed by: HOSPITALIST

## 2024-04-23 PROCEDURE — 82948 REAGENT STRIP/BLOOD GLUCOSE: CPT | Performed by: HOSPITALIST

## 2024-04-23 PROCEDURE — 94799 UNLISTED PULMONARY SVC/PX: CPT

## 2024-04-23 PROCEDURE — 94664 DEMO&/EVAL PT USE INHALER: CPT

## 2024-04-23 PROCEDURE — 83735 ASSAY OF MAGNESIUM: CPT | Performed by: HOSPITALIST

## 2024-04-23 PROCEDURE — 80048 BASIC METABOLIC PNL TOTAL CA: CPT | Performed by: HOSPITALIST

## 2024-04-23 PROCEDURE — 97161 PT EVAL LOW COMPLEX 20 MIN: CPT

## 2024-04-23 RX ADMIN — GABAPENTIN 600 MG: 300 CAPSULE ORAL at 05:46

## 2024-04-23 RX ADMIN — ASPIRIN 81 MG: 81 TABLET, COATED ORAL at 08:45

## 2024-04-23 RX ADMIN — PANTOPRAZOLE SODIUM 40 MG: 40 TABLET, DELAYED RELEASE ORAL at 05:46

## 2024-04-23 RX ADMIN — FINASTERIDE 5 MG: 5 TABLET, FILM COATED ORAL at 08:45

## 2024-04-23 RX ADMIN — INSULIN LISPRO 10 UNITS: 100 INJECTION, SOLUTION INTRAVENOUS; SUBCUTANEOUS at 12:08

## 2024-04-23 RX ADMIN — CEFTRIAXONE SODIUM 2000 MG: 2 INJECTION, POWDER, FOR SOLUTION INTRAMUSCULAR; INTRAVENOUS at 05:51

## 2024-04-23 RX ADMIN — APIXABAN 5 MG: 5 TABLET, FILM COATED ORAL at 08:45

## 2024-04-23 RX ADMIN — INSULIN LISPRO 10 UNITS: 100 INJECTION, SOLUTION INTRAVENOUS; SUBCUTANEOUS at 21:16

## 2024-04-23 RX ADMIN — APIXABAN 5 MG: 5 TABLET, FILM COATED ORAL at 21:16

## 2024-04-23 RX ADMIN — GABAPENTIN 600 MG: 300 CAPSULE ORAL at 15:27

## 2024-04-23 RX ADMIN — BUDESONIDE AND FORMOTEROL FUMARATE DIHYDRATE 2 PUFF: 160; 4.5 AEROSOL RESPIRATORY (INHALATION) at 20:22

## 2024-04-23 RX ADMIN — PAROXETINE HYDROCHLORIDE 40 MG: 20 TABLET, FILM COATED ORAL at 17:25

## 2024-04-23 RX ADMIN — TAMSULOSIN HYDROCHLORIDE 0.4 MG: 0.4 CAPSULE ORAL at 08:45

## 2024-04-23 RX ADMIN — GABAPENTIN 600 MG: 300 CAPSULE ORAL at 21:15

## 2024-04-23 RX ADMIN — LEVOTHYROXINE SODIUM 100 MCG: 50 TABLET ORAL at 05:46

## 2024-04-23 RX ADMIN — INSULIN DETEMIR 40 UNITS: 100 INJECTION, SOLUTION SUBCUTANEOUS at 21:16

## 2024-04-23 RX ADMIN — BUPROPION HYDROCHLORIDE 150 MG: 75 TABLET ORAL at 08:45

## 2024-04-23 RX ADMIN — INSULIN LISPRO 8 UNITS: 100 INJECTION, SOLUTION INTRAVENOUS; SUBCUTANEOUS at 17:25

## 2024-04-23 RX ADMIN — ATORVASTATIN CALCIUM 80 MG: 40 TABLET, FILM COATED ORAL at 08:45

## 2024-04-23 RX ADMIN — BUDESONIDE AND FORMOTEROL FUMARATE DIHYDRATE 2 PUFF: 160; 4.5 AEROSOL RESPIRATORY (INHALATION) at 09:26

## 2024-04-23 NOTE — PLAN OF CARE
Goal Outcome Evaluation:               Pt admitted with Orchitis and UTI , left testicle is slightly swollen and very tender, adequate urine output, slept most of night , t max 99..

## 2024-04-23 NOTE — PROGRESS NOTES
[facility]   Urology Progress Note    Patient Name: Giles Donovan  : 1945  MRN: [id number]  Primary Care Physician:  Morena Masters MD  Date of admission: 2024    Subjective   Subjective     Feeling somewhat better      Objective   Objective     Vitals:   Temp:  [97.7 °F (36.5 °C)-99.5 °F (37.5 °C)] 99.5 °F (37.5 °C)  Heart Rate:  [] 92  Resp:  [16-18] 16  BP: (105-118)/(57-73) 118/73  Flow (L/min):  [2] 2  Physical Exam     Alert and oriented x3  No acute distress  Unlabored respirations  Nontender/nondistended   : No penile lesions; patient incontinent; left hemiscrotal swelling with significant discomfort to manipulation and movement; no significant right hemiscrotal findings ; overall stable; slightly more tolerant today    Result Review    Result Review:  I have personally reviewed the results from the time of this admission to 2024 08:40 EDT and agree with these findings:  [x]  Laboratory  []  Microbiology  []  Radiology  []  EKG/Telemetry   []  Cardiology/Vascular   []  Pathology  []  Old records  []  Other:      Assessment & Plan   Assessment / Plan     Brief Patient Summary:  Giles Donovan is a 78 y.o. male who  history of BPH, urge urinary incontinence who presented with left epididymoorchitis, UTI     Active Hospital Problems:  Active Hospital Problems    Diagnosis     **Orchitis      Plan:   Leukocytosis improving  Blood cultures negative x 2; urine culture pending    Continue empiric treatment      No acute urologic intervention indicated at this time     Serial examinations     Symptom control  Elevate scrotum  Ice pack as needed comfort     Will follow    Electronically signed by Aisha Somers MD, 24, 8:40 AM EDT.

## 2024-04-23 NOTE — PROGRESS NOTES
[facility]   Hospitalist Progress Note  Date: 2024  Patient Name: Giles Donovan  : 1945  MRN: [id number]  Date of admission: 2024      Subjective   Subjective     Chief Complaint: Left testicular pain    Summary:    Patient is a 78-year-old man that presents to the emergency room for testicular pain after he accidentally fell on his back and hit his left testicle.  Patient's significant other believes that he has had a urinary tract infection.  Patient denies no vomiting or diarrhea.  No chest pain or shortness of breath.     On arrival to the ED, patient's temperature 103, pulse 111, respiratory rate of 18, blood pressure 101/42, and he saturating 94% on 2 L of oxygen.     Ultrasound of scrotum and testes shows: Left testicle and epididymis appear hypervascular and the left epididymis appears enlarged, suggesting left epididymorchitis.  There is a moderate complex left hydrocele.  There is a left varicocele.     CT of his abdomen shows: Age-indeterminate infectious/inflammatory cystitis is possible.  There may be diffuse prostatomegaly.     Chest x-ray shows no acute infiltrate.     On labs, patient's sodium is 135, glucose is 135, calcium is 8.5, magnesium is 2.0, phosphorus 4.2.  Patient's initial lactate was 2.3, second lactate was 0.8.  White blood cell count is 24.86.  Hemoglobin is 9.8.     Urine shows cloudiness, specific gravity greater than 1.030, glucose greater than 1000, ketones 15, moderate amount of blood, small amount of leukocytes, too many white blood cells to count.  Patient evaluated by urology.  No immediate surgical intervention.  Urine culture positive for E. coli.  Interval Followup:   Tmax 101.8.  Other vital signs stable on room air.  Denies any burning with urination or strong smell but  Continues to have left testicular pain but improving.  Denies any chills.  Remains weak.    Review of Systems   All systems were reviewed and negative except for: Summary and  interval follow-up    Objective   Objective     Vitals:   Temp:  [98.2 °F (36.8 °C)-99.5 °F (37.5 °C)] 98.4 °F (36.9 °C)  Heart Rate:  [] 94  Resp:  [16-18] 16  BP: (109-133)/(65-82) 130/82  Physical Exam      Head: Normocephalic and atraumatic.      Nose: Nose normal.      Mouth/Throat:      Mouth: Mucous membranes are moist.   Eyes:      Extraocular Movements: Extraocular movements intact.      Conjunctiva/sclera: Conjunctivae normal.      Pupils: Pupils are equal, round, and reactive to light.   Cardiovascular:      Rate and Rhythm: Regular rhythm. Tachycardia present.      Pulses: Normal pulses.      Heart sounds: Normal heart sounds.   Pulmonary:      Effort: Pulmonary effort is normal.      Breath sounds: Normal breath sounds.   Abdominal:      General: There is no distension.      Palpations: Abdomen is soft.      Tenderness: There is no abdominal tenderness.   Genitourinary:     Comments: Tenderness to left testicle.  External urinary catheter in place  Musculoskeletal:         General: Normal range of motion.      Cervical back: Normal range of motion.   Skin:     General: Skin is warm and dry.      Capillary Refill: Capillary refill takes less than 2 seconds.   Neurological:      General: No focal deficit present.      Mental Status: He is alert and oriented to person, place, and time. Mental status is at baseline.   Psychiatric:         Mood and Affect: Mood normal.         Behavior: Behavior normal.       Result Review    Result Review:  I have personally reviewed the results for the past 24 hours and agree with these findings:  [x]  Laboratory  [x]  Microbiology  [x]  Radiology  []  EKG/Telemetry   []  Cardiology/Vascular   []  Pathology  [x]  Old records  [x]  Other:    Assessment & Plan   Assessment / Plan     Assessment:  Sepsis present on admission.  Patient has fever, leukocytosis and tachycardia.  Left epididymal orchitis.  UTI with hematuria due to E. coli.  BPH.  Complex left  hydrocele.  Anemia.  History of A-fib.  Chronic combined CHF.  Stable  Diabetes mellitus.  Hemoglobin A1c of 8.8%  Depression and PTSD.  Obstructive sleep apnea.  Patient has been off home CPAP for a year and a half  Hypothyroidism.    Plan:  Continue Rocephin.  Basal and sliding scale insulin.  Continue aspirin and Flomax.  Monitor culture data.  Continue home Synthroid, Aldactone and Jardiance Lipitor and statin, Wellbutrin, Paxil and paroxetine  Appreciate urology input.  PT OT  Discussed plan with RN.  Patient wants to go home from here.    DVT prophylaxis:  Medical and mechanical DVT prophylaxis orders are present.  Eliquis        CODE STATUS:   Code Status (Patient has no pulse and is not breathing): CPR (Attempt to Resuscitate)  Medical Interventions (Patient has pulse or is breathing): Full Support      Part of this note may be an electronic transcription/translation of spoken language to printed text using the Dragon Dictation System.     Electronically signed by Byron Gates MD, 04/23/24, 7:35 PM EDT.

## 2024-04-23 NOTE — THERAPY EVALUATION
Patient Name: Giles Donovan  : 1945    MRN: [id number]                              Today's Date: 2024       Admit Date: 2024    Visit Dx:     ICD-10-CM ICD-9-CM   1. Orchitis  N45.2 604.90   2. Epididymo-orchitis  N45.3 604.90   3. Urinary tract infection without hematuria, site unspecified  N39.0 599.0   4. Sepsis, due to unspecified organism, unspecified whether acute organ dysfunction present  A41.9 038.9     995.91   5. Decreased activities of daily living (ADL)  Z78.9 V49.89     Patient Active Problem List   Diagnosis    Allergic rhinitis    Controlled type 2 diabetes mellitus without complication, without long-term current use of insulin    GERD (gastroesophageal reflux disease)    Hyperlipidemia    Hypertension    Hypothyroidism    Stenosis of right carotid artery    Syncope    Urge incontinence of urine    Erectile dysfunction    Benign prostatic hyperplasia with urinary frequency    Cardiomyopathy    Cataract    Chest pain with low risk for cardiac etiology    Right-sided carotid artery occlusion without cerebral infarction    Depression    Vaso vagal episode    Hypoxia    Restrictive lung disease    Chronic heart failure with preserved ejection fraction    Carotid artery occlusion    Impacted cerumen, bilateral    Primary insomnia    Unsteady gait when walking    Orthostatic hypotension    Supplemental oxygen dependent    Paroxysmal atrial fibrillation    Chronic bilateral pleural effusions    JORGE (acute kidney injury)    Orchitis     Past Medical History:   Diagnosis Date    A-fib     Allergic rhinitis 2014    Will try Zyrtec, he didnt want to use a nasal spray.    Arthritis     Asthma     Cataract     left eye    CHF (congestive heart failure)     Cough 2016    PFT and CXR ordered.    Depression     PTSD    Diabetes     Elevated cholesterol     H/O psychiatric care     PTSD    Hyperlipidemia 2016    Lipids ordered. Continue on current medication.     Hypertension     Hypothyroidism     Seasonal allergies     Shortness of breath     Sleep apnea     Stenosis of right carotid artery 02/19/2017    Will refer to vascular surgeon.    Syncope 02/19/2017    Type 2 diabetes mellitus     Upper respiratory infection 10/18/2015    Will treat cough with Hydromet, otherwise over the counter meds for treatment.     Past Surgical History:   Procedure Laterality Date    CATARACT EXTRACTION Right     x2    COLONOSCOPY      JOINT REPLACEMENT      REPLACEMENT TOTAL HIP ONCOLOGIC Right     RETINAL DETACHMENT REPAIR      TOE AMPUTATION Left 11/2017    Second phalaeng.    TOE OSTEOPHYTE REMOVAL        General Information       [facility] Name 04/23/24 [facility]          OT Time and Intention    Document Type evaluation  -     Mode of Treatment individual therapy;occupational therapy  -[facility] Name 04/23/24 [facility]          General Information    Patient Profile Reviewed yes  -     Prior Level of Function --  Assist with ADLs, ambulated with a rollator, has a step over tub with built-in seat/grab bars/handheld shower head, elevated commode, stands to groom, and no home O2.  -     Existing Precautions/Restrictions fall  -     Barriers to Rehab none identified  -[facility] Name 04/23/24 [facility]          Occupational Profile    Reason for Services/Referral (Occupational Profile) Patient is a 78 year old male admitted to [facility] for testicular pain on April 22nd, 2024. Occupational therapy consulted due to recent decline in ADLs/functional transfers. No previous occupational therapy services for current condition.  -[facility] Name 04/23/24 1351          Living Environment    People in Home significant other  -[facility] Name 04/23/24 1351          Home Main Entrance    Number of Stairs, Main Entrance two  -     Stair Railings, Main Entrance railings safe and in good condition  -[facility] Name 04/23/24 1352          Cognition    Orientation Status (Cognition) oriented x  4  -       Row Name 04/23/24 1351          Safety Issues, Functional Mobility    Impairments Affecting Function (Mobility) balance;endurance/activity tolerance;pain  -               User Key  (r) = Recorded By, (t) = Taken By, (c) = Cosigned By      Initials Name Provider Type    LF Juliann Cleaning OT Occupational Therapist                     Mobility/ADL's       Row Name 04/23/24 1352          Bed Mobility    Bed Mobility supine-sit  -     Supine-Sit [place] (Bed Mobility) minimum assist (75% patient effort)  -     Bed Mobility, Safety Issues decreased use of arms for pushing/pulling;decreased use of legs for bridging/pushing  -     Assistive Device (Bed Mobility) bed rails;head of bed elevated  -       Row Name 04/23/24 [facility]          Transfers    Transfers sit-stand transfer;bed-chair transfer;stand-sit transfer  -       Row Name 04/23/24 [facility]          Bed-Chair Transfer    Bed-Chair [place] (Transfers) minimum assist (75% patient effort)  -     Assistive Device (Bed-Chair Transfers) walker, front-wheeled  -       Row Name 04/23/24 [facility]          Sit-Stand Transfer    Sit-Stand [place] (Transfers) minimum assist (75% patient effort)  -     Assistive Device (Sit-Stand Transfers) walker, front-wheeled  -       Row Name 04/23/24 [facility]          Stand-Sit Transfer    Stand-Sit [place] (Transfers) minimum assist (75% patient effort)  -     Assistive Device (Stand-Sit Transfers) walker, front-wheeled  -       Row Name 04/23/24 [facility]          Functional Mobility    Functional Mobility- Ind. Level contact guard assist  -     Functional Mobility- Device walker, front-wheeled  -     Functional Mobility- Comment 3-4 steps from EOB to recliner  -       Row Name 04/23/24 [facility]          Activities of Daily Living    BADL Assessment/Intervention bathing;upper body dressing;lower body dressing;grooming;feeding;toileting  -       Row Name 04/23/24 [facility]          Bathing  Assessment/Intervention    [place] Level (Bathing) bathing skills;upper body;standby assist;lower body;maximum assist (25% patient effort)  -       Row Name 04/23/24 1352          Upper Body Dressing Assessment/Training    [place] Level (Upper Body Dressing) upper body dressing skills;standby assist  -       Row Name 04/23/24 1352          Lower Body Dressing Assessment/Training    [place] Level (Lower Body Dressing) lower body dressing skills;maximum assist (25% patient effort)  -       Row Name 04/23/24 1352          Grooming Assessment/Training    [place] Level (Grooming) grooming skills;set up  -       Row Name 04/23/24 1352          Self-Feeding Assessment/Training    [place] Level (Feeding) feeding skills;set up  -       Row Name 04/23/24 1352          Toileting Assessment/Training    [place] Level (Toileting) toileting skills;maximum assist (25% patient effort);dependent (less than 25% patient effort)  -     Comment, (Toileting) Male purewick currently in place.  -               User Key  (r) = Recorded By, (t) = Taken By, (c) = Cosigned By      Initials Name Provider Type     Juliann Cleaning OT Occupational Therapist                   Obj/Interventions       Row Name 04/23/24 1354          Sensory Assessment (Somatosensory)    Sensory Assessment (Somatosensory) UE sensation intact  -[facility] Name 04/23/24 1354          Vision Assessment/Intervention    Visual Impairment/Limitations WFL  -[facility] Name 04/23/24 1354          Range of Motion Comprehensive    General Range of Motion bilateral upper extremity ROM WFL  -[facility] Name 04/23/24 1354          Strength Comprehensive (MMT)    Comment, General Manual Muscle Testing (MMT) Assessment 4/5 BUEs  -[facility] Name 04/23/24 1354          Motor Skills    Motor Skills coordination;functional endurance  -LF     Coordination bilateral;upper extremity;WFL  -LF     Functional Endurance Fair  -LF        Row Name 04/23/24 1358          Balance    Balance Assessment sitting dynamic balance;standing dynamic balance  -LF     Dynamic Sitting Balance supervision  -LF     Position, Sitting Balance unsupported;sitting in chair;sitting edge of bed  -LF     Dynamic Standing Balance contact guard  -LF     Position/Device Used, Standing Balance supported;walker, front-wheeled  -LF               User Key  (r) = Recorded By, (t) = Taken By, (c) = Cosigned By      Initials Name Provider Type     Juliann Cleaning, OT Occupational Therapist                   Goals/Plan       Row Name 04/23/24 135          Bed Mobility Goal 1 (OT)    Activity/Assistive Device (Bed Mobility Goal 1, OT) bed mobility activities, all  -LF     [place] Level/Cues Needed (Bed Mobility Goal 1, OT) modified independence  -LF     Time Frame (Bed Mobility Goal 1, OT) long term goal (LTG);10 days  -LF       Row Name 04/23/24 135          Transfer Goal 1 (OT)    Activity/Assistive Device (Transfer Goal 1, OT) transfers, all  -LF     [place] Level/Cues Needed (Transfer Goal 1, OT) modified independence  -LF     Time Frame (Transfer Goal 1, OT) long term goal (LTG);10 days  -LF       Row Name 04/23/24 1353          Bathing Goal 1 (OT)    Activity/Device (Bathing Goal 1, OT) bathing skills, all  -LF     [place] Level/Cues Needed (Bathing Goal 1, OT) modified independence  -LF     Time Frame (Bathing Goal 1, OT) long term goal (LTG);10 days  -LF       Row Name 04/23/24 1351          Dressing Goal 1 (OT)    Activity/Device (Dressing Goal 1, OT) dressing skills, all  -LF     [place]/Cues Needed (Dressing Goal 1, OT) modified independence  -LF     Time Frame (Dressing Goal 1, OT) long term goal (LTG);10 days  -LF       Row Name 04/23/24 1358          Toileting Goal 1 (OT)    Activity/Device (Toileting Goal 1, OT) toileting skills, all  -LF     [place] Level/Cues Needed (Toileting Goal 1, OT) modified independence  -LF     Time Frame  (Toileting Goal 1, OT) long term goal (LTG);10 days  -       Row Name 04/23/24 9768          Problem Specific Goal 1 (OT)    Problem Specific Goal 1 (OT) Patient will demonstrate good- endurance to support ADLs/functional transfers.  -     Time Frame (Problem Specific Goal 1, OT) long term goal (LTG);10 days  -       Row Name 04/23/24 2322          Therapy Assessment/Plan (OT)    Planned Therapy Interventions (OT) activity tolerance training;BADL retraining;functional balance retraining;occupation/activity based interventions;patient/caregiver education/training;strengthening exercise;transfer/mobility retraining  -               User Key  (r) = Recorded By, (t) = Taken By, (c) = Cosigned By      Initials Name Provider Type     Juliann Cleaning, JEZ Occupational Therapist                   Clinical Impression       Row Name 04/23/24 1202          Pain Assessment    Pain Intervention(s) Nursing Notified  -     Additional Documentation Pain Scale: FACES Pre/Post-Treatment (Group)  -       Row Name 04/23/24 3890          Pain Scale: FACES Pre/Post-Treatment    Pain: FACES Scale, Pretreatment 2-->hurts little bit  -     Posttreatment Pain Rating 6-->hurts even more  -       Row Name 04/23/24 7785          Plan of Care Review    Plan of Care Reviewed With patient  -     Progress no change  -     Outcome Evaluation Patient presents with limitations in self-care, functional transfers, balance, and endurance. He would benefit from continued skilled occupational therapy services to maximize independence with ADLs/functional transfers.  -       Row Name 04/23/24 3050          Therapy Assessment/Plan (OT)    Patient/Family Therapy Goal Statement (OT) To maximize independence.  -LF     Rehab Potential (OT) good, to achieve stated therapy goals  -     Criteria for Skilled Therapeutic Interventions Met (OT) yes;meets criteria;skilled treatment is necessary  -     Therapy Frequency (OT) 5 times/wk  -LF        Row Name 04/23/24 135          Therapy Plan Review/Discharge Plan (OT)    Anticipated Discharge Disposition (OT) home with home health;home with assist  -LF       Row Name 04/23/24 1354          Vital Signs    O2 Delivery Pre Treatment room air  -LF     O2 Delivery Intra Treatment room air  -LF     O2 Delivery Post Treatment room air  -LF       Row Name 04/23/24 1356          Positioning and Restraints    Pre-Treatment Position in bed  -LF     Post Treatment Position chair  -LF     In Chair reclined;call light within reach;encouraged to call for assist;exit alarm on  -LF               User Key  (r) = Recorded By, (t) = Taken By, (c) = Cosigned By      Initials Name Provider Type    LF Juliann Cleaning OT Occupational Therapist                   Outcome Measures       Row Name 04/23/24 135          How much help from another is currently needed...    Putting on and taking off regular lower body clothing? 2  -LF     Bathing (including washing, rinsing, and drying) 2  -LF     Toileting (which includes using toilet bed pan or urinal) 1  -LF     Putting on and taking off regular upper body clothing 3  -LF     Taking care of personal grooming (such as brushing teeth) 4  -LF     Eating meals 4  -LF     AM-PAC 6 Clicks Score (OT) 16  -LF       Row Name 04/23/24 135          Functional Assessment    Outcome Measure Options AM-PAC 6 Clicks Daily Activity (OT);Optimal Instrument  -LF       Row Name 04/23/24 1354          Optimal Instrument    Optimal Instrument Optimal - 3  -LF     Bending/Stooping 4  -LF     Standing 2  -LF     Reaching 1  -LF     From the list, choose the 3 activities you would most like to be able to do without any difficulty Bending/stooping;Standing;Reaching  -LF     Total Score Optimal - 3 7  -LF               User Key  (r) = Recorded By, (t) = Taken By, (c) = Cosigned By      Initials Name Provider Type    LF Juliann Cleaning, JEZ Occupational Therapist                    Occupational Therapy  Education       Title: PT OT SLP Therapies (Done)       Topic: Occupational Therapy (Done)       Point: ADL training (Done)       Description:   Instruct learner(s) on proper safety adaptation and remediation techniques during self care or transfers.   Instruct in proper use of assistive devices.                  Learning Progress Summary             Patient Acceptance, E,TB, VU by  at 4/23/2024 1355                         Point: Precautions (Done)       Description:   Instruct learner(s) on prescribed precautions during self-care and functional transfers.                  Learning Progress Summary             Patient Acceptance, E,TB, VU by  at 4/23/2024 1355                         Point: Body mechanics (Done)       Description:   Instruct learner(s) on proper positioning and spine alignment during self-care, functional mobility activities and/or exercises.                  Learning Progress Summary             Patient Acceptance, E,TB, VU by  at 4/23/2024 1355                                         User Key       Initials Effective Dates Name Provider Type Discipline     06/16/21 -  Juliann Cleaning OT Occupational Therapist OT                  OT Recommendation and Plan  Planned Therapy Interventions (OT): activity tolerance training, BADL retraining, functional balance retraining, occupation/activity based interventions, patient/caregiver education/training, strengthening exercise, transfer/mobility retraining  Therapy Frequency (OT): 5 times/wk  Plan of Care Review  Plan of Care Reviewed With: patient  Progress: no change  Outcome Evaluation: Patient presents with limitations in self-care, functional transfers, balance, and endurance. He would benefit from continued skilled occupational therapy services to maximize independence with ADLs/functional transfers.     Time Calculation:   Evaluation Complexity (OT)  Review Occupational Profile/Medical/Therapy History Complexity: brief/low  complexity  Assessment, Occupational Performance/Identification of Deficit Complexity: 3-5 performance deficits  Clinical Decision Making Complexity (OT): problem focused assessment/low complexity  Overall Complexity of Evaluation (OT): low complexity     Time Calculation- OT       Row Name 04/23/24 1355             Time Calculation- OT    OT Received On 04/23/24  -LF      OT Goal Re-Cert Due Date 05/02/24  -LF         Untimed Charges    OT Eval/Re-eval Minutes 47  -LF         Total Minutes    Untimed Charges Total Minutes 47  -LF       Total Minutes 47  -LF                User Key  (r) = Recorded By, (t) = Taken By, (c) = Cosigned By      Initials Name Provider Type    LF Juliann Cleaning OT Occupational Therapist                  Therapy Charges for Today       Code Description Service Date Service Provider Modifiers Qty    [id number] HC OT EVAL LOW COMPLEXITY 4 4/23/2024 Juliann Cleaning OT GO 1                 Juliann Cleaning OT  4/23/2024

## 2024-04-23 NOTE — PLAN OF CARE
Goal Outcome Evaluation:              Outcome Evaluation: VSS, BGL monitored and SSI given per order, external male urinary catheter in place for incontinence, pt up in chair today, able to make needs known, call light and personal items within reach, bed in lowest locked position, no signs of distress noted

## 2024-04-23 NOTE — PLAN OF CARE
Goal Outcome Evaluation:  Plan of Care Reviewed With: patient        Progress: no change  Outcome Evaluation: Patient presents with limitations in self-care, functional transfers, balance, and endurance. He would benefit from continued skilled occupational therapy services to maximize independence with ADLs/functional transfers.      Anticipated Discharge Disposition (OT): home with home health, home with assist

## 2024-04-23 NOTE — THERAPY EVALUATION
Acute Care - Physical Therapy Initial Evaluation   Felipe     Patient Name: Giles Donovan  : 1945  MRN: [id number]  Today's Date: 2024      Visit Dx:     ICD-10-CM ICD-9-CM   1. Orchitis  N45.2 604.90   2. Epididymo-orchitis  N45.3 604.90   3. Urinary tract infection without hematuria, site unspecified  N39.0 599.0   4. Sepsis, due to unspecified organism, unspecified whether acute organ dysfunction present  A41.9 038.9     995.91   5. Decreased activities of daily living (ADL)  Z78.9 V49.89   6. Difficulty walking  R26.2 719.7     Patient Active Problem List   Diagnosis    Allergic rhinitis    Controlled type 2 diabetes mellitus without complication, without long-term current use of insulin    GERD (gastroesophageal reflux disease)    Hyperlipidemia    Hypertension    Hypothyroidism    Stenosis of right carotid artery    Syncope    Urge incontinence of urine    Erectile dysfunction    Benign prostatic hyperplasia with urinary frequency    Cardiomyopathy    Cataract    Chest pain with low risk for cardiac etiology    Right-sided carotid artery occlusion without cerebral infarction    Depression    Vaso vagal episode    Hypoxia    Restrictive lung disease    Chronic heart failure with preserved ejection fraction    Carotid artery occlusion    Impacted cerumen, bilateral    Primary insomnia    Unsteady gait when walking    Orthostatic hypotension    Supplemental oxygen dependent    Paroxysmal atrial fibrillation    Chronic bilateral pleural effusions    JORGE (acute kidney injury)    Orchitis     Past Medical History:   Diagnosis Date    A-fib     Allergic rhinitis 2014    Will try Zyrtec, he didnt want to use a nasal spray.    Arthritis     Asthma     Cataract     left eye    CHF (congestive heart failure)     Cough 2016    PFT and CXR ordered.    Depression     PTSD    Diabetes     Elevated cholesterol     H/O psychiatric care     PTSD    Hyperlipidemia 2016    Lipids  ordered. Continue on current medication.    Hypertension     Hypothyroidism     Seasonal allergies     Shortness of breath     Sleep apnea     Stenosis of right carotid artery 02/19/2017    Will refer to vascular surgeon.    Syncope 02/19/2017    Type 2 diabetes mellitus     Upper respiratory infection 10/18/2015    Will treat cough with Hydromet, otherwise over the counter meds for treatment.     Past Surgical History:   Procedure Laterality Date    CATARACT EXTRACTION Right     x2    COLONOSCOPY      JOINT REPLACEMENT      REPLACEMENT TOTAL HIP ONCOLOGIC Right     RETINAL DETACHMENT REPAIR      TOE AMPUTATION Left 11/2017    Second phalaeng.    TOE OSTEOPHYTE REMOVAL       PT Assessment (Last 12 Hours)       PT Evaluation and Treatment       Row Name 04/23/24 1400          Physical Therapy Time and Intention    Document Type evaluation  -AV     Mode of Treatment individual therapy;physical therapy  -AV       Row Name 04/23/24 1400          General Information    Patient Profile Reviewed yes  -AV     Prior Level of Function --  Assist with ADLs. Ambulated with rollator. 2 L O2 as needed.  -AV     Existing Precautions/Restrictions fall  -AV       Row Name 04/23/24 1400          Living Environment    Current Living Arrangements home  -AV     Home Accessibility stairs to enter home  -AV     People in Home significant other;other (see comments)  significant other's sister  -AV       Row Name 04/23/24 1400          Home Main Entrance    Number of Stairs, Main Entrance two  -AV     Stair Railings, Main Entrance railings on both sides of stairs  -AV       Row Name 04/23/24 1400          Cognition    Orientation Status (Cognition) oriented x 3  -AV       Row Name 04/23/24 1400          Range of Motion (ROM)    Range of Motion bilateral lower extremities;ROM is WFL  -AV       Row Name 04/23/24 1400          Strength (Manual Muscle Testing)    Strength (Manual Muscle Testing) bilateral lower extremities  -AV       Row Name  04/23/24 1400          Bed Mobility    Bed Mobility supine-sit;sit-supine  -AV     Supine-Sit [place] (Bed Mobility) minimum assist (75% patient effort)  -AV     Sit-Supine [place] (Bed Mobility) minimum assist (75% patient effort)  -AV     Bed Mobility, Safety Issues decreased use of legs for bridging/pushing;decreased use of arms for pushing/pulling  -AV       Row Name 04/23/24 1400          Transfers    Transfers sit-stand transfer;stand-sit transfer  -AV       Row Name 04/23/24 1400          Sit-Stand Transfer    Sit-Stand [place] (Transfers) minimum assist (75% patient effort)  -AV     Assistive Device (Sit-Stand Transfers) walker, front-wheeled  -AV       Row Name 04/23/24 1400          Stand-Sit Transfer    Stand-Sit [place] (Transfers) minimum assist (75% patient effort)  -AV     Assistive Device (Stand-Sit Transfers) walker, front-wheeled  -AV       Row Name 04/23/24 1400          Gait/Stairs (Locomotion)    Gait/Stairs Locomotion gait/ambulation independence;gait/ambulation assistive device;distance ambulated  -AV     [place] Level (Gait) contact guard  -AV     Assistive Device (Gait) walker, front-wheeled  -AV     Distance in Feet (Gait) 25  -AV     Deviations/Abnormal Patterns (Gait) gait speed decreased;stride length decreased  -AV       Row Name 04/23/24 1400          Safety Issues, Functional Mobility    Impairments Affecting Function (Mobility) balance;endurance/activity tolerance;pain  -AV       Row Name 04/23/24 1400          Balance    Balance Assessment standing dynamic balance  -AV     Dynamic Standing Balance contact guard  -AV     Position/Device Used, Standing Balance supported;walker, front-wheeled  -AV       Row Name 04/23/24 1400          Plan of Care Review    Plan of Care Reviewed With patient  -AV     Progress no change  -AV     Outcome Evaluation Patient presents with deficits in balance, endurance, transfers, and ambulation. Patient will benefit from skilled  PT services to address these mobility deficits and decrease risk of falls.  -AV       Row Name 04/23/24 1400          Positioning and Restraints    Pre-Treatment Position in bed  -AV     Post Treatment Position bed  -AV     In Bed supine;call light within reach;encouraged to call for assist;exit alarm on  -AV       Row Name 04/23/24 1400          Therapy Assessment/Plan (PT)    Rehab Potential (PT) good, to achieve stated therapy goals  -AV     Criteria for Skilled Interventions Met (PT) yes;meets criteria  -AV     Therapy Frequency (PT) daily  -AV     Predicted Duration of Therapy Intervention (PT) 10 days  -AV     Problem List (PT) problems related to;balance;mobility  -AV     Activity Limitations Related to Problem List (PT) unable to transfer safely;unable to ambulate safely  -AV       Row Name 04/23/24 1400          PT Evaluation Complexity    History, PT Evaluation Complexity no personal factors and/or comorbidities  -AV     Examination of Body Systems (PT Eval Complexity) total of 4 or more elements  -AV     Clinical Presentation (PT Evaluation Complexity) stable  -AV     Clinical Decision Making (PT Evaluation Complexity) low complexity  -AV     Overall Complexity (PT Evaluation Complexity) low complexity  -AV       Row Name 04/23/24 1400          Therapy Plan Review/Discharge Plan (PT)    Therapy Plan Review (PT) evaluation/treatment results reviewed;patient  -AV       Row Name 04/23/24 1400          Physical Therapy Goals    Bed Mobility Goal Selection (PT) bed mobility, PT goal 1  -AV     Transfer Goal Selection (PT) transfer, PT goal 1  -AV     Gait Training Goal Selection (PT) gait training, PT goal 1  -AV       Row Name 04/23/24 1400          Bed Mobility Goal 1 (PT)    Activity/Assistive Device (Bed Mobility Goal 1, PT) sit to supine/supine to sit  -AV     [place] Level/Cues Needed (Bed Mobility Goal 1, PT) modified independence  -AV     Time Frame (Bed Mobility Goal 1, PT) 10 days  -AV       Row  Name 04/23/24 1400          Transfer Goal 1 (PT)    Activity/Assistive Device (Transfer Goal 1, PT) sit-to-stand/stand-to-sit;bed-to-chair/chair-to-bed;walker, rolling  -AV     [place] Level/Cues Needed (Transfer Goal 1, PT) modified independence  -AV     Time Frame (Transfer Goal 1, PT) 10 days  -AV       Row Name 04/23/24 1400          Gait Training Goal 1 (PT)    Activity/Assistive Device (Gait Training Goal 1, PT) gait (walking locomotion);assistive device use;walker, rolling  -AV     [place] Level (Gait Training Goal 1, PT) modified independence  -AV     Distance (Gait Training Goal 1, PT) 200  -AV     Time Frame (Gait Training Goal 1, PT) 10 days  -AV               User Key  (r) = Recorded By, (t) = Taken By, (c) = Cosigned By      Initials Name Provider Type    AV Gil Murphy, PT Physical Therapist                    Physical Therapy Education       Title: PT OT SLP Therapies (In Progress)       Topic: Physical Therapy (In Progress)       Point: Mobility training (Done)       Learning Progress Summary             Patient Acceptance, E,TB, VU by AV at 4/23/2024 1429                         Point: Home exercise program (Not Started)       Learner Progress:  Not documented in this visit.              Point: Body mechanics (Done)       Learning Progress Summary             Patient Acceptance, E,TB, VU by AV at 4/23/2024 1429                         Point: Precautions (Done)       Learning Progress Summary             Patient Acceptance, E,TB, VU by AV at 4/23/2024 1429                                         User Key       Initials Effective Dates Name Provider Type Discipline     06/11/21 -  Gil Murphy, PT Physical Therapist PT                  PT Recommendation and Plan  Anticipated Discharge Disposition (PT): home with home health  Planned Therapy Interventions (PT): balance training, bed mobility training, gait training, home exercise program, neuromuscular re-education,  strengthening, transfer training  Therapy Frequency (PT): daily  Plan of Care Reviewed With: patient  Progress: no change  Outcome Evaluation: Patient presents with deficits in balance, endurance, transfers, and ambulation. Patient will benefit from skilled PT services to address these mobility deficits and decrease risk of falls.   Outcome Measures       Row Name 04/23/24 1400             How much help from another person do you currently need...    Turning from your back to your side while in flat bed without using bedrails? 3  -AV      Moving from lying on back to sitting on the side of a flat bed without bedrails? 3  -AV      Moving to and from a bed to a chair (including a wheelchair)? 3  -AV      Standing up from a chair using your arms (e.g., wheelchair, bedside chair)? 3  -AV      Climbing 3-5 steps with a railing? 3  -AV      To walk in hospital room? 3  -AV      AM-PAC 6 Clicks Score (PT) 18  -AV      Highest Level of Mobility Goal 6 --> Walk 10 steps or more  -AV         Functional Assessment    Outcome Measure Options AM-PAC 6 Clicks Basic Mobility (PT)  -AV                User Key  (r) = Recorded By, (t) = Taken By, (c) = Cosigned By      Initials Name Provider Type    AV Gil Murphy, PT Physical Therapist                     Time Calculation:    PT Charges       Row Name 04/23/24 1428             Time Calculation    PT Received On 04/23/24  -AV      PT Goal Re-Cert Due Date 05/02/24  -AV         Untimed Charges    PT Eval/Re-eval Minutes 35  -AV         Total Minutes    Untimed Charges Total Minutes 35  -AV       Total Minutes 35  -AV                User Key  (r) = Recorded By, (t) = Taken By, (c) = Cosigned By      Initials Name Provider Type    Gil Dunn, PT Physical Therapist                  Therapy Charges for Today       Code Description Service Date Service Provider Modifiers Qty    24063982691 HC PT EVAL LOW COMPLEXITY 3 4/23/2024 Gil Murphy, PT GP 1            PT  G-Codes  Outcome Measure Options: AM-PAC 6 Clicks Basic Mobility (PT)  AM-PAC 6 Clicks Score (PT): 18  AM-PAC 6 Clicks Score (OT): 16    Gil Murphy, PT  4/23/2024

## 2024-04-23 NOTE — SIGNIFICANT NOTE
04/23/24 1430   Plan   Plan CCM, RN met with patient to introduce self and assess possible discharge needs.  Patient reports lives with SO Vicky.  Reports good support when needed.  Patient is able to provide own ADL's.  Reports no financial needs.  PCP and Facesheet verified.  Patient does not want inpatient rehab but is agreeable to home health if needed with no preference on agency used.  [facility]  [facility]

## 2024-04-24 LAB
ANION GAP SERPL CALCULATED.3IONS-SCNC: 11.1 MMOL/L (ref 5–15)
BACTERIA SPEC AEROBE CULT: ABNORMAL
BASOPHILS # BLD AUTO: 0.04 10*3/MM3 (ref 0–0.2)
BASOPHILS NFR BLD AUTO: 0.3 % (ref 0–1.5)
BUN SERPL-MCNC: 18 MG/DL (ref 8–23)
BUN/CREAT SERPL: 19.6 (ref 7–25)
CALCIUM SPEC-SCNC: 8.8 MG/DL (ref 8.6–10.5)
CHLORIDE SERPL-SCNC: 96 MMOL/L (ref 98–107)
CO2 SERPL-SCNC: 24.9 MMOL/L (ref 22–29)
CREAT SERPL-MCNC: 0.92 MG/DL (ref 0.76–1.27)
DEPRECATED RDW RBC AUTO: 47.4 FL (ref 37–54)
EGFRCR SERPLBLD CKD-EPI 2021: 85.7 ML/MIN/1.73
EOSINOPHIL # BLD AUTO: 0.16 10*3/MM3 (ref 0–0.4)
EOSINOPHIL NFR BLD AUTO: 1.4 % (ref 0.3–6.2)
ERYTHROCYTE [DISTWIDTH] IN BLOOD BY AUTOMATED COUNT: 14.1 % (ref 12.3–15.4)
GLUCOSE BLDC GLUCOMTR-MCNC: 177 MG/DL (ref 70–99)
GLUCOSE BLDC GLUCOMTR-MCNC: 217 MG/DL (ref 70–99)
GLUCOSE BLDC GLUCOMTR-MCNC: 255 MG/DL (ref 70–99)
GLUCOSE BLDC GLUCOMTR-MCNC: 280 MG/DL (ref 70–99)
GLUCOSE SERPL-MCNC: 128 MG/DL (ref 65–99)
HCT VFR BLD AUTO: 30.8 % (ref 37.5–51)
HGB BLD-MCNC: 9.8 G/DL (ref 13–17.7)
IMM GRANULOCYTES # BLD AUTO: 0.06 10*3/MM3 (ref 0–0.05)
IMM GRANULOCYTES NFR BLD AUTO: 0.5 % (ref 0–0.5)
LYMPHOCYTES # BLD AUTO: 1.14 10*3/MM3 (ref 0.7–3.1)
LYMPHOCYTES NFR BLD AUTO: 9.9 % (ref 19.6–45.3)
MAGNESIUM SERPL-MCNC: 1.9 MG/DL (ref 1.6–2.4)
MCH RBC QN AUTO: 29.1 PG (ref 26.6–33)
MCHC RBC AUTO-ENTMCNC: 31.8 G/DL (ref 31.5–35.7)
MCV RBC AUTO: 91.4 FL (ref 79–97)
MONOCYTES # BLD AUTO: 1.55 10*3/MM3 (ref 0.1–0.9)
MONOCYTES NFR BLD AUTO: 13.4 % (ref 5–12)
NEUTROPHILS NFR BLD AUTO: 74.5 % (ref 42.7–76)
NEUTROPHILS NFR BLD AUTO: 8.58 10*3/MM3 (ref 1.7–7)
NRBC BLD AUTO-RTO: 0 /100 WBC (ref 0–0.2)
PHOSPHATE SERPL-MCNC: 3 MG/DL (ref 2.5–4.5)
PLATELET # BLD AUTO: 287 10*3/MM3 (ref 140–450)
PMV BLD AUTO: 9.6 FL (ref 6–12)
POTASSIUM SERPL-SCNC: 4 MMOL/L (ref 3.5–5.2)
RBC # BLD AUTO: 3.37 10*6/MM3 (ref 4.14–5.8)
SODIUM SERPL-SCNC: 132 MMOL/L (ref 136–145)
WBC NRBC COR # BLD AUTO: 11.53 10*3/MM3 (ref 3.4–10.8)

## 2024-04-24 PROCEDURE — 80048 BASIC METABOLIC PNL TOTAL CA: CPT | Performed by: HOSPITALIST

## 2024-04-24 PROCEDURE — 63710000001 INSULIN LISPRO (HUMAN) PER 5 UNITS: Performed by: HOSPITALIST

## 2024-04-24 PROCEDURE — 63710000001 INSULIN DETEMIR PER 5 UNITS: Performed by: HOSPITALIST

## 2024-04-24 PROCEDURE — 99232 SBSQ HOSP IP/OBS MODERATE 35: CPT | Performed by: INTERNAL MEDICINE

## 2024-04-24 PROCEDURE — 36415 COLL VENOUS BLD VENIPUNCTURE: CPT | Performed by: HOSPITALIST

## 2024-04-24 PROCEDURE — 99231 SBSQ HOSP IP/OBS SF/LOW 25: CPT | Performed by: UROLOGY

## 2024-04-24 PROCEDURE — 25010000002 CEFTRIAXONE PER 250 MG: Performed by: HOSPITALIST

## 2024-04-24 PROCEDURE — 83735 ASSAY OF MAGNESIUM: CPT | Performed by: HOSPITALIST

## 2024-04-24 PROCEDURE — 82948 REAGENT STRIP/BLOOD GLUCOSE: CPT

## 2024-04-24 PROCEDURE — 94799 UNLISTED PULMONARY SVC/PX: CPT

## 2024-04-24 PROCEDURE — 82948 REAGENT STRIP/BLOOD GLUCOSE: CPT | Performed by: HOSPITALIST

## 2024-04-24 PROCEDURE — 85025 COMPLETE CBC W/AUTO DIFF WBC: CPT | Performed by: HOSPITALIST

## 2024-04-24 PROCEDURE — 84100 ASSAY OF PHOSPHORUS: CPT | Performed by: HOSPITALIST

## 2024-04-24 RX ADMIN — PANTOPRAZOLE SODIUM 40 MG: 40 TABLET, DELAYED RELEASE ORAL at 05:47

## 2024-04-24 RX ADMIN — BUDESONIDE AND FORMOTEROL FUMARATE DIHYDRATE 2 PUFF: 160; 4.5 AEROSOL RESPIRATORY (INHALATION) at 06:13

## 2024-04-24 RX ADMIN — INSULIN DETEMIR 40 UNITS: 100 INJECTION, SOLUTION SUBCUTANEOUS at 20:46

## 2024-04-24 RX ADMIN — BUPROPION HYDROCHLORIDE 150 MG: 75 TABLET ORAL at 08:01

## 2024-04-24 RX ADMIN — GABAPENTIN 600 MG: 300 CAPSULE ORAL at 05:47

## 2024-04-24 RX ADMIN — APIXABAN 5 MG: 5 TABLET, FILM COATED ORAL at 20:46

## 2024-04-24 RX ADMIN — INSULIN LISPRO 3 UNITS: 100 INJECTION, SOLUTION INTRAVENOUS; SUBCUTANEOUS at 11:53

## 2024-04-24 RX ADMIN — INSULIN LISPRO 8 UNITS: 100 INJECTION, SOLUTION INTRAVENOUS; SUBCUTANEOUS at 20:46

## 2024-04-24 RX ADMIN — ASPIRIN 81 MG: 81 TABLET, COATED ORAL at 08:01

## 2024-04-24 RX ADMIN — ATORVASTATIN CALCIUM 80 MG: 40 TABLET, FILM COATED ORAL at 08:01

## 2024-04-24 RX ADMIN — BUDESONIDE AND FORMOTEROL FUMARATE DIHYDRATE 2 PUFF: 160; 4.5 AEROSOL RESPIRATORY (INHALATION) at 20:15

## 2024-04-24 RX ADMIN — CEFTRIAXONE SODIUM 2000 MG: 2 INJECTION, POWDER, FOR SOLUTION INTRAMUSCULAR; INTRAVENOUS at 07:07

## 2024-04-24 RX ADMIN — GABAPENTIN 600 MG: 300 CAPSULE ORAL at 15:02

## 2024-04-24 RX ADMIN — APIXABAN 5 MG: 5 TABLET, FILM COATED ORAL at 08:01

## 2024-04-24 RX ADMIN — INSULIN LISPRO 8 UNITS: 100 INJECTION, SOLUTION INTRAVENOUS; SUBCUTANEOUS at 17:16

## 2024-04-24 RX ADMIN — TAMSULOSIN HYDROCHLORIDE 0.4 MG: 0.4 CAPSULE ORAL at 08:01

## 2024-04-24 RX ADMIN — FINASTERIDE 5 MG: 5 TABLET, FILM COATED ORAL at 08:01

## 2024-04-24 RX ADMIN — LEVOTHYROXINE SODIUM 100 MCG: 50 TABLET ORAL at 05:47

## 2024-04-24 RX ADMIN — SENNOSIDES AND DOCUSATE SODIUM 2 TABLET: 50; 8.6 TABLET ORAL at 08:01

## 2024-04-24 RX ADMIN — PAROXETINE HYDROCHLORIDE 40 MG: 20 TABLET, FILM COATED ORAL at 17:16

## 2024-04-24 RX ADMIN — INSULIN LISPRO 5 UNITS: 100 INJECTION, SOLUTION INTRAVENOUS; SUBCUTANEOUS at 08:01

## 2024-04-24 RX ADMIN — Medication 10 ML: at 08:01

## 2024-04-24 RX ADMIN — SENNOSIDES AND DOCUSATE SODIUM 2 TABLET: 50; 8.6 TABLET ORAL at 20:46

## 2024-04-24 RX ADMIN — GABAPENTIN 600 MG: 300 CAPSULE ORAL at 20:45

## 2024-04-24 NOTE — PLAN OF CARE
Goal Outcome Evaluation:   Patient denied pain throughout shift. Reports a decrease in swelling. No issues noted at this time.

## 2024-04-24 NOTE — PROGRESS NOTES
[facility]   Hospitalist Progress Note  Date: 2024  Patient Name: Giles Donovan  : 1945  MRN: [id number]  Date of admission: 2024      Subjective   Subjective     Chief Complaint: Left testicular pain    Summary:    Patient is a 78-year-old man that presents to the emergency room for testicular pain after he accidentally fell on his back and hit his left testicle.  Patient's significant other believes that he has had a urinary tract infection.  Patient denies no vomiting or diarrhea.  No chest pain or shortness of breath.     On arrival to the ED, patient's temperature 103, pulse 111, respiratory rate of 18, blood pressure 101/42, and he saturating 94% on 2 L of oxygen.     Ultrasound of scrotum and testes shows: Left testicle and epididymis appear hypervascular and the left epididymis appears enlarged, suggesting left epididymorchitis.  There is a moderate complex left hydrocele.  There is a left varicocele.     CT of his abdomen shows: Age-indeterminate infectious/inflammatory cystitis is possible.  There may be diffuse prostatomegaly.     Chest x-ray shows no acute infiltrate.     On labs, patient's sodium is 135, glucose is 135, calcium is 8.5, magnesium is 2.0, phosphorus 4.2.  Patient's initial lactate was 2.3, second lactate was 0.8.  White blood cell count is 24.86.  Hemoglobin is 9.8.     Urine shows cloudiness, specific gravity greater than 1.030, glucose greater than 1000, ketones 15, moderate amount of blood, small amount of leukocytes, too many white blood cells to count.  Patient evaluated by urology.  No immediate surgical intervention.  Urine culture positive for E. coli.  Interval Followup:    vital signs stable on room air.  No more febrile  Denies any burning with urination or strong smell but  Continues to have left testicular pain but improving.  Denies any chills.  Remains weak.  Good urine output.    Review of Systems   All systems were reviewed and negative  except for: Summary and interval follow-up    Objective   Objective     Vitals:   Temp:  [99 °F (37.2 °C)-99.7 °F (37.6 °C)] 99 °F (37.2 °C)  Heart Rate:  [] 94  Resp:  [16] 16  BP: (113-136)/(66-84) 127/76  Physical Exam      Head: Normocephalic and atraumatic.      Nose: Nose normal.      Mouth/Throat:      Mouth: Mucous membranes are moist.   Eyes:      Extraocular Movements: Extraocular movements intact.      Conjunctiva/sclera: Conjunctivae normal.      Pupils: Pupils are equal, round, and reactive to light.   Cardiovascular:      Rate and Rhythm: Regular rhythm. Tachycardia present.      Pulses: Normal pulses.      Heart sounds: Normal heart sounds.   Pulmonary:      Effort: Pulmonary effort is normal.      Breath sounds: Normal breath sounds.   Abdominal:      General: There is no distension.      Palpations: Abdomen is soft.      Tenderness: There is no abdominal tenderness.   Genitourinary:     Comments: Tenderness to left testicle.  External urinary catheter in place  Musculoskeletal:         General: Normal range of motion.      Cervical back: Normal range of motion.   Skin:     General: Skin is warm and dry.      Capillary Refill: Capillary refill takes less than 2 seconds.   Neurological:      General: No focal deficit present.      Mental Status: He is alert and oriented to person, place, and time. Mental status is at baseline.   Psychiatric:         Mood and Affect: Mood normal.         Behavior: Behavior normal.       Result Review    Result Review:  I have personally reviewed the results for the past 24 hours and agree with these findings:  [x]  Laboratory  [x]  Microbiology  [x]  Radiology  []  EKG/Telemetry   []  Cardiology/Vascular   []  Pathology  [x]  Old records  [x]  Other:    Assessment & Plan   Assessment / Plan     Assessment:  Sepsis present on admission.  Patient has fever, leukocytosis and tachycardia.  Resolved  Left epididymal orchitis.  UTI with hematuria due to E.  coli.  BPH.  Complex left hydrocele.  Anemia.  History of A-fib.  Chronic combined CHF.  Stable  Diabetes mellitus.  Hemoglobin A1c of 8.8%  Depression and PTSD.  Obstructive sleep apnea.  Patient has been off home CPAP for a year and a half  Hypothyroidism.    Plan:  Continue Rocephin.  Basal and sliding scale insulin.  Continue aspirin and Flomax.  Monitor culture data.  Home Jardiance stopped due to UTI.  Hold home Coreg  Continue home Synthroid, Aldactone , Lipitor and statin, Wellbutrin, Paxil and paroxetine  Appreciate urology input.  Will need at least 2 weeks of oral antibiotics.  Patient to follow-up with Ms. Kahn urology nurse practitioner in 3 to 4 weeks.  PT OT  Discussed plan with RN.  Discharge home in a.m. after culture sensitivities is back    DVT prophylaxis:  Medical and mechanical DVT prophylaxis orders are present.  Eliquis        CODE STATUS:   Code Status (Patient has no pulse and is not breathing): CPR (Attempt to Resuscitate)  Medical Interventions (Patient has pulse or is breathing): Full Support      Part of this note may be an electronic transcription/translation of spoken language to printed text using the Dragon Dictation System.     Electronically signed by Byron Gates MD, 04/24/24, 5:25 PM EDT.

## 2024-04-24 NOTE — PLAN OF CARE
Goal Outcome Evaluation:         VSS. External catheter in place for urgency. Pt able to stand at bedside with walker and x1 assist for linen change. Pt with no complaints of pain this shift. Pt family at bedside during care and updated. Pt with scrotal edema and tenderness, scrotum elevated. Pt encouraged to turn in bed.    Jeanette Madison RN

## 2024-04-24 NOTE — PROGRESS NOTES
[facility]   Urology Progress Note    Patient Name: Giles Donovan  : 1945  MRN: [id number]  Primary Care Physician:  Morena Msaters MD  Date of admission: 2024    Subjective   Subjective     Feeling better      Objective   Objective     Vitals:   Temp:  [98.2 °F (36.8 °C)-99 °F (37.2 °C)] 99 °F (37.2 °C)  Heart Rate:  [] 97  Resp:  [16] 16  BP: (126-136)/(74-84) 126/77  Physical Exam     Alert and oriented x3  No acute distress  Unlabored respirations  Nontender/nondistended   : No penile lesions; improvement of left hemiscrotal edema and induration, more tolerant to today's exam    Result Review    Result Review:  I have personally reviewed the results from the time of this admission to 2024 08:07 EDT and agree with these findings:  [x]  Laboratory  []  Microbiology  []  Radiology  []  EKG/Telemetry   []  Cardiology/Vascular   []  Pathology  []  Old records  []  Other:      Assessment & Plan   Assessment / Plan     Brief Patient Summary:  Giles Donovan is a 78 y.o. male who  history of BPH, urge urinary incontinence who presented with left epididymoorchitis, UTI     Active Hospital Problems:  Active Hospital Problems    Diagnosis     **Orchitis      Plan:   Leukocytosis improving, white count 11.53 from 16.93; creatinine 0.92    Blood cultures negative x 2; urine culture with UTI, > 100,000; sensitivities pending  Continue empiric treatment    Will warrant 2-3 weeks antibiotic for abdomen orchitis    No acute urologic intervention indicated at this time     Symptom control  Elevate scrotum  Ice pack as needed comfort      Plan to follow-up with urology ARNTabatha METZGER (has seen her before) 3-4 weeks after discharge       Electronically signed by Aisha Somers MD, 24, 8:09 AM EDT.

## 2024-04-25 ENCOUNTER — TELEPHONE (OUTPATIENT)
Dept: INTERNAL MEDICINE | Facility: CLINIC | Age: 79
End: 2024-04-25
Payer: MEDICARE

## 2024-04-25 ENCOUNTER — READMISSION MANAGEMENT (OUTPATIENT)
Dept: CALL CENTER | Facility: HOSPITAL | Age: 79
End: 2024-04-25
Payer: MEDICARE

## 2024-04-25 VITALS
HEART RATE: 90 BPM | BODY MASS INDEX: 30.31 KG/M2 | DIASTOLIC BLOOD PRESSURE: 96 MMHG | RESPIRATION RATE: 18 BRPM | SYSTOLIC BLOOD PRESSURE: 154 MMHG | OXYGEN SATURATION: 95 % | WEIGHT: 193.12 LBS | HEIGHT: 67 IN | TEMPERATURE: 99 F

## 2024-04-25 PROBLEM — N39.0 E. COLI UTI (URINARY TRACT INFECTION): Status: ACTIVE | Noted: 2024-04-25

## 2024-04-25 PROBLEM — B96.20 E. COLI UTI (URINARY TRACT INFECTION): Status: ACTIVE | Noted: 2024-04-25

## 2024-04-25 LAB
BASOPHILS # BLD AUTO: 0.04 10*3/MM3 (ref 0–0.2)
BASOPHILS NFR BLD AUTO: 0.4 % (ref 0–1.5)
DEPRECATED RDW RBC AUTO: 47.3 FL (ref 37–54)
EOSINOPHIL # BLD AUTO: 0.09 10*3/MM3 (ref 0–0.4)
EOSINOPHIL NFR BLD AUTO: 1 % (ref 0.3–6.2)
ERYTHROCYTE [DISTWIDTH] IN BLOOD BY AUTOMATED COUNT: 14.4 % (ref 12.3–15.4)
GLUCOSE BLDC GLUCOMTR-MCNC: 140 MG/DL (ref 70–99)
GLUCOSE BLDC GLUCOMTR-MCNC: 185 MG/DL (ref 70–99)
GLUCOSE BLDC GLUCOMTR-MCNC: 232 MG/DL (ref 70–99)
HCT VFR BLD AUTO: 31.7 % (ref 37.5–51)
HGB BLD-MCNC: 10.3 G/DL (ref 13–17.7)
IMM GRANULOCYTES # BLD AUTO: 0.07 10*3/MM3 (ref 0–0.05)
IMM GRANULOCYTES NFR BLD AUTO: 0.7 % (ref 0–0.5)
LYMPHOCYTES # BLD AUTO: 1.6 10*3/MM3 (ref 0.7–3.1)
LYMPHOCYTES NFR BLD AUTO: 17 % (ref 19.6–45.3)
MCH RBC QN AUTO: 29.3 PG (ref 26.6–33)
MCHC RBC AUTO-ENTMCNC: 32.5 G/DL (ref 31.5–35.7)
MCV RBC AUTO: 90.1 FL (ref 79–97)
MONOCYTES # BLD AUTO: 1.74 10*3/MM3 (ref 0.1–0.9)
MONOCYTES NFR BLD AUTO: 18.5 % (ref 5–12)
NEUTROPHILS NFR BLD AUTO: 5.86 10*3/MM3 (ref 1.7–7)
NEUTROPHILS NFR BLD AUTO: 62.4 % (ref 42.7–76)
NRBC BLD AUTO-RTO: 0 /100 WBC (ref 0–0.2)
PLATELET # BLD AUTO: 303 10*3/MM3 (ref 140–450)
PMV BLD AUTO: 9.4 FL (ref 6–12)
RBC # BLD AUTO: 3.52 10*6/MM3 (ref 4.14–5.8)
WBC NRBC COR # BLD AUTO: 9.4 10*3/MM3 (ref 3.4–10.8)

## 2024-04-25 PROCEDURE — 82948 REAGENT STRIP/BLOOD GLUCOSE: CPT

## 2024-04-25 PROCEDURE — 97116 GAIT TRAINING THERAPY: CPT

## 2024-04-25 PROCEDURE — 94799 UNLISTED PULMONARY SVC/PX: CPT

## 2024-04-25 PROCEDURE — 97110 THERAPEUTIC EXERCISES: CPT

## 2024-04-25 PROCEDURE — 63710000001 INSULIN LISPRO (HUMAN) PER 5 UNITS: Performed by: HOSPITALIST

## 2024-04-25 PROCEDURE — 82948 REAGENT STRIP/BLOOD GLUCOSE: CPT | Performed by: HOSPITALIST

## 2024-04-25 PROCEDURE — 85025 COMPLETE CBC W/AUTO DIFF WBC: CPT | Performed by: HOSPITALIST

## 2024-04-25 PROCEDURE — 25010000002 CEFTRIAXONE PER 250 MG: Performed by: HOSPITALIST

## 2024-04-25 PROCEDURE — 94664 DEMO&/EVAL PT USE INHALER: CPT

## 2024-04-25 PROCEDURE — 99231 SBSQ HOSP IP/OBS SF/LOW 25: CPT | Performed by: UROLOGY

## 2024-04-25 PROCEDURE — 99239 HOSP IP/OBS DSCHRG MGMT >30: CPT | Performed by: INTERNAL MEDICINE

## 2024-04-25 RX ORDER — CEPHALEXIN 500 MG/1
500 CAPSULE ORAL 2 TIMES DAILY
Qty: 28 CAPSULE | Refills: 0 | Status: SHIPPED | OUTPATIENT
Start: 2024-04-26 | End: 2024-05-10

## 2024-04-25 RX ORDER — FINASTERIDE 5 MG/1
5 TABLET, FILM COATED ORAL DAILY
Qty: 30 TABLET | Refills: 0 | Status: SHIPPED | OUTPATIENT
Start: 2024-04-26 | End: 2024-05-26

## 2024-04-25 RX ORDER — TAMSULOSIN HYDROCHLORIDE 0.4 MG/1
0.4 CAPSULE ORAL DAILY
Qty: 30 CAPSULE | Refills: 0 | Status: SHIPPED | OUTPATIENT
Start: 2024-04-26 | End: 2024-05-26

## 2024-04-25 RX ADMIN — LEVOTHYROXINE SODIUM 100 MCG: 50 TABLET ORAL at 06:30

## 2024-04-25 RX ADMIN — BUPROPION HYDROCHLORIDE 150 MG: 75 TABLET ORAL at 08:18

## 2024-04-25 RX ADMIN — CEFTRIAXONE SODIUM 2000 MG: 2 INJECTION, POWDER, FOR SOLUTION INTRAMUSCULAR; INTRAVENOUS at 06:30

## 2024-04-25 RX ADMIN — ASPIRIN 81 MG: 81 TABLET, COATED ORAL at 08:18

## 2024-04-25 RX ADMIN — APIXABAN 5 MG: 5 TABLET, FILM COATED ORAL at 08:18

## 2024-04-25 RX ADMIN — BUDESONIDE AND FORMOTEROL FUMARATE DIHYDRATE 2 PUFF: 160; 4.5 AEROSOL RESPIRATORY (INHALATION) at 08:14

## 2024-04-25 RX ADMIN — INSULIN LISPRO 5 UNITS: 100 INJECTION, SOLUTION INTRAVENOUS; SUBCUTANEOUS at 12:28

## 2024-04-25 RX ADMIN — ATORVASTATIN CALCIUM 80 MG: 40 TABLET, FILM COATED ORAL at 08:18

## 2024-04-25 RX ADMIN — GABAPENTIN 600 MG: 300 CAPSULE ORAL at 14:24

## 2024-04-25 RX ADMIN — TAMSULOSIN HYDROCHLORIDE 0.4 MG: 0.4 CAPSULE ORAL at 08:18

## 2024-04-25 RX ADMIN — PANTOPRAZOLE SODIUM 40 MG: 40 TABLET, DELAYED RELEASE ORAL at 06:30

## 2024-04-25 RX ADMIN — FINASTERIDE 5 MG: 5 TABLET, FILM COATED ORAL at 08:18

## 2024-04-25 RX ADMIN — GABAPENTIN 600 MG: 300 CAPSULE ORAL at 06:30

## 2024-04-25 NOTE — PLAN OF CARE
Goal Outcome Evaluation:         VSS. Family at bedside overnight. Pt with no complaints of pain. FS ACHS. Pt encouraged to turn and reposition in bed. External catheter in place due to urgency.     Jeanette Madison RN

## 2024-04-25 NOTE — OUTREACH NOTE
Prep Survey      Flowsheet Row Responses   [facility] discharged from? [facility]   Is LACE score < 7 ? No   Eligibility [facility]   Date of Admission 04/22/24   Date of Discharge 04/25/24   Discharge Disposition Home or Self Care   Discharge diagnosis Orchitis, UTI   Does the patient have one of the following disease processes/diagnoses(primary or secondary)? Other   Does the patient have Home health ordered? Yes   What is the Home health agency?  [facility]   Is there a DME ordered? No   Prep survey completed? Yes            Ashley SANCHEZ - Registered Nurse

## 2024-04-25 NOTE — THERAPY TREATMENT NOTE
Acute Care - Physical Therapy Treatment Note  EDUARDA Wang     Patient Name: Giles Donovan  : 1945  MRN: [id number]  Today's Date: 2024      Visit Dx:     ICD-10-CM ICD-9-CM   1. Orchitis  N45.2 604.90   2. Epididymo-orchitis  N45.3 604.90   3. Urinary tract infection without hematuria, site unspecified  N39.0 599.0   4. Sepsis, due to unspecified organism, unspecified whether acute organ dysfunction present  A41.9 038.9     995.91   5. Decreased activities of daily living (ADL)  Z78.9 V49.89   6. Difficulty walking  R26.2 719.7     Patient Active Problem List   Diagnosis    Allergic rhinitis    Controlled type 2 diabetes mellitus without complication, without long-term current use of insulin    GERD (gastroesophageal reflux disease)    Hyperlipidemia    Hypertension    Hypothyroidism    Stenosis of right carotid artery    Syncope    Urge incontinence of urine    Erectile dysfunction    Benign prostatic hyperplasia with urinary frequency    Cardiomyopathy    Cataract    Chest pain with low risk for cardiac etiology    Right-sided carotid artery occlusion without cerebral infarction    Depression    Vaso vagal episode    Hypoxia    Restrictive lung disease    Chronic heart failure with preserved ejection fraction    Carotid artery occlusion    Impacted cerumen, bilateral    Primary insomnia    Unsteady gait when walking    Orthostatic hypotension    Supplemental oxygen dependent    Paroxysmal atrial fibrillation    Chronic bilateral pleural effusions    JORGE (acute kidney injury)    Orchitis     Past Medical History:   Diagnosis Date    A-fib     Allergic rhinitis 2014    Will try Zyrtec, he didnt want to use a nasal spray.    Arthritis     Asthma     Cataract     left eye    CHF (congestive heart failure)     Cough 2016    PFT and CXR ordered.    Depression     PTSD    Diabetes     Elevated cholesterol     H/O psychiatric care     PTSD    Hyperlipidemia 2016    Lipids ordered.  Continue on current medication.    Hypertension     Hypothyroidism     Seasonal allergies     Shortness of breath     Sleep apnea     Stenosis of right carotid artery 02/19/2017    Will refer to vascular surgeon.    Syncope 02/19/2017    Type 2 diabetes mellitus     Upper respiratory infection 10/18/2015    Will treat cough with Hydromet, otherwise over the counter meds for treatment.     Past Surgical History:   Procedure Laterality Date    CATARACT EXTRACTION Right     x2    COLONOSCOPY      JOINT REPLACEMENT      REPLACEMENT TOTAL HIP ONCOLOGIC Right     RETINAL DETACHMENT REPAIR      TOE AMPUTATION Left 11/2017    Second phalaeng.    TOE OSTEOPHYTE REMOVAL       PT Assessment (Last 12 Hours)       PT Evaluation and Treatment       Row Name 04/25/24 1139          Physical Therapy Time and Intention    Subjective Information complains of;weakness;fatigue  -DK     Document Type therapy note (daily note)  -DK     Mode of Treatment individual therapy;physical therapy  -DK     Patient Effort good  -DK     Symptoms Noted During/After Treatment fatigue  -DK     Comment Pt felt damp, as did his gown/linens.  He reports he sweats profusely.  PCA was advised so she could change bed linens.  Pt reports no testicular pain, and that MD states testicle is back to normal size now.  -DK       Row Name 04/25/24 1139          Pain    Pretreatment Pain Rating 0/10 - no pain  -DK     Posttreatment Pain Rating 0/10 - no pain  -DK       Row Name 04/25/24 1139          Cognition    Affect/Mental Status (Cognition) WFL  -DK     Orientation Status (Cognition) oriented x 4  -DK     Follows Commands (Cognition) WFL  -DK     Cognitive Function WFL  -DK     Personal Safety Interventions gait belt;nonskid shoes/slippers when out of bed;supervised activity  -DK       Row Name 04/25/24 1139          Bed Mobility    Bed Mobility supine-sit-supine  -DK     Supine-Sit [place] (Bed Mobility) standby assist  -DK     Sit-Supine [place] (Bed  Mobility) standby assist  -DK     Supine-Sit-Supine [place] (Bed Mobility) standby assist  -DK     Assistive Device (Bed Mobility) bed rails  -DK       Row Name 04/25/24 1139          Transfers    Transfers sit-stand transfer;stand-sit transfer  -DK       Row Name 04/25/24 1139          Sit-Stand Transfer    Sit-Stand [place] (Transfers) standby assist;contact guard;1 person assist  -DK     Assistive Device (Sit-Stand Transfers) walker, front-wheeled  -DK       Row Name 04/25/24 1139          Stand-Sit Transfer    Stand-Sit [place] (Transfers) standby assist;contact guard;1 person assist  -DK     Assistive Device (Stand-Sit Transfers) walker, front-wheeled  -DK       Row Name 04/25/24 1139          Gait/Stairs (Locomotion)    Gait/Stairs Locomotion gait/ambulation independence;gait/ambulation assistive device;distance ambulated;gait pattern  -DK     [place] Level (Gait) standby assist;1 person assist;contact guard  -DK     Assistive Device (Gait) walker, front-wheeled  -DK     Distance in Feet (Gait) 80  -DK     Pattern (Gait) step-through  -DK     Deviations/Abnormal Patterns (Gait) ashish decreased;festinating/shuffling;gait speed decreased;stride length decreased  -DK     Comment, (Gait/Stairs) Pt ambulated on room air with a rolling walker.  He returned to bed on alert post treatment.  Pt will need a brief during gait.  -DK       Row Name 04/25/24 1139          Safety Issues, Functional Mobility    Safety Issues Affecting Function (Mobility) safety precaution awareness  -DK     Impairments Affecting Function (Mobility) balance;endurance/activity tolerance;strength  -       Row Name 04/25/24 1139          Balance    Balance Assessment sitting static balance;sitting dynamic balance;standing static balance;standing dynamic balance  -DK     Static Sitting Balance standby assist  -DK     Dynamic Sitting Balance standby assist  -DK     Position, Sitting Balance unsupported;sitting edge of bed   -DK     Static Standing Balance standby assist  -DK     Dynamic Standing Balance standby assist;contact guard;1-person assist  -DK     Position/Device Used, Standing Balance walker, front-wheeled  -DK     Balance Interventions standing;dynamic;tandem gait  -       Row Name 04/25/24 1139          Motor Skills    Motor Skills --  therapeutic exercises  -DK     Coordination WFL  -DK     Therapeutic Exercise hip;knee;ankle  -       Row Name 04/25/24 1139          Hip (Therapeutic Exercise)    Hip (Therapeutic Exercise) AROM (active range of motion)  -DK     Hip AROM (Therapeutic Exercise) bilateral;flexion;extension;aBduction;aDduction;sitting;15 repititions  -       Row Name 04/25/24 1139          Knee (Therapeutic Exercise)    Knee (Therapeutic Exercise) AROM (active range of motion)  -DK     Knee AROM (Therapeutic Exercise) bilateral;flexion;extension;LAQ (long arc quad);sitting;15 repititions  -       Row Name 04/25/24 1139          Ankle (Therapeutic Exercise)    Ankle (Therapeutic Exercise) AROM (active range of motion)  -DK     Ankle AROM (Therapeutic Exercise) bilateral;dorsiflexion;plantarflexion;sitting;20 repititions  -       Row Name 04/25/24 1139          Plan of Care Review    Plan of Care Reviewed With patient;family  -DK     Progress improving  -       Row Name 04/25/24 1139          Positioning and Restraints    Pre-Treatment Position in bed  -DK     Post Treatment Position bed  -DK     In Bed supine;call light within reach;encouraged to call for assist;exit alarm on;side rails up x2;with family/caregiver;legs elevated  -       Row Name 04/25/24 1139          Therapy Assessment/Plan (PT)    Rehab Potential (PT) good, to achieve stated therapy goals  -DK     Criteria for Skilled Interventions Met (PT) skilled treatment is necessary  -DK     Therapy Frequency (PT) daily  -DK     Problem List (PT) problems related to;balance;mobility;strength  -DK     Activity Limitations Related to Problem  List (PT) unable to ambulate safely;unable to transfer safely  -DK       Row Name 04/25/24 1139          Progress Summary (PT)    Progress Toward Functional Goals (PT) progress toward functional goals is good  -               User Key  (r) = Recorded By, (t) = Taken By, (c) = Cosigned By      Initials Name Provider Type    Gemma Lagunas, PTA Physical Therapist Assistant                    Physical Therapy Education       Title: PT OT SLP Therapies (Done)       Topic: Physical Therapy (Done)       Point: Mobility training (Done)       Learning Progress Summary             Patient Acceptance, TB,E, VU,DU by KT at 4/24/2024 2128    Acceptance, E,TB, DU,VU by KT at 4/24/2024 0054    Acceptance, E,TB, VU by AV at 4/23/2024 1429   Family Acceptance, E,TB, DU,VU by KT at 4/24/2024 0054                         Point: Home exercise program (Done)       Learning Progress Summary             Patient Acceptance, TB,E, VU,DU by KT at 4/24/2024 2128    Acceptance, E,TB, DU,VU by KT at 4/24/2024 0054   Family Acceptance, E,TB, DU,VU by KT at 4/24/2024 0054                         Point: Body mechanics (Done)       Learning Progress Summary             Patient Acceptance, TB,E, VU,DU by KT at 4/24/2024 2128    Acceptance, E,TB, DU,VU by KT at 4/24/2024 0054    Acceptance, E,TB, VU by AV at 4/23/2024 1429   Family Acceptance, E,TB, DU,VU by KT at 4/24/2024 0054                         Point: Precautions (Done)       Learning Progress Summary             Patient Acceptance, TB,E, VU,DU by KT at 4/24/2024 2128    Acceptance, E,TB, DU,VU by KT at 4/24/2024 0054    Acceptance, E,TB, VU by AV at 4/23/2024 1429   Family Acceptance, E,TB, DU,VU by KT at 4/24/2024 0054                                         User Key       Initials Effective Dates Name Provider Type Discipline    AV 06/11/21 -  Gil Murphy, PT Physical Therapist PT    KT 10/09/23 -  Jeanette Madison, RN Registered Nurse Nurse                  PT Recommendation and  Plan  Planned Therapy Interventions (PT): balance training, bed mobility training, gait training, home exercise program, strengthening, transfer training  Therapy Frequency (PT): daily  Progress Summary (PT)  Progress Toward Functional Goals (PT): progress toward functional goals is good  Plan of Care Reviewed With: patient, family  Progress: improving   Outcome Measures       Row Name 04/25/24 1139 04/23/24 1400          How much help from another person do you currently need...    Turning from your back to your side while in flat bed without using bedrails? 4  -DK 3  -AV     Moving from lying on back to sitting on the side of a flat bed without bedrails? 4  -DK 3  -AV     Moving to and from a bed to a chair (including a wheelchair)? 3  -DK 3  -AV     Standing up from a chair using your arms (e.g., wheelchair, bedside chair)? 3  -DK 3  -AV     Climbing 3-5 steps with a railing? 2  -DK 3  -AV     To walk in hospital room? 3  -DK 3  -AV     AM-PAC 6 Clicks Score (PT) 19  -DK 18  -AV     Highest Level of Mobility Goal 6 --> Walk 10 steps or more  -DK 6 --> Walk 10 steps or more  -AV        Functional Assessment    Outcome Measure Options AM-PAC 6 Clicks Basic Mobility (PT)  -DK AM-PAC 6 Clicks Basic Mobility (PT)  -AV               User Key  (r) = Recorded By, (t) = Taken By, (c) = Cosigned By      Initials Name Provider Type    Gemma Lagunas, PTA Physical Therapist Assistant    AV Gil Murphy, PT Physical Therapist                     Time Calculation:    PT Charges       Row Name 04/25/24 1145             Time Calculation    PT Received On 04/25/24  -DK      PT Goal Re-Cert Due Date 05/02/24  -DK         Timed Charges    88939 - PT Therapeutic Exercise Minutes 12  -DK      82553 - Gait Training Minutes  8  -DK      57650 - PT Therapeutic Activity Minutes 8  -DK         Total Minutes    Timed Charges Total Minutes 28  -DK       Total Minutes 28  -DK                User Key  (r) = Recorded By, (t) = Taken  By, (c) = Cosigned By      Initials Name Provider Type    Gemma Lagunas PTA Physical Therapist Assistant                  Therapy Charges for Today       Code Description Service Date Service Provider Modifiers Qty    11048603425 HC PT THER PROC EA 15 MIN 4/25/2024 Gemma Stone PTA GP 1    81140206563 HC GAIT TRAINING EA 15 MIN 4/25/2024 Gemma Stone PTA GP 1            PT G-Codes  Outcome Measure Options: AM-PAC 6 Clicks Basic Mobility (PT)  AM-PAC 6 Clicks Score (PT): 19  AM-PAC 6 Clicks Score (OT): 16    Gemma Stone PTA  4/25/2024

## 2024-04-25 NOTE — PLAN OF CARE
Problem: Adult Inpatient Plan of Care  Goal: Plan of Care Review  Outcome: Ongoing, Progressing  Flowsheets (Taken 4/25/2024 1550)  Outcome Evaluation: VSS. Patient encouraged to elevate scrotum. External catheter in place. Pain controlled  Goal: Patient-Specific Goal (Individualized)  Outcome: Ongoing, Progressing  Goal: Absence of Hospital-Acquired Illness or Injury  Outcome: Ongoing, Progressing  Intervention: Identify and Manage Fall Risk  Recent Flowsheet Documentation  Taken 4/25/2024 0845 by Alycia Reece RN  Safety Promotion/Fall Prevention: safety round/check completed  Intervention: Prevent and Manage VTE (Venous Thromboembolism) Risk  Recent Flowsheet Documentation  Taken 4/25/2024 0845 by Alycia Reece RN  Activity Management: activity encouraged  Goal: Optimal Comfort and Wellbeing  Outcome: Ongoing, Progressing  Intervention: Provide Person-Centered Care  Recent Flowsheet Documentation  Taken 4/25/2024 0845 by Alycia Reece RN  Trust Relationship/Rapport:   care explained   choices provided  Goal: Readiness for Transition of Care  Outcome: Ongoing, Progressing     Problem: Skin Injury Risk Increased  Goal: Skin Health and Integrity  Outcome: Ongoing, Progressing     Problem: Diabetes Comorbidity  Goal: Blood Glucose Level Within Targeted Range  Outcome: Ongoing, Progressing     Problem: Hypertension Comorbidity  Goal: Blood Pressure in Desired Range  Outcome: Ongoing, Progressing     Problem: Obstructive Sleep Apnea Risk or Actual Comorbidity Management  Goal: Unobstructed Breathing During Sleep  Outcome: Ongoing, Progressing     Problem: Fall Injury Risk  Goal: Absence of Fall and Fall-Related Injury  Outcome: Ongoing, Progressing  Intervention: Promote Injury-Free Environment  Recent Flowsheet Documentation  Taken 4/25/2024 0845 by Alycia Reece RN  Safety Promotion/Fall Prevention: safety round/check completed   Goal Outcome Evaluation:              Outcome Evaluation: VSS. Patient  encouraged to elevate scrotum. External catheter in place. Pain controlled

## 2024-04-25 NOTE — PROGRESS NOTES
[facility]   Urology Progress Note    Patient Name: Giles Donovan  : 1945  MRN: [id number]  Primary Care Physician:  Morena Masters MD  Date of admission: 2024    Subjective   Subjective     Denies pain      Objective   Objective     Vitals:   Temp:  [99 °F (37.2 °C)-99.9 °F (37.7 °C)] 99.9 °F (37.7 °C)  Heart Rate:  [90-98] 95  Resp:  [16-18] 18  BP: (113-131)/(66-80) 122/66  Physical Exam     Alert and oriented x3  No acute distress  Unlabored respirations  Nontender/nondistended   : No penile lesions; continued improvement of left hemiscrotal edema and induration, pain with manipulation    Result Review    Result Review:  I have personally reviewed the results from the time of this admission to 2024 07:39 EDT and agree with these findings:  [x]  Laboratory  []  Microbiology  []  Radiology  []  EKG/Telemetry   []  Cardiology/Vascular   []  Pathology  []  Old records  []  Other:      Assessment & Plan   Assessment / Plan     Brief Patient Summary:  Giles Donovan is a 78 y.o. male who  history of BPH, urge urinary incontinence who presented with left epididymoorchitis, UTI     Active Hospital Problems:  Active Hospital Problems    Diagnosis     **Orchitis      Plan:   WBC now 9.4 , white count 11.53 from 16.93; creatinine 0.92    Blood cultures negative x 2; urine culture with UTI, > 100,000 with pan sensitive ecoli    Will warrant 2-3 weeks antibiotic for abdomen orchitis    No acute urologic intervention indicated at this time     Symptom control  Elevate scrotum  Ice pack as needed comfort      Plan to follow-up with urology Tabatha AREVALO (has seen her before) 3-4 weeks after discharge     Will sign off, please call with questions    Electronically signed by Aisha Somers MD, 24, 7:41 AM EDT.

## 2024-04-25 NOTE — DISCHARGE SUMMARY
[facility]        HOSPITALIST  DISCHARGE SUMMARY    Patient Name: Giles Donovan  : 1945  MRN: [id number]    Date of Admission: 2024  Date of Discharge:  2024  Primary Care Physician: Morena Masters MD    Consults       Date and Time Order Name Status Description    2024  8:44 AM Hospitalist (on-call MD unless specified)      2024  8:44 AM Urology (on-call MD unless specified) Completed             Active and Resolved Hospital Problems:  Active Hospital Problems    Diagnosis POA    **Orchitis [N45.2] Yes    E. coli UTI (urinary tract infection) [N39.0, B96.20] Yes      Resolved Hospital Problems   No resolved problems to display.   Sepsis present on admission.  Patient has fever, leukocytosis and tachycardia.  Resolved  Left epididymal orchitis.  Improving  UTI with hematuria due to E. coli.  BPH.  Complex left hydrocele.  Anemia.  History of A-fib.  Chronic combined CHF.  Stable  Diabetes mellitus.  Hemoglobin A1c of 8.8%  Depression and PTSD.  Obstructive sleep apnea.  Patient has been off home CPAP for a year and a half  Hypothyroidism.    Hospital Course     Hospital Course:  Giles Donovan is a 78 y.o. male  that presents to the emergency room for testicular pain after he accidentally fell on his back and hit his left testicle.  Patient's significant other believes that he has had a urinary tract infection.  Patient denies no vomiting or diarrhea.  No chest pain or shortness of breath.     On arrival to the ED, patient's temperature 103, pulse 111, respiratory rate of 18, blood pressure 101/42, and he saturating 94% on 2 L of oxygen.     Ultrasound of scrotum and testes shows: Left testicle and epididymis appear hypervascular and the left epididymis appears enlarged, suggesting left epididymorchitis.  There is a moderate complex left hydrocele.  There is a left varicocele.     CT of his abdomen shows: Age-indeterminate infectious/inflammatory  cystitis is possible.  There may be diffuse prostatomegaly.     Chest x-ray shows no acute infiltrate.     On labs, patient's sodium is 135, glucose is 135, calcium is 8.5, magnesium is 2.0, phosphorus 4.2.  Patient's initial lactate was 2.3, second lactate was 0.8.  White blood cell count is 24.86.  Hemoglobin is 9.8.     Urine shows cloudiness, specific gravity greater than 1.030, glucose greater than 1000, ketones 15, moderate amount of blood, small amount of leukocytes, too many white blood cells to count.  Patient evaluated by urology.  No immediate surgical intervention.  Urine culture positive for E. coli.  Interval Followup:    vital signs stable on room air.  No more febrile  Denies any burning with urination or strong smell but  Left testicular pain much improved.  Does not hurt to walk.  Denies any chills.  Remains weak.  Good urine output.  Cleared by urology to be released.  Does not want to go to inpatient rehab      DISCHARGE Follow Up Recommendations for labs and diagnostics: PCP and urology.  Home Jardiance on hold because of UTI.      Day of Discharge     Vital Signs:  Temp:  [98.8 °F (37.1 °C)-99.9 °F (37.7 °C)] 98.8 °F (37.1 °C)  Heart Rate:  [80-95] 80  Resp:  [18] 18  BP: (122-146)/(66-85) 146/85    Physical Exam:   Head: Normocephalic and atraumatic.      Nose: Nose normal.      Mouth/Throat:      Mouth: Mucous membranes are moist.   Eyes:      Extraocular Movements: Extraocular movements intact.      Conjunctiva/sclera: Conjunctivae normal.      Pupils: Pupils are equal, round, and reactive to light.   Cardiovascular:      Rate and Rhythm: Regular rhythm. Tachycardia present.      Pulses: Normal pulses.      Heart sounds: Normal heart sounds.   Pulmonary:      Effort: Pulmonary effort is normal.      Breath sounds: Normal breath sounds.   Abdominal:      General: There is no distension.      Palpations: Abdomen is soft.      Tenderness: There is no abdominal tenderness.   Genitourinary:      Comments: Tenderness to left testicle.  Decreasing swelling left testicle.  External urinary catheter in place  Musculoskeletal:         General: Normal range of motion.      Cervical back: Normal range of motion.   Skin:     General: Skin is warm and dry.      Capillary Refill: Capillary refill takes less than 2 seconds.   Neurological:      General: No focal deficit present.      Mental Status: He is alert and oriented to person, place, and time. Mental status is at baseline.   Psychiatric:         Mood and Affect: Mood normal.         Behavior: Behavior normal.           Discharge Details        Discharge Medications        New Medications        Instructions Start Date   cephalexin 500 MG capsule  Commonly known as: KEFLEX   500 mg, Oral, 2 Times Daily   Start Date: April 26, 2024     finasteride 5 MG tablet  Commonly known as: PROSCAR   5 mg, Oral, Daily   Start Date: April 26, 2024     tamsulosin 0.4 MG capsule 24 hr capsule  Commonly known as: FLOMAX   0.4 mg, Oral, Daily   Start Date: April 26, 2024            Continue These Medications        Instructions Start Date   albuterol (2.5 MG/3ML) 0.083% nebulizer solution  Commonly known as: PROVENTIL   2.5 mg, Nebulization, Every 4 Hours PRN      aspirin EC 81 MG EC tablet  Generic drug: aspirin   81 mg, Oral, Daily      atorvastatin 80 MG tablet  Commonly known as: LIPITOR   80 mg, Oral, Daily      benzonatate 100 MG capsule  Commonly known as: TESSALON   100-200 mg, Oral, 3 Times Daily PRN      buPROPion 75 MG tablet  Commonly known as: WELLBUTRIN   150 mg, Oral, Daily      carvedilol 3.125 MG tablet  Commonly known as: COREG   3.125 mg, Oral, 2 Times Daily      cetirizine 10 MG tablet  Commonly known as: ZyrTEC Allergy   10 mg, Oral, Daily      Eliquis 5 MG tablet tablet  Generic drug: apixaban   5 mg, Oral, Every 12 Hours Scheduled      ezetimibe 10 MG tablet  Commonly known as: ZETIA   10 mg, Oral, Daily      fluticasone-salmeterol 230-21 MCG/ACT  inhaler  Commonly known as: ADVAIR HFA   2 puffs, Inhalation, 2 Times Daily      gabapentin 600 MG tablet  Commonly known as: NEURONTIN   600 mg, Oral, 3 Times Daily PRN      guaiFENesin 600 MG 12 hr tablet  Commonly known as: MUCINEX   1,200 mg, Oral, 2 Times Daily PRN      insulin aspart 100 UNIT/ML injection  Commonly known as: novoLOG   24-26 Units, Subcutaneous, 3 Times Daily Before Meals      Lantus SoloStar 100 UNIT/ML injection pen  Generic drug: Insulin Glargine   Inject 40-44 Units under the skin into the appropriate area as directed Every Night.      levothyroxine 100 MCG tablet  Commonly known as: SYNTHROID, LEVOTHROID   100 mcg, Oral, Daily      metFORMIN 500 MG tablet  Commonly known as: GLUCOPHAGE   500 mg, Oral, 2 Times Daily With Meals      olopatadine 0.1 % ophthalmic solution  Commonly known as: PATANOL   1 drop, Both Eyes, Daily PRN      omeprazole 20 MG capsule  Commonly known as: priLOSEC   20 mg, Oral, Daily      oxybutynin XL 10 MG 24 hr tablet  Commonly known as: DITROPAN-XL   10 mg, Oral, Daily      Ozempic (1 MG/DOSE) 2 MG/1.5ML solution pen-injector  Generic drug: Semaglutide (1 MG/DOSE)   1 mg, Subcutaneous, Weekly      PARoxetine 40 MG tablet  Commonly known as: PAXIL   40 mg, Oral, Every Evening      prazosin 1 MG capsule  Commonly known as: MINIPRESS   1 mg, Oral, Nightly PRN      spironolactone 25 MG tablet  Commonly known as: ALDACTONE   25 mg, Oral, Daily      traZODone 100 MG tablet  Commonly known as: DESYREL   50 mg, Oral, Nightly PRN      Vitamin D-3 25 MCG (1000 UT) capsule   1 capsule, Oral, Daily             Stop These Medications      empagliflozin 10 MG tablet tablet  Commonly known as: Jardiance              Allergies   Allergen Reactions    Latex Rash and Anaphylaxis    Latex, Natural Rubber Itching       Discharge Disposition:  Home-Health Care [facility]    Diet:  Diet Instructions       Diet: Diabetic Diets, Cardiac Diets; Healthy Heart (2-3 Na+); Thin (IDDSI 0); Consistent  Carbohydrate      Discharge Diet:  Diabetic Diets  Cardiac Diets       Cardiac Diet: Healthy Heart (2-3 Na+)    Fluid Consistency: Thin (IDDSI 0)    Diabetic Diet: Consistent Carbohydrate            Discharge Activity:   Activity Instructions       Activity as Tolerated              CODE STATUS:  Code Status and Medical Interventions:   Ordered at: 04/22/24 1106     Code Status (Patient has no pulse and is not breathing):    CPR (Attempt to Resuscitate)     Medical Interventions (Patient has pulse or is breathing):    Full Support         Future Appointments   Date Time Provider Department Center   5/1/2024 11:15 AM [facility]   5/6/2024  2:00 PM Maury Carpio MD [facility] HRTFL HA None   5/21/2024  1:15 PM Tabatha Oconnell APRN [facility] U ETRING [facility]   7/10/2024 11:15 AM [facility] Masters [facility]   7/25/2024  2:15 PM Herb Ochoa DO [facility] PCC ETW [facility]   8/15/2024 11:30 AM Dara Peterson MD [facility] CD EDIXE [facility]       Additional Instructions for the Follow-ups that You Need to Schedule       Discharge Follow-up with PCP   As directed       Currently Documented PCP:    Morena Masters MD    PCP Phone Number:    [phone]     Follow Up Details: 1 week        Discharge Follow-up with Specified Provider: Urology nurse practitioner Ms. Kahn; 3 Weeks   As directed      To: Urology nurse practitioner Ms. Kahn   Follow Up: 3 Weeks                Pertinent  and/or Most Recent Results     PROCEDURES:   * Cannot find OR case *     LAB RESULTS:      Lab 04/25/24  0311 04/24/24  0351 04/23/24  0316 04/22/24  0934 04/22/24  0839 04/22/24  0442   WBC 9.40 11.53* 16.93* 24.86*  --  23.86*   HEMOGLOBIN 10.3* 9.8* 9.9* 9.8*  --  10.9*   HEMATOCRIT 31.7* 30.8* 31.0* 29.7*  --  34.0*   PLATELETS 303 287 278 208  --  249   NEUTROS ABS 5.86 8.58* 13.09* 18.78*  --  19.63*   IMMATURE GRANS (ABS) 0.07* 0.06* 0.14* 1.03*  --  0.39*   LYMPHS ABS 1.60 1.14 1.44 1.71  --  1.23   MONOS ABS  1.74* 1.55* 2.08* 3.25*  --  2.50*   EOS ABS 0.09 0.16 0.13 0.02  --  0.02   MCV 90.1 91.4 91.4 89.7  --  91.6   LACTATE  --   --   --   --  0.8 2.3*         Lab 04/24/24  0351 04/23/24  0316 04/22/24  0934 04/22/24  0442   SODIUM 132* 133* 135* 133*   POTASSIUM 4.0 4.3 4.2 4.4   CHLORIDE 96* 99 98 95*   CO2 24.9 23.3 23.4 21.0*   ANION GAP 11.1 10.7 13.6 17.0*   BUN 18 19 17 18   CREATININE 0.92 1.00 1.09 1.25   EGFR 85.7 77.5 69.9 59.3*   GLUCOSE 128* 155* 132* 165*   CALCIUM 8.8 8.7 8.5* 9.2   MAGNESIUM 1.9 2.4 2.0 2.0   PHOSPHORUS 3.0 2.9 4.2  --    HEMOGLOBIN A1C  --   --  8.80*  --          Lab 04/22/24  0442   TOTAL PROTEIN 7.6   ALBUMIN 3.6   GLOBULIN 4.0   ALT (SGPT) 19   AST (SGOT) 23   BILIRUBIN 0.8   ALK PHOS 95         Lab 04/22/24  0442   HSTROP T 42*                 Brief Urine Lab Results  (Last result in the past 365 days)        Color   Clarity   Blood   Leuk Est   Nitrite   Protein   CREAT   Urine HCG        04/22/24 0443 Yellow   Cloudy   Moderate (2+)   Small (1+)   Negative   100 mg/dL (2+)                 Microbiology Results (last 10 days)       Procedure Component Value - Date/Time    Urine Culture - Urine, Urine, Clean Catch [[id number]]  (Abnormal)  (Susceptibility) Collected: 04/22/24 0443    Lab Status: Final result Specimen: Urine, Clean Catch Updated: 04/24/24 1040     Urine Culture >100,000 CFU/mL Escherichia coli    Narrative:      Colonization of the urinary tract without infection is common. Treatment is discouraged unless the patient is symptomatic, pregnant, or undergoing an invasive urologic procedure.    Susceptibility        Escherichia coli      LAYA      Amoxicillin + Clavulanate Susceptible      Ampicillin Susceptible      Ampicillin + Sulbactam Susceptible      Cefazolin Susceptible      Cefepime Susceptible      Ceftazidime Susceptible      Ceftriaxone Susceptible      Gentamicin Susceptible      Levofloxacin Susceptible      Nitrofurantoin Susceptible      Piperacillin +  Tazobactam Susceptible      Trimethoprim + Sulfamethoxazole Susceptible                           Blood Culture - Blood, Arm, Right [[id number]]  (Normal) Collected: 04/22/24 0442    Lab Status: Preliminary result Specimen: Blood from Arm, Right Updated: 04/25/24 0500     Blood Culture No growth at 3 days    Blood Culture - Blood, Arm, Left [[id number]]  (Normal) Collected: 04/22/24 0442    Lab Status: Preliminary result Specimen: Blood from Arm, Left Updated: 04/25/24 0500     Blood Culture No growth at 3 days    COVID PRE-OP / PRE-PROCEDURE SCREENING ORDER (NO ISOLATION) - Swab, Nasopharynx [[id number]]  (Normal) Collected: 04/22/24 0427    Lab Status: Final result Specimen: Swab from Nasopharynx Updated: 04/22/24 0541    Narrative:      The following orders were created for panel order COVID PRE-OP / PRE-PROCEDURE SCREENING ORDER (NO ISOLATION) - Swab, Nasopharynx.  Procedure                               Abnormality         Status                     ---------                               -----------         ------                     COVID-19, FLU A/B, RSV P...[[id number]]  Normal              Final result                 Please view results for these tests on the individual orders.    COVID-19, FLU A/B, RSV PCR 1 HR TAT - Swab, Nasopharynx [[id number]]  (Normal) Collected: 04/22/24 0427    Lab Status: Final result Specimen: Swab from Nasopharynx Updated: 04/22/24 0541     COVID19 Not Detected     Influenza A PCR Not Detected     Influenza B PCR Not Detected     RSV, PCR Not Detected    Narrative:      Fact sheet for providers: https://www.fda.gov/media/228610/download    Fact sheet for patients: https://www.fda.gov/media/358785/download    Test performed by PCR.            US Scrotum & Testicles    Result Date: 4/22/2024  Impression: Impression: 1.  Left testicle and epididymis appear hypervascular and left epidermis appears enlarged, suggesting left epididymoorchitis. 2.  Moderate complex left hydrocele. 3.  Left  varicocele.    Electronically Signed By-Desmond Sosa MD On:4/22/2024 8:04 AM      CT Abdomen Pelvis With Contrast    Result Date: 4/22/2024  Impression: An age-indeterminate infectious/inflammatory cystitis is possible. There may be diffuse prostatomegaly. Otherwise, no definite significant acute findings are appreciated. Please see above comments for further detail.    Electronically Signed By-Kalpesh Oconnell MD On:4/22/2024 6:31 AM       Results for orders placed during the hospital encounter of 09/16/23    Bilateral Carotid Duplex    Interpretation Summary    Right internal carotid artery is occluded.    Left internal carotid artery demonstrates normal flow without evidence of hemodynamically significant stenosis.    Heavy plaque in the bifurcations bilaterally.    Vertebral flow is antegrade bilaterally.    No prior study available for comparison.      Results for orders placed during the hospital encounter of 09/16/23    Bilateral Carotid Duplex    Interpretation Summary    Right internal carotid artery is occluded.    Left internal carotid artery demonstrates normal flow without evidence of hemodynamically significant stenosis.    Heavy plaque in the bifurcations bilaterally.    Vertebral flow is antegrade bilaterally.    No prior study available for comparison.      Results for orders placed during the hospital encounter of 09/16/23    Adult Transthoracic Echo Complete w/ Color, Spectral and Contrast if necessary per protocol    Interpretation Summary    Left ventricular systolic function is low normal. Calculated left ventricular EF = 45.1%    The left ventricular cavity is borderline dilated.    Left ventricular diastolic function is consistent with (grade I) impaired relaxation.    There is mild calcification of the aortic valve.      Imaging Results (All)       Procedure Component Value Units Date/Time    US Scrotum & Testicles [[id number]] Collected: 04/22/24 0756     Updated: 04/22/24 0806     Narrative:      US SCROTUM AND TESTICLES-     Date of Exam: 4/22/2024 6:59 AM     Indication: left testicular pain.     Comparison: CT abdomen pelvis from earlier today     Technique: Multiple sonographic images of the scrotum were obtained in  transverse and longitudinal planes. Grayscale and color Doppler duplex  techniques were utilized.        Findings:  Both testicles appear normal in size and echotexture, although the left  testicle appears hypervascular. The right testicle measures 4.0 x 2.0  cm, while the left testicle measures 4.4 x 2.2 x 3.0 cm. The right  epididymis appears normal in size and vascularity. The left epidermis  appears enlarged and hypervascular. There is a moderate left hydrocele  with debris and septations, likely a complex hydrocele. There is a left  varicocele.       Impression:      Impression:  1.  Left testicle and epididymis appear hypervascular and left epidermis  appears enlarged, suggesting left epididymoorchitis.  2.  Moderate complex left hydrocele.  3.  Left varicocele.           Electronically Signed By-Desmond Sosa MD On:4/22/2024 8:04 AM       CT Abdomen Pelvis With Contrast [[id number]] Collected: 04/22/24 0620     Updated: 04/22/24 0633    Narrative:      CT ABDOMEN PELVIS W CONTRAST-     Date of Exam: 4/22/2024 6:06 AM     Indication: Left-sided abdominal pain for 1 week; vomiting & increased  abdominal pain for 1 day.     Comparison: None available.     Technique:   Axial CT images were obtained of the abdomen and pelvis following the  uneventful intravenous administration of 80 mL of Isovue-370 contrast  agent. Reconstructed 2D coronal and sagittal images were also obtained.  Automated exposure control and iterative construction methods were used.  No oral contrast agent was administered for the study.     Findings:  Coronary artery calcifications are seen. Atherosclerotic changes are  seen elsewhere. No aneurysmal dilatation of the abdominal aorta. No  acute  intraperitoneal or retroperitoneal hemorrhage. There may be  borderline cardiac enlargement. A tiny hiatal hernia is possible. Mild  subsegmental atelectasis and/or fibrosis involve(s) the lung bases.  Probably no acute infiltrate. Mild prominence of the bilateral  pelvicalyceal systems is seen. No ureterolithiasis. Tiny left calyceal  stones are suspected, estimated at 3 mm or less. Probably no right  nephrolithiasis. Mild prominence of the urinary bladder wall is seen,  which may represent an underdistention artifact. An age-indeterminate  infectious/inflammatory cystitis is possible. There is suspected  prostamegaly. Streak artifact from a right hip prosthesis obscures  detail, especially with regard to evaluating the distal ureters and the  lower pelvis. Pelvic phleboliths are seen. Formed stool is present of  the colon. There may be fecal stasis (or fecal retention) as with  constipation. No acute appendicitis or colitis. No acute diverticulitis.  No pneumoperitoneum or pneumatosis. No portal or mesenteric venous gas.  The gallbladder appears contracted. No gallstones or acute  cholecystitis. No acute pancreatitis. No adrenal mass. Degenerative  changes are seen throughout the imaged spine. There may be diffuse  idiopathic skeletal hyperostosis (DISH). Degenerative changes involve  the bilateral sacroiliac (SI) joints and the left hip joint. There is  suspected pubic osteitis. No acute fracture. No aggressive osseous  lesion is suggested. There are nonspecific prominent bilateral inguinal  lymph nodes without definite suppuration or necrosis. No extranodal  fluid collection is seen to suggest abscess. There may be a small right  inguinal hernia. It does not contain bowel. There is a tiny umbilical  hernia, which contains fat and no bowel.        Impression:      An age-indeterminate infectious/inflammatory cystitis is possible. There  may be diffuse prostatomegaly. Otherwise, no definite significant  acute  findings are appreciated. Please see above comments for further detail.           Electronically Signed By-Kalpesh Oconnell MD On:4/22/2024 6:31 AM       XR Chest 1 View [[id number]] Collected: 04/22/24 0510     Updated: 04/22/24 0517    Narrative:      XR CHEST 1 VW-     Date of Exam: 4/22/2024 5:02 AM     Indication: Weak/Dizzy/AMS triage protocol.     Comparison: 1/10/2024.     FINDINGS:   A single AP (or PA) upright portable chest radiograph was performed. No  cardiac enlargement is seen. No acute infiltrate is appreciated. No  pleural effusion or pneumothorax is identified. There is suspected mild  chronic blunting of the left lateral costophrenic sulcus, as before.  External artifacts obscure detail. Degenerative changes involve the  imaged spine and the bilateral shoulders. There is possible mild  levoscoliosis of the upper thoracic spine. No significant interval  change is seen since the prior study (or studies).     CONCLUSION:   No acute infiltrate is appreciated.        Electronically Signed By-Kalpesh Oconnell MD On:4/22/2024 5:15 AM                Labs Pending at Discharge:  Pending Labs       Order Current Status    Blood Culture - Blood, Arm, Left Preliminary result    Blood Culture - Blood, Arm, Right Preliminary result                Time spent on Discharge including face to face service: 35 minutes  Part of this note may be an electronic transcription/translation of spoken language to printed text using the Dragon Dictation System.     TElectronically signed by Byron Gates MD, 04/25/24, 4:03 PM EDT.

## 2024-04-25 NOTE — PLAN OF CARE
Goal Outcome Evaluation:              Outcome Evaluation: VSS. Patient encouraged to elevate scrotum. External catheter in place. Pain controlled

## 2024-04-26 ENCOUNTER — TRANSITIONAL CARE MANAGEMENT TELEPHONE ENCOUNTER (OUTPATIENT)
Dept: CALL CENTER | Facility: HOSPITAL | Age: 79
End: 2024-04-26
Payer: MEDICARE

## 2024-04-26 NOTE — OUTREACH NOTE
Call Center TCM Note      Flowsheet Row Responses   [facility] [place] patient discharged from? [facility]   Does the patient have one of the following disease processes/diagnoses(primary or secondary)? Other   TCM attempt successful? No   Unsuccessful attempts Attempt 2            Brit Muñoz RN    4/26/2024, 15:55 EDT

## 2024-04-26 NOTE — OUTREACH NOTE
Call Center TCM Note      Flowsheet Row Responses   [facility] patient discharged from? [facility]   Does the patient have one of the following disease processes/diagnoses(primary or secondary)? Other   TCM attempt successful? No  [VR lists dtr Carey and  S.KIERAN Perry]   Unsuccessful attempts Attempt 1  [attempted pt and S.O.]   Call Status Left message            Brit Muñoz RN    4/26/2024, 08:37 EDT

## 2024-04-27 LAB
BACTERIA SPEC AEROBE CULT: NORMAL
BACTERIA SPEC AEROBE CULT: NORMAL

## 2024-04-28 ENCOUNTER — TRANSITIONAL CARE MANAGEMENT TELEPHONE ENCOUNTER (OUTPATIENT)
Dept: CALL CENTER | Facility: HOSPITAL | Age: 79
End: 2024-04-28
Payer: MEDICARE

## 2024-04-28 NOTE — OUTREACH NOTE
Call Center TCM Note      Flowsheet Row Responses   [facility] patient discharged from? [facility]   Does the patient have one of the following disease processes/diagnoses(primary or secondary)? Other   TCM attempt successful? No   Unsuccessful attempts Attempt 3            Mabel Alvares RN    4/28/2024, 10:01 EDT

## 2024-04-30 ENCOUNTER — TELEPHONE (OUTPATIENT)
Dept: INTERNAL MEDICINE | Facility: CLINIC | Age: 79
End: 2024-04-30
Payer: MEDICARE

## 2024-04-30 NOTE — TELEPHONE ENCOUNTER
Corine from [facility] called office stating they need a verbal order for nursing, PT and OT for Thursday.

## 2024-05-01 ENCOUNTER — OFFICE VISIT (OUTPATIENT)
Dept: INTERNAL MEDICINE | Facility: CLINIC | Age: 79
End: 2024-05-01
Payer: MEDICARE

## 2024-05-01 VITALS
WEIGHT: 190.4 LBS | BODY MASS INDEX: 29.88 KG/M2 | HEIGHT: 67 IN | DIASTOLIC BLOOD PRESSURE: 68 MMHG | TEMPERATURE: 98.3 F | SYSTOLIC BLOOD PRESSURE: 128 MMHG | RESPIRATION RATE: 20 BRPM | HEART RATE: 83 BPM | OXYGEN SATURATION: 97 %

## 2024-05-01 DIAGNOSIS — Z76.0 MEDICATION REFILL: ICD-10-CM

## 2024-05-01 DIAGNOSIS — N39.0 URINARY TRACT INFECTION WITHOUT HEMATURIA, SITE UNSPECIFIED: ICD-10-CM

## 2024-05-01 DIAGNOSIS — N40.1 BENIGN PROSTATIC HYPERPLASIA WITH URINARY FREQUENCY: Primary | ICD-10-CM

## 2024-05-01 DIAGNOSIS — N45.2 ORCHITIS: ICD-10-CM

## 2024-05-01 DIAGNOSIS — R35.0 BENIGN PROSTATIC HYPERPLASIA WITH URINARY FREQUENCY: Primary | ICD-10-CM

## 2024-05-01 DIAGNOSIS — R32 URINARY INCONTINENCE, UNSPECIFIED TYPE: ICD-10-CM

## 2024-05-01 DIAGNOSIS — R29.6 FALLING EPISODES: ICD-10-CM

## 2024-05-01 PROCEDURE — 3074F SYST BP LT 130 MM HG: CPT | Performed by: INTERNAL MEDICINE

## 2024-05-01 PROCEDURE — 1126F AMNT PAIN NOTED NONE PRSNT: CPT | Performed by: INTERNAL MEDICINE

## 2024-05-01 PROCEDURE — 99495 TRANSJ CARE MGMT MOD F2F 14D: CPT | Performed by: INTERNAL MEDICINE

## 2024-05-01 PROCEDURE — 3078F DIAST BP <80 MM HG: CPT | Performed by: INTERNAL MEDICINE

## 2024-05-01 PROCEDURE — 1111F DSCHRG MED/CURRENT MED MERGE: CPT | Performed by: INTERNAL MEDICINE

## 2024-05-01 RX ORDER — CHOLECALCIFEROL (VITAMIN D3) 25 MCG
1000 CAPSULE ORAL DAILY
Qty: 60 CAPSULE | Refills: 1 | Status: SHIPPED | OUTPATIENT
Start: 2024-05-01

## 2024-05-01 RX ORDER — CETIRIZINE HYDROCHLORIDE 10 MG/1
10 TABLET ORAL DAILY
Qty: 90 TABLET | Refills: 3 | Status: SHIPPED | OUTPATIENT
Start: 2024-05-01

## 2024-05-01 RX ORDER — BENZONATATE 100 MG/1
100-200 CAPSULE ORAL 3 TIMES DAILY PRN
Qty: 90 CAPSULE | Refills: 1 | Status: SHIPPED | OUTPATIENT
Start: 2024-05-01

## 2024-05-01 NOTE — PROGRESS NOTES
Chief Complaint  Transitional Care Management    Subjective         Giles Donovan is a 78 y.o. male who presents for a transitional care management visit.    Within 48 business hours after discharge our office contacted him via telephone to coordinate his care and needs.      I reviewed and discussed the details of that call along with the discharge summary, hospital problems, inpatient lab results, inpatient diagnostic studies, and consultation reports with Giles.     Current outpatient and discharge medications have been reconciled for the patient.  Reviewed by: Morena Masters MD          4/25/2024     6:11 PM   Date of TCM Phone Call   [facility]   Date of Admission 4/22/2024   Date of Discharge 4/25/2024   Discharge Disposition Home or Self Care     Risk for Readmission (LACE) Score: 11 (4/25/2024  6:00 AM)    Subjective      History of Present Illness  The patient presents for evaluation of multiple medical concerns. He is accompanied by his wife.    The patient was informed about a face-to-face consultation last week regarding the acquisition of a scooter for mobility purposes.    The patient recently experienced a fall, landing on his left side, resulting in swelling and inflammation around his testicular area. The accompanying adult female reports that during the occupational therapy session, the patient was attempting to minimize his sitting position to prevent falls while seated in his recliner. However, the patient did not express any discomfort during these incidents. However, the following night, around 4:30 AM, the patient tripped over his oxygen cord and fell, landing on his left side. This resulted in severe pain, prompting the adult female to transport him to the emergency room. Currently, the patient's condition is stable, with no residual pain from his stroke. However, he occasionally experiences pain in his left lower abdomen when attempting to rise from a seated  "position.    The patient was diagnosed with a urinary tract infection (UTI) due to a malodorous urine odor. He is currently on a course of antibiotics for the UTI and is seeking a prescription for briefs. He typically uses three briefs daily, which initially resulted in decreased urination. However, this has improved recently. The patient has a history of UTI.    He denies any chest discomfort. His breathing is good. His endocrinologist took him off Jardiance. He denies any syncope. He is scheduled to start physical therapy from [facility] tomorrow.         Objective   Vital Signs:   Vitals:    05/01/24 1132   BP: 128/68   BP Location: Right arm   Patient Position: Sitting   Cuff Size: Adult   Pulse: 83   Resp: 20   Temp: 98.3 °F (36.8 °C)   TempSrc: Temporal   SpO2: 97%   Weight: 86.4 kg (190 lb 6.4 oz)   Height: 170.2 cm (67.01\")     Body mass index is 29.81 kg/m².    Wt Readings from Last 3 Encounters:   05/01/24 86.4 kg (190 lb 6.4 oz)   04/25/24 87.6 kg (193 lb 2 oz)   04/02/24 86.6 kg (191 lb)     BP Readings from Last 3 Encounters:   05/01/24 128/68   04/25/24 154/96   04/02/24 124/62       Health Maintenance   Topic Date Due    TDAP/TD VACCINES (1 - Tdap) Never done    ANNUAL WELLNESS VISIT  06/06/2024    COLORECTAL CANCER SCREENING  10/07/2024    DIABETIC EYE EXAM  05/08/2024 (Originally 6/1/2023)    RSV Vaccine - Adults (1 - 1-dose 60+ series) 06/01/2024 (Originally 10/21/2006)    COVID-19 Vaccine (6 - 2023-24 season) 11/01/2024 (Originally 4/20/2024)    ZOSTER VACCINE (1 of 2) 12/20/2024 (Originally 10/21/1996)    INFLUENZA VACCINE  08/01/2024    HEMOGLOBIN A1C  10/22/2024    LIPID PANEL  02/06/2025    URINE MICROALBUMIN  02/06/2025    BMI FOLLOWUP  03/20/2025    HEPATITIS C SCREENING  Completed    Pneumococcal Vaccine 65+  Completed       Physical Exam  Vitals reviewed.   Constitutional:       Appearance: Normal appearance. He is well-developed.   HENT:      Head: Normocephalic and atraumatic.      " Right Ear: External ear normal.      Left Ear: External ear normal.   Eyes:      Conjunctiva/sclera: Conjunctivae normal.      Pupils: Pupils are equal, round, and reactive to light.   Cardiovascular:      Rate and Rhythm: Normal rate and regular rhythm.      Heart sounds: No murmur heard.     No friction rub. No gallop.   Pulmonary:      Effort: Pulmonary effort is normal.      Breath sounds: Normal breath sounds. No wheezing or rhonchi.      Comments: Wearing oxygen  Musculoskeletal:      Comments: Walking with ambulation   Skin:     General: Skin is warm and dry.   Neurological:      Mental Status: He is alert and oriented to person, place, and time.   Psychiatric:         Mood and Affect: Affect normal.         Behavior: Behavior normal.         Thought Content: Thought content normal.        Physical Exam        Result Review :  The following data was reviewed by: Morena Masters MD on 05/01/2024:         Results  Imaging  Significant inflammation around testicular area.            Procedures            Assessment & Plan  Benign prostatic hyperplasia with urinary frequency    Urinary incontinence, unspecified type    Falling episodes    Orchitis    Urinary tract infection without hematuria, site unspecified    Medication refill      Orders Placed This Encounter   Procedures    Miscellaneous DME     New Medications Ordered This Visit   Medications    Cholecalciferol (Vitamin D-3) 25 MCG (1000 UT) capsule     Sig: Take 1,000 Units by mouth Daily.     Dispense:  60 capsule     Refill:  1    cetirizine (ZyrTEC Allergy) 10 MG tablet     Sig: Take 1 tablet by mouth Daily.     Dispense:  90 tablet     Refill:  3    benzonatate (TESSALON) 100 MG capsule     Sig: Take 1-2 capsules by mouth 3 (Three) Times a Day As Needed for Cough.     Dispense:  90 capsule     Refill:  1          Assessment & Plan  1. Mobility issues.  The patient was counseled to contact the scooter company on a regular basis to expedite the  process. It was emphasized that physical therapy is crucial for enhancing his strength and balance.    2. Testicular pain.  The patient's left lower quadrant abdominal pain is likely referred pain from his scrotum. His testicular pain has shown improvement, and it is anticipated that his condition will continue to improve due to trauma. The patient was advised to exercise caution while sitting, avoid rapid sitting, and refrain from falls. The use of a walker was also recommended.    3. Urinary tract infection.  The patient was informed that Jardiance could potentially exacerbate urinary tract infections. A prescription for briefs was provided.    Patient or patient representative verbalized consent for the use of Ambient Listening during the visit with  Morena Masters MD for chart documentation. 5/13/2024  01:23 EDT      FOLLOW UP  No follow-ups on file.  Patient was given instructions and counseling regarding his condition or for health maintenance advice. Please see specific information pulled into the AVS if appropriate.     Morena Masters MD  05/01/24  12:21 EDT    CURRENT & DISCONTINUED MEDICATIONS  Current Outpatient Medications   Medication Instructions    albuterol (PROVENTIL) 2.5 mg, Nebulization, Every 4 Hours PRN    apixaban (ELIQUIS) 5 mg, Oral, Every 12 Hours Scheduled    aspirin EC 81 mg, Oral, Daily    atorvastatin (LIPITOR) 80 mg, Oral, Daily    benzonatate (TESSALON) 100-200 mg, Oral, 3 Times Daily PRN    buPROPion (WELLBUTRIN) 150 mg, Oral, Daily    carvedilol (COREG) 3.125 mg, Oral, 2 Times Daily    cephalexin (KEFLEX) 500 mg, Oral, 2 Times Daily    cetirizine (ZYRTEC ALLERGY) 10 mg, Oral, Daily    ezetimibe (ZETIA) 10 mg, Oral, Daily    finasteride (PROSCAR) 5 mg, Oral, Daily    fluticasone-salmeterol (ADVAIR HFA) 230-21 MCG/ACT inhaler 2 puffs, Inhalation, 2 Times Daily    gabapentin (NEURONTIN) 600 mg, Oral, 3 Times Daily PRN    guaiFENesin (MUCINEX) 1,200 mg, Oral, 2 Times Daily PRN     insulin aspart (NOVOLOG) 24-26 Units, Subcutaneous, 3 Times Daily Before Meals    Insulin Glargine (Lantus SoloStar) 100 UNIT/ML injection pen Inject 40-44 Units under the skin into the appropriate area as directed Every Night.    levothyroxine (SYNTHROID, LEVOTHROID) 100 mcg, Oral, Daily    metFORMIN (GLUCOPHAGE) 500 mg, Oral, 2 Times Daily With Meals    olopatadine (PATANOL) 0.1 % ophthalmic solution 1 drop, Both Eyes, Daily PRN    omeprazole (PRILOSEC) 20 mg, Oral, Daily    oxybutynin XL (DITROPAN-XL) 10 mg, Oral, Daily    Ozempic (1 MG/DOSE) 1 mg, Subcutaneous, Weekly    PARoxetine (PAXIL) 40 mg, Oral, Every Evening    prazosin (MINIPRESS) 1 mg, Oral, Nightly PRN    spironolactone (ALDACTONE) 25 mg, Oral, Daily    tamsulosin (FLOMAX) 0.4 mg, Oral, Daily    traZODone (DESYREL) 50 mg, Oral, Nightly PRN    Vitamin D-3 1,000 Units, Oral, Daily       Medications Discontinued During This Encounter   Medication Reason    benzonatate (TESSALON) 100 MG capsule     Cholecalciferol (Vitamin D-3) 25 MCG (1000 UT) capsule Reorder    cetirizine (ZyrTEC Allergy) 10 MG tablet Reorder

## 2024-05-02 ENCOUNTER — TELEPHONE (OUTPATIENT)
Dept: CARDIOLOGY | Facility: CLINIC | Age: 79
End: 2024-05-02
Payer: MEDICARE

## 2024-05-02 NOTE — TELEPHONE ENCOUNTER
Spoke with Vicky and confirmed pt appt for Monday 5/6 @ 2:00, to get lab work done and to bring meds and bp log.

## 2024-05-03 ENCOUNTER — TELEPHONE (OUTPATIENT)
Dept: INTERNAL MEDICINE | Facility: CLINIC | Age: 79
End: 2024-05-03
Payer: MEDICARE

## 2024-05-03 NOTE — TELEPHONE ENCOUNTER
Corine from [facility] called into clinic today stating Yg with [facility] was giving an updated plan of care, Corine had stated Yg was requesting Miconazole powder be sent to pharmacy for pt's folds in the groin area, Corine also stated Yg is treating pt for Orchitis and pt is on oxygen and is walking with a walker.

## 2024-05-06 ENCOUNTER — OFFICE VISIT (OUTPATIENT)
Dept: CARDIOLOGY | Facility: CLINIC | Age: 79
End: 2024-05-06
Payer: MEDICARE

## 2024-05-06 ENCOUNTER — LAB (OUTPATIENT)
Dept: LAB | Facility: HOSPITAL | Age: 79
End: 2024-05-06
Payer: MEDICARE

## 2024-05-06 VITALS
BODY MASS INDEX: 30.45 KG/M2 | DIASTOLIC BLOOD PRESSURE: 58 MMHG | HEIGHT: 67 IN | SYSTOLIC BLOOD PRESSURE: 94 MMHG | HEART RATE: 84 BPM | WEIGHT: 194 LBS

## 2024-05-06 DIAGNOSIS — I48.0 PAROXYSMAL ATRIAL FIBRILLATION: ICD-10-CM

## 2024-05-06 DIAGNOSIS — I50.32 CHRONIC HEART FAILURE WITH PRESERVED EJECTION FRACTION: Primary | ICD-10-CM

## 2024-05-06 DIAGNOSIS — E78.2 MIXED HYPERLIPIDEMIA: ICD-10-CM

## 2024-05-06 DIAGNOSIS — I50.32 CHRONIC HEART FAILURE WITH PRESERVED EJECTION FRACTION: ICD-10-CM

## 2024-05-06 DIAGNOSIS — I10 PRIMARY HYPERTENSION: ICD-10-CM

## 2024-05-06 DIAGNOSIS — R06.02 SOB (SHORTNESS OF BREATH): ICD-10-CM

## 2024-05-06 LAB
ALBUMIN SERPL-MCNC: 3.8 G/DL (ref 3.5–5.2)
ALBUMIN/GLOB SERPL: 1.1 G/DL
ALP SERPL-CCNC: 73 U/L (ref 39–117)
ALT SERPL W P-5'-P-CCNC: 32 U/L (ref 1–41)
ANION GAP SERPL CALCULATED.3IONS-SCNC: 13.6 MMOL/L (ref 5–15)
AST SERPL-CCNC: 40 U/L (ref 1–40)
BASOPHILS # BLD AUTO: 0.09 10*3/MM3 (ref 0–0.2)
BASOPHILS NFR BLD AUTO: 0.9 % (ref 0–1.5)
BILIRUB SERPL-MCNC: 0.3 MG/DL (ref 0–1.2)
BUN SERPL-MCNC: 14 MG/DL (ref 8–23)
BUN/CREAT SERPL: 16.1 (ref 7–25)
CALCIUM SPEC-SCNC: 9.5 MG/DL (ref 8.6–10.5)
CHLORIDE SERPL-SCNC: 99 MMOL/L (ref 98–107)
CO2 SERPL-SCNC: 25.4 MMOL/L (ref 22–29)
CREAT SERPL-MCNC: 0.87 MG/DL (ref 0.76–1.27)
DEPRECATED RDW RBC AUTO: 47.9 FL (ref 37–54)
EGFRCR SERPLBLD CKD-EPI 2021: 88.9 ML/MIN/1.73
EOSINOPHIL # BLD AUTO: 0.21 10*3/MM3 (ref 0–0.4)
EOSINOPHIL NFR BLD AUTO: 2.1 % (ref 0.3–6.2)
ERYTHROCYTE [DISTWIDTH] IN BLOOD BY AUTOMATED COUNT: 14.3 % (ref 12.3–15.4)
GLOBULIN UR ELPH-MCNC: 3.6 GM/DL
GLUCOSE SERPL-MCNC: 62 MG/DL (ref 65–99)
HCT VFR BLD AUTO: 33.5 % (ref 37.5–51)
HGB BLD-MCNC: 10.9 G/DL (ref 13–17.7)
IMM GRANULOCYTES # BLD AUTO: 0.03 10*3/MM3 (ref 0–0.05)
IMM GRANULOCYTES NFR BLD AUTO: 0.3 % (ref 0–0.5)
LYMPHOCYTES # BLD AUTO: 2.24 10*3/MM3 (ref 0.7–3.1)
LYMPHOCYTES NFR BLD AUTO: 22.4 % (ref 19.6–45.3)
MAGNESIUM SERPL-MCNC: 1.8 MG/DL (ref 1.6–2.4)
MCH RBC QN AUTO: 29.7 PG (ref 26.6–33)
MCHC RBC AUTO-ENTMCNC: 32.5 G/DL (ref 31.5–35.7)
MCV RBC AUTO: 91.3 FL (ref 79–97)
MONOCYTES # BLD AUTO: 0.96 10*3/MM3 (ref 0.1–0.9)
MONOCYTES NFR BLD AUTO: 9.6 % (ref 5–12)
NEUTROPHILS NFR BLD AUTO: 6.45 10*3/MM3 (ref 1.7–7)
NEUTROPHILS NFR BLD AUTO: 64.7 % (ref 42.7–76)
NRBC BLD AUTO-RTO: 0 /100 WBC (ref 0–0.2)
NT-PROBNP SERPL-MCNC: 1259 PG/ML (ref 0–1800)
PHOSPHATE SERPL-MCNC: 3.9 MG/DL (ref 2.5–4.5)
PLATELET # BLD AUTO: 362 10*3/MM3 (ref 140–450)
PMV BLD AUTO: 9 FL (ref 6–12)
POTASSIUM SERPL-SCNC: 4.2 MMOL/L (ref 3.5–5.2)
PROT SERPL-MCNC: 7.4 G/DL (ref 6–8.5)
RBC # BLD AUTO: 3.67 10*6/MM3 (ref 4.14–5.8)
SODIUM SERPL-SCNC: 138 MMOL/L (ref 136–145)
WBC NRBC COR # BLD AUTO: 9.98 10*3/MM3 (ref 3.4–10.8)
WHOLE BLOOD HOLD SPECIMEN: NORMAL

## 2024-05-06 PROCEDURE — 83880 ASSAY OF NATRIURETIC PEPTIDE: CPT

## 2024-05-06 PROCEDURE — 80053 COMPREHEN METABOLIC PANEL: CPT

## 2024-05-06 PROCEDURE — 85025 COMPLETE CBC W/AUTO DIFF WBC: CPT

## 2024-05-06 PROCEDURE — 36415 COLL VENOUS BLD VENIPUNCTURE: CPT

## 2024-05-06 PROCEDURE — 83735 ASSAY OF MAGNESIUM: CPT

## 2024-05-06 PROCEDURE — 84100 ASSAY OF PHOSPHORUS: CPT

## 2024-05-06 RX ORDER — BUPROPION HYDROCHLORIDE 150 MG/1
150 TABLET ORAL DAILY
COMMUNITY

## 2024-05-06 RX ORDER — CARVEDILOL 6.25 MG/1
6.25 TABLET ORAL 2 TIMES DAILY
Qty: 180 TABLET | Refills: 3 | Status: SHIPPED | OUTPATIENT
Start: 2024-05-06

## 2024-05-08 ENCOUNTER — TELEPHONE (OUTPATIENT)
Dept: INTERNAL MEDICINE | Facility: CLINIC | Age: 79
End: 2024-05-08
Payer: MEDICARE

## 2024-05-08 NOTE — TELEPHONE ENCOUNTER
Caller: MONICA MENEZES- [facility]    Relationship:     Best call back number: [phone]    What is the best time to reach you: ANYTIME     Who are you requesting to speak with (clinical staff, provider,  specific staff member): CLINICAL     What was the call regarding: [facility] IS CALLING REQUESTING VERBAL ORDERS, FOR ONCE A WEEK FOR SIX WEEKS EFFECTIVE 05/08/2024.

## 2024-05-10 ENCOUNTER — OUTSIDE FACILITY SERVICE (OUTPATIENT)
Dept: INTERNAL MEDICINE | Facility: CLINIC | Age: 79
End: 2024-05-10
Payer: MEDICARE

## 2024-05-21 NOTE — TELEPHONE ENCOUNTER
action grid or workflows.    Caller: DANIELLE ALVES    Relationship: Emergency Contact    Best call back number: [phone]    Requested Prescriptions:   Requested Prescriptions     Pending Prescriptions Disp Refills    aspirin EC 81 MG EC tablet       Sig: Take 1 tablet by mouth Daily.    Miconazole Nitrate 2 % aerosol powder 113 g 1     Sig: Apply 1 Application topically 2 (Two) Times a Day As Needed (rash).        Pharmacy where request should be sent: [facility] [phone] [place], [place] [phone]      Last office visit with prescribing clinician: 5/1/2024   Last telemedicine visit with prescribing clinician: Visit date not found   Next office visit with prescribing clinician: 7/10/2024     Additional details provided by patient:      Does the patient have less than a 3 day supply:  [] Yes  [x] No    Would you like a call back once the refill request has been completed: [] Yes [x] No    If the office needs to give you a call back, can they leave a voicemail: [] Yes [x] No    Neto Cassidy Rep   05/21/24 16:39 EDT         DELETE AFTER READING TO PATIENT: “Thank you for sharing this information with me. I will send a message to the clinical team. Please allow 48 hours for the clinical staff to follow up on this request.”

## 2024-05-22 RX ORDER — ASPIRIN 81 MG/1
81 TABLET ORAL DAILY
Qty: 90 TABLET | Refills: 1 | Status: SHIPPED | OUTPATIENT
Start: 2024-05-22

## 2024-05-28 ENCOUNTER — TELEPHONE (OUTPATIENT)
Dept: INTERNAL MEDICINE | Facility: CLINIC | Age: 79
End: 2024-05-28
Payer: MEDICARE

## 2024-05-28 ENCOUNTER — TELEPHONE (OUTPATIENT)
Dept: CARDIOLOGY | Facility: CLINIC | Age: 79
End: 2024-05-28

## 2024-05-31 ENCOUNTER — OFFICE VISIT (OUTPATIENT)
Dept: CARDIOLOGY | Facility: CLINIC | Age: 79
End: 2024-05-31
Payer: MEDICARE

## 2024-05-31 ENCOUNTER — LAB (OUTPATIENT)
Dept: LAB | Facility: HOSPITAL | Age: 79
End: 2024-05-31
Payer: MEDICARE

## 2024-05-31 VITALS
DIASTOLIC BLOOD PRESSURE: 76 MMHG | HEIGHT: 67 IN | SYSTOLIC BLOOD PRESSURE: 134 MMHG | HEART RATE: 81 BPM | WEIGHT: 193 LBS | BODY MASS INDEX: 30.29 KG/M2

## 2024-05-31 DIAGNOSIS — I10 PRIMARY HYPERTENSION: ICD-10-CM

## 2024-05-31 DIAGNOSIS — I48.0 PAROXYSMAL ATRIAL FIBRILLATION: ICD-10-CM

## 2024-05-31 DIAGNOSIS — I50.32 CHRONIC HEART FAILURE WITH PRESERVED EJECTION FRACTION: Primary | ICD-10-CM

## 2024-05-31 DIAGNOSIS — E78.2 MIXED HYPERLIPIDEMIA: ICD-10-CM

## 2024-05-31 DIAGNOSIS — I50.32 CHRONIC HEART FAILURE WITH PRESERVED EJECTION FRACTION: ICD-10-CM

## 2024-05-31 LAB
ALBUMIN SERPL-MCNC: 4.1 G/DL (ref 3.5–5.2)
ALBUMIN/GLOB SERPL: 1.2 G/DL
ALP SERPL-CCNC: 63 U/L (ref 39–117)
ALT SERPL W P-5'-P-CCNC: 34 U/L (ref 1–41)
ANION GAP SERPL CALCULATED.3IONS-SCNC: 11.1 MMOL/L (ref 5–15)
AST SERPL-CCNC: 29 U/L (ref 1–40)
BASOPHILS # BLD AUTO: 0.03 10*3/MM3 (ref 0–0.2)
BASOPHILS NFR BLD AUTO: 0.4 % (ref 0–1.5)
BILIRUB SERPL-MCNC: 0.4 MG/DL (ref 0–1.2)
BUN SERPL-MCNC: 14 MG/DL (ref 8–23)
BUN/CREAT SERPL: 15.2 (ref 7–25)
CALCIUM SPEC-SCNC: 9.2 MG/DL (ref 8.6–10.5)
CHLORIDE SERPL-SCNC: 98 MMOL/L (ref 98–107)
CO2 SERPL-SCNC: 25.9 MMOL/L (ref 22–29)
CREAT SERPL-MCNC: 0.92 MG/DL (ref 0.76–1.27)
DEPRECATED RDW RBC AUTO: 43.3 FL (ref 37–54)
EGFRCR SERPLBLD CKD-EPI 2021: 85.7 ML/MIN/1.73
EOSINOPHIL # BLD AUTO: 0.15 10*3/MM3 (ref 0–0.4)
EOSINOPHIL NFR BLD AUTO: 2 % (ref 0.3–6.2)
ERYTHROCYTE [DISTWIDTH] IN BLOOD BY AUTOMATED COUNT: 13 % (ref 12.3–15.4)
GLOBULIN UR ELPH-MCNC: 3.4 GM/DL
GLUCOSE SERPL-MCNC: 317 MG/DL (ref 65–99)
HCT VFR BLD AUTO: 34.8 % (ref 37.5–51)
HGB BLD-MCNC: 11.5 G/DL (ref 13–17.7)
IMM GRANULOCYTES # BLD AUTO: 0.02 10*3/MM3 (ref 0–0.05)
IMM GRANULOCYTES NFR BLD AUTO: 0.3 % (ref 0–0.5)
LYMPHOCYTES # BLD AUTO: 1.99 10*3/MM3 (ref 0.7–3.1)
LYMPHOCYTES NFR BLD AUTO: 25.9 % (ref 19.6–45.3)
MAGNESIUM SERPL-MCNC: 1.7 MG/DL (ref 1.6–2.4)
MCH RBC QN AUTO: 30.2 PG (ref 26.6–33)
MCHC RBC AUTO-ENTMCNC: 33 G/DL (ref 31.5–35.7)
MCV RBC AUTO: 91.3 FL (ref 79–97)
MONOCYTES # BLD AUTO: 0.77 10*3/MM3 (ref 0.1–0.9)
MONOCYTES NFR BLD AUTO: 10 % (ref 5–12)
NEUTROPHILS NFR BLD AUTO: 4.72 10*3/MM3 (ref 1.7–7)
NEUTROPHILS NFR BLD AUTO: 61.4 % (ref 42.7–76)
NRBC BLD AUTO-RTO: 0 /100 WBC (ref 0–0.2)
NT-PROBNP SERPL-MCNC: 770 PG/ML (ref 0–1800)
PLATELET # BLD AUTO: 213 10*3/MM3 (ref 140–450)
PMV BLD AUTO: 10.5 FL (ref 6–12)
POTASSIUM SERPL-SCNC: 5.3 MMOL/L (ref 3.5–5.2)
PROT SERPL-MCNC: 7.5 G/DL (ref 6–8.5)
RBC # BLD AUTO: 3.81 10*6/MM3 (ref 4.14–5.8)
SODIUM SERPL-SCNC: 135 MMOL/L (ref 136–145)
WBC NRBC COR # BLD AUTO: 7.68 10*3/MM3 (ref 3.4–10.8)

## 2024-05-31 PROCEDURE — 36415 COLL VENOUS BLD VENIPUNCTURE: CPT

## 2024-05-31 PROCEDURE — 83735 ASSAY OF MAGNESIUM: CPT

## 2024-05-31 PROCEDURE — 3075F SYST BP GE 130 - 139MM HG: CPT

## 2024-05-31 PROCEDURE — 3078F DIAST BP <80 MM HG: CPT

## 2024-05-31 PROCEDURE — 80053 COMPREHEN METABOLIC PANEL: CPT

## 2024-05-31 PROCEDURE — 85025 COMPLETE CBC W/AUTO DIFF WBC: CPT

## 2024-05-31 PROCEDURE — 1159F MED LIST DOCD IN RCRD: CPT

## 2024-05-31 PROCEDURE — 99214 OFFICE O/P EST MOD 30 MIN: CPT

## 2024-05-31 PROCEDURE — 83880 ASSAY OF NATRIURETIC PEPTIDE: CPT

## 2024-05-31 PROCEDURE — 1160F RVW MEDS BY RX/DR IN RCRD: CPT

## 2024-05-31 RX ORDER — SACUBITRIL AND VALSARTAN 24; 26 MG/1; MG/1
0.5 TABLET, FILM COATED ORAL 2 TIMES DAILY
Qty: 180 TABLET | Refills: 3 | Status: SHIPPED | OUTPATIENT
Start: 2024-05-31

## 2024-05-31 RX ORDER — CARVEDILOL 12.5 MG/1
12.5 TABLET ORAL 2 TIMES DAILY
Qty: 180 TABLET | Refills: 3 | Status: SHIPPED | OUTPATIENT
Start: 2024-05-31

## 2024-05-31 NOTE — ASSESSMENT & PLAN NOTE
Mr. Donovan has heart failure with reduced ejection fraction who is doing much better.  He has his oxygen with him but has not utilized it during the visit today.  He does not appear to have any pedal edema.  Short of air is much improved.  He is unable to utilize SGLT2 due to ongoing UTIs.  I will make the following changes to optimize his heart failure medications:    1.  Increase carvedilol 6.25 mg take 1 tablet in the morning and 2 at night until the current supply is complete then the new prescription will be for 12.5 mg take 1 tablet twice daily.  2.  Start Entresto 24/26 mg take a half a tablet twice daily.  Hold for systolic less than 100.  3.  Do heart rate blood pressure and daily weight log and bring it to next appointment.  4.  Do blood work today and 1 to 2 days prior to next visit.

## 2024-05-31 NOTE — PATIENT INSTRUCTIONS
1.  Increase carvedilol 6.25 mg take 1 tablet in the morning and 2 at night until the current supply is complete then the new prescription will be for 12.5 mg take 1 tablet twice daily.  2.  Start Entresto 24/26 mg take a half a tablet twice daily.  Hold for systolic less than 100.  3.  Do heart rate blood pressure and daily weight log and bring it to next appointment.  4.  Do blood work today and 1 to 2 days prior to next visit.

## 2024-05-31 NOTE — ASSESSMENT & PLAN NOTE
Mr. Donovan's blood pressure has been stable on the current medication regimen.  Per his significant other systolic generally stays above 100.  I will titrate carvedilol to 12.5 to address an elevated heart rate and trial him on Entresto low-dose half tablet twice daily.  He will keep a heart rate blood pressure and weight log and bring it to his next appointment.  They are to hold Entresto if systolic blood pressure is less than 100 and contact the office.

## 2024-05-31 NOTE — ASSESSMENT & PLAN NOTE
Mr. Donovan has paroxysmal atrial fibrillation.  His heart rate does run a little on the high side.  Continue apixaban for stroke prevention.

## 2024-05-31 NOTE — PROGRESS NOTES
Office Visit    Chief Complaint  Chronic heart failure with preserved ejection fraction    Subjective            Giles Donovan presents to [facility] CARDIOLOGY  History of Present Illness  Mr. Donovan is a 78-year-old male that presented to the office today for follow-up due to heart failure with reduced ejection fraction, hypertension, atrial fibrillation and hyperlipidemia.  He appears to be doing much better today.  Short of air and pedal edema have resolved.  He is tolerating his medications well.  He has heart rate and blood pressure are slightly elevated at today's visit.  He did not bring the home log but significant other states that his heart rate generally runs in the 80s and 90s I will titrate up his carvedilo.  He denies any chest pain, dizziness, orthopnea or syncopal episodes.      Past Medical History:   Diagnosis Date    A-fib     Allergic rhinitis 12/27/2014    Will try Zyrtec, he didnt want to use a nasal spray.    Arthritis     Asthma     Cataract     left eye    CHF (congestive heart failure)     Cough 03/30/2016    PFT and CXR ordered.    Depression     PTSD    Diabetes     Elevated cholesterol     H/O psychiatric care 1999    PTSD    Hyperlipidemia 03/30/2016    Lipids ordered. Continue on current medication.    Hypertension     Hypothyroidism     Seasonal allergies     Shortness of breath     Sleep apnea     Stenosis of right carotid artery 02/19/2017    Will refer to vascular surgeon.    Syncope 02/19/2017    Type 2 diabetes mellitus     Upper respiratory infection 10/18/2015    Will treat cough with Hydromet, otherwise over the counter meds for treatment.       Allergies   Allergen Reactions    Latex Rash and Anaphylaxis    Latex, Natural Rubber Itching        Past Surgical History:   Procedure Laterality Date    CATARACT EXTRACTION Right     x2    COLONOSCOPY      JOINT REPLACEMENT      REPLACEMENT TOTAL HIP ONCOLOGIC Right     RETINAL DETACHMENT REPAIR      TOE  AMPUTATION Left 11/2017    Second phalaeng.    TOE OSTEOPHYTE REMOVAL          Social History     Tobacco Use    Smoking status: Never     Passive exposure: Never    Smokeless tobacco: Never   Vaping Use    Vaping status: Never Used   Substance Use Topics    Alcohol use: Yes     Alcohol/week: 7.0 standard drinks of alcohol     Types: 7 Cans of beer per week     Comment: 1 beer a night    Drug use: Never       Family History   Problem Relation Age of Onset    Heart disease Mother     Lupus Mother     Arthritis Mother     Kidney disease Sister         Prior to Admission medications    Medication Sig Start Date End Date Taking? Authorizing Provider   albuterol (PROVENTIL) (2.5 MG/3ML) 0.083% nebulizer solution Take 2.5 mg by nebulization Every 4 (Four) Hours As Needed for Wheezing or Shortness of Air.   Yes Kierra Pitts MD   apixaban (Eliquis) 5 MG tablet tablet Take 1 tablet by mouth Every 12 (Twelve) Hours.   Yes Kierra Pitts MD   aspirin EC 81 MG EC tablet Take 1 tablet by mouth Daily.  Patient taking differently: Take 1 tablet by mouth Every Other Day. 5/22/24  Yes Morena Masters MD   atorvastatin (LIPITOR) 80 MG tablet Take 1 tablet by mouth Daily. 8/9/22  Yes Cathy Ochoa MD   benzonatate (TESSALON) 100 MG capsule Take 1-2 capsules by mouth 3 (Three) Times a Day As Needed for Cough. 5/1/24  Yes Morena Masters MD   buPROPion XL (WELLBUTRIN XL) 150 MG 24 hr tablet Take 1 tablet by mouth Daily.   Yes Kierra Pitts MD   carvedilol (COREG) 6.25 MG tablet Take 1 tablet by mouth 2 (Two) Times a Day. 5/6/24  Yes Maury Carpio MD   cetirizine (ZyrTEC Allergy) 10 MG tablet Take 1 tablet by mouth Daily. 5/1/24  Yes Morena Masters MD   Cholecalciferol (Vitamin D-3) 25 MCG (1000 UT) capsule Take 1,000 Units by mouth Daily. 5/1/24  Yes Morena Masters MD   ezetimibe (ZETIA) 10 MG tablet Take 1 tablet by mouth Daily.   Yes Kierra Pitts MD   fluticasone-salmeterol  (ADVAIR HFA) 230-21 MCG/ACT inhaler Inhale 2 puffs 2 (Two) Times a Day.   Yes Kierra Pitts MD   gabapentin (NEURONTIN) 600 MG tablet Take 0.5 tablets by mouth Every Night. 3/31/21  Yes Kierra Pitts MD   guaiFENesin (MUCINEX) 600 MG 12 hr tablet Take 2 tablets by mouth 2 (Two) Times a Day As Needed for Cough or Congestion.   Yes Kierra Pitts MD   insulin aspart (novoLOG) 100 UNIT/ML injection Inject 24-26 Units under the skin into the appropriate area as directed 3 (Three) Times a Day Before Meals.   Yes Kierra Pitts MD   Insulin Glargine (Lantus SoloStar) 100 UNIT/ML injection pen Inject 40-44 Units under the skin into the appropriate area as directed Every Night.   Yes Kierra Pitts MD   levothyroxine (SYNTHROID, LEVOTHROID) 100 MCG tablet Take 1 tablet by mouth Daily.   Yes Kierra Pitts MD   metFORMIN (GLUCOPHAGE) 500 MG tablet Take 1 tablet by mouth 2 (Two) Times a Day With Meals.   Yes Kierra Pitts MD   Miconazole Nitrate 2 % aerosol powder Apply 1 Application topically 2 (Two) Times a Day As Needed (rash). 5/22/24  Yes Morena Masters MD   olopatadine (PATANOL) 0.1 % ophthalmic solution Administer 1 drop to both eyes Daily As Needed for Allergies.   Yes Kierra Pitts MD   omeprazole (priLOSEC) 20 MG capsule Take 1 capsule by mouth Daily. 10/9/23  Yes Issac Ortiz MD   PARoxetine (PAXIL) 40 MG tablet Take 1 tablet by mouth Every Evening. 4/5/21  Yes Kierra Pitts MD   Semaglutide, 1 MG/DOSE, (Ozempic, 1 MG/DOSE,) 2 MG/1.5ML solution pen-injector Inject 1 mg under the skin into the appropriate area as directed 1 (One) Time Per Week.  Patient taking differently: Inject 1 mg under the skin into the appropriate area as directed 1 (One) Time Per Week. Sundays 3/20/24  Yes Morena Masters MD   spironolactone (ALDACTONE) 25 MG tablet Take 1 tablet by mouth Daily. 4/2/24  Yes Talia Marcelo APRN   traZODone (DESYREL) 100 MG  "tablet Take 0.5 tablets by mouth At Night As Needed for Sleep. 4/22/22  Yes Provider, MD Kierra   oxybutynin XL (DITROPAN-XL) 10 MG 24 hr tablet Take 1 tablet by mouth Daily.    Provider, MD Kierra        Review of Systems   Constitutional:  Negative for fatigue.   Respiratory:  Negative for cough and shortness of breath.    Cardiovascular:  Negative for chest pain, palpitations and leg swelling.   Neurological:  Negative for dizziness.        Symptom Course: Improved    Weight Trend: Stable     Objective     /76   Pulse 81   Ht 170.2 cm (67.01\")   Wt 87.5 kg (193 lb)   BMI 30.22 kg/m²       Physical Exam  Constitutional:       General: He is awake.      Appearance: Normal appearance.   Neck:      Thyroid: No thyromegaly.      Vascular: No carotid bruit or JVD.   Cardiovascular:      Rate and Rhythm: Normal rate and regular rhythm.      Chest Wall: PMI is not displaced.      Pulses: Normal pulses.      Heart sounds: Normal heart sounds, S1 normal and S2 normal. No murmur heard.     No friction rub. No gallop. No S3 or S4 sounds.   Pulmonary:      Effort: Pulmonary effort is normal.      Breath sounds: Normal breath sounds and air entry. No wheezing, rhonchi or rales.   Abdominal:      General: Bowel sounds are normal.      Palpations: Abdomen is soft. There is no mass.      Tenderness: There is no abdominal tenderness.   Musculoskeletal:      Cervical back: Neck supple.      Right lower leg: No edema.      Left lower leg: No edema.   Neurological:      Mental Status: He is alert and oriented to person, place, and time.   Psychiatric:         Mood and Affect: Mood normal.         Behavior: Behavior is cooperative.           Result Review :                      Lab Results   Component Value Date    PROBNP 1,259.0 05/06/2024    PROBNP 449.3 04/01/2024    PROBNP 733.1 03/13/2024     CMP          4/23/2024    03:16 4/24/2024    03:51 5/6/2024    08:28   CMP   Glucose 155  128  62    BUN 19  18  14  " "  Creatinine 1.00  0.92  0.87    EGFR 77.5  85.7  88.9    Sodium 133  132  138    Potassium 4.3  4.0  4.2    Chloride 99  96  99    Calcium 8.7  8.8  9.5    Total Protein   7.4    Albumin   3.8    Globulin   3.6    Total Bilirubin   0.3    Alkaline Phosphatase   73    AST (SGOT)   40    ALT (SGPT)   32    Albumin/Globulin Ratio   1.1    BUN/Creatinine Ratio 19.0  19.6  16.1    Anion Gap 10.7  11.1  13.6      CBC w/diff          4/24/2024    03:51 4/25/2024    03:11 5/6/2024    08:28   CBC w/Diff   WBC 11.53  9.40  9.98    RBC 3.37  3.52  3.67    Hemoglobin 9.8  10.3  10.9    Hematocrit 30.8  31.7  33.5    MCV 91.4  90.1  91.3    MCH 29.1  29.3  29.7    MCHC 31.8  32.5  32.5    RDW 14.1  14.4  14.3    Platelets 287  303  362    Neutrophil Rel % 74.5  62.4  64.7    Immature Granulocyte Rel % 0.5  0.7  0.3    Lymphocyte Rel % 9.9  17.0  22.4    Monocyte Rel % 13.4  18.5  9.6    Eosinophil Rel % 1.4  1.0  2.1    Basophil Rel % 0.3  0.4  0.9       Lipid Panel          9/17/2023    04:53 12/20/2023    16:34 2/6/2024    11:48   Lipid Panel   Total Cholesterol 92  100  113    Triglycerides 134  86  181    HDL Cholesterol 34  38  42    VLDL Cholesterol 24  17  30    LDL Cholesterol  34  45  41    LDL/HDL Ratio 0.92  1.18  0.83       Lab Results   Component Value Date    TSH 1.780 03/13/2024    TSH 0.652 02/06/2024    TSH 0.653 12/20/2023      Lab Results   Component Value Date    FREET4 1.09 03/13/2024    FREET4 1.09 06/06/2023    FREET4 1.1 12/02/2020      No results found for: \"DDIMERQUANT\"  Magnesium   Date Value Ref Range Status   05/06/2024 1.8 1.6 - 2.4 mg/dL Final      No results found for: \"DIGOXIN\"   A1C Last 3 Results          12/20/2023    16:34 2/6/2024    11:48 4/22/2024    09:34   HGBA1C Last 3 Results   Hemoglobin A1C 8.80  9.80  8.80           Results for orders placed during the hospital encounter of 09/16/23    Adult Transthoracic Echo Complete w/ Color, Spectral and Contrast if necessary per " protocol    Interpretation Summary    Left ventricular systolic function is low normal. Calculated left ventricular EF = 45.1%    The left ventricular cavity is borderline dilated.    Left ventricular diastolic function is consistent with (grade I) impaired relaxation.    There is mild calcification of the aortic valve.        Assessment and Plan        Diagnoses and all orders for this visit:    1. Chronic heart failure with preserved ejection fraction (Primary)  Assessment & Plan:  Mr. Donovan has heart failure with reduced ejection fraction who is doing much better.  He has his oxygen with him but has not utilized it during the visit today.  He does not appear to have any pedal edema.  Short of air is much improved.  He is unable to utilize SGLT2 due to ongoing UTIs.  I will make the following changes to optimize his heart failure medications:    1.  Increase carvedilol 6.25 mg take 1 tablet in the morning and 2 at night until the current supply is complete then the new prescription will be for 12.5 mg take 1 tablet twice daily.  2.  Start Entresto 24/26 mg take a half a tablet twice daily.  Hold for systolic less than 100.  3.  Do heart rate blood pressure and daily weight log and bring it to next appointment.  4.  Do blood work today and 1 to 2 days prior to next visit.    Orders:  -     CBC & Differential; Future  -     Comprehensive Metabolic Panel; Future  -     proBNP; Future  -     Magnesium; Future  -     Lipid Panel; Future  -     Comprehensive Metabolic Panel; Future  -     Magnesium; Future  -     CBC & Differential; Future  -     proBNP; Future    2. Mixed hyperlipidemia  Assessment & Plan:  Mr. Sullivans lipids have been extremely elevated in the past.  He continues to utilize atorvastatin 80 mg and ezetimibe 10 mg daily.  Will check his lipid panel prior to his next visit.      3. Primary hypertension  Assessment & Plan:  Mr. Rich blood pressure has been stable on the current medication regimen.   Per his significant other systolic generally stays above 100.  I will titrate carvedilol to 12.5 to address an elevated heart rate and trial him on Entresto low-dose half tablet twice daily.  He will keep a heart rate blood pressure and weight log and bring it to his next appointment.  They are to hold Entresto if systolic blood pressure is less than 100 and contact the office.      4. Paroxysmal atrial fibrillation  Assessment & Plan:  Mr. Donovan has paroxysmal atrial fibrillation.  His heart rate does run a little on the high side.  Continue apixaban for stroke prevention.      Other orders  -     carvedilol (COREG) 12.5 MG tablet; Take 1 tablet by mouth 2 (Two) Times a Day.  Dispense: 180 tablet; Refill: 3  -     sacubitril-valsartan (Entresto) 24-26 MG tablet; Take 0.5 tablets by mouth 2 (Two) Times a Day.  Dispense: 180 tablet; Refill: 3            Follow Up     Return in about 4 weeks (around 6/28/2024).    Patient was given instructions and counseling regarding his condition or for health maintenance advice. Please see specific information pulled into the AVS if appropriate.     Electronically signed by TENISHA Lopez, 05/31/24, 4:27 PM EDT.

## 2024-05-31 NOTE — ASSESSMENT & PLAN NOTE
Mr. Donovan's lipids have been extremely elevated in the past.  He continues to utilize atorvastatin 80 mg and ezetimibe 10 mg daily.  Will check his lipid panel prior to his next visit.

## 2024-06-03 ENCOUNTER — TELEPHONE (OUTPATIENT)
Dept: INTERNAL MEDICINE | Facility: CLINIC | Age: 79
End: 2024-06-03
Payer: MEDICARE

## 2024-06-03 ENCOUNTER — TELEPHONE (OUTPATIENT)
Dept: CARDIOLOGY | Facility: CLINIC | Age: 79
End: 2024-06-03
Payer: MEDICARE

## 2024-06-03 NOTE — TELEPHONE ENCOUNTER
Caller: Giles Donovan    Relationship: Self    Best call back number: [phone]    What is the best time to reach you: ANYTIME     Who are you requesting to speak with (clinical staff, provider,  specific staff member): CLINICAL     Do you know the name of the person who called: NO    What was the call regarding: MEDICATION CHANGES     Is it okay if the provider responds through MyChart: NO

## 2024-06-03 NOTE — PROGRESS NOTES
Naa with [facility] called to inform of a level 2 drug interaction carvedilol, advair, albuterol, pt has been taking without any problems (FYI).   She was informed of the labs and to inform pt to follow a 1500 ml fluid restriction and to hold the spironolactone on Sundays and to follow a low potassium diet.

## 2024-06-03 NOTE — TELEPHONE ENCOUNTER
Caller: DANIELLE ALVES    Relationship: Emergency Contact    Best call back number: [phone]    What is the best time to reach you: ANY    What was the call regarding: [facility] DOES NOT CARRY MEDICATION     Miconazole Nitrate 2 % aerosol powder   THEY NEED IT PRESCRIBED IN THE CREAM FORM TO FILL IT.

## 2024-06-04 NOTE — TELEPHONE ENCOUNTER
Spoke with Naa [facility] nurse and she was with pt and informed them of fluid restriction and to not take spironolactone on Sundays.

## 2024-06-13 ENCOUNTER — LAB REQUISITION (OUTPATIENT)
Dept: LAB | Facility: HOSPITAL | Age: 79
End: 2024-06-13
Payer: MEDICARE

## 2024-06-13 DIAGNOSIS — I50.9 HEART FAILURE, UNSPECIFIED: ICD-10-CM

## 2024-06-13 DIAGNOSIS — E11.9 TYPE 2 DIABETES MELLITUS WITHOUT COMPLICATIONS: ICD-10-CM

## 2024-06-13 DIAGNOSIS — I10 ESSENTIAL (PRIMARY) HYPERTENSION: ICD-10-CM

## 2024-06-13 DIAGNOSIS — E78.5 HYPERLIPIDEMIA, UNSPECIFIED: ICD-10-CM

## 2024-06-13 LAB
ALBUMIN SERPL-MCNC: 3.8 G/DL (ref 3.5–5.2)
ALBUMIN/GLOB SERPL: 1.2 G/DL
ALP SERPL-CCNC: 53 U/L (ref 39–117)
ALT SERPL W P-5'-P-CCNC: 23 U/L (ref 1–41)
ANION GAP SERPL CALCULATED.3IONS-SCNC: 10 MMOL/L (ref 5–15)
AST SERPL-CCNC: 25 U/L (ref 1–40)
BASOPHILS # BLD AUTO: 0.04 10*3/MM3 (ref 0–0.2)
BASOPHILS NFR BLD AUTO: 0.5 % (ref 0–1.5)
BILIRUB SERPL-MCNC: 0.2 MG/DL (ref 0–1.2)
BUN SERPL-MCNC: 18 MG/DL (ref 8–23)
BUN/CREAT SERPL: 18.8 (ref 7–25)
CALCIUM SPEC-SCNC: 9 MG/DL (ref 8.6–10.5)
CHLORIDE SERPL-SCNC: 101 MMOL/L (ref 98–107)
CHOLEST SERPL-MCNC: 97 MG/DL (ref 0–200)
CO2 SERPL-SCNC: 27 MMOL/L (ref 22–29)
CREAT SERPL-MCNC: 0.96 MG/DL (ref 0.76–1.27)
DEPRECATED RDW RBC AUTO: 46.2 FL (ref 37–54)
EGFRCR SERPLBLD CKD-EPI 2021: 81.4 ML/MIN/1.73
EOSINOPHIL # BLD AUTO: 0.15 10*3/MM3 (ref 0–0.4)
EOSINOPHIL NFR BLD AUTO: 1.9 % (ref 0.3–6.2)
ERYTHROCYTE [DISTWIDTH] IN BLOOD BY AUTOMATED COUNT: 13.7 % (ref 12.3–15.4)
GLOBULIN UR ELPH-MCNC: 3.1 GM/DL
GLUCOSE SERPL-MCNC: 109 MG/DL (ref 65–99)
HCT VFR BLD AUTO: 34.7 % (ref 37.5–51)
HDLC SERPL-MCNC: 42 MG/DL (ref 40–60)
HGB BLD-MCNC: 11.4 G/DL (ref 13–17.7)
IMM GRANULOCYTES # BLD AUTO: 0.03 10*3/MM3 (ref 0–0.05)
IMM GRANULOCYTES NFR BLD AUTO: 0.4 % (ref 0–0.5)
LDLC SERPL CALC-MCNC: 40 MG/DL (ref 0–100)
LDLC/HDLC SERPL: 0.99 {RATIO}
LYMPHOCYTES # BLD AUTO: 2.49 10*3/MM3 (ref 0.7–3.1)
LYMPHOCYTES NFR BLD AUTO: 31.8 % (ref 19.6–45.3)
MAGNESIUM SERPL-MCNC: 1.8 MG/DL (ref 1.6–2.4)
MCH RBC QN AUTO: 30.1 PG (ref 26.6–33)
MCHC RBC AUTO-ENTMCNC: 32.9 G/DL (ref 31.5–35.7)
MCV RBC AUTO: 91.6 FL (ref 79–97)
MONOCYTES # BLD AUTO: 0.87 10*3/MM3 (ref 0.1–0.9)
MONOCYTES NFR BLD AUTO: 11.1 % (ref 5–12)
NEUTROPHILS NFR BLD AUTO: 4.25 10*3/MM3 (ref 1.7–7)
NEUTROPHILS NFR BLD AUTO: 54.3 % (ref 42.7–76)
NRBC BLD AUTO-RTO: 0 /100 WBC (ref 0–0.2)
NT-PROBNP SERPL-MCNC: 251 PG/ML (ref 0–1800)
PLATELET # BLD AUTO: 198 10*3/MM3 (ref 140–450)
PMV BLD AUTO: 10 FL (ref 6–12)
POTASSIUM SERPL-SCNC: 4.8 MMOL/L (ref 3.5–5.2)
PROT SERPL-MCNC: 6.9 G/DL (ref 6–8.5)
RBC # BLD AUTO: 3.79 10*6/MM3 (ref 4.14–5.8)
SODIUM SERPL-SCNC: 138 MMOL/L (ref 136–145)
TRIGL SERPL-MCNC: 68 MG/DL (ref 0–150)
VLDLC SERPL-MCNC: 15 MG/DL (ref 5–40)
WBC NRBC COR # BLD AUTO: 7.83 10*3/MM3 (ref 3.4–10.8)

## 2024-06-13 PROCEDURE — 80061 LIPID PANEL: CPT | Performed by: INTERNAL MEDICINE

## 2024-06-13 PROCEDURE — 83735 ASSAY OF MAGNESIUM: CPT | Performed by: INTERNAL MEDICINE

## 2024-06-13 PROCEDURE — 85025 COMPLETE CBC W/AUTO DIFF WBC: CPT | Performed by: INTERNAL MEDICINE

## 2024-06-13 PROCEDURE — 80053 COMPREHEN METABOLIC PANEL: CPT | Performed by: INTERNAL MEDICINE

## 2024-06-13 PROCEDURE — 83880 ASSAY OF NATRIURETIC PEPTIDE: CPT | Performed by: INTERNAL MEDICINE

## 2024-06-14 ENCOUNTER — TELEPHONE (OUTPATIENT)
Dept: CARDIOLOGY | Facility: CLINIC | Age: 79
End: 2024-06-14
Payer: MEDICARE

## 2024-06-14 NOTE — TELEPHONE ENCOUNTER
Caller: DANIELLE ALVES    Relationship: Emergency Contact    Best call back number: [phone]     What form or medical record are you requesting: NEEDING A LETTER STATING THAT DANIELLE IS THE PT'S PRIMARY CAREGIVER AND THAT SHE NEEDS TO BE EXCUSED FROM [employer] FOR THE MONTH OF AUGUST AND SEPTEMBER.      Who is requesting this form or medical record from you: COURT SYSTEM    How would you like to receive the form or medical records (pick-up, mail, fax): PICKUP  If pick-up, provide patient with address and location details    Timeframe paperwork needed:BEFORE AUGUST    Additional notes:

## 2024-06-18 ENCOUNTER — TELEPHONE (OUTPATIENT)
Dept: INTERNAL MEDICINE | Facility: CLINIC | Age: 79
End: 2024-06-18
Payer: MEDICARE

## 2024-06-18 NOTE — TELEPHONE ENCOUNTER
Hanane , Home Health Nurse called into clinic today to report non- injury fall yesterday, Hanane stated pt was ambulating with Rolator and went to lock the wheels and moved too fast. Pt fell on left hip, pt has no complaints of pain, no bruising,  and the home tiffani nurse stated he seems to be doing ok.

## 2024-06-27 ENCOUNTER — TELEPHONE (OUTPATIENT)
Dept: INTERNAL MEDICINE | Facility: CLINIC | Age: 79
End: 2024-06-27
Payer: MEDICARE

## 2024-06-27 ENCOUNTER — OFFICE VISIT (OUTPATIENT)
Dept: CARDIOLOGY | Facility: CLINIC | Age: 79
End: 2024-06-27
Payer: MEDICARE

## 2024-06-27 VITALS
DIASTOLIC BLOOD PRESSURE: 76 MMHG | SYSTOLIC BLOOD PRESSURE: 118 MMHG | WEIGHT: 190 LBS | BODY MASS INDEX: 29.82 KG/M2 | HEIGHT: 67 IN | HEART RATE: 90 BPM

## 2024-06-27 DIAGNOSIS — I48.0 PAROXYSMAL ATRIAL FIBRILLATION: ICD-10-CM

## 2024-06-27 DIAGNOSIS — E78.2 MIXED HYPERLIPIDEMIA: ICD-10-CM

## 2024-06-27 DIAGNOSIS — I50.32 CHRONIC HEART FAILURE WITH PRESERVED EJECTION FRACTION: Primary | ICD-10-CM

## 2024-06-27 DIAGNOSIS — I10 PRIMARY HYPERTENSION: ICD-10-CM

## 2024-06-27 RX ORDER — SPIRONOLACTONE 25 MG/1
25 TABLET ORAL DAILY
Start: 2024-06-27

## 2024-06-27 RX ORDER — CARVEDILOL 12.5 MG/1
12.5 TABLET ORAL 2 TIMES DAILY
Start: 2024-06-27

## 2024-06-27 RX ORDER — CARVEDILOL 6.25 MG/1
6.25 TABLET ORAL 2 TIMES DAILY WITH MEALS
COMMUNITY
End: 2024-06-27

## 2024-06-27 NOTE — TELEPHONE ENCOUNTER
Received call from JACQUI Lyn with [facility].  She confirmed extension of SKNV x 3 weeks.  They are following patient due to HTN and dizziness.  Patient had recent medication changes per Cardiology and will need monitoring until he stabilizes on those changes.  Thanked her for calling and told her I would put not in for provider.    Tawny Sandoval RN BSN  [facility] [place]

## 2024-06-27 NOTE — PATIENT INSTRUCTIONS
1.  Increase carvedilol 6.25 mg take 2 tablets twice a day until the current supply is complete.  2.  Decrease spironolactone 25 mg do not take on Sunday.  3.  Take Entresto 24/26 mg at half a tablet only if systolic blood pressure is greater than 100.  4.  Do heart rate blood pressure and weight log and bring it to next appointment.  4.  5 do blood work 1 to 2 days prior to next visit.

## 2024-06-27 NOTE — PROGRESS NOTES
Office Visit    Chief Complaint  Chronic heart failure with preserved ejection fraction    Subjective            Giles Donovan presents to [facility] CARDIOLOGY  History of Present Illness  Mr. Donovan is a 78-year-old male that presented to the office today for follow-up due to heart failure with reduced ejection fraction, hypertension, atrial fibrillation and hyperlipidemia.  He appears to be doing much better today.  His short of air and pedal edema have both resolved.  He did have 1 episode of which he had a systolic blood pressure in the 70s but he was not symptomatic.  He had no other episodes of low blood pressure.  Per his home log systolic blood pressure generally runs around 100-1 20, with a heart rate in the 80s.  He did not increase his carvedilol to 2 tablets twice a day.  We will do that at this time.  He denies any chest pain, dizziness, orthopnea or syncopal episodes.      Past Medical History:   Diagnosis Date    A-fib     Allergic rhinitis 12/27/2014    Will try Zyrtec, he didnt want to use a nasal spray.    Arthritis     Asthma     Cataract     left eye    CHF (congestive heart failure)     Cough 03/30/2016    PFT and CXR ordered.    Depression     PTSD    Diabetes     Elevated cholesterol     H/O psychiatric care 1999    PTSD    Hyperlipidemia 03/30/2016    Lipids ordered. Continue on current medication.    Hypertension     Hypothyroidism     Seasonal allergies     Shortness of breath     Sleep apnea     Stenosis of right carotid artery 02/19/2017    Will refer to vascular surgeon.    Syncope 02/19/2017    Type 2 diabetes mellitus     Upper respiratory infection 10/18/2015    Will treat cough with Hydromet, otherwise over the counter meds for treatment.       Allergies   Allergen Reactions    Latex Rash and Anaphylaxis    Latex, Natural Rubber Itching        Past Surgical History:   Procedure Laterality Date    CATARACT EXTRACTION Right     x2    COLONOSCOPY      JOINT  REPLACEMENT      REPLACEMENT TOTAL HIP ONCOLOGIC Right     RETINAL DETACHMENT REPAIR      TOE AMPUTATION Left 11/2017    Second phalaeng.    TOE OSTEOPHYTE REMOVAL          Social History     Tobacco Use    Smoking status: Never     Passive exposure: Never    Smokeless tobacco: Never   Vaping Use    Vaping status: Never Used   Substance Use Topics    Alcohol use: Yes     Alcohol/week: 7.0 standard drinks of alcohol     Types: 7 Cans of beer per week     Comment: 1 beer a night    Drug use: Never       Family History   Problem Relation Age of Onset    Heart disease Mother     Lupus Mother     Arthritis Mother     Kidney disease Sister         Prior to Admission medications    Medication Sig Start Date End Date Taking? Authorizing Provider   albuterol (PROVENTIL) (2.5 MG/3ML) 0.083% nebulizer solution Take 2.5 mg by nebulization Every 4 (Four) Hours As Needed for Wheezing or Shortness of Air.   Yes Kierra Pitts MD   apixaban (Eliquis) 5 MG tablet tablet Take 1 tablet by mouth Every 12 (Twelve) Hours.   Yes Kierra Pitts MD   aspirin EC 81 MG EC tablet Take 1 tablet by mouth Daily.  Patient taking differently: Take 1 tablet by mouth Every Other Day. 5/22/24  Yes Morena Masters MD   atorvastatin (LIPITOR) 80 MG tablet Take 1 tablet by mouth Daily. 8/9/22  Yes Cathy Ochoa MD   benzonatate (TESSALON) 100 MG capsule Take 1-2 capsules by mouth 3 (Three) Times a Day As Needed for Cough. 5/1/24  Yes Morena Masters MD   buPROPion XL (WELLBUTRIN XL) 150 MG 24 hr tablet Take 1 tablet by mouth Daily.   Yes Kierra Pitts MD   carvedilol (COREG) 6.25 MG tablet Take 1 tablet by mouth 2 (Two) Times a Day With Meals. 1 tab in the am and 2 tabs in the pm until supply is complete   Yes Kierra Pitts MD   cetirizine (ZyrTEC Allergy) 10 MG tablet Take 1 tablet by mouth Daily. 5/1/24  Yes Morena Masters MD   Cholecalciferol (Vitamin D-3) 25 MCG (1000 UT) capsule Take 1,000 Units by  mouth Daily. 5/1/24  Yes Morena Masters MD   ezetimibe (ZETIA) 10 MG tablet Take 1 tablet by mouth Daily.   Yes Kierra Pitts MD   fluticasone-salmeterol (ADVAIR HFA) 230-21 MCG/ACT inhaler Inhale 2 puffs 2 (Two) Times a Day.   Yes Kierra Pitts MD   gabapentin (NEURONTIN) 600 MG tablet Take 0.5 tablets by mouth Every Night. 3/31/21  Yes Kierra Pitts MD   guaiFENesin (MUCINEX) 600 MG 12 hr tablet Take 2 tablets by mouth 2 (Two) Times a Day As Needed for Cough or Congestion.   Yes Kierra Pitts MD   insulin aspart (novoLOG) 100 UNIT/ML injection Inject 24-26 Units under the skin into the appropriate area as directed 3 (Three) Times a Day Before Meals.   Yes Kierra Pitts MD   Insulin Glargine (Lantus SoloStar) 100 UNIT/ML injection pen Inject 40-44 Units under the skin into the appropriate area as directed Every Night.   Yes Kierra Pitts MD   levothyroxine (SYNTHROID, LEVOTHROID) 100 MCG tablet Take 1 tablet by mouth Daily.   Yes Kierra Pitts MD   metFORMIN (GLUCOPHAGE) 500 MG tablet Take 1 tablet by mouth 2 (Two) Times a Day With Meals.   Yes Kierra Pitts MD   miconazole (Micatin) 2 % cream Apply 1 Application topically to the appropriate area as directed 2 (Two) Times a Day. 6/4/24  Yes Morena Masters MD   olopatadine (PATANOL) 0.1 % ophthalmic solution Administer 1 drop to both eyes Daily As Needed for Allergies.   Yes Kierra Pitts MD   omeprazole (priLOSEC) 20 MG capsule Take 1 capsule by mouth Daily. 10/9/23  Yes Issac Ortiz MD   PARoxetine (PAXIL) 40 MG tablet Take 1 tablet by mouth Every Evening. 4/5/21  Yes Kierra Pitts MD   sacubitril-valsartan (Entresto) 24-26 MG tablet Take 0.5 tablets by mouth 2 (Two) Times a Day. 5/31/24  Yes Talia Marcelo APRN   Semaglutide, 1 MG/DOSE, (Ozempic, 1 MG/DOSE,) 2 MG/1.5ML solution pen-injector Inject 1 mg under the skin into the appropriate area as directed 1 (One)  "Time Per Week.  Patient taking differently: Inject 1 mg under the skin into the appropriate area as directed 1 (One) Time Per Week. Sundays 3/20/24  Yes Morena Masters MD   spironolactone (ALDACTONE) 25 MG tablet Take 1 tablet by mouth Daily. 4/2/24  Yes Talia Marcelo APRN   traZODone (DESYREL) 100 MG tablet Take 0.5 tablets by mouth At Night As Needed for Sleep. 4/22/22  Yes Provider, MD Kierra   carvedilol (COREG) 12.5 MG tablet Take 1 tablet by mouth 2 (Two) Times a Day.  Patient not taking: Reported on 6/27/2024 5/31/24   Talia Marcelo APRN        Review of Systems   Constitutional:  Negative for fatigue.   Respiratory:  Negative for cough and shortness of breath.    Cardiovascular:  Negative for chest pain, palpitations and leg swelling.   Neurological:  Negative for dizziness.        Symptom Course: Been Stable    Weight Trend: Stable     Objective     /76   Pulse 90   Ht 170.2 cm (67.01\")   Wt 86.2 kg (190 lb)   BMI 29.75 kg/m²       Physical Exam  Constitutional:       General: He is awake.      Appearance: Normal appearance.   Neck:      Thyroid: No thyromegaly.      Vascular: No carotid bruit or JVD.   Cardiovascular:      Rate and Rhythm: Normal rate and regular rhythm.      Chest Wall: PMI is not displaced.      Pulses: Normal pulses.      Heart sounds: Normal heart sounds, S1 normal and S2 normal. No murmur heard.     No friction rub. No gallop. No S3 or S4 sounds.   Pulmonary:      Effort: Pulmonary effort is normal.      Breath sounds: Normal breath sounds and air entry. No wheezing, rhonchi or rales.   Abdominal:      General: Bowel sounds are normal.      Palpations: Abdomen is soft. There is no mass.      Tenderness: There is no abdominal tenderness.   Musculoskeletal:      Cervical back: Neck supple.      Right lower leg: No edema.      Left lower leg: No edema.   Neurological:      Mental Status: He is alert and oriented to person, place, and time.   Psychiatric:       " "  Mood and Affect: Mood normal.         Behavior: Behavior is cooperative.           Result Review :                      Lab Results   Component Value Date    PROBNP 251.0 06/13/2024    PROBNP 770.0 05/31/2024    PROBNP 1,259.0 05/06/2024     CMP          5/6/2024    08:28 5/31/2024    16:46 6/13/2024    10:00   CMP   Glucose 62  317  109    BUN 14  14  18    Creatinine 0.87  0.92  0.96    EGFR 88.9  85.7  81.4    Sodium 138  135  138    Potassium 4.2  5.3  4.8    Chloride 99  98  101    Calcium 9.5  9.2  9.0    Total Protein 7.4  7.5  6.9    Albumin 3.8  4.1  3.8    Globulin 3.6  3.4  3.1    Total Bilirubin 0.3  0.4  0.2    Alkaline Phosphatase 73  63  53    AST (SGOT) 40  29  25    ALT (SGPT) 32  34  23    Albumin/Globulin Ratio 1.1  1.2  1.2    BUN/Creatinine Ratio 16.1  15.2  18.8    Anion Gap 13.6  11.1  10.0      CBC w/diff          5/6/2024    08:28 5/31/2024    16:46 6/13/2024    10:00   CBC w/Diff   WBC 9.98  7.68  7.83    RBC 3.67  3.81  3.79    Hemoglobin 10.9  11.5  11.4    Hematocrit 33.5  34.8  34.7    MCV 91.3  91.3  91.6    MCH 29.7  30.2  30.1    MCHC 32.5  33.0  32.9    RDW 14.3  13.0  13.7    Platelets 362  213  198    Neutrophil Rel % 64.7  61.4  54.3    Immature Granulocyte Rel % 0.3  0.3  0.4    Lymphocyte Rel % 22.4  25.9  31.8    Monocyte Rel % 9.6  10.0  11.1    Eosinophil Rel % 2.1  2.0  1.9    Basophil Rel % 0.9  0.4  0.5       Lipid Panel          12/20/2023    16:34 2/6/2024    11:48 6/13/2024    10:00   Lipid Panel   Total Cholesterol 100  113  97    Triglycerides 86  181  68    HDL Cholesterol 38  42  42    VLDL Cholesterol 17  30  15    LDL Cholesterol  45  41  40    LDL/HDL Ratio 1.18  0.83  0.99       Lab Results   Component Value Date    TSH 1.780 03/13/2024    TSH 0.652 02/06/2024    TSH 0.653 12/20/2023      Lab Results   Component Value Date    FREET4 1.09 03/13/2024    FREET4 1.09 06/06/2023    FREET4 1.1 12/02/2020      No results found for: \"DDIMERQUANT\"  Magnesium   Date " "Value Ref Range Status   06/13/2024 1.8 1.6 - 2.4 mg/dL Final      No results found for: \"DIGOXIN\"   A1C Last 3 Results          12/20/2023    16:34 2/6/2024    11:48 4/22/2024    09:34   HGBA1C Last 3 Results   Hemoglobin A1C 8.80  9.80  8.80           Results for orders placed during the hospital encounter of 09/16/23    Adult Transthoracic Echo Complete w/ Color, Spectral and Contrast if necessary per protocol    Interpretation Summary    Left ventricular systolic function is low normal. Calculated left ventricular EF = 45.1%    The left ventricular cavity is borderline dilated.    Left ventricular diastolic function is consistent with (grade I) impaired relaxation.    There is mild calcification of the aortic valve.        Assessment and Plan        Diagnoses and all orders for this visit:    1. Chronic heart failure with preserved ejection fraction (Primary)  Assessment & Plan:  Mr. Donovan has heart failure with preserved ejection fraction who appears to be doing much better.  He is short of air and pedal edema have both resolved.  He has been able to do much more around the house.  I am unable to increase his Entresto due to an episode of hypotension and borderline pressures.  I will increase the carvedilol 6.25 mg take 2 tablets twice a day.  Patient was previously taking 1 in the morning and 2 at night.  He had not increased to twice daily.  Heart rate is 90 at today's visit.  Will continue spironolactone at the current dose.  He is unable to use SGLT2 due to ongoing urinary tract infections.    1.  Increase carvedilol 6.25 mg take 2 tablets twice a day until the current supply is complete.  2.  Decrease spironolactone 25 mg do not take on Sunday.  3.  Take Entresto 24/26 mg at half a tablet only if systolic blood pressure is greater than 100.  4.  Do heart rate blood pressure and weight log and bring it to next appointment.  4.  5 do blood work 1 to 2 days prior to next visit.    Orders:  -     " Comprehensive Metabolic Panel; Future  -     Magnesium; Future  -     CBC & Differential; Future  -     proBNP; Future    2. Primary hypertension  Assessment & Plan:  Per his home log his systolic blood pressure is generally stable.  He did have 1 episode where his systolic dropped into the 70s at which time it took a while to rebound.  He denies any symptoms associated with that low blood pressure.  He has not had any other episodes over the last month.  He will continue to do a heart rate blood pressure and weight log and bring it to his next appointment.      3. Mixed hyperlipidemia  Assessment & Plan:  Mr. Donovan has hyperlipidemia.  He is on atorvastatin 80 mg and Zetia daily, we will continue the same.      4. Paroxysmal atrial fibrillation  Assessment & Plan:  Mr. Donovan has paroxysmal atrial fibrillation.  Heart rate is not well-controlled.  I will increase the carvedilol to 12.5 mg 1 tablet twice a day.  He will continue apixaban for stroke prevention.    Orders:  -     Comprehensive Metabolic Panel; Future  -     Magnesium; Future  -     CBC & Differential; Future  -     proBNP; Future    Other orders  -     spironolactone (ALDACTONE) 25 MG tablet; Take 1 tablet by mouth Daily. Omit on Sundays  -     carvedilol (COREG) 12.5 MG tablet; Take 1 tablet by mouth 2 (Two) Times a Day.            Follow Up     No follow-ups on file.    Patient was given instructions and counseling regarding his condition or for health maintenance advice. Please see specific information pulled into the AVS if appropriate.     Electronically signed by TENISHA Lopez, 06/28/24, 7:50 AM EDT.

## 2024-06-28 NOTE — ASSESSMENT & PLAN NOTE
Mr. Donovan has hyperlipidemia.  He is on atorvastatin 80 mg and Zetia daily, we will continue the same.

## 2024-06-28 NOTE — ASSESSMENT & PLAN NOTE
Mr. Donovan has heart failure with preserved ejection fraction who appears to be doing much better.  He is short of air and pedal edema have both resolved.  He has been able to do much more around the house.  I am unable to increase his Entresto due to an episode of hypotension and borderline pressures.  I will increase the carvedilol 6.25 mg take 2 tablets twice a day.  Patient was previously taking 1 in the morning and 2 at night.  He had not increased to twice daily.  Heart rate is 90 at today's visit.  Will continue spironolactone at the current dose.  He is unable to use SGLT2 due to ongoing urinary tract infections.    1.  Increase carvedilol 6.25 mg take 2 tablets twice a day until the current supply is complete.  2.  Decrease spironolactone 25 mg do not take on Sunday.  3.  Take Entresto 24/26 mg at half a tablet only if systolic blood pressure is greater than 100.  4.  Do heart rate blood pressure and weight log and bring it to next appointment.  4.  5 do blood work 1 to 2 days prior to next visit.

## 2024-06-28 NOTE — ASSESSMENT & PLAN NOTE
Mr. Donovan has paroxysmal atrial fibrillation.  Heart rate is not well-controlled.  I will increase the carvedilol to 12.5 mg 1 tablet twice a day.  He will continue apixaban for stroke prevention.

## 2024-06-28 NOTE — ASSESSMENT & PLAN NOTE
Per his home log his systolic blood pressure is generally stable.  He did have 1 episode where his systolic dropped into the 70s at which time it took a while to rebound.  He denies any symptoms associated with that low blood pressure.  He has not had any other episodes over the last month.  He will continue to do a heart rate blood pressure and weight log and bring it to his next appointment.

## 2024-07-01 ENCOUNTER — TELEPHONE (OUTPATIENT)
Dept: UROLOGY | Facility: CLINIC | Age: 79
End: 2024-07-01
Payer: MEDICARE

## 2024-07-02 ENCOUNTER — OFFICE VISIT (OUTPATIENT)
Dept: UROLOGY | Facility: CLINIC | Age: 79
End: 2024-07-02
Payer: MEDICARE

## 2024-07-02 VITALS
SYSTOLIC BLOOD PRESSURE: 119 MMHG | HEART RATE: 85 BPM | DIASTOLIC BLOOD PRESSURE: 77 MMHG | BODY MASS INDEX: 29.35 KG/M2 | HEIGHT: 67 IN | RESPIRATION RATE: 14 BRPM | WEIGHT: 187 LBS

## 2024-07-02 DIAGNOSIS — B37.9 CANDIDIASIS: ICD-10-CM

## 2024-07-02 DIAGNOSIS — R35.0 BENIGN PROSTATIC HYPERPLASIA WITH URINARY FREQUENCY: Primary | ICD-10-CM

## 2024-07-02 DIAGNOSIS — N40.1 BENIGN PROSTATIC HYPERPLASIA WITH URINARY FREQUENCY: Primary | ICD-10-CM

## 2024-07-02 PROBLEM — B96.20 E. COLI UTI (URINARY TRACT INFECTION): Status: RESOLVED | Noted: 2024-04-25 | Resolved: 2024-07-02

## 2024-07-02 PROBLEM — R55 SYNCOPE: Status: RESOLVED | Noted: 2017-02-19 | Resolved: 2024-07-02

## 2024-07-02 PROBLEM — N17.9 AKI (ACUTE KIDNEY INJURY): Status: RESOLVED | Noted: 2024-02-19 | Resolved: 2024-07-02

## 2024-07-02 PROBLEM — N45.2 ORCHITIS: Status: RESOLVED | Noted: 2024-04-22 | Resolved: 2024-07-02

## 2024-07-02 PROBLEM — N52.9 ERECTILE DYSFUNCTION: Status: RESOLVED | Noted: 2021-06-14 | Resolved: 2024-07-02

## 2024-07-02 PROBLEM — J84.10 PULMONARY FIBROSIS: Status: ACTIVE | Noted: 2024-07-02

## 2024-07-02 PROBLEM — R55 VASO VAGAL EPISODE: Status: RESOLVED | Noted: 2017-01-23 | Resolved: 2024-07-02

## 2024-07-02 PROBLEM — R09.02 HYPOXIA: Status: RESOLVED | Noted: 2021-12-27 | Resolved: 2024-07-02

## 2024-07-02 PROBLEM — J98.4 RESTRICTIVE LUNG DISEASE: Status: RESOLVED | Noted: 2021-12-29 | Resolved: 2024-07-02

## 2024-07-02 PROBLEM — N39.0 E. COLI UTI (URINARY TRACT INFECTION): Status: RESOLVED | Noted: 2024-04-25 | Resolved: 2024-07-02

## 2024-07-02 PROBLEM — H61.23 IMPACTED CERUMEN, BILATERAL: Status: RESOLVED | Noted: 2023-06-06 | Resolved: 2024-07-02

## 2024-07-02 LAB
BILIRUB BLD-MCNC: NEGATIVE MG/DL
CLARITY, POC: CLEAR
COLOR UR: YELLOW
EXPIRATION DATE: ABNORMAL
GLUCOSE UR STRIP-MCNC: NEGATIVE MG/DL
KETONES UR QL: NEGATIVE
LEUKOCYTE EST, POC: NEGATIVE
Lab: ABNORMAL
NITRITE UR-MCNC: NEGATIVE MG/ML
PH UR: 6.5 [PH] (ref 5–8)
PROT UR STRIP-MCNC: ABNORMAL MG/DL
RBC # UR STRIP: ABNORMAL /UL
SP GR UR: 1.02 (ref 1–1.03)
URINE VOLUME: 28
UROBILINOGEN UR QL: NORMAL

## 2024-07-02 PROCEDURE — 99214 OFFICE O/P EST MOD 30 MIN: CPT | Performed by: NURSE PRACTITIONER

## 2024-07-02 PROCEDURE — 1159F MED LIST DOCD IN RCRD: CPT | Performed by: NURSE PRACTITIONER

## 2024-07-02 PROCEDURE — 81003 URINALYSIS AUTO W/O SCOPE: CPT | Performed by: NURSE PRACTITIONER

## 2024-07-02 PROCEDURE — 1160F RVW MEDS BY RX/DR IN RCRD: CPT | Performed by: NURSE PRACTITIONER

## 2024-07-02 PROCEDURE — 3078F DIAST BP <80 MM HG: CPT | Performed by: NURSE PRACTITIONER

## 2024-07-02 PROCEDURE — 3074F SYST BP LT 130 MM HG: CPT | Performed by: NURSE PRACTITIONER

## 2024-07-02 PROCEDURE — 51798 US URINE CAPACITY MEASURE: CPT | Performed by: NURSE PRACTITIONER

## 2024-07-02 RX ORDER — NYSTATIN AND TRIAMCINOLONE ACETONIDE 100000; 1 [USP'U]/G; MG/G
OINTMENT TOPICAL
Qty: 30 G | Refills: 1 | Status: SHIPPED | OUTPATIENT
Start: 2024-07-02

## 2024-07-02 RX ORDER — FINASTERIDE 5 MG/1
5 TABLET, FILM COATED ORAL DAILY
Qty: 90 TABLET | Refills: 3 | Status: SHIPPED | OUTPATIENT
Start: 2024-07-02

## 2024-07-02 NOTE — PROGRESS NOTES
Chief Complaint: Follow-up (Pt here for follow up.  Pt states he is not taking Tamsulosin and Oxybutynin.  Pt has a urinal at bedside.  He gets up 2-3 times at night.)    Subjective         History of Present Illness  Giles Donovan is a 78 y.o. male presents to [facility] to be seen for annual f/u BPH.    Patient was admitted to the hospital on 2/19/2024 after presenting to the emergency department for a fall.  The patient had been experiencing more falls and evaluation in the emergency department revealed elevated creatinine and hyperglycemia.  During the hospitalization the patient did have issues with urinary retention and oxybutynin was discontinued at time of discharge.    Admitted to the hospital in April 2024 for orchitis and E. coli UTI.  Patient had presented to the emergency room for testicular pain after he had fallen and injured his testicle.  Patient had a testicular scrotal ultrasound which showed left testicular epididymitis and orchitis.  Moderate left hydrocele.    CT of the abdomen revealed age-indeterminate infectious/inflammatory cystitis.  Diffuse prostatomegaly.  UA revealed cloudiness with specific gravity greater than 1.030 15 mg/dL of ketones moderate blood small leukocyte Estrace to many white blood cells to count.  Urine culture was positive for E. coli.    Patient was given IV and oral antibiotics with good discharged home.    He was discharged home on tamsulosin and finasteride.  Of note he is no longer taking these medications.    Patient is still getting up 2-3 times a night however he has a urinal at bedside that he utilizes.    Can have a weak stream at times.    Denies any straining does have hesitancy.    He states he has a rash between thigh and stomach due to moisture in his brief.      Previous:    States some urgency.    He reports a good stream.     Denies bothersome straining or hesitancy.     Nocturia x 2-3     He wears depends.     No GH/UTI      Previous:  8/21 cystoscopy-3 cm prostate, no median lobe, mild trabeculations.  Negative otherwise     3 beers daily.     No  Burning/ dysuria/ GH     No cardiopulmonary history.  Patient does not smoke. ASA 81/meloxicam.  Diabetes mellitus on insulin     Patient has never got a prescription for tadalafil yet     Sildenafil 100 mg -did not help     No urologic family history,   Has never had any urologic surgery.        PVR     3/22   109  9/21   167  5/21   78     9/20  GFR > 60      PSA        2/19 0.92    Objective     Past Medical History:   Diagnosis Date    A-fib     Allergic rhinitis 12/27/2014    Will try Zyrtec, he didnt want to use a nasal spray.    Arthritis     Asthma     Cataract     left eye    CHF (congestive heart failure)     Cough 03/30/2016    PFT and CXR ordered.    Depression     PTSD    Diabetes     Elevated cholesterol     H/O psychiatric care 1999    PTSD    Hyperlipidemia 03/30/2016    Lipids ordered. Continue on current medication.    Hypertension     Hypothyroidism     Seasonal allergies     Shortness of breath     Sleep apnea     Stenosis of right carotid artery 02/19/2017    Will refer to vascular surgeon.    Syncope 02/19/2017    Type 2 diabetes mellitus     Upper respiratory infection 10/18/2015    Will treat cough with Hydromet, otherwise over the counter meds for treatment.       Past Surgical History:   Procedure Laterality Date    CATARACT EXTRACTION Right     x2    COLONOSCOPY      JOINT REPLACEMENT      REPLACEMENT TOTAL HIP ONCOLOGIC Right     RETINAL DETACHMENT REPAIR      TOE AMPUTATION Left 11/2017    Second phalaeng.    TOE OSTEOPHYTE REMOVAL           Current Outpatient Medications:     albuterol (PROVENTIL) (2.5 MG/3ML) 0.083% nebulizer solution, Take 2.5 mg by nebulization Every 4 (Four) Hours As Needed for Wheezing or Shortness of Air., Disp: , Rfl:     apixaban (Eliquis) 5 MG tablet tablet, Take 1 tablet by mouth Every 12 (Twelve) Hours., Disp: , Rfl:     aspirin EC  81 MG EC tablet, Take 1 tablet by mouth Daily. (Patient taking differently: Take 1 tablet by mouth Every Other Day.), Disp: 90 tablet, Rfl: 1    atorvastatin (LIPITOR) 80 MG tablet, Take 1 tablet by mouth Daily., Disp: 90 tablet, Rfl: 1    benzonatate (TESSALON) 100 MG capsule, Take 1-2 capsules by mouth 3 (Three) Times a Day As Needed for Cough., Disp: 90 capsule, Rfl: 1    buPROPion XL (WELLBUTRIN XL) 150 MG 24 hr tablet, Take 1 tablet by mouth Daily., Disp: , Rfl:     carvedilol (COREG) 12.5 MG tablet, Take 1 tablet by mouth 2 (Two) Times a Day., Disp: , Rfl:     cetirizine (ZyrTEC Allergy) 10 MG tablet, Take 1 tablet by mouth Daily., Disp: 90 tablet, Rfl: 3    Cholecalciferol (Vitamin D-3) 25 MCG (1000 UT) capsule, Take 1,000 Units by mouth Daily., Disp: 60 capsule, Rfl: 1    ezetimibe (ZETIA) 10 MG tablet, Take 1 tablet by mouth Daily., Disp: , Rfl:     fluticasone-salmeterol (ADVAIR HFA) 230-21 MCG/ACT inhaler, Inhale 2 puffs 2 (Two) Times a Day., Disp: , Rfl:     gabapentin (NEURONTIN) 600 MG tablet, Take 0.5 tablets by mouth Every Night., Disp: , Rfl:     guaiFENesin (MUCINEX) 600 MG 12 hr tablet, Take 2 tablets by mouth 2 (Two) Times a Day As Needed for Cough or Congestion., Disp: , Rfl:     insulin aspart (novoLOG) 100 UNIT/ML injection, Inject 24-26 Units under the skin into the appropriate area as directed 3 (Three) Times a Day Before Meals., Disp: , Rfl:     Insulin Glargine (Lantus SoloStar) 100 UNIT/ML injection pen, Inject 40-44 Units under the skin into the appropriate area as directed Every Night., Disp: , Rfl:     levothyroxine (SYNTHROID, LEVOTHROID) 100 MCG tablet, Take 1 tablet by mouth Daily., Disp: , Rfl:     metFORMIN (GLUCOPHAGE) 500 MG tablet, Take 1 tablet by mouth 2 (Two) Times a Day With Meals., Disp: , Rfl:     miconazole (Micatin) 2 % cream, Apply 1 Application topically to the appropriate area as directed 2 (Two) Times a Day., Disp: 35 g, Rfl: 1    olopatadine (PATANOL) 0.1 %  "ophthalmic solution, Administer 1 drop to both eyes Daily As Needed for Allergies., Disp: , Rfl:     omeprazole (priLOSEC) 20 MG capsule, Take 1 capsule by mouth Daily., Disp: 90 capsule, Rfl: 4    PARoxetine (PAXIL) 40 MG tablet, Take 1 tablet by mouth Every Evening., Disp: , Rfl:     sacubitril-valsartan (Entresto) 24-26 MG tablet, Take 0.5 tablets by mouth 2 (Two) Times a Day., Disp: 180 tablet, Rfl: 3    Semaglutide, 1 MG/DOSE, (Ozempic, 1 MG/DOSE,) 2 MG/1.5ML solution pen-injector, Inject 1 mg under the skin into the appropriate area as directed 1 (One) Time Per Week. (Patient taking differently: Inject 1 mg under the skin into the appropriate area as directed 1 (One) Time Per Week. Sundays), Disp: 9 mL, Rfl: 2    spironolactone (ALDACTONE) 25 MG tablet, Take 1 tablet by mouth Daily. Omit on Sundays, Disp: , Rfl:     traZODone (DESYREL) 100 MG tablet, Take 0.5 tablets by mouth At Night As Needed for Sleep., Disp: , Rfl:     finasteride (PROSCAR) 5 MG tablet, Take 1 tablet by mouth Daily., Disp: 90 tablet, Rfl: 3    nystatin-triamcinolone (MYCOLOG) 832834-9.1 UNIT/GM-% ointment, Apply to affected area 2 times daily, Disp: 30 g, Rfl: 1    Allergies   Allergen Reactions    Latex Rash and Anaphylaxis    Latex, Natural Rubber Itching        Family History   Problem Relation Age of Onset    Heart disease Mother     Lupus Mother     Arthritis Mother     Kidney disease Sister        Social History     Socioeconomic History    Marital status: Single   Tobacco Use    Smoking status: Never     Passive exposure: Never    Smokeless tobacco: Never   Vaping Use    Vaping status: Never Used   Substance and Sexual Activity    Alcohol use: Yes     Alcohol/week: 7.0 standard drinks of alcohol     Types: 7 Cans of beer per week     Comment: 1 beer a night    Drug use: Never    Sexual activity: Defer       Vital Signs:   /77 (BP Location: Right arm, Patient Position: Lying)   Pulse 85   Resp 14   Ht 170.2 cm (67.01\")   Wt " 84.8 kg (187 lb)   BMI 29.28 kg/m²      Physical Exam     Result Review :   The following data was reviewed by: TENISHA Stack on 07/2/2024:  Results for orders placed or performed in visit on 07/02/24   Bladder Scan   Result Value Ref Range    Urine Volume 28    POC Urinalysis Dipstick, Automated    Specimen: Urine   Result Value Ref Range    Color Yellow Yellow, Straw, Dark Yellow, Ashley    Clarity, UA Clear Clear    Specific Gravity  1.020 1.005 - 1.030    pH, Urine 6.5 5.0 - 8.0    Leukocytes Negative Negative    Nitrite, UA Negative Negative    Protein, POC 30 mg/dL (A) Negative mg/dL    Glucose, UA Negative Negative mg/dL    Ketones, UA Negative Negative    Urobilinogen, UA Normal Normal, 0.2 E.U./dL    Bilirubin Negative Negative    Blood, UA Trace (A) Negative    Lot Number [id number]     Expiration Date 2,025/5        Bladder Scan interpretation 07/2/2024    Estimation of residual urine via BVI 3000 Verathon Bladder Scan  MA/nurse performing: wilian gross Ma   Residual Urine: 28 ml  Indication: Benign prostatic hyperplasia with urinary frequency    Candidiasis   Position: Supine  Examination: Incremental scanning of the suprapubic area using 2.0 MHz transducer using copious amounts of acoustic gel.   Findings: An anechoic area was demonstrated which represented the bladder, with measurement of residual urine as noted. I inspected this myself. In that the residual urine was stable or insignificant, refer to plan for treatment and plan necessary at this time.           Procedures        Assessment and Plan    Diagnoses and all orders for this visit:    1. Benign prostatic hyperplasia with urinary frequency (Primary)  -     POC Urinalysis Dipstick, Automated  -     Bladder Scan  -     finasteride (PROSCAR) 5 MG tablet; Take 1 tablet by mouth Daily.  Dispense: 90 tablet; Refill: 3    2. Candidiasis  -     nystatin-triamcinolone (MYCOLOG) 651910-0.1 UNIT/GM-% ointment; Apply to affected area 2 times daily   Dispense: 30 g; Refill: 1      Urinalysis today without any signs of infection.    Given UTI within the last year do recommend at least starting him back on finasteride especially given lower urinary tract symptoms.    Will continue to keep him off of tamsulosin given issues with falls earlier this year.    Will also not recommend placing him back on oxybutynin given anticholinergic side effects.        I spent 10 minutes caring for Giles on this date of service. This time includes time spent by me in the following activities:reviewing tests, obtaining and/or reviewing a separately obtained history, performing a medically appropriate examination and/or evaluation , counseling and educating the patient/family/caregiver, ordering medications, tests, or procedures, and documenting information in the medical record  Follow Up   Return in about 6 months (around 1/2/2025) for Follow-up BPH with [facility].  Patient was given instructions and counseling regarding his condition or for health maintenance advice. Please see specific information pulled into the AVS if appropriate.         This document has been electronically signed by TENISHA Stack  July 2, 2024 14:25 EDT

## 2024-07-09 ENCOUNTER — OUTSIDE FACILITY SERVICE (OUTPATIENT)
Dept: INTERNAL MEDICINE | Facility: CLINIC | Age: 79
End: 2024-07-09
Payer: MEDICARE

## 2024-07-10 ENCOUNTER — OFFICE VISIT (OUTPATIENT)
Dept: INTERNAL MEDICINE | Facility: CLINIC | Age: 79
End: 2024-07-10
Payer: MEDICARE

## 2024-07-10 VITALS
HEIGHT: 67 IN | WEIGHT: 192.4 LBS | OXYGEN SATURATION: 98 % | DIASTOLIC BLOOD PRESSURE: 62 MMHG | HEART RATE: 78 BPM | BODY MASS INDEX: 30.2 KG/M2 | RESPIRATION RATE: 20 BRPM | TEMPERATURE: 98.7 F | SYSTOLIC BLOOD PRESSURE: 122 MMHG

## 2024-07-10 DIAGNOSIS — H53.8 BLURRED VISION: ICD-10-CM

## 2024-07-10 DIAGNOSIS — I10 PRIMARY HYPERTENSION: ICD-10-CM

## 2024-07-10 DIAGNOSIS — E78.2 MIXED HYPERLIPIDEMIA: ICD-10-CM

## 2024-07-10 DIAGNOSIS — E11.49 TYPE 2 DIABETES MELLITUS WITH OTHER DIABETIC NEUROLOGICAL COMPLICATION: ICD-10-CM

## 2024-07-10 DIAGNOSIS — Z00.00 ENCOUNTER FOR ANNUAL WELLNESS VISIT (AWV) IN MEDICARE PATIENT: ICD-10-CM

## 2024-07-10 DIAGNOSIS — G56.00 PARTIAL THENAR ATROPHY, UNSPECIFIED LATERALITY: ICD-10-CM

## 2024-07-10 DIAGNOSIS — E11.9 CONTROLLED TYPE 2 DIABETES MELLITUS WITHOUT COMPLICATION, WITHOUT LONG-TERM CURRENT USE OF INSULIN: Primary | ICD-10-CM

## 2024-07-10 NOTE — PROGRESS NOTES
The ABCs of the Annual Wellness Visit  Subsequent Medicare Wellness Visit    Subjective      Giles Donovan is a 78 y.o. male who presents for a Subsequent Medicare Wellness Visit.    The following portions of the patient's history were reviewed and   updated as appropriate: allergies, current medications, past family history, past medical history, past social history, past surgical history, and problem list.    Compared to one year ago, the patient feels his physical   health is better.    Compared to one year ago, the patient feels his mental   health is better.    Recent Hospitalizations:  This patient has had a [facility] admission record on file within the last 365 days.    Current Medical Providers:  Patient Care Team:  Morena Masters MD as PCP - General (Internal Medicine)  Issac Ortiz MD as Consulting Physician (Urology)  Herb Ochoa DO as Consulting Physician (Pulmonary Disease)  Maury Carpio MD as Consulting Physician (Cardiology)    Outpatient Medications Prior to Visit   Medication Sig Dispense Refill    albuterol (PROVENTIL) (2.5 MG/3ML) 0.083% nebulizer solution Take 2.5 mg by nebulization Every 4 (Four) Hours As Needed for Wheezing or Shortness of Air.      apixaban (Eliquis) 5 MG tablet tablet Take 1 tablet by mouth Every 12 (Twelve) Hours.      aspirin EC 81 MG EC tablet Take 1 tablet by mouth Daily. (Patient taking differently: Take 1 tablet by mouth Every Other Day.) 90 tablet 1    atorvastatin (LIPITOR) 80 MG tablet Take 1 tablet by mouth Daily. 90 tablet 1    benzonatate (TESSALON) 100 MG capsule Take 1-2 capsules by mouth 3 (Three) Times a Day As Needed for Cough. 90 capsule 1    buPROPion XL (WELLBUTRIN XL) 150 MG 24 hr tablet Take 1 tablet by mouth Daily.      cetirizine (ZyrTEC Allergy) 10 MG tablet Take 1 tablet by mouth Daily. 90 tablet 3    Cholecalciferol (Vitamin D-3) 25 MCG (1000 UT) capsule Take 1,000 Units by mouth Daily. 60 capsule 1     ezetimibe (ZETIA) 10 MG tablet Take 1 tablet by mouth Daily.      finasteride (PROSCAR) 5 MG tablet Take 1 tablet by mouth Daily. 90 tablet 3    fluticasone-salmeterol (ADVAIR HFA) 230-21 MCG/ACT inhaler Inhale 2 puffs 2 (Two) Times a Day.      gabapentin (NEURONTIN) 600 MG tablet Take 0.5 tablets by mouth Every Night.      guaiFENesin (MUCINEX) 600 MG 12 hr tablet Take 2 tablets by mouth 2 (Two) Times a Day As Needed for Cough or Congestion.      insulin aspart (novoLOG) 100 UNIT/ML injection Inject 24-26 Units under the skin into the appropriate area as directed 3 (Three) Times a Day Before Meals.      Insulin Glargine (Lantus SoloStar) 100 UNIT/ML injection pen Inject 40-44 Units under the skin into the appropriate area as directed Every Night.      levothyroxine (SYNTHROID, LEVOTHROID) 100 MCG tablet Take 1 tablet by mouth Daily.      metFORMIN (GLUCOPHAGE) 500 MG tablet Take 1 tablet by mouth 2 (Two) Times a Day With Meals.      miconazole (Micatin) 2 % cream Apply 1 Application topically to the appropriate area as directed 2 (Two) Times a Day. 35 g 1    nystatin-triamcinolone (MYCOLOG) 063594-4.1 UNIT/GM-% ointment Apply to affected area 2 times daily 30 g 1    olopatadine (PATANOL) 0.1 % ophthalmic solution Administer 1 drop to both eyes Daily As Needed for Allergies.      omeprazole (priLOSEC) 20 MG capsule Take 1 capsule by mouth Daily. 90 capsule 4    PARoxetine (PAXIL) 40 MG tablet Take 1 tablet by mouth Every Evening.      sacubitril-valsartan (Entresto) 24-26 MG tablet Take 0.5 tablets by mouth 2 (Two) Times a Day. 180 tablet 3    Semaglutide, 1 MG/DOSE, (Ozempic, 1 MG/DOSE,) 2 MG/1.5ML solution pen-injector Inject 1 mg under the skin into the appropriate area as directed 1 (One) Time Per Week. (Patient taking differently: Inject 1 mg under the skin into the appropriate area as directed 1 (One) Time Per Week. Sundays) 9 mL 2    spironolactone (ALDACTONE) 25 MG tablet Take 1 tablet by mouth Daily. Omit  on Sundays      traZODone (DESYREL) 100 MG tablet Take 0.5 tablets by mouth At Night As Needed for Sleep.      carvedilol (COREG) 12.5 MG tablet Take 1 tablet by mouth 2 (Two) Times a Day.       No facility-administered medications prior to visit.       No opioid medication identified on active medication list. I have reviewed chart for other potential  high risk medication/s and harmful drug interactions in the elderly.        Aspirin is on active medication list. Aspirin use is indicated based on review of current medical condition/s. Pros and cons of this therapy have been discussed today. Benefits of this medication outweigh potential harm.  Patient has been encouraged to continue taking this medication.  .      Patient Active Problem List   Diagnosis    Allergic rhinitis    Controlled type 2 diabetes mellitus without complication, without long-term current use of insulin    GERD (gastroesophageal reflux disease)    Hyperlipidemia    Hypertension    Hypothyroidism    Stenosis of right carotid artery    Urge incontinence of urine    Benign prostatic hyperplasia with urinary frequency    Cardiomyopathy    Cataract    Right-sided carotid artery occlusion without cerebral infarction    Depression    Restrictive lung disease    Chronic heart failure with preserved ejection fraction    Carotid artery occlusion    Primary insomnia    Unsteady gait when walking    Orthostatic hypotension    Supplemental oxygen dependent    Paroxysmal atrial fibrillation    Chronic bilateral pleural effusions    Pulmonary fibrosis     Advance Care Planning   Advance Care Planning     Advance Directive is not on file.  ACP discussion was held with the patient during this visit. Patient has an advance directive (not in EMR), copy requested.     Objective    Vitals:    07/10/24 1142   BP: 122/62   BP Location: Right arm   Patient Position: Sitting   Cuff Size: Adult   Pulse: 78   Resp: 20   Temp: 98.7 °F (37.1 °C)   TempSrc: Temporal  "  SpO2: 98%   Weight: 87.3 kg (192 lb 6.4 oz)   Height: 170.2 cm (67.01\")     Estimated body mass index is 30.13 kg/m² as calculated from the following:    Height as of this encounter: 170.2 cm (67.01\").    Weight as of this encounter: 87.3 kg (192 lb 6.4 oz).           Does the patient have evidence of cognitive impairment?   No    Lab Results   Component Value Date    TRIG 68 2024    HDL 42 2024    LDL 40 2024    VLDL 15 2024          HEALTH RISK ASSESSMENT    Smoking Status:  Social History     Tobacco Use   Smoking Status Never    Passive exposure: Never   Smokeless Tobacco Never   Tobacco Comments    Pt stated he smoked a pipe [id number]. Pt stated he smoked a pipe a couple times a day. Pt stated he mixed with tobacco and marijuana     Alcohol Consumption:  Social History     Substance and Sexual Activity   Alcohol Use Yes    Alcohol/week: 7.0 standard drinks of alcohol    Types: 7 Cans of beer per week    Comment: 1 beer a night     Fall Risk Screen:    LIZBETH Fall Risk Assessment was completed, and patient is at HIGH risk for falls. Assessment completed on:7/10/2024    Depression Screenin/1/2024    11:41 AM   PHQ-2/PHQ-9 Depression Screening   Little Interest or Pleasure in Doing Things 1-->several days   Feeling Down, Depressed or Hopeless 0-->not at all   PHQ-9: Brief Depression Severity Measure Score 1       Health Habits and Functional and Cognitive Screenin/10/2024    11:48 AM   Functional & Cognitive Status   Do you have difficulty preparing food and eating? Yes   Do you have difficulty bathing yourself, getting dressed or grooming yourself? Yes   Do you have difficulty using the toilet? No   Do you have difficulty moving around from place to place? Yes   Do you have trouble with steps or getting out of a bed or a chair? Yes   Current Diet Well Balanced Diet   Dental Exam Up to date   Eye Exam Up to date   Exercise (times per week) 2 times per week   Current " Exercises Include Walking   Do you need help using the phone?  No   Are you deaf or do you have serious difficulty hearing?  Yes   Do you need help to go to places out of walking distance? Yes   Do you need help shopping? Yes   Do you need help preparing meals?  Yes   Do you need help with housework?  Yes   Do you need help with laundry? Yes   Do you need help taking your medications? Yes   Do you need help managing money? Yes   Do you ever drive or ride in a car without wearing a seat belt? No   Have you felt unusual stress, anger or loneliness in the last month? Yes   Who do you live with? Other   If you need help, do you have trouble finding someone available to you? No   Have you been bothered in the last four weeks by sexual problems? Yes   Do you have difficulty concentrating, remembering or making decisions? Yes       Age-appropriate Screening Schedule:  Refer to the list below for future screening recommendations based on patient's age, sex and/or medical conditions. Orders for these recommended tests are listed in the plan section. The patient has been provided with a written plan.    Health Maintenance   Topic Date Due    TDAP/TD VACCINES (1 - Tdap) Never done    DIABETIC EYE EXAM  06/01/2023    INFLUENZA VACCINE  08/01/2024    COLORECTAL CANCER SCREENING  10/07/2024    HEMOGLOBIN A1C  10/22/2024    RSV Vaccine - Adults (1 - 1-dose 60+ series) 08/25/2024 (Originally 10/21/2006)    COVID-19 Vaccine (6 - 2023-24 season) 11/01/2024 (Originally 4/20/2024)    ZOSTER VACCINE (1 of 2) 12/20/2024 (Originally 10/21/1996)    URINE MICROALBUMIN  02/06/2025    BMI FOLLOWUP  03/20/2025    LIPID PANEL  06/13/2025    ANNUAL WELLNESS VISIT  07/10/2025    HEPATITIS C SCREENING  Completed    Pneumococcal Vaccine 65+  Completed                  CMS Preventative Services Quick Reference  Risk Factors Identified During Encounter:    None Identified    The above risks/problems have been discussed with the patient.  Pertinent  information has been shared with the patient in the After Visit Summary.    Diagnoses and all orders for this visit:    1. Controlled type 2 diabetes mellitus without complication, without long-term current use of insulin (Primary)  Assessment & Plan:      Orders:  -     Ambulatory Referral to Ophthalmology    2. Mixed hyperlipidemia  Assessment & Plan:         3. Primary hypertension  Assessment & Plan:      Orders:  -     Ambulatory Referral to Ophthalmology    4. Blurred vision  -     Ambulatory Referral to Ophthalmology    5. Partial thenar atrophy, unspecified laterality  -     EMG & Nerve Conduction Test; Future    6. Type 2 diabetes mellitus with other diabetic neurological complication  -     EMG & Nerve Conduction Test; Future        Follow Up:   Next Medicare Wellness visit to be scheduled in 1 year.      An After Visit Summary and PPPS were made available to the patient.

## 2024-07-10 NOTE — ASSESSMENT & PLAN NOTE
The patient's most recent blood work, conducted in 06/2023, yielded satisfactory results. The goal is to maintain his A1c level closer to 180.

## 2024-07-10 NOTE — PROGRESS NOTES
Chief Complaint  Medicare Wellness-subsequent and Diabetes (3 month follow up )      Subjective      History of Present Illness  The patient presents for evaluation of multiple medical concerns. He is accompanied by his wife.    The patient recently consulted with Cardiology, during which his respiratory and leg swelling had shown significant improvement. His carvedilol dosage was increased due to well-controlled blood pressure, which he tolerated well. He denies experiencing any dizziness or lightheadedness.    The patient had a consultation with Urology regarding his testicular issues. He reports no current pain. A bladder scan was performed, and he was prescribed finasteride and a yeast-type medication, both of which have been effective. His urinary function has improved.    The patient's blood glucose levels typically range between 200 and 300, with occasional readings below 200. His blood glucose levels occasionally drop to 79 or 80. His wife monitors his blood glucose levels prior to meals, and if his glucose levels are already elevated, postprandially increases, necessitating the administration of NovoLog and Lantus, both long and short, which aid in morning glucose levels.    The patient experienced a fall approximately a month ago and again approximately a week ago. He experienced leg shaking after removing his walker, leading to balance issues. Physical therapy was discontinued, and he was advised to perform exercises at home.    The patient expresses a desire for a second opinion regarding his eyes. He reports a sensation akin to a fog, which worsens when he removes his glasses.    The patient reports muscle wasting in his hands, particularly when lifting weights. He occasionally experiences a burning sensation in his hands.    He went to ENT and they told him that the only thing they can do is try to get him hearing aid. They told him that it might not work as well. If it did not, they would have to do  "an implant in his left ear. They did a scan on his brain and it was good.         Objective   Vital Signs:   Vitals:    07/10/24 1142   BP: 122/62   BP Location: Right arm   Patient Position: Sitting   Cuff Size: Adult   Pulse: 78   Resp: 20   Temp: 98.7 °F (37.1 °C)   TempSrc: Temporal   SpO2: 98%   Weight: 87.3 kg (192 lb 6.4 oz)   Height: 170.2 cm (67.01\")     Body mass index is 30.13 kg/m².    Wt Readings from Last 3 Encounters:   07/30/24 85.7 kg (189 lb)   07/25/24 89.3 kg (196 lb 12.8 oz)   07/10/24 87.3 kg (192 lb 6.4 oz)     BP Readings from Last 3 Encounters:   07/30/24 100/58   07/25/24 108/55   07/10/24 122/62       Health Maintenance   Topic Date Due    TDAP/TD VACCINES (1 - Tdap) Never done    DIABETIC EYE EXAM  06/01/2023    INFLUENZA VACCINE  08/01/2024    COLORECTAL CANCER SCREENING  10/07/2024    HEMOGLOBIN A1C  10/22/2024    RSV Vaccine - Adults (1 - 1-dose 60+ series) 08/25/2024 (Originally 10/21/2006)    COVID-19 Vaccine (6 - 2023-24 season) 11/01/2024 (Originally 4/20/2024)    ZOSTER VACCINE (1 of 2) 12/20/2024 (Originally 10/21/1996)    URINE MICROALBUMIN  02/06/2025    BMI FOLLOWUP  03/20/2025    LIPID PANEL  06/13/2025    ANNUAL WELLNESS VISIT  07/10/2025    HEPATITIS C SCREENING  Completed    Pneumococcal Vaccine 65+  Completed       Physical Exam  Vitals reviewed.   Constitutional:       Appearance: Normal appearance. He is well-developed.   HENT:      Head: Normocephalic and atraumatic.      Right Ear: External ear normal.      Left Ear: External ear normal.   Eyes:      Conjunctiva/sclera: Conjunctivae normal.      Pupils: Pupils are equal, round, and reactive to light.   Cardiovascular:      Rate and Rhythm: Normal rate and regular rhythm.      Heart sounds: No murmur heard.     No friction rub. No gallop.   Pulmonary:      Effort: Pulmonary effort is normal.      Breath sounds: Normal breath sounds. No wheezing or rhonchi.      Comments: Wears oxygen  Musculoskeletal:      Comments: " Uses walker for ambulation   Skin:     General: Skin is warm and dry.   Neurological:      Mental Status: He is alert and oriented to person, place, and time.   Psychiatric:         Mood and Affect: Affect normal.         Behavior: Behavior normal.         Thought Content: Thought content normal.        Physical Exam        Result Review :  The following data was reviewed by: Morena Masters MD on 07/10/2024:         Results  Laboratory Studies  Blood sugar runs about 200, occasionally up to 300. A1c was 8.8 two months ago.            Procedures            Assessment & Plan  Controlled type 2 diabetes mellitus without complication, without long-term current use of insulin    The patient's most recent blood work, conducted in 06/2023, yielded satisfactory results. The goal is to maintain his A1c level closer to 180.      Mixed hyperlipidemia  Continue monitor labs and adjust as needed  Primary hypertension  Doing well continue current meds  Blurred vision  A referral to an ophthalmologist will be made.  Partial thenar atrophy, unspecified laterality    Muscle testing will be ordered for both arms.  Type 2 diabetes mellitus with other diabetic neurological complication    Encounter for annual wellness visit (AWV) in Medicare patient      Orders Placed This Encounter   Procedures    Ambulatory Referral to Ophthalmology    EMG & Nerve Conduction Test             Assessment & Plan      Patient or patient representative verbalized consent for the use of Ambient Listening during the visit with  Morena Masters MD for chart documentation. 8/3/2024  15:23 EDT      FOLLOW UP  No follow-ups on file.  Patient was given instructions and counseling regarding his condition or for health maintenance advice. Please see specific information pulled into the AVS if appropriate.     Morena Masters MD  08/03/24  15:23 EDT    CURRENT & DISCONTINUED MEDICATIONS  Current Outpatient Medications   Medication Instructions     albuterol (PROVENTIL) 2.5 mg, Nebulization, Every 4 Hours PRN    apixaban (ELIQUIS) 5 mg, Oral, Every 12 Hours Scheduled    aspirin EC 81 mg, Oral, Daily    atorvastatin (LIPITOR) 80 mg, Oral, Daily    benzonatate (TESSALON) 100-200 mg, Oral, 3 Times Daily PRN    buPROPion XL (WELLBUTRIN XL) 150 mg, Oral, Daily    carvedilol (COREG) 18.75 mg, Oral, 2 Times Daily    cetirizine (ZYRTEC ALLERGY) 10 mg, Oral, Daily    ezetimibe (ZETIA) 10 mg, Oral, Daily    finasteride (PROSCAR) 5 mg, Oral, Daily    fluticasone-salmeterol (ADVAIR HFA) 230-21 MCG/ACT inhaler 2 puffs, Inhalation, 2 Times Daily    gabapentin (NEURONTIN) 300 mg, Oral, Nightly    guaiFENesin (MUCINEX) 1,200 mg, Oral, 2 Times Daily PRN    insulin aspart (NOVOLOG) 24-26 Units, Subcutaneous, 3 Times Daily Before Meals    Insulin Glargine (Lantus SoloStar) 100 UNIT/ML injection pen Inject 40-44 Units under the skin into the appropriate area as directed Every Night.    levothyroxine (SYNTHROID, LEVOTHROID) 100 mcg, Oral, Daily    metFORMIN (GLUCOPHAGE) 500 mg, Oral, 2 Times Daily With Meals    miconazole (Micatin) 2 % cream 1 Application, Topical, 2 Times Daily    nystatin-triamcinolone (MYCOLOG) 542825-2.1 UNIT/GM-% ointment Apply to affected area 2 times daily    olopatadine (PATANOL) 0.1 % ophthalmic solution 1 drop, Both Eyes, Daily PRN    omeprazole (PRILOSEC) 20 mg, Oral, Daily    Ozempic (1 MG/DOSE) 1 mg, Subcutaneous, Weekly    PARoxetine (PAXIL) 40 mg, Oral, Every Evening    sacubitril-valsartan (Entresto) 24-26 MG tablet 0.5 tablets, Oral, 2 Times Daily    spironolactone (ALDACTONE) 25 mg, Oral, Daily, Omit on Sundays    traZODone (DESYREL) 50 mg, Oral, Nightly PRN    Vitamin D-3 1,000 Units, Oral, Daily       There are no discontinued medications.

## 2024-07-23 ENCOUNTER — HOSPITAL ENCOUNTER (OUTPATIENT)
Facility: HOSPITAL | Age: 79
Discharge: HOME | End: 2024-07-23
Admitting: INTERNAL MEDICINE
Payer: MEDICARE

## 2024-07-23 DIAGNOSIS — E11.49 TYPE 2 DIABETES MELLITUS WITH OTHER DIABETIC NEUROLOGICAL COMPLICATION: ICD-10-CM

## 2024-07-23 DIAGNOSIS — G56.00 PARTIAL THENAR ATROPHY, UNSPECIFIED LATERALITY: ICD-10-CM

## 2024-07-23 PROCEDURE — 95911 NRV CNDJ TEST 9-10 STUDIES: CPT

## 2024-07-23 PROCEDURE — 95886 MUSC TEST DONE W/N TEST COMP: CPT

## 2024-07-25 ENCOUNTER — OFFICE VISIT (OUTPATIENT)
Dept: PULMONOLOGY | Facility: CLINIC | Age: 79
End: 2024-07-25
Payer: MEDICARE

## 2024-07-25 VITALS
HEIGHT: 67 IN | TEMPERATURE: 97.2 F | BODY MASS INDEX: 30.89 KG/M2 | RESPIRATION RATE: 18 BRPM | OXYGEN SATURATION: 97 % | WEIGHT: 196.8 LBS | DIASTOLIC BLOOD PRESSURE: 55 MMHG | SYSTOLIC BLOOD PRESSURE: 108 MMHG | HEART RATE: 83 BPM

## 2024-07-25 DIAGNOSIS — J98.4 RESTRICTIVE LUNG DISEASE: Primary | ICD-10-CM

## 2024-07-25 PROCEDURE — 1159F MED LIST DOCD IN RCRD: CPT | Performed by: INTERNAL MEDICINE

## 2024-07-25 PROCEDURE — 99214 OFFICE O/P EST MOD 30 MIN: CPT | Performed by: INTERNAL MEDICINE

## 2024-07-25 PROCEDURE — 1160F RVW MEDS BY RX/DR IN RCRD: CPT | Performed by: INTERNAL MEDICINE

## 2024-07-25 PROCEDURE — 3074F SYST BP LT 130 MM HG: CPT | Performed by: INTERNAL MEDICINE

## 2024-07-25 PROCEDURE — 3078F DIAST BP <80 MM HG: CPT | Performed by: INTERNAL MEDICINE

## 2024-07-25 NOTE — PROGRESS NOTES
"Chief Complaint  Pulmonary Fibrosis, Shortness of Breath, Cough, and Follow-up (Pt here for 4 month follow up)    Subjective        Giles Donovan presents to [facility]  History of Present Illness  Male here for follow-up restrictive lung disease  Remains on oxygen  Breathing has improved with diuresis and cardiac medications  Recent imaging shows resolution of previously seen bilateral pleural effusions  Objective   Vital Signs:  /55 (BP Location: Right arm, Patient Position: Sitting, Cuff Size: Adult)   Pulse 83   Temp 97.2 °F (36.2 °C) (Temporal)   Resp 18   Ht 170.2 cm (67.01\")   Wt 89.3 kg (196 lb 12.8 oz)   SpO2 97% Comment: nasal cannula/ 2 L continuous  BMI 30.81 kg/m²   Estimated body mass index is 30.81 kg/m² as calculated from the following:    Height as of this encounter: 170.2 cm (67.01\").    Weight as of this encounter: 89.3 kg (196 lb 12.8 oz).               Physical Exam   Pleasant male  Resting comfortably  On continuous oxygen  Has significant thenar and hypothenar muscle wasting and wasting of the hand muscles in general  Diminished breath sounds both bases but improved aeration from prior exam  Result Review :                     Assessment and Plan     Diagnoses and all orders for this visit:    1. Restrictive lung disease (Primary)  -     Complete PFT - Pre & Post Bronchodilator; Future  -     Blood Gas, Arterial -; Future    Plan  Recent imaging of the abdomen showed complete resolution of previously seen pleural effusions    Patient has significant restrictive lung disease in the past and at that time I was concerned it was likely related to the effusions from his underlying heart failure his heart failure now has been treated adequately and he does feel better and his recent imaging shows resolution of previously seen pleural effusions    Will repeat PFTs at this time to see if the patient still has a restrictive " defect, after reviewing the imaging of the chest she has no evidence of any significant pulmonary fibrosis he does have some very subtle basilar atelectasis but no significant interstitial changes or any UIP changes that would suggest fibrosis, will also obtain arterial blood gas to see if the patient has any evidence of hypercapnia    Continue oxygen at current flow rate    Continue bronchodilator therapies as patient does endorse some improvement while being on these medications       Follow Up     Return in about 3 months (around 10/25/2024).  Patient was given instructions and counseling regarding his condition or for health maintenance advice. Please see specific information pulled into the AVS if appropriate.

## 2024-07-30 ENCOUNTER — LAB (OUTPATIENT)
Dept: LAB | Facility: HOSPITAL | Age: 79
End: 2024-07-30
Payer: MEDICARE

## 2024-07-30 ENCOUNTER — OFFICE VISIT (OUTPATIENT)
Dept: CARDIOLOGY | Facility: CLINIC | Age: 79
End: 2024-07-30
Payer: MEDICARE

## 2024-07-30 VITALS
WEIGHT: 189 LBS | HEIGHT: 67 IN | DIASTOLIC BLOOD PRESSURE: 58 MMHG | HEART RATE: 58 BPM | BODY MASS INDEX: 29.66 KG/M2 | SYSTOLIC BLOOD PRESSURE: 100 MMHG

## 2024-07-30 DIAGNOSIS — I50.32 CHRONIC HEART FAILURE WITH PRESERVED EJECTION FRACTION: ICD-10-CM

## 2024-07-30 DIAGNOSIS — I50.32 CHRONIC HEART FAILURE WITH PRESERVED EJECTION FRACTION: Primary | ICD-10-CM

## 2024-07-30 DIAGNOSIS — J98.4 RESTRICTIVE LUNG DISEASE: ICD-10-CM

## 2024-07-30 DIAGNOSIS — I48.0 PAROXYSMAL ATRIAL FIBRILLATION: ICD-10-CM

## 2024-07-30 DIAGNOSIS — I10 PRIMARY HYPERTENSION: ICD-10-CM

## 2024-07-30 DIAGNOSIS — E78.2 MIXED HYPERLIPIDEMIA: ICD-10-CM

## 2024-07-30 LAB
ALBUMIN SERPL-MCNC: 3.9 G/DL (ref 3.5–5.2)
ALBUMIN/GLOB SERPL: 1.3 G/DL
ALP SERPL-CCNC: 57 U/L (ref 39–117)
ALT SERPL W P-5'-P-CCNC: 26 U/L (ref 1–41)
ANION GAP SERPL CALCULATED.3IONS-SCNC: 9.5 MMOL/L (ref 5–15)
ARTERIAL PATENCY WRIST A: POSITIVE
AST SERPL-CCNC: 24 U/L (ref 1–40)
ATMOSPHERIC PRESS: 739.5 MMHG
BASE EXCESS BLDA CALC-SCNC: 0.6 MMOL/L (ref -2–2)
BASOPHILS # BLD AUTO: 0.04 10*3/MM3 (ref 0–0.2)
BASOPHILS NFR BLD AUTO: 0.5 % (ref 0–1.5)
BDY SITE: ABNORMAL
BILIRUB SERPL-MCNC: 0.4 MG/DL (ref 0–1.2)
BUN SERPL-MCNC: 17 MG/DL (ref 8–23)
BUN/CREAT SERPL: 15.7 (ref 7–25)
CALCIUM SPEC-SCNC: 9.3 MG/DL (ref 8.6–10.5)
CHLORIDE SERPL-SCNC: 99 MMOL/L (ref 98–107)
CO2 SERPL-SCNC: 28.5 MMOL/L (ref 22–29)
CREAT SERPL-MCNC: 1.08 MG/DL (ref 0.76–1.27)
DEPRECATED RDW RBC AUTO: 47.3 FL (ref 37–54)
EGFRCR SERPLBLD CKD-EPI 2021: 70.7 ML/MIN/1.73
EOSINOPHIL # BLD AUTO: 0.1 10*3/MM3 (ref 0–0.4)
EOSINOPHIL NFR BLD AUTO: 1.2 % (ref 0.3–6.2)
ERYTHROCYTE [DISTWIDTH] IN BLOOD BY AUTOMATED COUNT: 13.9 % (ref 12.3–15.4)
GLOBULIN UR ELPH-MCNC: 3.1 GM/DL
GLUCOSE SERPL-MCNC: 245 MG/DL (ref 65–99)
HCO3 BLDA-SCNC: 24 MMOL/L (ref 22–26)
HCT VFR BLD AUTO: 36.8 % (ref 37.5–51)
HCT VFR BLD CALC: 35 % (ref 38–51)
HEMODILUTION: NO
HGB BLD-MCNC: 11.8 G/DL (ref 13–17.7)
HGB BLDA-MCNC: 12 G/DL (ref 12–18)
IMM GRANULOCYTES # BLD AUTO: 0.04 10*3/MM3 (ref 0–0.05)
IMM GRANULOCYTES NFR BLD AUTO: 0.5 % (ref 0–0.5)
INHALED O2 CONCENTRATION: 21 %
LYMPHOCYTES # BLD AUTO: 1.9 10*3/MM3 (ref 0.7–3.1)
LYMPHOCYTES NFR BLD AUTO: 23.1 % (ref 19.6–45.3)
MAGNESIUM SERPL-MCNC: 1.7 MG/DL (ref 1.6–2.4)
MCH RBC QN AUTO: 29.4 PG (ref 26.6–33)
MCHC RBC AUTO-ENTMCNC: 32.1 G/DL (ref 31.5–35.7)
MCV RBC AUTO: 91.8 FL (ref 79–97)
MODALITY: ABNORMAL
MONOCYTES # BLD AUTO: 0.88 10*3/MM3 (ref 0.1–0.9)
MONOCYTES NFR BLD AUTO: 10.7 % (ref 5–12)
NEUTROPHILS NFR BLD AUTO: 5.25 10*3/MM3 (ref 1.7–7)
NEUTROPHILS NFR BLD AUTO: 64 % (ref 42.7–76)
NRBC BLD AUTO-RTO: 0 /100 WBC (ref 0–0.2)
NT-PROBNP SERPL-MCNC: 454.9 PG/ML (ref 0–1800)
PCO2 BLDA: 34 MM HG (ref 35–45)
PH BLDA: 7.46 PH UNITS (ref 7.35–7.45)
PLATELET # BLD AUTO: 212 10*3/MM3 (ref 140–450)
PMV BLD AUTO: 9.8 FL (ref 6–12)
PO2 BLD: 466 MM[HG] (ref 0–500)
PO2 BLDA: 97.8 MM HG (ref 80–100)
POTASSIUM SERPL-SCNC: 4.8 MMOL/L (ref 3.5–5.2)
PROT SERPL-MCNC: 7 G/DL (ref 6–8.5)
RBC # BLD AUTO: 4.01 10*6/MM3 (ref 4.14–5.8)
SAO2 % BLDCOA: 98 % (ref 95–99)
SODIUM SERPL-SCNC: 137 MMOL/L (ref 136–145)
WBC NRBC COR # BLD AUTO: 8.21 10*3/MM3 (ref 3.4–10.8)

## 2024-07-30 PROCEDURE — 83735 ASSAY OF MAGNESIUM: CPT

## 2024-07-30 PROCEDURE — 3074F SYST BP LT 130 MM HG: CPT

## 2024-07-30 PROCEDURE — 83880 ASSAY OF NATRIURETIC PEPTIDE: CPT

## 2024-07-30 PROCEDURE — 80053 COMPREHEN METABOLIC PANEL: CPT

## 2024-07-30 PROCEDURE — 99214 OFFICE O/P EST MOD 30 MIN: CPT

## 2024-07-30 PROCEDURE — 1159F MED LIST DOCD IN RCRD: CPT

## 2024-07-30 PROCEDURE — 36415 COLL VENOUS BLD VENIPUNCTURE: CPT

## 2024-07-30 PROCEDURE — 85025 COMPLETE CBC W/AUTO DIFF WBC: CPT

## 2024-07-30 PROCEDURE — 3078F DIAST BP <80 MM HG: CPT

## 2024-07-30 PROCEDURE — 36600 WITHDRAWAL OF ARTERIAL BLOOD: CPT

## 2024-07-30 PROCEDURE — 82803 BLOOD GASES ANY COMBINATION: CPT

## 2024-07-30 PROCEDURE — 1160F RVW MEDS BY RX/DR IN RCRD: CPT

## 2024-07-30 RX ORDER — CARVEDILOL 12.5 MG/1
18.75 TABLET ORAL 2 TIMES DAILY
Start: 2024-07-30

## 2024-07-30 NOTE — ASSESSMENT & PLAN NOTE
Mr. Donovan blood pressures are stable per his home log.  He does not report any low blood pressures, dizziness or lightheadedness.  He continues to utilize Entresto 24/26 to half tablet twice daily.  We are unable to increase due to episodes of hypotension.  He will continue to keep a heart rate blood pressure and weight log and bring it to his next appointment.   Patient

## 2024-07-30 NOTE — ASSESSMENT & PLAN NOTE
Mr. Donovan has heart failure with preserved ejection fraction who appears to be doing very well today.  He denies any short of air or pedal edema.  His heart rate is slightly elevated, but his pressures running on the lower side.  I will slowly titrate up the carvedilol to address his elevated heart rate.  Will continue Entresto 24/26 mg a half a tablet twice daily.  Patient is very sensitive and experiences hypotension with a full dose.  He is unable to utilize an SGLT2 due to ongoing urinary tract infections.    1.  Increase carvedilol, take  one 12.5 mg tablet  and one 3.125 mg tablet twice daily until the 3.125's are gone and then increase to 18.75 mg (one 12.5 and one 6.25 mg tab) twice daily.  2.  Continue rest medication as prescribed.  3.  Do a heart rate blood pressure and weight log and bring it to next appointment.  4.  Do blood work 1 to 2 days prior to next visit.

## 2024-07-30 NOTE — ASSESSMENT & PLAN NOTE
Mr. Donovan has paroxysmal atrial fibrillation.  His heart rate is not that well-controlled with the current dose of carvedilol.  I will titrate it up slowly due to borderline blood pressures.  He will continue to keep a heart rate blood pressure and weight log and bring it to his next appointment.

## 2024-07-30 NOTE — PROGRESS NOTES
Chief Complaint  Chronic HFpEF    Subjective            Giles Donovan presents to [facility] CARDIOLOGY  History of Present Illness  Mr. Donovan is a 78-year-old male that presented to the office today for follow-up due to heart failure with reduced ejection fraction, hypertension, atrial fibs and hyperlipidemia.  He appears to be doing very well today.  His short of air and pedal edema have both resolved.  His significant other/caregiver did reported that he got his walker stuck and fell in the garage prior to coming to this appointment.  He reports that he did land on his back and head.  His head does not appear to have a hematoma.  He denies any headache visual changes dizziness lightheadedness numbness, pain or bruising anywhere.  Patient is completely asymptomatic.  I have emphasized how important it is to be careful due to the Eliquis.  We have discussed in depth signs and symptoms of a bleed.  He has been advised to proceed to the ER for a CT of the head if there is any change at all.  Patient and caregiver both for verbalized understanding      Past Medical History:   Diagnosis Date    A-fib     Allergic rhinitis 12/27/2014    Will try Zyrtec, he didnt want to use a nasal spray.    Arthritis     Asthma     Cataract     left eye    CHF (congestive heart failure)     Cough 03/30/2016    PFT and CXR ordered.    Depression     PTSD    Diabetes     Elevated cholesterol     H/O psychiatric care 1999    PTSD    Hyperlipidemia 03/30/2016    Lipids ordered. Continue on current medication.    Hypertension     Hypothyroidism     Seasonal allergies     Shortness of breath     Sleep apnea     Stenosis of right carotid artery 02/19/2017    Will refer to vascular surgeon.    Syncope 02/19/2017    Type 2 diabetes mellitus     Upper respiratory infection 10/18/2015    Will treat cough with Hydromet, otherwise over the counter meds for treatment.       Allergies   Allergen Reactions    Latex Rash  and Anaphylaxis    Latex, Natural Rubber Itching        Past Surgical History:   Procedure Laterality Date    CATARACT EXTRACTION Right     x2    COLONOSCOPY      JOINT REPLACEMENT      REPLACEMENT TOTAL HIP ONCOLOGIC Right     RETINAL DETACHMENT REPAIR      TOE AMPUTATION Left 11/2017    Second phalaeng.    TOE OSTEOPHYTE REMOVAL          Social History     Tobacco Use    Smoking status: Never     Passive exposure: Never    Smokeless tobacco: Never    Tobacco comments:     Pt stated he smoked a pipe [id number]. Pt stated he smoked a pipe a couple times a day. Pt stated he mixed with tobacco and marijuana   Vaping Use    Vaping status: Never Used   Substance Use Topics    Alcohol use: Yes     Alcohol/week: 7.0 standard drinks of alcohol     Types: 7 Cans of beer per week     Comment: 1 beer a night    Drug use: Yes     Types: Marijuana     Comment: quit 1978       Family History   Problem Relation Age of Onset    Heart disease Mother     Lupus Mother     Arthritis Mother     Kidney disease Sister         Prior to Admission medications    Medication Sig Start Date End Date Taking? Authorizing Provider   albuterol (PROVENTIL) (2.5 MG/3ML) 0.083% nebulizer solution Take 2.5 mg by nebulization Every 4 (Four) Hours As Needed for Wheezing or Shortness of Air.   Yes Kierra Pitts MD   apixaban (Eliquis) 5 MG tablet tablet Take 1 tablet by mouth Every 12 (Twelve) Hours.   Yes Kierra Pitts MD   aspirin EC 81 MG EC tablet Take 1 tablet by mouth Daily.  Patient taking differently: Take 1 tablet by mouth Every Other Day. 5/22/24  Yes Morena Masters MD   atorvastatin (LIPITOR) 80 MG tablet Take 1 tablet by mouth Daily. 8/9/22  Yes Cathy Ochoa MD   benzonatate (TESSALON) 100 MG capsule Take 1-2 capsules by mouth 3 (Three) Times a Day As Needed for Cough. 5/1/24  Yes Morena Masters MD   buPROPion XL (WELLBUTRIN XL) 150 MG 24 hr tablet Take 1 tablet by mouth Daily.   Yes Kierra Pitts MD    carvedilol (COREG) 12.5 MG tablet Take 1 tablet by mouth 2 (Two) Times a Day. 6/27/24  Yes Talia Marcelo APRN   cetirizine (ZyrTEC Allergy) 10 MG tablet Take 1 tablet by mouth Daily. 5/1/24  Yes Morena Masters MD   Cholecalciferol (Vitamin D-3) 25 MCG (1000 UT) capsule Take 1,000 Units by mouth Daily. 5/1/24  Yes Morena Masters MD   ezetimibe (ZETIA) 10 MG tablet Take 1 tablet by mouth Daily.   Yes Kierra Pitts MD   finasteride (PROSCAR) 5 MG tablet Take 1 tablet by mouth Daily. 7/2/24  Yes Tabatha Oconnell APRN   fluticasone-salmeterol (ADVAIR HFA) 230-21 MCG/ACT inhaler Inhale 2 puffs 2 (Two) Times a Day.   Yes Kierra Pitts MD   gabapentin (NEURONTIN) 600 MG tablet Take 0.5 tablets by mouth Every Night. 3/31/21  Yes Kierra Pitts MD   guaiFENesin (MUCINEX) 600 MG 12 hr tablet Take 2 tablets by mouth 2 (Two) Times a Day As Needed for Cough or Congestion.   Yes ProviderKierra MD   insulin aspart (novoLOG) 100 UNIT/ML injection Inject 24-26 Units under the skin into the appropriate area as directed 3 (Three) Times a Day Before Meals.   Yes ProviderKierra MD   Insulin Glargine (Lantus SoloStar) 100 UNIT/ML injection pen Inject 40-44 Units under the skin into the appropriate area as directed Every Night.   Yes ProviderKierra MD   levothyroxine (SYNTHROID, LEVOTHROID) 100 MCG tablet Take 1 tablet by mouth Daily.   Yes Kierra Pitts MD   metFORMIN (GLUCOPHAGE) 500 MG tablet Take 1 tablet by mouth 2 (Two) Times a Day With Meals.   Yes ProviderKierra MD   miconazole (Micatin) 2 % cream Apply 1 Application topically to the appropriate area as directed 2 (Two) Times a Day. 6/4/24  Yes Morena Masters MD   nystatin-triamcinolone (MYCOLOG) 624731-0.1 UNIT/GM-% ointment Apply to affected area 2 times daily 7/2/24  Yes Tabatha Oconnell TENISHA   olopatadine (PATANOL) 0.1 % ophthalmic solution Administer 1 drop to both eyes Daily As Needed for  "Allergies.   Yes ProviderKierra MD   omeprazole (priLOSEC) 20 MG capsule Take 1 capsule by mouth Daily. 10/9/23  Yes Issac Ortiz MD   PARoxetine (PAXIL) 40 MG tablet Take 1 tablet by mouth Every Evening. 4/5/21  Yes Kierra Pitts MD   sacubitril-valsartan (Entresto) 24-26 MG tablet Take 0.5 tablets by mouth 2 (Two) Times a Day. 5/31/24  Yes Talia Marcelo APRN   Semaglutide, 1 MG/DOSE, (Ozempic, 1 MG/DOSE,) 2 MG/1.5ML solution pen-injector Inject 1 mg under the skin into the appropriate area as directed 1 (One) Time Per Week.  Patient taking differently: Inject 1 mg under the skin into the appropriate area as directed 1 (One) Time Per Week. Sundays 3/20/24  Yes Morena Masters MD   spironolactone (ALDACTONE) 25 MG tablet Take 1 tablet by mouth Daily. Omit on Sundays 6/27/24  Yes Talia Marcelo APRN   traZODone (DESYREL) 100 MG tablet Take 0.5 tablets by mouth At Night As Needed for Sleep. 4/22/22  Yes ProviderKierra MD        Review of Systems   Constitutional:  Positive for fatigue.   Respiratory:  Negative for cough and shortness of breath.    Cardiovascular:  Negative for chest pain, palpitations and leg swelling.   Neurological:  Negative for dizziness.        Symptom Course: Improved    Weight Trend: Stable     Objective     /58   Pulse 58   Ht 170.2 cm (67\")   Wt 85.7 kg (189 lb)   BMI 29.60 kg/m²       Physical Exam  Constitutional:       General: He is awake.      Appearance: Normal appearance. He is well-developed and well-groomed.   Neck:      Thyroid: No thyromegaly.      Vascular: No carotid bruit or JVD.   Cardiovascular:      Rate and Rhythm: Normal rate. Rhythm irregularly irregular.      Chest Wall: PMI is not displaced.      Pulses: Normal pulses.      Heart sounds: Normal heart sounds, S1 normal and S2 normal. No murmur heard.     No friction rub. No gallop. No S3 or S4 sounds.   Pulmonary:      Effort: Pulmonary effort is normal.      Breath sounds: " Normal breath sounds and air entry. No wheezing, rhonchi or rales.   Abdominal:      General: Bowel sounds are normal.      Palpations: Abdomen is soft. There is no mass.      Tenderness: There is no abdominal tenderness.   Musculoskeletal:      Cervical back: Neck supple.      Right lower leg: No edema.      Left lower leg: No edema.   Neurological:      General: No focal deficit present.      Mental Status: He is alert and oriented to person, place, and time.      GCS: GCS eye subscore is 4. GCS verbal subscore is 5. GCS motor subscore is 6.      Motor: Motor function is intact.      Coordination: Coordination is intact.   Psychiatric:         Mood and Affect: Mood normal.         Behavior: Behavior is cooperative.           Result Review :                      Lab Results   Component Value Date    PROBNP 454.9 07/30/2024    PROBNP 251.0 06/13/2024    PROBNP 770.0 05/31/2024     CMP          5/31/2024    16:46 6/13/2024    10:00 7/30/2024    13:29   CMP   Glucose 317  109  245    BUN 14  18  17    Creatinine 0.92  0.96  1.08    EGFR 85.7  81.4  70.7    Sodium 135  138  137    Potassium 5.3  4.8  4.8    Chloride 98  101  99    Calcium 9.2  9.0  9.3    Total Protein 7.5  6.9  7.0    Albumin 4.1  3.8  3.9    Globulin 3.4  3.1  3.1    Total Bilirubin 0.4  0.2  0.4    Alkaline Phosphatase 63  53  57    AST (SGOT) 29  25  24    ALT (SGPT) 34  23  26    Albumin/Globulin Ratio 1.2  1.2  1.3    BUN/Creatinine Ratio 15.2  18.8  15.7    Anion Gap 11.1  10.0  9.5      CBC w/diff          5/31/2024    16:46 6/13/2024    10:00 7/30/2024    13:29   CBC w/Diff   WBC 7.68  7.83  8.21    RBC 3.81  3.79  4.01    Hemoglobin 11.5  11.4  11.8    Hematocrit 34.8  34.7  36.8    MCV 91.3  91.6  91.8    MCH 30.2  30.1  29.4    MCHC 33.0  32.9  32.1    RDW 13.0  13.7  13.9    Platelets 213  198  212    Neutrophil Rel % 61.4  54.3  64.0    Immature Granulocyte Rel % 0.3  0.4  0.5    Lymphocyte Rel % 25.9  31.8  23.1    Monocyte Rel % 10.0   "11.1  10.7    Eosinophil Rel % 2.0  1.9  1.2    Basophil Rel % 0.4  0.5  0.5       Lipid Panel          12/20/2023    16:34 2/6/2024    11:48 6/13/2024    10:00   Lipid Panel   Total Cholesterol 100  113  97    Triglycerides 86  181  68    HDL Cholesterol 38  42  42    VLDL Cholesterol 17  30  15    LDL Cholesterol  45  41  40    LDL/HDL Ratio 1.18  0.83  0.99       Lab Results   Component Value Date    TSH 1.780 03/13/2024    TSH 0.652 02/06/2024    TSH 0.653 12/20/2023      Lab Results   Component Value Date    FREET4 1.09 03/13/2024    FREET4 1.09 06/06/2023    FREET4 1.1 12/02/2020      No results found for: \"DDIMERQUANT\"  Magnesium   Date Value Ref Range Status   07/30/2024 1.7 1.6 - 2.4 mg/dL Final      No results found for: \"DIGOXIN\"   A1C Last 3 Results          12/20/2023    16:34 2/6/2024    11:48 4/22/2024    09:34   HGBA1C Last 3 Results   Hemoglobin A1C 8.80  9.80  8.80           Results for orders placed during the hospital encounter of 09/16/23    Adult Transthoracic Echo Complete w/ Color, Spectral and Contrast if necessary per protocol    Interpretation Summary    Left ventricular systolic function is low normal. Calculated left ventricular EF = 45.1%    The left ventricular cavity is borderline dilated.    Left ventricular diastolic function is consistent with (grade I) impaired relaxation.    There is mild calcification of the aortic valve.        Assessment and Plan        Diagnoses and all orders for this visit:    1. Chronic heart failure with preserved ejection fraction (Primary)  Assessment & Plan:  Mr. Donovan has heart failure with preserved ejection fraction who appears to be doing very well today.  He denies any short of air or pedal edema.  His heart rate is slightly elevated, but his pressures running on the lower side.  I will slowly titrate up the carvedilol to address his elevated heart rate.  Will continue Entresto 24/26 mg a half a tablet twice daily.  Patient is very sensitive and " experiences hypotension with a full dose.  He is unable to utilize an SGLT2 due to ongoing urinary tract infections.    1.  Increase carvedilol, take  one 12.5 mg tablet  and one 3.125 mg tablet twice daily until the 3.125's are gone and then increase to 18.75 mg (one 12.5 and one 6.25 mg tab) twice daily.  2.  Continue rest medication as prescribed.  3.  Do a heart rate blood pressure and weight log and bring it to next appointment.  4.  Do blood work 1 to 2 days prior to next visit.    Orders:  -     Magnesium; Future  -     CBC & Differential; Future  -     Comprehensive Metabolic Panel; Future    2. Mixed hyperlipidemia  Assessment & Plan:  Mr. Donovan has hyperlipidemia.  He is on atorvastatin and Zetia daily.  Will continue the same.      3. Primary hypertension  Assessment & Plan:  Mr. Donovan blood pressures are stable per his home log.  He does not report any low blood pressures, dizziness or lightheadedness.  He continues to utilize Entresto 24/26 to half tablet twice daily.  We are unable to increase due to episodes of hypotension.  He will continue to keep a heart rate blood pressure and weight log and bring it to his next appointment.      4. Paroxysmal atrial fibrillation  Assessment & Plan:  Mr. Donovan has paroxysmal atrial fibrillation.  His heart rate is not that well-controlled with the current dose of carvedilol.  I will titrate it up slowly due to borderline blood pressures.  He will continue to keep a heart rate blood pressure and weight log and bring it to his next appointment.    Orders:  -     Magnesium; Future  -     CBC & Differential; Future  -     Comprehensive Metabolic Panel; Future    Other orders  -     carvedilol (COREG) 12.5 MG tablet; Take 1.5 tablets by mouth 2 (Two) Times a Day.            Follow Up     Return in about 6 weeks (around 9/10/2024).    Patient was given instructions and counseling regarding his condition or for health maintenance advice. Please see specific  information pulled into the AVS if appropriate.     Electronically signed by TENISHA Lopez, 07/30/24, 3:55 PM EDT.

## 2024-07-30 NOTE — PATIENT INSTRUCTIONS
1.  Increase carvedilol, take  one 12.5 mg tablet  and one 3.125 mg tablet twice daily until the 3.125's are gone and then increase to 18.75 mg (one 12.5 and one 6.25 mg tab) twice daily.  2.  Continue rest medication as prescribed.  3.  Do a heart rate blood pressure and weight log and bring it to next appointment.  4.  Do blood work 1 to 2 days prior to next visit.

## 2024-08-05 ENCOUNTER — TELEPHONE (OUTPATIENT)
Dept: INTERNAL MEDICINE | Facility: CLINIC | Age: 79
End: 2024-08-05
Payer: MEDICARE

## 2024-08-05 NOTE — TELEPHONE ENCOUNTER
Called and spoke to the pt. [facility] but the patient states he does not remember what it was for. He will call the [facility] and find out and then call our office back so we can start on his paperwork.

## 2024-08-06 DIAGNOSIS — R94.131 ABNORMAL EMG: ICD-10-CM

## 2024-08-06 DIAGNOSIS — G56.00 PARTIAL THENAR ATROPHY, UNSPECIFIED LATERALITY: Primary | ICD-10-CM

## 2024-08-13 RX ORDER — EZETIMIBE 10 MG/1
10 TABLET ORAL DAILY
Qty: 90 TABLET | Refills: 1 | Status: SHIPPED | OUTPATIENT
Start: 2024-08-13

## 2024-08-13 NOTE — TELEPHONE ENCOUNTER
Caller: DANIELLE ALVES    Relationship: Emergency Contact    Best call back number:     [phone]      Requested Prescriptions:   Requested Prescriptions     Pending Prescriptions Disp Refills    ezetimibe (ZETIA) 10 MG tablet       Sig: Take 1 tablet by mouth Daily.        Pharmacy where request should be sent: [address] [phone] [facility] [phone]      Last office visit with prescribing clinician: 7/10/2024   Last telemedicine visit with prescribing clinician: Visit date not found   Next office visit with prescribing clinician: 1/8/2025     Additional details provided by patient:     Does the patient have less than a 3 day supply:  [] Yes  [] No    Would you like a call back once the refill request has been completed: [] Yes [] No    If the office needs to give you a call back, can they leave a voicemail: [] Yes [] No    Neto Blandon Rep   08/13/24 11:45 EDT

## 2024-08-15 ENCOUNTER — LAB (OUTPATIENT)
Dept: LAB | Facility: HOSPITAL | Age: 79
End: 2024-08-15
Payer: MEDICARE

## 2024-08-15 ENCOUNTER — OFFICE VISIT (OUTPATIENT)
Dept: CARDIOLOGY | Facility: CLINIC | Age: 79
End: 2024-08-15
Payer: MEDICARE

## 2024-08-15 VITALS
WEIGHT: 196 LBS | HEIGHT: 67 IN | HEART RATE: 77 BPM | DIASTOLIC BLOOD PRESSURE: 62 MMHG | SYSTOLIC BLOOD PRESSURE: 99 MMHG | BODY MASS INDEX: 30.76 KG/M2

## 2024-08-15 DIAGNOSIS — N18.31 STAGE 3A CHRONIC KIDNEY DISEASE: ICD-10-CM

## 2024-08-15 DIAGNOSIS — E78.2 MIXED HYPERLIPIDEMIA: ICD-10-CM

## 2024-08-15 DIAGNOSIS — I48.0 PAROXYSMAL ATRIAL FIBRILLATION: ICD-10-CM

## 2024-08-15 DIAGNOSIS — I50.32 CHRONIC HEART FAILURE WITH PRESERVED EJECTION FRACTION: Primary | ICD-10-CM

## 2024-08-15 DIAGNOSIS — I50.32 CHRONIC HEART FAILURE WITH PRESERVED EJECTION FRACTION: ICD-10-CM

## 2024-08-15 DIAGNOSIS — I10 PRIMARY HYPERTENSION: ICD-10-CM

## 2024-08-15 LAB
ALBUMIN SERPL-MCNC: 3.9 G/DL (ref 3.5–5.2)
ALBUMIN/GLOB SERPL: 1.3 G/DL
ALP SERPL-CCNC: 61 U/L (ref 39–117)
ALT SERPL W P-5'-P-CCNC: 60 U/L (ref 1–41)
ANION GAP SERPL CALCULATED.3IONS-SCNC: 9.3 MMOL/L (ref 5–15)
AST SERPL-CCNC: 53 U/L (ref 1–40)
BASOPHILS # BLD AUTO: 0.04 10*3/MM3 (ref 0–0.2)
BASOPHILS NFR BLD AUTO: 0.5 % (ref 0–1.5)
BILIRUB SERPL-MCNC: 0.4 MG/DL (ref 0–1.2)
BUN SERPL-MCNC: 16 MG/DL (ref 8–23)
BUN/CREAT SERPL: 13 (ref 7–25)
CALCIUM SPEC-SCNC: 9.3 MG/DL (ref 8.6–10.5)
CHLORIDE SERPL-SCNC: 101 MMOL/L (ref 98–107)
CO2 SERPL-SCNC: 28.7 MMOL/L (ref 22–29)
CREAT SERPL-MCNC: 1.23 MG/DL (ref 0.76–1.27)
DEPRECATED RDW RBC AUTO: 45 FL (ref 37–54)
EGFRCR SERPLBLD CKD-EPI 2021: 60.5 ML/MIN/1.73
EOSINOPHIL # BLD AUTO: 0.14 10*3/MM3 (ref 0–0.4)
EOSINOPHIL NFR BLD AUTO: 1.9 % (ref 0.3–6.2)
ERYTHROCYTE [DISTWIDTH] IN BLOOD BY AUTOMATED COUNT: 13.6 % (ref 12.3–15.4)
GLOBULIN UR ELPH-MCNC: 3.1 GM/DL
GLUCOSE SERPL-MCNC: 92 MG/DL (ref 65–99)
HCT VFR BLD AUTO: 35.5 % (ref 37.5–51)
HGB BLD-MCNC: 11.4 G/DL (ref 13–17.7)
IMM GRANULOCYTES # BLD AUTO: 0.02 10*3/MM3 (ref 0–0.05)
IMM GRANULOCYTES NFR BLD AUTO: 0.3 % (ref 0–0.5)
LYMPHOCYTES # BLD AUTO: 2.48 10*3/MM3 (ref 0.7–3.1)
LYMPHOCYTES NFR BLD AUTO: 33.6 % (ref 19.6–45.3)
MAGNESIUM SERPL-MCNC: 1.9 MG/DL (ref 1.6–2.4)
MCH RBC QN AUTO: 29.2 PG (ref 26.6–33)
MCHC RBC AUTO-ENTMCNC: 32.1 G/DL (ref 31.5–35.7)
MCV RBC AUTO: 91 FL (ref 79–97)
MONOCYTES # BLD AUTO: 0.89 10*3/MM3 (ref 0.1–0.9)
MONOCYTES NFR BLD AUTO: 12.1 % (ref 5–12)
NEUTROPHILS NFR BLD AUTO: 3.8 10*3/MM3 (ref 1.7–7)
NEUTROPHILS NFR BLD AUTO: 51.6 % (ref 42.7–76)
NRBC BLD AUTO-RTO: 0 /100 WBC (ref 0–0.2)
PLATELET # BLD AUTO: 213 10*3/MM3 (ref 140–450)
PMV BLD AUTO: 9.6 FL (ref 6–12)
POTASSIUM SERPL-SCNC: 4.5 MMOL/L (ref 3.5–5.2)
PROT SERPL-MCNC: 7 G/DL (ref 6–8.5)
RBC # BLD AUTO: 3.9 10*6/MM3 (ref 4.14–5.8)
SODIUM SERPL-SCNC: 139 MMOL/L (ref 136–145)
WBC NRBC COR # BLD AUTO: 7.37 10*3/MM3 (ref 3.4–10.8)

## 2024-08-15 PROCEDURE — 85025 COMPLETE CBC W/AUTO DIFF WBC: CPT

## 2024-08-15 PROCEDURE — 83735 ASSAY OF MAGNESIUM: CPT

## 2024-08-15 PROCEDURE — 36415 COLL VENOUS BLD VENIPUNCTURE: CPT

## 2024-08-15 PROCEDURE — 80053 COMPREHEN METABOLIC PANEL: CPT

## 2024-08-15 NOTE — PROGRESS NOTES
Chief Complaint  Congestive Heart Failure (6m Follow Up)    Subjective            Giles Donovan presents to [facility] CARDIOLOG[facility]  History of Present Illness    Mr. Donovan is here for follow-up evaluation management of heart failure with preserved ejection fraction, paroxysmal atrial fibrillation, mixed hyperlipidemia.  He follows with the heart failure clinic.  He is doing well.  Volume status stable.  Denies chest pain or excessive shortness of breath.  No palpitations or bleeding problems on anticoagulation.    PMH  Past Medical History:   Diagnosis Date    A-fib     Allergic rhinitis 12/27/2014    Will try Zyrtec, he didnt want to use a nasal spray.    Arthritis     Asthma     Cataract     left eye    CHF (congestive heart failure)     Cough 03/30/2016    PFT and CXR ordered.    Depression     PTSD    Diabetes     Elevated cholesterol     H/O psychiatric care 1999    PTSD    Hyperlipidemia 03/30/2016    Lipids ordered. Continue on current medication.    Hypertension     Hypothyroidism     Seasonal allergies     Shortness of breath     Sleep apnea     Stenosis of right carotid artery 02/19/2017    Will refer to vascular surgeon.    Syncope 02/19/2017    Type 2 diabetes mellitus     Upper respiratory infection 10/18/2015    Will treat cough with Hydromet, otherwise over the counter meds for treatment.         SURGICALHX  Past Surgical History:   Procedure Laterality Date    CATARACT EXTRACTION Right     x2    COLONOSCOPY      JOINT REPLACEMENT      REPLACEMENT TOTAL HIP ONCOLOGIC Right     RETINAL DETACHMENT REPAIR      TOE AMPUTATION Left 11/2017    Second phalaeng.    TOE OSTEOPHYTE REMOVAL          SOC  Social History     Socioeconomic History    Marital status: Single   Tobacco Use    Smoking status: Never     Passive exposure: Never    Smokeless tobacco: Never    Tobacco comments:     Pt stated he smoked a pipe [id number]. Pt stated he smoked a pipe a couple times a day. Pt stated he  mixed with tobacco and marijuana   Vaping Use    Vaping status: Never Used   Substance and Sexual Activity    Alcohol use: Yes     Alcohol/week: 7.0 standard drinks of alcohol     Types: 7 Cans of beer per week     Comment: 1 beer a night    Drug use: Yes     Types: Marijuana     Comment: quit 1978    Sexual activity: Defer         FAMHX  Family History   Problem Relation Age of Onset    Heart disease Mother     Lupus Mother     Arthritis Mother     Kidney disease Sister           ALLERGY  Allergies   Allergen Reactions    Latex Rash and Anaphylaxis    Latex, Natural Rubber Itching        MEDSCURRENT    Current Outpatient Medications:     albuterol (PROVENTIL) (2.5 MG/3ML) 0.083% nebulizer solution, Take 2.5 mg by nebulization Every 4 (Four) Hours As Needed for Wheezing or Shortness of Air., Disp: , Rfl:     apixaban (Eliquis) 5 MG tablet tablet, Take 1 tablet by mouth Every 12 (Twelve) Hours., Disp: , Rfl:     aspirin EC 81 MG EC tablet, Take 1 tablet by mouth Daily. (Patient taking differently: Take 1 tablet by mouth Every Other Day.), Disp: 90 tablet, Rfl: 1    atorvastatin (LIPITOR) 80 MG tablet, Take 1 tablet by mouth Daily., Disp: 90 tablet, Rfl: 1    benzonatate (TESSALON) 100 MG capsule, Take 1-2 capsules by mouth 3 (Three) Times a Day As Needed for Cough., Disp: 90 capsule, Rfl: 1    buPROPion XL (WELLBUTRIN XL) 150 MG 24 hr tablet, Take 1 tablet by mouth Daily., Disp: , Rfl:     carvedilol (COREG) 12.5 MG tablet, Take 1.5 tablets by mouth 2 (Two) Times a Day., Disp: , Rfl:     cetirizine (ZyrTEC Allergy) 10 MG tablet, Take 1 tablet by mouth Daily., Disp: 90 tablet, Rfl: 3    Cholecalciferol (Vitamin D-3) 25 MCG (1000 UT) capsule, Take 1,000 Units by mouth Daily., Disp: 60 capsule, Rfl: 1    ezetimibe (ZETIA) 10 MG tablet, Take 1 tablet by mouth Daily., Disp: 90 tablet, Rfl: 1    finasteride (PROSCAR) 5 MG tablet, Take 1 tablet by mouth Daily., Disp: 90 tablet, Rfl: 3    fluticasone-salmeterol (ADVAIR HFA)  230-21 MCG/ACT inhaler, Inhale 2 puffs 2 (Two) Times a Day., Disp: , Rfl:     gabapentin (NEURONTIN) 600 MG tablet, Take 0.5 tablets by mouth Every Night., Disp: , Rfl:     guaiFENesin (MUCINEX) 600 MG 12 hr tablet, Take 2 tablets by mouth 2 (Two) Times a Day As Needed for Cough or Congestion., Disp: , Rfl:     insulin aspart (novoLOG) 100 UNIT/ML injection, Inject 24-26 Units under the skin into the appropriate area as directed 3 (Three) Times a Day Before Meals., Disp: , Rfl:     Insulin Glargine (Lantus SoloStar) 100 UNIT/ML injection pen, Inject 40-44 Units under the skin into the appropriate area as directed Every Night., Disp: , Rfl:     levothyroxine (SYNTHROID, LEVOTHROID) 100 MCG tablet, Take 1 tablet by mouth Daily., Disp: , Rfl:     metFORMIN (GLUCOPHAGE) 500 MG tablet, Take 1 tablet by mouth 2 (Two) Times a Day With Meals., Disp: , Rfl:     miconazole (Micatin) 2 % cream, Apply 1 Application topically to the appropriate area as directed 2 (Two) Times a Day., Disp: 35 g, Rfl: 1    nystatin-triamcinolone (MYCOLOG) 254070-2.1 UNIT/GM-% ointment, Apply to affected area 2 times daily, Disp: 30 g, Rfl: 1    olopatadine (PATANOL) 0.1 % ophthalmic solution, Administer 1 drop to both eyes Daily As Needed for Allergies., Disp: , Rfl:     omeprazole (priLOSEC) 20 MG capsule, Take 1 capsule by mouth Daily., Disp: 90 capsule, Rfl: 4    PARoxetine (PAXIL) 40 MG tablet, Take 1 tablet by mouth Every Evening., Disp: , Rfl:     sacubitril-valsartan (Entresto) 24-26 MG tablet, Take 0.5 tablets by mouth 2 (Two) Times a Day., Disp: 180 tablet, Rfl: 3    Semaglutide, 1 MG/DOSE, (Ozempic, 1 MG/DOSE,) 2 MG/1.5ML solution pen-injector, Inject 1 mg under the skin into the appropriate area as directed 1 (One) Time Per Week. (Patient taking differently: Inject 1 mg under the skin into the appropriate area as directed 1 (One) Time Per Week. Sundays), Disp: 9 mL, Rfl: 2    spironolactone (ALDACTONE) 25 MG tablet, Take 1 tablet by  "mouth Daily. Omit on Sundays, Disp: , Rfl:     traZODone (DESYREL) 100 MG tablet, Take 0.5 tablets by mouth At Night As Needed for Sleep., Disp: , Rfl:       Review of Systems   Cardiovascular:  Positive for dyspnea on exertion. Negative for chest pain, palpitations and syncope.        Objective     BP 99/62   Pulse 77   Ht 170.2 cm (67\")   Wt 88.9 kg (196 lb)   BMI 30.70 kg/m²       General Appearance:   well developed  well nourished  HENT:   oropharynx moist  lips not cyanotic  Neck:  thyroid not enlarged  supple  Respiratory:  no respiratory distress  normal breath sounds  no rales  Cardiovascular:  no jugular venous distention  regular rhythm  apical impulse normal  S1 normal, S2 normal  no S3, no S4   no murmur  no rub, no thrill  carotid pulses normal; no bruit  pedal pulses normal  lower extremity edema: Trace  Musculoskeletal:  no clubbing of fingers.   normocephalic, head atraumatic  Skin:   warm, dry  Psychiatric:  judgement and insight appropriate  normal mood and affect      Result Review :     The following data was reviewed by: TENISHA Roberts on 08/15/2024:    CMP          6/13/2024    10:00 7/30/2024    13:29 8/15/2024    10:34   CMP   Glucose 109  245  92    BUN 18  17  16    Creatinine 0.96  1.08  1.23    EGFR 81.4  70.7  60.5    Sodium 138  137  139    Potassium 4.8  4.8  4.5    Chloride 101  99  101    Calcium 9.0  9.3  9.3    Total Protein 6.9  7.0  7.0    Albumin 3.8  3.9  3.9    Globulin 3.1  3.1  3.1    Total Bilirubin 0.2  0.4  0.4    Alkaline Phosphatase 53  57  61    AST (SGOT) 25  24  53    ALT (SGPT) 23  26  60    Albumin/Globulin Ratio 1.2  1.3  1.3    BUN/Creatinine Ratio 18.8  15.7  13.0    Anion Gap 10.0  9.5  9.3      CBC          6/13/2024    10:00 7/30/2024    13:29 8/15/2024    10:34   CBC   WBC 7.83  8.21  7.37    RBC 3.79  4.01  3.90    Hemoglobin 11.4  11.8  11.4    Hematocrit 34.7  36.8  35.5    MCV 91.6  91.8  91.0    MCH 30.1  29.4  29.2    MCHC 32.9  32.1  " 32.1    RDW 13.7  13.9  13.6    Platelets 198  212  213      Lipid Panel          12/20/2023    16:34 2/6/2024    11:48 6/13/2024    10:00   Lipid Panel   Total Cholesterol 100  113  97    Triglycerides 86  181  68    HDL Cholesterol 38  42  42    VLDL Cholesterol 17  30  15    LDL Cholesterol  45  41  40    LDL/HDL Ratio 1.18  0.83  0.99      TSH          12/20/2023    16:34 2/6/2024    11:48 3/13/2024    15:12   TSH   TSH 0.653  0.652  1.780        Data reviewed : Cardiology studies heart failure notes reviewed.      Procedures      Giles Donovan  reports that he has never smoked. He has never been exposed to tobacco smoke. He has never used smokeless tobacco. I have educated him on the risk of diseases from using tobacco products such as cancer, COPD, and heart disease.                Assessment and Plan        ASSESSMENT:  Encounter Diagnoses   Name Primary?    Chronic heart failure with preserved ejection fraction Yes    Primary hypertension     Mixed hyperlipidemia     Paroxysmal atrial fibrillation     Stage 3a chronic kidney disease          PLAN:    1.  Heart failure with preserved ejection fraction, volume status stable upon exam.  He follows with heart failure clinic.  His blood pressure is borderline however he is asymptomatic.  Continue current medical therapy and follow-up with heart failure clinic as scheduled.  2.  Essential hypertension as above.  3.  Mixed hyperlipidemia, stable on statin therapy continue.  4.  Paroxysmal atrial fibrillation-clinically maintaining sinus rhythm.  Continue beta-blocker therapy and anticoagulation.  No bleeding problems.    Follow-up in 6 months unless problems arise.      Patient was given instructions and counseling regarding his condition or for health maintenance advice. Please see specific information pulled into the AVS if appropriate.           Mabel Granger, APRN   8/15/2024  11:58 EDT

## 2024-08-19 NOTE — PROGRESS NOTES
Spoke with Vicky, informed to have pt hold the atorvastatin for 3 days and then restart it back at half tab and to make sure to have blood work drawn prior to next appt.  States he is not taking tylenol.

## 2024-09-04 RX ORDER — CARVEDILOL 6.25 MG/1
6.25 TABLET ORAL 2 TIMES DAILY
Qty: 60 TABLET | Refills: 0 | Status: SHIPPED | OUTPATIENT
Start: 2024-09-04

## 2024-09-04 NOTE — TELEPHONE ENCOUNTER
Pt LISSET Perry called requesting more carvedilol 6.25 mg, will be running out before appointment.  Chart review complete, pt is taking carvedilol 12.5 mg tab plus 6.25 mg tab twice a day to equal 18.75 mg.  Med sent to pharmacy for refill.

## 2024-09-10 ENCOUNTER — LAB (OUTPATIENT)
Dept: LAB | Facility: HOSPITAL | Age: 79
End: 2024-09-10
Payer: MEDICARE

## 2024-09-10 DIAGNOSIS — I50.32 CHRONIC HEART FAILURE WITH PRESERVED EJECTION FRACTION: Primary | ICD-10-CM

## 2024-09-10 DIAGNOSIS — I50.32 CHRONIC HEART FAILURE WITH PRESERVED EJECTION FRACTION: ICD-10-CM

## 2024-09-10 LAB
ALBUMIN SERPL-MCNC: 4 G/DL (ref 3.5–5.2)
ALBUMIN/GLOB SERPL: 1.3 G/DL
ALP SERPL-CCNC: 55 U/L (ref 39–117)
ALT SERPL W P-5'-P-CCNC: 27 U/L (ref 1–41)
ANION GAP SERPL CALCULATED.3IONS-SCNC: 8.8 MMOL/L (ref 5–15)
AST SERPL-CCNC: 26 U/L (ref 1–40)
BASOPHILS # BLD AUTO: 0.03 10*3/MM3 (ref 0–0.2)
BASOPHILS NFR BLD AUTO: 0.4 % (ref 0–1.5)
BILIRUB SERPL-MCNC: 0.5 MG/DL (ref 0–1.2)
BUN SERPL-MCNC: 22 MG/DL (ref 8–23)
BUN/CREAT SERPL: 20.2 (ref 7–25)
CALCIUM SPEC-SCNC: 9.4 MG/DL (ref 8.6–10.5)
CHLORIDE SERPL-SCNC: 99 MMOL/L (ref 98–107)
CO2 SERPL-SCNC: 28.2 MMOL/L (ref 22–29)
CREAT SERPL-MCNC: 1.09 MG/DL (ref 0.76–1.27)
DEPRECATED RDW RBC AUTO: 46.2 FL (ref 37–54)
EGFRCR SERPLBLD CKD-EPI 2021: 69.9 ML/MIN/1.73
EOSINOPHIL # BLD AUTO: 0.15 10*3/MM3 (ref 0–0.4)
EOSINOPHIL NFR BLD AUTO: 2 % (ref 0.3–6.2)
ERYTHROCYTE [DISTWIDTH] IN BLOOD BY AUTOMATED COUNT: 13.8 % (ref 12.3–15.4)
GLOBULIN UR ELPH-MCNC: 3.2 GM/DL
GLUCOSE SERPL-MCNC: 197 MG/DL (ref 65–99)
HCT VFR BLD AUTO: 37.5 % (ref 37.5–51)
HGB BLD-MCNC: 12.2 G/DL (ref 13–17.7)
IMM GRANULOCYTES # BLD AUTO: 0.02 10*3/MM3 (ref 0–0.05)
IMM GRANULOCYTES NFR BLD AUTO: 0.3 % (ref 0–0.5)
LYMPHOCYTES # BLD AUTO: 2.04 10*3/MM3 (ref 0.7–3.1)
LYMPHOCYTES NFR BLD AUTO: 26.9 % (ref 19.6–45.3)
MAGNESIUM SERPL-MCNC: 1.9 MG/DL (ref 1.6–2.4)
MCH RBC QN AUTO: 29.9 PG (ref 26.6–33)
MCHC RBC AUTO-ENTMCNC: 32.5 G/DL (ref 31.5–35.7)
MCV RBC AUTO: 91.9 FL (ref 79–97)
MONOCYTES # BLD AUTO: 0.95 10*3/MM3 (ref 0.1–0.9)
MONOCYTES NFR BLD AUTO: 12.5 % (ref 5–12)
NEUTROPHILS NFR BLD AUTO: 4.38 10*3/MM3 (ref 1.7–7)
NEUTROPHILS NFR BLD AUTO: 57.9 % (ref 42.7–76)
NRBC BLD AUTO-RTO: 0 /100 WBC (ref 0–0.2)
PLATELET # BLD AUTO: 200 10*3/MM3 (ref 140–450)
PMV BLD AUTO: 10 FL (ref 6–12)
POTASSIUM SERPL-SCNC: 5 MMOL/L (ref 3.5–5.2)
PROT SERPL-MCNC: 7.2 G/DL (ref 6–8.5)
RBC # BLD AUTO: 4.08 10*6/MM3 (ref 4.14–5.8)
SODIUM SERPL-SCNC: 136 MMOL/L (ref 136–145)
WBC NRBC COR # BLD AUTO: 7.57 10*3/MM3 (ref 3.4–10.8)

## 2024-09-10 PROCEDURE — 85025 COMPLETE CBC W/AUTO DIFF WBC: CPT

## 2024-09-10 PROCEDURE — 80053 COMPREHEN METABOLIC PANEL: CPT

## 2024-09-10 PROCEDURE — 36415 COLL VENOUS BLD VENIPUNCTURE: CPT

## 2024-09-10 PROCEDURE — 83735 ASSAY OF MAGNESIUM: CPT

## 2024-09-11 ENCOUNTER — OFFICE VISIT (OUTPATIENT)
Dept: CARDIOLOGY | Facility: CLINIC | Age: 79
End: 2024-09-11
Payer: MEDICARE

## 2024-09-11 VITALS
WEIGHT: 197 LBS | SYSTOLIC BLOOD PRESSURE: 114 MMHG | HEIGHT: 67 IN | DIASTOLIC BLOOD PRESSURE: 70 MMHG | HEART RATE: 78 BPM | BODY MASS INDEX: 30.92 KG/M2

## 2024-09-11 DIAGNOSIS — I10 PRIMARY HYPERTENSION: ICD-10-CM

## 2024-09-11 DIAGNOSIS — E78.2 MIXED HYPERLIPIDEMIA: ICD-10-CM

## 2024-09-11 DIAGNOSIS — I48.0 PAROXYSMAL ATRIAL FIBRILLATION: ICD-10-CM

## 2024-09-11 DIAGNOSIS — I50.32 CHRONIC HEART FAILURE WITH PRESERVED EJECTION FRACTION: Primary | ICD-10-CM

## 2024-09-11 NOTE — ASSESSMENT & PLAN NOTE
Mr. Donovan has paroxysmal atrial fibrillation.  His is maintaining sinus rhythm.  His heart rate is well-controlled with current dose of carvedilol.  Will continue Eliquis for stroke prevention.

## 2024-09-11 NOTE — ASSESSMENT & PLAN NOTE
Mr. Donovan's blood pressure stable per his home log.  He has not had any recent episodes of hypotension dizziness or lightheadedness.  He continues to utilize Entresto 24/26 mg a half a tablet twice a day.  I am going to try to increase him to a half a tablet in the morning and a full tablet at night as long as his systolic blood pressure is greater than 100.  I have asked him to do a heart rate blood pressure and weight log and bring it to his next appointment.

## 2024-09-11 NOTE — PROGRESS NOTES
Office Visit    Chief Complaint  Chronic heart failure with preserved ejection fraction    Subjective            Giles Donovan presents to [facility]  History of Present Illness  Mr. Donovan is a 78-year-old male that presents to the office today for follow-up due to heart failure with reduced ejection fraction, hypertension, atrial fibrillation and hyperlipidemia.  He is doing much better today.  Short of air and pedal edema have almost fully resolved.  He denies any symptoms at this time.  Heart rate and blood pressure are stable.  He denies any chest pain, dizziness, orthopnea or syncopal episodes.      Past Medical History:   Diagnosis Date    A-fib     Allergic rhinitis 12/27/2014    Will try Zyrtec, he didnt want to use a nasal spray.    Arthritis     Asthma     Cataract     left eye    CHF (congestive heart failure)     Cough 03/30/2016    PFT and CXR ordered.    Depression     PTSD    Diabetes     Elevated cholesterol     H/O psychiatric care 1999    PTSD    Hyperlipidemia 03/30/2016    Lipids ordered. Continue on current medication.    Hypertension     Hypothyroidism     Seasonal allergies     Shortness of breath     Sleep apnea     Stenosis of right carotid artery 02/19/2017    Will refer to vascular surgeon.    Syncope 02/19/2017    Type 2 diabetes mellitus     Upper respiratory infection 10/18/2015    Will treat cough with Hydromet, otherwise over the counter meds for treatment.       Allergies   Allergen Reactions    Latex Rash and Anaphylaxis    Latex, Natural Rubber Itching        Past Surgical History:   Procedure Laterality Date    CATARACT EXTRACTION Right     x2    COLONOSCOPY      JOINT REPLACEMENT      REPLACEMENT TOTAL HIP ONCOLOGIC Right     RETINAL DETACHMENT REPAIR      TOE AMPUTATION Left 11/2017    Second phalaeng.    TOE OSTEOPHYTE REMOVAL          Social History     Tobacco Use    Smoking status: Never     Passive exposure: Never    Smokeless tobacco:  Never    Tobacco comments:     Pt stated he smoked a pipe [id number]. Pt stated he smoked a pipe a couple times a day. Pt stated he mixed with tobacco and marijuana   Vaping Use    Vaping status: Never Used   Substance Use Topics    Alcohol use: Yes     Alcohol/week: 7.0 standard drinks of alcohol     Types: 7 Cans of beer per week     Comment: occasional    Drug use: Not Currently     Types: Marijuana     Comment: quit 1978       Family History   Problem Relation Age of Onset    Heart disease Mother     Lupus Mother     Arthritis Mother     Kidney disease Sister         Prior to Admission medications    Medication Sig Start Date End Date Taking? Authorizing Provider   albuterol (PROVENTIL) (2.5 MG/3ML) 0.083% nebulizer solution Take 2.5 mg by nebulization Every 4 (Four) Hours As Needed for Wheezing or Shortness of Air.   Yes Kierra Pitts MD   apixaban (Eliquis) 5 MG tablet tablet Take 1 tablet by mouth Every 12 (Twelve) Hours.   Yes Kierra Pitts MD   aspirin EC 81 MG EC tablet Take 1 tablet by mouth Daily.  Patient taking differently: Take 1 tablet by mouth Every Other Day. 5/22/24  Yes Morena Masters MD   atorvastatin (LIPITOR) 80 MG tablet Take 1 tablet by mouth Daily.  Patient taking differently: Take 0.5 tablets by mouth Daily. 8/9/22  Yes Cathy Ochoa MD   benzonatate (TESSALON) 100 MG capsule Take 1-2 capsules by mouth 3 (Three) Times a Day As Needed for Cough. 5/1/24  Yes Morena Masters MD   buPROPion XL (WELLBUTRIN XL) 150 MG 24 hr tablet Take 1 tablet by mouth Daily.   Yes Kierra Pitts MD   carvedilol (COREG) 12.5 MG tablet Take 1.5 tablets by mouth 2 (Two) Times a Day.  Patient taking differently: Take 1 tablet by mouth 2 (Two) Times a Day. 7/30/24  Yes Talia Marcelo APRN   carvedilol (COREG) 6.25 MG tablet Take 1 tablet by mouth 2 (Two) Times a Day. 9/4/24  Yes Talia Marcelo APRN   cetirizine (ZyrTEC Allergy) 10 MG tablet Take 1 tablet by mouth Daily.  5/1/24  Yes Morena Masters MD   Cholecalciferol (Vitamin D-3) 25 MCG (1000 UT) capsule Take 1,000 Units by mouth Daily. 5/1/24  Yes Morena Masters MD   ezetimibe (ZETIA) 10 MG tablet Take 1 tablet by mouth Daily. 8/13/24  Yes Morena Masters MD   finasteride (PROSCAR) 5 MG tablet Take 1 tablet by mouth Daily. 7/2/24  Yes Tabatha Oconnell APRN   fluticasone-salmeterol (ADVAIR HFA) 230-21 MCG/ACT inhaler Inhale 2 puffs 2 (Two) Times a Day.   Yes Kierra Pitts MD   gabapentin (NEURONTIN) 600 MG tablet Take 0.5 tablets by mouth Every Night. 3/31/21  Yes Kierra Pitts MD   guaiFENesin (MUCINEX) 600 MG 12 hr tablet Take 2 tablets by mouth 2 (Two) Times a Day As Needed for Cough or Congestion.   Yes Kierra Pitts MD   insulin aspart (novoLOG) 100 UNIT/ML injection Inject 24-26 Units under the skin into the appropriate area as directed 3 (Three) Times a Day Before Meals.   Yes Kierra Pitts MD   Insulin Glargine (Lantus SoloStar) 100 UNIT/ML injection pen Inject 40-44 Units under the skin into the appropriate area as directed Every Night.   Yes Kierra Pitts MD   levothyroxine (SYNTHROID, LEVOTHROID) 100 MCG tablet Take 1 tablet by mouth Daily.   Yes Kierra Pitts MD   metFORMIN (GLUCOPHAGE) 500 MG tablet Take 1 tablet by mouth 2 (Two) Times a Day With Meals.   Yes ProviderKierra MD   miconazole (Micatin) 2 % cream Apply 1 Application topically to the appropriate area as directed 2 (Two) Times a Day. 6/4/24  Yes Morena Masters MD   nystatin-triamcinolone (MYCOLOG) 601060-6.1 UNIT/GM-% ointment Apply to affected area 2 times daily 7/2/24  Yes Tabatha Oconnell APRN   olopatadine (PATANOL) 0.1 % ophthalmic solution Administer 1 drop to both eyes Daily As Needed for Allergies.   Yes Kierra Pitts MD   omeprazole (priLOSEC) 20 MG capsule Take 1 capsule by mouth Daily. 10/9/23  Yes Issac Ortiz MD   PARoxetine (PAXIL) 40 MG tablet Take 1  "tablet by mouth Every Evening. 4/5/21  Yes Provider, MD Kierra   sacubitril-valsartan (Entresto) 24-26 MG tablet Take 0.5 tablets by mouth 2 (Two) Times a Day. 5/31/24  Yes Talia Marcelo APRN   Semaglutide, 1 MG/DOSE, (Ozempic, 1 MG/DOSE,) 2 MG/1.5ML solution pen-injector Inject 1 mg under the skin into the appropriate area as directed 1 (One) Time Per Week.  Patient taking differently: Inject 1 mg under the skin into the appropriate area as directed 1 (One) Time Per Week. Sundays 3/20/24  Yes Morena Masters MD   spironolactone (ALDACTONE) 25 MG tablet Take 1 tablet by mouth Daily. Omit on Sundays 6/27/24  Yes Talia Marcelo APRN   traZODone (DESYREL) 100 MG tablet Take 0.5 tablets by mouth At Night As Needed for Sleep. 4/22/22  Yes Provider, MD Kierra        Review of Systems   Constitutional:  Negative for fatigue.   Respiratory:  Negative for cough and shortness of breath.    Cardiovascular:  Negative for chest pain, palpitations and leg swelling.   Neurological:  Negative for dizziness.        Symptom Course: Been Stable    Weight Trend: Fluctuating Minimally     Objective     /70 (BP Location: Right arm)   Pulse 78   Ht 170.2 cm (67\")   Wt 89.4 kg (197 lb)   BMI 30.85 kg/m²       Physical Exam  Constitutional:       General: He is awake.      Appearance: Normal appearance.   Neck:      Thyroid: No thyromegaly.      Vascular: No carotid bruit or JVD.   Cardiovascular:      Rate and Rhythm: Normal rate and regular rhythm.      Chest Wall: PMI is not displaced.      Pulses: Normal pulses.      Heart sounds: Normal heart sounds, S1 normal and S2 normal. No murmur heard.     No friction rub. No gallop. No S3 or S4 sounds.   Pulmonary:      Effort: Pulmonary effort is normal.      Breath sounds: Normal breath sounds and air entry. No wheezing, rhonchi or rales.   Abdominal:      General: Bowel sounds are normal.      Palpations: Abdomen is soft. There is no mass.      Tenderness: There " is no abdominal tenderness.   Musculoskeletal:      Cervical back: Neck supple.      Right lower leg: No edema.      Left lower leg: No edema.   Neurological:      Mental Status: He is alert and oriented to person, place, and time.   Psychiatric:         Mood and Affect: Mood normal.         Behavior: Behavior is cooperative.           Result Review :                      Lab Results   Component Value Date    PROBNP 454.9 07/30/2024    PROBNP 251.0 06/13/2024    PROBNP 770.0 05/31/2024     CMP          7/30/2024    13:29 8/15/2024    10:34 9/10/2024    14:42   CMP   Glucose 245  92  197    BUN 17  16  22    Creatinine 1.08  1.23  1.09    EGFR 70.7  60.5  69.9    Sodium 137  139  136    Potassium 4.8  4.5  5.0    Chloride 99  101  99    Calcium 9.3  9.3  9.4    Total Protein 7.0  7.0  7.2    Albumin 3.9  3.9  4.0    Globulin 3.1  3.1  3.2    Total Bilirubin 0.4  0.4  0.5    Alkaline Phosphatase 57  61  55    AST (SGOT) 24  53  26    ALT (SGPT) 26  60  27    Albumin/Globulin Ratio 1.3  1.3  1.3    BUN/Creatinine Ratio 15.7  13.0  20.2    Anion Gap 9.5  9.3  8.8      CBC w/diff          7/30/2024    13:29 8/15/2024    10:34 9/10/2024    14:42   CBC w/Diff   WBC 8.21  7.37  7.57    RBC 4.01  3.90  4.08    Hemoglobin 11.8  11.4  12.2    Hematocrit 36.8  35.5  37.5    MCV 91.8  91.0  91.9    MCH 29.4  29.2  29.9    MCHC 32.1  32.1  32.5    RDW 13.9  13.6  13.8    Platelets 212  213  200    Neutrophil Rel % 64.0  51.6  57.9    Immature Granulocyte Rel % 0.5  0.3  0.3    Lymphocyte Rel % 23.1  33.6  26.9    Monocyte Rel % 10.7  12.1  12.5    Eosinophil Rel % 1.2  1.9  2.0    Basophil Rel % 0.5  0.5  0.4       Lipid Panel          12/20/2023    16:34 2/6/2024    11:48 6/13/2024    10:00   Lipid Panel   Total Cholesterol 100  113  97    Triglycerides 86  181  68    HDL Cholesterol 38  42  42    VLDL Cholesterol 17  30  15    LDL Cholesterol  45  41  40    LDL/HDL Ratio 1.18  0.83  0.99       Lab Results   Component Value Date  "   TSH 1.780 03/13/2024    TSH 0.652 02/06/2024    TSH 0.653 12/20/2023      Lab Results   Component Value Date    FREET4 1.09 03/13/2024    FREET4 1.09 06/06/2023    FREET4 1.1 12/02/2020      No results found for: \"DDIMERQUANT\"  Magnesium   Date Value Ref Range Status   09/10/2024 1.9 1.6 - 2.4 mg/dL Final      No results found for: \"DIGOXIN\"   A1C Last 3 Results          12/20/2023    16:34 2/6/2024    11:48 4/22/2024    09:34   HGBA1C Last 3 Results   Hemoglobin A1C 8.80  9.80  8.80           Results for orders placed during the hospital encounter of 09/16/23    Adult Transthoracic Echo Complete w/ Color, Spectral and Contrast if necessary per protocol    Interpretation Summary    Left ventricular systolic function is low normal. Calculated left ventricular EF = 45.1%    The left ventricular cavity is borderline dilated.    Left ventricular diastolic function is consistent with (grade I) impaired relaxation.    There is mild calcification of the aortic valve.        Assessment and Plan        Diagnoses and all orders for this visit:    1. Chronic heart failure with preserved ejection fraction (Primary)  Assessment & Plan:  Mr. Donovan has heart failure with preserved ejection fraction who appears to be well compensated today.  His short of air and pedal edema have resolved.  His heart rate and blood pressure are stable.  Will continue carvedilol at the current dose and attempt to titrate Entresto.  He is unable to utilize an SGLT2 due to ongoing urinary tract infections.  Will make the following changes to his heart failure medications:    1.  Increase Entresto 24/26 mg take a half a tablet in the morning and a full tablet at night as long as systolic blood pressure stays 100 or greater.  2.  Do a heart rate blood pressure and weight log and bring it to next appointment.  3.  Do blood work 1 to 2 days prior to next visit.  4.  Follow a low potassium diet.    Orders:  -     CBC & Differential; Future  -     " Comprehensive Metabolic Panel; Future  -     Magnesium; Future    2. Mixed hyperlipidemia  Assessment & Plan:  Mr. Donovan utilizes atorvastatin 40 mg for hyperlipidemia.  Will continue the same.      3. Primary hypertension  Assessment & Plan:  Mr. Donovan's blood pressure stable per his home log.  He has not had any recent episodes of hypotension dizziness or lightheadedness.  He continues to utilize Entresto 24/26 mg a half a tablet twice a day.  I am going to try to increase him to a half a tablet in the morning and a full tablet at night as long as his systolic blood pressure is greater than 100.  I have asked him to do a heart rate blood pressure and weight log and bring it to his next appointment.      4. Paroxysmal atrial fibrillation  Assessment & Plan:  Mr. Donovan has paroxysmal atrial fibrillation.  His is maintaining sinus rhythm.  His heart rate is well-controlled with current dose of carvedilol.  Will continue Eliquis for stroke prevention.    Orders:  -     CBC & Differential; Future  -     Comprehensive Metabolic Panel; Future  -     Magnesium; Future            Follow Up     Return in about 2 months (around 11/11/2024).    Patient was given instructions and counseling regarding his condition or for health maintenance advice. Please see specific information pulled into the AVS if appropriate.     Talia Marcelo, APRN   09/11/24 12:44 EDT

## 2024-09-11 NOTE — PATIENT INSTRUCTIONS
1.  Increase Entresto 24/26 mg take a half a tablet in the morning and a full tablet at night as long as systolic blood pressure stays 100 or greater.  2.  Do a heart rate blood pressure and weight log and bring it to next appointment.  3.  Do blood work 1 to 2 days prior to next visit.  4.  Follow a low potassium diet.

## 2024-09-11 NOTE — ASSESSMENT & PLAN NOTE
Mr. Donovan has heart failure with preserved ejection fraction who appears to be well compensated today.  His short of air and pedal edema have resolved.  His heart rate and blood pressure are stable.  Will continue carvedilol at the current dose and attempt to titrate Entresto.  He is unable to utilize an SGLT2 due to ongoing urinary tract infections.  Will make the following changes to his heart failure medications:    1.  Increase Entresto 24/26 mg take a half a tablet in the morning and a full tablet at night as long as systolic blood pressure stays 100 or greater.  2.  Do a heart rate blood pressure and weight log and bring it to next appointment.  3.  Do blood work 1 to 2 days prior to next visit.  4.  Follow a low potassium diet.

## 2024-09-12 ENCOUNTER — HOSPITAL ENCOUNTER (OUTPATIENT)
Dept: RESPIRATORY THERAPY | Facility: HOSPITAL | Age: 79
Discharge: HOME | End: 2024-09-12
Admitting: INTERNAL MEDICINE
Payer: MEDICARE

## 2024-09-12 DIAGNOSIS — J98.4 RESTRICTIVE LUNG DISEASE: ICD-10-CM

## 2024-09-12 PROCEDURE — 94726 PLETHYSMOGRAPHY LUNG VOLUMES: CPT

## 2024-09-12 PROCEDURE — 94060 EVALUATION OF WHEEZING: CPT

## 2024-09-12 PROCEDURE — 94729 DIFFUSING CAPACITY: CPT

## 2024-09-12 RX ORDER — ALBUTEROL SULFATE 0.83 MG/ML
2.5 SOLUTION RESPIRATORY (INHALATION) ONCE
Status: COMPLETED | OUTPATIENT
Start: 2024-09-12 | End: 2024-09-12

## 2024-09-12 RX ADMIN — ALBUTEROL SULFATE 2.5 MG: 2.5 SOLUTION RESPIRATORY (INHALATION) at 14:33

## 2024-09-23 ENCOUNTER — OFFICE VISIT (OUTPATIENT)
Dept: INTERNAL MEDICINE | Facility: CLINIC | Age: 79
End: 2024-09-23
Payer: MEDICARE

## 2024-09-23 VITALS
HEART RATE: 81 BPM | OXYGEN SATURATION: 99 % | DIASTOLIC BLOOD PRESSURE: 88 MMHG | SYSTOLIC BLOOD PRESSURE: 112 MMHG | WEIGHT: 195.8 LBS | BODY MASS INDEX: 30.67 KG/M2 | TEMPERATURE: 97.4 F

## 2024-09-23 DIAGNOSIS — R05.9 COUGH, UNSPECIFIED TYPE: ICD-10-CM

## 2024-09-23 DIAGNOSIS — J10.1 INFLUENZA B: Primary | ICD-10-CM

## 2024-09-23 LAB
EXPIRATION DATE: ABNORMAL
FLUAV AG UPPER RESP QL IA.RAPID: NOT DETECTED
FLUBV AG UPPER RESP QL IA.RAPID: DETECTED
INTERNAL CONTROL: ABNORMAL
Lab: ABNORMAL
SARS-COV-2 AG UPPER RESP QL IA.RAPID: NOT DETECTED

## 2024-09-23 PROCEDURE — 3079F DIAST BP 80-89 MM HG: CPT | Performed by: PHYSICIAN ASSISTANT

## 2024-09-23 PROCEDURE — 99213 OFFICE O/P EST LOW 20 MIN: CPT | Performed by: PHYSICIAN ASSISTANT

## 2024-09-23 PROCEDURE — 3074F SYST BP LT 130 MM HG: CPT | Performed by: PHYSICIAN ASSISTANT

## 2024-09-23 PROCEDURE — 1126F AMNT PAIN NOTED NONE PRSNT: CPT | Performed by: PHYSICIAN ASSISTANT

## 2024-09-23 PROCEDURE — 1160F RVW MEDS BY RX/DR IN RCRD: CPT | Performed by: PHYSICIAN ASSISTANT

## 2024-09-23 PROCEDURE — 1159F MED LIST DOCD IN RCRD: CPT | Performed by: PHYSICIAN ASSISTANT

## 2024-09-23 PROCEDURE — 87428 SARSCOV & INF VIR A&B AG IA: CPT | Performed by: PHYSICIAN ASSISTANT

## 2024-10-01 RX ORDER — CARVEDILOL 6.25 MG/1
6.25 TABLET ORAL 2 TIMES DAILY
Qty: 180 TABLET | Refills: 3 | Status: SHIPPED | OUTPATIENT
Start: 2024-10-01

## 2024-10-01 NOTE — TELEPHONE ENCOUNTER
Patient's SO called requesting refill on carvedilol 6.25 mg, chart review complete, medication sent for refill.

## 2024-10-11 ENCOUNTER — OFFICE VISIT (OUTPATIENT)
Dept: PULMONOLOGY | Facility: CLINIC | Age: 79
End: 2024-10-11
Payer: MEDICARE

## 2024-10-11 ENCOUNTER — TELEPHONE (OUTPATIENT)
Dept: UROLOGY | Facility: CLINIC | Age: 79
End: 2024-10-11
Payer: MEDICARE

## 2024-10-11 VITALS
DIASTOLIC BLOOD PRESSURE: 63 MMHG | HEIGHT: 67 IN | HEART RATE: 87 BPM | BODY MASS INDEX: 31.08 KG/M2 | TEMPERATURE: 98.7 F | OXYGEN SATURATION: 99 % | RESPIRATION RATE: 16 BRPM | SYSTOLIC BLOOD PRESSURE: 112 MMHG | WEIGHT: 198 LBS

## 2024-10-11 DIAGNOSIS — Z23 ENCOUNTER FOR IMMUNIZATION: ICD-10-CM

## 2024-10-11 DIAGNOSIS — J45.40 MODERATE PERSISTENT ASTHMA WITHOUT COMPLICATION: Chronic | ICD-10-CM

## 2024-10-11 DIAGNOSIS — I48.0 PAROXYSMAL ATRIAL FIBRILLATION: Chronic | ICD-10-CM

## 2024-10-11 DIAGNOSIS — I50.32 CHRONIC HEART FAILURE WITH PRESERVED EJECTION FRACTION: Chronic | ICD-10-CM

## 2024-10-11 DIAGNOSIS — B37.9 CANDIDIASIS: ICD-10-CM

## 2024-10-11 DIAGNOSIS — J98.4 RESTRICTIVE LUNG DISEASE: Primary | Chronic | ICD-10-CM

## 2024-10-11 RX ORDER — FLUTICASONE PROPIONATE AND SALMETEROL XINAFOATE 230; 21 UG/1; UG/1
2 AEROSOL, METERED RESPIRATORY (INHALATION) 2 TIMES DAILY
Qty: 3 EACH | Refills: 3 | Status: SHIPPED | OUTPATIENT
Start: 2024-10-11

## 2024-10-11 NOTE — PROGRESS NOTES
Primary Care Provider  Morena Masters MD     Referring Provider  No ref. provider found     Chief Complaint  Follow-up (3 month follow up/PFT 09/12), Restrictive Lung Disease, Allergic Rhinitis, Pleural Effusion, Cough (Productive- clear), and Shortness of Breath (SOA- with exertion)    Subjective          Giles Donovan presents to [facility]  History of Present Illness  Giles Donovan is a 78 y.o. male patient of Dr. Mcguire here for management of restrictive lung disease, chronic hypoxic respiratory failure, chronic diastolic heart failure, atrial fibrillation and dyspnea on exertion.    Patient states he is doing okay since his last office visit.  He did have influenza B at the end of September.  Patient's recent pulmonary function test from 9/12/2024 shows no obstruction but there is significant response to bronchodilator therapy as seen with asthma.  Patient has mild restrictive defect and a severely reduced diffusion capacity.  His ABG from 7/30/2024 does not show any evidence of hypercapnia.  I have discussed these findings with patient and family today in office.  He continues to use Advair as prescribed.  He has not needed to use his nebulizer treatments.  He continues to wear 2 L of oxygen as needed and is benefiting from its use.  Patient states that overall he is doing okay and has no other concerns at this time.  He does use the help of a rolling walker for mobility.  He would like to receive his flu vaccine today in office.     His history of smoking is   Tobacco Use: Low Risk  (10/11/2024)    Patient History     Smoking Tobacco Use: Never     Smokeless Tobacco Use: Never     Passive Exposure: Never   .    Review of Systems   Constitutional:  Negative for chills, fatigue, fever, unexpected weight gain and unexpected weight loss.   HENT:  Congestion: Nasal.    Respiratory:  Positive for shortness of breath. Negative for apnea, cough  and wheezing.         Negative for Hemoptysis     Cardiovascular:  Negative for chest pain, palpitations and leg swelling.   Skin:         Negative for cyanosis      Sleep: Negative for Excessive daytime sleepiness  Negative for morning headaches  Negative for Snoring    Family History   Problem Relation Age of Onset    Heart disease Mother     Lupus Mother     Arthritis Mother     Kidney disease Sister         Social History     Socioeconomic History    Marital status: Single   Tobacco Use    Smoking status: Never     Passive exposure: Never    Smokeless tobacco: Never    Tobacco comments:     Pt stated he smoked a pipe [id number]. Pt stated he smoked a pipe a couple times a day. Pt stated he mixed with tobacco and marijuana   Vaping Use    Vaping status: Never Used   Substance and Sexual Activity    Alcohol use: Yes     Alcohol/week: 7.0 standard drinks of alcohol     Types: 7 Cans of beer per week     Comment: occasional    Drug use: Not Currently     Types: Marijuana     Comment: quit 1978    Sexual activity: Defer        Past Medical History:   Diagnosis Date    A-fib     Allergic rhinitis 12/27/2014    Will try Zyrtec, he didnt want to use a nasal spray.    Arthritis     Asthma     Cataract     left eye    CHF (congestive heart failure)     Cough 03/30/2016    PFT and CXR ordered.    Depression     PTSD    Diabetes     Elevated cholesterol     H/O psychiatric care 1999    PTSD    Hyperlipidemia 03/30/2016    Lipids ordered. Continue on current medication.    Hypertension     Hypothyroidism     Seasonal allergies     Shortness of breath     Sleep apnea     Stenosis of right carotid artery 02/19/2017    Will refer to vascular surgeon.    Syncope 02/19/2017    Type 2 diabetes mellitus     Upper respiratory infection 10/18/2015    Will treat cough with Hydromet, otherwise over the counter meds for treatment.        Immunization History   Administered Date(s) Administered    COVID-19 (MODERNA) 1st,2nd,3rd Dose  Monovalent 01/01/2021, 01/27/2021    COVID-19 (MODERNA) Monovalent Original Booster 01/18/2022    COVID-19 (PFIZER) 12YRS+ (COMIRNATY) 12/20/2023    COVID-19 (PFIZER) BIVALENT 12+YRS 10/05/2022    Flu Vaccine Intradermal Quad 18-64YR 12/02/2020    Flu Vaccine Quad PF >36MO 10/01/2019, 12/02/2020    Flublok 18+yrs 10/05/2022    Fluzone High-Dose 65+YRS 10/11/2024    Fluzone High-Dose 65+yrs 10/24/2023    Fluzone Quad >6mos (Multi-dose) 09/17/2018    Pneumococcal Conjugate 20-Valent (PCV20) 12/05/2022         Allergies   Allergen Reactions    Latex Rash and Anaphylaxis    Latex, Natural Rubber Itching          Current Outpatient Medications:     albuterol (PROVENTIL) (2.5 MG/3ML) 0.083% nebulizer solution, Take 2.5 mg by nebulization Every 4 (Four) Hours As Needed for Wheezing or Shortness of Air., Disp: , Rfl:     apixaban (Eliquis) 5 MG tablet tablet, Take 1 tablet by mouth Every 12 (Twelve) Hours., Disp: , Rfl:     aspirin EC 81 MG EC tablet, Take 1 tablet by mouth Daily. (Patient taking differently: Take 1 tablet by mouth Every Other Day.), Disp: 90 tablet, Rfl: 1    atorvastatin (LIPITOR) 80 MG tablet, Take 1 tablet by mouth Daily. (Patient taking differently: Take 0.5 tablets by mouth Daily.), Disp: 90 tablet, Rfl: 1    benzonatate (TESSALON) 100 MG capsule, Take 1-2 capsules by mouth 3 (Three) Times a Day As Needed for Cough., Disp: 90 capsule, Rfl: 1    buPROPion XL (WELLBUTRIN XL) 150 MG 24 hr tablet, Take 1 tablet by mouth Daily., Disp: , Rfl:     carvedilol (COREG) 12.5 MG tablet, Take 1.5 tablets by mouth 2 (Two) Times a Day. (Patient taking differently: Take 1 tablet by mouth 2 (Two) Times a Day.), Disp: , Rfl:     carvedilol (COREG) 6.25 MG tablet, Take 1 tablet by mouth 2 (Two) Times a Day., Disp: 180 tablet, Rfl: 3    cetirizine (ZyrTEC Allergy) 10 MG tablet, Take 1 tablet by mouth Daily., Disp: 90 tablet, Rfl: 3    Cholecalciferol (Vitamin D-3) 25 MCG (1000 UT) capsule, Take 1,000 Units by mouth  Daily., Disp: 60 capsule, Rfl: 1    ezetimibe (ZETIA) 10 MG tablet, Take 1 tablet by mouth Daily., Disp: 90 tablet, Rfl: 1    finasteride (PROSCAR) 5 MG tablet, Take 1 tablet by mouth Daily., Disp: 90 tablet, Rfl: 3    fluticasone-salmeterol (ADVAIR HFA) 230-21 MCG/ACT inhaler, Inhale 2 puffs 2 (Two) Times a Day., Disp: 3 each, Rfl: 3    gabapentin (NEURONTIN) 600 MG tablet, Take 0.5 tablets by mouth Every Night., Disp: , Rfl:     guaiFENesin (MUCINEX) 600 MG 12 hr tablet, Take 2 tablets by mouth 2 (Two) Times a Day As Needed for Cough or Congestion., Disp: , Rfl:     insulin aspart (novoLOG) 100 UNIT/ML injection, Inject 24-26 Units under the skin into the appropriate area as directed 3 (Three) Times a Day Before Meals., Disp: , Rfl:     Insulin Glargine (Lantus SoloStar) 100 UNIT/ML injection pen, Inject 40-44 Units under the skin into the appropriate area as directed Every Night., Disp: , Rfl:     levothyroxine (SYNTHROID, LEVOTHROID) 100 MCG tablet, Take 1 tablet by mouth Daily., Disp: , Rfl:     metFORMIN (GLUCOPHAGE) 500 MG tablet, Take 1 tablet by mouth 2 (Two) Times a Day With Meals., Disp: , Rfl:     miconazole (Micatin) 2 % cream, Apply 1 Application topically to the appropriate area as directed 2 (Two) Times a Day., Disp: 35 g, Rfl: 1    nystatin-triamcinolone (MYCOLOG) 470767-0.1 UNIT/GM-% ointment, Apply to affected area 2 times daily, Disp: 30 g, Rfl: 1    olopatadine (PATANOL) 0.1 % ophthalmic solution, Administer 1 drop to both eyes Daily As Needed for Allergies., Disp: , Rfl:     omeprazole (priLOSEC) 20 MG capsule, Take 1 capsule by mouth Daily., Disp: 90 capsule, Rfl: 4    PARoxetine (PAXIL) 40 MG tablet, Take 1 tablet by mouth Every Evening., Disp: , Rfl:     sacubitril-valsartan (Entresto) 24-26 MG tablet, Take 0.5 tablets by mouth 2 (Two) Times a Day., Disp: 180 tablet, Rfl: 3    Semaglutide, 1 MG/DOSE, (Ozempic, 1 MG/DOSE,) 2 MG/1.5ML solution pen-injector, Inject 1 mg under the skin into the  "appropriate area as directed 1 (One) Time Per Week. (Patient taking differently: Inject 1 mg under the skin into the appropriate area as directed 1 (One) Time Per Week. Sundays), Disp: 9 mL, Rfl: 2    spironolactone (ALDACTONE) 25 MG tablet, Take 1 tablet by mouth Daily. Omit on Sundays, Disp: , Rfl:     traZODone (DESYREL) 100 MG tablet, Take 0.5 tablets by mouth At Night As Needed for Sleep., Disp: , Rfl:      Objective   Physical Exam  Constitutional:       General: He is not in acute distress.     Appearance: Normal appearance. He is normal weight.   HENT:      Right Ear: Hearing normal.      Left Ear: Hearing normal.      Nose: No nasal tenderness or congestion.      Mouth/Throat:      Mouth: Mucous membranes are moist. No oral lesions.   Eyes:      Extraocular Movements: Extraocular movements intact.      Pupils: Pupils are equal, round, and reactive to light.   Cardiovascular:      Rate and Rhythm: Normal rate and regular rhythm.      Pulses: Normal pulses.      Heart sounds: Normal heart sounds. No murmur heard.  Pulmonary:      Effort: Pulmonary effort is normal.      Breath sounds: Normal breath sounds. No wheezing, rhonchi or rales.   Musculoskeletal:      Right lower leg: No edema.      Left lower leg: No edema.   Skin:     General: Skin is warm and dry.      Findings: No lesion or rash.   Neurological:      General: No focal deficit present.      Mental Status: He is alert and oriented to person, place, and time.   Psychiatric:         Mood and Affect: Affect normal. Mood is not anxious or depressed.         Vital Signs:   /63 (BP Location: Right arm, Patient Position: Sitting, Cuff Size: Adult)   Pulse 87   Temp 98.7 °F (37.1 °C) (Temporal)   Resp 16   Ht 170.2 cm (67\")   Wt 89.8 kg (198 lb)   SpO2 99% Comment: room air  BMI 31.01 kg/m²        Result Review :   The following data was reviewed by: TENISHA Grant on 10/11/2024:  CMP          7/30/2024    13:29 8/15/2024    10:34 " 9/10/2024    14:42   CMP   Glucose 245  92  197    BUN 17  16  22    Creatinine 1.08  1.23  1.09    EGFR 70.7  60.5  69.9    Sodium 137  139  136    Potassium 4.8  4.5  5.0    Chloride 99  101  99    Calcium 9.3  9.3  9.4    Total Protein 7.0  7.0  7.2    Albumin 3.9  3.9  4.0    Globulin 3.1  3.1  3.2    Total Bilirubin 0.4  0.4  0.5    Alkaline Phosphatase 57  61  55    AST (SGOT) 24  53  26    ALT (SGPT) 26  60  27    Albumin/Globulin Ratio 1.3  1.3  1.3    BUN/Creatinine Ratio 15.7  13.0  20.2    Anion Gap 9.5  9.3  8.8      CBC w/diff          7/30/2024    13:29 8/15/2024    10:34 9/10/2024    14:42   CBC w/Diff   WBC 8.21  7.37  7.57    RBC 4.01  3.90  4.08    Hemoglobin 11.8  11.4  12.2    Hematocrit 36.8  35.5  37.5    MCV 91.8  91.0  91.9    MCH 29.4  29.2  29.9    MCHC 32.1  32.1  32.5    RDW 13.9  13.6  13.8    Platelets 212  213  200    Neutrophil Rel % 64.0  51.6  57.9    Immature Granulocyte Rel % 0.5  0.3  0.3    Lymphocyte Rel % 23.1  33.6  26.9    Monocyte Rel % 10.7  12.1  12.5    Eosinophil Rel % 1.2  1.9  2.0    Basophil Rel % 0.5  0.5  0.4      Covid Tests          4/22/2024    04:27   Common Labs   COVID19 Not Detected      Data reviewed : Radiologic studies chest CT 11/27/2023, chest x-ray 1/10/2024, chest x-ray 4/22/2024, pulmonary function test 9/12/2024 and Dr. Ochoa's last office note    Procedures        Assessment and Plan    Diagnoses and all orders for this visit:    1. Restrictive lung disease (Primary)  Comments:  Stable    2. Moderate persistent asthma without complication  Comments:  Continue Advair  Orders:  -     fluticasone-salmeterol (ADVAIR HFA) 230-21 MCG/ACT inhaler; Inhale 2 puffs 2 (Two) Times a Day.  Dispense: 3 each; Refill: 3    3. Chronic heart failure with preserved ejection fraction  Comments:  Follow-up with cardiology    4. Paroxysmal atrial fibrillation  Comments:  Follow-up with cardiology    5. Encounter for immunization  -     Fluzone High-Dose 65+yrs  ([id number])          Follow Up   Return in about 6 months (around 4/11/2025) for Recheck with Ria.  Patient was given instructions and counseling regarding his condition or for health maintenance advice. Please see specific information pulled into the AVS if appropriate.

## 2024-10-14 RX ORDER — NYSTATIN AND TRIAMCINOLONE ACETONIDE 100000; 1 [USP'U]/G; MG/G
OINTMENT TOPICAL
Qty: 30 G | Refills: 1 | Status: SHIPPED | OUTPATIENT
Start: 2024-10-14

## 2024-11-06 ENCOUNTER — TELEPHONE (OUTPATIENT)
Dept: CASE MANAGEMENT | Facility: OTHER | Age: 79
End: 2024-11-06
Payer: MEDICARE

## 2024-11-06 NOTE — TELEPHONE ENCOUNTER
FYI~This patient has hit the ACO list and was given to me to reach out to for CCM services. I assume due to his last A1c being 8.8. It also looks like he needs follow up regarding appts from referrals that were placed. I called and left a message for him today.    Thanks,  Gali

## 2024-11-07 NOTE — TELEPHONE ENCOUNTER
Caller: DANIELLE ALVES    Relationship: Emergency Contact    Best call back number: [phone]    Requested Prescriptions:   Requested Prescriptions     Pending Prescriptions Disp Refills    apixaban (Eliquis) 5 MG tablet tablet 60 tablet      Sig: Take 1 tablet by mouth Every 12 (Twelve) Hours.        Pharmacy where request should be sent: [address] [phone] [facility] [phone]      Last office visit with prescribing clinician: 7/10/2024   Last telemedicine visit with prescribing clinician: Visit date not found   Next office visit with prescribing clinician: 1/8/2025     Additional details provided by patient: PATIENT ONLY HAS TWO TABLETS LEFT. PLEASE ADVISE    Does the patient have less than a 3 day supply:  [x] Yes  [] No    Would you like a call back once the refill request has been completed: [x] Yes [] No    If the office needs to give you a call back, can they leave a voicemail: [] Yes [] No    Neto Yip Rep   11/07/24 10:21 EST

## 2024-11-07 NOTE — TELEPHONE ENCOUNTER
Caller: DANIELLE ALVES    Relationship to patient: Emergency Contact    Best call back number: [phone]     Patient is needing: CALLER TRYING TO RETURN CALL TO [facility]. CALLER HAS TRIED FOR A COUPLE OF DAYS BUT CAN'T GET THROUGH. PLEASE ADVISE.

## 2024-11-11 ENCOUNTER — TELEPHONE (OUTPATIENT)
Dept: CASE MANAGEMENT | Facility: OTHER | Age: 79
End: 2024-11-11
Payer: MEDICARE

## 2024-11-13 ENCOUNTER — PATIENT OUTREACH (OUTPATIENT)
Dept: CASE MANAGEMENT | Facility: OTHER | Age: 79
End: 2024-11-13
Payer: MEDICARE

## 2024-11-13 DIAGNOSIS — I10 PRIMARY HYPERTENSION: Primary | ICD-10-CM

## 2024-11-13 NOTE — OUTREACH NOTE
AMBULATORY CASE MANAGEMENT NOTE    Names and Relationships of Patient/Support Persons: Contact: DANIELLE ALVES; Relationship: Emergency Contact -     I spoke with patient's partner, Danielle, regarding CCM services. She is very interested in the program for the patient but needs to discuss it with him due to the potential for a copay due to him having  [employer] as a secondary insurance. I gave Danielle both of my office numbers and she is agreeable to call me back after she discusses this with him.    Gali BALLARD  Ambulatory Case Management    11/13/2024, 15:33 EST

## 2024-11-14 ENCOUNTER — TELEPHONE (OUTPATIENT)
Dept: CARDIOLOGY | Facility: CLINIC | Age: 79
End: 2024-11-14
Payer: MEDICARE

## 2024-11-14 NOTE — TELEPHONE ENCOUNTER
Caller: JOHN DANIEL    Relationship: Caregiver (non-relative)    Best call back number:[phone]     Requested Prescriptions:   Requested Prescriptions     Pending Prescriptions Disp Refills    apixaban (Eliquis) 5 MG tablet tablet 180 tablet 1     Sig: Take 1 tablet by mouth Every 12 (Twelve) Hours.        Pharmacy where request should be sent:      Last office visit with prescribing clinician: Visit date not found   Last telemedicine visit with prescribing clinician: Visit date not found   Next office visit with prescribing clinician: 2/21/2025     Additional details provided by patient: PT PRIMARY CARE GIVER CALLED AND SAID THAT WE HAD TO CALL IN BC HE WAS OUT OF REFILLS     Does the patient have less than a 3 day supply:  [x] Yes  [] No    Would you like a call back once the refill request has been completed: [x] Yes [] No    If the office needs to give you a call back, can they leave a voicemail: [x] Yes [] No    Neto Bustamante Rep   11/14/24 13:43 EST

## 2024-11-21 ENCOUNTER — LAB (OUTPATIENT)
Facility: HOSPITAL | Age: 79
End: 2024-11-21
Payer: MEDICARE

## 2024-11-21 ENCOUNTER — OFFICE VISIT (OUTPATIENT)
Dept: CARDIOLOGY | Facility: CLINIC | Age: 79
End: 2024-11-21
Payer: MEDICARE

## 2024-11-21 VITALS
BODY MASS INDEX: 30.45 KG/M2 | HEART RATE: 86 BPM | SYSTOLIC BLOOD PRESSURE: 96 MMHG | DIASTOLIC BLOOD PRESSURE: 57 MMHG | HEIGHT: 67 IN | WEIGHT: 194 LBS

## 2024-11-21 DIAGNOSIS — E78.2 MIXED HYPERLIPIDEMIA: ICD-10-CM

## 2024-11-21 DIAGNOSIS — I50.32 CHRONIC HEART FAILURE WITH PRESERVED EJECTION FRACTION: Primary | ICD-10-CM

## 2024-11-21 DIAGNOSIS — I50.32 CHRONIC HEART FAILURE WITH PRESERVED EJECTION FRACTION: ICD-10-CM

## 2024-11-21 DIAGNOSIS — I48.0 PAROXYSMAL ATRIAL FIBRILLATION: ICD-10-CM

## 2024-11-21 DIAGNOSIS — I10 PRIMARY HYPERTENSION: ICD-10-CM

## 2024-11-21 LAB
ALBUMIN SERPL-MCNC: 4 G/DL (ref 3.5–5.2)
ALBUMIN/GLOB SERPL: 1.3 G/DL
ALP SERPL-CCNC: 58 U/L (ref 39–117)
ALT SERPL W P-5'-P-CCNC: 25 U/L (ref 1–41)
ANION GAP SERPL CALCULATED.3IONS-SCNC: 11.1 MMOL/L (ref 5–15)
AST SERPL-CCNC: 24 U/L (ref 1–40)
BASOPHILS # BLD AUTO: 0.03 10*3/MM3 (ref 0–0.2)
BASOPHILS NFR BLD AUTO: 0.6 % (ref 0–1.5)
BILIRUB SERPL-MCNC: 0.3 MG/DL (ref 0–1.2)
BUN SERPL-MCNC: 17 MG/DL (ref 8–23)
BUN/CREAT SERPL: 13.8 (ref 7–25)
CALCIUM SPEC-SCNC: 9.8 MG/DL (ref 8.6–10.5)
CHLORIDE SERPL-SCNC: 98 MMOL/L (ref 98–107)
CO2 SERPL-SCNC: 26.9 MMOL/L (ref 22–29)
CREAT SERPL-MCNC: 1.23 MG/DL (ref 0.76–1.27)
DEPRECATED RDW RBC AUTO: 45.8 FL (ref 37–54)
EGFRCR SERPLBLD CKD-EPI 2021: 60.1 ML/MIN/1.73
EOSINOPHIL # BLD AUTO: 0.1 10*3/MM3 (ref 0–0.4)
EOSINOPHIL NFR BLD AUTO: 1.9 % (ref 0.3–6.2)
ERYTHROCYTE [DISTWIDTH] IN BLOOD BY AUTOMATED COUNT: 13.3 % (ref 12.3–15.4)
GLOBULIN UR ELPH-MCNC: 3 GM/DL
GLUCOSE SERPL-MCNC: 209 MG/DL (ref 65–99)
HCT VFR BLD AUTO: 35.4 % (ref 37.5–51)
HGB BLD-MCNC: 11.3 G/DL (ref 13–17.7)
IMM GRANULOCYTES # BLD AUTO: 0.02 10*3/MM3 (ref 0–0.05)
IMM GRANULOCYTES NFR BLD AUTO: 0.4 % (ref 0–0.5)
LYMPHOCYTES # BLD AUTO: 1.62 10*3/MM3 (ref 0.7–3.1)
LYMPHOCYTES NFR BLD AUTO: 30.9 % (ref 19.6–45.3)
MAGNESIUM SERPL-MCNC: 1.9 MG/DL (ref 1.6–2.4)
MCH RBC QN AUTO: 30.1 PG (ref 26.6–33)
MCHC RBC AUTO-ENTMCNC: 31.9 G/DL (ref 31.5–35.7)
MCV RBC AUTO: 94.1 FL (ref 79–97)
MONOCYTES # BLD AUTO: 0.96 10*3/MM3 (ref 0.1–0.9)
MONOCYTES NFR BLD AUTO: 18.3 % (ref 5–12)
NEUTROPHILS NFR BLD AUTO: 2.51 10*3/MM3 (ref 1.7–7)
NEUTROPHILS NFR BLD AUTO: 47.9 % (ref 42.7–76)
NRBC BLD AUTO-RTO: 0 /100 WBC (ref 0–0.2)
PLATELET # BLD AUTO: 173 10*3/MM3 (ref 140–450)
PMV BLD AUTO: 9.6 FL (ref 6–12)
POTASSIUM SERPL-SCNC: 4.9 MMOL/L (ref 3.5–5.2)
PROT SERPL-MCNC: 7 G/DL (ref 6–8.5)
RBC # BLD AUTO: 3.76 10*6/MM3 (ref 4.14–5.8)
SODIUM SERPL-SCNC: 136 MMOL/L (ref 136–145)
WBC NRBC COR # BLD AUTO: 5.24 10*3/MM3 (ref 3.4–10.8)

## 2024-11-21 PROCEDURE — 80053 COMPREHEN METABOLIC PANEL: CPT

## 2024-11-21 PROCEDURE — 36415 COLL VENOUS BLD VENIPUNCTURE: CPT

## 2024-11-21 PROCEDURE — 83735 ASSAY OF MAGNESIUM: CPT

## 2024-11-21 PROCEDURE — 85025 COMPLETE CBC W/AUTO DIFF WBC: CPT

## 2024-11-21 NOTE — ADDENDUM NOTE
Addended by: AMARILIS KOCH on: 7/19/2021 03:33 PM     Modules accepted: Orders    
Addended by: YAN ALLISON on: 7/19/2021 03:34 PM     Modules accepted: Orders    
no

## 2024-11-21 NOTE — ASSESSMENT & PLAN NOTE
Mr. Donovan's blood pressure is stable per his home log.  Generally runs on the lower side of normal.  Dizziness and lightheaded has improved.  Will continue carvedilol and Eliquis at the current doses.

## 2024-11-21 NOTE — ASSESSMENT & PLAN NOTE
Mr. Donovan has heart failure with preserved ejection fraction who appears to be doing well.  Short of air and pedal edema have resolved.  Blood pressure is stable although his heart rate remains on the higher side of normal.  We are unable to titrate up the carvedilol any more than 18.75 mg due to low blood pressures and a history of falls.  Will continue medication as prescribed.    1.  Continue all medication as prescribed.  2.  Do a heart rate blood pressure and weight log and bring it to next appointment.  3.  Do blood work 1 to 2 days prior to next visit.

## 2024-11-21 NOTE — ASSESSMENT & PLAN NOTE
Patient has paroxysmal atrial fibrillation.  Heart rate is semicontrolled with the current dose of carvedilol.  We are unable to titrate it up due to low blood pressures.  He denies any chest pain or palpitations.  I have asked him to continue to keep a heart rate blood pressure and weight log and bring it to his next appointment

## 2024-11-21 NOTE — PROGRESS NOTES
Office Visit    Chief Complaint  Chronic HFpEF    Subjective            Giles Donovan presents to [facility] CARDIOLOGY  History of Present Illness  Mr. Donovan is a 79-year-old male that presented to the office for follow-up due to heart failure with reduced ejection fraction, hypertension, atrial fibrillation and hyperlipidemia.  He is doing much better from a cardiac standpoint.  Short of air and pedal edema have improved.  Blood pressure tends to run on the lower side of normal and heart rate slightly elevated.  Mr. Donovan does have a history of falls.  He denies any chest pain, orthopnea or syncopal episodes.      Past Medical History:   Diagnosis Date    A-fib     Allergic rhinitis 12/27/2014    Will try Zyrtec, he didnt want to use a nasal spray.    Arthritis     Asthma     Cataract     left eye    CHF (congestive heart failure)     Cough 03/30/2016    PFT and CXR ordered.    Depression     PTSD    Diabetes     Elevated cholesterol     H/O psychiatric care 1999    PTSD    Hyperlipidemia 03/30/2016    Lipids ordered. Continue on current medication.    Hypertension     Hypothyroidism     Seasonal allergies     Shortness of breath     Sleep apnea     Stenosis of right carotid artery 02/19/2017    Will refer to vascular surgeon.    Syncope 02/19/2017    Type 2 diabetes mellitus     Upper respiratory infection 10/18/2015    Will treat cough with Hydromet, otherwise over the counter meds for treatment.       Allergies   Allergen Reactions    Latex Rash and Anaphylaxis    Latex, Natural Rubber Itching        Past Surgical History:   Procedure Laterality Date    CATARACT EXTRACTION Right     x2    COLONOSCOPY      JOINT REPLACEMENT      REPLACEMENT TOTAL HIP ONCOLOGIC Right     RETINAL DETACHMENT REPAIR      TOE AMPUTATION Left 11/2017    Second phalaeng.    TOE OSTEOPHYTE REMOVAL          Social History     Tobacco Use    Smoking status: Never     Passive exposure: Never    Smokeless tobacco:  Never    Tobacco comments:     Pt stated he smoked a pipe [id number]. Pt stated he smoked a pipe a couple times a day. Pt stated he mixed with tobacco and marijuana   Vaping Use    Vaping status: Never Used   Substance Use Topics    Alcohol use: Yes     Alcohol/week: 7.0 standard drinks of alcohol     Types: 7 Cans of beer per week     Comment: occasional    Drug use: Not Currently     Types: Marijuana     Comment: quit 1978       Family History   Problem Relation Age of Onset    Heart disease Mother     Lupus Mother     Arthritis Mother     Kidney disease Sister         Prior to Admission medications    Medication Sig Start Date End Date Taking? Authorizing Provider   albuterol (PROVENTIL) (2.5 MG/3ML) 0.083% nebulizer solution Take 2.5 mg by nebulization Every 4 (Four) Hours As Needed for Wheezing or Shortness of Air.   Yes Kierra Pitts MD   apixaban (Eliquis) 5 MG tablet tablet Take 1 tablet by mouth Every 12 (Twelve) Hours. 11/14/24  Yes MONIQUE Hallman MD   aspirin EC 81 MG EC tablet Take 1 tablet by mouth Daily.  Patient taking differently: Take 1 tablet by mouth Every Other Day. 5/22/24  Yes Morena Masters MD   atorvastatin (LIPITOR) 80 MG tablet Take 1 tablet by mouth Daily.  Patient taking differently: Take 0.5 tablets by mouth Daily. 8/9/22  Yes Cathy Ochoa MD   benzonatate (TESSALON) 100 MG capsule Take 1-2 capsules by mouth 3 (Three) Times a Day As Needed for Cough. 5/1/24  Yes Morena Masters MD   buPROPion XL (WELLBUTRIN XL) 150 MG 24 hr tablet Take 1 tablet by mouth Daily.   Yes Kierra Pitts MD   carvedilol (COREG) 12.5 MG tablet Take 1.5 tablets by mouth 2 (Two) Times a Day.  Patient taking differently: Take 1 tablet by mouth 2 (Two) Times a Day. 7/30/24  Yes Talia Marcelo APRN   carvedilol (COREG) 6.25 MG tablet Take 1 tablet by mouth 2 (Two) Times a Day. 10/1/24  Yes Talia Marcelo APRN   cetirizine (ZyrTEC Allergy) 10 MG tablet Take 1 tablet by mouth Daily.  5/1/24  Yes Morena Masters MD   Cholecalciferol (Vitamin D-3) 25 MCG (1000 UT) capsule Take 1,000 Units by mouth Daily. 5/1/24  Yes Morena Masters MD   ezetimibe (ZETIA) 10 MG tablet Take 1 tablet by mouth Daily. 8/13/24  Yes Morena Masters MD   finasteride (PROSCAR) 5 MG tablet Take 1 tablet by mouth Daily. 7/2/24  Yes Tabatha Oconnell APRN   fluticasone-salmeterol (ADVAIR HFA) 230-21 MCG/ACT inhaler Inhale 2 puffs 2 (Two) Times a Day. 10/11/24  Yes Aisha Garcia APRN   gabapentin (NEURONTIN) 600 MG tablet Take 0.5 tablets by mouth Every Night. 3/31/21  Yes Kierra Pitts MD   guaiFENesin (MUCINEX) 600 MG 12 hr tablet Take 2 tablets by mouth 2 (Two) Times a Day As Needed for Cough or Congestion.   Yes ProviderKierra MD   insulin aspart (novoLOG) 100 UNIT/ML injection Inject 24-26 Units under the skin into the appropriate area as directed 3 (Three) Times a Day Before Meals.   Yes ProviderKierra MD   Insulin Glargine (Lantus SoloStar) 100 UNIT/ML injection pen Inject 40-44 Units under the skin into the appropriate area as directed Every Night.   Yes ProviderKierra MD   levothyroxine (SYNTHROID, LEVOTHROID) 100 MCG tablet Take 1 tablet by mouth Daily.   Yes ProviderKierra MD   metFORMIN (GLUCOPHAGE) 500 MG tablet Take 1 tablet by mouth 2 (Two) Times a Day With Meals.   Yes Provider, MD Kierra   miconazole (Micatin) 2 % cream Apply 1 Application topically to the appropriate area as directed 2 (Two) Times a Day. 6/4/24  Yes Morena Masters MD   nystatin-triamcinolone (MYCOLOG) 556995-0.1 UNIT/GM-% ointment Apply to affected area 2 times daily 10/14/24  Yes Tabatha Oconnell APRN   olopatadine (PATANOL) 0.1 % ophthalmic solution Administer 1 drop to both eyes Daily As Needed for Allergies.   Yes ProviderKierra MD   omeprazole (priLOSEC) 20 MG capsule Take 1 capsule by mouth Daily. 10/9/23  Yes Issac Ortiz MD   PARoxetine (PAXIL) 40 MG tablet  "Take 1 tablet by mouth Every Evening. 4/5/21  Yes ProviderKierra MD   sacubitril-valsartan (Entresto) 24-26 MG tablet Take 0.5 tablets by mouth 2 (Two) Times a Day. 5/31/24  Yes Talia Marcelo APRN   Semaglutide, 1 MG/DOSE, (Ozempic, 1 MG/DOSE,) 2 MG/1.5ML solution pen-injector Inject 1 mg under the skin into the appropriate area as directed 1 (One) Time Per Week.  Patient taking differently: Inject 1 mg under the skin into the appropriate area as directed 1 (One) Time Per Week. Sundays 3/20/24  Yes Morena Masters MD   spironolactone (ALDACTONE) 25 MG tablet Take 1 tablet by mouth Daily. Omit on Sundays 6/27/24  Yes Talia Marcelo APRN   traZODone (DESYREL) 100 MG tablet Take 0.5 tablets by mouth At Night As Needed for Sleep. 4/22/22  Yes Provider, MD Kierra        Review of Systems   Constitutional:  Negative for fatigue.   Respiratory:  Positive for shortness of breath. Negative for cough.    Cardiovascular:  Negative for chest pain, palpitations and leg swelling.   Neurological:  Positive for dizziness.        Symptom Course: Improved    Weight Trend: Stable     Objective     BP 96/57   Pulse 86   Ht 170.2 cm (67\")   Wt 88 kg (194 lb)   BMI 30.38 kg/m²       Physical Exam  Constitutional:       General: He is awake.      Appearance: Normal appearance.   Neck:      Thyroid: No thyromegaly.      Vascular: No carotid bruit or JVD.   Cardiovascular:      Rate and Rhythm: Normal rate and regular rhythm.      Chest Wall: PMI is not displaced.      Pulses: Normal pulses.      Heart sounds: Normal heart sounds, S1 normal and S2 normal. No murmur heard.     No friction rub. No gallop. No S3 or S4 sounds.   Pulmonary:      Effort: Pulmonary effort is normal.      Breath sounds: Normal breath sounds and air entry. No wheezing, rhonchi or rales.   Abdominal:      General: Bowel sounds are normal.      Palpations: Abdomen is soft. There is no mass.      Tenderness: There is no abdominal tenderness. "   Musculoskeletal:      Cervical back: Neck supple.      Right lower leg: No edema.      Left lower leg: No edema.   Neurological:      Mental Status: He is alert and oriented to person, place, and time.   Psychiatric:         Mood and Affect: Mood normal.         Behavior: Behavior is cooperative.           Result Review :                      Lab Results   Component Value Date    PROBNP 454.9 07/30/2024    PROBNP 251.0 06/13/2024    PROBNP 770.0 05/31/2024     CMP          8/15/2024    10:34 9/10/2024    14:42 11/21/2024    09:20   CMP   Glucose 92  197  209    BUN 16  22  17    Creatinine 1.23  1.09  1.23    EGFR 60.5  69.9  60.1    Sodium 139  136  136    Potassium 4.5  5.0  4.9    Chloride 101  99  98    Calcium 9.3  9.4  9.8    Total Protein 7.0  7.2  7.0    Albumin 3.9  4.0  4.0    Globulin 3.1  3.2  3.0    Total Bilirubin 0.4  0.5  0.3    Alkaline Phosphatase 61  55  58    AST (SGOT) 53  26  24    ALT (SGPT) 60  27  25    Albumin/Globulin Ratio 1.3  1.3  1.3    BUN/Creatinine Ratio 13.0  20.2  13.8    Anion Gap 9.3  8.8  11.1      CBC w/diff          8/15/2024    10:34 9/10/2024    14:42 11/21/2024    09:20   CBC w/Diff   WBC 7.37  7.57  5.24    RBC 3.90  4.08  3.76    Hemoglobin 11.4  12.2  11.3    Hematocrit 35.5  37.5  35.4    MCV 91.0  91.9  94.1    MCH 29.2  29.9  30.1    MCHC 32.1  32.5  31.9    RDW 13.6  13.8  13.3    Platelets 213  200  173    Neutrophil Rel % 51.6  57.9  47.9    Immature Granulocyte Rel % 0.3  0.3  0.4    Lymphocyte Rel % 33.6  26.9  30.9    Monocyte Rel % 12.1  12.5  18.3    Eosinophil Rel % 1.9  2.0  1.9    Basophil Rel % 0.5  0.4  0.6       Lipid Panel          12/20/2023    16:34 2/6/2024    11:48 6/13/2024    10:00   Lipid Panel   Total Cholesterol 100  113  97    Triglycerides 86  181  68    HDL Cholesterol 38  42  42    VLDL Cholesterol 17  30  15    LDL Cholesterol  45  41  40    LDL/HDL Ratio 1.18  0.83  0.99       Lab Results   Component Value Date    TSH 1.780 03/13/2024  "   TSH 0.652 02/06/2024    TSH 0.653 12/20/2023      Lab Results   Component Value Date    FREET4 1.09 03/13/2024    FREET4 1.09 06/06/2023    FREET4 1.1 12/02/2020      No results found for: \"DDIMERQUANT\"  Magnesium   Date Value Ref Range Status   09/10/2024 1.9 1.6 - 2.4 mg/dL Final      No results found for: \"DIGOXIN\"   A1C Last 3 Results          12/20/2023    16:34 2/6/2024    11:48 4/22/2024    09:34   HGBA1C Last 3 Results   Hemoglobin A1C 8.80  9.80  8.80           Results for orders placed during the hospital encounter of 09/16/23    Adult Transthoracic Echo Complete w/ Color, Spectral and Contrast if necessary per protocol    Interpretation Summary    Left ventricular systolic function is low normal. Calculated left ventricular EF = 45.1%    The left ventricular cavity is borderline dilated.    Left ventricular diastolic function is consistent with (grade I) impaired relaxation.    There is mild calcification of the aortic valve.        Assessment and Plan        Diagnoses and all orders for this visit:    1. Chronic heart failure with preserved ejection fraction (Primary)  Assessment & Plan:  Mr. Donovan has heart failure with preserved ejection fraction who appears to be doing well.  Short of air and pedal edema have resolved.  Blood pressure is stable although his heart rate remains on the higher side of normal.  We are unable to titrate up the carvedilol any more than 18.75 mg due to low blood pressures and a history of falls.  Will continue medication as prescribed.    1.  Continue all medication as prescribed.  2.  Do a heart rate blood pressure and weight log and bring it to next appointment.  3.  Do blood work 1 to 2 days prior to next visit.    Orders:  -     CBC & Differential; Future  -     Comprehensive Metabolic Panel; Future  -     proBNP; Future  -     Magnesium; Future    2. Primary hypertension  Assessment & Plan:  Mr. Donovan's blood pressure is stable per his home log.  Generally runs on " the lower side of normal.  Dizziness and lightheaded has improved.  Will continue carvedilol and Eliquis at the current doses.      3. Mixed hyperlipidemia  Assessment & Plan:  Mr. Donovan utilizes atorvastatin 40 mg daily.  Continue the same.      4. Paroxysmal atrial fibrillation  Assessment & Plan:  Patient has paroxysmal atrial fibrillation.  Heart rate is semicontrolled with the current dose of carvedilol.  We are unable to titrate it up due to low blood pressures.  He denies any chest pain or palpitations.  I have asked him to continue to keep a heart rate blood pressure and weight log and bring it to his next appointment    Orders:  -     CBC & Differential; Future  -     Comprehensive Metabolic Panel; Future  -     proBNP; Future  -     Magnesium; Future            Follow Up     Return in about 2 months (around 1/21/2025).    Patient was given instructions and counseling regarding his condition or for health maintenance advice. Please see specific information pulled into the AVS if appropriate.     Electronically signed by TENISHA Lopez, 11/21/24, 3:22 PM EST.

## 2024-11-21 NOTE — PATIENT INSTRUCTIONS
1.  Continue all medication as prescribed.  2.  Do a heart rate blood pressure and weight log and bring it to next appointment.  3.  Do blood work 1 to 2 days prior to next visit.

## 2024-11-22 ENCOUNTER — PATIENT OUTREACH (OUTPATIENT)
Dept: CASE MANAGEMENT | Facility: OTHER | Age: 79
End: 2024-11-22
Payer: MEDICARE

## 2024-11-22 DIAGNOSIS — I10 PRIMARY HYPERTENSION: Primary | ICD-10-CM

## 2024-11-22 NOTE — OUTREACH NOTE
AMBULATORY CASE MANAGEMENT NOTE    Names and Relationships of Patient/Support Persons: Contact: DANIELLE ALVES; Relationship: Emergency Contact -     Patient is not interested in CCM    Gali BALLARD  Ambulatory Case Management    11/22/2024, 11:43 EST  
Right arm;

## 2024-11-25 RX ORDER — BENZONATATE 100 MG/1
100-200 CAPSULE ORAL 3 TIMES DAILY PRN
Qty: 90 CAPSULE | Refills: 1 | Status: SHIPPED | OUTPATIENT
Start: 2024-11-25

## 2024-11-25 RX ORDER — CHOLECALCIFEROL (VITAMIN D3) 25 MCG
1000 CAPSULE ORAL DAILY
Qty: 60 CAPSULE | Refills: 1 | Status: SHIPPED | OUTPATIENT
Start: 2024-11-25

## 2024-11-25 NOTE — TELEPHONE ENCOUNTER
Caller: DANIELLE ALVES    Relationship: Emergency Contact    Best call back number: [phone]    Requested Prescriptions:   Requested Prescriptions     Pending Prescriptions Disp Refills    benzonatate (TESSALON) 100 MG capsule 90 capsule 1     Sig: Take 1-2 capsules by mouth 3 (Three) Times a Day As Needed for Cough.    Cholecalciferol (Vitamin D-3) 25 MCG (1000 UT) capsule 60 capsule 1     Sig: Take 1,000 Units by mouth Daily.        Pharmacy where request should be sent: [facility] [phone] [place], [place] [id number]      Last office visit with prescribing clinician: 7/10/2024   Last telemedicine visit with prescribing clinician: Visit date not found   Next office visit with prescribing clinician: 1/8/2025     Additional details provided by patient:     Does the patient have less than a 3 day supply:  [x] Yes  [] No        Neto Lopez Rep   11/25/24 16:14 EST

## 2025-01-02 ENCOUNTER — TELEPHONE (OUTPATIENT)
Dept: UROLOGY | Age: 80
End: 2025-01-02

## 2025-01-02 ENCOUNTER — OFFICE VISIT (OUTPATIENT)
Dept: UROLOGY | Age: 80
End: 2025-01-02
Payer: MEDICARE

## 2025-01-02 VITALS — HEIGHT: 67 IN | WEIGHT: 199 LBS | BODY MASS INDEX: 31.23 KG/M2

## 2025-01-02 DIAGNOSIS — N40.1 BENIGN PROSTATIC HYPERPLASIA WITH URINARY FREQUENCY: Primary | ICD-10-CM

## 2025-01-02 DIAGNOSIS — R35.0 BENIGN PROSTATIC HYPERPLASIA WITH URINARY FREQUENCY: Primary | ICD-10-CM

## 2025-01-02 LAB
BILIRUB BLD-MCNC: NEGATIVE MG/DL
CLARITY, POC: CLEAR
COLOR UR: YELLOW
EXPIRATION DATE: ABNORMAL
GLUCOSE UR STRIP-MCNC: ABNORMAL MG/DL
KETONES UR QL: NEGATIVE
LEUKOCYTE EST, POC: NEGATIVE
Lab: ABNORMAL
NITRITE UR-MCNC: NEGATIVE MG/ML
PH UR: 6 [PH] (ref 5–8)
PROT UR STRIP-MCNC: ABNORMAL MG/DL
RBC # UR STRIP: ABNORMAL /UL
SP GR UR: 1.03 (ref 1–1.03)
URINE VOLUME: NORMAL
UROBILINOGEN UR QL: ABNORMAL

## 2025-01-02 PROCEDURE — 87086 URINE CULTURE/COLONY COUNT: CPT | Performed by: NURSE PRACTITIONER

## 2025-01-02 NOTE — PROGRESS NOTES
Chief Complaint: Benign Prostatic Hypertrophy (He states that he has been having issues with leakage )    Subjective         Benign Prostatic Hypertrophy      Giles Donovan is a 79 y.o. male presents to [facility] to be seen for f/u BPH.    He continues on finasteride.    He states slow stream at times.     Nocturia x 1-2    He is having issues with leakage.     Mostly urge incontinence.      Previous:     Patient was admitted to the hospital on 2/19/2024 after presenting to the emergency department for a fall.  The patient had been experiencing more falls and evaluation in the emergency department revealed elevated creatinine and hyperglycemia.  During the hospitalization the patient did have issues with urinary retention and oxybutynin was discontinued at time of discharge.    Admitted to the hospital in April 2024 for orchitis and E. coli UTI.  Patient had presented to the emergency room for testicular pain after he had fallen and injured his testicle.  Patient had a testicular scrotal ultrasound which showed left testicular epididymitis and orchitis.  Moderate left hydrocele.    CT of the abdomen revealed age-indeterminate infectious/inflammatory cystitis.  Diffuse prostatomegaly.  UA revealed cloudiness with specific gravity greater than 1.030 15 mg/dL of ketones moderate blood small leukocyte Estrace to many white blood cells to count.  Urine culture was positive for E. coli.    Patient was given IV and oral antibiotics with good discharged home.    He was discharged home on tamsulosin and finasteride.  Of note he is no longer taking these medications.    Patient is still getting up 2-3 times a night however he has a urinal at bedside that he utilizes.    Can have a weak stream at times.    Denies any straining does have hesitancy.    He states he has a rash between thigh and stomach due to moisture in his brief.      Previous:    States some urgency.    He reports a good  stream.     Denies bothersome straining or hesitancy.     Nocturia x 2-3     He wears depends.     No GH/UTI     Previous:  8/21 cystoscopy-3 cm prostate, no median lobe, mild trabeculations.  Negative otherwise     3 beers daily.     No  Burning/ dysuria/ GH     No cardiopulmonary history.  Patient does not smoke. ASA 81/meloxicam.  Diabetes mellitus on insulin     Patient has never got a prescription for tadalafil yet     Sildenafil 100 mg -did not help     No urologic family history,   Has never had any urologic surgery.        PVR     3/22   109  9/21   167  5/21   78     9/20  GFR > 60      PSA        2/19 0.92    Objective     Past Medical History:   Diagnosis Date    A-fib     Allergic rhinitis 12/27/2014    Will try Zyrtec, he didnt want to use a nasal spray.    Arthritis     Asthma     Cataract     left eye    CHF (congestive heart failure)     Cough 03/30/2016    PFT and CXR ordered.    Depression     PTSD    Diabetes     Elevated cholesterol     H/O psychiatric care 1999    PTSD    Hyperlipidemia 03/30/2016    Lipids ordered. Continue on current medication.    Hypertension     Hypothyroidism     Seasonal allergies     Shortness of breath     Sleep apnea     Stenosis of right carotid artery 02/19/2017    Will refer to vascular surgeon.    Syncope 02/19/2017    Type 2 diabetes mellitus     Upper respiratory infection 10/18/2015    Will treat cough with Hydromet, otherwise over the counter meds for treatment.       Past Surgical History:   Procedure Laterality Date    CATARACT EXTRACTION Right     x2    COLONOSCOPY      JOINT REPLACEMENT      REPLACEMENT TOTAL HIP ONCOLOGIC Right     RETINAL DETACHMENT REPAIR      TOE AMPUTATION Left 11/2017    Second phalaeng.    TOE OSTEOPHYTE REMOVAL           Current Outpatient Medications:     albuterol (PROVENTIL) (2.5 MG/3ML) 0.083% nebulizer solution, Take 2.5 mg by nebulization Every 4 (Four) Hours As Needed for Wheezing or Shortness of Air., Disp: , Rfl:      apixaban (Eliquis) 5 MG tablet tablet, Take 1 tablet by mouth Every 12 (Twelve) Hours., Disp: 180 tablet, Rfl: 3    aspirin EC 81 MG EC tablet, Take 1 tablet by mouth Daily. (Patient taking differently: Take 1 tablet by mouth Every Other Day.), Disp: 90 tablet, Rfl: 1    atorvastatin (LIPITOR) 80 MG tablet, Take 1 tablet by mouth Daily. (Patient taking differently: Take 0.5 tablets by mouth Daily.), Disp: 90 tablet, Rfl: 1    benzonatate (TESSALON) 100 MG capsule, Take 1-2 capsules by mouth 3 (Three) Times a Day As Needed for Cough., Disp: 90 capsule, Rfl: 1    buPROPion XL (WELLBUTRIN XL) 150 MG 24 hr tablet, Take 1 tablet by mouth Daily., Disp: , Rfl:     carvedilol (COREG) 12.5 MG tablet, Take 1.5 tablets by mouth 2 (Two) Times a Day. (Patient taking differently: Take 1 tablet by mouth 2 (Two) Times a Day.), Disp: , Rfl:     carvedilol (COREG) 6.25 MG tablet, Take 1 tablet by mouth 2 (Two) Times a Day., Disp: 180 tablet, Rfl: 3    cetirizine (ZyrTEC Allergy) 10 MG tablet, Take 1 tablet by mouth Daily., Disp: 90 tablet, Rfl: 3    Cholecalciferol 25 MCG (1000 UT) tablet, , Disp: , Rfl:     ezetimibe (ZETIA) 10 MG tablet, Take 1 tablet by mouth Daily., Disp: 90 tablet, Rfl: 1    finasteride (PROSCAR) 5 MG tablet, Take 1 tablet by mouth Daily., Disp: 90 tablet, Rfl: 3    fluticasone-salmeterol (ADVAIR HFA) 230-21 MCG/ACT inhaler, Inhale 2 puffs 2 (Two) Times a Day., Disp: 3 each, Rfl: 3    gabapentin (NEURONTIN) 600 MG tablet, Take 0.5 tablets by mouth Every Night., Disp: , Rfl:     guaiFENesin (MUCINEX) 600 MG 12 hr tablet, Take 2 tablets by mouth 2 (Two) Times a Day As Needed for Cough or Congestion., Disp: , Rfl:     insulin aspart (novoLOG) 100 UNIT/ML injection, Inject 24-26 Units under the skin into the appropriate area as directed 3 (Three) Times a Day Before Meals., Disp: , Rfl:     Insulin Glargine (Lantus SoloStar) 100 UNIT/ML injection pen, Inject 40-44 Units under the skin into the appropriate area as  directed Every Night., Disp: , Rfl:     levothyroxine (SYNTHROID, LEVOTHROID) 100 MCG tablet, Take 1 tablet by mouth Daily., Disp: , Rfl:     metFORMIN (GLUCOPHAGE) 500 MG tablet, Take 1 tablet by mouth 2 (Two) Times a Day With Meals., Disp: , Rfl:     miconazole (Micatin) 2 % cream, Apply 1 Application topically to the appropriate area as directed 2 (Two) Times a Day., Disp: 35 g, Rfl: 1    nystatin-triamcinolone (MYCOLOG) 872808-9.1 UNIT/GM-% ointment, Apply to affected area 2 times daily, Disp: 30 g, Rfl: 1    olopatadine (PATANOL) 0.1 % ophthalmic solution, Administer 1 drop to both eyes Daily As Needed for Allergies., Disp: , Rfl:     omeprazole (priLOSEC) 20 MG capsule, Take 1 capsule by mouth Daily., Disp: 90 capsule, Rfl: 4    PARoxetine (PAXIL) 40 MG tablet, Take 1 tablet by mouth Every Evening., Disp: , Rfl:     sacubitril-valsartan (Entresto) 24-26 MG tablet, Take 0.5 tablets by mouth 2 (Two) Times a Day., Disp: 180 tablet, Rfl: 3    spironolactone (ALDACTONE) 25 MG tablet, Take 1 tablet by mouth Daily. Omit on Sundays, Disp: , Rfl:     traZODone (DESYREL) 100 MG tablet, Take 0.5 tablets by mouth At Night As Needed for Sleep., Disp: , Rfl:     Vibegron 75 MG tablet, Take 1 tablet by mouth Daily., Disp: 90 tablet, Rfl: 3    Allergies   Allergen Reactions    Latex Rash and Anaphylaxis    Latex, Natural Rubber Itching        Family History   Problem Relation Age of Onset    Heart disease Mother     Lupus Mother     Arthritis Mother     Kidney disease Sister        Social History     Socioeconomic History    Marital status: Single   Tobacco Use    Smoking status: Never     Passive exposure: Never    Smokeless tobacco: Never    Tobacco comments:     Pt stated he smoked a pipe [id number]. Pt stated he smoked a pipe a couple times a day. Pt stated he mixed with tobacco and marijuana   Vaping Use    Vaping status: Never Used   Substance and Sexual Activity    Alcohol use: Yes     Alcohol/week: 7.0 standard drinks  "of alcohol     Types: 7 Cans of beer per week     Comment: occasional    Drug use: Not Currently     Types: Marijuana     Comment: quit 1978    Sexual activity: Defer       Vital Signs:   Ht 170.2 cm (67\")   Wt 90.3 kg (199 lb)   BMI 31.17 kg/m²      Physical Exam     Result Review :   The following data was reviewed by: TENISHA Stack on 01/2/2025:  Results for orders placed or performed in visit on 01/02/25   POC Urinalysis Dipstick, Automated    Collection Time: 01/02/25  3:24 PM    Specimen: Urine   Result Value Ref Range    Color Yellow Yellow, Straw, Dark Yellow, Ashley    Clarity, UA Clear Clear    Specific Gravity  1.030 1.005 - 1.030    pH, Urine 6.0 5.0 - 8.0    Leukocytes Negative Negative    Nitrite, UA Negative Negative    Protein,  mg/dL (A) Negative mg/dL    Glucose,  mg/dL (A) Negative mg/dL    Ketones, UA Negative Negative    Urobilinogen, UA 4 E.U./dL (A) Normal, 0.2 E.U./dL    Bilirubin Negative Negative    Blood, UA Trace (A) Negative    Lot Number [id number]     Expiration Date 10/2,025    Bladder Scan    Collection Time: 01/02/25  3:24 PM   Result Value Ref Range    Urine Volume 61ml        Bladder Scan interpretation 01/2/2025    Estimation of residual urine via BVI 3000 Verathon Bladder Scan  MA/nurse performing: emily tripp Rn . Ma   Residual Urine: 61 ml  Indication: Benign prostatic hyperplasia with urinary frequency   Position: Supine  Examination: Incremental scanning of the suprapubic area using 2.0 MHz transducer using copious amounts of acoustic gel.   Findings: An anechoic area was demonstrated which represented the bladder, with measurement of residual urine as noted. I inspected this myself. In that the residual urine was stable or insignificant, refer to plan for treatment and plan necessary at this time.           Procedures        Assessment and Plan    Diagnoses and all orders for this visit:    1. Benign prostatic hyperplasia with urinary frequency " (Primary)  -     POC Urinalysis Dipstick, Automated  -     Bladder Scan  -     Urine Culture - Urine, Urine, Clean Catch; Future  -     Vibegron 75 MG tablet; Take 1 tablet by mouth Daily.  Dispense: 90 tablet; Refill: 3  -     Urine Culture - Urine, Urine, Clean Catch      Will send UA for culture today.    Gemtesa selected rather than anticholinergic medication as anticholinergic medications have been shown to result in acute impairment of working memory, attention, and psychomotor speed.  Additionally recent evidence suggests that there long-term use may be associated with global cognitive impairment.  As such I will not use anticholinergics in this patient as I believe the risks outweigh the benefits.  DOI: 10.  1001/J AMA intern med.[email or web address]     Discussed at length that one of the limitations to obtaining Gemtesa is that it can be cost prohibitive.  Order sent to pharmacy-will attempt prior authorization if necessary to obtain insurance approval however, patient aware that despite our best efforts often times it remains too high of cost for patients to continue.  assess efficacy at follow up aware it can take up to 4-6 wks for max benefit.        I spent 10 minutes caring for Giles on this date of service. This time includes time spent by me in the following activities:reviewing tests, obtaining and/or reviewing a separately obtained history, performing a medically appropriate examination and/or evaluation , counseling and educating the patient/family/caregiver, ordering medications, tests, or procedures, and documenting information in the medical record  Follow Up   Return in about 3 months (around 4/2/2025) for f/u bph / incontinence.  Patient was given instructions and counseling regarding his condition or for health maintenance advice. Please see specific information pulled into the AVS if appropriate.         This document has been electronically signed by TENISHA Stack  January 2, 2025 16:17  EST

## 2025-01-03 LAB — BACTERIA SPEC AEROBE CULT: NO GROWTH

## 2025-01-08 ENCOUNTER — OFFICE VISIT (OUTPATIENT)
Dept: INTERNAL MEDICINE | Facility: CLINIC | Age: 80
End: 2025-01-08
Payer: MEDICARE

## 2025-01-08 VITALS
OXYGEN SATURATION: 100 % | DIASTOLIC BLOOD PRESSURE: 78 MMHG | WEIGHT: 198.6 LBS | TEMPERATURE: 98.3 F | RESPIRATION RATE: 18 BRPM | HEIGHT: 67 IN | BODY MASS INDEX: 31.17 KG/M2 | SYSTOLIC BLOOD PRESSURE: 122 MMHG | HEART RATE: 72 BPM

## 2025-01-08 DIAGNOSIS — J84.10 PULMONARY FIBROSIS: Primary | ICD-10-CM

## 2025-01-08 DIAGNOSIS — I50.32 CHRONIC HEART FAILURE WITH PRESERVED EJECTION FRACTION: ICD-10-CM

## 2025-01-08 DIAGNOSIS — R26.81 UNSTEADY GAIT WHEN WALKING: ICD-10-CM

## 2025-01-08 DIAGNOSIS — E11.9 CONTROLLED TYPE 2 DIABETES MELLITUS WITHOUT COMPLICATION, WITHOUT LONG-TERM CURRENT USE OF INSULIN: ICD-10-CM

## 2025-01-08 PROCEDURE — 99214 OFFICE O/P EST MOD 30 MIN: CPT | Performed by: INTERNAL MEDICINE

## 2025-01-08 PROCEDURE — 1126F AMNT PAIN NOTED NONE PRSNT: CPT | Performed by: INTERNAL MEDICINE

## 2025-01-08 PROCEDURE — 3078F DIAST BP <80 MM HG: CPT | Performed by: INTERNAL MEDICINE

## 2025-01-08 PROCEDURE — G2211 COMPLEX E/M VISIT ADD ON: HCPCS | Performed by: INTERNAL MEDICINE

## 2025-01-08 PROCEDURE — 3074F SYST BP LT 130 MM HG: CPT | Performed by: INTERNAL MEDICINE

## 2025-01-08 NOTE — PROGRESS NOTES
"Chief Complaint  Diabetes (6 mo f/u), Hyperlipidemia, and Hypertension      Subjective      History of Present Illness  The patient presents for evaluation of hand weakness, urinary incontinence, atrial fibrillation, diabetes mellitus, left foot swelling, and weight management. He is accompanied by his wife.    No recent syncope or chest discomfort. Occasional dyspnea, but overall respiratory function satisfactory. Initial neurologist consultation on 01/13/2025. Requested bi-weekly physical therapy for lower extremities and assistance with shoe forms from podiatrist Dr. Ledezma at [facility].    Urological health stable, slightly elevated protein levels in urine. Occasional urinary incontinence, not yet started Gemtesa.    Under care of Ms. Davian and Dr. Hallman at heart failure clinic, stable cardiac function. Atrial fibrillation well-managed, occasional heart rate elevations to 90s.    Followed by Aisha Garcia and Dr. Ochoa in pulmonology, advised continuation of inhaler therapy. Satisfactory breathing except during increased physical activity.    [facility] A1[facility] 9.7, no changes to diabetes management. Stress medication managed by healthcare team.    Mild left foot swelling observed this morning. Weight recently increased to 198 pounds, previously 180 pounds. Not on diuretics. Low-salt diet, primarily using garlic salt. Wife notes inadequate water intake, urinary incontinence more pronounced with alcohol. Mild left leg pain at night, relieved by crossing legs. Swelling does not extend above the ankle.    SOCIAL HISTORY  - Admits to drinking alcohol, including liquor, beer, and wine             Objective   Vital Signs:   Vitals:    01/08/25 1406   BP: 122/78   BP Location: Right arm   Patient Position: Sitting   Cuff Size: Adult   Pulse: 72   Resp: 18   Temp: 98.3 °F (36.8 °C)   TempSrc: Temporal   SpO2: 100%   Weight: 90.1 kg (198 lb 9.6 oz)   Height: 170.2 cm (67.01\")     Body mass " index is 31.1 kg/m².    Wt Readings from Last 3 Encounters:   01/08/25 90.1 kg (198 lb 9.6 oz)   01/02/25 90.3 kg (199 lb)   12/13/24 90 kg (198 lb 8 oz)     BP Readings from Last 3 Encounters:   01/08/25 122/78   12/13/24 114/70   11/21/24 96/57       Health Maintenance   Topic Date Due    TDAP/TD VACCINES (1 - Tdap) Never done    ZOSTER VACCINE (1 of 2) Never done    RSV Vaccine - Adults (1 - 1-dose 75+ series) Never done    COLORECTAL CANCER SCREENING  10/07/2024    HEMOGLOBIN A1C  10/22/2024    LIPID PANEL  06/13/2025    ANNUAL WELLNESS VISIT  07/10/2025    DIABETIC EYE EXAM  09/12/2025    BMI FOLLOWUP  01/08/2026    HEPATITIS C SCREENING  Completed    COVID-19 Vaccine  Completed    INFLUENZA VACCINE  Completed    Pneumococcal Vaccine 65+  Completed    URINE MICROALBUMIN  Discontinued       Physical Exam  Vitals reviewed.   Constitutional:       Appearance: Normal appearance. He is well-developed.   HENT:      Head: Normocephalic and atraumatic.      Right Ear: External ear normal.      Left Ear: External ear normal.   Eyes:      Conjunctiva/sclera: Conjunctivae normal.      Pupils: Pupils are equal, round, and reactive to light.   Cardiovascular:      Rate and Rhythm: Normal rate and regular rhythm.      Heart sounds: No murmur heard.     No friction rub. No gallop.   Pulmonary:      Effort: Pulmonary effort is normal.      Breath sounds: Normal breath sounds. No wheezing or rhonchi.   Musculoskeletal:      Comments: Significant thenar wasting   Skin:     General: Skin is warm and dry.   Neurological:      Mental Status: He is alert and oriented to person, place, and time.   Psychiatric:         Mood and Affect: Affect normal.         Behavior: Behavior normal.         Thought Content: Thought content normal.        Physical Exam        Result Review :  The following data was reviewed by: Morena Masters MD on 01/08/2025:         Results             Procedures            Assessment & Plan  Pulmonary  fibrosis    Orders:    Ambulatory Referral to Home Health    Unsteady gait when walking    Orders:    Ambulatory Referral to Home Health    Controlled type 2 diabetes mellitus without complication, without long-term current use of insulin      Orders:    Ambulatory Referral to Home Health    Chronic heart failure with preserved ejection fraction    Orders:    Ambulatory Referral to Home Health         Assessment & Plan  1. Hand weakness:  - Significant thenar wasting  - Potential neurological condition (e.g., ALS, pinched nerve)  - Discuss with neurologist on 07/13/2025  - Referral for home health physical therapy  - Communicate with Gali regarding shoe form    2. Urinary incontinence:  - Potentially linked to neurological diseases  - Not yet received Gemtesa  - Monitor alcohol intake, limit to one drink per day or less    3. Atrial fibrillation:  - Well-managed, occasional heart rate elevations to 90s  - Continue current management plan  - Report significant symptom changes    4. Diabetes mellitus:  - A1c: 9.7  - Continue current diabetes management plan  - [facility]    5. Left foot swelling:  - Monitor swelling, report if worsens or moves up the leg  - Consider ultrasound to rule out blood clot  - Reduce salt intake    6. Weight management:  - Weight fluctuating, recently 198 pounds  - Monitor weight  - Reduce salt intake  - Limit alcohol to one drink per day or less  -can do diuretics if needed however with prior syncope would like to stay away from them if at all possible.    Patient or patient representative verbalized consent for the use of Ambient Listening during the visit with  Morena Masters MD for chart documentation. 1/23/2025  08:46 EST      FOLLOW UP  Return in about 4 months (around 5/8/2025).  Patient was given instructions and counseling regarding his condition or for health maintenance advice. Please see specific information pulled into the AVS if appropriate.     Morena Cee  MD Sonam  01/08/25  14:44 EST    CURRENT & DISCONTINUED MEDICATIONS  Current Outpatient Medications   Medication Instructions    albuterol (PROVENTIL) 2.5 mg, Every 4 Hours PRN    apixaban (ELIQUIS) 5 mg, Oral, Every 12 Hours Scheduled    aspirin EC 81 mg, Oral, Daily    atorvastatin (LIPITOR) 80 mg, Oral, Daily    benzonatate (TESSALON) 100-200 mg, Oral, 3 Times Daily PRN    buPROPion XL (WELLBUTRIN XL) 150 mg, Daily    carvedilol (COREG) 18.75 mg, Oral, 2 Times Daily    carvedilol (COREG) 6.25 mg, Oral, 2 Times Daily    cetirizine (ZYRTEC ALLERGY) 10 mg, Oral, Daily    Cholecalciferol 25 MCG (1000 UT) tablet     ezetimibe (ZETIA) 10 mg, Oral, Daily    finasteride (PROSCAR) 5 mg, Oral, Daily    fluticasone-salmeterol (ADVAIR HFA) 230-21 MCG/ACT inhaler 2 puffs, Inhalation, 2 Times Daily    gabapentin (NEURONTIN) 300 mg, Nightly    guaiFENesin (MUCINEX) 1,200 mg, 2 Times Daily PRN    insulin aspart (NOVOLOG) 24-26 Units, 3 Times Daily Before Meals    Insulin Glargine (Lantus SoloStar) 100 UNIT/ML injection pen Inject 40-44 Units under the skin into the appropriate area as directed Every Night.    levothyroxine (SYNTHROID, LEVOTHROID) 100 mcg, Daily    metFORMIN (GLUCOPHAGE) 500 mg, 2 Times Daily With Meals    miconazole (Micatin) 2 % cream 1 Application, Topical, 2 Times Daily    nystatin-triamcinolone (MYCOLOG) 704273-4.1 UNIT/GM-% ointment Apply to affected area 2 times daily    olopatadine (PATANOL) 0.1 % ophthalmic solution 1 drop, Daily PRN    omeprazole (PRILOSEC) 20 mg, Oral, Daily    PARoxetine (PAXIL) 40 mg, Every Evening    sacubitril-valsartan (Entresto) 24-26 MG tablet 0.5 tablets, Oral, 2 Times Daily    spironolactone (ALDACTONE) 25 mg, Oral, Daily, Omit on Sundays    traZODone (DESYREL) 50 mg, Nightly PRN    Vibegron 75 mg, Oral, Daily       There are no discontinued medications.

## 2025-01-15 NOTE — TELEPHONE ENCOUNTER
Caller: DANIELLE ALVES    Relationship: Emergency Contact    Best call back number: [phone]     Requested Prescriptions:   Requested Prescriptions     Pending Prescriptions Disp Refills    Insulin Glargine (Lantus SoloStar) 100 UNIT/ML injection pen       Sig: Inject 40-44 Units under the skin into the appropriate area as directed Every Night.        Pharmacy where request should be sent: [place] [phone] [place] [phone]      Last office visit with prescribing clinician: 1/8/2025   Last telemedicine visit with prescribing clinician: Visit date not found   Next office visit with prescribing clinician: 5/9/2025     Additional details provided by patient: PATIENT IS COMPLETELY OUT OF MEDICATION. MEDICATION HAS NOT BE[facility] S[facility]IA [facility] [place] DUE TO THE WEATHER.     Does the patient have less than a 3 day supply:  [x] Yes  [] No    Would you like a call back once the refill request has been completed: [x] Yes [] No    If the office needs to give you a call back, can they leave a voicemail: [] Yes [] No    Neto Yip Rep   01/15/25 14:25 EST

## 2025-01-16 RX ORDER — INSULIN GLARGINE 100 [IU]/ML
40-44 INJECTION, SOLUTION SUBCUTANEOUS NIGHTLY
Qty: 90 ML | Refills: 1 | Status: SHIPPED | OUTPATIENT
Start: 2025-01-16

## 2025-01-21 ENCOUNTER — HOSPITAL ENCOUNTER (OUTPATIENT)
Facility: HOSPITAL | Age: 80
Discharge: HOME | End: 2025-01-21
Payer: MEDICARE

## 2025-01-21 ENCOUNTER — LAB (OUTPATIENT)
Facility: HOSPITAL | Age: 80
End: 2025-01-21
Payer: MEDICARE

## 2025-01-21 VITALS
BODY MASS INDEX: 31.08 KG/M2 | HEART RATE: 70 BPM | SYSTOLIC BLOOD PRESSURE: 141 MMHG | DIASTOLIC BLOOD PRESSURE: 81 MMHG | WEIGHT: 198 LBS | HEIGHT: 67 IN

## 2025-01-21 DIAGNOSIS — I10 PRIMARY HYPERTENSION: ICD-10-CM

## 2025-01-21 DIAGNOSIS — I50.32 CHRONIC HEART FAILURE WITH PRESERVED EJECTION FRACTION: ICD-10-CM

## 2025-01-21 DIAGNOSIS — I50.32 CHRONIC HEART FAILURE WITH PRESERVED EJECTION FRACTION: Primary | ICD-10-CM

## 2025-01-21 DIAGNOSIS — E78.2 MIXED HYPERLIPIDEMIA: ICD-10-CM

## 2025-01-21 DIAGNOSIS — I48.0 PAROXYSMAL ATRIAL FIBRILLATION: ICD-10-CM

## 2025-01-21 LAB
ALBUMIN SERPL-MCNC: 4.2 G/DL (ref 3.5–5.2)
ALBUMIN/GLOB SERPL: 1.3 G/DL
ALP SERPL-CCNC: 66 U/L (ref 39–117)
ALT SERPL W P-5'-P-CCNC: 33 U/L (ref 1–41)
ANION GAP SERPL CALCULATED.3IONS-SCNC: 11 MMOL/L (ref 5–15)
AST SERPL-CCNC: 34 U/L (ref 1–40)
BASOPHILS # BLD AUTO: 0.04 10*3/MM3 (ref 0–0.2)
BASOPHILS NFR BLD AUTO: 0.5 % (ref 0–1.5)
BILIRUB SERPL-MCNC: 0.3 MG/DL (ref 0–1.2)
BUN SERPL-MCNC: 18 MG/DL (ref 8–23)
BUN/CREAT SERPL: 14.1 (ref 7–25)
CALCIUM SPEC-SCNC: 9.5 MG/DL (ref 8.6–10.5)
CHLORIDE SERPL-SCNC: 98 MMOL/L (ref 98–107)
CO2 SERPL-SCNC: 26 MMOL/L (ref 22–29)
CREAT SERPL-MCNC: 1.28 MG/DL (ref 0.76–1.27)
DEPRECATED RDW RBC AUTO: 42.3 FL (ref 37–54)
EGFRCR SERPLBLD CKD-EPI 2021: 57.3 ML/MIN/1.73
EOSINOPHIL # BLD AUTO: 0.1 10*3/MM3 (ref 0–0.4)
EOSINOPHIL NFR BLD AUTO: 1.2 % (ref 0.3–6.2)
ERYTHROCYTE [DISTWIDTH] IN BLOOD BY AUTOMATED COUNT: 12 % (ref 12.3–15.4)
GLOBULIN UR ELPH-MCNC: 3.2 GM/DL
GLUCOSE SERPL-MCNC: 320 MG/DL (ref 65–99)
HCT VFR BLD AUTO: 38 % (ref 37.5–51)
HGB BLD-MCNC: 12 G/DL (ref 13–17.7)
IMM GRANULOCYTES # BLD AUTO: 0.01 10*3/MM3 (ref 0–0.05)
IMM GRANULOCYTES NFR BLD AUTO: 0.1 % (ref 0–0.5)
LYMPHOCYTES # BLD AUTO: 1.88 10*3/MM3 (ref 0.7–3.1)
LYMPHOCYTES NFR BLD AUTO: 23.3 % (ref 19.6–45.3)
MAGNESIUM SERPL-MCNC: 1.9 MG/DL (ref 1.6–2.4)
MCH RBC QN AUTO: 30.2 PG (ref 26.6–33)
MCHC RBC AUTO-ENTMCNC: 31.6 G/DL (ref 31.5–35.7)
MCV RBC AUTO: 95.5 FL (ref 79–97)
MONOCYTES # BLD AUTO: 0.81 10*3/MM3 (ref 0.1–0.9)
MONOCYTES NFR BLD AUTO: 10 % (ref 5–12)
NEUTROPHILS NFR BLD AUTO: 5.22 10*3/MM3 (ref 1.7–7)
NEUTROPHILS NFR BLD AUTO: 64.9 % (ref 42.7–76)
NRBC BLD AUTO-RTO: 0 /100 WBC (ref 0–0.2)
NT-PROBNP SERPL-MCNC: 371 PG/ML (ref 0–1800)
PLATELET # BLD AUTO: 247 10*3/MM3 (ref 140–450)
PMV BLD AUTO: 10.3 FL (ref 6–12)
POTASSIUM SERPL-SCNC: 5.6 MMOL/L (ref 3.5–5.2)
PROT SERPL-MCNC: 7.4 G/DL (ref 6–8.5)
RBC # BLD AUTO: 3.98 10*6/MM3 (ref 4.14–5.8)
SODIUM SERPL-SCNC: 135 MMOL/L (ref 136–145)
WBC NRBC COR # BLD AUTO: 8.06 10*3/MM3 (ref 3.4–10.8)

## 2025-01-21 PROCEDURE — 83880 ASSAY OF NATRIURETIC PEPTIDE: CPT

## 2025-01-21 PROCEDURE — 80053 COMPREHEN METABOLIC PANEL: CPT

## 2025-01-21 PROCEDURE — 83735 ASSAY OF MAGNESIUM: CPT

## 2025-01-21 PROCEDURE — 85025 COMPLETE CBC W/AUTO DIFF WBC: CPT

## 2025-01-21 PROCEDURE — G0463 HOSPITAL OUTPT CLINIC VISIT: HCPCS

## 2025-01-21 PROCEDURE — 36415 COLL VENOUS BLD VENIPUNCTURE: CPT

## 2025-01-21 RX ORDER — PRAZOSIN HYDROCHLORIDE 1 MG/1
1 CAPSULE ORAL NIGHTLY
COMMUNITY

## 2025-01-21 NOTE — ASSESSMENT & PLAN NOTE
Mr. Donovan has heart failure with preserved ejection fraction who is doing well over the last few months.  Short of air pedal edema have both improved.  He has been able to increase his activity.  Heart rate blood pressure are well-controlled.  Will continue carvedilol, Entresto, and spironolactone at the current doses.  Patient is unable to utilize SGLT2 due to ongoing urinary tract infections.    Continue all meds   Do blood work today   Do heart rate, blood pressure and weights

## 2025-01-21 NOTE — ASSESSMENT & PLAN NOTE
Patient's blood pressure is slightly elevated today blood pressure at home log his systolic generally runs in the upper 90s to low 100s.  I am unable to titrate any medications today.

## 2025-01-21 NOTE — ASSESSMENT & PLAN NOTE
Has proximal atrial fibrillation.  His rate is controlled with current dose of carvedilol.  He does utilize Eliquis for stroke prevention.

## 2025-01-22 DIAGNOSIS — I50.32 CHRONIC HEART FAILURE WITH PRESERVED EJECTION FRACTION: Primary | ICD-10-CM

## 2025-02-03 DIAGNOSIS — Z76.0 MEDICATION REFILL: ICD-10-CM

## 2025-02-03 RX ORDER — CETIRIZINE HYDROCHLORIDE 10 MG/1
10 TABLET ORAL DAILY
Qty: 90 TABLET | Refills: 3 | Status: SHIPPED | OUTPATIENT
Start: 2025-02-03

## 2025-02-03 NOTE — TELEPHONE ENCOUNTER
Caller: DANIELLE ALVES    Relationship: Emergency Contact    Best call back number:      Requested Prescriptions:   Requested Prescriptions     Pending Prescriptions Disp Refills    cetirizine (ZyrTEC Allergy) 10 MG tablet 90 tablet 3     Sig: Take 1 tablet by mouth Daily.        Pharmacy where request should be sent: [place] [phone] [place] [phone]      Last office visit with prescribing clinician: 1/8/2025   Last telemedicine visit with prescribing clinician: Visit date not found   Next office visit with prescribing clinician: 5/9/2025     Additional details provided by patient:      Does the patient have less than a 3 day supply:  [] Yes  [x] No    Would you like a call back once the refill request has been completed: [] Yes [x] No    If the office needs to give you a call back, can they leave a voicemail: [] Yes [x] No    Neto Cassidy Rep   02/03/25 16:32 EST

## 2025-02-04 RX ORDER — CARVEDILOL 12.5 MG/1
12.5 TABLET ORAL 2 TIMES DAILY
Qty: 180 TABLET | Refills: 3 | Status: SHIPPED | OUTPATIENT
Start: 2025-02-04

## 2025-02-04 NOTE — TELEPHONE ENCOUNTER
Received message from patient care giver that carvedilol 12.5 mg needs to be refilled.   Chart review completed.  Patient is on total dose of 18.75 mg bid and has the 6.25 mg that he takes with the 12.5 mg for a total of 18.75 mg bid.  Carvedilol 12.5 mg sent to pharmacy for refill.

## 2025-02-05 ENCOUNTER — TELEPHONE (OUTPATIENT)
Dept: UROLOGY | Age: 80
End: 2025-02-05
Payer: MEDICARE

## 2025-02-05 NOTE — TELEPHONE ENCOUNTER
PATIENT'S S/O CALLED, STATES THAT [facility] IS REQUIRING A PA FOR THE GEMTESA THAT WAS PRESCRIBED ON 01/02.

## 2025-02-21 ENCOUNTER — OFFICE VISIT (OUTPATIENT)
Dept: CARDIOLOGY | Facility: CLINIC | Age: 80
End: 2025-02-21
Payer: MEDICARE

## 2025-02-21 VITALS
HEIGHT: 67 IN | WEIGHT: 207 LBS | DIASTOLIC BLOOD PRESSURE: 65 MMHG | SYSTOLIC BLOOD PRESSURE: 128 MMHG | HEART RATE: 84 BPM | BODY MASS INDEX: 32.49 KG/M2

## 2025-02-21 DIAGNOSIS — E78.2 MIXED HYPERLIPIDEMIA: ICD-10-CM

## 2025-02-21 DIAGNOSIS — I10 PRIMARY HYPERTENSION: ICD-10-CM

## 2025-02-21 DIAGNOSIS — I50.32 CHRONIC HEART FAILURE WITH PRESERVED EJECTION FRACTION: Primary | ICD-10-CM

## 2025-02-21 DIAGNOSIS — I48.0 PAROXYSMAL ATRIAL FIBRILLATION: ICD-10-CM

## 2025-02-21 RX ORDER — SACUBITRIL AND VALSARTAN 24; 26 MG/1; MG/1
1 TABLET, FILM COATED ORAL 2 TIMES DAILY
Qty: 135 TABLET | Refills: 4 | Status: SHIPPED | OUTPATIENT
Start: 2025-02-21

## 2025-02-21 NOTE — PROGRESS NOTES
Chief Complaint  6 month follow up , Congestive Heart Failure, Hyperlipidemia, Atrial Fibrillation, and Hypertension    Subjective            Giles Donovan presents to [facility]      Mr. Donovan is here for follow-up evaluation management of heart failure with preserved ejection fraction, paroxysmal atrial fibrillation, mixed hyperlipidemia.  Since last visit he is doing relatively well.  He denies chest pain, palpitations or excessive shortness of breath.  No bleeding problems on anticoagulation.    PMH  Past Medical History:   Diagnosis Date    A-fib     Allergic rhinitis 12/27/2014    Will try Zyrtec, he didnt want to use a nasal spray.    Arthritis     Asthma     Cataract     left eye    CHF (congestive heart failure)     Cough 03/30/2016    PFT and CXR ordered.    Depression     PTSD    Diabetes     Elevated cholesterol     H/O psychiatric care 1999    PTSD    Hyperlipidemia 03/30/2016    Lipids ordered. Continue on current medication.    Hypertension     Hypothyroidism     Seasonal allergies     Shortness of breath     Sleep apnea     Stenosis of right carotid artery 02/19/2017    Will refer to vascular surgeon.    Syncope 02/19/2017    Type 2 diabetes mellitus     Upper respiratory infection 10/18/2015    Will treat cough with Hydromet, otherwise over the counter meds for treatment.         SURGICALHX  Past Surgical History:   Procedure Laterality Date    CATARACT EXTRACTION Right     x2    COLONOSCOPY      JOINT REPLACEMENT      REPLACEMENT TOTAL HIP ONCOLOGIC Right     RETINAL DETACHMENT REPAIR      TOE AMPUTATION Left 11/2017    Second phalaeng.    TOE OSTEOPHYTE REMOVAL          SOC  Social History     Socioeconomic History    Marital status: Single   Tobacco Use    Smoking status: Never     Passive exposure: Never    Smokeless tobacco: Never    Tobacco comments:     Pt stated he smoked a pipe [id number]. Pt stated he smoked a pipe a couple times a day. Pt stated he  mixed with tobacco and marijuana   Vaping Use    Vaping status: Never Used   Substance and Sexual Activity    Alcohol use: Yes     Alcohol/week: 7.0 standard drinks of alcohol     Types: 7 Cans of beer per week     Comment: occasional    Drug use: Not Currently     Types: Marijuana     Comment: quit 1978    Sexual activity: Defer         FAMHX  Family History   Problem Relation Age of Onset    Heart disease Mother     Lupus Mother     Arthritis Mother     Kidney disease Sister           ALLERGY  Allergies   Allergen Reactions    Latex Rash and Anaphylaxis    Latex, Natural Rubber Itching        MEDSCURRENT    Current Outpatient Medications:     albuterol (PROVENTIL) (2.5 MG/3ML) 0.083% nebulizer solution, Take 2.5 mg by nebulization Every 4 (Four) Hours As Needed for Wheezing or Shortness of Air., Disp: , Rfl:     apixaban (Eliquis) 5 MG tablet tablet, Take 1 tablet by mouth Every 12 (Twelve) Hours., Disp: 180 tablet, Rfl: 3    aspirin EC 81 MG EC tablet, Take 1 tablet by mouth Daily. (Patient taking differently: Take 1 tablet by mouth Every Other Day.), Disp: 90 tablet, Rfl: 1    atorvastatin (LIPITOR) 80 MG tablet, Take 1 tablet by mouth Daily. (Patient taking differently: Take 0.5 tablets by mouth Daily.), Disp: 90 tablet, Rfl: 1    benzonatate (TESSALON) 100 MG capsule, Take 1-2 capsules by mouth 3 (Three) Times a Day As Needed for Cough., Disp: 90 capsule, Rfl: 1    buPROPion XL (WELLBUTRIN XL) 150 MG 24 hr tablet, Take 1 tablet by mouth Daily., Disp: , Rfl:     carvedilol (COREG) 12.5 MG tablet, Take 1 tablet by mouth 2 (Two) Times a Day., Disp: 180 tablet, Rfl: 3    carvedilol (COREG) 6.25 MG tablet, Take 1 tablet by mouth 2 (Two) Times a Day., Disp: 180 tablet, Rfl: 3    cetirizine (ZyrTEC Allergy) 10 MG tablet, Take 1 tablet by mouth Daily., Disp: 90 tablet, Rfl: 3    Cholecalciferol 25 MCG (1000 UT) tablet, , Disp: , Rfl:     ezetimibe (ZETIA) 10 MG tablet, Take 1 tablet by mouth Daily., Disp: 90 tablet,  Rfl: 1    finasteride (PROSCAR) 5 MG tablet, Take 1 tablet by mouth Daily., Disp: 90 tablet, Rfl: 3    fluticasone-salmeterol (ADVAIR HFA) 230-21 MCG/ACT inhaler, Inhale 2 puffs 2 (Two) Times a Day., Disp: 3 each, Rfl: 3    gabapentin (NEURONTIN) 600 MG tablet, Take 0.5 tablets by mouth Every Night., Disp: , Rfl:     guaiFENesin (MUCINEX) 600 MG 12 hr tablet, Take 2 tablets by mouth 2 (Two) Times a Day As Needed for Cough or Congestion., Disp: , Rfl:     insulin aspart (novoLOG) 100 UNIT/ML injection, Inject 24-26 Units under the skin into the appropriate area as directed 3 (Three) Times a Day Before Meals., Disp: , Rfl:     Insulin Glargine (Lantus SoloStar) 100 UNIT/ML injection pen, Inject 40-44 Units under the skin into the appropriate area as directed Every Night., Disp: 90 mL, Rfl: 1    levothyroxine (SYNTHROID, LEVOTHROID) 100 MCG tablet, Take 1 tablet by mouth Daily., Disp: , Rfl:     metFORMIN (GLUCOPHAGE) 500 MG tablet, Take 1 tablet by mouth 2 (Two) Times a Day With Meals., Disp: , Rfl:     miconazole (Micatin) 2 % cream, Apply 1 Application topically to the appropriate area as directed 2 (Two) Times a Day., Disp: 35 g, Rfl: 1    nystatin-triamcinolone (MYCOLOG) 525715-2.1 UNIT/GM-% ointment, Apply to affected area 2 times daily, Disp: 30 g, Rfl: 1    olopatadine (PATANOL) 0.1 % ophthalmic solution, Administer 1 drop to both eyes Daily As Needed for Allergies., Disp: , Rfl:     omeprazole (priLOSEC) 20 MG capsule, Take 1 capsule by mouth Daily., Disp: 90 capsule, Rfl: 4    PARoxetine (PAXIL) 40 MG tablet, Take 1 tablet by mouth Every Evening., Disp: , Rfl:     prazosin (MINIPRESS) 1 MG capsule, Take 1 capsule by mouth Every Night., Disp: , Rfl:     sacubitril-valsartan (Entresto) 24-26 MG tablet, Take 1 tablet by mouth 2 (Two) Times a Day. Take 0.5 pill AM, 1 pill in PM, Disp: 135 tablet, Rfl: 4    spironolactone (ALDACTONE) 25 MG tablet, Take 1 tablet by mouth Daily. Omit on Sundays, Disp: , Rfl:      "traZODone (DESYREL) 100 MG tablet, Take 0.5 tablets by mouth At Night As Needed for Sleep., Disp: , Rfl:     Vibegron 75 MG tablet, Take 1 tablet by mouth Daily., Disp: 90 tablet, Rfl: 3      Review of Systems   Cardiovascular:  Positive for dyspnea on exertion. Negative for chest pain, palpitations and syncope.        Objective     /65   Pulse 84   Ht 170.2 cm (67.01\")   Wt 93.9 kg (207 lb)   BMI 32.41 kg/m²       General Appearance:   well developed  well nourished  HENT:   oropharynx moist  lips not cyanotic  Neck:  thyroid not enlarged  supple  Respiratory:  no respiratory distress  normal breath sounds  no rales  Cardiovascular:  no jugular venous distention  regular rhythm  apical impulse normal  S1 normal, S2 normal  no S3, no S4   no murmur  no rub, no thrill  carotid pulses normal; no bruit  pedal pulses normal  lower extremity edema: Trace  Musculoskeletal:  no clubbing of fingers.   normocephalic, head atraumatic  Skin:   warm, dry  Psychiatric:  judgement and insight appropriate  normal mood and affect      Result Review :     The following data was reviewed by: Giles Hallman MD on 08/15/2024:    CMP          9/10/2024    14:42 11/21/2024    09:20 1/21/2025    12:27   CMP   Glucose 197  209  320    BUN 22  17  18    Creatinine 1.09  1.23  1.28    EGFR 69.9  60.1  57.3    Sodium 136  136  135    Potassium 5.0  4.9  5.6    Chloride 99  98  98    Calcium 9.4  9.8  9.5    Total Protein 7.2  7.0  7.4    Albumin 4.0  4.0  4.2    Globulin 3.2  3.0  3.2    Total Bilirubin 0.5  0.3  0.3    Alkaline Phosphatase 55  58  66    AST (SGOT) 26  24  34    ALT (SGPT) 27  25  33    Albumin/Globulin Ratio 1.3  1.3  1.3    BUN/Creatinine Ratio 20.2  13.8  14.1    Anion Gap 8.8  11.1  11.0      CBC          9/10/2024    14:42 11/21/2024    09:20 1/21/2025    12:27   CBC   WBC 7.57  5.24  8.06    RBC 4.08  3.76  3.98    Hemoglobin 12.2  11.3  12.0    Hematocrit 37.5  35.4  38.0    MCV 91.9  94.1  95.5  "   MCH 29.9  30.1  30.2    MCHC 32.5  31.9  31.6    RDW 13.8  13.3  12.0    Platelets 200  173  247      Lipid Panel          6/13/2024    10:00   Lipid Panel   Total Cholesterol 97    Triglycerides 68    HDL Cholesterol 42    VLDL Cholesterol 15    LDL Cholesterol  40    LDL/HDL Ratio 0.99      TSH          3/13/2024    15:12   TSH   TSH 1.780        Data reviewed : Primary care and heart failure clinic records reviewed      Procedures           Assessment and Plan        ASSESSMENT:  Encounter Diagnoses   Name Primary?    Chronic heart failure with preserved ejection fraction Yes    Primary hypertension     Mixed hyperlipidemia     Paroxysmal atrial fibrillation          PLAN:    1.  Heart failure with preserved ejection fraction, volume status stable upon exam.  Continue beta-blocker, Entresto, Aldactone  2.  Essential hypertension-controlled, continue current medical therapy  3.  Mixed hyperlipidemia, stable on statin therapy continue.  4.  Paroxysmal atrial fibrillation-clinically maintaining sinus rhythm.  Continue beta-blocker therapy and anticoagulation.     Follow-up in about 7 months      Patient was given instructions and counseling regarding his condition or for health maintenance advice. Please see specific information pulled into the AVS if appropriate.           Giles Hallman MD   2/21/2025  11:37 EST

## 2025-03-31 RX ORDER — BENZONATATE 100 MG/1
100-200 CAPSULE ORAL 3 TIMES DAILY PRN
Qty: 90 CAPSULE | Refills: 1 | Status: SHIPPED | OUTPATIENT
Start: 2025-03-31

## 2025-03-31 RX ORDER — CHOLECALCIFEROL (VITAMIN D3) 25 MCG
1000 TABLET ORAL DAILY
Qty: 90 TABLET | Refills: 1 | Status: SHIPPED | OUTPATIENT
Start: 2025-03-31

## 2025-03-31 NOTE — TELEPHONE ENCOUNTER
Caller: DANIELLE ALVES    Relationship: Emergency Contact    Best call back number:     Requested Prescriptions:   Requested Prescriptions     Pending Prescriptions Disp Refills    benzonatate (TESSALON) 100 MG capsule 90 capsule 1     Sig: Take 1-2 capsules by mouth 3 (Three) Times a Day As Needed for Cough.    Cholecalciferol 25 MCG (1000 UT) tablet          Pharmacy where request should be sent: [facility] [phone] [place], [place] [phone]      Last office visit with prescribing clinician: 1/8/2025   Last telemedicine visit with prescribing clinician: Visit date not found   Next office visit with prescribing clinician: 5/9/2025     Additional details provided by patient:      Does the patient have less than a 3 day supply:  [x] Yes  [] No    Would you like a call back once the refill request has been completed: [] Yes [x] No    If the office needs to give you a call back, can they leave a voicemail: [] Yes [x] No    Neto Cassidy Rep   03/31/25 16:40 EDT

## 2025-04-03 ENCOUNTER — OFFICE VISIT (OUTPATIENT)
Dept: UROLOGY | Age: 80
End: 2025-04-03
Payer: MEDICARE

## 2025-04-03 VITALS — WEIGHT: 207 LBS | BODY MASS INDEX: 32.49 KG/M2 | HEIGHT: 67 IN

## 2025-04-03 DIAGNOSIS — N40.1 BENIGN PROSTATIC HYPERPLASIA WITH URINARY FREQUENCY: Primary | ICD-10-CM

## 2025-04-03 DIAGNOSIS — R35.0 BENIGN PROSTATIC HYPERPLASIA WITH URINARY FREQUENCY: Primary | ICD-10-CM

## 2025-04-03 PROBLEM — R26.81 UNSTEADY GAIT WHEN WALKING: Status: RESOLVED | Noted: 2023-06-06 | Resolved: 2025-04-03

## 2025-04-03 PROBLEM — J10.1 INFLUENZA B: Status: RESOLVED | Noted: 2024-09-23 | Resolved: 2025-04-03

## 2025-04-03 LAB
BILIRUB BLD-MCNC: NEGATIVE MG/DL
CLARITY, POC: CLEAR
COLOR UR: YELLOW
EXPIRATION DATE: ABNORMAL
GLUCOSE UR STRIP-MCNC: ABNORMAL MG/DL
KETONES UR QL: NEGATIVE
LEUKOCYTE EST, POC: NEGATIVE
Lab: ABNORMAL
NITRITE UR-MCNC: NEGATIVE MG/ML
PH UR: 6.5 [PH] (ref 5–8)
PROT UR STRIP-MCNC: ABNORMAL MG/DL
RBC # UR STRIP: ABNORMAL /UL
SP GR UR: 1.02 (ref 1–1.03)
URINE VOLUME: NORMAL
UROBILINOGEN UR QL: ABNORMAL

## 2025-04-03 NOTE — PROGRESS NOTES
Chief Complaint: Benign Prostatic Hypertrophy (He states that he is having some urinary urgency. He states that he could not  the gemtesa )    Subjective         Benign Prostatic Hypertrophy      Giles Donovan is a 79 y.o. male presents to [facility] to be seen for f/u BPH.    At last visit placed patient on Gemtesa for OAB symptoms however even When we sent in a prior authorization patient has not been able to pick this up yet.    At last visit we sent ua for cx and this was negative for any bacterial growth.    Continues on finasteride.    No change in urinary symptoms.          Previous:      He continues on finasteride.    He states slow stream at times.     Nocturia x 1-2    He is having issues with leakage.     Mostly urge incontinence.      Previous:     Patient was admitted to the hospital on 2/19/2024 after presenting to the emergency department for a fall.  The patient had been experiencing more falls and evaluation in the emergency department revealed elevated creatinine and hyperglycemia.  During the hospitalization the patient did have issues with urinary retention and oxybutynin was discontinued at time of discharge.    Admitted to the hospital in April 2024 for orchitis and E. coli UTI.  Patient had presented to the emergency room for testicular pain after he had fallen and injured his testicle.  Patient had a testicular scrotal ultrasound which showed left testicular epididymitis and orchitis.  Moderate left hydrocele.    CT of the abdomen revealed age-indeterminate infectious/inflammatory cystitis.  Diffuse prostatomegaly.  UA revealed cloudiness with specific gravity greater than 1.030 15 mg/dL of ketones moderate blood small leukocyte Estrace to many white blood cells to count.  Urine culture was positive for E. coli.    Patient was given IV and oral antibiotics with good discharged home.    He was discharged home on tamsulosin and finasteride.  Of note he  is no longer taking these medications.    Patient is still getting up 2-3 times a night however he has a urinal at bedside that he utilizes.    Can have a weak stream at times.    Denies any straining does have hesitancy.    He states he has a rash between thigh and stomach due to moisture in his brief.      Previous:    States some urgency.    He reports a good stream.     Denies bothersome straining or hesitancy.     Nocturia x 2-3     He wears depends.     No GH/UTI     Previous:  8/21 cystoscopy-3 cm prostate, no median lobe, mild trabeculations.  Negative otherwise     3 beers daily.     No  Burning/ dysuria/ GH     No cardiopulmonary history.  Patient does not smoke. ASA 81/meloxicam.  Diabetes mellitus on insulin     Patient has never got a prescription for tadalafil yet     Sildenafil 100 mg -did not help     No urologic family history,   Has never had any urologic surgery.        PVR     3/22   109  9/21   167  5/21   78     9/20  GFR > 60      PSA        2/19 0.92    Objective     Past Medical History:   Diagnosis Date    A-fib     Allergic rhinitis 12/27/2014    Will try Zyrtec, he didnt want to use a nasal spray.    Arthritis     Asthma     Cataract     left eye    CHF (congestive heart failure)     Cough 03/30/2016    PFT and CXR ordered.    Depression     PTSD    Diabetes     Elevated cholesterol     H/O psychiatric care 1999    PTSD    Hyperlipidemia 03/30/2016    Lipids ordered. Continue on current medication.    Hypertension     Hypothyroidism     Seasonal allergies     Shortness of breath     Sleep apnea     Stenosis of right carotid artery 02/19/2017    Will refer to vascular surgeon.    Syncope 02/19/2017    Type 2 diabetes mellitus     Upper respiratory infection 10/18/2015    Will treat cough with Hydromet, otherwise over the counter meds for treatment.       Past Surgical History:   Procedure Laterality Date    CATARACT EXTRACTION Right     x2    COLONOSCOPY      JOINT REPLACEMENT       REPLACEMENT TOTAL HIP ONCOLOGIC Right     RETINAL DETACHMENT REPAIR      TOE AMPUTATION Left 11/2017    Second phalaeng.    TOE OSTEOPHYTE REMOVAL           Current Outpatient Medications:     albuterol (PROVENTIL) (2.5 MG/3ML) 0.083% nebulizer solution, Take 2.5 mg by nebulization Every 4 (Four) Hours As Needed for Wheezing or Shortness of Air., Disp: , Rfl:     apixaban (Eliquis) 5 MG tablet tablet, Take 1 tablet by mouth Every 12 (Twelve) Hours., Disp: 180 tablet, Rfl: 3    aspirin EC 81 MG EC tablet, Take 1 tablet by mouth Daily. (Patient taking differently: Take 1 tablet by mouth Every Other Day.), Disp: 90 tablet, Rfl: 1    atorvastatin (LIPITOR) 80 MG tablet, Take 1 tablet by mouth Daily. (Patient taking differently: Take 0.5 tablets by mouth Daily.), Disp: 90 tablet, Rfl: 1    benzonatate (TESSALON) 100 MG capsule, Take 1-2 capsules by mouth 3 (Three) Times a Day As Needed for Cough., Disp: 90 capsule, Rfl: 1    buPROPion XL (WELLBUTRIN XL) 150 MG 24 hr tablet, Take 1 tablet by mouth Daily., Disp: , Rfl:     carvedilol (COREG) 12.5 MG tablet, Take 1 tablet by mouth 2 (Two) Times a Day., Disp: 180 tablet, Rfl: 3    carvedilol (COREG) 6.25 MG tablet, Take 1 tablet by mouth 2 (Two) Times a Day., Disp: 180 tablet, Rfl: 3    cetirizine (ZyrTEC Allergy) 10 MG tablet, Take 1 tablet by mouth Daily., Disp: 90 tablet, Rfl: 3    Cholecalciferol 25 MCG (1000 UT) tablet, Take 1 tablet by mouth Daily., Disp: 90 tablet, Rfl: 1    ezetimibe (ZETIA) 10 MG tablet, Take 1 tablet by mouth Daily., Disp: 90 tablet, Rfl: 1    finasteride (PROSCAR) 5 MG tablet, Take 1 tablet by mouth Daily., Disp: 90 tablet, Rfl: 3    fluticasone-salmeterol (ADVAIR HFA) 230-21 MCG/ACT inhaler, Inhale 2 puffs 2 (Two) Times a Day., Disp: 3 each, Rfl: 3    gabapentin (NEURONTIN) 600 MG tablet, Take 0.5 tablets by mouth Every Night., Disp: , Rfl:     guaiFENesin (MUCINEX) 600 MG 12 hr tablet, Take 2 tablets by mouth 2 (Two) Times a Day As Needed for  Cough or Congestion., Disp: , Rfl:     insulin aspart (novoLOG) 100 UNIT/ML injection, Inject 24-26 Units under the skin into the appropriate area as directed 3 (Three) Times a Day Before Meals., Disp: , Rfl:     Insulin Glargine (Lantus SoloStar) 100 UNIT/ML injection pen, Inject 40-44 Units under the skin into the appropriate area as directed Every Night., Disp: 90 mL, Rfl: 1    levothyroxine (SYNTHROID, LEVOTHROID) 100 MCG tablet, Take 1 tablet by mouth Daily., Disp: , Rfl:     metFORMIN (GLUCOPHAGE) 500 MG tablet, Take 1 tablet by mouth 2 (Two) Times a Day With Meals., Disp: , Rfl:     miconazole (Micatin) 2 % cream, Apply 1 Application topically to the appropriate area as directed 2 (Two) Times a Day., Disp: 35 g, Rfl: 1    nystatin-triamcinolone (MYCOLOG) 139550-0.1 UNIT/GM-% ointment, Apply to affected area 2 times daily, Disp: 30 g, Rfl: 1    olopatadine (PATANOL) 0.1 % ophthalmic solution, Administer 1 drop to both eyes Daily As Needed for Allergies., Disp: , Rfl:     omeprazole (priLOSEC) 20 MG capsule, Take 1 capsule by mouth Daily., Disp: 90 capsule, Rfl: 4    PARoxetine (PAXIL) 40 MG tablet, Take 1 tablet by mouth Every Evening., Disp: , Rfl:     prazosin (MINIPRESS) 1 MG capsule, Take 1 capsule by mouth Every Night., Disp: , Rfl:     sacubitril-valsartan (Entresto) 24-26 MG tablet, Take 1 tablet by mouth 2 (Two) Times a Day. Take 0.5 pill AM, 1 pill in PM, Disp: 135 tablet, Rfl: 4    spironolactone (ALDACTONE) 25 MG tablet, Take 1 tablet by mouth Daily. Omit on Sundays, Disp: , Rfl:     traZODone (DESYREL) 100 MG tablet, Take 0.5 tablets by mouth At Night As Needed for Sleep., Disp: , Rfl:     Vibegron 75 MG tablet, Take 1 tablet by mouth Daily., Disp: 90 tablet, Rfl: 3    Allergies   Allergen Reactions    Latex Rash and Anaphylaxis    Latex, Natural Rubber Itching        Family History   Problem Relation Age of Onset    Heart disease Mother     Lupus Mother     Arthritis Mother     Kidney disease  "Sister        Social History     Socioeconomic History    Marital status: Single   Tobacco Use    Smoking status: Never     Passive exposure: Never    Smokeless tobacco: Never    Tobacco comments:     Pt stated he smoked a pipe [id number]. Pt stated he smoked a pipe a couple times a day. Pt stated he mixed with tobacco and marijuana   Vaping Use    Vaping status: Never Used   Substance and Sexual Activity    Alcohol use: Yes     Alcohol/week: 7.0 standard drinks of alcohol     Types: 7 Cans of beer per week     Comment: occasional    Drug use: Not Currently     Types: Marijuana     Comment: quit 1978    Sexual activity: Defer       Vital Signs:   Ht 170.2 cm (67\")   Wt 93.9 kg (207 lb)   BMI 32.42 kg/m²      Physical Exam     Result Review :   The following data was reviewed by: TENISHA Stack on 04/3/2025:  Results for orders placed or performed in visit on 04/03/25   Bladder Scan    Collection Time: 04/03/25  2:19 PM   Result Value Ref Range    Urine Volume 6ml    POC Urinalysis Dipstick, Automated    Collection Time: 04/03/25  2:26 PM    Specimen: Urine   Result Value Ref Range    Color Yellow Yellow, Straw, Dark Yellow, Ashley    Clarity, UA Clear Clear    Specific Gravity  1.020 1.005 - 1.030    pH, Urine 6.5 5.0 - 8.0    Leukocytes Negative Negative    Nitrite, UA Negative Negative    Protein, POC 30 mg/dL (A) Negative mg/dL    Glucose,  mg/dL (A) Negative mg/dL    Ketones, UA Negative Negative    Urobilinogen, UA 0.2 E.U./dL Normal, 0.2 E.U./dL    Bilirubin Negative Negative    Blood, UA Trace (A) Negative    Lot Number [id number]     Expiration Date 3/2,026        Bladder Scan interpretation 04/3/2025    Estimation of residual urine via BVI 3000 Verathon Bladder Scan  MA/nurse performing: emily tripp Rn . Ma   Residual Urine: 6 ml  Indication: Benign prostatic hyperplasia with urinary frequency   Position: Supine  Examination: Incremental scanning of the suprapubic area using 2.0 MHz transducer " using copious amounts of acoustic gel.   Findings: An anechoic area was demonstrated which represented the bladder, with measurement of residual urine as noted. I inspected this myself. In that the residual urine was stable or insignificant, refer to plan for treatment and plan necessary at this time.           Procedures        Assessment and Plan    Diagnoses and all orders for this visit:    1. Benign prostatic hyperplasia with urinary frequency (Primary)  -     POC Urinalysis Dipstick, Automated  -     Bladder Scan  -     Vibegron 75 MG tablet; Take 1 tablet by mouth Daily.  Dispense: 90 tablet; Refill: 3        We we will call the pharmacy to inquire about why his Gemtesa is not being filled.    Give him samples of Gemtesa today.    For a follow-up appointment in 3 months or sooner if needed.      I spent 10 minutes caring for Giles on this date of service. This time includes time spent by me in the following activities:reviewing tests, obtaining and/or reviewing a separately obtained history, performing a medically appropriate examination and/or evaluation , counseling and educating the patient/family/caregiver, ordering medications, tests, or procedures, and documenting information in the medical record  Follow Up   No follow-ups on file.  Patient was given instructions and counseling regarding his condition or for health maintenance advice. Please see specific information pulled into the AVS if appropriate.         This document has been electronically signed by TENISHA Stack  April 3, 2025 14:36 EDT

## 2025-04-16 ENCOUNTER — OFFICE VISIT (OUTPATIENT)
Dept: PULMONOLOGY | Facility: CLINIC | Age: 80
End: 2025-04-16
Payer: MEDICARE

## 2025-04-16 VITALS
DIASTOLIC BLOOD PRESSURE: 80 MMHG | OXYGEN SATURATION: 96 % | HEART RATE: 79 BPM | HEIGHT: 67 IN | WEIGHT: 200 LBS | SYSTOLIC BLOOD PRESSURE: 136 MMHG | TEMPERATURE: 97.6 F | BODY MASS INDEX: 31.39 KG/M2 | RESPIRATION RATE: 16 BRPM

## 2025-04-16 DIAGNOSIS — R06.09 DYSPNEA ON EXERTION: ICD-10-CM

## 2025-04-16 DIAGNOSIS — J45.40 MODERATE PERSISTENT ASTHMA WITHOUT COMPLICATION: Chronic | ICD-10-CM

## 2025-04-16 DIAGNOSIS — J98.4 RESTRICTIVE LUNG DISEASE: Primary | Chronic | ICD-10-CM

## 2025-04-16 DIAGNOSIS — I50.32 CHRONIC HEART FAILURE WITH PRESERVED EJECTION FRACTION: Chronic | ICD-10-CM

## 2025-04-16 DIAGNOSIS — J96.11 CHRONIC RESPIRATORY FAILURE WITH HYPOXIA: Chronic | ICD-10-CM

## 2025-04-16 DIAGNOSIS — I48.0 PAROXYSMAL ATRIAL FIBRILLATION: Chronic | ICD-10-CM

## 2025-04-16 NOTE — PROGRESS NOTES
Primary Care Provider  Morena Masters MD     Referring Provider  No ref. provider found       Patient or patient representative verbalized consent for the use of Ambient Listening during the visit with  TENISHA Grant for chart documentation. 4/16/2025  14:20 EDT    Chief Complaint  Follow-up (6 month f/up ) and Shortness of Breath    Subjective          Giles Donovan presents to [facility]ULMONARY [facility]ICAL [facility]ARE MEDICINE  History of Present Illness  Giles Donovan is a 79 y.o. male patient of Dr. Ochoa here for management of restrictive lung disease, chronic hypoxic respiratory failure, chronic diastolic heart failure, atrial fibrillation and dyspnea on exertion.     History of Present Illness  The patient is a 79-year-old male who presents for evaluation of his breathing.    He reports satisfactory respiratory function, with no recent use of albuterol. He does not experience any systemic symptoms such as fevers or chills, and there is no evidence of productive cough. He has not required any recent courses of antibiotics or steroids for his respiratory condition. His current medication regimen includes Advair, which he continues to use. He utilizes supplemental oxygen during the night.    He continues to use and benefit from his oxygen.    MEDICATIONS  Advair, albuterol       His history of smoking is   Tobacco Use: Medium Risk (4/16/2025)    Patient History     Smoking Tobacco Use: Former     Smokeless Tobacco Use: Never     Passive Exposure: Past   .    Review of Systems   Constitutional:  Negative for chills, fatigue, fever, unexpected weight gain and unexpected weight loss.   HENT:  Congestion: Nasal.    Respiratory:  Positive for shortness of breath. Negative for apnea, cough and wheezing.         Negative for Hemoptysis     Cardiovascular:  Negative for chest pain, palpitations and leg swelling.   Skin:         Negative for cyanosis      Sleep: Negative for  Excessive daytime sleepiness  Negative for morning headaches  Negative for Snoring    Family History   Problem Relation Age of Onset    Heart disease Mother     Lupus Mother     Arthritis Mother     Kidney disease Sister         Social History     Socioeconomic History    Marital status: Single   Tobacco Use    Smoking status: Former     Types: Pipe     Passive exposure: Past    Smokeless tobacco: Never    Tobacco comments:     Pt stated he smoked a pipe [id number]. Pt stated he smoked a pipe a couple times a day. Pt stated he mixed with tobacco and marijuana   Vaping Use    Vaping status: Never Used   Substance and Sexual Activity    Alcohol use: Yes     Alcohol/week: 7.0 standard drinks of alcohol     Types: 7 Cans of beer per week     Comment: occasional    Drug use: Not Currently     Types: Marijuana     Comment: quit 1978    Sexual activity: Defer        Past Medical History:   Diagnosis Date    A-fib     Allergic rhinitis 12/27/2014    Will try Zyrtec, he didnt want to use a nasal spray.    Arthritis     Asthma     Cataract     left eye    CHF (congestive heart failure)     Cough 03/30/2016    PFT and CXR ordered.    Depression     PTSD    Diabetes     Elevated cholesterol     H/O psychiatric care 1999    PTSD    Hyperlipidemia 03/30/2016    Lipids ordered. Continue on current medication.    Hypertension     Hypothyroidism     Seasonal allergies     Shortness of breath     Sleep apnea     Stenosis of right carotid artery 02/19/2017    Will refer to vascular surgeon.    Syncope 02/19/2017    Type 2 diabetes mellitus     Upper respiratory infection 10/18/2015    Will treat cough with Hydromet, otherwise over the counter meds for treatment.        Immunization History   Administered Date(s) Administered    ABRYSVO (RSV, 60+ or pregnant women 32-36 wks) 11/18/2024    COVID-19 (MODERNA) 12YRS+ (SPIKEVAX) 11/18/2024    COVID-19 (MODERNA) 1st,2nd,3rd Dose Monovalent 01/01/2021, 01/27/2021    COVID-19 (MODERNA)  Monovalent Original Booster 01/18/2022    COVID-19 (PFIZER) 12YRS+ (COMIRNATY) 12/20/2023    COVID-19 (PFIZER) BIVALENT 12+YRS 10/05/2022    Flu Vaccine Intradermal Quad 18-64YR 12/02/2020    Flu Vaccine Quad PF >36MO 10/01/2019, 12/02/2020    Flublok 18+yrs 10/05/2022    Fluzone High-Dose 65+YRS 10/11/2024    Fluzone High-Dose 65+yrs 10/24/2023    Fluzone Quad >6mos (Multi-dose) 09/17/2018    Pneumococcal Conjugate 13-Valent (PCV13) 12/11/2015    Pneumococcal Conjugate 20-Valent (PCV20) 12/05/2022    Pneumococcal Polysaccharide (PPSV23) 09/19/2014    Pneumococcal, Unspecified 09/15/2003    Td, Unspecified 02/26/2003    Tdap 12/11/2015         Allergies   Allergen Reactions    Latex Rash and Anaphylaxis    Latex, Natural Rubber Itching          Current Outpatient Medications:     albuterol (PROVENTIL) (2.5 MG/3ML) 0.083% nebulizer solution, Take 2.5 mg by nebulization Every 4 (Four) Hours As Needed for Wheezing or Shortness of Air., Disp: , Rfl:     apixaban (Eliquis) 5 MG tablet tablet, Take 1 tablet by mouth Every 12 (Twelve) Hours., Disp: 180 tablet, Rfl: 3    aspirin EC 81 MG EC tablet, Take 1 tablet by mouth Daily. (Patient taking differently: Take 1 tablet by mouth Every Other Day.), Disp: 90 tablet, Rfl: 1    atorvastatin (LIPITOR) 80 MG tablet, Take 1 tablet by mouth Daily. (Patient taking differently: Take 0.5 tablets by mouth Daily.), Disp: 90 tablet, Rfl: 1    benzonatate (TESSALON) 100 MG capsule, Take 1-2 capsules by mouth 3 (Three) Times a Day As Needed for Cough., Disp: 90 capsule, Rfl: 1    buPROPion XL (WELLBUTRIN XL) 150 MG 24 hr tablet, Take 1 tablet by mouth Daily., Disp: , Rfl:     carvedilol (COREG) 12.5 MG tablet, Take 1 tablet by mouth 2 (Two) Times a Day., Disp: 180 tablet, Rfl: 3    carvedilol (COREG) 6.25 MG tablet, Take 1 tablet by mouth 2 (Two) Times a Day., Disp: 180 tablet, Rfl: 3    cetirizine (ZyrTEC Allergy) 10 MG tablet, Take 1 tablet by mouth Daily., Disp: 90 tablet, Rfl: 3     Cholecalciferol 25 MCG (1000 UT) tablet, Take 1 tablet by mouth Daily., Disp: 90 tablet, Rfl: 1    ezetimibe (ZETIA) 10 MG tablet, Take 1 tablet by mouth Daily., Disp: 90 tablet, Rfl: 1    finasteride (PROSCAR) 5 MG tablet, Take 1 tablet by mouth Daily., Disp: 90 tablet, Rfl: 3    fluticasone-salmeterol (ADVAIR HFA) 230-21 MCG/ACT inhaler, Inhale 2 puffs 2 (Two) Times a Day., Disp: 3 each, Rfl: 3    gabapentin (NEURONTIN) 600 MG tablet, Take 0.5 tablets by mouth Every Night., Disp: , Rfl:     guaiFENesin (MUCINEX) 600 MG 12 hr tablet, Take 2 tablets by mouth 2 (Two) Times a Day As Needed for Cough or Congestion., Disp: , Rfl:     insulin aspart (novoLOG) 100 UNIT/ML injection, Inject 24-26 Units under the skin into the appropriate area as directed 3 (Three) Times a Day Before Meals., Disp: , Rfl:     Insulin Glargine (Lantus SoloStar) 100 UNIT/ML injection pen, Inject 40-44 Units under the skin into the appropriate area as directed Every Night., Disp: 90 mL, Rfl: 1    levothyroxine (SYNTHROID, LEVOTHROID) 100 MCG tablet, Take 1 tablet by mouth Daily., Disp: , Rfl:     metFORMIN (GLUCOPHAGE) 500 MG tablet, Take 1 tablet by mouth 2 (Two) Times a Day With Meals., Disp: , Rfl:     miconazole (Micatin) 2 % cream, Apply 1 Application topically to the appropriate area as directed 2 (Two) Times a Day., Disp: 35 g, Rfl: 1    nystatin-triamcinolone (MYCOLOG) 413046-3.1 UNIT/GM-% ointment, Apply to affected area 2 times daily, Disp: 30 g, Rfl: 1    olopatadine (PATANOL) 0.1 % ophthalmic solution, Administer 1 drop to both eyes Daily As Needed for Allergies., Disp: , Rfl:     omeprazole (priLOSEC) 20 MG capsule, Take 1 capsule by mouth Daily., Disp: 90 capsule, Rfl: 4    PARoxetine (PAXIL) 40 MG tablet, Take 1 tablet by mouth Every Evening., Disp: , Rfl:     prazosin (MINIPRESS) 1 MG capsule, Take 1 capsule by mouth Every Night., Disp: , Rfl:     sacubitril-valsartan (Entresto) 24-26 MG tablet, Take 1 tablet by mouth 2 (Two)  "Times a Day. Take 0.5 pill AM, 1 pill in PM, Disp: 135 tablet, Rfl: 4    spironolactone (ALDACTONE) 25 MG tablet, Take 1 tablet by mouth Daily. Omit on Sundays, Disp: , Rfl:     traZODone (DESYREL) 100 MG tablet, Take 0.5 tablets by mouth At Night As Needed for Sleep., Disp: , Rfl:     Vibegron 75 MG tablet, Take 1 tablet by mouth Daily., Disp: 90 tablet, Rfl: 3     Objective   Physical Exam  Constitutional:       General: He is not in acute distress.     Appearance: Normal appearance. He is normal weight.   HENT:      Right Ear: Hearing normal.      Left Ear: Hearing normal.      Nose: No nasal tenderness or congestion.      Mouth/Throat:      Mouth: Mucous membranes are moist. No oral lesions.   Eyes:      Extraocular Movements: Extraocular movements intact.      Pupils: Pupils are equal, round, and reactive to light.   Cardiovascular:      Rate and Rhythm: Normal rate and regular rhythm.      Pulses: Normal pulses.      Heart sounds: Normal heart sounds. No murmur heard.  Pulmonary:      Effort: Pulmonary effort is normal.      Breath sounds: Normal breath sounds. No wheezing, rhonchi or rales.   Musculoskeletal:      Right lower leg: No edema.      Left lower leg: No edema.   Skin:     General: Skin is warm and dry.      Findings: No lesion or rash.   Neurological:      General: No focal deficit present.      Mental Status: He is alert and oriented to person, place, and time.   Psychiatric:         Mood and Affect: Affect normal. Mood is not anxious or depressed.         Vital Signs:   /80 (BP Location: Right arm, Patient Position: Sitting, Cuff Size: Large Adult)   Pulse 79   Temp 97.6 °F (36.4 °C) (Oral)   Resp 16   Ht 170.2 cm (67\")   Wt 90.7 kg (200 lb)   SpO2 96% Comment: RA; 2 L's PRN  BMI 31.32 kg/m²        Result Review :   The following data was reviewed by: TENISHA Grant on 04/16/2025:  CMP          9/10/2024    14:42 11/21/2024    09:20 1/21/2025    12:27   CMP   Glucose 197  209  " 320    BUN 22  17  18    Creatinine 1.09  1.23  1.28    EGFR 69.9  60.1  57.3    Sodium 136  136  135    Potassium 5.0  4.9  5.6    Chloride 99  98  98    Calcium 9.4  9.8  9.5    Total Protein 7.2  7.0  7.4    Albumin 4.0  4.0  4.2    Globulin 3.2  3.0  3.2    Total Bilirubin 0.5  0.3  0.3    Alkaline Phosphatase 55  58  66    AST (SGOT) 26  24  34    ALT (SGPT) 27  25  33    Albumin/Globulin Ratio 1.3  1.3  1.3    BUN/Creatinine Ratio 20.2  13.8  14.1    Anion Gap 8.8  11.1  11.0      CBC w/diff          9/10/2024    14:42 11/21/2024    09:20 1/21/2025    12:27   CBC w/Diff   WBC 7.57  5.24  8.06    RBC 4.08  3.76  3.98    Hemoglobin 12.2  11.3  12.0    Hematocrit 37.5  35.4  38.0    MCV 91.9  94.1  95.5    MCH 29.9  30.1  30.2    MCHC 32.5  31.9  31.6    RDW 13.8  13.3  12.0    Platelets 200  173  247    Neutrophil Rel % 57.9  47.9  64.9    Immature Granulocyte Rel % 0.3  0.4  0.1    Lymphocyte Rel % 26.9  30.9  23.3    Monocyte Rel % 12.5  18.3  10.0    Eosinophil Rel % 2.0  1.9  1.2    Basophil Rel % 0.4  0.6  0.5      Data reviewed : Radiologic studies chest x-ray 4/22/2024, pulmonary function test 9/12/2024 and my last office note    Procedures        Assessment and Plan    Diagnoses and all orders for this visit:    1. Restrictive lung disease (Primary)  Comments:  Stable    2. Moderate persistent asthma without complication  Comments:  Continue Advair    3. Chronic respiratory failure with hypoxia  Comments:  Continue oxygen.  Patient using and benefiting.    4. Dyspnea on exertion  Comments:  Albuterol as needed    5. Chronic heart failure with preserved ejection fraction  Comments:  Follow-up with cardiology    6. Paroxysmal atrial fibrillation  Comments:  Follow-up with cardiology        Assessment & Plan  1.Asthma  He reports good breathing and has not needed to use albuterol frequently. He continues to use Advair and uses oxygen at night. There are no recent issues with fever, chills, or coughing. He  has not been on antibiotics or steroids recently. He will continue his current regimen of Advair until October 2025.    Follow-up  The patient will follow up in 6 months.          Follow Up   Return in about 6 months (around 10/16/2025) for Recheck with Juan Antonio.  Patient was given instructions and counseling regarding his condition or for health maintenance advice. Please see specific information pulled into the AVS if appropriate.

## 2025-05-08 NOTE — TELEPHONE ENCOUNTER
Left message for patient to call back to let him know to go to the hospital or [facility] lab and drop off a culture.    Full

## 2025-05-09 ENCOUNTER — OFFICE VISIT (OUTPATIENT)
Dept: INTERNAL MEDICINE | Facility: CLINIC | Age: 80
End: 2025-05-09
Payer: MEDICARE

## 2025-05-09 VITALS
SYSTOLIC BLOOD PRESSURE: 126 MMHG | HEART RATE: 70 BPM | WEIGHT: 210.4 LBS | DIASTOLIC BLOOD PRESSURE: 70 MMHG | HEIGHT: 67 IN | OXYGEN SATURATION: 99 % | TEMPERATURE: 98.6 F | BODY MASS INDEX: 33.02 KG/M2 | RESPIRATION RATE: 22 BRPM

## 2025-05-09 DIAGNOSIS — M62.50 MUSCULAR ATROPHY, UNSPECIFIED SITE: ICD-10-CM

## 2025-05-09 DIAGNOSIS — I48.0 PAROXYSMAL ATRIAL FIBRILLATION: ICD-10-CM

## 2025-05-09 DIAGNOSIS — E06.3 HYPOTHYROIDISM DUE TO HASHIMOTO THYROIDITIS: ICD-10-CM

## 2025-05-09 DIAGNOSIS — E78.2 MIXED HYPERLIPIDEMIA: ICD-10-CM

## 2025-05-09 DIAGNOSIS — R53.1 WEAKNESS: ICD-10-CM

## 2025-05-09 DIAGNOSIS — I10 PRIMARY HYPERTENSION: ICD-10-CM

## 2025-05-09 DIAGNOSIS — E11.9 CONTROLLED TYPE 2 DIABETES MELLITUS WITHOUT COMPLICATION, WITHOUT LONG-TERM CURRENT USE OF INSULIN: Primary | ICD-10-CM

## 2025-05-09 DIAGNOSIS — R74.8 ELEVATED CK: ICD-10-CM

## 2025-05-09 RX ORDER — TADALAFIL 5 MG/1
5 TABLET ORAL DAILY PRN
Qty: 90 TABLET | Refills: 1 | Status: SHIPPED | OUTPATIENT
Start: 2025-05-09

## 2025-05-09 NOTE — PROGRESS NOTES
"Chief Complaint  Diabetes (4 mo f/u /), Weight Management, and Med Management (Would like to discuss Cialis )      Subjective      History of Present Illness  The patient presents for evaluation of leg shaking, diabetes mellitus, erectile dysfunction, and neuropathy. He is accompanied by his wife.    He reports daily leg tremors during ambulation, causing instability and necessitating a cane or walker. His wife corroborates this, recounting a fall at [facility] due to the absence of his walker. No recent falls, but leg shaking persists when leaning on objects. He desires to maintain mobility through walking. Reports persistent lower back pain. [facility] with slight progression. Reluctant towards surgical intervention.    Monitors blood glucose levels regularly; reports low morning readings due to excessive insulin. Occasional hyperglycemia due to forgetting to check levels before meals.    Reports difficulty achieving an erection; previous medications ineffective. Discontinued Viagra due to cost. Never used Cialis daily. No feelings of weakness; feels stable overall. Ceased weightlifting. Takes testosterone supplement from [facility] daily, occasionally forgets doses.    Currently on Wellbutrin and Paxil for stress management, reports effectiveness.         Objective   Vital Signs:   Vitals:    05/09/25 1312   BP: 126/70   BP Location: Right arm   Patient Position: Sitting   Cuff Size: Adult   Pulse: 70   Resp: 22   Temp: 98.6 °F (37 °C)   TempSrc: Temporal   SpO2: 99%   Weight: 95.4 kg (210 lb 6.4 oz)   Height: 170.2 cm (67.01\")     Body mass index is 32.95 kg/m².    Wt Readings from Last 3 Encounters:   05/09/25 95.4 kg (210 lb 6.4 oz)   04/16/25 90.7 kg (200 lb)   04/03/25 93.9 kg (207 lb)     BP Readings from Last 3 Encounters:   05/09/25 126/70   04/16/25 136/80   02/21/25 128/65       Health Maintenance   Topic Date Due    URINE MICROALBUMIN-CREATININE RATIO (uACR)  Never done    ZOSTER " VACCINE (1 of 2) Never done    DIABETIC FOOT EXAM  09/19/2018    COLORECTAL CANCER SCREENING  10/07/2024    HEMOGLOBIN A1C  10/22/2024    ANNUAL WELLNESS VISIT  07/10/2025    COVID-19 Vaccine (9 - 2024-25 season) 05/18/2025    LIPID PANEL  06/13/2025    INFLUENZA VACCINE  07/01/2025    DIABETIC EYE EXAM  09/12/2025    TDAP/TD VACCINES (2 - Td or Tdap) 12/11/2025    HEPATITIS C SCREENING  Completed    RSV Vaccine - Adults  Completed    Pneumococcal Vaccine 50+  Completed       Physical Exam  Vitals reviewed.   Constitutional:       Appearance: Normal appearance. He is well-developed.   HENT:      Head: Normocephalic and atraumatic.      Right Ear: External ear normal.      Left Ear: External ear normal.   Eyes:      Conjunctiva/sclera: Conjunctivae normal.      Pupils: Pupils are equal, round, and reactive to light.   Cardiovascular:      Rate and Rhythm: Normal rate and regular rhythm.      Heart sounds: No murmur heard.     No friction rub. No gallop.   Pulmonary:      Effort: Pulmonary effort is normal.      Breath sounds: Normal breath sounds. No wheezing or rhonchi.   Musculoskeletal:      Comments: Significant wasting in bilateral hands using walker for ambulation   Skin:     General: Skin is warm and dry.   Neurological:      Mental Status: He is alert and oriented to person, place, and time.   Psychiatric:         Mood and Affect: Affect normal.         Behavior: Behavior normal.         Thought Content: Thought content normal.        Physical Exam        Result Review :  The following data was reviewed by: Morena Masters MD on 05/09/2025:         Results  - Labs:  Reviewed most recent labs            Procedures            Assessment & Plan  Controlled type 2 diabetes mellitus without complication, without long-term current use of insulin           Mixed hyperlipidemia            Primary hypertension           Hypothyroidism due to Hashimoto thyroiditis         Paroxysmal atrial fibrillation          Muscular atrophy, unspecified site    Orders:    Ambulatory Referral to Neurology    Weakness    Orders:    Ambulatory Referral to Neurology    Elevated CK    Orders:    Ambulatory Referral to Neurology         Assessment & Plan  Leg shaking  - Concerning given elevated CK levels and muscle wasting in hands  - Differential diagnoses: ALS, Nan Gehrig's disease  - Referral to neuromuscular specialist for further evaluation  - Patient to bring recent lab results from [place]    Diabetes mellitus  - Blood glucose levels well-managed overall; concern about low morning readings due to excessive nighttime insulin  - Reduce nighttime insulin dosage by 2 units; adjust as needed  - Reminder to check blood glucose levels before meals  - Monitor levels closely when resuming gym activities; less insulin may be needed    Erectile dysfunction  - Difficulty achieving an erection; previous medications ineffective  - Prescription for Cialis 5 mg provided; take 30 minutes prior to sexual activity or daily for prostate health and urinary difficulties  - Caution about potential side effects: dizziness, lightheadedness (hypotension)  - If 5 mg ineffective, increase dosage gradually up to 20 mg; no more than 4 tablets at a time, not more than every other day  - [facility]; discontinue [employer] supplement if causing hypertension or chest pain    Neuropathy  - Diagnosed neuropathy, worsened over time  - Confirmed by previous evaluation  - Referral to neuromuscular specialist for further evaluation and management  - Patient to bring recent lab results from [place]    Stress management  - Currently taking Wellbutrin (bupropion) and Paxil (paroxetine); reports effectiveness  - Continue current regimen  - No changes necessary    Patient or patient representative verbalized consent for the use of Ambient Listening during the visit with  Morena Masters MD for chart documentation. 5/9/2025  14:11  EDT      FOLLOW UP  No follow-ups on file.  Patient was given instructions and counseling regarding his condition or for health maintenance advice. Please see specific information pulled into the AVS if appropriate.     Morena Masters MD  05/09/25  14:11 EDT    CURRENT & DISCONTINUED MEDICATIONS  Current Outpatient Medications   Medication Instructions    albuterol (PROVENTIL) 2.5 mg, Every 4 Hours PRN    apixaban (ELIQUIS) 5 mg, Oral, Every 12 Hours Scheduled    aspirin EC 81 mg, Oral, Daily    atorvastatin (LIPITOR) 80 mg, Oral, Daily    benzonatate (TESSALON) 100-200 mg, Oral, 3 Times Daily PRN    buPROPion XL (WELLBUTRIN XL) 150 mg, Daily    carvedilol (COREG) 6.25 mg, Oral, 2 Times Daily    carvedilol (COREG) 12.5 mg, Oral, 2 Times Daily    cetirizine (ZYRTEC ALLERGY) 10 mg, Oral, Daily    cholecalciferol (VITAMIN D3) 1,000 Units, Oral, Daily    ezetimibe (ZETIA) 10 mg, Oral, Daily    finasteride (PROSCAR) 5 mg, Oral, Daily    fluticasone-salmeterol (ADVAIR HFA) 230-21 MCG/ACT inhaler 2 puffs, Inhalation, 2 Times Daily    gabapentin (NEURONTIN) 300 mg, Nightly    guaiFENesin (MUCINEX) 1,200 mg, 2 Times Daily PRN    insulin aspart (NOVOLOG) 24-26 Units, 3 Times Daily Before Meals    Lantus SoloStar 40-44 Units, Subcutaneous, Nightly    levothyroxine (SYNTHROID, LEVOTHROID) 100 mcg, Daily    metFORMIN (GLUCOPHAGE) 500 mg, 2 Times Daily With Meals    miconazole (Micatin) 2 % cream 1 Application, Topical, 2 Times Daily    olopatadine (PATANOL) 0.1 % ophthalmic solution 1 drop, Daily PRN    omeprazole (PRILOSEC) 20 mg, Oral, Daily    PARoxetine (PAXIL) 40 mg, Every Evening    prazosin (MINIPRESS) 1 mg, Nightly    sacubitril-valsartan (Entresto) 24-26 MG tablet 1 tablet, Oral, 2 Times Daily, Take 0.5 pill AM, 1 pill in PM    spironolactone (ALDACTONE) 25 mg, Oral, Daily, Omit on Sundays    tadalafil (CIALIS) 5 mg, Oral, Daily PRN    traZODone (DESYREL) 50 mg, Nightly PRN    Vibegron 75 mg, Oral, Daily        Medications Discontinued During This Encounter   Medication Reason    nystatin-triamcinolone (MYCOLOG) 590551-3.1 UNIT/GM-% ointment *Therapy completed

## 2025-05-20 NOTE — TELEPHONE ENCOUNTER
Diuretics Protocol [id number]/20/2025 09:52 AM   Protocol Details Normal serum potassium in past 12 months    Normal serum sodium in past 12 months

## 2025-05-20 NOTE — TELEPHONE ENCOUNTER
Caller: DANIELLE MENEZES    Relationship: Emergency Contact    Best call back number: [phone]    Requested Prescriptions:   Requested Prescriptions     Pending Prescriptions Disp Refills    spironolactone (ALDACTONE) 25 MG tablet       Sig: Take 1 tablet by mouth Daily. Omit on Sundays        Pharmacy where request should be sent: [place] [phone] [place] [phone]      Last office visit with prescribing clinician: 5/9/2025   Last telemedicine visit with prescribing clinician: Visit date not found   Next office visit with prescribing clinician: 9/10/2025     Additional details provided by patient: PATIENT'S WIFE SAID THE ORIGINAL PRESCRIBER OF THIS MEDICATION IS NOT IN PRACTICE ANY LONGER, SHE DOES NOT KNOW WHO TO GO TO TO HAVE THIS FILLED.     Does the patient have less than a 3 day supply:  [x] Yes  [] No      Neto Lopez Rep   05/20/25 09:51 EDT

## 2025-05-21 RX ORDER — SPIRONOLACTONE 25 MG/1
25 TABLET ORAL DAILY
Start: 2025-05-21

## 2025-07-07 ENCOUNTER — OFFICE VISIT (OUTPATIENT)
Dept: UROLOGY | Age: 80
End: 2025-07-07
Payer: MEDICARE

## 2025-07-07 VITALS — WEIGHT: 210 LBS | BODY MASS INDEX: 32.96 KG/M2 | RESPIRATION RATE: 18 BRPM | HEIGHT: 67 IN

## 2025-07-07 DIAGNOSIS — R35.0 BENIGN PROSTATIC HYPERPLASIA WITH URINARY FREQUENCY: Primary | ICD-10-CM

## 2025-07-07 DIAGNOSIS — N40.1 BENIGN PROSTATIC HYPERPLASIA WITH URINARY FREQUENCY: Primary | ICD-10-CM

## 2025-07-07 DIAGNOSIS — R31.29 MICROHEMATURIA: ICD-10-CM

## 2025-07-07 DIAGNOSIS — R35.0 URINATION FREQUENCY: ICD-10-CM

## 2025-07-07 PROBLEM — I51.7 CARDIOMEGALY: Status: ACTIVE | Noted: 2025-07-07

## 2025-07-07 PROBLEM — I65.29 CAROTID ARTERY OCCLUSION: Status: RESOLVED | Noted: 2023-06-06 | Resolved: 2025-07-07

## 2025-07-07 PROBLEM — I65.21: Status: RESOLVED | Noted: 2017-01-23 | Resolved: 2025-07-07

## 2025-07-07 PROBLEM — E11.40 DIABETIC NEUROPATHY: Status: ACTIVE | Noted: 2025-07-07

## 2025-07-07 PROBLEM — G47.20 ABNORMAL CIRCADIAN RHYTHM: Status: ACTIVE | Noted: 2025-07-07

## 2025-07-07 PROBLEM — H26.9 CATARACT: Status: RESOLVED | Noted: 2021-12-21 | Resolved: 2025-07-07

## 2025-07-07 LAB
BACTERIA UR QL AUTO: NORMAL /HPF
BILIRUB BLD-MCNC: NEGATIVE MG/DL
CLARITY, POC: CLEAR
COLOR UR: ABNORMAL
EXPIRATION DATE: ABNORMAL
GLUCOSE UR STRIP-MCNC: ABNORMAL MG/DL
HYALINE CASTS UR QL AUTO: NORMAL /LPF
KETONES UR QL: ABNORMAL
LEUKOCYTE EST, POC: NEGATIVE
Lab: ABNORMAL
NITRITE UR-MCNC: NEGATIVE MG/ML
PH UR: 6 [PH] (ref 5–8)
PROT UR STRIP-MCNC: ABNORMAL MG/DL
RBC # UR STRIP: ABNORMAL /UL
RBC # UR STRIP: NORMAL /HPF
REF LAB TEST METHOD: NORMAL
SP GR UR: 1.02 (ref 1–1.03)
SQUAMOUS #/AREA URNS HPF: NORMAL /HPF
URINE VOLUME: NORMAL
UROBILINOGEN UR QL: NORMAL
WBC # UR STRIP: NORMAL /HPF

## 2025-07-07 PROCEDURE — 81015 MICROSCOPIC EXAM OF URINE: CPT | Performed by: NURSE PRACTITIONER

## 2025-07-07 NOTE — PROGRESS NOTES
Chief Complaint: Urinary Urgency, Follow-up, and Benign Prostatic Hypertrophy    Subjective         Benign Prostatic Hypertrophy      Giles Donovan is a 79 y.o. male presents to [facility] to be seen for f/u BPH.    At last visit we gave the patient samples of Gemtesa to see if this would alleviate urinary symptoms and did send in a prescription for this medication.    He has continued on finasteride.    He is taking gemtesa.    His PCP had been prescribing him daily Cialis but it appears that he is not taking this as well given that it was causing some issues with a headache.    PVR 0    Nocturia x 2    Stream is good.     He is still having urgency and leakage before he can get there.     His wife states that if he drinks beer and liquor and if his b/g is high he will go more often and have some issues with leakge more during those times.     His wife states that the gemtesa actually works well for him.       Previous:    At last visit placed patient on Gemtesa for OAB symptoms however even When we sent in a prior authorization patient has not been able to pick this up yet.    At last visit we sent ua for cx and this was negative for any bacterial growth.    Continues on finasteride.    No change in urinary symptoms.          Previous:      He continues on finasteride.    He states slow stream at times.     Nocturia x 1-2    He is having issues with leakage.     Mostly urge incontinence.      Previous:     Patient was admitted to the hospital on 2/19/2024 after presenting to the emergency department for a fall.  The patient had been experiencing more falls and evaluation in the emergency department revealed elevated creatinine and hyperglycemia.  During the hospitalization the patient did have issues with urinary retention and oxybutynin was discontinued at time of discharge.    Admitted to the hospital in April 2024 for orchitis and E. coli UTI.  Patient had presented to the  emergency room for testicular pain after he had fallen and injured his testicle.  Patient had a testicular scrotal ultrasound which showed left testicular epididymitis and orchitis.  Moderate left hydrocele.    CT of the abdomen revealed age-indeterminate infectious/inflammatory cystitis.  Diffuse prostatomegaly.  UA revealed cloudiness with specific gravity greater than 1.030 15 mg/dL of ketones moderate blood small leukocyte Estrace to many white blood cells to count.  Urine culture was positive for E. coli.    Patient was given IV and oral antibiotics with good discharged home.    He was discharged home on tamsulosin and finasteride.  Of note he is no longer taking these medications.    Patient is still getting up 2-3 times a night however he has a urinal at bedside that he utilizes.    Can have a weak stream at times.    Denies any straining does have hesitancy.    He states he has a rash between thigh and stomach due to moisture in his brief.      Previous:    States some urgency.    He reports a good stream.     Denies bothersome straining or hesitancy.     Nocturia x 2-3     He wears depends.     No GH/UTI     Previous:  8/21 cystoscopy-3 cm prostate, no median lobe, mild trabeculations.  Negative otherwise     3 beers daily.     No  Burning/ dysuria/ GH     No cardiopulmonary history.  Patient does not smoke. ASA 81/meloxicam.  Diabetes mellitus on insulin     Patient has never got a prescription for tadalafil yet     Sildenafil 100 mg -did not help     No urologic family history,   Has never had any urologic surgery.        PVR     3/22   109  9/21   167  5/21   78     9/20  GFR > 60      PSA        2/19 0.92    Objective     Past Medical History:   Diagnosis Date    A-fib     Allergic rhinitis 12/27/2014    Will try Zyrtec, he didnt want to use a nasal spray.    Arthritis     Asthma     Cataract     left eye    CHF (congestive heart failure)     Cough 03/30/2016    PFT and CXR ordered.    Depression      PTSD    Diabetes     Elevated cholesterol     H/O psychiatric care 1999    PTSD    Hyperlipidemia 03/30/2016    Lipids ordered. Continue on current medication.    Hypertension     Hypothyroidism     Seasonal allergies     Shortness of breath     Sleep apnea     Stenosis of right carotid artery 02/19/2017    Will refer to vascular surgeon.    Syncope 02/19/2017    Type 2 diabetes mellitus     Upper respiratory infection 10/18/2015    Will treat cough with Hydromet, otherwise over the counter meds for treatment.       Past Surgical History:   Procedure Laterality Date    CATARACT EXTRACTION Right     x2    COLONOSCOPY      JOINT REPLACEMENT      REPLACEMENT TOTAL HIP ONCOLOGIC Right     RETINAL DETACHMENT REPAIR      TOE AMPUTATION Left 11/2017    Second phalaeng.    TOE OSTEOPHYTE REMOVAL           Current Outpatient Medications:     albuterol (PROVENTIL) (2.5 MG/3ML) 0.083% nebulizer solution, Take 2.5 mg by nebulization Every 4 (Four) Hours As Needed for Wheezing or Shortness of Air., Disp: , Rfl:     apixaban (Eliquis) 5 MG tablet tablet, Take 1 tablet by mouth Every 12 (Twelve) Hours., Disp: 180 tablet, Rfl: 3    aspirin EC 81 MG EC tablet, Take 1 tablet by mouth Daily. (Patient taking differently: Take 1 tablet by mouth Every Other Day.), Disp: 90 tablet, Rfl: 1    atorvastatin (LIPITOR) 80 MG tablet, Take 1 tablet by mouth Daily. (Patient taking differently: Take 0.5 tablets by mouth Daily.), Disp: 90 tablet, Rfl: 1    benzonatate (TESSALON) 100 MG capsule, Take 1-2 capsules by mouth 3 (Three) Times a Day As Needed for Cough., Disp: 90 capsule, Rfl: 1    buPROPion XL (WELLBUTRIN XL) 150 MG 24 hr tablet, Take 1 tablet by mouth Daily., Disp: , Rfl:     carvedilol (COREG) 12.5 MG tablet, Take 1 tablet by mouth 2 (Two) Times a Day., Disp: 180 tablet, Rfl: 3    carvedilol (COREG) 6.25 MG tablet, Take 1 tablet by mouth 2 (Two) Times a Day., Disp: 180 tablet, Rfl: 3    cetirizine (ZyrTEC Allergy) 10 MG tablet, Take 1  tablet by mouth Daily., Disp: 90 tablet, Rfl: 3    Cholecalciferol 25 MCG (1000 UT) tablet, Take 1 tablet by mouth Daily., Disp: 90 tablet, Rfl: 1    ezetimibe (ZETIA) 10 MG tablet, Take 1 tablet by mouth Daily., Disp: 90 tablet, Rfl: 1    finasteride (PROSCAR) 5 MG tablet, Take 1 tablet by mouth Daily., Disp: 90 tablet, Rfl: 3    fluticasone-salmeterol (ADVAIR HFA) 230-21 MCG/ACT inhaler, Inhale 2 puffs 2 (Two) Times a Day., Disp: 3 each, Rfl: 3    gabapentin (NEURONTIN) 600 MG tablet, Take 0.5 tablets by mouth Every Night., Disp: , Rfl:     guaiFENesin (MUCINEX) 600 MG 12 hr tablet, Take 2 tablets by mouth 2 (Two) Times a Day As Needed for Cough or Congestion., Disp: , Rfl:     insulin aspart (novoLOG) 100 UNIT/ML injection, Inject 24-26 Units under the skin into the appropriate area as directed 3 (Three) Times a Day Before Meals., Disp: , Rfl:     Insulin Glargine (Lantus SoloStar) 100 UNIT/ML injection pen, Inject 40-44 Units under the skin into the appropriate area as directed Every Night., Disp: 90 mL, Rfl: 1    levothyroxine (SYNTHROID, LEVOTHROID) 100 MCG tablet, Take 1 tablet by mouth Daily., Disp: , Rfl:     metFORMIN (GLUCOPHAGE) 500 MG tablet, Take 1 tablet by mouth 2 (Two) Times a Day With Meals., Disp: , Rfl:     olopatadine (PATANOL) 0.1 % ophthalmic solution, Administer 1 drop to both eyes Daily As Needed for Allergies., Disp: , Rfl:     omeprazole (priLOSEC) 20 MG capsule, Take 1 capsule by mouth Daily., Disp: 90 capsule, Rfl: 4    PARoxetine (PAXIL) 40 MG tablet, Take 1 tablet by mouth Every Evening., Disp: , Rfl:     prazosin (MINIPRESS) 1 MG capsule, Take 1 capsule by mouth Every Night., Disp: , Rfl:     sacubitril-valsartan (Entresto) 24-26 MG tablet, Take 1 tablet by mouth 2 (Two) Times a Day. Take 0.5 pill AM, 1 pill in PM, Disp: 135 tablet, Rfl: 4    spironolactone (ALDACTONE) 25 MG tablet, Take 1 tablet by mouth Daily. Omit on Sundays, Disp: 90 tablet, Rfl: 1    traZODone (DESYREL) 100 MG  "tablet, Take 0.5 tablets by mouth At Night As Needed for Sleep., Disp: , Rfl:     Vibegron 75 MG tablet, Take 1 tablet by mouth Daily., Disp: 90 tablet, Rfl: 3    Allergies   Allergen Reactions    Latex Rash and Anaphylaxis    Latex, Natural Rubber Itching        Family History   Problem Relation Age of Onset    Heart disease Mother     Lupus Mother     Arthritis Mother     Kidney disease Sister        Social History     Socioeconomic History    Marital status: Single   Tobacco Use    Smoking status: Former     Types: Pipe     Quit date:      Years since quittin.5     Passive exposure: Past    Smokeless tobacco: Never    Tobacco comments:     Pt stated he smoked a pipe [id number]. Pt stated he smoked a pipe a couple times a day. Pt stated he mixed with tobacco and marijuana   Vaping Use    Vaping status: Never Used   Substance and Sexual Activity    Alcohol use: Yes     Alcohol/week: 7.0 standard drinks of alcohol     Types: 7 Cans of beer per week     Comment: occasional    Drug use: Not Currently     Types: Marijuana     Comment: quit     Sexual activity: Defer       Vital Signs:   Resp 18   Ht 170.2 cm (67.01\")   Wt 95.3 kg (210 lb)   BMI 32.88 kg/m²      Physical Exam     Result Review :   The following data was reviewed by: TENISHA Stack on 2025:  Results for orders placed or performed in visit on 25   Bladder Scan    Collection Time: 25  3:08 PM   Result Value Ref Range    Urine Volume 0ml    POC Urinalysis Dipstick, Automated    Collection Time: 25  3:14 PM    Specimen: Urine   Result Value Ref Range    Color Dark Yellow Yellow, Straw, Dark Yellow, Ashley    Clarity, UA Clear Clear    Specific Gravity  1.020 1.005 - 1.030    pH, Urine 6.0 5.0 - 8.0    Leukocytes Negative Negative    Nitrite, UA Negative Negative    Protein,  mg/dL (A) Negative mg/dL    Glucose,  mg/dL (A) Negative mg/dL    Ketones, UA Trace (A) Negative    Urobilinogen, UA Normal " Normal, 0.2 E.U./dL    Bilirubin Negative Negative    Blood, UA Small (A) Negative    Lot Number [id number]     Expiration Date 10/2,031        Bladder Scan interpretation 07/07/2025    Estimation of residual urine via BVI 3000 Verathon Bladder Scan  MA/nurse performing: stewart perez Ma   Residual Urine: 0 ml  Indication: Benign prostatic hyperplasia with urinary frequency    Urination frequency    Microhematuria   Position: Supine  Examination: Incremental scanning of the suprapubic area using 2.0 MHz transducer using copious amounts of acoustic gel.   Findings: An anechoic area was demonstrated which represented the bladder, with measurement of residual urine as noted. I inspected this myself. In that the residual urine was stable or insignificant, refer to plan for treatment and plan necessary at this time.           Procedures        Assessment and Plan    Diagnoses and all orders for this visit:    1. Benign prostatic hyperplasia with urinary frequency (Primary)  -     Bladder Scan  -     POC Urinalysis Dipstick, Automated  -     Vibegron 75 MG tablet; Take 1 tablet by mouth Daily.  Dispense: 90 tablet; Refill: 3    2. Urination frequency  -     Bladder Scan  -     POC Urinalysis Dipstick, Automated    3. Microhematuria  -     Urinalysis, Microscopic Only - Urine, Clean Catch; Future        Ua toady with some blood.     Will send for micro.    If more than 3-5 red blood cells per high-powered field we will proceed with upper and lower urinary tract evaluation to delineate etiology of microhematuria.    Continue gemtesa.     Yes behavioral modifications including more strict blood glucose control, eliminating excess alcohol caffeine or irritating bladder substances.        I spent 10 minutes caring for Giles on this date of service. This time includes time spent by me in the following activities:reviewing tests, obtaining and/or reviewing a separately obtained history, performing a medically appropriate examination  and/or evaluation , counseling and educating the patient/family/caregiver, ordering medications, tests, or procedures, and documenting information in the medical record  Follow Up   Return in about 4 months (around 11/7/2025) for Follow-up BPH with PVR, urinary urgency frequency, microhematuria.  Patient was given instructions and counseling regarding his condition or for health maintenance advice. Please see specific information pulled into the AVS if appropriate.         This document has been electronically signed by TENISHA Stack  July 7, 2025 15:25 EDT

## 2025-07-08 ENCOUNTER — RESULTS FOLLOW-UP (OUTPATIENT)
Dept: UROLOGY | Age: 80
End: 2025-07-08
Payer: MEDICARE

## 2025-07-08 NOTE — TELEPHONE ENCOUNTER
Per Tabatha, Please let the patient know that he has no concerning amount of red blood cells in his urine. We had discussed moving forward with a CT scan and a cystoscopy, however that is not indicated currently.    LVM for pt to call back for results. Okay to relay message to pt.

## 2025-07-09 NOTE — PROGRESS NOTES
Office Visit    Chief Complaint  Chronic heart failure with preserved ejection fraction    Subjective            Giles Donovan presents to [facility]  History of Present Illness  Mr. Donovan is a 79-year-old male that presented to the office for follow-up due to heart failure with reduced ejection fraction, hypertension, atrial fibrillation and hyperlipidemia.  Patient reports that he has been doing very well over the last couple months.  Short of air and pedal edema have both improved.  He has been able to increase his activity.  Pressures are slightly elevated today but per his home runs in the upper 90s low 100s majority of the time.  Patient denies any chest pain, dizziness, orthopnea or syncopal episodes.      Past Medical History:   Diagnosis Date    A-fib     Allergic rhinitis 12/27/2014    Will try Zyrtec, he didnt want to use a nasal spray.    Arthritis     Asthma     Cataract     left eye    CHF (congestive heart failure)     Cough 03/30/2016    PFT and CXR ordered.    Depression     PTSD    Diabetes     Elevated cholesterol     H/O psychiatric care 1999    PTSD    Hyperlipidemia 03/30/2016    Lipids ordered. Continue on current medication.    Hypertension     Hypothyroidism     Seasonal allergies     Shortness of breath     Sleep apnea     Stenosis of right carotid artery 02/19/2017    Will refer to vascular surgeon.    Syncope 02/19/2017    Type 2 diabetes mellitus     Upper respiratory infection 10/18/2015    Will treat cough with Hydromet, otherwise over the counter meds for treatment.       Allergies   Allergen Reactions    Latex Rash and Anaphylaxis    Latex, Natural Rubber Itching        Past Surgical History:   Procedure Laterality Date    CATARACT EXTRACTION Right     x2    COLONOSCOPY      JOINT REPLACEMENT      REPLACEMENT TOTAL HIP ONCOLOGIC Right     RETINAL DETACHMENT REPAIR      TOE AMPUTATION Left 11/2017    Second phalaeng.    TOE OSTEOPHYTE REMOVAL           Social History     Tobacco Use    Smoking status: Never     Passive exposure: Never    Smokeless tobacco: Never    Tobacco comments:     Pt stated he smoked a pipe [id number]. Pt stated he smoked a pipe a couple times a day. Pt stated he mixed with tobacco and marijuana   Vaping Use    Vaping status: Never Used   Substance Use Topics    Alcohol use: Yes     Alcohol/week: 7.0 standard drinks of alcohol     Types: 7 Cans of beer per week     Comment: occasional    Drug use: Not Currently     Types: Marijuana     Comment: quit 1978       Family History   Problem Relation Age of Onset    Heart disease Mother     Lupus Mother     Arthritis Mother     Kidney disease Sister         Prior to Admission medications    Medication Sig Start Date End Date Taking? Authorizing Provider   apixaban (Eliquis) 5 MG tablet tablet Take 1 tablet by mouth Every 12 (Twelve) Hours. 11/14/24  Yes MONIQUE Hallman MD   aspirin EC 81 MG EC tablet Take 1 tablet by mouth Daily.  Patient taking differently: Take 1 tablet by mouth Every Other Day. 5/22/24  Yes Morena Masters MD   atorvastatin (LIPITOR) 80 MG tablet Take 1 tablet by mouth Daily.  Patient taking differently: Take 0.5 tablets by mouth Daily. 8/9/22  Yes Cathy Ochoa MD   benzonatate (TESSALON) 100 MG capsule Take 1-2 capsules by mouth 3 (Three) Times a Day As Needed for Cough. 11/25/24  Yes Morena Masters MD   buPROPion XL (WELLBUTRIN XL) 150 MG 24 hr tablet Take 1 tablet by mouth Daily.   Yes ProviderKierra MD   carvedilol (COREG) 12.5 MG tablet Take 1.5 tablets by mouth 2 (Two) Times a Day.  Patient taking differently: Take 1 tablet by mouth 2 (Two) Times a Day. 7/30/24  Yes Talia Marcelo APRN   carvedilol (COREG) 6.25 MG tablet Take 1 tablet by mouth 2 (Two) Times a Day. 10/1/24  Yes Talia Marcelo APRN   cetirizine (ZyrTEC Allergy) 10 MG tablet Take 1 tablet by mouth Daily. 5/1/24  Yes Morena Masters MD   Cholecalciferol 25 MCG (1000 UT)  tablet  12/5/24  Yes Kierra Pitts MD   ezetimibe (ZETIA) 10 MG tablet Take 1 tablet by mouth Daily. 8/13/24  Yes Morena Masters MD   finasteride (PROSCAR) 5 MG tablet Take 1 tablet by mouth Daily. 7/2/24  Yes Tabatha Oconnell APRN   gabapentin (NEURONTIN) 600 MG tablet Take 0.5 tablets by mouth Every Night. 3/31/21  Yes Kierra Pitts MD   guaiFENesin (MUCINEX) 600 MG 12 hr tablet Take 2 tablets by mouth 2 (Two) Times a Day As Needed for Cough or Congestion.   Yes Kierra Pitts MD   insulin aspart (novoLOG) 100 UNIT/ML injection Inject 24-26 Units under the skin into the appropriate area as directed 3 (Three) Times a Day Before Meals.   Yes Kierra Pitts MD   Insulin Glargine (Lantus SoloStar) 100 UNIT/ML injection pen Inject 40-44 Units under the skin into the appropriate area as directed Every Night. 1/16/25  Yes Morena Masters MD   levothyroxine (SYNTHROID, LEVOTHROID) 100 MCG tablet Take 1 tablet by mouth Daily.   Yes Kierra Pitts MD   metFORMIN (GLUCOPHAGE) 500 MG tablet Take 1 tablet by mouth 2 (Two) Times a Day With Meals.   Yes Kierra Pitts MD   olopatadine (PATANOL) 0.1 % ophthalmic solution Administer 1 drop to both eyes Daily As Needed for Allergies.   Yes Kierra Pitts MD   omeprazole (priLOSEC) 20 MG capsule Take 1 capsule by mouth Daily. 10/9/23  Yes Issac Ortiz MD   PARoxetine (PAXIL) 40 MG tablet Take 1 tablet by mouth Every Evening. 4/5/21  Yes Kierra Pitts MD   prazosin (MINIPRESS) 1 MG capsule Take 1 capsule by mouth Every Night.   Yes Kierra Pitts MD   sacubitril-valsartan (Entresto) 24-26 MG tablet Take 0.5 tablets by mouth 2 (Two) Times a Day.  Patient taking differently: Take 0.5 tablets by mouth 2 (Two) Times a Day. 0.5 tablet in AM, 1 whole tablet in PM 5/31/24  Yes Talia Marcelo APRN   spironolactone (ALDACTONE) 25 MG tablet Take 1 tablet by mouth Daily. Omit on Sundays 6/27/24  Yes Davian  "TENISHA Reynolds   traZODone (DESYREL) 100 MG tablet Take 0.5 tablets by mouth At Night As Needed for Sleep. 4/22/22  Yes Provider, MD Kierra   albuterol (PROVENTIL) (2.5 MG/3ML) 0.083% nebulizer solution Take 2.5 mg by nebulization Every 4 (Four) Hours As Needed for Wheezing or Shortness of Air.    Provider, MD Kierra   fluticasone-salmeterol (ADVAIR HFA) 230-21 MCG/ACT inhaler Inhale 2 puffs 2 (Two) Times a Day. 10/11/24   Aisha Garcia APRN   miconazole (Micatin) 2 % cream Apply 1 Application topically to the appropriate area as directed 2 (Two) Times a Day. 6/4/24   Morena Masters MD   nystatin-triamcinolone (MYCOLOG) 761510-4.1 UNIT/GM-% ointment Apply to affected area 2 times daily 10/14/24   Tabatha Oconnell APRN   Vibegron 75 MG tablet Take 1 tablet by mouth Daily. 1/2/25   Tabatha Oconnell APRN        Review of Systems   Constitutional:  Negative for fatigue.   Respiratory:  Negative for cough and shortness of breath.    Cardiovascular:  Negative for chest pain, palpitations and leg swelling.   Neurological:  Negative for dizziness.        Symptom Course: Been Unchanged    Weight Trend: Fluctuating Minimally     Objective     /81   Pulse 70   Ht 170.2 cm (67.01\")   Wt 89.8 kg (198 lb)   BMI 31.00 kg/m²       Physical Exam  Constitutional:       General: He is awake.      Appearance: Normal appearance.   Neck:      Thyroid: No thyromegaly.      Vascular: No carotid bruit or JVD.   Cardiovascular:      Rate and Rhythm: Normal rate and regular rhythm.      Chest Wall: PMI is not displaced.      Pulses: Normal pulses.      Heart sounds: Normal heart sounds, S1 normal and S2 normal. No murmur heard.     No friction rub. No gallop. No S3 or S4 sounds.   Pulmonary:      Effort: Pulmonary effort is normal.      Breath sounds: Normal breath sounds and air entry. No wheezing, rhonchi or rales.   Abdominal:      General: Bowel sounds are normal.      Palpations: Abdomen is soft. There is " no mass.      Tenderness: There is no abdominal tenderness.   Musculoskeletal:      Cervical back: Neck supple.      Right lower leg: No edema.      Left lower leg: No edema.   Neurological:      Mental Status: He is alert and oriented to person, place, and time.   Psychiatric:         Mood and Affect: Mood normal.         Behavior: Behavior is cooperative.           Result Review :                      Lab Results   Component Value Date    PROBNP 454.9 07/30/2024    PROBNP 251.0 06/13/2024    PROBNP 770.0 05/31/2024     CMP          8/15/2024    10:34 9/10/2024    14:42 11/21/2024    09:20   CMP   Glucose 92  197  209    BUN 16  22  17    Creatinine 1.23  1.09  1.23    EGFR 60.5  69.9  60.1    Sodium 139  136  136    Potassium 4.5  5.0  4.9    Chloride 101  99  98    Calcium 9.3  9.4  9.8    Total Protein 7.0  7.2  7.0    Albumin 3.9  4.0  4.0    Globulin 3.1  3.2  3.0    Total Bilirubin 0.4  0.5  0.3    Alkaline Phosphatase 61  55  58    AST (SGOT) 53  26  24    ALT (SGPT) 60  27  25    Albumin/Globulin Ratio 1.3  1.3  1.3    BUN/Creatinine Ratio 13.0  20.2  13.8    Anion Gap 9.3  8.8  11.1      CBC w/diff          8/15/2024    10:34 9/10/2024    14:42 11/21/2024    09:20   CBC w/Diff   WBC 7.37  7.57  5.24    RBC 3.90  4.08  3.76    Hemoglobin 11.4  12.2  11.3    Hematocrit 35.5  37.5  35.4    MCV 91.0  91.9  94.1    MCH 29.2  29.9  30.1    MCHC 32.1  32.5  31.9    RDW 13.6  13.8  13.3    Platelets 213  200  173    Neutrophil Rel % 51.6  57.9  47.9    Immature Granulocyte Rel % 0.3  0.3  0.4    Lymphocyte Rel % 33.6  26.9  30.9    Monocyte Rel % 12.1  12.5  18.3    Eosinophil Rel % 1.9  2.0  1.9    Basophil Rel % 0.5  0.4  0.6       Lipid Panel          2/6/2024    11:48 6/13/2024    10:00   Lipid Panel   Total Cholesterol 113  97    Triglycerides 181  68    HDL Cholesterol 42  42    VLDL Cholesterol 30  15    LDL Cholesterol  41  40    LDL/HDL Ratio 0.83  0.99       Lab Results   Component Value Date    TSH  "1.780 03/13/2024    TSH 0.652 02/06/2024    TSH 0.653 12/20/2023      Lab Results   Component Value Date    FREET4 1.09 03/13/2024    FREET4 1.09 06/06/2023    FREET4 1.1 12/02/2020      No results found for: \"DDIMERQUANT\"  Magnesium   Date Value Ref Range Status   11/21/2024 1.9 1.6 - 2.4 mg/dL Final      No results found for: \"DIGOXIN\"   A1C Last 3 Results          2/6/2024    11:48 4/22/2024    09:34   HGBA1C Last 3 Results   Hemoglobin A1C 9.80  8.80           Results for orders placed during the hospital encounter of 09/16/23    Adult Transthoracic Echo Complete w/ Color, Spectral and Contrast if necessary per protocol    Interpretation Summary    Left ventricular systolic function is low normal. Calculated left ventricular EF = 45.1%    The left ventricular cavity is borderline dilated.    Left ventricular diastolic function is consistent with (grade I) impaired relaxation.    There is mild calcification of the aortic valve.        Assessment and Plan        Diagnoses and all orders for this visit:    1. Chronic heart failure with preserved ejection fraction (Primary)  Assessment & Plan:  Mr. Donovan has heart failure with preserved ejection fraction who is doing well over the last few months.  Short of air pedal edema have both improved.  He has been able to increase his activity.  Heart rate blood pressure are well-controlled.  Will continue carvedilol, Entresto, and spironolactone at the current doses.  Patient is unable to utilize SGLT2 due to ongoing urinary tract infections.    Continue all meds   Do blood work today   Do heart rate, blood pressure and weights     Orders:  -     CBC & Differential; Future  -     Comprehensive Metabolic Panel; Future  -     proBNP; Future  -     Magnesium; Future    2. Primary hypertension  Assessment & Plan:  Patient's blood pressure is slightly elevated today blood pressure at home log his systolic generally runs in the upper 90s to low 100s.  I am unable to titrate any " medications today.      3. Mixed hyperlipidemia  Assessment & Plan:  Patient utilizes atorvastatin 40 mg daily, continue same.      4. Paroxysmal atrial fibrillation  Assessment & Plan:  Has proximal atrial fibrillation.  His rate is controlled with current dose of carvedilol.  He does utilize Eliquis for stroke prevention.              Follow Up     Return for follow up with Dr Hallman .    Patient was given instructions and counseling regarding his condition or for health maintenance advice. Please see specific information pulled into the AVS if appropriate.     HEART FAIL CLIN [facility]   01/21/25 11:47 EST    Patient done with Dragon    Electronically signed by TENISHA Lopez, 01/21/25, 12:36 PM EST.    Spontaneous

## 2025-07-29 ENCOUNTER — TELEPHONE (OUTPATIENT)
Dept: UROLOGY | Age: 80
End: 2025-07-29
Payer: MEDICARE

## 2025-07-29 DIAGNOSIS — N40.1 BENIGN PROSTATIC HYPERPLASIA WITH URINARY FREQUENCY: ICD-10-CM

## 2025-07-29 DIAGNOSIS — R35.0 BENIGN PROSTATIC HYPERPLASIA WITH URINARY FREQUENCY: ICD-10-CM

## 2025-07-29 RX ORDER — FINASTERIDE 5 MG/1
5 TABLET, FILM COATED ORAL DAILY
Qty: 90 TABLET | Refills: 1 | Status: SHIPPED | OUTPATIENT
Start: 2025-07-29

## 2025-07-29 NOTE — TELEPHONE ENCOUNTER
Patients wife called and stated patient is out of the finasteride and needs a refill send to [facility] . Please advise!

## 2025-08-21 ENCOUNTER — OFFICE VISIT (OUTPATIENT)
Dept: CARDIOLOGY | Facility: CLINIC | Age: 80
End: 2025-08-21
Payer: MEDICARE

## 2025-08-21 VITALS
DIASTOLIC BLOOD PRESSURE: 56 MMHG | BODY MASS INDEX: 33.07 KG/M2 | WEIGHT: 210.7 LBS | OXYGEN SATURATION: 97 % | SYSTOLIC BLOOD PRESSURE: 124 MMHG | HEART RATE: 66 BPM | HEIGHT: 67 IN

## 2025-08-21 DIAGNOSIS — I48.0 PAROXYSMAL ATRIAL FIBRILLATION: ICD-10-CM

## 2025-08-21 DIAGNOSIS — I50.32 CHRONIC HEART FAILURE WITH PRESERVED EJECTION FRACTION: Primary | ICD-10-CM

## 2025-08-21 DIAGNOSIS — E78.2 MIXED HYPERLIPIDEMIA: ICD-10-CM

## 2025-08-21 DIAGNOSIS — I10 PRIMARY HYPERTENSION: ICD-10-CM

## 2025-08-21 RX ORDER — CARVEDILOL 6.25 MG/1
6.25 TABLET ORAL 2 TIMES DAILY
Qty: 180 TABLET | Refills: 3 | Status: SHIPPED | OUTPATIENT
Start: 2025-08-21

## 2025-08-21 RX ORDER — CARVEDILOL 12.5 MG/1
12.5 TABLET ORAL 2 TIMES DAILY
Qty: 180 TABLET | Refills: 3 | Status: SHIPPED | OUTPATIENT
Start: 2025-08-21

## 2025-08-23 ENCOUNTER — APPOINTMENT (OUTPATIENT)
Dept: CT IMAGING | Facility: HOSPITAL | Age: 80
End: 2025-08-23
Payer: MEDICARE

## 2025-08-23 ENCOUNTER — HOSPITAL ENCOUNTER (EMERGENCY)
Facility: HOSPITAL | Age: 80
Discharge: HOME | End: 2025-08-23
Attending: EMERGENCY MEDICINE
Payer: MEDICARE

## 2025-08-23 ENCOUNTER — APPOINTMENT (OUTPATIENT)
Dept: GENERAL RADIOLOGY | Facility: HOSPITAL | Age: 80
End: 2025-08-23
Payer: MEDICARE

## 2025-08-25 RX ORDER — SPIRONOLACTONE 25 MG/1
25 TABLET ORAL DAILY
Qty: 90 TABLET | Refills: 1 | Status: SHIPPED | OUTPATIENT
Start: 2025-08-25

## 2025-08-26 ENCOUNTER — TELEPHONE (OUTPATIENT)
Dept: CARDIOLOGY | Facility: CLINIC | Age: 80
End: 2025-08-26
Payer: MEDICARE